# Patient Record
Sex: MALE | Race: WHITE | NOT HISPANIC OR LATINO | Employment: OTHER | URBAN - METROPOLITAN AREA
[De-identification: names, ages, dates, MRNs, and addresses within clinical notes are randomized per-mention and may not be internally consistent; named-entity substitution may affect disease eponyms.]

---

## 2017-01-03 ENCOUNTER — OFFICE VISIT (OUTPATIENT)
Dept: INTERNAL MEDICINE | Facility: CLINIC | Age: 82
End: 2017-01-03
Payer: MEDICARE

## 2017-01-03 VITALS
BODY MASS INDEX: 27.93 KG/M2 | OXYGEN SATURATION: 95 % | TEMPERATURE: 97 F | HEART RATE: 105 BPM | HEIGHT: 73 IN | WEIGHT: 210.75 LBS | DIASTOLIC BLOOD PRESSURE: 64 MMHG | SYSTOLIC BLOOD PRESSURE: 166 MMHG

## 2017-01-03 DIAGNOSIS — M51.36 DDD (DEGENERATIVE DISC DISEASE), LUMBAR: ICD-10-CM

## 2017-01-03 DIAGNOSIS — N40.0 BENIGN NON-NODULAR PROSTATIC HYPERPLASIA WITHOUT LOWER URINARY TRACT SYMPTOMS: ICD-10-CM

## 2017-01-03 DIAGNOSIS — J44.9 CHRONIC OBSTRUCTIVE PULMONARY DISEASE, UNSPECIFIED COPD TYPE: ICD-10-CM

## 2017-01-03 DIAGNOSIS — I71.40 ABDOMINAL AORTIC ANEURYSM WITHOUT RUPTURE: ICD-10-CM

## 2017-01-03 DIAGNOSIS — I10 ESSENTIAL HYPERTENSION: Chronic | ICD-10-CM

## 2017-01-03 DIAGNOSIS — E78.00 PURE HYPERCHOLESTEROLEMIA: Primary | Chronic | ICD-10-CM

## 2017-01-03 DIAGNOSIS — E29.1 HYPOGONADISM IN MALE: Primary | ICD-10-CM

## 2017-01-03 PROCEDURE — 1157F ADVNC CARE PLAN IN RCRD: CPT | Mod: S$GLB,,, | Performed by: PEDIATRICS

## 2017-01-03 PROCEDURE — 99214 OFFICE O/P EST MOD 30 MIN: CPT | Mod: 25,S$GLB,, | Performed by: PEDIATRICS

## 2017-01-03 PROCEDURE — 1125F AMNT PAIN NOTED PAIN PRSNT: CPT | Mod: S$GLB,,, | Performed by: PEDIATRICS

## 2017-01-03 PROCEDURE — 1159F MED LIST DOCD IN RCRD: CPT | Mod: S$GLB,,, | Performed by: PEDIATRICS

## 2017-01-03 PROCEDURE — 99499 UNLISTED E&M SERVICE: CPT | Mod: S$GLB,,, | Performed by: PEDIATRICS

## 2017-01-03 PROCEDURE — 99999 PR PBB SHADOW E&M-EST. PATIENT-LVL III: CPT | Mod: PBBFAC,,, | Performed by: PEDIATRICS

## 2017-01-03 PROCEDURE — 3077F SYST BP >= 140 MM HG: CPT | Mod: S$GLB,,, | Performed by: PEDIATRICS

## 2017-01-03 PROCEDURE — 3078F DIAST BP <80 MM HG: CPT | Mod: S$GLB,,, | Performed by: PEDIATRICS

## 2017-01-03 PROCEDURE — 1160F RVW MEDS BY RX/DR IN RCRD: CPT | Mod: S$GLB,,, | Performed by: PEDIATRICS

## 2017-01-03 PROCEDURE — 96372 THER/PROPH/DIAG INJ SC/IM: CPT | Mod: S$GLB,,, | Performed by: PEDIATRICS

## 2017-01-03 RX ORDER — TESTOSTERONE CYPIONATE 200 MG/ML
200 INJECTION, SOLUTION INTRAMUSCULAR
Status: DISCONTINUED | OUTPATIENT
Start: 2017-01-03 | End: 2017-03-28

## 2017-01-03 RX ADMIN — TESTOSTERONE CYPIONATE 200 MG: 200 INJECTION, SOLUTION INTRAMUSCULAR at 10:01

## 2017-01-03 NOTE — MR AVS SNAPSHOT
[facility] Internal Medicine  [address] [place] [address]  Phone: [phone]  Fax: [phone]                  Chao Hawk   1/3/2017 10:20 AM   Office Visit    Description:  Male : 1932   Provider:  MONSTER Ureña Jr., MD   Department:  [facility] Internal Medicine           Reason for Visit     Follow-up           Diagnoses this Visit        Comments    Pure hypercholesterolemia    -  Primary     Essential hypertension         Chronic obstructive pulmonary disease, unspecified COPD type         Abdominal aortic aneurysm without rupture         DDD (degenerative disc disease), lumbar         Benign non-nodular prostatic hyperplasia without lower urinary tract symptoms                To Do List           Future Appointments        Provider Department Dept Phone    2017 7:45 AM LABORATORY, [facility] [phone]    2017 8:00 AM [facility] [phone]    7/3/2017 8:00 AM MONSTER Ureña Jr., MD [facility] Internal [facility] [phone]      Goals (5 Years of Data)     None      Follow-Up and Disposition     Return in about 6 months (around 7/3/2017).    Follow-up and Disposition History      [facility]     [facility] -  Assistance  Registered nurses in the [facility] provide clinical advisement, health education, appointment booking, and other advisory services.  Call for this free service at [phone].             Medications           Message regarding Medications     Verify the changes and/or additions to your medication regime listed below are the same as discussed with your clinician today.  If any of these changes or additions are incorrect, please notify your healthcare provider.        STOP taking these medications     gabapentin (NEURONTIN) 100 MG capsule Take 1 capsule (100 mg total) by mouth every evening. Take 45 min to 1 hour before bed as may cause drowsiness    FLOVENT  "DISKUS 100 mcg/actuation inhaler INHALE 1 PUFF TWICE DAILY --RINSE MOUTH OUT WITH WATER           Verify that the below list of medications is an accurate representation of the medications you are currently taking.  If none reported, the list may be blank. If incorrect, please contact your healthcare provider. Carry this list with you in case of emergency.           Current Medications     atorvastatin (LIPITOR) 40 MG tablet Take 1 tablet (40 mg total) by mouth once daily.    epinephrine (EPIPEN) 0.3 mg/0.3 mL (1:1,000) AtIn Inject 1 each into the muscle once.    hydrochlorothiazide (HYDRODIURIL) 25 MG tablet TAKE 1 TABLET EVERY DAY    lisinopril 10 MG tablet TAKE 1 TABLET ONE TIME DAILY    meloxicam (MOBIC) 7.5 MG tablet TAKE 1 TABLET ONE TIME DAILY    tamsulosin (FLOMAX) 0.4 mg Cp24 TAKE 1 CAPSULE EVERY MORNING    tramadol-acetaminophen 37.5-325 mg (ULTRACET) 37.5-325 mg Tab Take 1 tablet by mouth 2 (two) times daily as needed for Pain.           Clinical Reference Information           Vital Signs - Last Recorded  Most recent update: 1/3/2017 10:07 AM by Kala Hoskins LPN    BP Pulse Temp Ht    (!) 166/64 (BP Location: Left arm, Patient Position: Sitting, BP Method: Manual) 105 96.5 °F (35.8 °C) (Tympanic) 6' 1" (1.854 m)    Wt SpO2 BMI    95.6 kg (210 lb 12.2 oz) 95% 27.81 kg/m2      Blood Pressure          Most Recent Value    BP  (!)  166/64      Allergies as of 1/3/2017     Bee Sting  [Honey Bee Venom Protein]    Poison Ivy Extract    Penicillins      Immunizations Administered on Date of Encounter - 1/3/2017     None      Orders Placed During Today's Visit      Normal Orders This Visit    Assign HDMP Onboarding Questionnaire Series     Hypertension Digital Medicine (HDMP)  Enrollment Order     Future Labs/Procedures Expected by Expires    ALT (SGPT)  1/3/2017 3/4/2018    Lipid panel  1/3/2017 1/3/2018    US Abdominal Aorta  1/3/2017 1/3/2018      Hypertension Digital Medicine Program Information          "     As discussed, you could benefit from enrolling in the Hypertension Digital Medicine Program. The goal of the program is to help you effectively manage your high blood pressure through an appropriate balance of medication and lifestyle changes, all from the comfort of your own home. Effectively managed blood pressure reduces your risk of having a heart attack or a stroke in the future, so you can enjoy a long and healthy life. We want to make blood pressure control your goal.        What is the Hypertension Digital Medicine Program?  Finding the right balance in managing high blood pressure is different for every person, and we will tailor our treatment approach based on your individual needs using the most current evidence-based guidelines.  As a participant, you will be able to send home blood pressure readings, on your schedule, directly into your medical record at [facility]. I, along with a team of pharmacists, will monitor this data, help you adjust your medication(s) and/or make lifestyle recommendations to better manage your hypertension.       What are the requirements of the program?   Participating in the program is as easy as 1 - 2 - 3.   1. Smartphone - You must have your own smartphone to participate (either an iPhone or an Android phone such as Access UK, LumiGrow, HTC, NaturalMotion, SpringSource, or [employer] Biosystems).    2. [facility] account - [facility] offers a great way to connect through the online patient portal, [facility], which is free and provides you access to your [facility] medical record.  3. Digital blood pressure cuff - Using this blood pressure cuff that hooks up to your smartphone, you will be able to send in your home blood pressure readings to the Hypertension Digital Medicine Team. We have made arrangements to offer blood pressure cuffs at a discount for our programs participants.           What can I do to get started?   Complete the Hypertension Digital Medicine Patient Consent questionnaire already  "available in your [facility] account. To access and complete this questionnaire, either  - Use the [facility] website on a computer and select My Medical Record, then Questionnaires.  - Use the [employer] kim on your smartphone and select "Questionnaires".    Once you have given consent, additional onboarding questionnaires will be assigned to you to complete prior to starting the program. You must return to the "Questionnaires" page of your [facility] account to start the additional questionnaires.     How do I purchase a digital blood pressure cuff and obtain a discount?  [facility] has negotiated a discounted price from industry leading healthcare technology companies. Patients using an Apple iPhone can purchase an ecoATM Ease Blood Pressure cuff for $33 (originally $39.99). While supplies last, Android users can purchase the Logic Instrument Blood Pressure Monitor for $45 (originally $129.95).  Purchase locations will be sent once all onboarding questionnaires have been completed (see above).    If you have any questions regarding this program or would like more information, please visit our Hypertension Digital Medicine website ([email or web address]) or call Digital Medicine Patient Support at [phone].          "

## 2017-01-03 NOTE — PROGRESS NOTES
Subjective:        Patient ID: Chao Hawk is a 83 y.o. male.      Chief Complaint: Follow-up      HPI Comments: HTN: B/P normal, no HTNive symptoms  LIPIDS:following D&E, tolerating and compliant with med(s).    BPH quiet  COPD:Does well with meds. Flovent causes so mucous congestion.  Low T: fell off depo testosterone.  Back much improved with daily exercise and tylenol, not limiting daily activities.  AAA: u/s stable  LABS REVIEWED AND DISCUSSED WITH PATIENT      Review of Systems   Constitutional: Negative for fever and unexpected weight change.   HENT: Negative for congestion and rhinorrhea.   Eyes: Negative for discharge and redness.   Respiratory: Negative for cough and wheezing.   Cardiovascular: Negative for chest pain, palpitations and leg swelling.   Gastrointestinal: Negative for vomiting or pain. But he gets alternating diarrhea and constipation.   Endocrine: Negative for cold intolerance, heat intolerance, polydipsia, polyphagia and polyuria.   Genitourinary: Negative for decreased urine volume and difficulty urinating.   Musculoskeletal: Positive for back pain. Negative for joint swelling and arthralgias.   Skin: Negative for rash and wound.   Neurological: Negative for syncope and headaches.   Psychiatric/Behavioral: Negative for behavioral problems and sleep disturbance.       Objective:        Physical Exam   Constitutional: He is oriented to person, place, and time. He appears well-developed and well-nourished. No distress.   Neck: Trachea normal and normal range of motion. Neck supple. No JVD present. No thyromegaly present.   Cardiovascular: Normal rate, regular rhythm, S1 normal, S2 normal, normal heart sounds and normal pulses. Exam reveals no gallop and no friction rub.    No murmur heard.  Pulmonary/Chest: Effort normal and breath sounds normal. He has no wheezes. He has no rales.   Abdominal: Soft. Normal appearance and bowel sounds are normal. He exhibits no mass. There is no  hepatosplenomegaly. There is no tenderness. There is no rebound and no guarding.   Musculoskeletal: Normal range of motion. He exhibits no edema.   ROM is mildly restricted in lumbar flexion, no SLR pain, no muscle spasm.   Lymphadenopathy:   He has no cervical adenopathy.   Neurological: He is alert and oriented to person, place, and time. He has normal strength. Coordination and gait normal.   Skin: Skin is warm and intact. No rash noted.   Psychiatric: He has a normal mood and affect. His speech is normal and behavior is normal. Judgment and thought content normal.       Assessment:        1.  Chronic obstructive pulmonary disease, unspecified COPD type     2.  Low testosterone     3.  Essential hypertension     4.  Pure hypercholesterolemia     5.  Low back pain without sciatica, unspecified back pain laterality         Plan:        He is doing well overall. Continue meds and monthly depoT. Try to rinse mouth out with water, if not improved, try spiriva. D&E, weight loss. F/U 6 months with labs(u/s AAA yearly).

## 2017-01-19 ENCOUNTER — PATIENT MESSAGE (OUTPATIENT)
Dept: INTERNAL MEDICINE | Facility: CLINIC | Age: 82
End: 2017-01-19

## 2017-01-20 ENCOUNTER — PATIENT MESSAGE (OUTPATIENT)
Dept: INTERNAL MEDICINE | Facility: CLINIC | Age: 82
End: 2017-01-20

## 2017-01-20 ENCOUNTER — TELEPHONE (OUTPATIENT)
Dept: INTERNAL MEDICINE | Facility: CLINIC | Age: 82
End: 2017-01-20

## 2017-01-22 ENCOUNTER — PATIENT MESSAGE (OUTPATIENT)
Dept: INTERNAL MEDICINE | Facility: CLINIC | Age: 82
End: 2017-01-22

## 2017-01-30 ENCOUNTER — PATIENT MESSAGE (OUTPATIENT)
Dept: INTERNAL MEDICINE | Facility: CLINIC | Age: 82
End: 2017-01-30

## 2017-02-24 ENCOUNTER — PATIENT MESSAGE (OUTPATIENT)
Dept: INTERNAL MEDICINE | Facility: CLINIC | Age: 82
End: 2017-02-24

## 2017-02-24 DIAGNOSIS — J44.9 CHRONIC OBSTRUCTIVE PULMONARY DISEASE, UNSPECIFIED COPD TYPE: Primary | ICD-10-CM

## 2017-02-28 ENCOUNTER — OFFICE VISIT (OUTPATIENT)
Dept: INTERNAL MEDICINE | Facility: CLINIC | Age: 82
End: 2017-02-28
Payer: MEDICARE

## 2017-02-28 VITALS
WEIGHT: 206.25 LBS | SYSTOLIC BLOOD PRESSURE: 122 MMHG | DIASTOLIC BLOOD PRESSURE: 74 MMHG | BODY MASS INDEX: 27.33 KG/M2 | TEMPERATURE: 98 F | HEIGHT: 73 IN

## 2017-02-28 DIAGNOSIS — E78.00 PURE HYPERCHOLESTEROLEMIA: Chronic | ICD-10-CM

## 2017-02-28 DIAGNOSIS — M51.36 DDD (DEGENERATIVE DISC DISEASE), LUMBAR: ICD-10-CM

## 2017-02-28 DIAGNOSIS — R79.89 LOW TESTOSTERONE: ICD-10-CM

## 2017-02-28 DIAGNOSIS — I70.0 ATHEROSCLEROSIS OF AORTA: ICD-10-CM

## 2017-02-28 DIAGNOSIS — J44.9 CHRONIC OBSTRUCTIVE PULMONARY DISEASE, UNSPECIFIED COPD TYPE: Primary | ICD-10-CM

## 2017-02-28 DIAGNOSIS — N40.0 BENIGN NON-NODULAR PROSTATIC HYPERPLASIA WITHOUT LOWER URINARY TRACT SYMPTOMS: ICD-10-CM

## 2017-02-28 DIAGNOSIS — I10 ESSENTIAL HYPERTENSION: Chronic | ICD-10-CM

## 2017-02-28 DIAGNOSIS — I71.40 ABDOMINAL AORTIC ANEURYSM WITHOUT RUPTURE: ICD-10-CM

## 2017-02-28 PROCEDURE — 99213 OFFICE O/P EST LOW 20 MIN: CPT | Mod: 25,S$GLB,, | Performed by: PHYSICIAN ASSISTANT

## 2017-02-28 PROCEDURE — 1157F ADVNC CARE PLAN IN RCRD: CPT | Mod: S$GLB,,, | Performed by: PHYSICIAN ASSISTANT

## 2017-02-28 PROCEDURE — 99499 UNLISTED E&M SERVICE: CPT | Mod: S$GLB,,, | Performed by: PHYSICIAN ASSISTANT

## 2017-02-28 PROCEDURE — 3074F SYST BP LT 130 MM HG: CPT | Mod: S$GLB,,, | Performed by: PHYSICIAN ASSISTANT

## 2017-02-28 PROCEDURE — 99999 PR PBB SHADOW E&M-EST. PATIENT-LVL III: CPT | Mod: PBBFAC,,, | Performed by: PHYSICIAN ASSISTANT

## 2017-02-28 PROCEDURE — 1160F RVW MEDS BY RX/DR IN RCRD: CPT | Mod: S$GLB,,, | Performed by: PHYSICIAN ASSISTANT

## 2017-02-28 PROCEDURE — 3078F DIAST BP <80 MM HG: CPT | Mod: S$GLB,,, | Performed by: PHYSICIAN ASSISTANT

## 2017-02-28 PROCEDURE — 1159F MED LIST DOCD IN RCRD: CPT | Mod: S$GLB,,, | Performed by: PHYSICIAN ASSISTANT

## 2017-02-28 PROCEDURE — 96372 THER/PROPH/DIAG INJ SC/IM: CPT | Mod: S$GLB,,, | Performed by: PHYSICIAN ASSISTANT

## 2017-02-28 RX ADMIN — TESTOSTERONE CYPIONATE 200 MG: 200 INJECTION, SOLUTION INTRAMUSCULAR at 10:02

## 2017-02-28 NOTE — PROGRESS NOTES
"Chao Hawk presented for a  Medicare AWV and comprehensive Health Risk Assessment today. The following components were reviewed and updated:    · Medical history  · Family History  · Social history  · Allergies and Current Medications  · Health Risk Assessment  · Health Maintenance  · Care Team   He was called in by Dr. collado for his annual HRA exam.  He has no problems today.  He is supposed to get his testosterone shot this morning after this exam is done.  See Completed Assessments for Annual Wellness Visit within the encounter summary.       The following assessments were completed:  · Living Situation  · CAGE  · Depression Screening  · Timed Get Up and Go  · Whisper Test  · Cognitive Function Screening  · Nutrition Screening  · ADL Screening  · PAQ Screening    Vitals:    02/28/17 1002   BP: 122/74   BP Location: Right arm   Patient Position: Sitting   BP Method: Manual   Temp: 97.5 °F (36.4 °C)   TempSrc: Tympanic   Weight: 93.6 kg (206 lb 3.9 oz)   Height: 6' 1" (1.854 m)     Body mass index is 27.21 kg/(m^2).  Physical Exam   Constitutional: He is oriented to person, place, and time. He appears well-developed and well-nourished.   HENT:   Head: Normocephalic and atraumatic.   Right Ear: External ear normal.   Left Ear: External ear normal.   Nose: Nose normal.   Mouth/Throat: Oropharynx is clear and moist. No oropharyngeal exudate.   Eyes: Conjunctivae and EOM are normal. Pupils are equal, round, and reactive to light. No scleral icterus.   Neck: Normal range of motion. Neck supple. No JVD present. No tracheal deviation present. No thyromegaly present.   Cardiovascular: Normal rate, regular rhythm, normal heart sounds and intact distal pulses.  Exam reveals no gallop and no friction rub.    No murmur heard.  Pulmonary/Chest: Effort normal and breath sounds normal. No respiratory distress. He has no wheezes. He has no rales. He exhibits no tenderness.   Abdominal: Soft. Bowel sounds are normal. He exhibits " no mass. There is no tenderness. There is no rebound and no guarding.   Genitourinary:   Genitourinary Comments: This portion of the exam was not accomplished today.   Musculoskeletal: Normal range of motion. He exhibits no edema or tenderness.   Lymphadenopathy:     He has no cervical adenopathy.   Neurological: He is alert and oriented to person, place, and time. He has normal reflexes. He displays normal reflexes. No cranial nerve deficit. Coordination normal.   Skin: Skin is warm and dry. No rash noted. He is not diaphoretic. No erythema.   Psychiatric: He has a normal mood and affect. His behavior is normal. Judgment and thought content normal.   Nursing note and vitals reviewed.        Diagnoses and health risks identified today and associated recommendations/orders:    1. Chronic obstructive pulmonary disease, unspecified COPD type  This problem is adequately taking care of by his PCP Dr. Percy Ureña M.D.    2. Essential hypertension  Check answer to #1.    3. Atherosclerosis of aorta  Check answer to #1.    4. Abdominal aortic aneurysm without rupture  Check answer to #1.    5. Benign non-nodular prostatic hyperplasia without lower urinary tract symptoms  Check answer to #1.    6. Pure hypercholesterolemia  Check answer to #1.    7. DDD (degenerative disc disease), lumbar  Check answer to #1.    8. Low testosterone  Check answer to #1.      Provided Chao with a 5-10 year written screening schedule and personal prevention plan. Recommendations were developed using the USPSTF age appropriate recommendations. Education, counseling, and referrals were provided as needed. After Visit Summary printed and given to patient which includes a list of additional screenings\tests needed.    Dr. ureña keeps him up to date on everything necessary and there is no need to do any ancillary studies today at today's visit.    Return in about 6 months (around 8/28/2017).    Fausto Darling PA-C

## 2017-02-28 NOTE — MR AVS SNAPSHOT
[facility] Internal Medicine  [address] [place] [address]  Phone: [phone]  Fax: [phone]                  Chao Hawk   2017 10:00 AM   Office Visit    Description:  Male : 1932   Provider:  Fausto Darling PA-C   Department:  [facility] Internal Medicine           Reason for Visit     HRA           Diagnoses this Visit        Comments    Chronic obstructive pulmonary disease, unspecified COPD type    -  Primary     Essential hypertension         Atherosclerosis of aorta         Abdominal aortic aneurysm without rupture         Benign non-nodular prostatic hyperplasia without lower urinary tract symptoms         Pure hypercholesterolemia         DDD (degenerative disc disease), lumbar         Low testosterone                To Do List           Future Appointments        Provider Department Dept Phone    3/28/2017 9:30 AM INTERNAL MEDICINE NURSE, [facility] [phone]    2017 7:45 AM LAB[facility]ATORY, [facility] [phone]    2017 8:00 AM [facility] [phone]    7/3/2017 8:00 AM MONSTER Ureña Jr., MD [facility] [phone]      Goals (5 Years of Data)     None      Follow-Up and Disposition     Discussed this visit Return in about 6 months (around 2017).      [facility]     [facility] Nurse [facility] Line - 24/7 Assistance  Registered nurses in the [facility] provide clinical advisement, health education, appointment booking, and other advisory services.  Call for this free service at [phone].             Medications           Message regarding Medications     Verify the changes and/or additions to your medication regime listed below are the same as discussed with your clinician today.  If any of these changes or additions are incorrect, please notify your healthcare provider.             Verify that the below list of medications is an accurate  representation of the medications you are currently taking.  If none reported, the list may be blank. If incorrect, please contact your healthcare provider. Carry this list with you in case of emergency.           Current Medications     atorvastatin (LIPITOR) 40 MG tablet Take 1 tablet (40 mg total) by mouth once daily.    beclomethasone (QVAR) 80 mcg/actuation Aero Inhale 1 puff into the lungs 2 (two) times daily. Controller    epinephrine (EPIPEN) 0.3 mg/0.3 mL (1:1,000) AtIn Inject 1 each into the muscle once.    hydrochlorothiazide (HYDRODIURIL) 25 MG tablet TAKE 1 TABLET EVERY DAY    lisinopril 10 MG tablet TAKE 1 TABLET ONE TIME DAILY    meloxicam (MOBIC) 7.5 MG tablet TAKE 1 TABLET ONE TIME DAILY    tamsulosin (FLOMAX) 0.4 mg Cp24 TAKE 1 CAPSULE EVERY MORNING           Clinical Reference Information           Your Vitals Were     BP                   122/74 (BP Location: Right arm, Patient Position: Sitting, BP Method: Manual)           Blood Pressure          Most Recent Value    BP  122/74      Allergies as of 2/28/2017     Bee Sting  [Allergen Ext-venom-honey Bee]    Poison Ivy Extract    Penicillins      Immunizations Administered on Date of Encounter - 2/28/2017     None      Administrations This Visit     testosterone cypionate injection 200 mg     Admin Date Action Dose Route Administered By             02/28/2017 Given 200 mg Intramuscular Kala Hoskins LPN                      Language Assistance Services     ATTENTION: Language assistance services are available, free of charge. Please call [phone].      ATENCIÓN: Si habla español, tiene a han disposición servicios gratuitos de asistencia lingüística. Llame al [phone].     CHÚ Ý: N?u b?n nói Ti?ng Vi?t, có các d?ch v? h? tr? ngôn ng? mi?n phí dành cho b?n. G?i s? [phone].         [facility] complies with applicable Federal civil rights laws and does not discriminate on the basis of race, color, national origin,  age, disability, or sex.

## 2017-02-28 NOTE — PROGRESS NOTES
Testosterone injection given. Patient tolerated well. Advised to wait 15min in lobby to check reaction. He verbalized understanding.

## 2017-03-02 RX ORDER — FLUTICASONE PROPIONATE 110 UG/1
1 AEROSOL, METERED RESPIRATORY (INHALATION) 2 TIMES DAILY
Qty: 36 G | Refills: 3 | Status: SHIPPED | OUTPATIENT
Start: 2017-03-02 | End: 2018-01-17

## 2017-03-10 ENCOUNTER — PATIENT MESSAGE (OUTPATIENT)
Dept: INTERNAL MEDICINE | Facility: CLINIC | Age: 82
End: 2017-03-10

## 2017-03-27 ENCOUNTER — PATIENT MESSAGE (OUTPATIENT)
Dept: INTERNAL MEDICINE | Facility: CLINIC | Age: 82
End: 2017-03-27

## 2017-03-27 DIAGNOSIS — G57.00 PYRIFORMIS SYNDROME, UNSPECIFIED LATERALITY: Primary | ICD-10-CM

## 2017-03-27 RX ORDER — CELECOXIB 100 MG/1
100 CAPSULE ORAL 2 TIMES DAILY
Qty: 60 CAPSULE | Refills: 1 | Status: SHIPPED | OUTPATIENT
Start: 2017-03-27 | End: 2017-05-16

## 2017-03-28 ENCOUNTER — CLINICAL SUPPORT (OUTPATIENT)
Dept: INTERNAL MEDICINE | Facility: CLINIC | Age: 82
End: 2017-03-28
Payer: MEDICARE

## 2017-03-28 DIAGNOSIS — E29.1 HYPOGONADISM IN MALE: ICD-10-CM

## 2017-03-28 PROCEDURE — 96372 THER/PROPH/DIAG INJ SC/IM: CPT | Mod: S$GLB,,, | Performed by: PEDIATRICS

## 2017-03-28 PROCEDURE — 99999 PR PBB SHADOW E&M-EST. PATIENT-LVL II: CPT | Mod: PBBFAC,,,

## 2017-03-28 RX ORDER — TESTOSTERONE CYPIONATE 200 MG/ML
200 INJECTION, SOLUTION INTRAMUSCULAR
Status: DISCONTINUED | OUTPATIENT
Start: 2017-03-28 | End: 2017-07-03

## 2017-03-28 RX ADMIN — TESTOSTERONE CYPIONATE 200 MG: 200 INJECTION, SOLUTION INTRAMUSCULAR at 09:03

## 2017-04-27 ENCOUNTER — TELEPHONE (OUTPATIENT)
Dept: INTERNAL MEDICINE | Facility: CLINIC | Age: 82
End: 2017-04-27

## 2017-04-27 NOTE — TELEPHONE ENCOUNTER
Returned call to pt, says he's usually in pain after his therapy session, and would like to reschedule today's nurse visit to tomorrow. I advised him that I would not be in the office tomorrow to administer his injection, and offered Tuesday. He states that he has another therapy session on Tuesday as well. Called pt to inform him that Sonja has agreed to give injection. He verbalized understanding.//rlt

## 2017-04-27 NOTE — TELEPHONE ENCOUNTER
----- Message from Benja Shah sent at 4/27/2017  8:16 AM CDT -----  Contact: pt  He's calling in regards to rescheduling his nurses visit Monday 5/1/17, please advise, [phone] (home)

## 2017-04-28 ENCOUNTER — CLINICAL SUPPORT (OUTPATIENT)
Dept: INTERNAL MEDICINE | Facility: CLINIC | Age: 82
End: 2017-04-28
Payer: MEDICARE

## 2017-04-28 PROCEDURE — 99999 PR PBB SHADOW E&M-EST. PATIENT-LVL II: CPT | Mod: PBBFAC,,,

## 2017-04-28 PROCEDURE — 96372 THER/PROPH/DIAG INJ SC/IM: CPT | Mod: S$GLB,,, | Performed by: PEDIATRICS

## 2017-04-28 RX ADMIN — TESTOSTERONE CYPIONATE 200 MG: 200 INJECTION, SOLUTION INTRAMUSCULAR at 09:04

## 2017-04-28 NOTE — PROGRESS NOTES
Patient came in for depo testosterone. Administered injection. Instructed patient to wait 15 minutes for any adverse reactions. Patient verbalized understanding.

## 2017-05-02 ENCOUNTER — PATIENT MESSAGE (OUTPATIENT)
Dept: INTERNAL MEDICINE | Facility: CLINIC | Age: 82
End: 2017-05-02

## 2017-05-04 ENCOUNTER — OFFICE VISIT (OUTPATIENT)
Dept: INTERNAL MEDICINE | Facility: CLINIC | Age: 82
End: 2017-05-04
Payer: MEDICARE

## 2017-05-04 VITALS
WEIGHT: 195.31 LBS | TEMPERATURE: 97 F | BODY MASS INDEX: 25.88 KG/M2 | SYSTOLIC BLOOD PRESSURE: 126 MMHG | OXYGEN SATURATION: 98 % | HEART RATE: 91 BPM | DIASTOLIC BLOOD PRESSURE: 80 MMHG | HEIGHT: 73 IN

## 2017-05-04 DIAGNOSIS — E78.5 HYPERLIPIDEMIA, UNSPECIFIED HYPERLIPIDEMIA TYPE: ICD-10-CM

## 2017-05-04 DIAGNOSIS — M25.551 PAIN OF RIGHT HIP JOINT: Primary | ICD-10-CM

## 2017-05-04 PROCEDURE — 99499 UNLISTED E&M SERVICE: CPT | Mod: S$GLB,,, | Performed by: PEDIATRICS

## 2017-05-04 PROCEDURE — 99999 PR PBB SHADOW E&M-EST. PATIENT-LVL III: CPT | Mod: PBBFAC,,, | Performed by: PEDIATRICS

## 2017-05-04 PROCEDURE — 1157F ADVNC CARE PLAN IN RCRD: CPT | Mod: 8P,S$GLB,, | Performed by: PEDIATRICS

## 2017-05-04 PROCEDURE — 1125F AMNT PAIN NOTED PAIN PRSNT: CPT | Mod: S$GLB,,, | Performed by: PEDIATRICS

## 2017-05-04 PROCEDURE — 1160F RVW MEDS BY RX/DR IN RCRD: CPT | Mod: S$GLB,,, | Performed by: PEDIATRICS

## 2017-05-04 PROCEDURE — 1159F MED LIST DOCD IN RCRD: CPT | Mod: S$GLB,,, | Performed by: PEDIATRICS

## 2017-05-04 PROCEDURE — 3079F DIAST BP 80-89 MM HG: CPT | Mod: S$GLB,,, | Performed by: PEDIATRICS

## 2017-05-04 PROCEDURE — 3074F SYST BP LT 130 MM HG: CPT | Mod: S$GLB,,, | Performed by: PEDIATRICS

## 2017-05-04 PROCEDURE — 99213 OFFICE O/P EST LOW 20 MIN: CPT | Mod: S$GLB,,, | Performed by: PEDIATRICS

## 2017-05-04 RX ORDER — METHOCARBAMOL 500 MG/1
500 TABLET, FILM COATED ORAL 3 TIMES DAILY PRN
Qty: 40 TABLET | Refills: 0 | Status: SHIPPED | OUTPATIENT
Start: 2017-05-04 | End: 2017-05-16

## 2017-05-04 RX ORDER — ATORVASTATIN CALCIUM 40 MG/1
40 TABLET, FILM COATED ORAL DAILY
Qty: 90 TABLET | Refills: 3 | Status: SHIPPED | OUTPATIENT
Start: 2017-05-04 | End: 2018-01-17 | Stop reason: SDUPTHER

## 2017-05-04 NOTE — PROGRESS NOTES
Subjective:       Patient ID: Chao Hawk is a 85 y.o. male.    Chief Complaint: Generalized pain    HPI Comments: He has been in PT for his back and hips. Friday, PT was aggressively working and he developed right and lesser degree left hip pain at base of leg. Back leg muscles and posterior knees hurt. Stepping, sitting worsens. Interferes with sleep.    Review of Systems   Genitourinary: Negative for dysuria, frequency and urgency.   Musculoskeletal: Positive for arthralgias, back pain and gait problem.       Objective:      Physical Exam   Constitutional: He is oriented to person, place, and time. He appears well-developed and well-nourished. No distress.   Musculoskeletal: He exhibits no edema.   With his 10 pound weight loss, he has lost fat pads of buttocks. He is tender at posterior hip at ischial tuberosity. This mimics pain. ROM good, but flexion of hip illicits pain.   Neurological: He is alert and oriented to person, place, and time.   Psychiatric: He has a normal mood and affect. His behavior is normal. Judgment and thought content normal.       Assessment:       1. Pain of right hip joint    2. Hyperlipidemia, unspecified hyperlipidemia type        Plan:       Pain of right hip joint  -     methocarbamol (ROBAXIN) 500 MG Tab; Take 1 tablet (500 mg total) by mouth 3 (three) times daily as needed.  Dispense: 40 tablet; Refill: 0    Hyperlipidemia, unspecified hyperlipidemia type  -     atorvastatin (LIPITOR) 40 MG tablet; Take 1 tablet (40 mg total) by mouth once daily.  Dispense: 90 tablet; Refill: 3    Padding when sitting. See PT for needling and add muscle relaxant. If not better then xray.

## 2017-05-04 NOTE — MR AVS SNAPSHOT
[facility]  [address] [place] [address]  Phone: [phone]  Fax: [phone]                  Chao Hawk   2017 2:40 PM   Office Visit    Description:  Male : 1932   Provider:  MONSTER Bell Jr., MD   Department:  [facility] - Internal Medicine           Reason for Visit     Generalized pain           Diagnoses this Visit        Comments    Pain of right hip joint    -  Primary     Hyperlipidemia, unspecified hyperlipidemia type                To Do List           Future Appointments        Provider Department Dept Phone    2017 9:30 AM NURSE SCHEDULE, [facility] [phone]    2017 9:30 AM NURSE SCHEDULE, [facility] [phone]    2017 7:45 AM LABORATORY, [facility] [phone]    2017 8:00 AM [facility] [phone]    7/3/2017 8:00 AM MONSTER Bell Jr., MD [facility] [phone]      Goals (5 Years of Data)     None      Follow-Up and Disposition     Follow-up and Disposition History       These Medications        Disp Refills Start End    methocarbamol (ROBAXIN) 500 MG Tab 40 tablet 0 2017    Take 1 tablet (500 mg total) by mouth 3 (three) times daily as needed. - Oral    [id number] [place] [facility] [address] Ph #: [phone]       atorvastatin (LIPITOR) 40 MG tablet 90 tablet 3 2017    Take 1 tablet (40 mg total) by mouth once daily. - Oral    Pharmacy: [facility] [address] [place] [address] Ph #: [phone]         [facility]     [facility] -  Assistance  Unless otherwise directed by your provider, please contact [facility], our nurse care line that is available for  assistance.     Registered nurses in the [facility] Center provide: appointment scheduling,  "clinical advisement, health education, and other advisory services.  Call: [phone] (toll free)               Medications           Message regarding Medications     Verify the changes and/or additions to your medication regime listed below are the same as discussed with your clinician today.  If any of these changes or additions are incorrect, please notify your healthcare provider.        START taking these NEW medications        Refills    methocarbamol (ROBAXIN) 500 MG Tab 0    Sig: Take 1 tablet (500 mg total) by mouth 3 (three) times daily as needed.    Class: Normal    Route: Oral           Verify that the below list of medications is an accurate representation of the medications you are currently taking.  If none reported, the list may be blank. If incorrect, please contact your healthcare provider. Carry this list with you in case of emergency.           Current Medications     atorvastatin (LIPITOR) 40 MG tablet Take 1 tablet (40 mg total) by mouth once daily.    celecoxib (CELEBREX) 100 MG capsule Take 1 capsule (100 mg total) by mouth 2 (two) times daily.    epinephrine (EPIPEN) 0.3 mg/0.3 mL (1:1,000) AtIn Inject 1 each into the muscle once.    fluticasone (FLOVENT HFA) 110 mcg/actuation inhaler Inhale 1 puff into the lungs 2 (two) times daily. Controller    hydrochlorothiazide (HYDRODIURIL) 25 MG tablet TAKE 1 TABLET EVERY DAY    lisinopril 10 MG tablet TAKE 1 TABLET ONE TIME DAILY    tamsulosin (FLOMAX) 0.4 mg Cp24 TAKE 1 CAPSULE EVERY MORNING    methocarbamol (ROBAXIN) 500 MG Tab Take 1 tablet (500 mg total) by mouth 3 (three) times daily as needed.           Clinical Reference Information           Your Vitals Were     BP Pulse Temp Height    126/80 (BP Location: Left arm, Patient Position: Sitting, BP Method: Manual) 91 97.4 °F (36.3 °C) (Tympanic) 6' 1" (1.854 m)    Weight SpO2 BMI    88.6 kg (195 lb 5.2 oz) 98% 25.77 kg/m2      Blood Pressure          Most Recent Value    BP  126/80    "   Allergies as of 5/4/2017     Bee Sting  [Allergen Ext-venom-honey Bee]    Poison Ivy Extract    Penicillins      Immunizations Administered on Date of Encounter - 5/4/2017     None      Language Assistance Services     ATTENTION: Language assistance services are available, free of charge. Please call [phone].      ATENCIÓN: Si estefanía west, tiene a han disposición servicios gratuitos de asistencia lingüística. Llame al [phone].     CHÚ Ý: N?u b?n nói Ti?ng Vi?t, có các d?ch v? h? tr? ngôn ng? mi?n phí dành cho b?n. G?i s? [phone].         [facility] complies with applicable Federal civil rights laws and does not discriminate on the basis of race, color, national origin, age, disability, or sex.

## 2017-05-15 ENCOUNTER — PATIENT MESSAGE (OUTPATIENT)
Dept: INTERNAL MEDICINE | Facility: CLINIC | Age: 82
End: 2017-05-15

## 2017-05-15 DIAGNOSIS — M51.36 DDD (DEGENERATIVE DISC DISEASE), LUMBAR: Primary | ICD-10-CM

## 2017-05-16 ENCOUNTER — PATIENT MESSAGE (OUTPATIENT)
Dept: INTERNAL MEDICINE | Facility: CLINIC | Age: 82
End: 2017-05-16

## 2017-05-16 ENCOUNTER — TELEPHONE (OUTPATIENT)
Dept: INTERNAL MEDICINE | Facility: CLINIC | Age: 82
End: 2017-05-16

## 2017-05-16 RX ORDER — ETODOLAC 400 MG/1
400 TABLET, FILM COATED ORAL 2 TIMES DAILY
Qty: 60 TABLET | Refills: 0 | Status: SHIPPED | OUTPATIENT
Start: 2017-05-16 | End: 2017-06-15

## 2017-05-16 RX ORDER — CYCLOBENZAPRINE HCL 10 MG
10 TABLET ORAL 3 TIMES DAILY PRN
Qty: 30 TABLET | Refills: 0 | Status: SHIPPED | OUTPATIENT
Start: 2017-05-16 | End: 2017-05-26

## 2017-05-16 NOTE — TELEPHONE ENCOUNTER
----- Message from Carmen Rangel sent at 5/16/2017  8:08 AM CDT -----  Contact: Marge, Pharmacist with [facility]   [phone] ext [phone]  Recd escripts received this morning came through as faxes instead without any information at all so she has no info at all to go by.

## 2017-05-16 NOTE — TELEPHONE ENCOUNTER
Returned call to pharmacy.  [facility] did not process correctly. Verbal order given for Flexeril and Lodine.//rlt

## 2017-05-18 ENCOUNTER — TELEPHONE (OUTPATIENT)
Dept: INTERNAL MEDICINE | Facility: CLINIC | Age: 82
End: 2017-05-18

## 2017-05-18 NOTE — TELEPHONE ENCOUNTER
Returned call to patient regarding referral to PM. Appt scheduled. He verbalized understanding of time/date.//rlt

## 2017-05-18 NOTE — TELEPHONE ENCOUNTER
----- Message from Asaf Jacobson sent at 5/18/2017 10:22 AM CDT -----  Contact: Pt   Pt is requesting to speak with the nurse in regards to getting him in with pain management./ Please advise [phone] (home)

## 2017-05-22 ENCOUNTER — OFFICE VISIT (OUTPATIENT)
Dept: PAIN MEDICINE | Facility: CLINIC | Age: 82
End: 2017-05-22
Payer: MEDICARE

## 2017-05-22 VITALS
WEIGHT: 195 LBS | DIASTOLIC BLOOD PRESSURE: 67 MMHG | BODY MASS INDEX: 25.84 KG/M2 | HEIGHT: 73 IN | SYSTOLIC BLOOD PRESSURE: 115 MMHG | HEART RATE: 108 BPM | RESPIRATION RATE: 16 BRPM

## 2017-05-22 DIAGNOSIS — M54.16 BILATERAL LUMBAR RADICULOPATHY: Primary | ICD-10-CM

## 2017-05-22 DIAGNOSIS — M51.36 DDD (DEGENERATIVE DISC DISEASE), LUMBAR: ICD-10-CM

## 2017-05-22 DIAGNOSIS — M48.061 STENOSIS, SPINAL, LUMBAR: ICD-10-CM

## 2017-05-22 DIAGNOSIS — M43.16 SPONDYLOLISTHESIS OF LUMBAR REGION: ICD-10-CM

## 2017-05-22 DIAGNOSIS — M47.817 SPONDYLOSIS OF LUMBOSACRAL REGION WITHOUT MYELOPATHY OR RADICULOPATHY: ICD-10-CM

## 2017-05-22 PROCEDURE — 1125F AMNT PAIN NOTED PAIN PRSNT: CPT | Mod: S$GLB,,, | Performed by: ANESTHESIOLOGY

## 2017-05-22 PROCEDURE — 99499 UNLISTED E&M SERVICE: CPT | Mod: S$GLB,,, | Performed by: ANESTHESIOLOGY

## 2017-05-22 PROCEDURE — 99213 OFFICE O/P EST LOW 20 MIN: CPT | Mod: S$GLB,,, | Performed by: ANESTHESIOLOGY

## 2017-05-22 PROCEDURE — 1160F RVW MEDS BY RX/DR IN RCRD: CPT | Mod: S$GLB,,, | Performed by: ANESTHESIOLOGY

## 2017-05-22 PROCEDURE — 99999 PR PBB SHADOW E&M-EST. PATIENT-LVL III: CPT | Mod: PBBFAC,,, | Performed by: ANESTHESIOLOGY

## 2017-05-22 PROCEDURE — 3074F SYST BP LT 130 MM HG: CPT | Mod: S$GLB,,, | Performed by: ANESTHESIOLOGY

## 2017-05-22 PROCEDURE — 1159F MED LIST DOCD IN RCRD: CPT | Mod: S$GLB,,, | Performed by: ANESTHESIOLOGY

## 2017-05-22 PROCEDURE — 3078F DIAST BP <80 MM HG: CPT | Mod: S$GLB,,, | Performed by: ANESTHESIOLOGY

## 2017-05-22 RX ORDER — TRAMADOL HYDROCHLORIDE AND ACETAMINOPHEN 37.5; 325 MG/1; MG/1
1 TABLET, FILM COATED ORAL 2 TIMES DAILY PRN
Qty: 60 TABLET | Refills: 0 | Status: SHIPPED | OUTPATIENT
Start: 2017-05-22 | End: 2017-06-21

## 2017-05-22 NOTE — LETTER
May 23, 2017      MONSTER Ureña Jr., MD  [address]  [place] [address]           [facility]  [address] [place] [address]  Phone: [phone]  Fax: [phone]          Patient: Chao Hawk   MR Number: [id number]   YOB: 1932   Date of Visit: 5/22/2017       Dear Dr. MONSTER Ureña Jr.:    Thank you for referring Chao Hawk to me for evaluation. Attached you will find relevant portions of my assessment and plan of care.    If you have questions, please do not hesitate to call me. I look forward to following Chao Hawk along with you.    Sincerely,    Landon Hurtado MD    Enclosure  CC:  No Recipients    If you would like to receive this communication electronically, please contact [email or web address] or [phone] to request more information on [employer] Link access.    For providers and/or their staff who would like to refer a patient to [facility], please contact us through our one-stop-shop provider referral line, [facility], at [phone].    If you feel you have received this communication in error or would no longer like to receive these types of communications, please e-mail [email or web address]

## 2017-05-22 NOTE — PROGRESS NOTES
Chief Pain Complaint:  Low Back Pain, Bilateral Leg Pain    History of Present Illness:  This patient is a 85 y.o. male who presents today complaining of the above noted pain/s. The patient describes the pain as follows.    - duration of pain: > 3 months   - timing: constant   - character: aching, sharp  - radiating, dermatomal: extends into bilateral lower extremities  - antecedent trauma, prior spinal surgery: no prior trauma, no prior spinal surgery   - pertinent negatives: No fever, No chills, No weight loss, No bladder dysfunction, No bowel dysfunction, No saddle anesthesia  - pertinent positives: generalized nonspecific Lower Extremity weakness bilaterally    - medications, other therapies tried (physical therapy, injections):     >> Tylenol, NSAIDs, ultracet, gabapentin    >> Has previously undergone Physical Therapy    >> Has previously undergone spinal injection/s, lumbar mbb's        Imaging / Labs / Studies (reviewed on 5/22/2017):      Results for orders placed during the hospital encounter of 01/08/16   MRI Lumbar Spine Without Contrast    Narrative Technique:  Multiplanar, multisequence MR images were performed of the lumbar spine obtained without contrast.   Comparison: None.   Results:  There is grade 1 spondylolisthesis of L4 on L5 along with a few millimeters of retrolisthesis of L3 on L4. The vertebral body heights are well maintained, with no fracture.  No marrow signal abnormality suspicious for an infiltrative process.    The conus medullaris terminates at approximately the mid body of L1.  There is an infrarenal abdominal aortic aneurysm that measures up to 3.5 cm in AP dimension and up to approximately 3.3 cm in length.  There is disk desiccation noted throughout the lumbar spine with severe disk space narrowing noted from L1-2 through the L5-S1 levels.  There appeared a couple renal cysts associated with either kidney.  L1-L2: Diffuse disk bulge and posterior spondylotic ridging and  bilateral facet arthropathy right greater than the left resulting in mild narrowing of the central canal.  There is also mild narrowing of the right neural foraminal canal.  The right lateral recess is also minimally narrowed.  L2-L3: Diffuse disk bulge posterior spondylotic ridging severe bilateral facet arthropathy and bilateral ligamentum flavum hypertrophy resulting in severe narrowing of the central canal and significant narrowing of either lateral recess.  The right neural foraminal canal is mildly narrowed.  L3-L4: Diffuse disk bulge severe bilateral facet arthropathy and mild bilateral ligamenta flava hypertrophy resulting in severe narrowing of the central canal.  The left neural foraminal canal is moderately to severely narrowed.  The right neural foraminal canal is mildly narrowed.  Both lateral recesses demonstrate significant narrowing as well right greater than the left.  L4-L5:  Diffuse disk bulge severe bilateral facet arthropathy and severe bilateral ligamentum flavum hypertrophy resulting in severe narrowing of the central canal.  The bilateral neural foraminal canals are moderately narrowed left greater than the right.  L5-S1:  Moderate to severe bilateral facet arthropathy along with a diffuse disk bulge resulting in no significant central canal narrowing.  The right neural foraminal canal is mildly to moderately narrowed.  The left neural foraminal canal is moderately to severely narrowed.              Results for orders placed during the hospital encounter of 08/30/13   CT Cervical Spine Without Contrast    Narrative Study:   Cervical spine CT without contrast.  History: Trauma, neck pain.  Findings:  No evidence for cervical spine fracture.  No abnormal prevertebral soft tissue swelling.  C2-3  disc: Right C2-3 facet arthropathy with mild posterior disk bulge and osteophyte formation.  Right C2-3 uncovertebral joint hypertrophy.  C3-4  disc: Bilateral C3-C4 facet arthropathy with mild  "degenerative disk space narrowing and disk bulge.  C4-5  disc: Left C4-5 facet arthropathy with mild disk bulge and degenerative spondylolisthesis of C4 on C5.  C5-6  disc: Left C5-6 facet of all but the with mild disk bulge and degenerative spondylolisthesis of C5 on C6.  C6-7  disc: Mild disk bulge and marginal osteophyte formation with bilateral facet arthropathy.  C7-T1 disc: Degenerative disk space narrowing with mild disk bulge and marginal osteophyte formation.  Left C7-T1 facet arthropathy.              Results for orders placed during the hospital encounter of 04/02/15   X-Ray Lumbar Spine AP And Lateral    Narrative Three views of the lumbar spine  Findings: The vertebral bodies demonstrate normal height.  There is mild levoscoliosis of the upper lumbar spine with apex at L1. There is a few millimeters of anterolisthesis of L4 on L5. There is moderate to severe disk space narrowing noted throughout the lumbar spine with multilevel spondylosis noted. prominent multilevel facet arthropathy is present.           Review of Systems:  CONSTITUTIONAL: patient denies any fever, chills, or weight loss  SKIN: patient denies any rash or itching  RESPIRATORY: patient reports dyspnea with exertion COPD  GASTROINTESTINAL: patient denies having any diarrhea, constipation, or bowel incontinence  GENITOURINARY: patient denies having any abnormal bladder function    MUSCULOSKELETAL:  - patient complains of the above noted pain/s (see chief pain complaint)    NEUROLOGICAL:   - pain as above  - strength in Lower extremities is decreased, BILATERALLY  - sensation in Lower extremities is abnormal, BILATERALLY  - patient denies any loss of bowel or bladder control      PSYCHIATRIC: patient denies any change in mood      Physical Exam:  /67 (BP Location: Right arm, Patient Position: Sitting, BP Method: Automatic)   Pulse 108   Resp 16   Ht 6' 1" (1.854 m)   Wt 88.5 kg (195 lb)   BMI 25.73 kg/m²  (reviewed on " 5/22/2017)  General: alert and oriented, in no apparent distress  Gait: normal gait  Skin: No rashes, No discoloration, No obvious lesions  HEENT: EOMI  Respiratory: respirations nonlabored    Musculoskeletal:  - Any pain on flexion, extension, rotation:    >> pain on extension and rotation  - Straight Leg Raise:     >> LEFT :: negative    >> RIGHT :: negative    - Any tenderness to palpation across paraspinal muscles, joints, bursae:     >> across lumbar paraspinals    Neuro:  - Extremity Strength:     >> LEFT :: 5/5    >> RIGHT :: 5/5  - Extremity Reflexes:    >> LEFT  :: 2+    >> RIGHT :: 2+    Psych:  Mood and affect is appropriate      Assessment:  Lumbar Spondylosis  Lumbar Degenerative Disc Disease  Spinal stenosis  Spondylolisthesis  Lumbar radiculopathy    Plan:  Patient presents complaining of low back and bilateral leg pain with some distal numbness.  I will update MRI, order b/l L4 tf debbie, refill ultracet which he has tolerated in the past.  Imaging / studies reviewed, detailed above.  I discussed in detail the risks, benefits, and alternatives to any and all potential treatment options.  All questions and concerns were fully addressed today in clinic.  More than 50% of the time was spent counseling / coordinating care.      >>Opioid Risk Tool:  12/5/2016 :: 0

## 2017-05-23 ENCOUNTER — HOSPITAL ENCOUNTER (OUTPATIENT)
Dept: RADIOLOGY | Facility: HOSPITAL | Age: 82
Discharge: HOME | End: 2017-05-23
Attending: ANESTHESIOLOGY
Payer: MEDICARE

## 2017-05-23 DIAGNOSIS — M54.16 LUMBAR RADICULOPATHY: Primary | ICD-10-CM

## 2017-05-23 DIAGNOSIS — M54.16 BILATERAL LUMBAR RADICULOPATHY: ICD-10-CM

## 2017-05-23 PROCEDURE — 72148 MRI LUMBAR SPINE W/O DYE: CPT | Mod: 26,,, | Performed by: RADIOLOGY

## 2017-05-23 PROCEDURE — 72148 MRI LUMBAR SPINE W/O DYE: CPT | Mod: TC,PO

## 2017-05-30 ENCOUNTER — PATIENT MESSAGE (OUTPATIENT)
Dept: INTERNAL MEDICINE | Facility: CLINIC | Age: 82
End: 2017-05-30

## 2017-05-31 ENCOUNTER — PATIENT MESSAGE (OUTPATIENT)
Dept: INTERNAL MEDICINE | Facility: CLINIC | Age: 82
End: 2017-05-31

## 2017-06-01 ENCOUNTER — HOSPITAL ENCOUNTER (OUTPATIENT)
Dept: RADIOLOGY | Facility: HOSPITAL | Age: 82
Discharge: HOME | End: 2017-06-01
Attending: PEDIATRICS
Payer: MEDICARE

## 2017-06-01 ENCOUNTER — OFFICE VISIT (OUTPATIENT)
Dept: INTERNAL MEDICINE | Facility: CLINIC | Age: 82
End: 2017-06-01
Payer: MEDICARE

## 2017-06-01 VITALS
SYSTOLIC BLOOD PRESSURE: 142 MMHG | TEMPERATURE: 97 F | HEIGHT: 73 IN | WEIGHT: 200.31 LBS | OXYGEN SATURATION: 94 % | HEART RATE: 102 BPM | BODY MASS INDEX: 26.55 KG/M2 | DIASTOLIC BLOOD PRESSURE: 80 MMHG

## 2017-06-01 DIAGNOSIS — R60.9 DEPENDENT EDEMA: Primary | ICD-10-CM

## 2017-06-01 DIAGNOSIS — R60.9 DEPENDENT EDEMA: ICD-10-CM

## 2017-06-01 PROBLEM — E66.3 OVERWEIGHT (BMI 25.0-29.9): Status: ACTIVE | Noted: 2017-06-01

## 2017-06-01 PROBLEM — E78.5 HYPERLIPIDEMIA: Chronic | Status: ACTIVE | Noted: 2017-06-01

## 2017-06-01 PROBLEM — E78.5 HYPERLIPIDEMIA: Status: ACTIVE | Noted: 2017-06-01

## 2017-06-01 PROCEDURE — 99213 OFFICE O/P EST LOW 20 MIN: CPT | Mod: S$GLB,,, | Performed by: PHYSICIAN ASSISTANT

## 2017-06-01 PROCEDURE — 71020 XR CHEST PA AND LATERAL: CPT | Mod: 26,,, | Performed by: RADIOLOGY

## 2017-06-01 PROCEDURE — 99999 PR PBB SHADOW E&M-EST. PATIENT-LVL III: CPT | Mod: PBBFAC,,, | Performed by: PHYSICIAN ASSISTANT

## 2017-06-01 PROCEDURE — 1125F AMNT PAIN NOTED PAIN PRSNT: CPT | Mod: S$GLB,,, | Performed by: PHYSICIAN ASSISTANT

## 2017-06-01 PROCEDURE — 1159F MED LIST DOCD IN RCRD: CPT | Mod: S$GLB,,, | Performed by: PHYSICIAN ASSISTANT

## 2017-06-01 PROCEDURE — 71020 XR CHEST PA AND LATERAL: CPT | Mod: TC,PO

## 2017-06-01 NOTE — PROGRESS NOTES
Subjective:       Patient ID: Chao Hawk is a 85 y.o.W/ male.    Chief Complaint: Edema (lower leg edema bilateral)    HPI         He comes in today in a wheelchair and is accompanied by his son who lives in [place] normally.  Patient has the above problem.  He first started out with a little bit of swelling in his left foot and ankle and that can have stopped and got better and now he has swelling in his right foot and his entire lower leg.  He does admit to eating some salty products.  He also admits to sitting in a chair with his feet on the floor a lot.  He has also fallen a couple of times in the past week because he just lost his balance.        He denies any problem with: Fever, chills, rigors, dyspnea of any modality, cough, asthma, CP, pleurisy, palpitations, tachyarrhythmia, retrosternal ache, sweats, diaphoresis, night sweats, and GI symptoms of nausea, vomiting, diarrhea, anorexia etc.  He has no history of ever having had a blood clot in either his lungs or his legs.  He and his family are little worried that he may have a blood clot in his right leg.    Review of Systems    Otherwise negative concerning the PULMONARY, CV, VASCULAR, GI, and  system review.    Objective:      Physical Exam    CHEST: Clear BS anterior to posterior but his breath sounds are probably only 40-50% of normal.  He is not retracting any rib muscles or using accessory muscles to breathe and he doesn't appear to be in any respiratory distress.  I don't hear any wheeze, rhonchi, or rales.  HEART: S1 is greater than S2.  RSR.  Pulse rate is 80 bpm and regular.  There were no asystoles heard.  He does have 2+ pitting edema from his knee all the way to his toes on the right side.  There is some erythema of his toes and right foot.  He does have a half plus dorsalis pedis pulse but I can't palpate his posterior tibialis.  LEFT LEG: He does have pitting edema of 1+ variety all the way up to about mid tibia.  There is no  inflammatory response on his left foot or ankle.  His ability to ambulate is almost nonexistent.  He's very weak has hard for him to stand at this point.    Assessment:       1. Dependent edema        Plan:     1.  Chastised about his salt intake and he is to stop using any salty foods or saltshaker.  He is to go to start using CoSalt or Ms Dash instead of his regular saltshaker.  2.  For the next few days, he is to increase his use of HCTZ 25 mg to be taken at 8 AM and 2 PM.  He's also to elevate his feet and ankles higher than his hip on the couch or the bed until the swelling goes down.  Should his leg become more red or hot to touch they're to contact me immediately.  3.  Get CXR PA lateral today.  4.  Follow-up Monday if no significant improvement in his swelling.

## 2017-06-07 ENCOUNTER — TELEPHONE (OUTPATIENT)
Dept: PAIN MEDICINE | Facility: CLINIC | Age: 82
End: 2017-06-07

## 2017-06-07 NOTE — TELEPHONE ENCOUNTER
Spoke with patient and reminded to stop *ASA tomorrow as discussed.  Patient acknowledged understanding.

## 2017-06-08 ENCOUNTER — OUTPATIENT CASE MANAGEMENT (OUTPATIENT)
Dept: ADMINISTRATIVE | Facility: OTHER | Age: 82
End: 2017-06-08

## 2017-06-08 NOTE — PROGRESS NOTES
Thank you for the referral.  Patient has been assigned to ELEANOR Larose for low risk screening for Outpatient Case Management.     Reason for referral: Low Risk ([employer] Patient)      Member is not eligible for the [employer] At Home program are you able to offer Regional support?    Have a great day!    Thank you.    Basilia Hobson  [facility]  Special Process Supporter      Thank you    Yamileth Mcgarry, SSC

## 2017-06-09 ENCOUNTER — OUTPATIENT CASE MANAGEMENT (OUTPATIENT)
Dept: ADMINISTRATIVE | Facility: OTHER | Age: 82
End: 2017-06-09

## 2017-06-09 NOTE — PROGRESS NOTES
This CSW received a referral on the above patient.   Reason for referral:Not eligible for [employer] at home  Name of the community resource that was provided:Emergency Alert System  Resource given to: Patient  via US Mail    CSW completed assessment with patient . CSW mailed resources to patient home . CSW also provided her contact information for any additional needs.

## 2017-06-12 ENCOUNTER — PATIENT MESSAGE (OUTPATIENT)
Dept: INTERNAL MEDICINE | Facility: CLINIC | Age: 82
End: 2017-06-12

## 2017-06-14 ENCOUNTER — HOSPITAL ENCOUNTER (OUTPATIENT)
Facility: HOSPITAL | Age: 82
Discharge: HOME | End: 2017-06-14
Attending: ANESTHESIOLOGY | Admitting: ANESTHESIOLOGY
Payer: MEDICARE

## 2017-06-14 ENCOUNTER — HOSPITAL ENCOUNTER (OUTPATIENT)
Dept: RADIOLOGY | Facility: HOSPITAL | Age: 82
Discharge: HOME | End: 2017-06-14
Attending: ANESTHESIOLOGY | Admitting: ANESTHESIOLOGY
Payer: MEDICARE

## 2017-06-14 ENCOUNTER — SURGERY (OUTPATIENT)
Age: 82
End: 2017-06-14

## 2017-06-14 VITALS
HEART RATE: 106 BPM | SYSTOLIC BLOOD PRESSURE: 151 MMHG | OXYGEN SATURATION: 97 % | HEIGHT: 72 IN | BODY MASS INDEX: 26.41 KG/M2 | DIASTOLIC BLOOD PRESSURE: 94 MMHG | RESPIRATION RATE: 14 BRPM | WEIGHT: 195 LBS

## 2017-06-14 DIAGNOSIS — M54.16 BILATERAL LUMBAR RADICULOPATHY: Primary | ICD-10-CM

## 2017-06-14 DIAGNOSIS — M54.16 BILATERAL LUMBAR RADICULOPATHY: ICD-10-CM

## 2017-06-14 DIAGNOSIS — M54.16 LUMBAR RADICULOPATHY: ICD-10-CM

## 2017-06-14 PROCEDURE — 64483 NJX AA&/STRD TFRM EPI L/S 1: CPT | Mod: 50

## 2017-06-14 PROCEDURE — 25000003 PHARM REV CODE 250

## 2017-06-14 PROCEDURE — 25500020 PHARM REV CODE 255

## 2017-06-14 PROCEDURE — 64483 NJX AA&/STRD TFRM EPI L/S 1: CPT | Mod: 50,,, | Performed by: ANESTHESIOLOGY

## 2017-06-14 PROCEDURE — 63600175 PHARM REV CODE 636 W HCPCS

## 2017-06-14 PROCEDURE — 63600175 PHARM REV CODE 636 W HCPCS: Performed by: ANESTHESIOLOGY

## 2017-06-14 PROCEDURE — 25000003 PHARM REV CODE 250: Performed by: ANESTHESIOLOGY

## 2017-06-14 RX ORDER — LIDOCAINE HYDROCHLORIDE 20 MG/ML
INJECTION, SOLUTION INFILTRATION; PERINEURAL
Status: DISCONTINUED | OUTPATIENT
Start: 2017-06-14 | End: 2017-06-14 | Stop reason: HOSPADM

## 2017-06-14 RX ORDER — DEXAMETHASONE SODIUM PHOSPHATE 4 MG/ML
INJECTION, SOLUTION INTRA-ARTICULAR; INTRALESIONAL; INTRAMUSCULAR; INTRAVENOUS; SOFT TISSUE
Status: DISCONTINUED | OUTPATIENT
Start: 2017-06-14 | End: 2017-06-14 | Stop reason: HOSPADM

## 2017-06-14 RX ADMIN — LIDOCAINE HYDROCHLORIDE 10 ML: 20 INJECTION, SOLUTION INFILTRATION; PERINEURAL at 10:06

## 2017-06-14 RX ADMIN — DEXAMETHASONE SODIUM PHOSPHATE 20 MG: 4 INJECTION, SOLUTION INTRA-ARTICULAR; INTRALESIONAL; INTRAMUSCULAR; INTRAVENOUS; SOFT TISSUE at 10:06

## 2017-06-14 NOTE — PLAN OF CARE
Problem: Patient Care Overview  Goal: Plan of Care Review  Outcome: Outcome(s) achieved Date Met: 06/14/17  Patient discharged home in stable condition via wheelchair with ride. Verbalized understanding of discharge instructions. Patient voiced no complaints at this time. Patient stood at side of bed, walked steps with no new motor or sensory deficits. Neurologically intact.

## 2017-06-14 NOTE — DISCHARGE SUMMARY
[facility]  Discharge Note       Description of Procedure: Lumbar Transforaminal Epidural Steroid Injection under Fluoroscopic Guidance    Procedure Date: 6/14/2017    Admit Date: 6/14/2017  Discharge Date: 6/14/2017     Attending Physician: Landon Hurtado   Discharge Provider: Landon Hurtado    Preoperative Diagnosis:   Active Hospital Problems    Diagnosis  POA    Lumbar radiculopathy [M54.16]  Yes     Priority: High      Resolved Hospital Problems    Diagnosis Date Resolved POA   No resolved problems to display.        Postoperative Diagnosis: as above, same as preoperative diagnosis    Discharged Condition: Stable    Hospital Course: Patient was admitted for an outpatient procedure. The procedure was tolerated well with no complications.    Final Diagnoses: Same as principal problem.    Disposition: Home, self-care.    Follow up/Patient Instructions:  Follow-up in clinic in 2-3 weeks.    Medications: No medications were prescribed today. The patient was advised to resume normal medication regimen without change.  Specific information was provided regarding restarting any anticoagulant/s.    Discharge Procedure Orders (must include Diet, Follow-up, Activity):  Light activity for the remainder of the day, resume normal activity tomorrow. Resume normal diet. Follow-up in clinic in 2-3 weeks.

## 2017-06-14 NOTE — OP NOTE
"Procedure: Lumbar Transforaminal Epidural Steroid Injection under Fluoroscopic Guidance (supraneural approach)    Level: L4/5     Side: Bilateral    PROCEDURE DATE: 6/14/2017    Pre-operative Diagnosis: Lumbar Radiculopathy  Post-operative Diagnosis: Lumbar Radiculopathy    Provider: Landon Hurtado MD  Assistant(s): None    Anesthesia: Local, No Sedation    >> 0 mg of VERSED    >> 0 mcg of FENTANYL     Indication: Low back pain with radiculopathy consistent with distribution of targeted nerve. Symptoms unresponsive to conservative treatments. Fluoroscopy was used to optimize visualization of needle placement and to maximize safety.     Procedure Description / Technique:  The patient was seen and identified in the preoperative area. Risks, benefits, complications, and alternatives were discussed with the patient. The patient agreed to proceed with the procedure and signed the consent. The site and side of the procedure was identified and marked. An IV was not placed for this procedure. The patient was taken to the procedural suite.    The patient was positioned in prone orientation on procedure table and a pillow was placed under the abdomen to reduce lumbar lordosis. A time out was performed prior to any intervention. The procedure, site, side, and allergies were stated and agreed to by all present. The lumbosacral area was widely prepped with ChloraPrep. The procedural site was draped in usual sterile fashion. Vital signs were closely monitored throughout this procedure. Conscious sedation was not used for this procedure.    The target area was visualized under fluoroscopy. The cephalocaudal angle of the fluoroscope was adjusted as to align the vertebral end plates. The fluoroscopic arm was rotated ipsilaterally to an angle of approximately 30 degrees until the "constanza dog" outline came into view and the tip of the inferior superior articular process pointed towards the midline, 6:00 position of the above pedicle. " "A 25 gauge 3.5 inch spinal needle was directed towards the "chin" of the "constanza dog" (adjacent to the pars interarticularis and inferior to the pedicle). The needle was advanced until OS was met at the inferior border of the pedicle / pars interface. The needle was adjusted so that it would pass inferior to the osseous border. The fluoroscope was then placed in the lateral position and the needle was slowly advanced until it rested in the posterior 1/3rd of the vertebral foramen. AP fluoroscopy was checked and the needle tip rested at the 6:00 position under the pedicle. No paresthesia was elicited during needle placement. With the needle tip in its final position, gentle aspiration was negative for blood and CSF. Omnipaque 240 (1 to 2 mL) was injected under live fluoroscopy. Microbore tubing was used for injection. There was no pain or paresthesia on injection. The contrast clearly delineated the targeted nerve root on AP fluoroscopy. No vascular uptake was seen. A solution containing 2 mL of 1% PF Lidocaine and 2 mL of Dexamethasone (10 mg/mL) was mixed and 2 mL was injected slowly at each level targeted. There was minimal resistance on injection. No pain or paresthesia was elicited on injection. The stylet was replaced and the needle was withdrawn intact. This procedure was performed for each of the above indicated levels.     Description of Findings: Not applicable    Prosthetic devices, grafts, tissues, or devices implanted: None    Specimen Removed: No    Estimated Blood Loss: minimal    COMPLICATIONS: None    DISPOSITION / PLANS: The patient was transferred to the recovery area in a stable condition for observation. The patient was reexamined prior to discharge. There was no evidence of acute neurologic injury following the procedure.  Patient was discharged from the recovery room after meeting discharge criteria. Home discharge instructions were given to the patient by the staff.    "

## 2017-06-23 ENCOUNTER — TELEPHONE (OUTPATIENT)
Dept: RADIOLOGY | Facility: HOSPITAL | Age: 82
End: 2017-06-23

## 2017-06-26 ENCOUNTER — HOSPITAL ENCOUNTER (OUTPATIENT)
Dept: RADIOLOGY | Facility: HOSPITAL | Age: 82
Discharge: HOME | End: 2017-06-26
Attending: PEDIATRICS
Payer: MEDICARE

## 2017-06-26 DIAGNOSIS — I71.40 ABDOMINAL AORTIC ANEURYSM WITHOUT RUPTURE: ICD-10-CM

## 2017-06-26 PROCEDURE — 76775 US EXAM ABDO BACK WALL LIM: CPT | Mod: 26,,, | Performed by: RADIOLOGY

## 2017-06-26 PROCEDURE — 76775 US EXAM ABDO BACK WALL LIM: CPT | Mod: TC,PO

## 2017-06-29 RX ORDER — TRAMADOL HYDROCHLORIDE AND ACETAMINOPHEN 37.5; 325 MG/1; MG/1
1 TABLET, FILM COATED ORAL 2 TIMES DAILY PRN
Qty: 60 TABLET | Refills: 1 | Status: SHIPPED | OUTPATIENT
Start: 2017-07-05 | End: 2017-07-03 | Stop reason: SDUPTHER

## 2017-07-03 ENCOUNTER — OFFICE VISIT (OUTPATIENT)
Dept: INTERNAL MEDICINE | Facility: CLINIC | Age: 82
End: 2017-07-03
Payer: MEDICARE

## 2017-07-03 VITALS
HEART RATE: 94 BPM | BODY MASS INDEX: 26.12 KG/M2 | DIASTOLIC BLOOD PRESSURE: 60 MMHG | WEIGHT: 192.88 LBS | HEIGHT: 72 IN | OXYGEN SATURATION: 95 % | SYSTOLIC BLOOD PRESSURE: 126 MMHG | TEMPERATURE: 97 F

## 2017-07-03 DIAGNOSIS — E29.1 HYPOGONADISM IN MALE: Primary | ICD-10-CM

## 2017-07-03 DIAGNOSIS — E29.1 HYPOGONADISM IN MALE: ICD-10-CM

## 2017-07-03 DIAGNOSIS — R60.0 LEG EDEMA, RIGHT: ICD-10-CM

## 2017-07-03 DIAGNOSIS — E78.00 PURE HYPERCHOLESTEROLEMIA: Chronic | ICD-10-CM

## 2017-07-03 DIAGNOSIS — R79.89 LOW TESTOSTERONE: ICD-10-CM

## 2017-07-03 DIAGNOSIS — I10 ESSENTIAL HYPERTENSION: Chronic | ICD-10-CM

## 2017-07-03 DIAGNOSIS — J44.9 CHRONIC OBSTRUCTIVE PULMONARY DISEASE, UNSPECIFIED COPD TYPE: Primary | Chronic | ICD-10-CM

## 2017-07-03 DIAGNOSIS — M51.36 DDD (DEGENERATIVE DISC DISEASE), LUMBAR: ICD-10-CM

## 2017-07-03 DIAGNOSIS — N40.0 BENIGN NON-NODULAR PROSTATIC HYPERPLASIA WITHOUT LOWER URINARY TRACT SYMPTOMS: Chronic | ICD-10-CM

## 2017-07-03 PROCEDURE — 99214 OFFICE O/P EST MOD 30 MIN: CPT | Mod: 25,S$GLB,, | Performed by: PEDIATRICS

## 2017-07-03 PROCEDURE — 96372 THER/PROPH/DIAG INJ SC/IM: CPT | Mod: S$GLB,,, | Performed by: PEDIATRICS

## 2017-07-03 PROCEDURE — 99999 PR PBB SHADOW E&M-EST. PATIENT-LVL III: CPT | Mod: PBBFAC,,, | Performed by: PEDIATRICS

## 2017-07-03 PROCEDURE — 1159F MED LIST DOCD IN RCRD: CPT | Mod: S$GLB,,, | Performed by: PEDIATRICS

## 2017-07-03 RX ORDER — TRAMADOL HYDROCHLORIDE AND ACETAMINOPHEN 37.5; 325 MG/1; MG/1
1 TABLET, FILM COATED ORAL 2 TIMES DAILY PRN
Qty: 60 TABLET | Refills: 1 | Status: SHIPPED | OUTPATIENT
Start: 2017-07-05 | End: 2017-08-04

## 2017-07-03 RX ORDER — FUROSEMIDE 40 MG/1
40 TABLET ORAL DAILY
Qty: 30 TABLET | Refills: 11 | Status: SHIPPED | OUTPATIENT
Start: 2017-07-03 | End: 2018-01-17

## 2017-07-03 RX ORDER — TESTOSTERONE CYPIONATE 200 MG/ML
200 INJECTION, SOLUTION INTRAMUSCULAR ONCE
Status: COMPLETED | OUTPATIENT
Start: 2017-07-03 | End: 2017-07-03

## 2017-07-03 RX ORDER — TESTOSTERONE CYPIONATE 200 MG/ML
200 INJECTION, SOLUTION INTRAMUSCULAR
Status: DISCONTINUED | OUTPATIENT
Start: 2017-07-26 | End: 2018-01-09

## 2017-07-03 RX ADMIN — TESTOSTERONE CYPIONATE 200 MG: 200 INJECTION, SOLUTION INTRAMUSCULAR at 09:07

## 2017-07-03 NOTE — PROGRESS NOTES
Subjective:       Patient ID: Chao Hawk is a 85 y.o. male.    Chief Complaint: Follow-up (6 mo w/lab)    HTN: B/P normal, no HTNive symptoms  LIPIDS:following D&E, tolerating and compliant with med(s).    BPH quiet  COPD:Does well with meds. Flovent causes so mucous congestion.  Low T: fell off depo testosterone.  Back  improved with daily exercise and ultracet and JANET by Dr Hurtado-50% improvement but still very limiting daily activities. Has to use walker, use hands to transfer. Fall risk.    Right leg still swollen, left less so.     LABS REVIEWED AND DISCUSSED WITH PATIENT  LABS REVIEWED AND DISCUSSED WITH PATIENT      Review of Systems   Constitutional: Negative for fever and unexpected weight change.   HENT: Negative for congestion and rhinorrhea.    Eyes: Negative for discharge and redness.   Respiratory: Negative for cough and wheezing.    Cardiovascular: Positive for leg swelling. Negative for chest pain and palpitations.   Gastrointestinal: Negative for constipation, diarrhea and vomiting.   Endocrine: Negative for cold intolerance, polydipsia, polyphagia and polyuria.   Genitourinary: Negative for decreased urine volume and difficulty urinating.   Musculoskeletal: Positive for arthralgias, back pain and gait problem. Negative for joint swelling.   Skin: Negative for rash and wound.   Neurological: Negative for syncope and headaches.   Psychiatric/Behavioral: Negative for behavioral problems and sleep disturbance.       Objective:      Physical Exam   Constitutional: He is oriented to person, place, and time. He appears well-developed and well-nourished.   Neck: No JVD present.   Cardiovascular: Normal rate, regular rhythm and normal heart sounds.    No murmur heard.  Pulmonary/Chest: Effort normal and breath sounds normal. No respiratory distress. He has no wheezes. He has no rales.   Abdominal: Soft. He exhibits no distension and no mass. There is no tenderness. There is no rebound and no guarding.    Musculoskeletal: He exhibits edema (trace left edema, right 1-2+ to knee).   Very limited back movement, gait unsteady, uses hands to transfer   Neurological: He is alert and oriented to person, place, and time. No cranial nerve deficit. Coordination normal.   Psychiatric: He has a normal mood and affect. His behavior is normal. Judgment and thought content normal.       Assessment:       1. Chronic obstructive pulmonary disease, unspecified COPD type    2. Pure hypercholesterolemia    3. Essential hypertension    4. Low testosterone    5. DDD (degenerative disc disease), lumbar    6. Benign non-nodular prostatic hyperplasia without lower urinary tract symptoms    7. Leg edema, right    8. Hypogonadism in male        Plan:       Chronic obstructive pulmonary disease, unspecified COPD type    Pure hypercholesterolemia  -     ALT (SGPT); Future; Expected date: 07/03/2017  -     AST (SGOT); Future; Expected date: 07/03/2017  -     Lipid panel; Future; Expected date: 07/03/2017    Essential hypertension  -     Basic metabolic panel; Future; Expected date: 07/03/2017  -     CBC auto differential; Future; Expected date: 07/03/2017    Low testosterone    DDD (degenerative disc disease), lumbar  -     tramadol-acetaminophen 37.5-325 mg (ULTRACET) 37.5-325 mg Tab; Take 1 tablet by mouth 2 (two) times daily as needed for Pain.  Dispense: 60 tablet; Refill: 1    Benign non-nodular prostatic hyperplasia without lower urinary tract symptoms  -     PROSTATE SPECIFIC ANTIGEN, DIAGNOSTIC; Future; Expected date: 07/03/2017    Leg edema, right  -     VAS Ultrasound doppler venous leg right; Future  -     furosemide (LASIX) 40 MG tablet; Take 1 tablet (40 mg total) by mouth once daily.  Dispense: 30 tablet; Refill: 11    Hypogonadism in male  -     testosterone cypionate injection 200 mg; Inject 1 mL (200 mg total) into the muscle every 30 days.    Meds reviewed. Add lasix, get venous US to check DVT. Watch salt intake. He plans to  consult [facility] for his back. F/U in 6 months with labs.

## 2017-07-05 ENCOUNTER — TELEPHONE (OUTPATIENT)
Dept: RADIOLOGY | Facility: HOSPITAL | Age: 82
End: 2017-07-05

## 2017-07-05 ENCOUNTER — OFFICE VISIT (OUTPATIENT)
Dept: PAIN MEDICINE | Facility: CLINIC | Age: 82
End: 2017-07-05
Payer: MEDICARE

## 2017-07-05 VITALS
HEART RATE: 108 BPM | RESPIRATION RATE: 16 BRPM | SYSTOLIC BLOOD PRESSURE: 125 MMHG | HEIGHT: 72 IN | BODY MASS INDEX: 25.06 KG/M2 | WEIGHT: 185 LBS | DIASTOLIC BLOOD PRESSURE: 73 MMHG

## 2017-07-05 DIAGNOSIS — M54.16 LUMBAR RADICULOPATHY: Primary | ICD-10-CM

## 2017-07-05 DIAGNOSIS — M47.817 SPONDYLOSIS OF LUMBOSACRAL REGION WITHOUT MYELOPATHY OR RADICULOPATHY: ICD-10-CM

## 2017-07-05 DIAGNOSIS — M48.061 STENOSIS, SPINAL, LUMBAR: ICD-10-CM

## 2017-07-05 DIAGNOSIS — M54.16 BILATERAL LUMBAR RADICULOPATHY: ICD-10-CM

## 2017-07-05 DIAGNOSIS — M51.36 DDD (DEGENERATIVE DISC DISEASE), LUMBAR: ICD-10-CM

## 2017-07-05 DIAGNOSIS — M43.16 SPONDYLOLISTHESIS OF LUMBAR REGION: ICD-10-CM

## 2017-07-05 PROCEDURE — 99214 OFFICE O/P EST MOD 30 MIN: CPT | Mod: S$GLB,,, | Performed by: PHYSICIAN ASSISTANT

## 2017-07-05 PROCEDURE — 99999 PR PBB SHADOW E&M-EST. PATIENT-LVL III: CPT | Mod: PBBFAC,,, | Performed by: PHYSICIAN ASSISTANT

## 2017-07-05 PROCEDURE — 1159F MED LIST DOCD IN RCRD: CPT | Mod: S$GLB,,, | Performed by: PHYSICIAN ASSISTANT

## 2017-07-05 PROCEDURE — 1125F AMNT PAIN NOTED PAIN PRSNT: CPT | Mod: S$GLB,,, | Performed by: PHYSICIAN ASSISTANT

## 2017-07-05 NOTE — TELEPHONE ENCOUNTER
Patient's prescriptions were printed and signed by Dr. Hurtado prior to the patient's clinic appointment.    He already got a Rx with refill from Dr. Ureña, so Rx printed by our office will be destroyed.

## 2017-07-05 NOTE — PROGRESS NOTES
Chief Pain Complaint:  Low Back Pain, Bilateral Leg Pain    History of Present Illness:  This patient is a 85 y.o. male who presents today complaining of the above noted pain/s. The patient describes the pain as follows.    - duration of pain: > 4 months   - timing: constant   - character: aching, sharp  - radiating, dermatomal: extends into bilateral lower extremities, worse on the right  - antecedent trauma, prior spinal surgery: no prior trauma, no prior spinal surgery   - pertinent negatives: No fever, No chills, No weight loss, No bladder dysfunction, No bowel dysfunction, No saddle anesthesia  - pertinent positives: generalized nonspecific Lower Extremity weakness bilaterally    - medications, other therapies tried (physical therapy, injections):     >> Tylenol, NSAIDs, ultracet, gabapentin    >> Has previously undergone Physical Therapy    >> Has previously undergone spinal injection/s    - Previous lumbar mbb's   - Bilateral L4/5 TF JANET on 6-14-17        Imaging / Labs / Studies (reviewed on 7/5/2017):    5/24/17 Lumbar MRI  Comparison: 01/08/2016  Usual sequences performed without contrast.  History: Radiculopathy  Findings:  Heterogeneous marrow signal noted throughout the spine.  Multiple small hemangiomas identified involving the T12 through sacral regions.  Multilevel disc desiccation with anterior and posterior marginal spurring throughout the lumbar spine.  Extensive bilateral facet arthropathy and grade 1 anterolisthesis L4 on L5 noted.  Conus terminates at the L1 level.  Incidental note again made of probable renal cortical cysts and ectasia and mild saccular aneurysmal dilatation of distal abdominal aorta.  Correlate clinically.  T12-L1: Evaluate on sagittal sequences only.  Desiccation with minimal posterior degenerative ridging.  Bilateral facet arthropathy.  No central or foraminal stenosis.  L1-2: desiccation with uniform loss of disc height.  Posterior bony ridging with smooth diffuse disc  ridging.  Foraminal and extraforaminal prominence.  Minimal inferior foraminal stenosis.  No central stenosis.  Facet arthropathy and hypertrophied posterior ligaments.  L2-3: Desiccation with uniform loss of height.  Posterior concentric disc bulge or ridging with effaced/flattening of thecal sac.  No central stenosis with fat signal identified posteriorly.  Moderate inferior foraminal stenosis bilaterally.  Extensive facet arthropathy and hypertrophied posterior ligaments.  L3-4: Facet arthropathy with uniform loss of disc height.  Posterior concentric disc bulge effaces anterior thecal sac 3 prominent inferior foraminal stenosis, slightly greater on the LEFT.  There is concomitant asymmetric prominence facet arthropathy and hypertrophied posterior ligaments on the LEFT.  No central stenosis.  L4-5: Minimal anterior subluxation L4 and L5 with concomitant marked loss of disc height and disc desiccation.  Posterior bony ridging.  Moderate to severe central stenosis secondary to prominent posterior marginal spurring, anterior subluxation and extensive facet arthropathy.  Concomitant prominent inferior foraminal stenosis noted bilaterally.  Heterogeneous signal noted  centrally in area of maximum stenosis.  Cannot completely exclude disc protrusion or small extruded fragment.  Degree of central stenosis is severe at this focal level.  L5-S1: Prominent disc desiccation and uniform loss of height.  Posterior concentric disc bulge with slight LEFT posterior lateral and bilateral foraminal and extraforaminal prominence.  Disc comes in close proximity to the S1 nerve roots within the lateral recess.  Minimal inferior foraminal stenosis, greater on the LEFT.  No central stenosis.  Prominent facet arthropathy.       01/08/16 MRI Lumbar Spine Without Contrast    Narrative Technique:  Multiplanar, multisequence MR images were performed of the lumbar spine obtained without contrast.   Comparison: None.   Results:  There is  grade 1 spondylolisthesis of L4 on L5 along with a few millimeters of retrolisthesis of L3 on L4. The vertebral body heights are well maintained, with no fracture.  No marrow signal abnormality suspicious for an infiltrative process.    The conus medullaris terminates at approximately the mid body of L1.  There is an infrarenal abdominal aortic aneurysm that measures up to 3.5 cm in AP dimension and up to approximately 3.3 cm in length.  There is disk desiccation noted throughout the lumbar spine with severe disk space narrowing noted from L1-2 through the L5-S1 levels.  There appeared a couple renal cysts associated with either kidney.  L1-L2: Diffuse disk bulge and posterior spondylotic ridging and bilateral facet arthropathy right greater than the left resulting in mild narrowing of the central canal.  There is also mild narrowing of the right neural foraminal canal.  The right lateral recess is also minimally narrowed.  L2-L3: Diffuse disk bulge posterior spondylotic ridging severe bilateral facet arthropathy and bilateral ligamentum flavum hypertrophy resulting in severe narrowing of the central canal and significant narrowing of either lateral recess.  The right neural foraminal canal is mildly narrowed.  L3-L4: Diffuse disk bulge severe bilateral facet arthropathy and mild bilateral ligamenta flava hypertrophy resulting in severe narrowing of the central canal.  The left neural foraminal canal is moderately to severely narrowed.  The right neural foraminal canal is mildly narrowed.  Both lateral recesses demonstrate significant narrowing as well right greater than the left.  L4-L5:  Diffuse disk bulge severe bilateral facet arthropathy and severe bilateral ligamentum flavum hypertrophy resulting in severe narrowing of the central canal.  The bilateral neural foraminal canals are moderately narrowed left greater than the right.  L5-S1:  Moderate to severe bilateral facet arthropathy along with a diffuse disk  bulge resulting in no significant central canal narrowing.  The right neural foraminal canal is mildly to moderately narrowed.  The left neural foraminal canal is moderately to severely narrowed.       8/30/13 CT Cervical Spine Without Contrast    Narrative Study:   Cervical spine CT without contrast.  History: Trauma, neck pain.  Findings:  No evidence for cervical spine fracture.  No abnormal prevertebral soft tissue swelling.  C2-3  disc: Right C2-3 facet arthropathy with mild posterior disk bulge and osteophyte formation.  Right C2-3 uncovertebral joint hypertrophy.  C3-4  disc: Bilateral C3-C4 facet arthropathy with mild degenerative disk space narrowing and disk bulge.  C4-5  disc: Left C4-5 facet arthropathy with mild disk bulge and degenerative spondylolisthesis of C4 on C5.  C5-6  disc: Left C5-6 facet of all but the with mild disk bulge and degenerative spondylolisthesis of C5 on C6.  C6-7  disc: Mild disk bulge and marginal osteophyte formation with bilateral facet arthropathy.  C7-T1 disc: Degenerative disk space narrowing with mild disk bulge and marginal osteophyte formation.  Left C7-T1 facet arthropathy.       4/02/15 X-Ray Lumbar Spine AP And Lateral    Narrative Three views of the lumbar spine  Findings: The vertebral bodies demonstrate normal height.  There is mild levoscoliosis of the upper lumbar spine with apex at L1. There is a few millimeters of anterolisthesis of L4 on L5. There is moderate to severe disk space narrowing noted throughout the lumbar spine with multilevel spondylosis noted. prominent multilevel facet arthropathy is present.           Review of Systems:  CONSTITUTIONAL: patient denies any fever, chills, or weight loss  SKIN: patient denies any rash or itching  RESPIRATORY: patient reports dyspnea with exertion COPD  GASTROINTESTINAL: patient denies having any diarrhea, constipation, or bowel incontinence  GENITOURINARY: patient denies having any abnormal bladder  function    MUSCULOSKELETAL:  - patient complains of the above noted pain/s (see chief pain complaint)    NEUROLOGICAL:   - pain as above  - strength in Lower extremities is decreased, BILATERALLY  - sensation in Lower extremities is abnormal, BILATERALLY  - patient denies any loss of bowel or bladder control      PSYCHIATRIC: patient denies any change in mood      Physical Exam:  Vitals:  /73 (BP Location: Right arm, Patient Position: Sitting, BP Method: Automatic)   Pulse 108   Resp 16   Ht 6' (1.829 m)   Wt 83.9 kg (185 lb)   BMI 25.09 kg/m²    (reviewed on 7/5/2017)    General: alert and oriented, in no apparent distress  Gait: antalgic gait, using walker  Skin: No rashes, No discoloration, No obvious lesions  HEENT: EOMI  Respiratory: respirations nonlabored    Musculoskeletal:  - Any pain on flexion, extension, rotation:    >> pain on extension and rotation  - Straight Leg Raise:     >> LEFT :: negative    >> RIGHT :: negative  - Any tenderness to palpation across paraspinal muscles, joints, bursae:     >> across lumbar paraspinals    Neuro:  - Extremity Strength:     >> LEFT :: 5/5    >> RIGHT :: 5/5  - Extremity Reflexes:    >> LEFT  :: 2+    >> RIGHT :: 2+    Psych:  Mood and affect is appropriate        Assessment:  Lumbar Spondylosis  Lumbar Degenerative Disc Disease  Spinal stenosis  Spondylolisthesis  Lumbar radiculopathy    Plan:  Patient presents for follow-up. He complains of low back and bilateral leg pain with some distal numbness.    - S/p bilateral L4/5 TF JANET on 6-14-17 with some relief overall, but he is not happy with degree of functioning at this time. He reports he is unable to move around without the walker.  - Lumbar MRI reviewed, detailed above.   - Will refer to [facility] (Dr. Blancas) - as this patient is ready to move forward with surgical intervention.  RTC PRN. I discussed the risks, benefits, and alternatives to potential treatment options. All questions and  concerns were fully addressed today in clinic. Dr. Hurtado was consulted regarding the patient plan and agrees.            >>Opioid Risk Tool:  12/5/2016 :: 0

## 2017-07-06 ENCOUNTER — HOSPITAL ENCOUNTER (OUTPATIENT)
Dept: RADIOLOGY | Facility: HOSPITAL | Age: 82
Discharge: HOME | End: 2017-07-06
Attending: PEDIATRICS
Payer: MEDICARE

## 2017-07-06 DIAGNOSIS — R60.0 LEG EDEMA, RIGHT: ICD-10-CM

## 2017-07-06 PROCEDURE — 93971 EXTREMITY STUDY: CPT | Mod: TC,PO

## 2017-07-06 PROCEDURE — 93971 EXTREMITY STUDY: CPT | Mod: 26,,, | Performed by: RADIOLOGY

## 2017-07-17 ENCOUNTER — PATIENT MESSAGE (OUTPATIENT)
Dept: INTERNAL MEDICINE | Facility: CLINIC | Age: 82
End: 2017-07-17

## 2017-07-27 ENCOUNTER — CLINICAL SUPPORT (OUTPATIENT)
Dept: INTERNAL MEDICINE | Facility: CLINIC | Age: 82
End: 2017-07-27
Payer: MEDICARE

## 2017-07-27 PROCEDURE — 96372 THER/PROPH/DIAG INJ SC/IM: CPT | Mod: S$GLB,,, | Performed by: PEDIATRICS

## 2017-07-27 PROCEDURE — 99999 PR PBB SHADOW E&M-EST. PATIENT-LVL II: CPT | Mod: PBBFAC,,,

## 2017-07-27 RX ADMIN — TESTOSTERONE CYPIONATE 200 MG: 200 INJECTION, SOLUTION INTRAMUSCULAR at 09:07

## 2017-08-21 ENCOUNTER — CLINICAL SUPPORT (OUTPATIENT)
Dept: CARDIOLOGY | Facility: CLINIC | Age: 82
End: 2017-08-21
Payer: MEDICARE

## 2017-08-21 ENCOUNTER — HOSPITAL ENCOUNTER (OUTPATIENT)
Dept: RADIOLOGY | Facility: HOSPITAL | Age: 82
Discharge: HOME | End: 2017-08-21
Attending: PHYSICIAN ASSISTANT
Payer: MEDICARE

## 2017-08-21 DIAGNOSIS — M51.26 DISC DISPLACEMENT, LUMBAR: Primary | ICD-10-CM

## 2017-08-21 DIAGNOSIS — M48.061 STENOSIS, SPINAL, LUMBAR: ICD-10-CM

## 2017-08-21 DIAGNOSIS — Z01.818 PREOP EXAMINATION: ICD-10-CM

## 2017-08-21 DIAGNOSIS — M51.26 DISPLACEMENT OF LUMBAR INTERVERTEBRAL DISC WITHOUT MYELOPATHY: Primary | ICD-10-CM

## 2017-08-21 DIAGNOSIS — M51.26 DISPLACEMENT OF LUMBAR INTERVERTEBRAL DISC WITHOUT MYELOPATHY: ICD-10-CM

## 2017-08-21 PROCEDURE — 71010 XR CHEST 1 VIEW: CPT | Mod: 26,,, | Performed by: RADIOLOGY

## 2017-08-21 PROCEDURE — 93000 ELECTROCARDIOGRAM COMPLETE: CPT | Mod: S$GLB,,, | Performed by: NUCLEAR MEDICINE

## 2017-08-22 DIAGNOSIS — M48.061 STENOSIS, SPINAL, LUMBAR: ICD-10-CM

## 2017-08-22 DIAGNOSIS — M51.26 DISC DISPLACEMENT, LUMBAR: ICD-10-CM

## 2017-08-22 DIAGNOSIS — M79.609 PAIN IN LIMB: Primary | ICD-10-CM

## 2017-08-24 ENCOUNTER — OFFICE VISIT (OUTPATIENT)
Dept: INTERNAL MEDICINE | Facility: CLINIC | Age: 82
End: 2017-08-24
Payer: MEDICARE

## 2017-08-24 VITALS
DIASTOLIC BLOOD PRESSURE: 64 MMHG | OXYGEN SATURATION: 95 % | BODY MASS INDEX: 25.26 KG/M2 | TEMPERATURE: 96 F | WEIGHT: 186.5 LBS | HEIGHT: 72 IN | SYSTOLIC BLOOD PRESSURE: 132 MMHG | HEART RATE: 96 BPM

## 2017-08-24 DIAGNOSIS — Q61.02 MULTIPLE RENAL CYSTS: ICD-10-CM

## 2017-08-24 DIAGNOSIS — E78.00 PURE HYPERCHOLESTEROLEMIA: Chronic | ICD-10-CM

## 2017-08-24 DIAGNOSIS — N40.0 BENIGN PROSTATIC HYPERPLASIA WITHOUT LOWER URINARY TRACT SYMPTOMS: Chronic | ICD-10-CM

## 2017-08-24 DIAGNOSIS — I10 ESSENTIAL HYPERTENSION: Chronic | ICD-10-CM

## 2017-08-24 DIAGNOSIS — R79.89 LOW TESTOSTERONE: ICD-10-CM

## 2017-08-24 DIAGNOSIS — M54.16 LUMBAR RADICULOPATHY: ICD-10-CM

## 2017-08-24 DIAGNOSIS — Z01.818 PREOPERATIVE CLEARANCE: Primary | ICD-10-CM

## 2017-08-24 DIAGNOSIS — J44.9 CHRONIC OBSTRUCTIVE PULMONARY DISEASE, UNSPECIFIED COPD TYPE: Chronic | ICD-10-CM

## 2017-08-24 DIAGNOSIS — I71.40 ABDOMINAL AORTIC ANEURYSM WITHOUT RUPTURE: ICD-10-CM

## 2017-08-24 PROCEDURE — 96372 THER/PROPH/DIAG INJ SC/IM: CPT | Mod: S$GLB,,, | Performed by: PEDIATRICS

## 2017-08-24 PROCEDURE — 3078F DIAST BP <80 MM HG: CPT | Mod: S$GLB,,, | Performed by: PEDIATRICS

## 2017-08-24 PROCEDURE — 3075F SYST BP GE 130 - 139MM HG: CPT | Mod: S$GLB,,, | Performed by: PEDIATRICS

## 2017-08-24 PROCEDURE — 1159F MED LIST DOCD IN RCRD: CPT | Mod: S$GLB,,, | Performed by: PEDIATRICS

## 2017-08-24 PROCEDURE — 99214 OFFICE O/P EST MOD 30 MIN: CPT | Mod: 25,S$GLB,, | Performed by: PEDIATRICS

## 2017-08-24 PROCEDURE — 99999 PR PBB SHADOW E&M-EST. PATIENT-LVL III: CPT | Mod: PBBFAC,,, | Performed by: PEDIATRICS

## 2017-08-24 PROCEDURE — 99499 UNLISTED E&M SERVICE: CPT | Mod: S$GLB,,, | Performed by: PEDIATRICS

## 2017-08-24 PROCEDURE — 3008F BODY MASS INDEX DOCD: CPT | Mod: S$GLB,,, | Performed by: PEDIATRICS

## 2017-08-24 RX ADMIN — TESTOSTERONE CYPIONATE 200 MG: 200 INJECTION, SOLUTION INTRAMUSCULAR at 10:08

## 2017-08-24 NOTE — PROGRESS NOTES
Subjective:       Patient ID: Chao Hawk is a 85 y.o. male.    Chief Complaint: Pre-op Exam    Dr Alvarado will be performing L4/L5 back surgery on 9/13/17    PMH/PSH/SH/FH reviewed: has never had bleeding or anesthetic difficulties.    HTN: B/P normal, no HTNive symptoms  LIPIDS:following D&E, tolerating and compliant with med(s).    BPH quiet  COPD:Does well with meds. Flovent causes so mucous congestion.  Low T: fell off depo testosterone.  DJD Back: still very limiting daily activities. Has to use walker, use hands to transfer. Fall risk.  HX leg venous insufficiency: Right leg minimally swollen and red.   AAA: u/s earlier showed fusiaform dilation 3.2 cm     LABS REVIEWED AND DISCUSSED WITH PATIENT      Review of Systems   Constitutional: Negative for fever and unexpected weight change.   HENT: Negative for congestion and rhinorrhea.    Eyes: Negative for discharge and redness.   Respiratory: Negative for cough and wheezing.    Cardiovascular: Positive for leg swelling (mild). Negative for chest pain and palpitations.   Gastrointestinal: Negative for constipation, diarrhea and vomiting.   Endocrine: Negative for cold intolerance, heat intolerance, polydipsia, polyphagia and polyuria.   Genitourinary: Negative for decreased urine volume and difficulty urinating.   Musculoskeletal: Positive for arthralgias, back pain and gait problem. Negative for joint swelling.   Skin: Negative for rash and wound.   Neurological: Negative for syncope and headaches.   Psychiatric/Behavioral: Negative for behavioral problems and sleep disturbance.       Objective:      Physical Exam   Constitutional: He is oriented to person, place, and time. He appears well-developed and well-nourished. No distress.   Neck: No JVD present.   Cardiovascular: Normal rate, regular rhythm and normal heart sounds.    No murmur heard.  Pulmonary/Chest: Effort normal and breath sounds normal. No respiratory distress. He has no wheezes. He has no  rales.   Abdominal: Soft. He exhibits no distension and no mass. There is no tenderness. There is no rebound and no guarding.   Musculoskeletal: He exhibits edema (1+ bilateral R>L with minimal redness, no tenderness).   Lymphadenopathy:     He has no cervical adenopathy.   Neurological: He is alert and oriented to person, place, and time. No cranial nerve deficit.   Psychiatric: He has a normal mood and affect. His behavior is normal. Judgment and thought content normal.       Assessment:       1. Preoperative clearance    2. Lumbar radiculopathy    3. Chronic obstructive pulmonary disease, unspecified COPD type    4. Low testosterone    5. Essential hypertension    6. Benign prostatic hyperplasia without lower urinary tract symptoms    7. Pure hypercholesterolemia    8. Multiple renal cysts    9. Abdominal aortic aneurysm without rupture        Plan:       Preoperative clearance    Lumbar radiculopathy    Chronic obstructive pulmonary disease, unspecified COPD type    Low testosterone    Essential hypertension    Benign prostatic hyperplasia without lower urinary tract symptoms    Pure hypercholesterolemia    Multiple renal cysts    Abdominal aortic aneurysm without rupture    His preop labs were reviewed. INR and PT were not covered by insurance, Dr Alvarado can obtain at time of surgery if he needs. CXR and EKG stable. He is cleared for surgery at slightly increased risk. He may need preop albuterol for COPD and post op pulmonary toilet. Hold vitamin and NSAIDs 7 days prior. Note to Dr Alvarado.

## 2017-09-28 ENCOUNTER — CLINICAL SUPPORT (OUTPATIENT)
Dept: INTERNAL MEDICINE | Facility: CLINIC | Age: 82
End: 2017-09-28
Payer: MEDICARE

## 2017-09-28 PROCEDURE — 96372 THER/PROPH/DIAG INJ SC/IM: CPT | Mod: S$GLB,,, | Performed by: PEDIATRICS

## 2017-09-28 PROCEDURE — 99999 PR PBB SHADOW E&M-EST. PATIENT-LVL II: CPT | Mod: PBBFAC,,,

## 2017-09-28 RX ADMIN — TESTOSTERONE CYPIONATE 200 MG: 200 INJECTION, SOLUTION INTRAMUSCULAR at 09:09

## 2017-09-28 NOTE — PROGRESS NOTES
Testosterone injection given. Patient tolerated well. Advised to wait 15min in lobby to check reaction. He verbalized understanding.//rlt

## 2017-09-30 ENCOUNTER — PATIENT MESSAGE (OUTPATIENT)
Dept: INTERNAL MEDICINE | Facility: CLINIC | Age: 82
End: 2017-09-30

## 2017-10-09 ENCOUNTER — PATIENT MESSAGE (OUTPATIENT)
Dept: INTERNAL MEDICINE | Facility: CLINIC | Age: 82
End: 2017-10-09

## 2017-10-11 ENCOUNTER — OFFICE VISIT (OUTPATIENT)
Dept: INTERNAL MEDICINE | Facility: CLINIC | Age: 82
End: 2017-10-11
Payer: MEDICARE

## 2017-10-11 VITALS
BODY MASS INDEX: 26.22 KG/M2 | WEIGHT: 193.56 LBS | HEIGHT: 72 IN | RESPIRATION RATE: 20 BRPM | SYSTOLIC BLOOD PRESSURE: 136 MMHG | DIASTOLIC BLOOD PRESSURE: 66 MMHG | HEART RATE: 94 BPM | OXYGEN SATURATION: 95 % | TEMPERATURE: 98 F

## 2017-10-11 DIAGNOSIS — R60.9 DEPENDENT EDEMA: Primary | ICD-10-CM

## 2017-10-11 PROCEDURE — 99999 PR PBB SHADOW E&M-EST. PATIENT-LVL IV: CPT | Mod: PBBFAC,,, | Performed by: PHYSICIAN ASSISTANT

## 2017-10-11 PROCEDURE — 99213 OFFICE O/P EST LOW 20 MIN: CPT | Mod: S$GLB,,, | Performed by: PHYSICIAN ASSISTANT

## 2017-10-11 RX ORDER — ASPIRIN 81 MG/1
81 TABLET ORAL NIGHTLY
Status: ON HOLD | COMMUNITY
End: 2020-10-06 | Stop reason: SDUPTHER

## 2017-10-11 RX ORDER — TRAMADOL HYDROCHLORIDE AND ACETAMINOPHEN 37.5; 325 MG/1; MG/1
1 TABLET, FILM COATED ORAL 2 TIMES DAILY PRN
Qty: 60 TABLET | Refills: 0 | Status: SHIPPED | OUTPATIENT
Start: 2017-10-11 | End: 2018-01-17

## 2017-10-11 RX ORDER — TRAMADOL HYDROCHLORIDE AND ACETAMINOPHEN 37.5; 325 MG/1; MG/1
1 TABLET, FILM COATED ORAL 2 TIMES DAILY PRN
COMMUNITY
End: 2017-10-11 | Stop reason: SDUPTHER

## 2017-10-11 RX ORDER — MELOXICAM 7.5 MG/1
7.5 TABLET ORAL DAILY
COMMUNITY
End: 2018-01-17

## 2017-10-11 RX ORDER — OXYCODONE AND ACETAMINOPHEN 7.5; 325 MG/1; MG/1
1 TABLET ORAL EVERY 4 HOURS PRN
COMMUNITY
Start: 2017-09-15 | End: 2018-01-17

## 2017-10-11 NOTE — PROGRESS NOTES
Subjective:       Patient ID: Chao Hawk is a 85 y.o.W/ male.    Chief Complaint: Leg Swelling and Leg Pain    HPI         He comes in today accompanied by his wife and he has the above problem.  He has been swelling since mid June in his legs and feet and had been placed on Lasix 40 mg by his PCP Dr. Ureña.  At first it was getting better and then it got back to the way it was before he started the Lasix.  He doesn't get up and move around very much during the daytime and he does sit in a chair with his feet on the floor a lot.  Occasionally he will elevate them.  He's also started having numbness in his toes and soles of his feet.  He did have lumbar spine and disc surgery just done about a month ago and all his pre-op labs were normal at that time.  He denies any problems with: Chest pain, palpitations, tachyarrhythmia, sweats, neck or shoulder pain, numbness of his upper extremities, diaphoresis, near syncope, heartburn, indigestion, nausea, anorexia, loose bowels or diarrhea.  He says he has limited his salt intake to almost nothing.  He is not eating a lot of salty snacks or salty lunchmeat.    Review of Systems    Otherwise negative concerning his PULMONARY, CV, VASCULAR, and GI system review.    Objective:      Physical Exam    CHEST: Clear BS anterior to posterior with no wheeze, rhonchi, or rales.  There is no muffling of the chest sounds and the intensity is normal.  HEART: RSR.  Pulse rate is 76 bpm and regular.  S1 is greater than S2 and there is no murmur or gallop.  ABDOMEN: Rotund but not distended with air or fluid.  Bowel sounds are normal.  No organomegaly or mass felt palpation percussion.  LOWER EXTREMITIES: He has 2+ pitting edema up to both knees.  He is wearing some compression stockings.    Assessment:       1. Dependent edema        Plan:      1.  Dr. Ureña came in and also took a look at him.  He agrees with the following.  2.  Patient needs to get up every hour and walk for 5-10  minutes.  He also needs to elevate his feet when he is sitting down either on a couch or not elevated recliner where the fever higher than the hip.  3.  Increase his Lasix temporarily to 1 in the morning at 9:00 in the morning and a second one at 3 PM.  He's only did take the evening dose if his legs are swollen.  4.  Recheck in 2 weeks if symptoms persist.

## 2017-10-26 ENCOUNTER — CLINICAL SUPPORT (OUTPATIENT)
Dept: INTERNAL MEDICINE | Facility: CLINIC | Age: 82
End: 2017-10-26
Payer: MEDICARE

## 2017-10-26 PROCEDURE — 96372 THER/PROPH/DIAG INJ SC/IM: CPT | Mod: S$GLB,,, | Performed by: PEDIATRICS

## 2017-10-26 PROCEDURE — 99999 PR PBB SHADOW E&M-EST. PATIENT-LVL II: CPT | Mod: PBBFAC,,,

## 2017-10-26 RX ADMIN — TESTOSTERONE CYPIONATE 200 MG: 200 INJECTION, SOLUTION INTRAMUSCULAR at 09:10

## 2017-11-08 RX ORDER — FLUTICASONE PROPIONATE 100 UG/1
POWDER, METERED RESPIRATORY (INHALATION)
Qty: 3 INHALER | Refills: 3 | Status: SHIPPED | OUTPATIENT
Start: 2017-11-08 | End: 2018-01-17 | Stop reason: SDUPTHER

## 2017-11-14 ENCOUNTER — PATIENT MESSAGE (OUTPATIENT)
Dept: INTERNAL MEDICINE | Facility: CLINIC | Age: 82
End: 2017-11-14

## 2017-12-06 ENCOUNTER — TELEPHONE (OUTPATIENT)
Dept: INTERNAL MEDICINE | Facility: CLINIC | Age: 82
End: 2017-12-06

## 2017-12-06 NOTE — TELEPHONE ENCOUNTER
Returned call to pt. Says he'd like to reschedule testosterone injection to 1/9 when he comes in for labs. Appt rescheduled. He verbalized understanding.   General

## 2017-12-06 NOTE — TELEPHONE ENCOUNTER
----- Message from Xander Henry sent at 12/5/2017  5:29 PM CST -----  Contact: Self  Pt called in reference to rescheduling nurse visit on 12/28/17 to 1/9/18 and would like a callback at ([phone])

## 2017-12-28 ENCOUNTER — TELEPHONE (OUTPATIENT)
Dept: INTERNAL MEDICINE | Facility: CLINIC | Age: 82
End: 2017-12-28

## 2017-12-28 NOTE — TELEPHONE ENCOUNTER
Returned call to pt's daughter regarding work release paperwork. No answer. Left message for return call.

## 2017-12-28 NOTE — TELEPHONE ENCOUNTER
Returned call to pt's daughter. Says mom passed away 11/14. Says she needs to get [facility] paperwork completed, as she was taking care of dad after vehicle accident. Wants to know if Dr. Ureña would be willing to complete paperwork. I advised that Dr. Ureña would complete the paperwork, but she must bring in from her employer. She verbalized understanding.

## 2017-12-28 NOTE — TELEPHONE ENCOUNTER
----- Message from Lauren Willis sent at 12/28/2017  2:35 PM CST -----  Contact: Zhane, pt's daughter  She's returning a missed call, please advise [phone]

## 2017-12-28 NOTE — TELEPHONE ENCOUNTER
----- Message from Jennifer Saucedo sent at 12/28/2017 10:22 AM CST -----  Contact: Pt/Daughter (Zhane)  Please give pt daughter a call at [phone] regarding a work release form for her.

## 2018-01-09 ENCOUNTER — LAB VISIT (OUTPATIENT)
Dept: LAB | Facility: HOSPITAL | Age: 83
End: 2018-01-09
Attending: PEDIATRICS
Payer: MEDICARE

## 2018-01-09 ENCOUNTER — CLINICAL SUPPORT (OUTPATIENT)
Dept: INTERNAL MEDICINE | Facility: CLINIC | Age: 83
End: 2018-01-09
Payer: MEDICARE

## 2018-01-09 DIAGNOSIS — I10 ESSENTIAL HYPERTENSION: Chronic | ICD-10-CM

## 2018-01-09 DIAGNOSIS — N40.0 BENIGN NON-NODULAR PROSTATIC HYPERPLASIA WITHOUT LOWER URINARY TRACT SYMPTOMS: Chronic | ICD-10-CM

## 2018-01-09 DIAGNOSIS — E29.1 HYPOGONADISM IN MALE: Primary | ICD-10-CM

## 2018-01-09 DIAGNOSIS — E78.00 PURE HYPERCHOLESTEROLEMIA: Chronic | ICD-10-CM

## 2018-01-09 LAB
ALT SERPL W/O P-5'-P-CCNC: 22 U/L
ANION GAP SERPL CALC-SCNC: 9 MMOL/L
AST SERPL-CCNC: 25 U/L
BASOPHILS # BLD AUTO: 0.03 K/UL
BASOPHILS NFR BLD: 0.4 %
BUN SERPL-MCNC: 18 MG/DL
CALCIUM SERPL-MCNC: 8.8 MG/DL
CHLORIDE SERPL-SCNC: 106 MMOL/L
CHOLEST SERPL-MCNC: 142 MG/DL
CHOLEST/HDLC SERPL: 2.7 {RATIO}
CO2 SERPL-SCNC: 28 MMOL/L
COMPLEXED PSA SERPL-MCNC: 2.2 NG/ML
CREAT SERPL-MCNC: 0.9 MG/DL
DIFFERENTIAL METHOD: ABNORMAL
EOSINOPHIL # BLD AUTO: 0.2 K/UL
EOSINOPHIL NFR BLD: 2.7 %
ERYTHROCYTE [DISTWIDTH] IN BLOOD BY AUTOMATED COUNT: 13.8 %
EST. GFR  (AFRICAN AMERICAN): >60 ML/MIN/1.73 M^2
EST. GFR  (NON AFRICAN AMERICAN): >60 ML/MIN/1.73 M^2
GLUCOSE SERPL-MCNC: 98 MG/DL
HCT VFR BLD AUTO: 40.4 %
HDLC SERPL-MCNC: 53 MG/DL
HDLC SERPL: 37.3 %
HGB BLD-MCNC: 13.2 G/DL
IMM GRANULOCYTES # BLD AUTO: 0.02 K/UL
IMM GRANULOCYTES NFR BLD AUTO: 0.2 %
LDLC SERPL CALC-MCNC: 72 MG/DL
LYMPHOCYTES # BLD AUTO: 1 K/UL
LYMPHOCYTES NFR BLD: 12.1 %
MCH RBC QN AUTO: 30.8 PG
MCHC RBC AUTO-ENTMCNC: 32.7 G/DL
MCV RBC AUTO: 94 FL
MONOCYTES # BLD AUTO: 0.8 K/UL
MONOCYTES NFR BLD: 9.3 %
NEUTROPHILS # BLD AUTO: 6.1 K/UL
NEUTROPHILS NFR BLD: 75.3 %
NONHDLC SERPL-MCNC: 89 MG/DL
NRBC BLD-RTO: 0 /100 WBC
PLATELET # BLD AUTO: 197 K/UL
PMV BLD AUTO: 11.3 FL
POTASSIUM SERPL-SCNC: 4.1 MMOL/L
RBC # BLD AUTO: 4.29 M/UL
SODIUM SERPL-SCNC: 143 MMOL/L
TRIGL SERPL-MCNC: 85 MG/DL
WBC # BLD AUTO: 8.04 K/UL

## 2018-01-09 PROCEDURE — 96372 THER/PROPH/DIAG INJ SC/IM: CPT | Mod: S$GLB,,, | Performed by: PEDIATRICS

## 2018-01-09 PROCEDURE — 80048 BASIC METABOLIC PNL TOTAL CA: CPT

## 2018-01-09 PROCEDURE — 36415 COLL VENOUS BLD VENIPUNCTURE: CPT | Mod: PO

## 2018-01-09 PROCEDURE — 80061 LIPID PANEL: CPT

## 2018-01-09 PROCEDURE — 85025 COMPLETE CBC W/AUTO DIFF WBC: CPT

## 2018-01-09 PROCEDURE — 84153 ASSAY OF PSA TOTAL: CPT

## 2018-01-09 PROCEDURE — 84450 TRANSFERASE (AST) (SGOT): CPT

## 2018-01-09 PROCEDURE — 84460 ALANINE AMINO (ALT) (SGPT): CPT

## 2018-01-09 RX ORDER — TESTOSTERONE CYPIONATE 200 MG/ML
200 INJECTION, SOLUTION INTRAMUSCULAR ONCE
Status: COMPLETED | OUTPATIENT
Start: 2018-01-09 | End: 2018-01-09

## 2018-01-09 RX ADMIN — TESTOSTERONE CYPIONATE 200 MG: 200 INJECTION, SOLUTION INTRAMUSCULAR at 09:01

## 2018-01-10 RX ORDER — TESTOSTERONE CYPIONATE 200 MG/ML
200 INJECTION, SOLUTION INTRAMUSCULAR
Status: COMPLETED | OUTPATIENT
Start: 2018-02-08 | End: 2018-06-28

## 2018-01-11 ENCOUNTER — TELEPHONE (OUTPATIENT)
Dept: INTERNAL MEDICINE | Facility: CLINIC | Age: 83
End: 2018-01-11

## 2018-01-11 NOTE — TELEPHONE ENCOUNTER
I returned a call to the pts daughter and she is requesting the time frame of November 22,2017 through December 15,2017 and please fax back to her at the number on the received fax . I  Informed her that this information has been sent to the provider and once completed will be faxed back. She verbalized understanding.

## 2018-01-11 NOTE — TELEPHONE ENCOUNTER
----- Message from Jennifer Saucedo sent at 1/11/2018  1:36 PM CST -----  Contact: Pt Daughter  Please call pt daughter at [phone] regarding a fax that she sent over.

## 2018-01-15 DIAGNOSIS — I10 ESSENTIAL HYPERTENSION: ICD-10-CM

## 2018-01-16 RX ORDER — HYDROCHLOROTHIAZIDE 25 MG/1
TABLET ORAL
Qty: 90 TABLET | Refills: 3 | Status: SHIPPED | OUTPATIENT
Start: 2018-01-16 | End: 2018-01-17 | Stop reason: SDUPTHER

## 2018-01-16 RX ORDER — LISINOPRIL 10 MG/1
TABLET ORAL
Qty: 90 TABLET | Refills: 3 | Status: SHIPPED | OUTPATIENT
Start: 2018-01-16 | End: 2018-01-17 | Stop reason: SDUPTHER

## 2018-01-17 ENCOUNTER — TELEPHONE (OUTPATIENT)
Dept: INTERNAL MEDICINE | Facility: CLINIC | Age: 83
End: 2018-01-17

## 2018-01-17 ENCOUNTER — OFFICE VISIT (OUTPATIENT)
Dept: INTERNAL MEDICINE | Facility: CLINIC | Age: 83
End: 2018-01-17
Payer: MEDICARE

## 2018-01-17 VITALS
DIASTOLIC BLOOD PRESSURE: 60 MMHG | TEMPERATURE: 96 F | HEART RATE: 80 BPM | WEIGHT: 184.5 LBS | HEIGHT: 72 IN | OXYGEN SATURATION: 97 % | BODY MASS INDEX: 24.99 KG/M2 | SYSTOLIC BLOOD PRESSURE: 130 MMHG

## 2018-01-17 DIAGNOSIS — I70.0 ATHEROSCLEROSIS OF AORTA: ICD-10-CM

## 2018-01-17 DIAGNOSIS — M54.16 LUMBAR RADICULOPATHY: ICD-10-CM

## 2018-01-17 DIAGNOSIS — I71.40 ABDOMINAL AORTIC ANEURYSM WITHOUT RUPTURE: ICD-10-CM

## 2018-01-17 DIAGNOSIS — E78.5 HYPERLIPIDEMIA, UNSPECIFIED HYPERLIPIDEMIA TYPE: ICD-10-CM

## 2018-01-17 DIAGNOSIS — I10 ESSENTIAL HYPERTENSION: Chronic | ICD-10-CM

## 2018-01-17 DIAGNOSIS — E78.00 PURE HYPERCHOLESTEROLEMIA: Primary | Chronic | ICD-10-CM

## 2018-01-17 DIAGNOSIS — J44.9 CHRONIC OBSTRUCTIVE PULMONARY DISEASE, UNSPECIFIED COPD TYPE: Chronic | ICD-10-CM

## 2018-01-17 DIAGNOSIS — R79.89 LOW TESTOSTERONE: ICD-10-CM

## 2018-01-17 DIAGNOSIS — N40.0 BENIGN PROSTATIC HYPERPLASIA WITHOUT LOWER URINARY TRACT SYMPTOMS: Chronic | ICD-10-CM

## 2018-01-17 DIAGNOSIS — D64.9 ANEMIA, UNSPECIFIED TYPE: ICD-10-CM

## 2018-01-17 PROCEDURE — 99499 UNLISTED E&M SERVICE: CPT | Mod: S$GLB,,, | Performed by: PEDIATRICS

## 2018-01-17 PROCEDURE — 99999 PR PBB SHADOW E&M-EST. PATIENT-LVL III: CPT | Mod: PBBFAC,,, | Performed by: PEDIATRICS

## 2018-01-17 PROCEDURE — 99214 OFFICE O/P EST MOD 30 MIN: CPT | Mod: S$GLB,,, | Performed by: PEDIATRICS

## 2018-01-17 RX ORDER — HYDROCHLOROTHIAZIDE 25 MG/1
25 TABLET ORAL DAILY
Qty: 90 TABLET | Refills: 3 | Status: SHIPPED | OUTPATIENT
Start: 2018-01-17 | End: 2019-01-17

## 2018-01-17 RX ORDER — ATORVASTATIN CALCIUM 40 MG/1
40 TABLET, FILM COATED ORAL DAILY
Qty: 90 TABLET | Refills: 3 | Status: SHIPPED | OUTPATIENT
Start: 2018-01-17 | End: 2018-07-17 | Stop reason: DRUGHIGH

## 2018-01-17 RX ORDER — FLUTICASONE PROPIONATE 100 UG/1
POWDER, METERED RESPIRATORY (INHALATION)
Qty: 3 INHALER | Refills: 3 | Status: SHIPPED | OUTPATIENT
Start: 2018-01-17 | End: 2019-01-17

## 2018-01-17 RX ORDER — TAMSULOSIN HYDROCHLORIDE 0.4 MG/1
0.8 CAPSULE ORAL EVERY MORNING
Qty: 180 CAPSULE | Refills: 3 | Status: SHIPPED | OUTPATIENT
Start: 2018-01-17 | End: 2018-11-20 | Stop reason: SDUPTHER

## 2018-01-17 RX ORDER — LISINOPRIL 10 MG/1
TABLET ORAL
Qty: 90 TABLET | Refills: 3 | Status: SHIPPED | OUTPATIENT
Start: 2018-01-17 | End: 2019-01-25 | Stop reason: SDUPTHER

## 2018-01-17 NOTE — PROGRESS NOTES
Subjective:       Patient ID: Chao Hawk is a 85 y.o. male.     Chief Complaint: Follow-up (6 mo w/lab)     HTN: B/P normal, no HTNive symptoms  LIPIDS:following D&E, tolerating and compliant with med(s).    BPH on flomax 0.4 mg nocturia x 3, wants to increase  COPD:Does well, no exercise limitations. Flovent diskus working well, no albuterol use.  Low T:On depo testosterone.  Back  improved with surgery, uses canes only for balance and supprot for uneven surface, has residual feet numbness. Uses only tylenol. Fall risk is now minimal, only fall occurred after surgery .     LABS REVIEWED AND DISCUSSED WITH PATIENT     Review of Systems   Constitutional: Negative for fever and unexpected weight change.   HENT: Negative for congestion and rhinorrhea.    Eyes: Negative for discharge and redness.   Respiratory: Negative for cough and wheezing.    Cardiovascular: Positive for leg swelling. Negative for chest pain and palpitations.   Gastrointestinal: Negative for constipation, diarrhea and vomiting.   Endocrine: Negative for cold intolerance, polydipsia, polyphagia and polyuria.   Genitourinary: Negative for decreased urine volume and difficulty urinating.   Musculoskeletal: Positive for arthralgias, back pain and gait problem. Negative for joint swelling.   Skin: Negative for rash and wound.   Neurological: Negative for syncope and headaches.   Psychiatric/Behavioral: Negative for behavioral problems and sleep disturbance. Greiving for wife after her death, not depressed, close family and friend support      Objective:   Physical Exam   Constitutional: He is oriented to person, place, and time. He appears well-developed and well-nourished.   Neck: No JVD present.   Cardiovascular: Normal rate, regular rhythm and normal heart sounds.    No murmur heard.  Pulmonary/Chest: Effort normal and breath sounds normal. No respiratory distress. He has no wheezes. He has no rales.   Abdominal: Soft. He exhibits no distension  and no mass. There is no tenderness. There is no rebound and no guarding.   Musculoskeletal: He exhibits edema (trace left edema, right 1-2+ to knee).   Much improved back movement, gait steady, does not uses hands to transfer   Neurological: He is alert and oriented to person, place, and time. No cranial nerve deficit. Coordination normal.   Psychiatric: He has a normal mood and affect. His behavior is normal. Judgment and thought content normal.       Assessment:       1. Chronic obstructive pulmonary disease, unspecified COPD type    2. Pure hypercholesterolemia    3. Essential hypertension    4. Low testosterone    5. DDD (degenerative disc disease), lumbar    6. Benign non-nodular prostatic hyperplasia without lower urinary tract symptoms    7. AAA/ aortic atherlosclerosis   8. Hypogonadism in male        Plan:   Meds reviewed. Diesase states under good control, but he would like to try increase flomax to 0.8 mg, watch dizziness. He is doing well. F/U in 6 months with labs and triple A us. .

## 2018-01-17 NOTE — TELEPHONE ENCOUNTER
----- Message from Darrion Boyd sent at 1/17/2018  3:00 PM CST -----  Contact: Pt  Pt request call from the nurse call, please contact the pt at [phone]

## 2018-01-17 NOTE — TELEPHONE ENCOUNTER
----- Message from Irene Olivarez sent at 1/17/2018  1:23 PM CST -----  Contact: self [phone]  Would like to consult with nurse regarding rescheduling appointment scheduled on 1/17 to sooner appointment.  Please call back at  [phone].   Md Estefany

## 2018-02-05 ENCOUNTER — PATIENT OUTREACH (OUTPATIENT)
Dept: ADMINISTRATIVE | Facility: HOSPITAL | Age: 83
End: 2018-02-05

## 2018-02-05 NOTE — PROGRESS NOTES
Last documented 2017 Medication reconciliation Post d/c has been sent to [facility] as attestation documentation for MEDICATION RECONCILIATION. Dated 10/11/17. Measure has been met.

## 2018-02-06 ENCOUNTER — PATIENT MESSAGE (OUTPATIENT)
Dept: INTERNAL MEDICINE | Facility: CLINIC | Age: 83
End: 2018-02-06

## 2018-02-08 ENCOUNTER — OFFICE VISIT (OUTPATIENT)
Dept: INTERNAL MEDICINE | Facility: CLINIC | Age: 83
End: 2018-02-08
Payer: MEDICARE

## 2018-02-08 ENCOUNTER — CLINICAL SUPPORT (OUTPATIENT)
Dept: INTERNAL MEDICINE | Facility: CLINIC | Age: 83
End: 2018-02-08
Payer: MEDICARE

## 2018-02-08 ENCOUNTER — HOSPITAL ENCOUNTER (EMERGENCY)
Facility: HOSPITAL | Age: 83
Discharge: HOME | End: 2018-02-08
Attending: EMERGENCY MEDICINE
Payer: MEDICARE

## 2018-02-08 VITALS
DIASTOLIC BLOOD PRESSURE: 69 MMHG | OXYGEN SATURATION: 95 % | HEART RATE: 80 BPM | HEIGHT: 72 IN | BODY MASS INDEX: 25.26 KG/M2 | SYSTOLIC BLOOD PRESSURE: 134 MMHG | WEIGHT: 186.5 LBS

## 2018-02-08 VITALS
DIASTOLIC BLOOD PRESSURE: 59 MMHG | TEMPERATURE: 98 F | WEIGHT: 186.81 LBS | OXYGEN SATURATION: 97 % | HEART RATE: 80 BPM | BODY MASS INDEX: 25.33 KG/M2 | RESPIRATION RATE: 16 BRPM | SYSTOLIC BLOOD PRESSURE: 132 MMHG

## 2018-02-08 DIAGNOSIS — I49.1 PAC (PREMATURE ATRIAL CONTRACTION): ICD-10-CM

## 2018-02-08 DIAGNOSIS — M47.816 LUMBAR ARTHROPATHY: ICD-10-CM

## 2018-02-08 DIAGNOSIS — I10 ESSENTIAL HYPERTENSION: Chronic | ICD-10-CM

## 2018-02-08 DIAGNOSIS — I71.40 ABDOMINAL AORTIC ANEURYSM WITHOUT RUPTURE: ICD-10-CM

## 2018-02-08 DIAGNOSIS — R39.12 BENIGN PROSTATIC HYPERPLASIA WITH WEAK URINARY STREAM: Chronic | ICD-10-CM

## 2018-02-08 DIAGNOSIS — N40.1 BENIGN PROSTATIC HYPERPLASIA WITH WEAK URINARY STREAM: Chronic | ICD-10-CM

## 2018-02-08 DIAGNOSIS — I70.0 ATHEROSCLEROSIS OF AORTA: ICD-10-CM

## 2018-02-08 DIAGNOSIS — I73.9 PVD (PERIPHERAL VASCULAR DISEASE): Primary | ICD-10-CM

## 2018-02-08 DIAGNOSIS — M79.89 RIGHT LEG SWELLING: ICD-10-CM

## 2018-02-08 DIAGNOSIS — I49.9 IRREGULARLY IRREGULAR CARDIAC RHYTHM: ICD-10-CM

## 2018-02-08 DIAGNOSIS — M79.2 NEUROPATHIC PAIN: ICD-10-CM

## 2018-02-08 DIAGNOSIS — M51.36 DDD (DEGENERATIVE DISC DISEASE), LUMBAR: ICD-10-CM

## 2018-02-08 DIAGNOSIS — R55 NEAR SYNCOPE: ICD-10-CM

## 2018-02-08 DIAGNOSIS — J44.89 CHRONIC BRONCHITIS WITH EMPHYSEMA: ICD-10-CM

## 2018-02-08 DIAGNOSIS — J84.10 CALCIFIED GRANULOMA OF LUNG: ICD-10-CM

## 2018-02-08 DIAGNOSIS — R79.89 LOW TESTOSTERONE: ICD-10-CM

## 2018-02-08 DIAGNOSIS — Q61.02 MULTIPLE RENAL CYSTS: ICD-10-CM

## 2018-02-08 DIAGNOSIS — R09.89 BILATERAL CAROTID BRUITS: ICD-10-CM

## 2018-02-08 DIAGNOSIS — R60.0 UNILATERAL EDEMA OF LOWER EXTREMITY: ICD-10-CM

## 2018-02-08 DIAGNOSIS — Z00.00 ENCOUNTER FOR PREVENTIVE HEALTH EXAMINATION: Primary | ICD-10-CM

## 2018-02-08 DIAGNOSIS — E78.00 PURE HYPERCHOLESTEROLEMIA: Chronic | ICD-10-CM

## 2018-02-08 PROBLEM — J98.4 CALCIFIED GRANULOMA OF LUNG: Status: ACTIVE | Noted: 2018-02-08

## 2018-02-08 LAB
ALBUMIN SERPL BCP-MCNC: 4.1 G/DL
ALP SERPL-CCNC: 70 U/L
ALT SERPL W/O P-5'-P-CCNC: 26 U/L
ANION GAP SERPL CALC-SCNC: 10 MMOL/L
APTT BLDCRRT: 24.3 SEC
AST SERPL-CCNC: 23 U/L
BASOPHILS # BLD AUTO: 0.01 K/UL
BASOPHILS NFR BLD: 0.1 %
BILIRUB SERPL-MCNC: 0.5 MG/DL
BNP SERPL-MCNC: 11 PG/ML
BUN SERPL-MCNC: 20 MG/DL
CALCIUM SERPL-MCNC: 9.6 MG/DL
CHLORIDE SERPL-SCNC: 107 MMOL/L
CO2 SERPL-SCNC: 24 MMOL/L
CREAT SERPL-MCNC: 0.9 MG/DL
DIFFERENTIAL METHOD: ABNORMAL
EOSINOPHIL # BLD AUTO: 0.1 K/UL
EOSINOPHIL NFR BLD: 1.9 %
ERYTHROCYTE [DISTWIDTH] IN BLOOD BY AUTOMATED COUNT: 14.8 %
EST. GFR  (AFRICAN AMERICAN): >60 ML/MIN/1.73 M^2
EST. GFR  (NON AFRICAN AMERICAN): >60 ML/MIN/1.73 M^2
GLUCOSE SERPL-MCNC: 100 MG/DL
HCT VFR BLD AUTO: 40.3 %
HGB BLD-MCNC: 13.1 G/DL
INR PPP: 1
LYMPHOCYTES # BLD AUTO: 0.8 K/UL
LYMPHOCYTES NFR BLD: 11.8 %
MCH RBC QN AUTO: 30.9 PG
MCHC RBC AUTO-ENTMCNC: 32.5 G/DL
MCV RBC AUTO: 95 FL
MONOCYTES # BLD AUTO: 0.5 K/UL
MONOCYTES NFR BLD: 7.4 %
NEUTROPHILS # BLD AUTO: 5.4 K/UL
NEUTROPHILS NFR BLD: 78.8 %
PLATELET # BLD AUTO: 166 K/UL
PMV BLD AUTO: 11 FL
POTASSIUM SERPL-SCNC: 4.1 MMOL/L
PROT SERPL-MCNC: 6.7 G/DL
PROTHROMBIN TIME: 10.1 SEC
RBC # BLD AUTO: 4.24 M/UL
SODIUM SERPL-SCNC: 141 MMOL/L
TROPONIN I SERPL DL<=0.01 NG/ML-MCNC: 0.03 NG/ML
WBC # BLD AUTO: 6.79 K/UL

## 2018-02-08 PROCEDURE — 85025 COMPLETE CBC W/AUTO DIFF WBC: CPT

## 2018-02-08 PROCEDURE — 99499 UNLISTED E&M SERVICE: CPT | Mod: S$GLB,,, | Performed by: NURSE PRACTITIONER

## 2018-02-08 PROCEDURE — 99214 OFFICE O/P EST MOD 30 MIN: CPT | Mod: PO,25 | Performed by: NURSE PRACTITIONER

## 2018-02-08 PROCEDURE — 85730 THROMBOPLASTIN TIME PARTIAL: CPT

## 2018-02-08 PROCEDURE — 99284 EMERGENCY DEPT VISIT MOD MDM: CPT | Mod: 25

## 2018-02-08 PROCEDURE — 99999 PR PBB SHADOW E&M-EST. PATIENT-LVL IV: CPT | Mod: PBBFAC,,, | Performed by: NURSE PRACTITIONER

## 2018-02-08 PROCEDURE — 93005 ELECTROCARDIOGRAM TRACING: CPT

## 2018-02-08 PROCEDURE — 85610 PROTHROMBIN TIME: CPT

## 2018-02-08 PROCEDURE — 80053 COMPREHEN METABOLIC PANEL: CPT

## 2018-02-08 PROCEDURE — 93010 ELECTROCARDIOGRAM REPORT: CPT | Mod: ,,, | Performed by: INTERNAL MEDICINE

## 2018-02-08 PROCEDURE — 83880 ASSAY OF NATRIURETIC PEPTIDE: CPT

## 2018-02-08 PROCEDURE — 84484 ASSAY OF TROPONIN QUANT: CPT

## 2018-02-08 PROCEDURE — 96372 THER/PROPH/DIAG INJ SC/IM: CPT | Mod: S$GLB,,, | Performed by: PEDIATRICS

## 2018-02-08 PROCEDURE — G0439 PPPS, SUBSEQ VISIT: HCPCS | Mod: S$GLB,,, | Performed by: NURSE PRACTITIONER

## 2018-02-08 RX ORDER — ASPIRIN 81 MG/1
81 TABLET ORAL
COMMUNITY
End: 2018-03-20 | Stop reason: SDUPTHER

## 2018-02-08 RX ORDER — HYDROCODONE BITARTRATE AND ACETAMINOPHEN 5; 325 MG/1; MG/1
TABLET ORAL
Refills: 0 | COMMUNITY
Start: 2017-11-15 | End: 2018-03-20 | Stop reason: ALTCHOICE

## 2018-02-08 RX ORDER — ASPIRIN 325 MG
325 TABLET ORAL
Status: DISCONTINUED | OUTPATIENT
Start: 2018-02-08 | End: 2018-02-08 | Stop reason: HOSPADM

## 2018-02-08 RX ORDER — FUROSEMIDE 40 MG/1
20 TABLET ORAL
COMMUNITY
End: 2018-07-17 | Stop reason: DRUGHIGH

## 2018-02-08 RX ORDER — ATORVASTATIN CALCIUM 10 MG/1
10 TABLET, FILM COATED ORAL
COMMUNITY
End: 2018-02-08

## 2018-02-08 RX ADMIN — TESTOSTERONE CYPIONATE 200 MG: 200 INJECTION, SOLUTION INTRAMUSCULAR at 08:02

## 2018-02-08 NOTE — PROGRESS NOTES
I offered to discuss end of life issues, including information on how to make advance directives that the patient could use to name someone who would make medical decisions on their behalf if they became too ill to make themselves. Patient states has copy of advanced directives at home and will bring at follow up visit to Dr. Ureña for his medical records.

## 2018-02-08 NOTE — PROGRESS NOTES
Chao Hawk presented for a  Medicare AWV and comprehensive Health Risk Assessment today. The following components were reviewed and updated:    · Medical history  · Family History  · Social history  · Allergies and Current Medications  · Health Risk Assessment  · Health Maintenance  · Care Team     ** See Completed Assessments for Annual Wellness Visit within the encounter summary.**       The following assessments were completed:  · Living Situation  · CAGE  · Depression Screening- PHQ9 Score: 5  · Timed Get Up and Go  · Whisper Test- left ear abnormal  · Cognitive Function Screening  · Nutrition Screening  · ADL Screening  · PAQ Screening    Vitals:    02/08/18 1010   BP: 134/69   BP Location: Left arm   Patient Position: Sitting   BP Method: Large (Manual)   Pulse: 80   SpO2: 95%   Weight: 84.6 kg (186 lb 8.2 oz)   Height: 6' (1.829 m)     Body mass index is 25.3 kg/m².  Physical Exam   Constitutional: He is oriented to person, place, and time. Vital signs are normal. He appears well-developed. He is cooperative. No distress.   HENT:   Head: Normocephalic and atraumatic.   Right Ear: No decreased hearing is noted.   Left Ear: Decreased hearing is noted.   Eyes: Pupils are equal, round, and reactive to light.   Neck: Decreased carotid pulses present. Carotid bruit is present.   Cardiovascular: Normal rate and intact distal pulses.  A regularly irregular rhythm present. Exam reveals no gallop.    No murmur heard.  Pulses:       Carotid pulses are 1+ on the right side with bruit, and 2+ on the left side with bruit.       Radial pulses are 2+ on the right side, and 2+ on the left side.        Dorsalis pedis pulses are 1+ on the right side, and 1+ on the left side.   Pulmonary/Chest: Effort normal. He has rales in the right middle field, the right lower field and the left lower field.   Abdominal: Soft. Bowel sounds are normal. He exhibits no distension. There is no tenderness.   AAA   Musculoskeletal:        Right  ankle: He exhibits decreased range of motion, swelling and abnormal pulse.        Right lower leg: He exhibits tenderness and edema.        Left lower leg: He exhibits edema.   3+ RLE edema, RLE erythematous, posterior calves TOP, Right calf warmth greater than left. Toes cyanotic and cold on right, 1+ Bilateral pedal pulses  1+ LLE edema, No sensation in 6 points with monofilament test on right. 2 places of 6 sensed with monofilament test on left.    Feet:   Right Foot:   Protective Sensation: 6 sites tested. 0 sites sensed.   Skin Integrity: Positive for erythema and warmth.   Left Foot:   Protective Sensation: 6 sites tested. 2 sites sensed.   Neurological: He is alert and oriented to person, place, and time. A sensory deficit is present. He exhibits normal muscle tone. Gait abnormal.   Skin: Skin is warm, dry and intact.   Psychiatric: He has a normal mood and affect. His speech is normal and behavior is normal. Judgment and thought content normal. Cognition and memory are normal.   Nursing note and vitals reviewed.        Diagnoses and health risks identified today and associated recommendations/orders:    1. Encounter for preventive health examination  Abnormal whisper test- Left ear  PHQ9 score: 5, grief over loss of wife in November 2017    2. Lumbar arthropathy  Stable and controlled. Patient states had lumbar surgery 9/2017. Pain improved. Neuropathy resolved.Continue current treatment plan as previously prescribed with your PCP and neurosurgeon at [facility].         3. Calcified granuloma of lung  CT abdomen 11-14-17 in Care Everywhere: Calcified granuloma of right middle lobe lung   Stable. Denies shortness of breath. States has chronic daily productive cough.  Continue current treatment plan as previously prescribed with your PCP.       4. Abdominal aortic aneurysm without rupture  6/26/17 AAA ultrasound: Impression: Stable abdominal aortic aneurysm with maximum diameter of 3.2 cm.  Stable and  controlled. Future abdominal ultrasound ordered per Dr. Ureña. Continue current treatment plan as previously prescribed with your PCP.         5. Chronic bronchitis with emphysema  Stable and controlled. Patient denies any changes Discussed follow up PFTs to evaluate COPD. Patient refused pulmonary function testing to evaluate current state of previously diagnosed (per his report) COPD. States he had PFTs many years ago. Continue current treatment plan as previously prescribed with your PCP.         6. Atherosclerosis of aorta  Stable. Patient denies chest pain. On ASA, statin, blood pressure controlled. Continue current treatment plan as previously prescribed with your PCP.         7. Essential hypertension  Stable and controlled. On ACE, Lasix, and HCTZ. Patient feels 40 mg Lasix dose it too much. States has been taking half of the dose. States no improvement in edema.  Continue current treatment plan as previously prescribed with your PCP.         8. Pure hypercholesterolemia  Lab Results   Component Value Date    CHOL 142 01/09/2018    CHOL 145 06/26/2017    CHOL 136 12/27/2016     Lab Results   Component Value Date    HDL 53 01/09/2018    HDL 47 06/26/2017    HDL 51 12/27/2016     Lab Results   Component Value Date    LDLCALC 72.0 01/09/2018    LDLCALC 81.0 06/26/2017    LDLCALC 70.8 12/27/2016     Lab Results   Component Value Date    TRIG 85 01/09/2018    TRIG 85 06/26/2017    TRIG 71 12/27/2016     Lab Results   Component Value Date    CHOLHDL 37.3 01/09/2018    CHOLHDL 32.4 06/26/2017    CHOLHDL 37.5 12/27/2016   Stable and controlled. On statin therapy. LFT WNL. Continue current treatment plan as previously prescribed with your PCP.       9. Multiple renal cysts  Lumbar MRI 5/24/17: Incidental note again made of probable renal cortical cysts and ectasia and mild saccular aneurysmal dilatation of distal abdominal aorta.  Correlate clinically.  Lab Results   Component Value Date    CREATININE 0.9 01/09/2018     BUN 18 01/09/2018     01/09/2018    K 4.1 01/09/2018     01/09/2018    CO2 28 01/09/2018     Stable. Renal function stable. Follow up with PCP  Continue current treatment plan as previously prescribed with your PCP.       10. Benign prostatic hyperplasia with weak urinary stream  This problem is currently not controlled.Patient states continues to have poor urine stream after increased medication dose.  Please follow up with your PCP as planned to discuss adjustments to your treatment plan.      11. Low testosterone  Stable. Patient testosterone levels being measured by PCP. Received testosterone injection today prior to visit.  Continue current treatment plan as previously prescribed with your PCP.       12. Irregularly irregular cardiac rhythm  This is a new problem that has been identified during today's visit. Patient has no history of atrial fibrillation, states has had several near syncopal episodes wear he was sitting on the edge of the bed and woke up right before his face could hit the floor. RLE edema 3+ warm with erythema, claudication bilaterally, 1+ bilateral pedal pulses, right toes light cyanosis and cold, loss of sensation bilaterally on monofilament test, completely on riight foot. Trace edema to LLE. Bilateral carotid bruits. Dr. Ureña evaluated patient. Instructed patient concern for DVT, pulmonary embolism, atrial fibrillation, and PAD. Dr. Ureña and educated patient on risks if conditions exist and not evaluated and treated. He stated this could not be done in the clinic and would best serve the patient's need for timely assessment and possible intervention in the hospital. Dr. Sarmiento at [facility] ED notified of patient condition and Dr. Ureña's assessment concers.  Please follow up with PCP post hospital visit.       13. Unilateral edema of lower extremity  See #12    14. Bilateral carotid bruits  See #12    15. DDD (degenerative disc disease), lumbar  Stable and controlled.  Continue current treatment plan as previously prescribed with your neurologist.       16. Near syncope  See #12     Patient refused Mental health recommended based on PHQ9 score. Denies desire for grief assistance.     Provided Chao with a 5-10 year written screening schedule and personal prevention plan. Recommendations were developed using the USPSTF age appropriate recommendations. Education, counseling, and referrals were provided as needed. After Visit Summary printed and given to patient which includes a list of additional screenings\tests needed.    Follow-up in about 1 year (around 2/8/2019) for HRA.    Chely Joseph NP

## 2018-02-08 NOTE — PROGRESS NOTES
Testosterone given. Pt tolerated well. Advised to wait 15 mins in lobby to check reaction. He verbalized understanding.

## 2018-02-08 NOTE — Clinical Note
Primary Care Providers: MONSTER Ureña Jr., MD, MD (General),  Your patient was seen today for a HRA visit. Abnormalities and gap(s) in care (HEDIS gaps) have been identified during this visit that required additional testing and possible follow up.   Patient report called to ED per your request. As always, pertinent findings in bold after each diagnosis. Patient refused Mental health recommended based on PHQ9 score. Denies desire for grief assistance.   No orders of the defined types were placed in this encounter.   These orders were placed using [facility] approved protocol and any results will be forwarded to your office for appropriate follow up. I have included a copy my visit note; please review the note and feel free to contact me with any questions.   Thank you for allowing me to participate in the care of your patients.  Chely Joseph NP

## 2018-02-08 NOTE — ED PROVIDER NOTES
"SCRIBE #1 NOTE: I, Gavin Az, am scribing for, and in the presence of, Jody Sarmiento MD. I have scribed the entire note.      History      Chief Complaint   Patient presents with    Leg Swelling     pt sent by dr delacruz for "testing" pt unsure of what test       Review of patient's allergies indicates:   Allergen Reactions    Bee sting  [allergen ext-venom-honey bee] Swelling    Poison ivy extract Hives    Penicillins Rash        HPI   HPI    2/8/2018, 1:39 PM   History obtained from the patient      History of Present Illness: Chao Hawk Jr. is a 85 y.o. male patient who presents to the Emergency Department for further evaluation of RLE swelling which onset gradually months ago. Symptoms are constant and moderate in severity. Sx are exacerbated by nothing and relieved by nothing. Pt was seen at Dr. Delacruz's office PTA and we was sent to the ED for further evaluation. Pt also states he was told he had an irregular heart beat while being seen today. Pt reports a mild numb sensation to bilateral feet which is chronic and has been ongoing for months, he denies any worsening numbness or paresthesias, denies any actual weakness and is able to ambulate without difficulty, denies any rest pain in his legs. No other sxs reported. Patient denies any chest pain or pressure, dizziness, syncope or presyncope, palpitations,  fever, N/V/D, chills, weakness, calf pain, recent injury, radiating pain, foot pain, ecchymosis, SOB and all other sxs at this time. No further complaints or concerns at this time.     Arrival mode: Personal vehicle    PCP: VY Delacruz Jr, MD       Past Medical History:  Past Medical History:   Diagnosis Date    Arthritis     back, hand    Back pain     Dr. Cristobal- [facility]    Chronic bronchitis     Diverticulosis 5/16/06    colonoscopy    Hernia     x2    Hyperlipidemia     Hypertension     Lumbar radiculopathy 6/14/2017    Multiple renal cysts 7/5/2016    Tobacco dependence  "       Past Surgical History:  Past Surgical History:   Procedure Laterality Date    EYE SURGERY Bilateral     Glaucoma- Dr. Gianni Payne    FINGER SURGERY Right 1980s    index- Dr. Encarnacion    HERNIA REPAIR  1990s    x2    LUMBAR SPINE SURGERY  09/13/2017    TONSILLECTOMY, ADENOIDECTOMY  1940         Family History:  Family History   Problem Relation Age of Onset    Emphysema Mother     Heart disease Mother     COPD Mother     Appendicitis Father     Heart disease Son     Colon cancer Neg Hx     Cancer Neg Hx     Stroke Neg Hx        Social History:  Social History     Social History Main Topics    Smoking status: Former Smoker     Packs/day: 1.00     Years: 40.00     Quit date: 11/19/1970    Smokeless tobacco: Never Used    Alcohol use 9.0 oz/week     8 Standard drinks or equivalent, 7 Shots of liquor per week    Drug use: No    Sexual activity: No       ROS   Review of Systems   Constitutional: Negative for activity change, chills, diaphoresis and fever.   HENT: Negative for congestion, drooling, ear pain, rhinorrhea, sneezing, sore throat and trouble swallowing.    Eyes: Negative for pain.   Respiratory: Negative for cough, chest tightness, shortness of breath, wheezing and stridor.    Cardiovascular: Positive for leg swelling. Negative for chest pain and palpitations.   Gastrointestinal: Negative for abdominal distention, abdominal pain, constipation, diarrhea, nausea and vomiting.   Genitourinary: Negative for difficulty urinating, dysuria, frequency and urgency.   Musculoskeletal: Negative for arthralgias, back pain, myalgias, neck pain and neck stiffness.   Skin: Negative for pallor, rash and wound.   Neurological: Positive for numbness (bilateral feet chronic). Negative for dizziness, syncope, weakness, light-headedness and headaches.   All other systems reviewed and are negative.      Physical Exam      Initial Vitals [02/08/18 1307]   BP Pulse Resp Temp SpO2   (!) 160/78 106 16 97.6 °F  (36.4 °C) 95 %      MAP       105.33          Physical Exam  Nursing Notes and Vital Signs Reviewed.  Constitutional: Patient is in no apparent distress. Well-developed and well-nourished. Elderly, non ill appearing.   Head: Atraumatic. Normocephalic.  Eyes: PERRL. EOM intact. Conjunctivae are not pale. No scleral icterus.  ENT: Mucous membranes are moist. Oropharynx is clear and symmetric.    Neck: Supple. Full ROM. No lymphadenopathy.  Cardiovascular: Regular rate. Regular rhythm. No murmurs, rubs, or gallops. Distal pulses are 2+ and symmetric.  Pulmonary/Chest: No respiratory distress. Clear to auscultation bilaterally. No wheezing or rales.  Abdominal: Soft and non-distended.  There is no tenderness.  No rebound, guarding, or rigidity. Good bowel sounds.  Musculoskeletal: Moves all extremities. No obvious deformities. No edema. No calf tenderness. BLE are warm to touch.  RLE: no evident deformity. negative for tenderness. Mild swelling noted to the R foot and distal RLE. No erythema and no increased warmth appreciated. No calf pain or tenderness appreciated with palpation. 2 doplarable pulses noted to bilateral feet. ROM is normal. Cap refill distally is <2 seconds. No motor deficit. Neurovascularly intact. Toes are not cool or mottled or ischemic, He reports decreased sensation bilaterally but is able to move feet and toes without difficulty.   Skin: Warm and dry.  No rash, decreased hair noted on right distal and left distal leg.   Neurological:  Alert, awake, and appropriate.  Normal speech.  No acute focal neurological deficits are appreciated.  Psychiatric: Normal affect. Good eye contact. Appropriate in content.    ED Course    Procedures  ED Vital Signs:  Vitals:    02/08/18 1307 02/08/18 1356 02/08/18 1435 02/08/18 1540   BP: (!) 160/78 116/68  (!) 132/59   Pulse: 106 90 77 80   Resp: 16 14 16 16   Temp: 97.6 °F (36.4 °C)      TempSrc: Oral      SpO2: 95% 97% 95% 97%   Weight: 84.7 kg (186 lb 12.8 oz)           Abnormal Lab Results:  Labs Reviewed   CBC W/ AUTO DIFFERENTIAL - Abnormal; Notable for the following:        Result Value    RBC 4.24 (*)     Hemoglobin 13.1 (*)     RDW 14.8 (*)     Lymph # 0.8 (*)     Gran% 78.8 (*)     Lymph% 11.8 (*)     All other components within normal limits   TROPONIN I - Abnormal; Notable for the following:     Troponin I 0.030 (*)     All other components within normal limits   COMPREHENSIVE METABOLIC PANEL   B-TYPE NATRIURETIC PEPTIDE   PROTIME-INR   APTT        All Lab Results:  Results for orders placed or performed during the hospital encounter of 02/08/18   CBC auto differential   Result Value Ref Range    WBC 6.79 3.90 - 12.70 K/uL    RBC 4.24 (L) 4.60 - 6.20 M/uL    Hemoglobin 13.1 (L) 14.0 - 18.0 g/dL    Hematocrit 40.3 40.0 - 54.0 %    MCV 95 82 - 98 fL    MCH 30.9 27.0 - 31.0 pg    MCHC 32.5 32.0 - 36.0 g/dL    RDW 14.8 (H) 11.5 - 14.5 %    Platelets 166 150 - 350 K/uL    MPV 11.0 9.2 - 12.9 fL    Gran # (ANC) 5.4 1.8 - 7.7 K/uL    Lymph # 0.8 (L) 1.0 - 4.8 K/uL    Mono # 0.5 0.3 - 1.0 K/uL    Eos # 0.1 0.0 - 0.5 K/uL    Baso # 0.01 0.00 - 0.20 K/uL    Gran% 78.8 (H) 38.0 - 73.0 %    Lymph% 11.8 (L) 18.0 - 48.0 %    Mono% 7.4 4.0 - 15.0 %    Eosinophil% 1.9 0.0 - 8.0 %    Basophil% 0.1 0.0 - 1.9 %    Differential Method Automated    Comprehensive metabolic panel   Result Value Ref Range    Sodium 141 136 - 145 mmol/L    Potassium 4.1 3.5 - 5.1 mmol/L    Chloride 107 95 - 110 mmol/L    CO2 24 23 - 29 mmol/L    Glucose 100 70 - 110 mg/dL    BUN, Bld 20 8 - 23 mg/dL    Creatinine 0.9 0.5 - 1.4 mg/dL    Calcium 9.6 8.7 - 10.5 mg/dL    Total Protein 6.7 6.0 - 8.4 g/dL    Albumin 4.1 3.5 - 5.2 g/dL    Total Bilirubin 0.5 0.1 - 1.0 mg/dL    Alkaline Phosphatase 70 55 - 135 U/L    AST 23 10 - 40 U/L    ALT 26 10 - 44 U/L    Anion Gap 10 8 - 16 mmol/L    eGFR if African American >60 >60 mL/min/1.73 m^2    eGFR if non African American >60 >60 mL/min/1.73 m^2   Troponin I #1    Result Value Ref Range    Troponin I 0.030 (H) 0.000 - 0.026 ng/mL   B-Type natriuretic peptide (BNP)   Result Value Ref Range    BNP 11 0 - 99 pg/mL   Protime-INR   Result Value Ref Range    Prothrombin Time 10.1 9.0 - 12.5 sec    INR 1.0 0.8 - 1.2   APTT   Result Value Ref Range    aPTT 24.3 21.0 - 32.0 sec         Imaging Results:  Imaging Results          US Lower Extremity Veins Right (Final result)  Result time 02/08/18 14:28:28    Final result by Percy Fairchild MD (02/08/18 14:28:28)                 Impression:       1.  Negative exam.      Electronically signed by: PERCY FAIRCHILD MD  Date:     02/08/18  Time:    14:28              Narrative:    Indication: Leg swelling, leg pain.    CPT code 36117.    Findings:    Grayscale, color flow, and Doppler evaluation of the right lower extremity deep venous structures was performed.    All deep venous structures display normal coaptation and response augmentation without evidence of deep venous thrombosis.                               The EKG was ordered, reviewed, and independently interpreted by the ED provider.  Interpretation time: 1354  Rate: 91 BPM  Rhythm: SR with PAC  Interpretation: Incomplete R BBB. No STEMI.         The Emergency Provider reviewed the vital signs and test results, which are outlined above.    ED Discussion     3:38 PM Reassessed the pt, no DVT, he has no evidence clinically of an acute ischemic leg, he is not in AFib, NSR on ECG with occassional PACs, he has evidence of likely PVD, this however is not an acute issue and can be addressed in an outpatient setting, he has no chest pain or pressure or any recent history to suggest of such, no shortness of breath. I discussed the need for outpatient follow up with cardiology Dr. Hoffmann is who he would like to follow up with briefly discussed case with Dr. Hoffmann who states patient can follow up within next week needs ABIs and likely holter monitor.   The pt is resting comfortably and is NAD.   Discussed test results, shared treatment plan, specific conditions for return, and the need for f/u.  Answered their questions at this time.  Pt understands and agrees to the plan.  The pt has remained hemodynamically stable through ED course and is stable for discharge.     Troponin mildly elevated but this is nonspecific and patient does not have any active chest pain, pressure, shortness of breath symptoms.     ED Medication(s):  Medications   aspirin tablet 325 mg (325 mg Oral Not Given 2/8/18 1530)       New Prescriptions    No medications on file       Follow-up Information     Natalya Hoffmann MD. Schedule an appointment as soon as possible for a visit in 1 day.    Specialty:  Cardiology  Why:  Return to the Emergency Room, If symptoms worsen  Contact information:  [address]  [place] [address] [phone]                     Medical Decision Making    Medical Decision Making:   Clinical Tests:   Lab Tests: Reviewed and Ordered  Radiological Study: Reviewed and Ordered  Medical Tests: Ordered and Reviewed           Scribe Attestation:   Scribe #1: I performed the above scribed service and the documentation accurately describes the services I performed. I attest to the accuracy of the note.    Attending:   Physician Attestation Statement for Scribe #1: I, Jody Sarmiento MD, personally performed the services described in this documentation, as scribed by Gavin Bernardo, in my presence, and it is both accurate and complete.          Clinical Impression       ICD-10-CM ICD-9-CM   1. PVD (peripheral vascular disease) I73.9 443.9   2. Right leg swelling M79.89 729.81   3. Neuropathic pain M79.2 729.2   4. PAC (premature atrial contraction) I49.1 427.61       Disposition:   Disposition: Discharged  Condition: Stable         Jody Sarmiento MD  02/08/18 4841

## 2018-02-08 NOTE — PATIENT INSTRUCTIONS
Counseling and Referral of Other Preventative  (Italic type indicates deductible and co-insurance are waived)    Patient Name: Chao Hawk  Today's Date: 2/8/2018    Health Maintenance       Date Due Completion Date    Lipid Panel 01/09/2019 1/9/2018    Colonoscopy 11/19/2020 11/19/2015 (Not Clinical)    Override on 11/19/2015: Not Clinically Appropriate    Override on 5/16/2006: Done (repeat in 5 years)    TETANUS VACCINE 08/30/2023 8/30/2013        No orders of the defined types were placed in this encounter.    The following information is provided to all patients.  This information is to help you find resources for any of the problems found today that may be affecting your health:                Living healthy guide: www.[facility].[place].gov      Understanding Diabetes: www.diabetes.org      Eating healthy: www.cdc.gov/healthyweight      CDC home safety checklist: www.cdc.gov/steadi/patient.html      Agency on Aging: www.goea.[place].[facility]      Alcoholics anonymous (AA): www.aa.org      Physical Activity: www.jojo.nih.gov/ng1tlqy      Tobacco use: www.quitwithusla.org

## 2018-02-15 DIAGNOSIS — I10 HYPERTENSION, UNSPECIFIED TYPE: Primary | ICD-10-CM

## 2018-02-16 ENCOUNTER — OFFICE VISIT (OUTPATIENT)
Dept: CARDIOLOGY | Facility: CLINIC | Age: 83
End: 2018-02-16
Payer: MEDICARE

## 2018-02-16 VITALS
WEIGHT: 189.13 LBS | HEART RATE: 80 BPM | HEIGHT: 72 IN | BODY MASS INDEX: 25.62 KG/M2 | SYSTOLIC BLOOD PRESSURE: 132 MMHG | DIASTOLIC BLOOD PRESSURE: 70 MMHG

## 2018-02-16 DIAGNOSIS — I70.0 ATHEROSCLEROSIS OF AORTA: ICD-10-CM

## 2018-02-16 DIAGNOSIS — I71.40 ABDOMINAL AORTIC ANEURYSM WITHOUT RUPTURE: ICD-10-CM

## 2018-02-16 DIAGNOSIS — J44.9 CHRONIC OBSTRUCTIVE PULMONARY DISEASE, UNSPECIFIED COPD TYPE: Chronic | ICD-10-CM

## 2018-02-16 DIAGNOSIS — E78.00 PURE HYPERCHOLESTEROLEMIA: Chronic | ICD-10-CM

## 2018-02-16 DIAGNOSIS — I10 ESSENTIAL HYPERTENSION: Chronic | ICD-10-CM

## 2018-02-16 DIAGNOSIS — R60.0 BILATERAL LEG EDEMA: Primary | ICD-10-CM

## 2018-02-16 PROCEDURE — 1159F MED LIST DOCD IN RCRD: CPT | Mod: S$GLB,,, | Performed by: INTERNAL MEDICINE

## 2018-02-16 PROCEDURE — 3008F BODY MASS INDEX DOCD: CPT | Mod: S$GLB,,, | Performed by: INTERNAL MEDICINE

## 2018-02-16 PROCEDURE — 1125F AMNT PAIN NOTED PAIN PRSNT: CPT | Mod: S$GLB,,, | Performed by: INTERNAL MEDICINE

## 2018-02-16 PROCEDURE — 99999 PR PBB SHADOW E&M-EST. PATIENT-LVL III: CPT | Mod: PBBFAC,,, | Performed by: INTERNAL MEDICINE

## 2018-02-16 PROCEDURE — 99204 OFFICE O/P NEW MOD 45 MIN: CPT | Mod: S$GLB,,, | Performed by: INTERNAL MEDICINE

## 2018-02-16 PROCEDURE — 99499 UNLISTED E&M SERVICE: CPT | Mod: S$GLB,,, | Performed by: INTERNAL MEDICINE

## 2018-02-16 NOTE — PROGRESS NOTES
Subjective:   Patient ID:  Chao Hawk Jr. is a 85 y.o. male who presents for evaluation of Hypertension and Leg Swelling      HPI  An 86 yo male with h//o aaa back pain hlp htn s/purgery had subsequent fabián that leg swelling that has been progressively worse with swelling more as the day progresses. It is betetr in the morning. It is betetr with leg elvation. His legs feels tight in the calves it is worse on the rt. He had u/s negative for dvt.has no chest pain has shortness of breath from copd.no evaluation for pulmonary htn since an echo in 2013.has no chf symptoms angina tia claudication bleeding bruisng abdominal pain.  Past Medical History:   Diagnosis Date    Arthritis     back, hand    Back pain     Dr. Cristobal- [facility]    Bilateral leg edema 2/16/2018    Chronic bronchitis     Diverticulosis 5/16/06    colonoscopy    Hernia     x2    Hyperlipidemia     Hypertension     Lumbar radiculopathy 6/14/2017    Multiple renal cysts 7/5/2016    Tobacco dependence        Past Surgical History:   Procedure Laterality Date    EYE SURGERY Bilateral     Glaucoma- Dr. Gianni Payne    FINGER SURGERY Right 1980s    index- Dr. Encarnacion    HERNIA REPAIR  1990s    x2    LUMBAR SPINE SURGERY  09/13/2017    TONSILLECTOMY, ADENOIDECTOMY  1940       Social History   Substance Use Topics    Smoking status: Former Smoker     Packs/day: 1.00     Years: 40.00     Quit date: 11/19/1970    Smokeless tobacco: Never Used    Alcohol use 9.0 oz/week     8 Standard drinks or equivalent, 7 Shots of liquor per week       Family History   Problem Relation Age of Onset    Emphysema Mother     Heart disease Mother     COPD Mother     Appendicitis Father     Heart disease Son     Colon cancer Neg Hx     Cancer Neg Hx     Stroke Neg Hx        Current Outpatient Prescriptions   Medication Sig    aspirin (ECOTRIN) 81 MG EC tablet Take 81 mg by mouth once daily.    aspirin (ECOTRIN) 81 MG EC tablet Take 81 mg by mouth.     atorvastatin (LIPITOR) 40 MG tablet Take 1 tablet (40 mg total) by mouth once daily. (Patient taking differently: Take 10 mg by mouth once daily. )    epinephrine (EPIPEN) 0.3 mg/0.3 mL (1:1,000) AtIn Inject 1 each into the muscle once.    fluticasone (FLOVENT DISKUS) 100 mcg/actuation inhaler INHALE 1 PUFF TWICE DAILY --RINSE MOUTH OUT WITH WATER    furosemide (LASIX) 40 MG tablet Take 40 mg by mouth.     hydroCHLOROthiazide (HYDRODIURIL) 25 MG tablet Take 1 tablet (25 mg total) by mouth once daily.    hydrocodone-acetaminophen 5-325mg (NORCO) 5-325 mg per tablet     lisinopril 10 MG tablet TAKE 1 TABLET ONE TIME DAILY    tamsulosin (FLOMAX) 0.4 mg Cp24 Take 2 capsules (0.8 mg total) by mouth every morning. (Patient taking differently: Take 0.8 mg by mouth every evening. )     Current Facility-Administered Medications   Medication    testosterone cypionate injection 200 mg     Current Outpatient Prescriptions on File Prior to Visit   Medication Sig    aspirin (ECOTRIN) 81 MG EC tablet Take 81 mg by mouth once daily.    aspirin (ECOTRIN) 81 MG EC tablet Take 81 mg by mouth.    atorvastatin (LIPITOR) 40 MG tablet Take 1 tablet (40 mg total) by mouth once daily. (Patient taking differently: Take 10 mg by mouth once daily. )    epinephrine (EPIPEN) 0.3 mg/0.3 mL (1:1,000) AtIn Inject 1 each into the muscle once.    fluticasone (FLOVENT DISKUS) 100 mcg/actuation inhaler INHALE 1 PUFF TWICE DAILY --RINSE MOUTH OUT WITH WATER    furosemide (LASIX) 40 MG tablet Take 40 mg by mouth.     hydroCHLOROthiazide (HYDRODIURIL) 25 MG tablet Take 1 tablet (25 mg total) by mouth once daily.    hydrocodone-acetaminophen 5-325mg (NORCO) 5-325 mg per tablet     lisinopril 10 MG tablet TAKE 1 TABLET ONE TIME DAILY    tamsulosin (FLOMAX) 0.4 mg Cp24 Take 2 capsules (0.8 mg total) by mouth every morning. (Patient taking differently: Take 0.8 mg by mouth every evening. )     Current Facility-Administered Medications on  File Prior to Visit   Medication    testosterone cypionate injection 200 mg       Review of patient's allergies indicates:   Allergen Reactions    Bee sting  [allergen ext-venom-honey bee] Swelling    Poison ivy extract Hives    Penicillins Rash       Review of Systems   Constitution: Negative for weakness and malaise/fatigue.   Eyes: Negative for blurred vision.   Cardiovascular: Positive for dyspnea on exertion and leg swelling. Negative for chest pain, claudication, cyanosis, irregular heartbeat, near-syncope, orthopnea, palpitations and paroxysmal nocturnal dyspnea.   Respiratory: Positive for shortness of breath. Negative for cough and hemoptysis.    Hematologic/Lymphatic: Negative for bleeding problem. Does not bruise/bleed easily.   Skin: Negative for dry skin and itching.   Musculoskeletal: Negative for falls, muscle weakness and myalgias.   Gastrointestinal: Negative for abdominal pain, diarrhea, heartburn, hematemesis, hematochezia and melena.   Genitourinary: Negative for flank pain and hematuria.   Neurological: Negative for dizziness, focal weakness, headaches, light-headedness, numbness, paresthesias and seizures.   Psychiatric/Behavioral: Negative for altered mental status and memory loss. The patient is not nervous/anxious.    Allergic/Immunologic: Negative for hives.       Objective:   Physical Exam   Constitutional: He is oriented to person, place, and time. He appears well-developed and well-nourished. No distress.   HENT:   Head: Normocephalic and atraumatic.   Eyes: EOM are normal. Pupils are equal, round, and reactive to light. Right eye exhibits no discharge. Left eye exhibits no discharge.   Neck: Neck supple. No JVD present. No thyromegaly present.   Cardiovascular: Normal rate, regular rhythm, normal heart sounds and intact distal pulses.  Exam reveals no gallop and no friction rub.    No murmur heard.  Pulses:       Carotid pulses are 2+ on the right side, and 2+ on the left side.        Radial pulses are 2+ on the right side, and 2+ on the left side.        Femoral pulses are 2+ on the right side, and 2+ on the left side.       Popliteal pulses are 2+ on the right side, and 2+ on the left side.        Dorsalis pedis pulses are 2+ on the right side, and 2+ on the left side.        Posterior tibial pulses are 2+ on the right side, and 2+ on the left side.   No pulsatile abdominal mass.   Pulmonary/Chest: Effort normal and breath sounds normal. No respiratory distress. He has no wheezes. He has no rales. He exhibits no tenderness.   Abdominal: Soft. Bowel sounds are normal. He exhibits no distension. There is no tenderness.   Musculoskeletal: Normal range of motion. He exhibits edema (trace bilateral).   Neurological: He is alert and oriented to person, place, and time. No cranial nerve deficit.   Skin: Skin is warm and dry. No rash noted. He is not diaphoretic. No erythema.   Decrease hair in legs with spider veins.   Psychiatric: He has a normal mood and affect. His behavior is normal.   Nursing note and vitals reviewed.    Vitals:    02/16/18 1116   BP: 132/70   Pulse: 80   Weight: 85.8 kg (189 lb 2.5 oz)   Height: 6' (1.829 m)     Lab Results   Component Value Date    CHOL 142 01/09/2018    CHOL 145 06/26/2017    CHOL 136 12/27/2016     Lab Results   Component Value Date    HDL 53 01/09/2018    HDL 47 06/26/2017    HDL 51 12/27/2016     Lab Results   Component Value Date    LDLCALC 72.0 01/09/2018    LDLCALC 81.0 06/26/2017    LDLCALC 70.8 12/27/2016     Lab Results   Component Value Date    TRIG 85 01/09/2018    TRIG 85 06/26/2017    TRIG 71 12/27/2016     Lab Results   Component Value Date    CHOLHDL 37.3 01/09/2018    CHOLHDL 32.4 06/26/2017    CHOLHDL 37.5 12/27/2016       Chemistry        Component Value Date/Time     02/08/2018 1402    K 4.1 02/08/2018 1402     02/08/2018 1402    CO2 24 02/08/2018 1402    BUN 20 02/08/2018 1402    CREATININE 0.9 02/08/2018 1402      02/08/2018 1402        Component Value Date/Time    CALCIUM 9.6 02/08/2018 1402    ALKPHOS 70 02/08/2018 1402    AST 23 02/08/2018 1402    ALT 26 02/08/2018 1402    BILITOT 0.5 02/08/2018 1402    ESTGFRAFRICA >60 02/08/2018 1402    EGFRNONAA >60 02/08/2018 1402          Lab Results   Component Value Date    TSH 2.14 05/16/2011     Lab Results   Component Value Date    INR 1.0 02/08/2018    INR 1.0 08/21/2017    INR 1.0 05/15/2011     Lab Results   Component Value Date    WBC 6.79 02/08/2018    HGB 13.1 (L) 02/08/2018    HCT 40.3 02/08/2018    MCV 95 02/08/2018     02/08/2018     BNP  @LABRCNTIP(BNP,BNPTRIAGEBLO)@  CrCl cannot be calculated (Patient's most recent lab result is older than the maximum 7 days allowed.).  Assessment:     1. Bilateral leg edema    2. Essential hypertension    3. Chronic obstructive pulmonary disease, unspecified COPD type    4. Pure hypercholesterolemia    5. Atherosclerosis of aorta    6. Abdominal aortic aneurysm without rupture      Leg swelling is mostly from venous insufficiency . However needs to make sure no element of cor pulmonale or pulmonary htn.will reepeate cho.  He will benefit from support hose 15-20 mmhg leg elevation low salt diet.  Plan:   Echo   Compression stockings. 15 -20  Phone review

## 2018-02-16 NOTE — LETTER
February 16, 2018      Iglesia Almaraz MD  [address] [facility] [place] [address]           [facility] - Cardiology  [address] [facility] [address] [place] [address]  Phone: [phone]  Fax: [phone]          Patient: Chao Hawk Jr.   MR Number: [id number]   YOB: 1932   Date of Visit: 2/16/2018       Dear Dr. Iglesia Almaraz:    Thank you for referring Chao Hawk to me for evaluation. Attached you will find relevant portions of my assessment and plan of care.    If you have questions, please do not hesitate to call me. I look forward to following Chao Hawk along with you.    Sincerely,    Gael Hoffmann MD    Enclosure  CC:  No Recipients    If you would like to receive this communication electronically, please contact [email or web address] or [phone] to request more information on [employer] access.    For providers and/or their staff who would like to refer a patient to [facility], please contact us through our one-stop-shop provider referral line, [facility], at [phone].    If you feel you have received this communication in error or would no longer like to receive these types of communications, please e-mail [email or web address]

## 2018-02-27 ENCOUNTER — CLINICAL SUPPORT (OUTPATIENT)
Dept: CARDIOLOGY | Facility: CLINIC | Age: 83
End: 2018-02-27
Attending: INTERNAL MEDICINE
Payer: MEDICARE

## 2018-02-27 DIAGNOSIS — I10 ESSENTIAL HYPERTENSION: Chronic | ICD-10-CM

## 2018-02-27 DIAGNOSIS — R60.0 BILATERAL LEG EDEMA: ICD-10-CM

## 2018-02-27 LAB
DIASTOLIC DYSFUNCTION: NO
ESTIMATED PA SYSTOLIC PRESSURE: 45.45
MITRAL VALVE MOBILITY: NORMAL
MITRAL VALVE REGURGITATION: ABNORMAL
RETIRED EF AND QEF - SEE NOTES: 55 (ref 55–65)

## 2018-02-27 PROCEDURE — 93306 TTE W/DOPPLER COMPLETE: CPT | Mod: S$GLB,,, | Performed by: INTERNAL MEDICINE

## 2018-02-28 ENCOUNTER — TELEPHONE (OUTPATIENT)
Dept: CARDIOLOGY | Facility: CLINIC | Age: 83
End: 2018-02-28

## 2018-03-08 ENCOUNTER — CLINICAL SUPPORT (OUTPATIENT)
Dept: INTERNAL MEDICINE | Facility: CLINIC | Age: 83
End: 2018-03-08
Payer: MEDICARE

## 2018-03-08 PROCEDURE — 99999 PR PBB SHADOW E&M-EST. PATIENT-LVL II: CPT | Mod: PBBFAC,,,

## 2018-03-08 PROCEDURE — 96372 THER/PROPH/DIAG INJ SC/IM: CPT | Mod: S$GLB,,, | Performed by: PEDIATRICS

## 2018-03-08 RX ADMIN — TESTOSTERONE CYPIONATE 200 MG: 200 INJECTION, SOLUTION INTRAMUSCULAR at 09:03

## 2018-03-20 ENCOUNTER — OFFICE VISIT (OUTPATIENT)
Dept: PULMONOLOGY | Facility: CLINIC | Age: 83
End: 2018-03-20
Payer: MEDICARE

## 2018-03-20 VITALS
OXYGEN SATURATION: 93 % | RESPIRATION RATE: 12 BRPM | DIASTOLIC BLOOD PRESSURE: 60 MMHG | WEIGHT: 193.56 LBS | BODY MASS INDEX: 26.22 KG/M2 | HEART RATE: 86 BPM | HEIGHT: 72 IN | SYSTOLIC BLOOD PRESSURE: 158 MMHG

## 2018-03-20 DIAGNOSIS — I27.21 PAH (PULMONARY ARTERY HYPERTENSION): Primary | ICD-10-CM

## 2018-03-20 DIAGNOSIS — J41.0 SIMPLE CHRONIC BRONCHITIS: ICD-10-CM

## 2018-03-20 DIAGNOSIS — G47.34 NOCTURNAL HYPOXEMIA: ICD-10-CM

## 2018-03-20 PROCEDURE — 99205 OFFICE O/P NEW HI 60 MIN: CPT | Mod: 25,S$GLB,, | Performed by: INTERNAL MEDICINE

## 2018-03-20 PROCEDURE — 99999 PR PBB SHADOW E&M-EST. PATIENT-LVL IV: CPT | Mod: PBBFAC,,, | Performed by: INTERNAL MEDICINE

## 2018-03-20 PROCEDURE — 99499 UNLISTED E&M SERVICE: CPT | Mod: S$GLB,,, | Performed by: INTERNAL MEDICINE

## 2018-03-20 NOTE — PROGRESS NOTES
Subjective:       Patient ID: Chao Hawk Jr. is a 86 y.o. male.    Chief Complaint: He       Pulmonary Hypertension    HPI     Dyspnea:  Dyspnea  Patient complains of shortness of breath for months. Started on Flovent with little relief. Symptoms occur walking short distances. Back surgery and pain limit activity. Symptoms began 2 years ago, gradually worsening since. Associated symptoms include  dyspnea on exertion, morning cough, productive cough, shortness of breath, sputum production and wheezing. He denies chest pain, located left chest. He does not have had recent travel. Weight has been stable. Symptoms are exacerbated by any exercise. Symptoms are alleviated by rest.     history of Chronic Obstructive Pulmonary Disease   Former smoker 40 pack years     Pulmonary Hypertension  He presents for evaluation and treatment of pulmonary hypertension. Symptoms include dyspnea on exertion, productive cough, shortness of breath and sputum production.  Symptoms began 2 years ago, unchanged since that time. He denies chest pain, located left chest. Associated symptoms include myalgias and productive cough. He does not have had recent travel. Weight has been stable. Appetite has been unaffected. Symptoms are exacerbated by any exercise. Symptoms are alleviated by rest. Work up thus far has included Echo.      Past Medical History:   Diagnosis Date    Arthritis     back, hand    Back pain     Dr. Cristobal- [facility]    Bilateral leg edema 2/16/2018    Chronic bronchitis     Diverticulosis 5/16/06    colonoscopy    Hernia     x2    Hyperlipidemia     Hypertension     Lumbar radiculopathy 6/14/2017    Multiple renal cysts 7/5/2016    Tobacco dependence      Past Surgical History:   Procedure Laterality Date    EYE SURGERY Bilateral     Glaucoma- Dr. Gianni Payne    FINGER SURGERY Right 1980s    index- Dr. Encarnacion    HERNIA REPAIR  1990s    x2    LUMBAR SPINE SURGERY  09/13/2017    TONSILLECTOMY, ADENOIDECTOMY   1940     Social History     Social History    Marital status:      Spouse name: Linsey    Number of children: 4    Years of education: 12 + 4     Occupational History     retired age 60      [employer]     Social History Main Topics    Smoking status: Former Smoker     Packs/day: 1.00     Years: 40.00     Start date: 1/1/1949     Quit date: 1/1/1991    Smokeless tobacco: Never Used    Alcohol use 9.0 oz/week     7 Shots of liquor, 8 Standard drinks or equivalent per week    Drug use: No    Sexual activity: No     Other Topics Concern    Not on file     Social History Narrative    No narrative on file     Review of Systems   Constitutional: Positive for fatigue. Negative for fever.   HENT: Positive for postnasal drip, rhinorrhea and congestion.    Eyes: Negative for redness and itching.   Respiratory: Positive for cough, sputum production, shortness of breath, dyspnea on extertion, use of rescue inhaler and Paroxysmal Nocturnal Dyspnea.    Cardiovascular: Negative for chest pain, palpitations and leg swelling.   Genitourinary: Negative for difficulty urinating and hematuria.   Endocrine: Negative for cold intolerance and heat intolerance.    Skin: Negative for rash.   Gastrointestinal: Negative for nausea and abdominal pain.   Neurological: Negative for dizziness, syncope, weakness and light-headedness.   Hematological: Negative for adenopathy. Does not bruise/bleed easily.   Psychiatric/Behavioral: Negative for sleep disturbance. The patient is not nervous/anxious.        Objective:      Physical Exam   Constitutional: He is oriented to person, place, and time. He appears well-developed and well-nourished.   HENT:   Head: Normocephalic and atraumatic.   Mouth/Throat: Oropharyngeal exudate present.   Eyes: Conjunctivae are normal. Pupils are equal, round, and reactive to light.   Neck: Neck supple. No JVD present. No tracheal deviation present. No thyromegaly present.    Cardiovascular: Normal rate, regular rhythm and normal heart sounds.    No murmur heard.  Pulmonary/Chest: Effort normal. He has decreased breath sounds. He has wheezes in the right lower field and the left lower field. He has no rhonchi. He has no rales.   Abdominal: Soft. Bowel sounds are normal.   Musculoskeletal: Normal range of motion. He exhibits no edema or tenderness.   Lymphadenopathy:     He has no cervical adenopathy.   Neurological: He is alert and oriented to person, place, and time.   Skin: Skin is warm and dry.   Nursing note and vitals reviewed.    Personal Diagnostic Review  Chest x-ray: hyperinflation   CONCLUSIONS     1 - Normal left ventricular systolic function (EF 55-60%).     2 - Normal left ventricular diastolic function.     3 - Normal right ventricular systolic function .     4 - Pulmonary hypertension. The estimated PA systolic pressure is 45 mmHg.   This document has been electronically    SIGNED BY: Claudy Hui MD On: 02/27/2018 12:27          No flowsheet data found.      Assessment:       1. PAH (pulmonary artery hypertension)    2. Simple chronic bronchitis    3. Nocturnal hypoxemia        Outpatient Encounter Prescriptions as of 3/20/2018   Medication Sig Dispense Refill    aspirin (ECOTRIN) 81 MG EC tablet Take 81 mg by mouth once daily.      atorvastatin (LIPITOR) 40 MG tablet Take 1 tablet (40 mg total) by mouth once daily. (Patient taking differently: Take 10 mg by mouth once daily. ) 90 tablet 3    epinephrine (EPIPEN) 0.3 mg/0.3 mL (1:1,000) AtIn Inject 1 each into the muscle once.      fluticasone (FLOVENT DISKUS) 100 mcg/actuation inhaler INHALE 1 PUFF TWICE DAILY --RINSE MOUTH OUT WITH WATER 3 Inhaler 3    furosemide (LASIX) 40 MG tablet Take 20 mg by mouth.       lisinopril 10 MG tablet TAKE 1 TABLET ONE TIME DAILY 90 tablet 3    tamsulosin (FLOMAX) 0.4 mg Cp24 Take 2 capsules (0.8 mg total) by mouth every morning. (Patient taking differently: Take 0.8 mg by mouth  every evening. ) 180 capsule 3    hydroCHLOROthiazide (HYDRODIURIL) 25 MG tablet Take 1 tablet (25 mg total) by mouth once daily. 90 tablet 3    [DISCONTINUED] aspirin (ECOTRIN) 81 MG EC tablet Take 81 mg by mouth.      [DISCONTINUED] hydrocodone-acetaminophen 5-325mg (NORCO) 5-325 mg per tablet   0     Facility-Administered Encounter Medications as of 3/20/2018   Medication Dose Route Frequency Provider Last Rate Last Dose    testosterone cypionate injection 200 mg  200 mg Intramuscular Q28 Days MONSTER Ureña Jr., MD   200 mg at 03/08/18 0902     Orders Placed This Encounter   Procedures    X-Ray Chest 4 Or More View     Standing Status:   Future     Standing Expiration Date:   3/21/2019     Order Specific Question:   Reason for Exam:     Answer:   asbestosis    Complete PFT with bronchodilator     Standing Status:   Future     Standing Expiration Date:   9/20/2019    PULSE OXIMETRY OVERNIGHT     Order Specific Question:   Reason for Test?     Answer:   O2 Qualification     Comments:   On room air     Order Specific Question:   Symptoms:     Answer:   Daytime Fatigue     Plan:       Requested Prescriptions      No prescriptions requested or ordered in this encounter     PAH (pulmonary artery hypertension)  -     PULSE OXIMETRY OVERNIGHT  -     Complete PFT with bronchodilator; Future; Expected date: 03/20/2018    Simple chronic bronchitis  -     PULSE OXIMETRY OVERNIGHT  -     Cancel: Spirometry with/without bronchodilator; Future; Expected date: 09/20/2018  -     X-Ray Chest 4 Or More View; Future; Expected date: 03/20/2018  -     Complete PFT with bronchodilator; Future; Expected date: 03/20/2018    Nocturnal hypoxemia  -     PULSE OXIMETRY OVERNIGHT           Follow-up in about 1 week (around 3/27/2018) for review of PFT, CXR and overnight.    MEDICAL DECISION MAKING: Moderate to high complexity.  Overall, the multiple problems listed are of moderate to high severity that may impact quality of life and  activities of daily living. Side effects of medications, treatment plan as well as options and alternatives reviewed and discussed with patient. There was counseling of patient concerning these issues.    Total time spent in face to face counseling and coordination of care - 60  minutes over 50% of time was used in discussion of prognosis, risks, benefits of treatment, instructions and compliance with regimen . Discussion with other physicians or health care providers (DME, NP, pharmacy, respiratory therapy) occurred.

## 2018-03-20 NOTE — LETTER
March 20, 2018      Gael Hoffmann MD  [address]  [place] [address]           [facility]  [address] [place] [address]  Phone: [phone]  Fax: [phone]          Patient: Chao Hawk Jr.   MR Number: [id number]   YOB: 1932   Date of Visit: 3/20/2018       Dear Dr. Gael Hoffmann:    Thank you for referring Chao Hawk to me for evaluation. Attached you will find relevant portions of my assessment and plan of care.    If you have questions, please do not hesitate to call me. I look forward to following Chao Hawk along with you.    Sincerely,    Obed Sotelo MD    Enclosure  CC:  No Recipients    If you would like to receive this communication electronically, please contact [email or web address] or [phone] to request more information on [employer] access.    For providers and/or their staff who would like to refer a patient to [facility], please contact us through our one-stop-shop provider referral line, [facility], at [phone].    If you feel you have received this communication in error or would no longer like to receive these types of communications, please e-mail [email or web address]

## 2018-04-05 ENCOUNTER — CLINICAL SUPPORT (OUTPATIENT)
Dept: INTERNAL MEDICINE | Facility: CLINIC | Age: 83
End: 2018-04-05
Payer: MEDICARE

## 2018-04-05 PROCEDURE — 96372 THER/PROPH/DIAG INJ SC/IM: CPT | Mod: S$GLB,,, | Performed by: FAMILY MEDICINE

## 2018-04-05 RX ADMIN — TESTOSTERONE CYPIONATE 200 MG: 200 INJECTION, SOLUTION INTRAMUSCULAR at 08:04

## 2018-04-12 ENCOUNTER — TELEPHONE (OUTPATIENT)
Dept: INTERNAL MEDICINE | Facility: CLINIC | Age: 83
End: 2018-04-12

## 2018-04-12 NOTE — TELEPHONE ENCOUNTER
----- Message from Jennifer Saucedo sent at 4/12/2018  3:39 PM CDT -----  Contact: Pt  Please give pt a call at ..[phone] (home) regarding his ER visit

## 2018-04-12 NOTE — TELEPHONE ENCOUNTER
JOLYNN pt regarding hospital follow and pt has an appt scheduled for 04/17/18 at 11:20a. Pt verbalized understanding.

## 2018-04-13 ENCOUNTER — TELEPHONE (OUTPATIENT)
Dept: EMERGENCY MEDICINE | Facility: HOSPITAL | Age: 83
End: 2018-04-13

## 2018-04-13 ENCOUNTER — HOSPITAL ENCOUNTER (INPATIENT)
Facility: HOSPITAL | Age: 83
LOS: 2 days | Discharge: HOME | DRG: 392 | End: 2018-04-15
Attending: EMERGENCY MEDICINE | Admitting: SURGERY
Payer: MEDICARE

## 2018-04-13 DIAGNOSIS — K57.20 DIVERTICULITIS OF LARGE INTESTINE WITH PERFORATION WITHOUT BLEEDING: ICD-10-CM

## 2018-04-13 DIAGNOSIS — K56.7 ILEUS: ICD-10-CM

## 2018-04-13 DIAGNOSIS — K57.20 DIVERTICULITIS OF LARGE INTESTINE WITH PERFORATION WITHOUT ABSCESS OR BLEEDING: ICD-10-CM

## 2018-04-13 DIAGNOSIS — K57.92 DIVERTICULITIS: Primary | ICD-10-CM

## 2018-04-13 LAB
ALBUMIN SERPL BCP-MCNC: 3.6 G/DL
ALP SERPL-CCNC: 55 U/L
ALT SERPL W/O P-5'-P-CCNC: 24 U/L
ANION GAP SERPL CALC-SCNC: 9 MMOL/L
APTT BLDCRRT: 24.4 SEC
AST SERPL-CCNC: 24 U/L
BASOPHILS # BLD AUTO: 0.02 K/UL
BASOPHILS NFR BLD: 0.2 %
BILIRUB SERPL-MCNC: 0.4 MG/DL
BILIRUB UR QL STRIP: NEGATIVE
BUN SERPL-MCNC: 19 MG/DL
CALCIUM SERPL-MCNC: 9.3 MG/DL
CHLORIDE SERPL-SCNC: 105 MMOL/L
CLARITY UR: CLEAR
CO2 SERPL-SCNC: 27 MMOL/L
COLOR UR: YELLOW
COMPLEXED PSA SERPL-MCNC: 2.1 NG/ML
CREAT SERPL-MCNC: 1 MG/DL
DIFFERENTIAL METHOD: ABNORMAL
EOSINOPHIL # BLD AUTO: 0.1 K/UL
EOSINOPHIL NFR BLD: 1.5 %
ERYTHROCYTE [DISTWIDTH] IN BLOOD BY AUTOMATED COUNT: 14.4 %
EST. GFR  (AFRICAN AMERICAN): >60 ML/MIN/1.73 M^2
EST. GFR  (NON AFRICAN AMERICAN): >60 ML/MIN/1.73 M^2
GLUCOSE SERPL-MCNC: 129 MG/DL
GLUCOSE UR QL STRIP: NEGATIVE
HCT VFR BLD AUTO: 38.7 %
HGB BLD-MCNC: 12.9 G/DL
HGB UR QL STRIP: NEGATIVE
INR PPP: 1
KETONES UR QL STRIP: NEGATIVE
LACTATE SERPL-SCNC: 1.9 MMOL/L
LEUKOCYTE ESTERASE UR QL STRIP: NEGATIVE
LYMPHOCYTES # BLD AUTO: 0.5 K/UL
LYMPHOCYTES NFR BLD: 5.4 %
MCH RBC QN AUTO: 31.9 PG
MCHC RBC AUTO-ENTMCNC: 33.3 G/DL
MCV RBC AUTO: 96 FL
MONOCYTES # BLD AUTO: 1.1 K/UL
MONOCYTES NFR BLD: 11.3 %
NEUTROPHILS # BLD AUTO: 7.8 K/UL
NEUTROPHILS NFR BLD: 81.6 %
NITRITE UR QL STRIP: NEGATIVE
PH UR STRIP: 6 [PH] (ref 5–8)
PLATELET # BLD AUTO: 157 K/UL
PMV BLD AUTO: 10.5 FL
POCT GLUCOSE: 113 MG/DL (ref 70–110)
POTASSIUM SERPL-SCNC: 3.7 MMOL/L
PROT SERPL-MCNC: 6.2 G/DL
PROT UR QL STRIP: NEGATIVE
PROTHROMBIN TIME: 10.1 SEC
RBC # BLD AUTO: 4.04 M/UL
SODIUM SERPL-SCNC: 141 MMOL/L
SP GR UR STRIP: 1.02 (ref 1–1.03)
URN SPEC COLLECT METH UR: NORMAL
UROBILINOGEN UR STRIP-ACNC: NEGATIVE EU/DL
WBC # BLD AUTO: 9.53 K/UL

## 2018-04-13 PROCEDURE — 11000001 HC ACUTE MED/SURG PRIVATE ROOM

## 2018-04-13 PROCEDURE — 25000242 PHARM REV CODE 250 ALT 637 W/ HCPCS: Performed by: EMERGENCY MEDICINE

## 2018-04-13 PROCEDURE — 85025 COMPLETE CBC W/AUTO DIFF WBC: CPT

## 2018-04-13 PROCEDURE — 25000003 PHARM REV CODE 250: Performed by: PHYSICIAN ASSISTANT

## 2018-04-13 PROCEDURE — 94640 AIRWAY INHALATION TREATMENT: CPT

## 2018-04-13 PROCEDURE — 83605 ASSAY OF LACTIC ACID: CPT

## 2018-04-13 PROCEDURE — 99222 1ST HOSP IP/OBS MODERATE 55: CPT | Mod: ,,, | Performed by: PHYSICIAN ASSISTANT

## 2018-04-13 PROCEDURE — S0030 INJECTION, METRONIDAZOLE: HCPCS | Performed by: PHYSICIAN ASSISTANT

## 2018-04-13 PROCEDURE — 81003 URINALYSIS AUTO W/O SCOPE: CPT

## 2018-04-13 PROCEDURE — 85730 THROMBOPLASTIN TIME PARTIAL: CPT

## 2018-04-13 PROCEDURE — 25500020 PHARM REV CODE 255: Performed by: EMERGENCY MEDICINE

## 2018-04-13 PROCEDURE — 84153 ASSAY OF PSA TOTAL: CPT

## 2018-04-13 PROCEDURE — 80053 COMPREHEN METABOLIC PANEL: CPT

## 2018-04-13 PROCEDURE — 85610 PROTHROMBIN TIME: CPT

## 2018-04-13 PROCEDURE — 63600175 PHARM REV CODE 636 W HCPCS: Performed by: EMERGENCY MEDICINE

## 2018-04-13 RX ORDER — ONDANSETRON 2 MG/ML
4 INJECTION INTRAMUSCULAR; INTRAVENOUS
Status: COMPLETED | OUTPATIENT
Start: 2018-04-13 | End: 2018-04-13

## 2018-04-13 RX ORDER — RAMELTEON 8 MG/1
8 TABLET ORAL NIGHTLY PRN
Status: DISCONTINUED | OUTPATIENT
Start: 2018-04-13 | End: 2018-04-15 | Stop reason: HOSPADM

## 2018-04-13 RX ORDER — BUDESONIDE 0.5 MG/2ML
0.5 INHALANT ORAL 2 TIMES DAILY
Status: DISCONTINUED | OUTPATIENT
Start: 2018-04-13 | End: 2018-04-15 | Stop reason: HOSPADM

## 2018-04-13 RX ORDER — MORPHINE SULFATE 4 MG/ML
2 INJECTION, SOLUTION INTRAMUSCULAR; INTRAVENOUS
Status: COMPLETED | OUTPATIENT
Start: 2018-04-13 | End: 2018-04-13

## 2018-04-13 RX ORDER — MORPHINE SULFATE 4 MG/ML
1 INJECTION, SOLUTION INTRAMUSCULAR; INTRAVENOUS EVERY 4 HOURS PRN
Status: DISCONTINUED | OUTPATIENT
Start: 2018-04-13 | End: 2018-04-15 | Stop reason: HOSPADM

## 2018-04-13 RX ORDER — HYDROCODONE BITARTRATE AND ACETAMINOPHEN 5; 325 MG/1; MG/1
1 TABLET ORAL EVERY 4 HOURS PRN
Status: DISCONTINUED | OUTPATIENT
Start: 2018-04-13 | End: 2018-04-15 | Stop reason: HOSPADM

## 2018-04-13 RX ORDER — DIPHENHYDRAMINE HYDROCHLORIDE 50 MG/ML
25 INJECTION INTRAMUSCULAR; INTRAVENOUS EVERY 4 HOURS PRN
Status: DISCONTINUED | OUTPATIENT
Start: 2018-04-13 | End: 2018-04-15 | Stop reason: HOSPADM

## 2018-04-13 RX ORDER — METRONIDAZOLE 500 MG/100ML
500 INJECTION, SOLUTION INTRAVENOUS
Status: DISCONTINUED | OUTPATIENT
Start: 2018-04-13 | End: 2018-04-14

## 2018-04-13 RX ORDER — TAMSULOSIN HYDROCHLORIDE 0.4 MG/1
0.8 CAPSULE ORAL NIGHTLY
Status: DISCONTINUED | OUTPATIENT
Start: 2018-04-13 | End: 2018-04-15 | Stop reason: HOSPADM

## 2018-04-13 RX ORDER — CIPROFLOXACIN 2 MG/ML
400 INJECTION, SOLUTION INTRAVENOUS
Status: DISCONTINUED | OUTPATIENT
Start: 2018-04-13 | End: 2018-04-14

## 2018-04-13 RX ORDER — METRONIDAZOLE 500 MG/100ML
500 INJECTION, SOLUTION INTRAVENOUS
Status: DISCONTINUED | OUTPATIENT
Start: 2018-04-13 | End: 2018-04-13

## 2018-04-13 RX ORDER — DEXTROSE MONOHYDRATE, SODIUM CHLORIDE, AND POTASSIUM CHLORIDE 50; 1.49; 4.5 G/1000ML; G/1000ML; G/1000ML
INJECTION, SOLUTION INTRAVENOUS CONTINUOUS
Status: DISCONTINUED | OUTPATIENT
Start: 2018-04-13 | End: 2018-04-15 | Stop reason: HOSPADM

## 2018-04-13 RX ORDER — ONDANSETRON 8 MG/1
8 TABLET, ORALLY DISINTEGRATING ORAL EVERY 8 HOURS PRN
Status: DISCONTINUED | OUTPATIENT
Start: 2018-04-13 | End: 2018-04-15 | Stop reason: HOSPADM

## 2018-04-13 RX ORDER — LISINOPRIL 10 MG/1
10 TABLET ORAL DAILY
Status: DISCONTINUED | OUTPATIENT
Start: 2018-04-13 | End: 2018-04-15 | Stop reason: HOSPADM

## 2018-04-13 RX ORDER — ACETAMINOPHEN 325 MG/1
650 TABLET ORAL EVERY 8 HOURS PRN
Status: DISCONTINUED | OUTPATIENT
Start: 2018-04-13 | End: 2018-04-15 | Stop reason: HOSPADM

## 2018-04-13 RX ORDER — SODIUM CHLORIDE 0.9 % (FLUSH) 0.9 %
3 SYRINGE (ML) INJECTION
Status: DISCONTINUED | OUTPATIENT
Start: 2018-04-13 | End: 2018-04-15 | Stop reason: HOSPADM

## 2018-04-13 RX ORDER — ACETAMINOPHEN 325 MG/1
650 TABLET ORAL EVERY 4 HOURS PRN
Status: DISCONTINUED | OUTPATIENT
Start: 2018-04-13 | End: 2018-04-15 | Stop reason: HOSPADM

## 2018-04-13 RX ORDER — ATORVASTATIN CALCIUM 40 MG/1
40 TABLET, FILM COATED ORAL DAILY
Status: DISCONTINUED | OUTPATIENT
Start: 2018-04-13 | End: 2018-04-15 | Stop reason: HOSPADM

## 2018-04-13 RX ORDER — FLUTICASONE PROPIONATE 110 UG/1
1 AEROSOL, METERED RESPIRATORY (INHALATION) EVERY 12 HOURS
Status: DISCONTINUED | OUTPATIENT
Start: 2018-04-13 | End: 2018-04-13 | Stop reason: CLARIF

## 2018-04-13 RX ADMIN — METRONIDAZOLE 500 MG: 500 INJECTION, SOLUTION INTRAVENOUS at 03:04

## 2018-04-13 RX ADMIN — ATORVASTATIN CALCIUM 40 MG: 40 TABLET, FILM COATED ORAL at 02:04

## 2018-04-13 RX ADMIN — IOHEXOL 75 ML: 350 INJECTION, SOLUTION INTRAVENOUS at 11:04

## 2018-04-13 RX ADMIN — DEXTROSE MONOHYDRATE, SODIUM CHLORIDE, AND POTASSIUM CHLORIDE: 50; 4.5; 1.49 INJECTION, SOLUTION INTRAVENOUS at 03:04

## 2018-04-13 RX ADMIN — HYDROCODONE BITARTRATE AND ACETAMINOPHEN 1 TABLET: 5; 325 TABLET ORAL at 08:04

## 2018-04-13 RX ADMIN — TAMSULOSIN HYDROCHLORIDE 0.8 MG: 0.4 CAPSULE ORAL at 08:04

## 2018-04-13 RX ADMIN — BUDESONIDE 0.5 MG: 0.5 SUSPENSION RESPIRATORY (INHALATION) at 09:04

## 2018-04-13 RX ADMIN — MORPHINE SULFATE 2 MG: 4 INJECTION INTRAVENOUS at 09:04

## 2018-04-13 RX ADMIN — HYDROCODONE BITARTRATE AND ACETAMINOPHEN 1 TABLET: 5; 325 TABLET ORAL at 03:04

## 2018-04-13 RX ADMIN — METRONIDAZOLE 500 MG: 500 INJECTION, SOLUTION INTRAVENOUS at 09:04

## 2018-04-13 RX ADMIN — LISINOPRIL 10 MG: 10 TABLET ORAL at 02:04

## 2018-04-13 RX ADMIN — CIPROFLOXACIN 400 MG: 2 INJECTION, SOLUTION INTRAVENOUS at 02:04

## 2018-04-13 RX ADMIN — ONDANSETRON 4 MG: 2 INJECTION INTRAMUSCULAR; INTRAVENOUS at 09:04

## 2018-04-13 NOTE — ED PROVIDER NOTES
SCRIBE #1 NOTE: I, Jose De Jesus Teague, am scribing for, and in the presence of, Robert Hoskins Jr., MD. I have scribed the entire note.      History      Chief Complaint   Patient presents with    Diverticulitis     called back for perf diverticulitis. States he feels better than when he came in this week.        Review of patient's allergies indicates:   Allergen Reactions    Bee sting  [allergen ext-venom-honey bee] Swelling    Poison ivy extract Hives    Penicillins Rash        HPI   HPI    4/13/2018, 8:55 AM   History obtained from the patient      History of Present Illness: Chao Hawk Jr. is a 86 y.o. male patient who presents to the Emergency Department for LLQ pain which onset gradually 3 days ago. Sxs are constant and moderate in severity. Pt reports he was seen in ED 2 days ago for same sxs. Pt was called back to ED today for perforated diverticulitis. Pt reports his sxs have improved since last being seen. There are no mitigating or exacerbating factors noted. Pt denies any fever, N/V/D, CP, SOB, HA, numbness, blood in stool, constipation, and all other sxs at this time. Prior tx includes CIPRO and FLAGYL as Rx. No further complaints or concerns at this time.     Arrival mode: Personal vehicle     PCP: VY Ureña Jr, MD       Past Medical History:  Past Medical History:   Diagnosis Date    Arthritis     back, hand    Back pain     Dr. Cristobal- [facility]    Bilateral leg edema 2/16/2018    Chronic bronchitis     Diverticulosis 5/16/06    colonoscopy    Hernia     x2    Hyperlipidemia     Hypertension     Lumbar radiculopathy 6/14/2017    Multiple renal cysts 7/5/2016    Tobacco dependence        Past Surgical History:  Past Surgical History:   Procedure Laterality Date    EYE SURGERY Bilateral     Glaucoma- Dr. Gianni Payne    FINGER SURGERY Right 1980s    index- Dr. Encarnacion    HERNIA REPAIR  1990s    x2    LUMBAR SPINE SURGERY  09/13/2017    TONSILLECTOMY, ADENOIDECTOMY  1940          Family History:  Family History   Problem Relation Age of Onset    Emphysema Mother     Heart disease Mother     COPD Mother     Appendicitis Father     Heart disease Son     Colon cancer Neg Hx     Cancer Neg Hx     Stroke Neg Hx        Social History:  Social History     Social History Main Topics    Smoking status: Former Smoker     Packs/day: 1.00     Years: 40.00     Start date: 1/1/1949     Quit date: 1/1/1991    Smokeless tobacco: Never Used    Alcohol use 9.0 oz/week     7 Shots of liquor, 8 Standard drinks or equivalent per week    Drug use: No    Sexual activity: No       ROS   Review of Systems   Constitutional: Negative for fever.   HENT: Negative for sore throat.    Respiratory: Negative for shortness of breath.    Cardiovascular: Negative for chest pain.   Gastrointestinal: Positive for abdominal pain (LLQ). Negative for nausea.   Genitourinary: Negative for dysuria.   Musculoskeletal: Negative for back pain.   Skin: Negative for rash.   Neurological: Negative for weakness.   Hematological: Does not bruise/bleed easily.     Physical Exam      Initial Vitals [04/13/18 0813]   BP Pulse Resp Temp SpO2   130/67 101 18 97.9 °F (36.6 °C) (!) 94 %      MAP       88          Physical Exam  Nursing Notes and Vital Signs Reviewed.  Constitutional: Patient is in no acute distress. Well-developed and well-nourished.  Head: Atraumatic. Normocephalic.  Eyes: PERRL. EOM intact. Conjunctivae are not pale. No scleral icterus.  ENT: Mucous membranes are moist. Oropharynx is clear and symmetric.    Neck: Supple. Full ROM. No lymphadenopathy.  Cardiovascular: Regular rate. Regular rhythm. No murmurs, rubs, or gallops. Distal pulses are 2+ and symmetric.  Pulmonary/Chest: No respiratory distress. Clear to auscultation bilaterally. No wheezing or rales.  Abdominal: Soft and non-distended.  There is LLQ tenderness.  No rebound, guarding, or rigidity. Good bowel sounds.  Genitourinary: No CVA  "tenderness  Musculoskeletal: Moves all extremities. No obvious deformities. No edema. No calf tenderness.  Skin: Warm and dry.  Neurological:  Alert, awake, and appropriate.  Normal speech.  No acute focal neurological deficits are appreciated.  Psychiatric: Normal affect. Good eye contact. Appropriate in content.    ED Course    Procedures  ED Vital Signs:  Vitals:    04/13/18 0813 04/13/18 1013 04/13/18 1101 04/13/18 1348   BP: 130/67 (!) 114/59 (!) 109/53 134/64   Pulse: 101 74 70 72   Resp: 18 16 16    Temp: 97.9 °F (36.6 °C)      TempSrc: Oral      SpO2: (!) 94% (!) 94% (!) 94% 95%   Weight: 86.1 kg (189 lb 13.1 oz)      Height: 6' 1" (1.854 m)          Abnormal Lab Results:  Labs Reviewed   CBC W/ AUTO DIFFERENTIAL - Abnormal; Notable for the following:        Result Value    RBC 4.04 (*)     Hemoglobin 12.9 (*)     Hematocrit 38.7 (*)     MCH 31.9 (*)     Gran # (ANC) 7.8 (*)     Lymph # 0.5 (*)     Mono # 1.1 (*)     Gran% 81.6 (*)     Lymph% 5.4 (*)     All other components within normal limits   COMPREHENSIVE METABOLIC PANEL - Abnormal; Notable for the following:     Glucose 129 (*)     All other components within normal limits   POCT GLUCOSE - Abnormal; Notable for the following:     POCT Glucose 113 (*)     All other components within normal limits   LACTIC ACID, PLASMA   PROTIME-INR   APTT   URINALYSIS   PSA, SCREENING        All Lab Results:  Results for orders placed or performed during the hospital encounter of 04/13/18   CBC auto differential   Result Value Ref Range    WBC 9.53 3.90 - 12.70 K/uL    RBC 4.04 (L) 4.60 - 6.20 M/uL    Hemoglobin 12.9 (L) 14.0 - 18.0 g/dL    Hematocrit 38.7 (L) 40.0 - 54.0 %    MCV 96 82 - 98 fL    MCH 31.9 (H) 27.0 - 31.0 pg    MCHC 33.3 32.0 - 36.0 g/dL    RDW 14.4 11.5 - 14.5 %    Platelets 157 150 - 350 K/uL    MPV 10.5 9.2 - 12.9 fL    Gran # (ANC) 7.8 (H) 1.8 - 7.7 K/uL    Lymph # 0.5 (L) 1.0 - 4.8 K/uL    Mono # 1.1 (H) 0.3 - 1.0 K/uL    Eos # 0.1 0.0 - 0.5 K/uL "    Baso # 0.02 0.00 - 0.20 K/uL    Gran% 81.6 (H) 38.0 - 73.0 %    Lymph% 5.4 (L) 18.0 - 48.0 %    Mono% 11.3 4.0 - 15.0 %    Eosinophil% 1.5 0.0 - 8.0 %    Basophil% 0.2 0.0 - 1.9 %    Differential Method Automated    Comprehensive metabolic panel   Result Value Ref Range    Sodium 141 136 - 145 mmol/L    Potassium 3.7 3.5 - 5.1 mmol/L    Chloride 105 95 - 110 mmol/L    CO2 27 23 - 29 mmol/L    Glucose 129 (H) 70 - 110 mg/dL    BUN, Bld 19 8 - 23 mg/dL    Creatinine 1.0 0.5 - 1.4 mg/dL    Calcium 9.3 8.7 - 10.5 mg/dL    Total Protein 6.2 6.0 - 8.4 g/dL    Albumin 3.6 3.5 - 5.2 g/dL    Total Bilirubin 0.4 0.1 - 1.0 mg/dL    Alkaline Phosphatase 55 55 - 135 U/L    AST 24 10 - 40 U/L    ALT 24 10 - 44 U/L    Anion Gap 9 8 - 16 mmol/L    eGFR if African American >60 >60 mL/min/1.73 m^2    eGFR if non African American >60 >60 mL/min/1.73 m^2   Lactic acid, plasma   Result Value Ref Range    Lactate (Lactic Acid) 1.9 0.5 - 2.2 mmol/L   Protime-INR   Result Value Ref Range    Prothrombin Time 10.1 9.0 - 12.5 sec    INR 1.0 0.8 - 1.2   APTT   Result Value Ref Range    aPTT 24.4 21.0 - 32.0 sec   POCT glucose   Result Value Ref Range    POCT Glucose 113 (H) 70 - 110 mg/dL         Imaging Results:  Imaging Results          CT Abdomen Pelvis With Contrast (Final result)  Result time 04/13/18 12:53:44    Final result by Fausto Patel MD (04/13/18 12:53:44)                 Impression:      Evidence for colonic diverticulosis.  Wall thickening with pericolonic fatty stranding involving the proximal sigmoid colon which is similar to 04/12/2018 concerning for diverticulitis.  There is evidence for a pneumoperitoneum without interval worsening compared to 04/12/2018.  Compatible with bowel perforation.  No definite sizable pericolonic abscess is identified.    Dilated small bowel within the left lower quadrant and left midabdomen.  Suspect this is due to an ileus.  Obstructed small bowel possibly yet appears less  likely.    Enlarged prostate gland with prominent seminal vesicles.    Bilateral renal cysts.    Contrast within the gallbladder which can be seen with vicarious excretion of contrast.    Infrarenal abdominal aortic aneurysm.    Several other findings as described above.    Findings discussed with Dr. Hoskins 12:50 PM 04/13/2018.    All CT scans at this facility use dose modulation, iterative reconstruction, and/or weight based dosing when appropriate to reduce radiation dose to as low as reasonably achievable.      Electronically signed by: MAGAN ENCARNACION MD  Date:     04/13/18  Time:    12:53              Narrative:    EXAM: <Abdomen and Pelvis CT> with IV contrast     CLINICAL HISTORY: Diverticulitis, perforated.  Subsequent encounter.    TECHNIQUE: Axial images. Reformatted coronal and sagittal images.    Contrast:   <Omnipaque 350> <75>ml.     COMPARISON STUDIES: CT abdomen and pelvis 04/12/2018.    FINDINGS:  Elevated left hemidiaphragm.  A 1.5 cm calcification within the right middle lobe.  Mild atelectasis right left lower lobes.    There is evidence for pneumoperitoneum.  There is gas within the left upper quadrant without interval worsening compared to 04/12/2018.    Borderline splenomegaly.  Unremarkable liver.  Contrast within the gallbladder lumen which can be seen with vicarious excretion of contrast.  The adrenal glands are unremarkable.  No definite acute pancreatic abnormality is identified.    Diffuse atherosclerosis.  There is a distal abdominal aortic aneurysm measuring 3.6 cm AP dimension 3.3 cm transverse dimension.  There is also fusiform aneurysmal dilatation of the mid infrarenal abdominal aorta measuring approximately 3.2 cm in diameter.    Right kidney: Perinephric fatty stranding similar to the prior study.  No definite hydronephrosis.  There are approximate 3 renal cysts measuring up to 4.5 x 3.4 cm in diameter.  Additional lesions too small to accurately characterize.      Left kidney:  Perinephric fatty stranding similar to the prior study.  No definite hydronephrosis.  2 renal cysts measuring up to 1.6 cm in diameter.  Other lesions too small to accurately characterize.    There is enlargement of the prostate gland as well as bilateral seminal vesicles.  The prostate indents the interest to the urinary bladder.  Urinary bladder is otherwise unremarkable.    Colonic diverticulosis.  Wall thickening involving the proximal sigmoid colon with pericolonic fatty stranding.  There is gas demonstrated within several diverticula within this region.  There may be a couple of subtle foci of contiguous free air.  There is free air contiguous with the splenic flexure.    Cecum is in the right mid abdomen.  Moderate amount of feces in the colon.  Distended small bowel within the left lower quadrant and left midabdomen.  Appendix and remainder of the bowel appear unremarkable.      No definite acute or aggressive osseous lesion is identified.  Degenerative changes in thoracic and lumbar spine.  Previous surgery involving the lumbar spine.  Right inguinal hernia.                             X-Ray Abdomen Flat And Erect (Final result)  Result time 04/13/18 09:14:59    Final result by Percy Fairchild MD (04/13/18 09:14:59)                 Impression:     Nonobstructive bowel gas pattern.  Moderate constipation.      Electronically signed by: PERCY FAIRCHILD MD  Date:     04/13/18  Time:    09:14              Narrative:    History: Abdominal pain    Comparison: None    Result: 3 view abdomen    Nonobstructive bowel gas pattern is noted. Moderate constipation. No radiopaque kidney calculus is identified.  There are multiple calcifications in the pelvis most consistent with phleboliths.  No evidence of organomegaly.  The bones demonstrate moderate degenerative changes within the lower lumbar region.  The bones are otherwise intact.  Calcification/granuloma right lower lobe.                                      The  Emergency Provider reviewed the vital signs and test results, which are outlined above.    ED Discussion     10:57 AM: Dr. Hoskins discussed the pt's case with Dr. Melgar (General Surgery). Dr. Melgar agrees with current care and management of pt and accepts admission.   Admitting Service: General Surgery   Admitting Physician: Dr. Melgar  Admit to: Med/Surg    12:00 PM: Re-evaluated pt. I have discussed test results, shared treatment plan, and the need for admission with patient and family at bedside. Pt and family express understanding at this time and agree with all information. All questions answered. Pt and family have no further questions or concerns at this time. Pt is ready for admit.    ED Medication(s):  Medications   ciprofloxacin (CIPRO)400mg/200ml D5W IVPB 400 mg (not administered)   sodium chloride 0.9% flush 3 mL (not administered)   ondansetron disintegrating tablet 8 mg (not administered)   promethazine (PHENERGAN) 6.25 mg in dextrose 5 % 50 mL IVPB (not administered)   ramelteon tablet 8 mg (not administered)   acetaminophen tablet 650 mg (not administered)   diphenhydrAMINE injection 25 mg (not administered)   dextrose 5 % and 0.45 % NaCl with KCl 20 mEq infusion (not administered)   hydrocodone-acetaminophen 5-325mg per tablet 1 tablet (not administered)   acetaminophen tablet 650 mg (not administered)   morphine injection 1 mg (not administered)   metronidazole IVPB 500 mg (not administered)   atorvastatin tablet 40 mg (not administered)   lisinopril tablet 10 mg (not administered)   tamsulosin 24 hr capsule 0.8 mg (not administered)   budesonide nebulizer solution 0.5 mg (not administered)   morphine injection 2 mg (2 mg Intravenous Given 4/13/18 2162)   ondansetron injection 4 mg (4 mg Intravenous Given 4/13/18 2824)   omnipaque 350 iohexol 75 mL (75 mLs Intravenous Given 4/13/18 8698)       New Prescriptions    No medications on file             Medical Decision Making    Medical  Decision Making:   Clinical Tests:   Lab Tests: Reviewed and Ordered  Radiological Study: Reviewed and Ordered           Scribe Attestation:   Scribe #1: I performed the above scribed service and the documentation accurately describes the services I performed. I attest to the accuracy of the note.    Attending:   Physician Attestation Statement for Scribe #1: I, Robert Hoskins Jr., MD, personally performed the services described in this documentation, as scribed by Jose De Jesus Teague, in my presence, and it is both accurate and complete.          Clinical Impression       ICD-10-CM ICD-9-CM   1. Diverticulitis K57.92 562.11   2. Diverticulitis of large intestine with perforation without bleeding K57.20 562.11     569.83   3. Ileus K56.7 560.1       Disposition:   Disposition: Admitted  Condition: Serious         Robert Hoskins Jr., MD  04/13/18 174

## 2018-04-13 NOTE — PLAN OF CARE
SW met with patient in ED. Patient lives alone.  Patient independent with ADL's at home (uses a cane support).  Patient voiced no d/c concerns.  No anticipated needs at present. Case mgt to follow up with d/c needs.  Juan Wright (son) -[phone].     04/13/18 1515   Discharge Assessment   Assessment Type Discharge Planning Assessment   Confirmed/corrected address and phone number on facesheet? Yes   Assessment information obtained from? Patient   Communicated expected length of stay with patient/caregiver yes   Current cognitive status: Alert/Oriented   Current Functional Status: Assistive Equipment;Independent   Lives With alone   Able to Return to Prior Arrangements yes   Is patient able to care for self after discharge? Yes   Who are your caregiver(s) and their phone number(s)? juan wright (son) [phone]   Patient's perception of discharge disposition home or selfcare   Readmission Within The Last 30 Days no previous admission in last 30 days   Patient currently being followed by outpatient case management? No   Patient currently receives any other outside agency services? No   Equipment Currently Used at Home cane, straight;shower chair;grab bar;walker, rolling   Do you have any problems affording any of your prescribed medications? No   Is the patient taking medications as prescribed? yes   Does the patient have transportation home? Yes   Transportation Available car;family or friend will provide   Does the patient receive services at the [facility]? No   Discharge Plan A Home

## 2018-04-13 NOTE — HPI
Chao Hawk Jr. is a 86 y.o. male presents with LLQ abdominal pain that began 3 days ago and rapdily worsened. He denies fever, chills, nausea, vomiting. He presented 2 days ago to ER with the same symptoms. He was called by ER and asked to return due to perforation. He has been on PO cipro and flagyl. Pt reports he feels about the same. He has not had a BM in 3 days. His appetite is poor. Repeat CT today shows similar microperforation of sigmoid diverticulitis, now dilated small bowel.

## 2018-04-13 NOTE — CONSULTS
[facility] -   General Surgery  Consult Note    Patient Name: Chao Hawk Jr.  MRN: [id number]  Code Status: Full Code  Admission Date: 4/13/2018  Hospital Length of Stay: 0 days  Attending Physician: Robert Hoskins Jr., MD  Primary Care Provider: VY Ureña Jr, MD    Patient information was obtained from patient, past medical records and ER records.     Inpatient consult to General surgery  Consult performed by: SHERIN ISIDRO  Consult ordered by: ROBERT HOSKINS JR        Subjective:     Principal Problem: <principal problem not specified>    History of Present Illness: Chao Hawk Jr. is a 86 y.o. male presents with LLQ abdominal pain that began 3 days ago and rapdily worsened. He denies fever, chills, nausea, vomiting. He presented 2 days ago to ER with the same symptoms. He was called by ER and asked to return due to perforation. He has been on PO cipro and flagyl. Pt reports he feels about the same. He has not had a BM in 3 days. His appetite is poor. Repeat CT today shows similar microperforation of sigmoid diverticulitis, now dilated small bowel.    Current Facility-Administered Medications on File Prior to Encounter   Medication    testosterone cypionate injection 200 mg     Current Outpatient Prescriptions on File Prior to Encounter   Medication Sig    aspirin (ECOTRIN) 81 MG EC tablet Take 81 mg by mouth once daily.    atorvastatin (LIPITOR) 40 MG tablet Take 1 tablet (40 mg total) by mouth once daily. (Patient taking differently: Take 10 mg by mouth once daily. )    ciprofloxacin HCl (CIPRO) 500 MG tablet Take 1 tablet (500 mg total) by mouth 2 (two) times daily.    fluticasone (FLOVENT DISKUS) 100 mcg/actuation inhaler INHALE 1 PUFF TWICE DAILY --RINSE MOUTH OUT WITH WATER    furosemide (LASIX) 40 MG tablet Take 20 mg by mouth.     hydroCHLOROthiazide (HYDRODIURIL) 25 MG tablet Take 1 tablet (25 mg total) by mouth once daily.    lisinopril 10 MG tablet TAKE 1 TABLET ONE TIME  DAILY    metroNIDAZOLE (FLAGYL) 500 MG tablet Take 1 tablet (500 mg total) by mouth 3 (three) times daily.    tamsulosin (FLOMAX) 0.4 mg Cp24 Take 2 capsules (0.8 mg total) by mouth every morning. (Patient taking differently: Take 0.8 mg by mouth every evening. )    traMADol (ULTRAM) 50 mg tablet Take 1 tablet (50 mg total) by mouth every 6 (six) hours as needed for Pain.    epinephrine (EPIPEN) 0.3 mg/0.3 mL (1:1,000) AtIn Inject 1 each into the muscle once.       Review of patient's allergies indicates:   Allergen Reactions    Bee sting  [allergen ext-venom-honey bee] Swelling    Poison ivy extract Hives    Penicillins Rash       Past Medical History:   Diagnosis Date    Arthritis     back, hand    Back pain     Dr. Cristobal- [facility]    Bilateral leg edema 2/16/2018    Chronic bronchitis     Diverticulosis 5/16/06    colonoscopy    Hernia     x2    Hyperlipidemia     Hypertension     Lumbar radiculopathy 6/14/2017    Multiple renal cysts 7/5/2016    Tobacco dependence      Past Surgical History:   Procedure Laterality Date    EYE SURGERY Bilateral     Glaucoma- Dr. Gianni Payne    FINGER SURGERY Right 1980s    index- Dr. Encarnacion    HERNIA REPAIR  1990s    x2    LUMBAR SPINE SURGERY  09/13/2017    TONSILLECTOMY, ADENOIDECTOMY  1940     Family History     Problem Relation (Age of Onset)    Appendicitis Father    COPD Mother    Emphysema Mother    Heart disease Mother, Son        Social History Main Topics    Smoking status: Former Smoker     Packs/day: 1.00     Years: 40.00     Start date: 1/1/1949     Quit date: 1/1/1991    Smokeless tobacco: Never Used    Alcohol use 9.0 oz/week     7 Shots of liquor, 8 Standard drinks or equivalent per week    Drug use: No    Sexual activity: No     Review of Systems   Constitutional: Positive for appetite change. Negative for activity change, chills and fever.   Respiratory: Negative for shortness of breath.    Cardiovascular: Positive for leg swelling  (chronic). Negative for chest pain.   Gastrointestinal: Positive for abdominal pain and constipation. Negative for abdominal distention, anal bleeding, blood in stool, nausea and vomiting.   Genitourinary: Positive for difficulty urinating. Negative for dysuria.   Musculoskeletal: Negative for myalgias.   Skin: Negative for wound.   Neurological: Negative for weakness.   Hematological: Does not bruise/bleed easily.   Psychiatric/Behavioral: The patient is not nervous/anxious.      Objective:     Vital Signs (Most Recent):  Temp: 97.9 °F (36.6 °C) (04/13/18 0813)  Pulse: 70 (04/13/18 1101)  Resp: 16 (04/13/18 1101)  BP: (!) 109/53 (04/13/18 1101)  SpO2: (!) 94 % (04/13/18 1101) Vital Signs (24h Range):  Temp:  [97.9 °F (36.6 °C)] 97.9 °F (36.6 °C)  Pulse:  [] 70  Resp:  [16-18] 16  SpO2:  [94 %] 94 %  BP: (109-130)/(53-67) 109/53     Weight: 86.1 kg (189 lb 13.1 oz)  Body mass index is 25.04 kg/m².    Physical Exam   Constitutional: He is oriented to person, place, and time. He appears well-developed and well-nourished.   HENT:   Head: Normocephalic and atraumatic.   Eyes: EOM are normal.   Cardiovascular: Normal rate and regular rhythm.    Pulmonary/Chest: Effort normal. No respiratory distress.   Abdominal: Soft. He exhibits distension (softly). There is tenderness (llq). There is guarding.   Musculoskeletal: Normal range of motion.   Neurological: He is alert and oriented to person, place, and time.   Skin: Skin is warm and dry.   Psychiatric: He has a normal mood and affect. Thought content normal.   Vitals reviewed.      Significant Labs:  CBC:   Recent Labs  Lab 04/13/18  0859   WBC 9.53   RBC 4.04*   HGB 12.9*   HCT 38.7*      MCV 96   MCH 31.9*   MCHC 33.3     CMP:   Recent Labs  Lab 04/13/18  0859   *   CALCIUM 9.3   ALBUMIN 3.6   PROT 6.2      K 3.7   CO2 27      BUN 19   CREATININE 1.0   ALKPHOS 55   ALT 24   AST 24   BILITOT 0.4       Significant Diagnostics:  I have  reviewed all pertinent imaging results/findings within the past 24 hours.    Assessment/Plan:     Diverticulitis of large intestine with perforation without abscess or bleeding    Admit to inpatient  IV fluids, IV cipro/flagyl, NPO  Prn analgesia  Resume home meds            VTE Risk Mitigation         Ordered     Place sequential compression device  Until discontinued      04/13/18 1320          Thank you for your consult. I will follow-up with patient. Please contact us if you have any additional questions.    Dalia Turner PA-C  General Surgery  [facility]

## 2018-04-13 NOTE — PLAN OF CARE
SW met with patient in ED. Patient lives alone.  Patient independent with ADL's at home (uses a cane support).  Patient voiced no d/c concerns.  No anticipated needs at present. Case mgt to follow up with d/c needs.  Juan Wright (son) -[phone].     04/13/18 1515   Discharge Assessment   Assessment Type Discharge Planning Assessment   Confirmed/corrected address and phone number on facesheet? Yes   Assessment information obtained from? Patient   Communicated expected length of stay with patient/caregiver yes   Current cognitive status: Alert/Oriented   Current Functional Status: Assistive Equipment;Independent   Lives With alone   Able to Return to Prior Arrangements yes   Is patient able to care for self after discharge? Yes   Who are your caregiver(s) and their phone number(s)? juan wright (son) [phone]   Patient's perception of discharge disposition home or selfcare   Readmission Within The Last 30 Days no previous admission in last 30 days   Patient currently being followed by outpatient case management? No   Patient currently receives any other outside agency services? No   Equipment Currently Used at Home cane, straight   Do you have any problems affording any of your prescribed medications? No   Is the patient taking medications as prescribed? yes   Does the patient have transportation home? Yes   Transportation Available car;family or friend will provide   Does the patient receive services at the [facility]? No   Discharge Plan A Home

## 2018-04-13 NOTE — TELEPHONE ENCOUNTER
----- Message from Kenneth Moran MD sent at 4/13/2018  5:28 AM CDT -----  Call to have pt return to ED for eval of perforated diverticulitis.

## 2018-04-13 NOTE — SUBJECTIVE & OBJECTIVE
Current Facility-Administered Medications on File Prior to Encounter   Medication    testosterone cypionate injection 200 mg     Current Outpatient Prescriptions on File Prior to Encounter   Medication Sig    aspirin (ECOTRIN) 81 MG EC tablet Take 81 mg by mouth once daily.    atorvastatin (LIPITOR) 40 MG tablet Take 1 tablet (40 mg total) by mouth once daily. (Patient taking differently: Take 10 mg by mouth once daily. )    ciprofloxacin HCl (CIPRO) 500 MG tablet Take 1 tablet (500 mg total) by mouth 2 (two) times daily.    fluticasone (FLOVENT DISKUS) 100 mcg/actuation inhaler INHALE 1 PUFF TWICE DAILY --RINSE MOUTH OUT WITH WATER    furosemide (LASIX) 40 MG tablet Take 20 mg by mouth.     hydroCHLOROthiazide (HYDRODIURIL) 25 MG tablet Take 1 tablet (25 mg total) by mouth once daily.    lisinopril 10 MG tablet TAKE 1 TABLET ONE TIME DAILY    metroNIDAZOLE (FLAGYL) 500 MG tablet Take 1 tablet (500 mg total) by mouth 3 (three) times daily.    tamsulosin (FLOMAX) 0.4 mg Cp24 Take 2 capsules (0.8 mg total) by mouth every morning. (Patient taking differently: Take 0.8 mg by mouth every evening. )    traMADol (ULTRAM) 50 mg tablet Take 1 tablet (50 mg total) by mouth every 6 (six) hours as needed for Pain.    epinephrine (EPIPEN) 0.3 mg/0.3 mL (1:1,000) AtIn Inject 1 each into the muscle once.       Review of patient's allergies indicates:   Allergen Reactions    Bee sting  [allergen ext-venom-honey bee] Swelling    Poison ivy extract Hives    Penicillins Rash       Past Medical History:   Diagnosis Date    Arthritis     back, hand    Back pain     Dr. Cristobal- [facility]    Bilateral leg edema 2/16/2018    Chronic bronchitis     Diverticulosis 5/16/06    colonoscopy    Hernia     x2    Hyperlipidemia     Hypertension     Lumbar radiculopathy 6/14/2017    Multiple renal cysts 7/5/2016    Tobacco dependence      Past Surgical History:   Procedure Laterality Date    EYE SURGERY Bilateral     Glaucoma-  Dr. Gianni Payne    FINGER SURGERY Right 1980s    index- Dr. Encarnacion    HERNIA REPAIR  1990s    x2    LUMBAR SPINE SURGERY  09/13/2017    TONSILLECTOMY, ADENOIDECTOMY  1940     Family History     Problem Relation (Age of Onset)    Appendicitis Father    COPD Mother    Emphysema Mother    Heart disease Mother, Son        Social History Main Topics    Smoking status: Former Smoker     Packs/day: 1.00     Years: 40.00     Start date: 1/1/1949     Quit date: 1/1/1991    Smokeless tobacco: Never Used    Alcohol use 9.0 oz/week     7 Shots of liquor, 8 Standard drinks or equivalent per week    Drug use: No    Sexual activity: No     Review of Systems   Constitutional: Positive for appetite change. Negative for activity change, chills and fever.   Respiratory: Negative for shortness of breath.    Cardiovascular: Positive for leg swelling (chronic). Negative for chest pain.   Gastrointestinal: Positive for abdominal pain and constipation. Negative for abdominal distention, anal bleeding, blood in stool, nausea and vomiting.   Genitourinary: Positive for difficulty urinating. Negative for dysuria.   Musculoskeletal: Negative for myalgias.   Skin: Negative for wound.   Neurological: Negative for weakness.   Hematological: Does not bruise/bleed easily.   Psychiatric/Behavioral: The patient is not nervous/anxious.      Objective:     Vital Signs (Most Recent):  Temp: 97.9 °F (36.6 °C) (04/13/18 0813)  Pulse: 70 (04/13/18 1101)  Resp: 16 (04/13/18 1101)  BP: (!) 109/53 (04/13/18 1101)  SpO2: (!) 94 % (04/13/18 1101) Vital Signs (24h Range):  Temp:  [97.9 °F (36.6 °C)] 97.9 °F (36.6 °C)  Pulse:  [] 70  Resp:  [16-18] 16  SpO2:  [94 %] 94 %  BP: (109-130)/(53-67) 109/53     Weight: 86.1 kg (189 lb 13.1 oz)  Body mass index is 25.04 kg/m².    Physical Exam   Constitutional: He is oriented to person, place, and time. He appears well-developed and well-nourished.   HENT:   Head: Normocephalic and atraumatic.   Eyes:  EOM are normal.   Cardiovascular: Normal rate and regular rhythm.    Pulmonary/Chest: Effort normal. No respiratory distress.   Abdominal: Soft. He exhibits distension (softly). There is tenderness (llq). There is guarding.   Musculoskeletal: Normal range of motion.   Neurological: He is alert and oriented to person, place, and time.   Skin: Skin is warm and dry.   Psychiatric: He has a normal mood and affect. Thought content normal.   Vitals reviewed.      Significant Labs:  CBC:   Recent Labs  Lab 04/13/18  0859   WBC 9.53   RBC 4.04*   HGB 12.9*   HCT 38.7*      MCV 96   MCH 31.9*   MCHC 33.3     CMP:   Recent Labs  Lab 04/13/18  0859   *   CALCIUM 9.3   ALBUMIN 3.6   PROT 6.2      K 3.7   CO2 27      BUN 19   CREATININE 1.0   ALKPHOS 55   ALT 24   AST 24   BILITOT 0.4       Significant Diagnostics:  I have reviewed all pertinent imaging results/findings within the past 24 hours.

## 2018-04-14 PROBLEM — K57.92 DIVERTICULITIS: Status: RESOLVED | Noted: 2018-04-13 | Resolved: 2018-04-14

## 2018-04-14 LAB
ANION GAP SERPL CALC-SCNC: 7 MMOL/L
BASOPHILS # BLD AUTO: 0.01 K/UL
BASOPHILS NFR BLD: 0.2 %
BUN SERPL-MCNC: 13 MG/DL
CALCIUM SERPL-MCNC: 8.9 MG/DL
CHLORIDE SERPL-SCNC: 102 MMOL/L
CO2 SERPL-SCNC: 30 MMOL/L
CREAT SERPL-MCNC: 0.9 MG/DL
DIFFERENTIAL METHOD: ABNORMAL
EOSINOPHIL # BLD AUTO: 0.2 K/UL
EOSINOPHIL NFR BLD: 3.1 %
ERYTHROCYTE [DISTWIDTH] IN BLOOD BY AUTOMATED COUNT: 14.4 %
EST. GFR  (AFRICAN AMERICAN): >60 ML/MIN/1.73 M^2
EST. GFR  (NON AFRICAN AMERICAN): >60 ML/MIN/1.73 M^2
GLUCOSE SERPL-MCNC: 111 MG/DL
HCT VFR BLD AUTO: 39.4 %
HGB BLD-MCNC: 12.6 G/DL
LYMPHOCYTES # BLD AUTO: 0.7 K/UL
LYMPHOCYTES NFR BLD: 11.5 %
MCH RBC QN AUTO: 31.3 PG
MCHC RBC AUTO-ENTMCNC: 32 G/DL
MCV RBC AUTO: 98 FL
MONOCYTES # BLD AUTO: 0.6 K/UL
MONOCYTES NFR BLD: 11 %
NEUTROPHILS # BLD AUTO: 4.3 K/UL
NEUTROPHILS NFR BLD: 74.2 %
PLATELET # BLD AUTO: 143 K/UL
PMV BLD AUTO: 11.3 FL
POTASSIUM SERPL-SCNC: 4.1 MMOL/L
RBC # BLD AUTO: 4.02 M/UL
SODIUM SERPL-SCNC: 139 MMOL/L
WBC # BLD AUTO: 5.75 K/UL

## 2018-04-14 PROCEDURE — 25000003 PHARM REV CODE 250: Performed by: SURGERY

## 2018-04-14 PROCEDURE — 11000001 HC ACUTE MED/SURG PRIVATE ROOM

## 2018-04-14 PROCEDURE — S0030 INJECTION, METRONIDAZOLE: HCPCS | Performed by: PHYSICIAN ASSISTANT

## 2018-04-14 PROCEDURE — 63600175 PHARM REV CODE 636 W HCPCS: Performed by: EMERGENCY MEDICINE

## 2018-04-14 PROCEDURE — 80048 BASIC METABOLIC PNL TOTAL CA: CPT

## 2018-04-14 PROCEDURE — 25000003 PHARM REV CODE 250: Performed by: PHYSICIAN ASSISTANT

## 2018-04-14 PROCEDURE — 36415 COLL VENOUS BLD VENIPUNCTURE: CPT

## 2018-04-14 PROCEDURE — 85025 COMPLETE CBC W/AUTO DIFF WBC: CPT

## 2018-04-14 PROCEDURE — 94640 AIRWAY INHALATION TREATMENT: CPT

## 2018-04-14 PROCEDURE — 63600175 PHARM REV CODE 636 W HCPCS: Performed by: SURGERY

## 2018-04-14 PROCEDURE — 99232 SBSQ HOSP IP/OBS MODERATE 35: CPT | Mod: ,,, | Performed by: SURGERY

## 2018-04-14 PROCEDURE — 63600175 PHARM REV CODE 636 W HCPCS: Performed by: PHYSICIAN ASSISTANT

## 2018-04-14 PROCEDURE — 25000242 PHARM REV CODE 250 ALT 637 W/ HCPCS: Performed by: EMERGENCY MEDICINE

## 2018-04-14 RX ORDER — ENOXAPARIN SODIUM 100 MG/ML
40 INJECTION SUBCUTANEOUS EVERY 24 HOURS
Status: DISCONTINUED | OUTPATIENT
Start: 2018-04-14 | End: 2018-04-15 | Stop reason: HOSPADM

## 2018-04-14 RX ORDER — CIPROFLOXACIN 500 MG/1
500 TABLET ORAL EVERY 12 HOURS
Status: DISCONTINUED | OUTPATIENT
Start: 2018-04-14 | End: 2018-04-15 | Stop reason: HOSPADM

## 2018-04-14 RX ORDER — METRONIDAZOLE 500 MG/1
500 TABLET ORAL EVERY 8 HOURS
Status: DISCONTINUED | OUTPATIENT
Start: 2018-04-14 | End: 2018-04-15 | Stop reason: HOSPADM

## 2018-04-14 RX ORDER — HYDROCODONE BITARTRATE AND ACETAMINOPHEN 5; 325 MG/1; MG/1
1 TABLET ORAL EVERY 6 HOURS PRN
Status: DISCONTINUED | OUTPATIENT
Start: 2018-04-14 | End: 2018-04-15 | Stop reason: HOSPADM

## 2018-04-14 RX ADMIN — DIPHENHYDRAMINE HYDROCHLORIDE 25 MG: 50 INJECTION, SOLUTION INTRAMUSCULAR; INTRAVENOUS at 05:04

## 2018-04-14 RX ADMIN — CIPROFLOXACIN 400 MG: 2 INJECTION, SOLUTION INTRAVENOUS at 01:04

## 2018-04-14 RX ADMIN — BUDESONIDE 0.5 MG: 0.5 SUSPENSION RESPIRATORY (INHALATION) at 07:04

## 2018-04-14 RX ADMIN — TAMSULOSIN HYDROCHLORIDE 0.8 MG: 0.4 CAPSULE ORAL at 09:04

## 2018-04-14 RX ADMIN — DEXTROSE MONOHYDRATE, SODIUM CHLORIDE, AND POTASSIUM CHLORIDE: 50; 4.5; 1.49 INJECTION, SOLUTION INTRAVENOUS at 07:04

## 2018-04-14 RX ADMIN — METRONIDAZOLE 500 MG: 500 INJECTION, SOLUTION INTRAVENOUS at 05:04

## 2018-04-14 RX ADMIN — DEXTROSE MONOHYDRATE, SODIUM CHLORIDE, AND POTASSIUM CHLORIDE: 50; 4.5; 1.49 INJECTION, SOLUTION INTRAVENOUS at 06:04

## 2018-04-14 RX ADMIN — HYDROCODONE BITARTRATE AND ACETAMINOPHEN 1 TABLET: 5; 325 TABLET ORAL at 12:04

## 2018-04-14 RX ADMIN — METRONIDAZOLE 500 MG: 500 TABLET ORAL at 09:04

## 2018-04-14 RX ADMIN — LISINOPRIL 10 MG: 10 TABLET ORAL at 08:04

## 2018-04-14 RX ADMIN — BUDESONIDE 0.5 MG: 0.5 SUSPENSION RESPIRATORY (INHALATION) at 08:04

## 2018-04-14 RX ADMIN — DEXTROSE MONOHYDRATE, SODIUM CHLORIDE, AND POTASSIUM CHLORIDE: 50; 4.5; 1.49 INJECTION, SOLUTION INTRAVENOUS at 12:04

## 2018-04-14 RX ADMIN — CIPROFLOXACIN HYDROCHLORIDE 500 MG: 500 TABLET, FILM COATED ORAL at 09:04

## 2018-04-14 RX ADMIN — HYDROCODONE BITARTRATE AND ACETAMINOPHEN 1 TABLET: 5; 325 TABLET ORAL at 06:04

## 2018-04-14 RX ADMIN — ATORVASTATIN CALCIUM 40 MG: 40 TABLET, FILM COATED ORAL at 08:04

## 2018-04-14 RX ADMIN — METRONIDAZOLE 500 MG: 500 TABLET ORAL at 02:04

## 2018-04-14 RX ADMIN — ENOXAPARIN SODIUM 40 MG: 100 INJECTION SUBCUTANEOUS at 04:04

## 2018-04-14 NOTE — PROGRESS NOTES
[facility] -   General Surgery  Progress Note    Subjective:     History of Present Illness:  Chao Hawk Jr. is a 86 y.o. male presents with LLQ abdominal pain that began 3 days ago and rapdily worsened. He denies fever, chills, nausea, vomiting. He presented 2 days ago to ER with the same symptoms. He was called by ER and asked to return due to perforation. He has been on PO cipro and flagyl. Pt reports he feels about the same. He has not had a BM in 3 days. His appetite is poor. Repeat CT today shows similar microperforation of sigmoid diverticulitis, now dilated small bowel.    Post-Op Info:  * No surgery found *         Interval History: No significant increase in pain, still mild to minimal left lower quadrant    Medications:  Continuous Infusions:   dextrose 5 % and 0.45 % NaCl with KCl 20 mEq 125 mL/hr at 04/14/18 0056     Scheduled Meds:   atorvastatin  40 mg Oral Daily    budesonide  0.5 mg Nebulization BID    ciprofloxacin HCl  500 mg Oral Q12H    enoxaparin  40 mg Subcutaneous Daily    lisinopril  10 mg Oral Daily    metroNIDAZOLE  500 mg Oral Q8H    tamsulosin  0.8 mg Oral QHS     PRN Meds:acetaminophen, acetaminophen, diphenhydrAMINE, hydrocodone-acetaminophen 5-325mg, hydrocodone-acetaminophen 5-325mg, morphine, ondansetron, promethazine (PHENERGAN) IVPB, ramelteon, sodium chloride 0.9%     Review of patient's allergies indicates:   Allergen Reactions    Bee sting  [allergen ext-venom-honey bee] Swelling    Poison ivy extract Hives    Penicillins Rash     Objective:     Vital Signs (Most Recent):  Temp: 98.4 °F (36.9 °C) (04/14/18 1108)  Pulse: 91 (04/14/18 1108)  Resp: 18 (04/14/18 1108)  BP: (!) 149/69 (04/14/18 1108)  SpO2: 98 % (04/14/18 1108) Vital Signs (24h Range):  Temp:  [97.5 °F (36.4 °C)-98.4 °F (36.9 °C)] 98.4 °F (36.9 °C)  Pulse:  [64-99] 91  Resp:  [16-20] 18  SpO2:  [93 %-99 %] 98 %  BP: (109-149)/(53-69) 149/69     Weight: 86.1 kg (189 lb 13.1 oz)  Body mass  index is 25.04 kg/m².    Intake/Output - Last 3 Shifts       04/12 0700 - 04/13 0659 04/13 0700 - 04/14 0659 04/14 0700 - 04/15 0659    P.O.  0     I.V. (mL/kg)  1812.5 (21.1)     IV Piggyback  700     Total Intake(mL/kg)  2512.5 (29.2)     Net   +2512.5             Urine Occurrence   1 x    Stool Occurrence   1 x          Physical Exam   Constitutional: He is oriented to person, place, and time. He appears well-developed and well-nourished. No distress.   HENT:   Head: Normocephalic and atraumatic.   Neck: Normal range of motion.   Cardiovascular: Normal rate, regular rhythm and normal heart sounds.    Pulmonary/Chest: Effort normal and breath sounds normal.   Abdominal: Soft. Bowel sounds are normal. He exhibits no distension. Tenderness: minimum left lower quadrant.   Musculoskeletal: Normal range of motion. He exhibits no edema.   Neurological: He is oriented to person, place, and time.   Vitals reviewed.      Significant Labs:  CBC:   Recent Labs  Lab 04/14/18  0522   WBC 5.75   RBC 4.02*   HGB 12.6*   HCT 39.4*   *   MCV 98   MCH 31.3*   MCHC 32.0     BMP:   Recent Labs  Lab 04/14/18 0522   *      K 4.1      CO2 30*   BUN 13   CREATININE 0.9   CALCIUM 8.9       Significant Diagnostics:  No new    Assessment/Plan:     Diverticulitis of large intestine with perforation without abscess or bleeding    Clear liquids to full liquid diet.  Convert to oral antibiotics.  Labs in the morning.  Possible discharge on low residual diet tomorrow          Pure hypercholesterolemia    Continue home medications        Essential hypertension    Home medications        COPD (chronic obstructive pulmonary disease)    Home medications            Justo Melgar MD  General Surgery  [facility] -

## 2018-04-14 NOTE — HOSPITAL COURSE
Hospital day 2.  No significant change abdominal pain.  He has remained afebrile.  Has some mild left lower quadrant discomfort    Hd3:  Less pain, no nausea, tolerated a diet

## 2018-04-14 NOTE — H&P
[facility] -   General Surgery       Patient Name: Chao Hawk Jr.  MRN: [id number]  Code Status: Full Code  Admission Date: 4/13/2018  Hospital Length of Stay: 0 days  Attending Physician: Robert Hoskins Jr., MD  Primary Care Provider: VY Ureña Jr, MD     Patient information was obtained from patient, past medical records and ER records.      Inpatient consult to General surgery  Consult performed by: SHERIN ISIDRO  Consult ordered by: ROBERT HOSKINS JR        Subjective:      Principal Problem: <principal problem not specified>     History of Present Illness: Chao Hawk Jr. is a 86 y.o. male presents with LLQ abdominal pain that began 3 days ago and rapdily worsened. He denies fever, chills, nausea, vomiting. He presented 2 days ago to ER with the same symptoms. He was called by ER and asked to return due to perforation. He has been on PO cipro and flagyl. Pt reports he feels about the same. He has not had a BM in 3 days. His appetite is poor. Repeat CT today shows similar microperforation of sigmoid diverticulitis, now dilated small bowel.     Current Facility-Administered Medications on File Prior to Encounter   Medication    testosterone cypionate injection 200 mg           Current Outpatient Prescriptions on File Prior to Encounter   Medication Sig    aspirin (ECOTRIN) 81 MG EC tablet Take 81 mg by mouth once daily.    atorvastatin (LIPITOR) 40 MG tablet Take 1 tablet (40 mg total) by mouth once daily. (Patient taking differently: Take 10 mg by mouth once daily. )    ciprofloxacin HCl (CIPRO) 500 MG tablet Take 1 tablet (500 mg total) by mouth 2 (two) times daily.    fluticasone (FLOVENT DISKUS) 100 mcg/actuation inhaler INHALE 1 PUFF TWICE DAILY --RINSE MOUTH OUT WITH WATER    furosemide (LASIX) 40 MG tablet Take 20 mg by mouth.     hydroCHLOROthiazide (HYDRODIURIL) 25 MG tablet Take 1 tablet (25 mg total) by mouth once daily.    lisinopril 10 MG tablet TAKE 1 TABLET ONE TIME  DAILY    metroNIDAZOLE (FLAGYL) 500 MG tablet Take 1 tablet (500 mg total) by mouth 3 (three) times daily.    tamsulosin (FLOMAX) 0.4 mg Cp24 Take 2 capsules (0.8 mg total) by mouth every morning. (Patient taking differently: Take 0.8 mg by mouth every evening. )    traMADol (ULTRAM) 50 mg tablet Take 1 tablet (50 mg total) by mouth every 6 (six) hours as needed for Pain.    epinephrine (EPIPEN) 0.3 mg/0.3 mL (1:1,000) AtIn Inject 1 each into the muscle once.              Review of patient's allergies indicates:   Allergen Reactions    Bee sting  [allergen ext-venom-honey bee] Swelling    Poison ivy extract Hives    Penicillins Rash              Past Medical History:   Diagnosis Date    Arthritis       back, hand    Back pain       Dr. Cristobal- [facility]    Bilateral leg edema 2/16/2018    Chronic bronchitis      Diverticulosis 5/16/06     colonoscopy    Hernia       x2    Hyperlipidemia      Hypertension      Lumbar radiculopathy 6/14/2017    Multiple renal cysts 7/5/2016    Tobacco dependence              Past Surgical History:   Procedure Laterality Date    EYE SURGERY Bilateral       Glaucoma- Dr. Gianni Payne    FINGER SURGERY Right 1980s     index- Dr. Encarnacion    HERNIA REPAIR   1990s     x2    LUMBAR SPINE SURGERY   09/13/2017    TONSILLECTOMY, ADENOIDECTOMY   1940           Family History      Problem Relation (Age of Onset)     Appendicitis Father     COPD Mother     Emphysema Mother     Heart disease Mother, Son                Social History Main Topics    Smoking status: Former Smoker       Packs/day: 1.00       Years: 40.00       Start date: 1/1/1949       Quit date: 1/1/1991    Smokeless tobacco: Never Used    Alcohol use 9.0 oz/week       7 Shots of liquor, 8 Standard drinks or equivalent per week    Drug use: No    Sexual activity: No      Review of Systems   Constitutional: Positive for appetite change. Negative for activity change, chills and fever.   Respiratory: Negative for  shortness of breath.    Cardiovascular: Positive for leg swelling (chronic). Negative for chest pain.   Gastrointestinal: Positive for abdominal pain and constipation. Negative for abdominal distention, anal bleeding, blood in stool, nausea and vomiting.   Genitourinary: Positive for difficulty urinating. Negative for dysuria.   Musculoskeletal: Negative for myalgias.   Skin: Negative for wound.   Neurological: Negative for weakness.   Hematological: Does not bruise/bleed easily.   Psychiatric/Behavioral: The patient is not nervous/anxious.       Objective:      Vital Signs (Most Recent):  Temp: 97.9 °F (36.6 °C) (04/13/18 0813)  Pulse: 70 (04/13/18 1101)  Resp: 16 (04/13/18 1101)  BP: (!) 109/53 (04/13/18 1101)  SpO2: (!) 94 % (04/13/18 1101) Vital Signs (24h Range):  Temp:  [97.9 °F (36.6 °C)] 97.9 °F (36.6 °C)  Pulse:  [] 70  Resp:  [16-18] 16  SpO2:  [94 %] 94 %  BP: (109-130)/(53-67) 109/53      Weight: 86.1 kg (189 lb 13.1 oz)  Body mass index is 25.04 kg/m².     Physical Exam   Constitutional: He is oriented to person, place, and time. He appears well-developed and well-nourished.   HENT:   Head: Normocephalic and atraumatic.   Eyes: EOM are normal.   Cardiovascular: Normal rate and regular rhythm.    Pulmonary/Chest: Effort normal. No respiratory distress.   Abdominal: Soft. He exhibits distension (softly). There is tenderness (llq). There is guarding.   Musculoskeletal: Normal range of motion.   Neurological: He is alert and oriented to person, place, and time.   Skin: Skin is warm and dry.   Psychiatric: He has a normal mood and affect. Thought content normal.   Vitals reviewed.        Significant Labs:  CBC:   Recent Labs  Lab 04/13/18  0859   WBC 9.53   RBC 4.04*   HGB 12.9*   HCT 38.7*      MCV 96   MCH 31.9*   MCHC 33.3      CMP:   Recent Labs  Lab 04/13/18  0859   *   CALCIUM 9.3   ALBUMIN 3.6   PROT 6.2      K 3.7   CO2 27      BUN 19   CREATININE 1.0   ALKPHOS 55    ALT 24   AST 24   BILITOT 0.4         Significant Diagnostics:  I have reviewed all pertinent imaging results/findings within the past 24 hours.     Assessment/Plan:          Diverticulitis of large intestine with perforation without abscess or bleeding     Admit to inpatient  IV fluids, IV cipro/flagyl, NPO  Prn analgesia  Resume home meds                      VTE Risk Mitigation          Ordered       Place sequential compression device  Until discontinued      04/13/18 8469             Thank you for your consult. I will follow-up with patient. Please contact us if you have any additional questions.

## 2018-04-14 NOTE — ASSESSMENT & PLAN NOTE
Clear liquids to full liquid diet.  Convert to oral antibiotics.  Labs in the morning.  Possible discharge on low residual diet tomorrow

## 2018-04-14 NOTE — SUBJECTIVE & OBJECTIVE
Interval History: No significant increase in pain, still mild to minimal left lower quadrant    Medications:  Continuous Infusions:   dextrose 5 % and 0.45 % NaCl with KCl 20 mEq 125 mL/hr at 04/14/18 0056     Scheduled Meds:   atorvastatin  40 mg Oral Daily    budesonide  0.5 mg Nebulization BID    ciprofloxacin HCl  500 mg Oral Q12H    enoxaparin  40 mg Subcutaneous Daily    lisinopril  10 mg Oral Daily    metroNIDAZOLE  500 mg Oral Q8H    tamsulosin  0.8 mg Oral QHS     PRN Meds:acetaminophen, acetaminophen, diphenhydrAMINE, hydrocodone-acetaminophen 5-325mg, hydrocodone-acetaminophen 5-325mg, morphine, ondansetron, promethazine (PHENERGAN) IVPB, ramelteon, sodium chloride 0.9%     Review of patient's allergies indicates:   Allergen Reactions    Bee sting  [allergen ext-venom-honey bee] Swelling    Poison ivy extract Hives    Penicillins Rash     Objective:     Vital Signs (Most Recent):  Temp: 98.4 °F (36.9 °C) (04/14/18 1108)  Pulse: 91 (04/14/18 1108)  Resp: 18 (04/14/18 1108)  BP: (!) 149/69 (04/14/18 1108)  SpO2: 98 % (04/14/18 1108) Vital Signs (24h Range):  Temp:  [97.5 °F (36.4 °C)-98.4 °F (36.9 °C)] 98.4 °F (36.9 °C)  Pulse:  [64-99] 91  Resp:  [16-20] 18  SpO2:  [93 %-99 %] 98 %  BP: (109-149)/(53-69) 149/69     Weight: 86.1 kg (189 lb 13.1 oz)  Body mass index is 25.04 kg/m².    Intake/Output - Last 3 Shifts       04/12 0700 - 04/13 0659 04/13 0700 - 04/14 0659 04/14 0700 - 04/15 0659    P.O.  0     I.V. (mL/kg)  1812.5 (21.1)     IV Piggyback  700     Total Intake(mL/kg)  2512.5 (29.2)     Net   +2512.5             Urine Occurrence   1 x    Stool Occurrence   1 x          Physical Exam   Constitutional: He is oriented to person, place, and time. He appears well-developed and well-nourished. No distress.   HENT:   Head: Normocephalic and atraumatic.   Neck: Normal range of motion.   Cardiovascular: Normal rate, regular rhythm and normal heart sounds.    Pulmonary/Chest: Effort normal and  breath sounds normal.   Abdominal: Soft. Bowel sounds are normal. He exhibits no distension. Tenderness: minimum left lower quadrant.   Musculoskeletal: Normal range of motion. He exhibits no edema.   Neurological: He is oriented to person, place, and time.   Vitals reviewed.      Significant Labs:  CBC:   Recent Labs  Lab 04/14/18  0522   WBC 5.75   RBC 4.02*   HGB 12.6*   HCT 39.4*   *   MCV 98   MCH 31.3*   MCHC 32.0     BMP:   Recent Labs  Lab 04/14/18  0522   *      K 4.1      CO2 30*   BUN 13   CREATININE 0.9   CALCIUM 8.9       Significant Diagnostics:  No new

## 2018-04-14 NOTE — PLAN OF CARE
Problem: Patient Care Overview  Goal: Plan of Care Review  Outcome: Ongoing (interventions implemented as appropriate)  Patient AAO and VSS.    IVF & antibiotics administered as ordered. Pain adequately managed with prn meds. NPO excepts sips with meds. Remains free of injury. Fall precautions maintained with bed low and wheels locked. Chart review for 24 hours completed. Will continue to monitor till discharge.

## 2018-04-14 NOTE — PLAN OF CARE
Problem: Patient Care Overview  Goal: Plan of Care Review  Outcome: Ongoing (interventions implemented as appropriate)  Patient remains free of falls and safety precautions maintained. Afebrile. Pain controlled. Patient tolerating clear liquid diet well. No complaints of N/V at this time. Will continue to monitor. 12 hour chart check.

## 2018-04-15 VITALS
WEIGHT: 189.81 LBS | SYSTOLIC BLOOD PRESSURE: 148 MMHG | RESPIRATION RATE: 20 BRPM | DIASTOLIC BLOOD PRESSURE: 65 MMHG | HEIGHT: 73 IN | HEART RATE: 77 BPM | TEMPERATURE: 98 F | OXYGEN SATURATION: 95 % | BODY MASS INDEX: 25.16 KG/M2

## 2018-04-15 PROBLEM — K57.20 DIVERTICULITIS OF LARGE INTESTINE WITH PERFORATION WITHOUT ABSCESS OR BLEEDING: Status: ACTIVE | Noted: 2018-04-15

## 2018-04-15 LAB
ANION GAP SERPL CALC-SCNC: 8 MMOL/L
BASOPHILS # BLD AUTO: 0.01 K/UL
BASOPHILS NFR BLD: 0.2 %
BUN SERPL-MCNC: 10 MG/DL
CALCIUM SERPL-MCNC: 8.8 MG/DL
CHLORIDE SERPL-SCNC: 106 MMOL/L
CO2 SERPL-SCNC: 26 MMOL/L
CREAT SERPL-MCNC: 0.9 MG/DL
DIFFERENTIAL METHOD: ABNORMAL
EOSINOPHIL # BLD AUTO: 0.2 K/UL
EOSINOPHIL NFR BLD: 3.4 %
ERYTHROCYTE [DISTWIDTH] IN BLOOD BY AUTOMATED COUNT: 14.1 %
EST. GFR  (AFRICAN AMERICAN): >60 ML/MIN/1.73 M^2
EST. GFR  (NON AFRICAN AMERICAN): >60 ML/MIN/1.73 M^2
GLUCOSE SERPL-MCNC: 111 MG/DL
HCT VFR BLD AUTO: 38.6 %
HGB BLD-MCNC: 12.6 G/DL
LYMPHOCYTES # BLD AUTO: 0.8 K/UL
LYMPHOCYTES NFR BLD: 14.4 %
MCH RBC QN AUTO: 31.6 PG
MCHC RBC AUTO-ENTMCNC: 32.6 G/DL
MCV RBC AUTO: 97 FL
MONOCYTES # BLD AUTO: 0.6 K/UL
MONOCYTES NFR BLD: 10.8 %
NEUTROPHILS # BLD AUTO: 4 K/UL
NEUTROPHILS NFR BLD: 71.2 %
PLATELET # BLD AUTO: 136 K/UL
PMV BLD AUTO: 10.2 FL
POTASSIUM SERPL-SCNC: 4.1 MMOL/L
RBC # BLD AUTO: 3.99 M/UL
SODIUM SERPL-SCNC: 140 MMOL/L
WBC # BLD AUTO: 5.64 K/UL

## 2018-04-15 PROCEDURE — 80048 BASIC METABOLIC PNL TOTAL CA: CPT

## 2018-04-15 PROCEDURE — 85025 COMPLETE CBC W/AUTO DIFF WBC: CPT

## 2018-04-15 PROCEDURE — 94640 AIRWAY INHALATION TREATMENT: CPT

## 2018-04-15 PROCEDURE — 36415 COLL VENOUS BLD VENIPUNCTURE: CPT

## 2018-04-15 PROCEDURE — 25000003 PHARM REV CODE 250: Performed by: PHYSICIAN ASSISTANT

## 2018-04-15 PROCEDURE — 25000003 PHARM REV CODE 250: Performed by: SURGERY

## 2018-04-15 PROCEDURE — 25000242 PHARM REV CODE 250 ALT 637 W/ HCPCS: Performed by: EMERGENCY MEDICINE

## 2018-04-15 RX ADMIN — LISINOPRIL 10 MG: 10 TABLET ORAL at 09:04

## 2018-04-15 RX ADMIN — ATORVASTATIN CALCIUM 40 MG: 40 TABLET, FILM COATED ORAL at 09:04

## 2018-04-15 RX ADMIN — CIPROFLOXACIN HYDROCHLORIDE 500 MG: 500 TABLET, FILM COATED ORAL at 09:04

## 2018-04-15 RX ADMIN — DEXTROSE MONOHYDRATE, SODIUM CHLORIDE, AND POTASSIUM CHLORIDE: 50; 4.5; 1.49 INJECTION, SOLUTION INTRAVENOUS at 03:04

## 2018-04-15 RX ADMIN — HYDROCODONE BITARTRATE AND ACETAMINOPHEN 1 TABLET: 5; 325 TABLET ORAL at 03:04

## 2018-04-15 RX ADMIN — METRONIDAZOLE 500 MG: 500 TABLET ORAL at 05:04

## 2018-04-15 RX ADMIN — BUDESONIDE 0.5 MG: 0.5 SUSPENSION RESPIRATORY (INHALATION) at 07:04

## 2018-04-15 NOTE — SUBJECTIVE & OBJECTIVE
Interval History: tolerated full liquids, less pain    Medications:  Continuous Infusions:   dextrose 5 % and 0.45 % NaCl with KCl 20 mEq 125 mL/hr at 04/15/18 0333     Scheduled Meds:   atorvastatin  40 mg Oral Daily    budesonide  0.5 mg Nebulization BID    ciprofloxacin HCl  500 mg Oral Q12H    enoxaparin  40 mg Subcutaneous Daily    lisinopril  10 mg Oral Daily    metroNIDAZOLE  500 mg Oral Q8H    tamsulosin  0.8 mg Oral QHS     PRN Meds:acetaminophen, acetaminophen, diphenhydrAMINE, hydrocodone-acetaminophen 5-325mg, hydrocodone-acetaminophen 5-325mg, morphine, ondansetron, promethazine (PHENERGAN) IVPB, ramelteon, sodium chloride 0.9%     Review of patient's allergies indicates:   Allergen Reactions    Bee sting  [allergen ext-venom-honey bee] Swelling    Poison ivy extract Hives    Penicillins Rash     Objective:     Vital Signs (Most Recent):  Temp: 98.4 °F (36.9 °C) (04/15/18 0817)  Pulse: 78 (04/15/18 0817)  Resp: 18 (04/15/18 0817)  BP: (!) 144/71 (04/15/18 0817)  SpO2: 96 % (04/15/18 0817) Vital Signs (24h Range):  Temp:  [97.9 °F (36.6 °C)-98.5 °F (36.9 °C)] 98.4 °F (36.9 °C)  Pulse:  [73-91] 78  Resp:  [16-18] 18  SpO2:  [93 %-98 %] 96 %  BP: (119-149)/(59-71) 144/71     Weight: 86.1 kg (189 lb 13.1 oz)  Body mass index is 25.04 kg/m².    Intake/Output - Last 3 Shifts       04/13 0700 - 04/14 0659 04/14 0700 - 04/15 0659 04/15 0700 - 04/16 0659    P.O. 0 480     I.V. (mL/kg) 1812.5 (21.1) 2968.8 (34.5)     IV Piggyback 700      Total Intake(mL/kg) 2512.5 (29.2) 3448.8 (40.1)     Net +2512.5 +3448.8             Urine Occurrence  9 x     Stool Occurrence  1 x           Physical Exam   Constitutional: He is oriented to person, place, and time. He appears well-developed and well-nourished. No distress.   HENT:   Head: Normocephalic and atraumatic.   Neck: Normal range of motion. Neck supple.   Cardiovascular: Normal rate, regular rhythm and normal heart sounds.    Pulmonary/Chest: Effort normal  and breath sounds normal.   Abdominal: Soft. Bowel sounds are normal. He exhibits no distension. Tenderness: very mild. There is no rebound and no guarding. No hernia.   Musculoskeletal: Normal range of motion.   Neurological: He is alert and oriented to person, place, and time.   Skin: Skin is warm and dry. Capillary refill takes less than 2 seconds.   Psychiatric: He has a normal mood and affect. His behavior is normal. Judgment and thought content normal.   Vitals reviewed.      Significant Labs:  CBC:   Recent Labs  Lab 04/15/18  0626   WBC 5.64   RBC 3.99*   HGB 12.6*   HCT 38.6*   *   MCV 97   MCH 31.6*   MCHC 32.6     BMP:   Recent Labs  Lab 04/15/18  0626   *      K 4.1      CO2 26   BUN 10   CREATININE 0.9   CALCIUM 8.8     CMP:   Recent Labs  Lab 04/13/18  0859  04/15/18  0626   *  < > 111*   CALCIUM 9.3  < > 8.8   ALBUMIN 3.6  --   --    PROT 6.2  --   --      < > 140   K 3.7  < > 4.1   CO2 27  < > 26     < > 106   BUN 19  < > 10   CREATININE 1.0  < > 0.9   ALKPHOS 55  --   --    ALT 24  --   --    AST 24  --   --    BILITOT 0.4  --   --    < > = values in this interval not displayed.    Significant Diagnostics:  none

## 2018-04-15 NOTE — PLAN OF CARE
Problem: Patient Care Overview  Goal: Plan of Care Review  Outcome: Outcome(s) achieved Date Met: 04/15/18  Remains injury free. Denies c/o pain. Stable condition. Discharging to home.

## 2018-04-15 NOTE — PROGRESS NOTES
[facility] -   General Surgery  Progress Note    Subjective:     History of Present Illness:  Chao Hawk Jr. is a 86 y.o. male presents with LLQ abdominal pain that began 3 days ago and rapdily worsened. He denies fever, chills, nausea, vomiting. He presented 2 days ago to ER with the same symptoms. He was called by ER and asked to return due to perforation. He has been on PO cipro and flagyl. Pt reports he feels about the same. He has not had a BM in 3 days. His appetite is poor. Repeat CT today shows similar microperforation of sigmoid diverticulitis, now dilated small bowel.    Post-Op Info:  * No surgery found *         Interval History: tolerated full liquids, less pain    Medications:  Continuous Infusions:   dextrose 5 % and 0.45 % NaCl with KCl 20 mEq 125 mL/hr at 04/15/18 0333     Scheduled Meds:   atorvastatin  40 mg Oral Daily    budesonide  0.5 mg Nebulization BID    ciprofloxacin HCl  500 mg Oral Q12H    enoxaparin  40 mg Subcutaneous Daily    lisinopril  10 mg Oral Daily    metroNIDAZOLE  500 mg Oral Q8H    tamsulosin  0.8 mg Oral QHS     PRN Meds:acetaminophen, acetaminophen, diphenhydrAMINE, hydrocodone-acetaminophen 5-325mg, hydrocodone-acetaminophen 5-325mg, morphine, ondansetron, promethazine (PHENERGAN) IVPB, ramelteon, sodium chloride 0.9%     Review of patient's allergies indicates:   Allergen Reactions    Bee sting  [allergen ext-venom-honey bee] Swelling    Poison ivy extract Hives    Penicillins Rash     Objective:     Vital Signs (Most Recent):  Temp: 98.4 °F (36.9 °C) (04/15/18 0817)  Pulse: 78 (04/15/18 0817)  Resp: 18 (04/15/18 0817)  BP: (!) 144/71 (04/15/18 0817)  SpO2: 96 % (04/15/18 0817) Vital Signs (24h Range):  Temp:  [97.9 °F (36.6 °C)-98.5 °F (36.9 °C)] 98.4 °F (36.9 °C)  Pulse:  [73-91] 78  Resp:  [16-18] 18  SpO2:  [93 %-98 %] 96 %  BP: (119-149)/(59-71) 144/71     Weight: 86.1 kg (189 lb 13.1 oz)  Body mass index is 25.04 kg/m².    Intake/Output -  Last 3 Shifts       04/13 0700 - 04/14 0659 04/14 0700 - 04/15 0659 04/15 0700 - 04/16 0659    P.O. 0 480     I.V. (mL/kg) 1812.5 (21.1) 2968.8 (34.5)     IV Piggyback 700      Total Intake(mL/kg) 2512.5 (29.2) 3448.8 (40.1)     Net +2512.5 +3448.8             Urine Occurrence  9 x     Stool Occurrence  1 x           Physical Exam   Constitutional: He is oriented to person, place, and time. He appears well-developed and well-nourished. No distress.   HENT:   Head: Normocephalic and atraumatic.   Neck: Normal range of motion. Neck supple.   Cardiovascular: Normal rate, regular rhythm and normal heart sounds.    Pulmonary/Chest: Effort normal and breath sounds normal.   Abdominal: Soft. Bowel sounds are normal. He exhibits no distension. Tenderness: very mild. There is no rebound and no guarding. No hernia.   Musculoskeletal: Normal range of motion.   Neurological: He is alert and oriented to person, place, and time.   Skin: Skin is warm and dry. Capillary refill takes less than 2 seconds.   Psychiatric: He has a normal mood and affect. His behavior is normal. Judgment and thought content normal.   Vitals reviewed.      Significant Labs:  CBC:   Recent Labs  Lab 04/15/18  0626   WBC 5.64   RBC 3.99*   HGB 12.6*   HCT 38.6*   *   MCV 97   MCH 31.6*   MCHC 32.6     BMP:   Recent Labs  Lab 04/15/18  0626   *      K 4.1      CO2 26   BUN 10   CREATININE 0.9   CALCIUM 8.8     CMP:   Recent Labs  Lab 04/13/18  0859  04/15/18  0626   *  < > 111*   CALCIUM 9.3  < > 8.8   ALBUMIN 3.6  --   --    PROT 6.2  --   --      < > 140   K 3.7  < > 4.1   CO2 27  < > 26     < > 106   BUN 19  < > 10   CREATININE 1.0  < > 0.9   ALKPHOS 55  --   --    ALT 24  --   --    AST 24  --   --    BILITOT 0.4  --   --    < > = values in this interval not displayed.    Significant Diagnostics:  none    Assessment/Plan:     * Diverticulitis of large intestine with perforation without abscess or bleeding     Low residual diet.  C oral antibiotics.    Discharge home        Pure hypercholesterolemia    Continue home medications        Essential hypertension    Home medications        COPD (chronic obstructive pulmonary disease)    Home medications            Justo Melgar MD  General Surgery  [facility] -

## 2018-04-15 NOTE — PLAN OF CARE
04/15/18 1119   Final Note   Assessment Type Final Discharge Note   Discharge Disposition Home   Right Care Referral Info   Post Acute Recommendation No Care

## 2018-04-15 NOTE — DISCHARGE SUMMARY
[facility] -   General Surgery  Discharge Summary      Patient Name: Chao Hawk Jr.  MRN: [id number]  Admission Date: 4/13/2018  Hospital Length of Stay: 2 days  Discharge Date and Time: 4/15/2018  1:43 PM  Attending Physician: No att. providers found   Discharging Provider: Justo Melgar MD  Primary Care Provider: VY Ureña Jr, MD    HPI:   Chao Hawk Jr. is a 86 y.o. male presents with LLQ abdominal pain that began 3 days ago and rapdily worsened. He denies fever, chills, nausea, vomiting. He presented 2 days ago to ER with the same symptoms. He was called by ER and asked to return due to perforation. He has been on PO cipro and flagyl. Pt reports he feels about the same. He has not had a BM in 3 days. His appetite is poor. Repeat CT today shows similar microperforation of sigmoid diverticulitis, now dilated small bowel.    * No surgery found *      Indwelling Lines/Drains at time of discharge:   Lines/Drains/Airways          No matching active lines, drains, or airways        Hospital Course: Hospital day 2.  No significant change abdominal pain.  He has remained afebrile.  Has some mild left lower quadrant discomfort    Hd3:  Less pain, no nausea, tolerated a diet    Consults:   Consults         Status Ordering Provider     Inpatient consult to General surgery  Once     Provider:  Justo Melgar MD    Completed DAYO OLIVEROS JR          Significant Diagnostic Studies: Labs:   BMP:   Recent Labs  Lab 04/14/18  0522 04/15/18  0626   * 111*    140   K 4.1 4.1    106   CO2 30* 26   BUN 13 10   CREATININE 0.9 0.9   CALCIUM 8.9 8.8   , CMP   Recent Labs  Lab 04/14/18  0522 04/15/18  0626    140   K 4.1 4.1    106   CO2 30* 26   * 111*   BUN 13 10   CREATININE 0.9 0.9   CALCIUM 8.9 8.8   ANIONGAP 7* 8   ESTGFRAFRICA >60 >60   EGFRNONAA >60 >60    and CBC   Recent Labs  Lab 04/14/18  0522 04/15/18  0626   WBC 5.75 5.64   HGB 12.6* 12.6*   HCT 39.4*  38.6*   * 136*     Radiology: CT scan: CT ABDOMEN PELVIS WITH CONTRAST:   Results for orders placed or performed during the hospital encounter of 04/13/18   CT Abdomen Pelvis With Contrast    Narrative    EXAM: <Abdomen and Pelvis CT> with IV contrast     CLINICAL HISTORY: Diverticulitis, perforated.  Subsequent encounter.    TECHNIQUE: Axial images. Reformatted coronal and sagittal images.    Contrast:   <Omnipaque 350> <75>ml.     COMPARISON STUDIES: CT abdomen and pelvis 04/12/2018.    FINDINGS:  Elevated left hemidiaphragm.  A 1.5 cm calcification within the right middle lobe.  Mild atelectasis right left lower lobes.    There is evidence for pneumoperitoneum.  There is gas within the left upper quadrant without interval worsening compared to 04/12/2018.    Borderline splenomegaly.  Unremarkable liver.  Contrast within the gallbladder lumen which can be seen with vicarious excretion of contrast.  The adrenal glands are unremarkable.  No definite acute pancreatic abnormality is identified.    Diffuse atherosclerosis.  There is a distal abdominal aortic aneurysm measuring 3.6 cm AP dimension 3.3 cm transverse dimension.  There is also fusiform aneurysmal dilatation of the mid infrarenal abdominal aorta measuring approximately 3.2 cm in diameter.    Right kidney: Perinephric fatty stranding similar to the prior study.  No definite hydronephrosis.  There are approximate 3 renal cysts measuring up to 4.5 x 3.4 cm in diameter.  Additional lesions too small to accurately characterize.      Left kidney: Perinephric fatty stranding similar to the prior study.  No definite hydronephrosis.  2 renal cysts measuring up to 1.6 cm in diameter.  Other lesions too small to accurately characterize.    There is enlargement of the prostate gland as well as bilateral seminal vesicles.  The prostate indents the interest to the urinary bladder.  Urinary bladder is otherwise unremarkable.    Colonic diverticulosis.  Wall  thickening involving the proximal sigmoid colon with pericolonic fatty stranding.  There is gas demonstrated within several diverticula within this region.  There may be a couple of subtle foci of contiguous free air.  There is free air contiguous with the splenic flexure.    Cecum is in the right mid abdomen.  Moderate amount of feces in the colon.  Distended small bowel within the left lower quadrant and left midabdomen.  Appendix and remainder of the bowel appear unremarkable.      No definite acute or aggressive osseous lesion is identified.  Degenerative changes in thoracic and lumbar spine.  Previous surgery involving the lumbar spine.  Right inguinal hernia.    Impression    Evidence for colonic diverticulosis.  Wall thickening with pericolonic fatty stranding involving the proximal sigmoid colon which is similar to 04/12/2018 concerning for diverticulitis.  There is evidence for a pneumoperitoneum without interval worsening compared to 04/12/2018.  Compatible with bowel perforation.  No definite sizable pericolonic abscess is identified.    Dilated small bowel within the left lower quadrant and left midabdomen.  Suspect this is due to an ileus.  Obstructed small bowel possibly yet appears less likely.    Enlarged prostate gland with prominent seminal vesicles.    Bilateral renal cysts.    Contrast within the gallbladder which can be seen with vicarious excretion of contrast.    Infrarenal abdominal aortic aneurysm.    Several other findings as described above.    Findings discussed with Dr. Hoskins 12:50 PM 04/13/2018.    All CT scans at this facility use dose modulation, iterative reconstruction, and/or weight based dosing when appropriate to reduce radiation dose to as low as reasonably achievable.      Electronically signed by: MAGAN ENCARNACION MD  Date:     04/13/18  Time:    12:53     and CT ABDOMEN PELVIS WITHOUT CONTRAST: No results found for this visit on 04/13/18.    Pending Diagnostic Studies:      None        Final Active Diagnoses:    Diagnosis Date Noted POA    PRINCIPAL PROBLEM:  Diverticulitis of large intestine with perforation without abscess or bleeding [K57.20] 04/15/2018 Yes    COPD (chronic obstructive pulmonary disease) [J44.9] 12/17/2013 Yes     Chronic    Essential hypertension [I10] 12/17/2013 Yes     Chronic    Pure hypercholesterolemia [E78.00] 12/17/2013 Yes     Chronic      Problems Resolved During this Admission:    Diagnosis Date Noted Date Resolved POA    Diverticulitis [K57.92] 04/13/2018 04/14/2018 Yes      Discharged Condition: stable    Disposition: Home or Self Care    Follow Up:  Follow-up Information     Justo Melgar MD In 2 weeks.    Specialty:  General Surgery  Why:  est patient, diverticulitis  Contact information:  [address]  [place] [id number] [phone]                 Patient Instructions:     Diet Adult Regular   Order Specific Question Answer Comments   Additional restrictions: Low Fiber      Activity as tolerated     Notify your health care provider if you experience any of the following:  temperature >100.4     Notify your health care provider if you experience any of the following:  persistent nausea and vomiting or diarrhea     Notify your health care provider if you experience any of the following:  severe uncontrolled pain     Notify your health care provider if you experience any of the following:  redness, tenderness, or signs of infection (pain, swelling, redness, odor or green/yellow discharge around incision site)       Medications:  Reconciled Home Medications:      Medication List      CHANGE how you take these medications    atorvastatin 40 MG tablet  Commonly known as:  LIPITOR  Take 1 tablet (40 mg total) by mouth once daily.  What changed:  how much to take     tamsulosin 0.4 mg Cp24  Commonly known as:  FLOMAX  Take 2 capsules (0.8 mg total) by mouth every morning.  What changed:  when to take this        CONTINUE taking these  medications    aspirin 81 MG EC tablet  Commonly known as:  ECOTRIN  Take 81 mg by mouth once daily.     ciprofloxacin HCl 500 MG tablet  Commonly known as:  CIPRO  Take 1 tablet (500 mg total) by mouth 2 (two) times daily.     EPINEPHrine 0.3 mg/0.3 mL Atin  Commonly known as:  EPIPEN  Inject 1 each into the muscle once.     fluticasone 100 mcg/actuation inhaler  Commonly known as:  FLOVENT DISKUS  INHALE 1 PUFF TWICE DAILY --RINSE MOUTH OUT WITH WATER     furosemide 40 MG tablet  Commonly known as:  LASIX  Take 20 mg by mouth.     hydroCHLOROthiazide 25 MG tablet  Commonly known as:  HYDRODIURIL  Take 1 tablet (25 mg total) by mouth once daily.     lisinopril 10 MG tablet  TAKE 1 TABLET ONE TIME DAILY     metroNIDAZOLE 500 MG tablet  Commonly known as:  FLAGYL  Take 1 tablet (500 mg total) by mouth 3 (three) times daily.     traMADol 50 mg tablet  Commonly known as:  ULTRAM  Take 1 tablet (50 mg total) by mouth every 6 (six) hours as needed for Pain.          Time spent on the discharge of patient: 20 minutes    Justo Melgar MD  General Surgery  [facility] -

## 2018-04-16 ENCOUNTER — PATIENT OUTREACH (OUTPATIENT)
Dept: ADMINISTRATIVE | Facility: CLINIC | Age: 83
End: 2018-04-16

## 2018-04-16 NOTE — PATIENT INSTRUCTIONS
Discharge Instructions for Diverticulitis  You have been diagnosed with diverticulitis. This is a condition in which small pouches form in your colon (large intestine) and become inflamed or infected. Follow the guidelines below for home care.  As you recover  Tips for recovery include:  · Eat a low-fiber diet. Your healthcare provider may advise a liquid diet. This gives your bowel a chance to rest so that it can recover.  · Foods to include: flake cereal, mashed potatoes, pancakes, waffles, pasta, white bread, rice, applesauce, bananas, eggs, fish, poultry, tofu, and well-cooked vegetables  · Take your medicines as directed. Do not stop taking the medicines, even if you feel better.  · Monitor your temperature and report any rising temperature to your healthcare provider.  · Take antibiotics exactly as directed. Do not miss any and keep taking them even if you feel better.   · Drink 6 to 8 glasses of water every day, unless directed otherwise.  · Use a heating pad or hot water bottle to reduce abdominal cramping or pain.  Preventing diverticulitis in the future  Tips for prevention include:  · Eat a high-fiber diet. Fiber adds bulk to the stool so that it passes through the large intestine more easily.  · Keep drinking 6 to 8 glasses of water every day, unless directed otherwise.  · Begin an exercise program. Ask your healthcare provider how to get started. You can benefit from simple activities such as walking or gardening.  · Treat diarrhea with a bland diet. Start with liquids only, then slowly add fiber over time.  · Watch for changes in your bowel movements (constipation to diarrhea).  · Avoid constipation with fiber and add a stool softener if needed.   · Get plenty of rest and sleep.  Follow-up care  Make a follow-up appointment as directed by our staff.  When to call your healthcare provider  Call your healthcare provider immediately if you have any of the following:  · Fever of 100.4°F (38.0°C) or  higher, or as directed by your healthcare provider  · Chills  · Severe cramps in the belly, most commonly the lower left side  · Tenderness in the belly, most commonly the lower left side  · Nausea and vomiting  · Bleeding from your rectum   Date Last Reviewed: 7/1/2016  © 2548-6834 [employer]. [address] [place] [address], [place] [address]. All rights reserved. This information is not intended as a substitute for professional medical care. Always follow your healthcare professional's instructions.

## 2018-04-17 ENCOUNTER — HOSPITAL ENCOUNTER (OUTPATIENT)
Dept: RADIOLOGY | Facility: HOSPITAL | Age: 83
Discharge: HOME | End: 2018-04-17
Attending: INTERNAL MEDICINE
Payer: MEDICARE

## 2018-04-17 ENCOUNTER — OFFICE VISIT (OUTPATIENT)
Dept: PULMONOLOGY | Facility: CLINIC | Age: 83
End: 2018-04-17
Payer: MEDICARE

## 2018-04-17 ENCOUNTER — OFFICE VISIT (OUTPATIENT)
Dept: INTERNAL MEDICINE | Facility: CLINIC | Age: 83
End: 2018-04-17
Payer: MEDICARE

## 2018-04-17 ENCOUNTER — PROCEDURE VISIT (OUTPATIENT)
Dept: PULMONOLOGY | Facility: CLINIC | Age: 83
End: 2018-04-17
Payer: MEDICARE

## 2018-04-17 VITALS
OXYGEN SATURATION: 95 % | DIASTOLIC BLOOD PRESSURE: 64 MMHG | HEART RATE: 81 BPM | SYSTOLIC BLOOD PRESSURE: 112 MMHG | BODY MASS INDEX: 25.58 KG/M2 | RESPIRATION RATE: 17 BRPM | WEIGHT: 193 LBS | HEIGHT: 73 IN

## 2018-04-17 VITALS
DIASTOLIC BLOOD PRESSURE: 64 MMHG | HEART RATE: 98 BPM | SYSTOLIC BLOOD PRESSURE: 112 MMHG | BODY MASS INDEX: 25.31 KG/M2 | HEIGHT: 73 IN | OXYGEN SATURATION: 95 % | TEMPERATURE: 99 F | WEIGHT: 190.94 LBS

## 2018-04-17 DIAGNOSIS — J41.0 SIMPLE CHRONIC BRONCHITIS: ICD-10-CM

## 2018-04-17 DIAGNOSIS — R39.12 BENIGN PROSTATIC HYPERPLASIA WITH WEAK URINARY STREAM: Chronic | ICD-10-CM

## 2018-04-17 DIAGNOSIS — N40.1 BENIGN PROSTATIC HYPERPLASIA WITH WEAK URINARY STREAM: Chronic | ICD-10-CM

## 2018-04-17 DIAGNOSIS — E78.00 PURE HYPERCHOLESTEROLEMIA: Chronic | ICD-10-CM

## 2018-04-17 DIAGNOSIS — K57.20 DIVERTICULITIS OF LARGE INTESTINE WITH PERFORATION WITHOUT ABSCESS OR BLEEDING: Primary | ICD-10-CM

## 2018-04-17 DIAGNOSIS — I10 ESSENTIAL HYPERTENSION: Chronic | ICD-10-CM

## 2018-04-17 DIAGNOSIS — J44.9 CHRONIC OBSTRUCTIVE PULMONARY DISEASE, UNSPECIFIED COPD TYPE: Primary | ICD-10-CM

## 2018-04-17 DIAGNOSIS — J44.9 CHRONIC OBSTRUCTIVE PULMONARY DISEASE, UNSPECIFIED COPD TYPE: Chronic | ICD-10-CM

## 2018-04-17 DIAGNOSIS — I27.21 PAH (PULMONARY ARTERY HYPERTENSION): ICD-10-CM

## 2018-04-17 PROCEDURE — 99999 PR PBB SHADOW E&M-EST. PATIENT-LVL III: CPT | Mod: PBBFAC,,, | Performed by: PEDIATRICS

## 2018-04-17 PROCEDURE — 94729 DIFFUSING CAPACITY: CPT | Mod: S$GLB,,, | Performed by: INTERNAL MEDICINE

## 2018-04-17 PROCEDURE — 99499 UNLISTED E&M SERVICE: CPT | Mod: S$PBB,,, | Performed by: PEDIATRICS

## 2018-04-17 PROCEDURE — 99214 OFFICE O/P EST MOD 30 MIN: CPT | Mod: 25,S$GLB,, | Performed by: INTERNAL MEDICINE

## 2018-04-17 PROCEDURE — 94010 BREATHING CAPACITY TEST: CPT | Mod: S$GLB,,, | Performed by: INTERNAL MEDICINE

## 2018-04-17 PROCEDURE — 71048 X-RAY EXAM CHEST 4+ VIEWS: CPT | Mod: 26,,, | Performed by: RADIOLOGY

## 2018-04-17 PROCEDURE — 94726 PLETHYSMOGRAPHY LUNG VOLUMES: CPT | Mod: S$GLB,,, | Performed by: INTERNAL MEDICINE

## 2018-04-17 PROCEDURE — 99499 UNLISTED E&M SERVICE: CPT | Mod: S$GLB,,, | Performed by: INTERNAL MEDICINE

## 2018-04-17 PROCEDURE — 99999 PR PBB SHADOW E&M-EST. PATIENT-LVL IV: CPT | Mod: PBBFAC,,, | Performed by: INTERNAL MEDICINE

## 2018-04-17 PROCEDURE — 99214 OFFICE O/P EST MOD 30 MIN: CPT | Mod: S$GLB,,, | Performed by: PEDIATRICS

## 2018-04-17 PROCEDURE — 71048 X-RAY EXAM CHEST 4+ VIEWS: CPT | Mod: TC,PO

## 2018-04-17 RX ORDER — ALBUTEROL SULFATE 0.83 MG/ML
2.5 SOLUTION RESPIRATORY (INHALATION)
Qty: 360 ML | Refills: 12 | Status: SHIPPED | OUTPATIENT
Start: 2018-04-17 | End: 2019-01-30 | Stop reason: SDUPTHER

## 2018-04-17 NOTE — PROGRESS NOTES
Subjective:       Patient ID: Chao Hawk Jr. is a 86 y.o. male.    Chief Complaint: Hospital Follow Up (after fall )    Seen in ER for abd pain, felt to be diverticulitis with microperforations. Seen by surgery and felt to be out patient candidate and placed on metronidazole and cipro combo. Pain resolved, no fever, no bloody diarrhea(in fact constipated). Has appt with surgery today. HTN, COPD all stable. Appetite good, UOP good.      Review of Systems   Constitutional: Negative for fever and unexpected weight change.   HENT: Negative for congestion and rhinorrhea.    Eyes: Negative for discharge and redness.   Respiratory: Negative for cough and wheezing.    Cardiovascular: Negative for chest pain and leg swelling.   Gastrointestinal: Positive for constipation. Negative for abdominal distention, abdominal pain, anal bleeding, blood in stool, diarrhea, nausea and vomiting.   Endocrine: Negative for cold intolerance, heat intolerance, polydipsia, polyphagia and polyuria.   Genitourinary: Negative for decreased urine volume, difficulty urinating, dysuria and frequency.   Musculoskeletal: Negative for arthralgias and joint swelling.   Skin: Negative for rash and wound.   Neurological: Negative for syncope and headaches.   Psychiatric/Behavioral: Negative for behavioral problems and sleep disturbance.       Objective:      Physical Exam   Constitutional: He is oriented to person, place, and time. He appears well-developed and well-nourished. No distress.   HENT:   Head: Normocephalic and atraumatic.   Right Ear: Tympanic membrane normal.   Left Ear: Tympanic membrane normal.   Mouth/Throat: Uvula is midline and mucous membranes are normal. Normal dentition.   Eyes: Conjunctivae, EOM and lids are normal. Pupils are equal, round, and reactive to light.   Neck: Trachea normal and normal range of motion. Neck supple. No JVD present. No thyromegaly present.   Cardiovascular: Normal rate, regular rhythm, S1 normal, S2  normal, normal heart sounds and normal pulses.  Exam reveals no gallop and no friction rub.    No murmur heard.  Pulmonary/Chest: Effort normal and breath sounds normal. He has no wheezes. He has no rales.   Abdominal: Soft. Normal appearance and bowel sounds are normal. He exhibits no distension and no mass. There is no hepatosplenomegaly. There is tenderness (mild LLQ). There is no rebound and no guarding.   Musculoskeletal: Normal range of motion. He exhibits no edema.   Lymphadenopathy:     He has no cervical adenopathy.   Neurological: He is alert and oriented to person, place, and time. He has normal strength. No cranial nerve deficit. Coordination and gait normal.   Skin: Skin is warm and intact. No rash noted.   Psychiatric: He has a normal mood and affect. His speech is normal and behavior is normal. Judgment and thought content normal.       Assessment:       1. Diverticulitis of large intestine with perforation without abscess or bleeding    2. Essential hypertension    3. Benign prostatic hyperplasia with weak urinary stream    4. Pure hypercholesterolemia    5. Chronic obstructive pulmonary disease, unspecified COPD type        Plan:       Diverticulitis of large intestine with perforation without abscess or bleeding    Essential hypertension    Benign prostatic hyperplasia with weak urinary stream    Pure hypercholesterolemia    Chronic obstructive pulmonary disease, unspecified COPD type    Overall he is stable. Finish antibiotics, see surgery. Use live cx yogurt or probiotics. OTC mirilax daily up to capful in 12 ounces of water for daily soft smooth stool. F/U as arranged.

## 2018-04-17 NOTE — PROGRESS NOTES
Subjective:       Patient ID: Chao Hawk Jr. is a 86 y.o. male.    Chief Complaint: He       COPD and pulmonary artery hypertension    HPI   COPD  He presents for evaluation and treatment of COPD. The patient is not currently have symptoms / an exacerbation. The patient has COPD for approximately 10 years. Symptoms in previous episodes have included dyspnea, cough and wheezing, and typically last 2 weeks. Previous episodes have been exacerbated by any exercise. Current treatment includes albuterol inhaler, which generally provides some relief of symptoms.   He uses 1 pillows at night. Patient currently is not on home oxygen therapy.. The patient is having no constitutional symptoms, denying fever, chills, anorexia, or weight loss. The patient has been hospitalized for this condition before. He has a history of 40 pack years. The patient is experiencing exercise intolerance (difficulty walking 1 blocks on flat ground).    Pulmonary Hypertension  He presents for evaluation and treatment of pulmonary hypertension. Symptoms include dyspnea on exertion, productive cough, shortness of breath and sputum production.  Symptoms began 2 years ago, unchanged since that time. He denies chest pain, located left chest. Associated symptoms include myalgias and productive cough. He does not have had recent travel. Weight has been stable. Appetite has been unaffected. Symptoms are exacerbated by any exercise. Symptoms are alleviated by rest. Work up thus far has included Echo.      He   presents for evaluation and treatment of Chronic Obstructive Pulmonary Disease and sigmoid diverticulitis after being discharged from the hospital  5  days ago. Since discharge symptoms have gradually improving course since that time. Patient denies chest pain. Symptoms are aggravated by activity. Symptoms improve withrest.  Respiratory: positive for cough and dyspnea on exertion; Cardiovascular: no chest pain or palpitations.  Patient currently  is not on home oxygen therapy..    MEDICAL RECORDS FROM THE HOSPITAL REVIEWED:  [facility] -   General Surgery  Discharge Summary        Patient Name: Chao Hawk Jr.  MRN: [id number]  Admission Date: 4/13/2018  Hospital Length of Stay: 2 days  Discharge Date and Time: 4/15/2018  1:43 PM  Attending Physician: No att. providers found   Discharging Provider: Justo Melgar MD  Primary Care Provider: VY Ureña Jr, MD     HPI:   Chao Hawk Jr. is a 86 y.o. male presents with LLQ abdominal pain that began 3 days ago and rapdily worsened. He denies fever, chills, nausea, vomiting. He presented 2 days ago to ER with the same symptoms. He was called by ER and asked to return due to perforation. He has been on PO cipro and flagyl. Pt reports he feels about the same. He has not had a BM in 3 days. His appetite is poor. Repeat CT today shows similar microperforation of sigmoid diverticulitis, now dilated small bowel.     * No surgery found *      Indwelling Lines/Drains at time of discharge:       Lines/Drains/Airways            No matching active lines, drains, or airways          Hospital Course: Hospital day 2.  No significant change abdominal pain.  He has remained afebrile.  Has some mild left lower quadrant discomfort     Hd3:  Less pain, no nausea, tolerated a diet     Consults:         Review of medical records from hospital. Medical records from hospital were reviewed during office visit - these included but were not limited to review of radiographic studies and /or radiologists reports, laboratory studies, discharge summaries, procedure notes, consultations and other transcribed notes.                     Past Medical History:   Diagnosis Date    Arthritis     back, hand    Back pain     Dr. Cristobal- [facility]    Bilateral leg edema 2/16/2018    Chronic bronchitis     Diverticulosis 5/16/06    colonoscopy    Hernia     x2    Hyperlipidemia     Hypertension     Lumbar radiculopathy  6/14/2017    Multiple renal cysts 7/5/2016    Tobacco dependence      Past Surgical History:   Procedure Laterality Date    EYE SURGERY Bilateral     Glaucoma- Dr. Gianni Payne    FINGER SURGERY Right 1980s    index- Dr. Encarnacion    HERNIA REPAIR  1990s    x2    LUMBAR SPINE SURGERY  09/13/2017    TONSILLECTOMY, ADENOIDECTOMY  1940     Social History     Social History    Marital status:      Spouse name: Linsey    Number of children: 4    Years of education: 12 + 4     Occupational History     retired age 60      [employer]     Social History Main Topics    Smoking status: Former Smoker     Packs/day: 1.00     Years: 40.00     Start date: 1/1/1949     Quit date: 1/1/1991    Smokeless tobacco: Never Used    Alcohol use 9.0 oz/week     7 Shots of liquor, 8 Standard drinks or equivalent per week    Drug use: No    Sexual activity: No     Other Topics Concern    Not on file     Social History Narrative    No narrative on file     Review of Systems   Constitutional: Positive for fatigue. Negative for fever.   HENT: Positive for congestion. Negative for postnasal drip and rhinorrhea.    Eyes: Negative for redness and itching.   Respiratory: Positive for shortness of breath. Negative for cough, wheezing, dyspnea on extertion and Paroxysmal Nocturnal Dyspnea.    Cardiovascular: Negative for chest pain.   Genitourinary: Negative for difficulty urinating and hematuria.   Endocrine: Negative for polyphagia, cold intolerance and heat intolerance.    Musculoskeletal: Positive for arthralgias.   Skin: Negative for rash.   Gastrointestinal: Positive for abdominal pain. Negative for nausea, vomiting and abdominal distention.   Neurological: Negative for dizziness and headaches.   Hematological: Negative for adenopathy. Does not bruise/bleed easily and no excessive bruising.   Psychiatric/Behavioral: Positive for sleep disturbance. The patient is not nervous/anxious.        Objective:       Physical Exam   Constitutional: He is oriented to person, place, and time. He appears well-developed and well-nourished.   HENT:   Head: Normocephalic and atraumatic.   Eyes: Conjunctivae are normal. Pupils are equal, round, and reactive to light.   Neck: Neck supple. No JVD present. No tracheal deviation present. No thyromegaly present.   Cardiovascular: Normal rate, regular rhythm and normal heart sounds.    Pulmonary/Chest: Effort normal. He has decreased breath sounds in the right lower field and the left lower field.   Abdominal: Soft.   Musculoskeletal:   Decreased range of motion of knees and back   Lymphadenopathy:     He has no cervical adenopathy.   Neurological: He is alert and oriented to person, place, and time.   Skin: Skin is warm and dry.   Nursing note and vitals reviewed.    Personal Diagnostic Review  none pertinent  Office Spirometry Results:     No flowsheet data found.  Pulmonary Studies Review 4/17/2018   SpO2 95   Height 73.000   Weight 3088   BMI (Calculated) 25.5   Predicted Distance 247.11   Predicted Distance Meters (Calculated) 508.68         Assessment:       1. Chronic obstructive pulmonary disease, unspecified COPD type        Outpatient Encounter Prescriptions as of 4/17/2018   Medication Sig Dispense Refill    aspirin (ECOTRIN) 81 MG EC tablet Take 81 mg by mouth once daily.      atorvastatin (LIPITOR) 40 MG tablet Take 1 tablet (40 mg total) by mouth once daily. (Patient taking differently: Take 10 mg by mouth once daily. ) 90 tablet 3    ciprofloxacin HCl (CIPRO) 500 MG tablet Take 1 tablet (500 mg total) by mouth 2 (two) times daily. 14 tablet 0    epinephrine (EPIPEN) 0.3 mg/0.3 mL (1:1,000) AtIn Inject 1 each into the muscle once.      fluticasone (FLOVENT DISKUS) 100 mcg/actuation inhaler INHALE 1 PUFF TWICE DAILY --RINSE MOUTH OUT WITH WATER 3 Inhaler 3    furosemide (LASIX) 40 MG tablet Take 20 mg by mouth.       hydroCHLOROthiazide (HYDRODIURIL) 25 MG tablet Take 1  "tablet (25 mg total) by mouth once daily. 90 tablet 3    lisinopril 10 MG tablet TAKE 1 TABLET ONE TIME DAILY 90 tablet 3    metroNIDAZOLE (FLAGYL) 500 MG tablet Take 1 tablet (500 mg total) by mouth 3 (three) times daily. 21 tablet 0    tamsulosin (FLOMAX) 0.4 mg Cp24 Take 2 capsules (0.8 mg total) by mouth every morning. (Patient taking differently: Take 0.8 mg by mouth every evening. ) 180 capsule 3    traMADol (ULTRAM) 50 mg tablet Take 1 tablet (50 mg total) by mouth every 6 (six) hours as needed for Pain. 12 tablet 0    albuterol (PROVENTIL) 2.5 mg /3 mL (0.083 %) nebulizer solution Take 3 mLs (2.5 mg total) by nebulization every 6 (six) hours while awake. 360 mL 12     Facility-Administered Encounter Medications as of 4/17/2018   Medication Dose Route Frequency Provider Last Rate Last Dose    testosterone cypionate injection 200 mg  200 mg Intramuscular Q28 Days MONSTER Ureña Jr., MD   200 mg at 04/05/18 0854     Orders Placed This Encounter   Procedures    NEBULIZER FOR HOME USE     [facility] for CPAP/Oxygen/Nebulizer supplies.  Customer Service: [phone]  Call: [phone]  Fax: [phone]  Billing Inquiries: [phone] or [phone]       Order Specific Question:   Height:     Answer:   6' 1" (1.854 m)     Order Specific Question:   Weight:     Answer:   87.5 kg (193 lb)     Order Specific Question:   Length of need (1-99 months):     Answer:   99    NEBULIZER KIT (SUPPLIES) FOR HOME USE     Order Specific Question:   Height:     Answer:   6' 1" (1.854 m)     Order Specific Question:   Weight:     Answer:   87.5 kg (193 lb)     Order Specific Question:   Length of need (1-99 months):     Answer:   99     Order Specific Question:   Mask or Mouthpiece?     Answer:   Mouthpiece    Six Minute Walk Test to qualify for Home Oxygen     Standing Status:   Future     Standing Expiration Date:   4/17/2019     Plan:       Requested Prescriptions     Signed Prescriptions Disp Refills    " albuterol (PROVENTIL) 2.5 mg /3 mL (0.083 %) nebulizer solution 360 mL 12     Sig: Take 3 mLs (2.5 mg total) by nebulization every 6 (six) hours while awake.     Chronic obstructive pulmonary disease, unspecified COPD type  -     NEBULIZER FOR HOME USE  -     NEBULIZER KIT (SUPPLIES) FOR HOME USE  -     albuterol (PROVENTIL) 2.5 mg /3 mL (0.083 %) nebulizer solution; Take 3 mLs (2.5 mg total) by nebulization every 6 (six) hours while awake.  Dispense: 360 mL; Refill: 12  -     Six Minute Walk Test to qualify for Home Oxygen; Future           Follow-up in about 7 months (around 11/17/2018) for 6 min walk.    MEDICAL DECISION MAKING: Moderate to high complexity.  Overall, the multiple problems listed are of moderate to high severity that may impact quality of life and activities of daily living. Side effects of medications, treatment plan as well as options and alternatives reviewed and discussed with patient. There was counseling of patient concerning these issues.    Total time spent in face to face counseling and coordination of care - 40  minutes over 50% of time was used in discussion of prognosis, risks, benefits of treatment, instructions and compliance with regimen . Discussion with other physicians or health care providers (DME, NP, pharmacy, respiratory therapy) occurred.

## 2018-04-17 NOTE — PATIENT INSTRUCTIONS
Step-by-Step  Using a Nebulizer with a Mouthpiece    Date Last Reviewed: 5/29/2015  © 9108-2174 [employer]. [address] [place] [address]. All rights reserved. This information is not intended as a substitute for professional medical care. Always follow your healthcare professional's instructions.        Step-by-Step  Using a Nebulizer     11 steps in using a nebulizer with a face mask     Date Last Reviewed: 3/1/2017  © 3157-7411 [employer]. [address] [place] [address]. All rights reserved. This information is not intended as a substitute for professional medical care. Always follow your healthcare professional's instructions.        Albuterol inhalation solution  What is this medicine?  ALBUTEROL (al BYOO ter ole) is a bronchodilator. It helps to open up the airways in your lungs to make it easier to breathe. This medicine is used to treat and to prevent bronchospasm.  How should I use this medicine?  This medicine is used in a nebulizer. Nebulizers make a liquid into an aerosol that you breathe in through your mouth or your mouth and nose into your lungs. You will be taught how to use your nebulizer. Follow the directions on your prescription label. Take your medicine at regular intervals. Do not use more often than directed.  Talk to your pediatrician regarding the use of this medicine in children. Special care may be needed.  What side effects may I notice from receiving this medicine?  Side effects that you should report to your doctor or health care professional as soon as possible:  · allergic reactions like skin rash, itching or hives, swelling of the face, lips, or tongue  · breathing problems  · chest pain  · feeling faint or lightheaded, falls  · high blood pressure  · irregular heartbeat  · fever  · muscle cramps or weakness  · pain, tingling, numbness in the hands or feet  · vomiting  Side effects that usually do not require medical attention  (report to your doctor or health care professional if they continue or are bothersome):  · cough  · difficulty sleeping  · headache  · nervousness, trembling  · stomach upset  · stuffy or runny nose  · throat irritation  · unusual taste  What may interact with this medicine?  · anti-infectives like chloroquine and pentamidine  · caffeine  · cisapride  · diuretics  · medicines for colds  · medicines for depression or emotional or psychotic conditions  · medicines for weight loss including some herbal products  · methadone  · some antibiotics like clarithromycin, erythromycin, levofloxacin, and linezolid  · some heart medicines  · steroid hormones like dexamethasone, cortisone, hydrocortisone  · theophylline  · thyroid hormones  What if I miss a dose?  If you miss a dose, use it as soon as you can. If it is almost time for your next dose, use only that dose. Do not use double or extra doses.  Where should I keep my medicine?  Keep out of the reach of children.  Store between 2 and 25 degrees C (36 and 77 degrees F). Do not freeze. Protect from light. Throw away any unused medicine after the expiration date. Most products are kept in the foil package until time of use. Some products can be used up to 1 week after they are removed from the foil pouch. Check the instructions that come with your medicine.  What should I tell my health care provider before I take this medicine?  They need to know if you have any of the following conditions:  · diabetes  · heart disease or irregular heartbeat  · high blood pressure  · pheochromocytoma  · seizures  · thyroid disease  · an unusual or allergic reaction to albuterol, levalbuterol, sulfites, other medicines, foods, dyes, or preservatives  · pregnant or trying to get pregnant  · breast-feeding  What should I watch for while using this medicine?  Tell your doctor or health care professional if your symptoms do not improve. Do not use extra albuterol. Call your doctor right away if  your asthma or bronchitis gets worse while you are using this medicine.  If your mouth gets dry try chewing sugarless gum or sucking hard candy. Drink water as directed.  NOTE:This sheet is a summary. It may not cover all possible information. If you have questions about this medicine, talk to your doctor, pharmacist, or health care provider. Copyright© 2017 [employer]

## 2018-04-18 LAB
PRE DLCO: 11.66 ML/MMHG/MIN (ref 14.95–23.24)
PRE ERV: 0.59 L
PRE FEF 25 75: 0.28 L/S (ref 1.08–2.81)
PRE FET 100: 16.74 S
PRE FEV1 FVC: 36 %
PRE FEV1: 1.09 L (ref 2.56–3.41)
PRE FIF 50: 3.96 L/S
PRE FRC PL: 5.92 L (ref 3.53–4.74)
PRE FVC: 3.03 L (ref 3.75–4.76)
PRE KROGHS K: 2.35 1/MIN
PRE PEF: 3.68 L/S (ref 5.83–8.34)
PRE RV: 5.21 L (ref 2.62–3.41)
PRE SVC: 3.15 L
PRE TLC: 8.36 L (ref 6.49–7.49)
PREDICTED DLCO: 19.1 ML/MMHG/MIN (ref 14.95–23.24)
PREDICTED FEV1 FVC: 70.3 % (ref 65.46–75.14)
PREDICTED FEV1: 2.99 L (ref 2.56–3.41)
PREDICTED FRC N2: 4.14 L (ref 3.53–4.74)
PREDICTED FRC PL: 4.14 L (ref 3.53–4.74)
PREDICTED FVC: 4.25 L (ref 3.75–4.76)
PREDICTED RV: 3.02 L (ref 2.62–3.41)
PREDICTED SVC: 4.27 L
PREDICTED TLC: 6.99 L (ref 6.49–7.49)
PROVOCATION PROTOCOL: ABNORMAL

## 2018-04-30 ENCOUNTER — OFFICE VISIT (OUTPATIENT)
Dept: SURGERY | Facility: CLINIC | Age: 83
End: 2018-04-30
Payer: MEDICARE

## 2018-04-30 VITALS
HEIGHT: 73 IN | TEMPERATURE: 98 F | HEART RATE: 86 BPM | SYSTOLIC BLOOD PRESSURE: 124 MMHG | RESPIRATION RATE: 18 BRPM | BODY MASS INDEX: 24.69 KG/M2 | WEIGHT: 186.31 LBS | DIASTOLIC BLOOD PRESSURE: 60 MMHG

## 2018-04-30 DIAGNOSIS — K57.20 DIVERTICULITIS OF LARGE INTESTINE WITH PERFORATION WITHOUT ABSCESS OR BLEEDING: Primary | ICD-10-CM

## 2018-04-30 DIAGNOSIS — Z87.19 HISTORY OF DIVERTICULITIS: ICD-10-CM

## 2018-04-30 PROCEDURE — 99211 OFF/OP EST MAY X REQ PHY/QHP: CPT | Mod: S$GLB,,, | Performed by: SURGERY

## 2018-04-30 PROCEDURE — 99999 PR PBB SHADOW E&M-EST. PATIENT-LVL IV: CPT | Mod: PBBFAC,,, | Performed by: SURGERY

## 2018-04-30 NOTE — PROGRESS NOTES
Subjective:       Patient ID: Chao Hawk Jr. is a 86 y.o. male.     diverticulitis with microerfoaton    Chief Complaint: Hospital Follow Up      Pain has not returned   Tolerating a diet  Completes his antibiotics tomorrow      Review of Systems   Respiratory: Negative.    Cardiovascular: Negative.    Gastrointestinal: Negative.        Objective:      Physical Exam   Constitutional:    slightly frail   Cardiovascular: Normal rate and regular rhythm.    Pulmonary/Chest: Effort normal and breath sounds normal.   Abdominal: Soft. Bowel sounds are normal. He exhibits no distension. There is no tenderness.   Vitals reviewed.      Assessment:      diverticulitis with  Microperforation, resolved     Plan:       Finish his last antibiotic pills.  He was asked to go to the emergency room or seek care if his pain returns in the future.    Follow-up with surgery as needed

## 2018-05-03 ENCOUNTER — CLINICAL SUPPORT (OUTPATIENT)
Dept: INTERNAL MEDICINE | Facility: CLINIC | Age: 83
End: 2018-05-03
Payer: MEDICARE

## 2018-05-03 PROCEDURE — 99999 PR PBB SHADOW E&M-EST. PATIENT-LVL II: CPT | Mod: PBBFAC,,,

## 2018-05-03 PROCEDURE — 96372 THER/PROPH/DIAG INJ SC/IM: CPT | Mod: S$GLB,,, | Performed by: PEDIATRICS

## 2018-05-03 RX ADMIN — TESTOSTERONE CYPIONATE 200 MG: 200 INJECTION, SOLUTION INTRAMUSCULAR at 09:05

## 2018-05-21 ENCOUNTER — TELEPHONE (OUTPATIENT)
Dept: PULMONOLOGY | Facility: CLINIC | Age: 83
End: 2018-05-21

## 2018-05-21 NOTE — TELEPHONE ENCOUNTER
----- Message from Irene Olivarez sent at 5/21/2018  2:34 PM CDT -----  Contact: [facility] [phone]  Would like to consult with nurse regarding staus of request for Albuterol solution.  Please call back at [phone].  Md Estefany

## 2018-05-31 ENCOUNTER — CLINICAL SUPPORT (OUTPATIENT)
Dept: INTERNAL MEDICINE | Facility: CLINIC | Age: 83
End: 2018-05-31
Payer: MEDICARE

## 2018-05-31 PROCEDURE — 96372 THER/PROPH/DIAG INJ SC/IM: CPT | Mod: S$GLB,,, | Performed by: PEDIATRICS

## 2018-05-31 RX ADMIN — TESTOSTERONE CYPIONATE 200 MG: 200 INJECTION, SOLUTION INTRAMUSCULAR at 09:05

## 2018-06-28 ENCOUNTER — CLINICAL SUPPORT (OUTPATIENT)
Dept: INTERNAL MEDICINE | Facility: CLINIC | Age: 83
End: 2018-06-28
Payer: MEDICARE

## 2018-06-28 PROCEDURE — 96372 THER/PROPH/DIAG INJ SC/IM: CPT | Mod: S$GLB,,, | Performed by: PEDIATRICS

## 2018-06-28 RX ADMIN — TESTOSTERONE CYPIONATE 200 MG: 200 INJECTION, SOLUTION INTRAMUSCULAR at 08:06

## 2018-07-09 ENCOUNTER — TELEPHONE (OUTPATIENT)
Dept: RADIOLOGY | Facility: HOSPITAL | Age: 83
End: 2018-07-09

## 2018-07-10 ENCOUNTER — HOSPITAL ENCOUNTER (OUTPATIENT)
Dept: RADIOLOGY | Facility: HOSPITAL | Age: 83
Discharge: HOME | End: 2018-07-10
Attending: PEDIATRICS
Payer: MEDICARE

## 2018-07-10 DIAGNOSIS — I71.40 ABDOMINAL AORTIC ANEURYSM WITHOUT RUPTURE: ICD-10-CM

## 2018-07-10 PROCEDURE — 76775 US EXAM ABDO BACK WALL LIM: CPT | Mod: 26,,, | Performed by: RADIOLOGY

## 2018-07-10 PROCEDURE — 76775 US EXAM ABDO BACK WALL LIM: CPT | Mod: TC,PO

## 2018-07-17 ENCOUNTER — TELEPHONE (OUTPATIENT)
Dept: INTERNAL MEDICINE | Facility: CLINIC | Age: 83
End: 2018-07-17

## 2018-07-17 ENCOUNTER — OFFICE VISIT (OUTPATIENT)
Dept: INTERNAL MEDICINE | Facility: CLINIC | Age: 83
End: 2018-07-17
Payer: MEDICARE

## 2018-07-17 VITALS
RESPIRATION RATE: 16 BRPM | DIASTOLIC BLOOD PRESSURE: 72 MMHG | HEIGHT: 73 IN | OXYGEN SATURATION: 98 % | SYSTOLIC BLOOD PRESSURE: 122 MMHG | TEMPERATURE: 98 F | WEIGHT: 188.25 LBS | HEART RATE: 86 BPM | BODY MASS INDEX: 24.95 KG/M2

## 2018-07-17 DIAGNOSIS — Z87.19 HISTORY OF DIVERTICULITIS: ICD-10-CM

## 2018-07-17 DIAGNOSIS — I10 ESSENTIAL HYPERTENSION: Primary | Chronic | ICD-10-CM

## 2018-07-17 DIAGNOSIS — R39.12 BENIGN PROSTATIC HYPERPLASIA WITH WEAK URINARY STREAM: Chronic | ICD-10-CM

## 2018-07-17 DIAGNOSIS — E78.00 PURE HYPERCHOLESTEROLEMIA: Chronic | ICD-10-CM

## 2018-07-17 DIAGNOSIS — N40.1 BENIGN PROSTATIC HYPERPLASIA WITH WEAK URINARY STREAM: Chronic | ICD-10-CM

## 2018-07-17 DIAGNOSIS — R79.89 LOW TESTOSTERONE: ICD-10-CM

## 2018-07-17 DIAGNOSIS — M51.36 DDD (DEGENERATIVE DISC DISEASE), LUMBAR: ICD-10-CM

## 2018-07-17 PROCEDURE — 99214 OFFICE O/P EST MOD 30 MIN: CPT | Mod: S$GLB,,, | Performed by: PEDIATRICS

## 2018-07-17 PROCEDURE — 99999 PR PBB SHADOW E&M-EST. PATIENT-LVL III: CPT | Mod: PBBFAC,,, | Performed by: PEDIATRICS

## 2018-07-17 RX ORDER — ATORVASTATIN CALCIUM 10 MG/1
10 TABLET, FILM COATED ORAL DAILY
COMMUNITY
End: 2019-01-17

## 2018-07-17 RX ORDER — ACETAMINOPHEN 500 MG
1000 TABLET ORAL 2 TIMES DAILY PRN
COMMUNITY
End: 2020-09-30

## 2018-07-17 RX ORDER — TRAMADOL HYDROCHLORIDE 50 MG/1
50 TABLET ORAL DAILY PRN
Qty: 30 TABLET | Refills: 5 | Status: SHIPPED | OUTPATIENT
Start: 2018-07-17 | End: 2020-01-17 | Stop reason: ALTCHOICE

## 2018-07-17 NOTE — PROGRESS NOTES
Subjective:       Patient ID: Chao Hawk Jr. is a 86 y.o. male.    Chief Complaint: Follow-up and Results    HTN: B/P normal, no HTNive symptoms  LIPIDS:following D&E, tolerating and compliant with med(s).    BPH on flomax 0.4 mg nocturia x 3  COPD:Does well, no exercise limitations. Saw pulmonary, compliant with meds  Low T:On depo testosterone.  Back still  improved with surgery, uses canes only for balance and support for uneven surface, has residual feet numbness. Worse in AM until he gets around. Uses only tylenol twice a day and tramadol in AM. Fall risk is now minimal, only one fall occurred after surgery and another when he fell asleep on edge of bed .  Diverticulitis hx: quiet currently.     LABS REVIEWED AND DISCUSSED WITH PATIENT      Review of Systems   Constitutional: Negative for fever and unexpected weight change.   HENT: Negative for congestion and rhinorrhea.    Eyes: Negative for discharge and redness.   Respiratory: Negative for cough and wheezing.    Cardiovascular: Negative for chest pain, palpitations and leg swelling.   Gastrointestinal: Negative for abdominal pain, anal bleeding, blood in stool, constipation, diarrhea and vomiting.   Endocrine: Negative for cold intolerance, heat intolerance, polydipsia, polyphagia and polyuria.   Genitourinary: Positive for difficulty urinating and frequency. Negative for decreased urine volume and dysuria.        Stable bph   Musculoskeletal: Positive for arthralgias and back pain. Negative for joint swelling.        Stable   Skin: Negative for rash and wound.   Neurological: Negative for syncope and headaches.   Psychiatric/Behavioral: Negative for behavioral problems, decreased concentration and sleep disturbance.       Objective:      Physical Exam   Constitutional: He is oriented to person, place, and time. He appears well-developed and well-nourished. No distress.   Neck: No JVD present. No thyromegaly present.   Cardiovascular: Normal rate,  regular rhythm and normal heart sounds.    No murmur heard.  Pulmonary/Chest: Effort normal and breath sounds normal. No respiratory distress. He has no wheezes. He has no rales.   Abdominal: Soft. He exhibits no distension and no mass. There is no tenderness. There is no guarding.   Musculoskeletal: He exhibits no edema.   Lymphadenopathy:     He has no cervical adenopathy.   Neurological: He is alert and oriented to person, place, and time. No cranial nerve deficit. Coordination normal.   Skin: Capillary refill takes less than 2 seconds. No rash noted.   Psychiatric: He has a normal mood and affect. His behavior is normal. Judgment and thought content normal.       Assessment:       1. Essential hypertension    2. Pure hypercholesterolemia    3. Benign prostatic hyperplasia with weak urinary stream    4. Low testosterone    5. History of diverticulitis    6. DDD (degenerative disc disease), lumbar        Plan:       Essential hypertension  -     Basic metabolic panel; Future; Expected date: 07/17/2018  -     CBC auto differential; Future; Expected date: 07/17/2018    Pure hypercholesterolemia  -     Lipid panel; Future; Expected date: 07/17/2018  -     AST (SGOT); Future; Expected date: 07/17/2018  -     ALT (SGPT); Future; Expected date: 07/17/2018    Benign prostatic hyperplasia with weak urinary stream    Low testosterone    History of diverticulitis    DDD (degenerative disc disease), lumbar  -     traMADol (ULTRAM) 50 mg tablet; Take 1 tablet (50 mg total) by mouth daily as needed for Pain.  Dispense: 30 tablet; Refill: 5    Overall he is doing well. Maintain meds. F/U 6 months with labs.

## 2018-07-17 NOTE — TELEPHONE ENCOUNTER
Per Dr. Ureña, advised pt 07/10/18 abd u/s unchanged from last yrs result, pt voiced understanding and to call back PRN.

## 2018-07-19 ENCOUNTER — OUTPATIENT CASE MANAGEMENT (OUTPATIENT)
Dept: ADMINISTRATIVE | Facility: OTHER | Age: 83
End: 2018-07-19

## 2018-07-19 NOTE — PROGRESS NOTES
The following patient has been assigned to Gianna Crowell RN with Outpatient Complex Care Management for high risk screening.    Reason: High Risk Recently Seen in Clinic    Please contact OPCM at [phone] with any questions.    Thank you,    Millicent Stewart    Outpatient Case Management

## 2018-07-23 ENCOUNTER — OUTPATIENT CASE MANAGEMENT (OUTPATIENT)
Dept: ADMINISTRATIVE | Facility: OTHER | Age: 83
End: 2018-07-23

## 2018-07-23 NOTE — LETTER
July 23, 2018    Chao VY Hawk Jr.  [address] [address]             [facility] [address] [place] [address] Dear Mr. Hawk,          Thank you for speaking to me and completing you Health Assessment.      Here are the educational and resource materials that I believe you may find useful. Please take your time to review them. Do not hesitate to call me for any questions or concerns.      General appointment scheduling [phone]  [facility] (nurse line) [phone]    Sincerely,     Gianna Crowell RN   Outpatient Case Management  [facility]  [phone]

## 2018-07-23 NOTE — PROGRESS NOTES
7/23  Summary:  Initial and RN assessment completed with patient on the phone today. Patient is 86 yom that lives alone in the house he owns. Patient is alert and oriented. Patient is totally independent with all of his ADLs. Patient states he walks unassisted but does have a cane to use as needed for balance in bathroom etc. Patient has arthritis and suffers with back pain. Patient states he uses Tramadol once daily and Tylenol for pain control. Encouraged patient to try heat and cold and he voiced understanding. Patient states he takes all of his medications as directed. Patient reports he drives himself to his MD appointments and picks up his medications at pharmacy. Patient has 4 children 3 living in other states and 1 here in [place]. Patient states he stays busy doing things around his house and goes to Religious and out to eat with a friend. Patient has COPD and uses his nebulizer twice daily. Patient does report that he watches his sodium intake and does have some LE swelling for which he wears JOSÉ LUIS hose. Encouraged patient to call this RN if having any questions/concerns. This RN will continue to follow. MEGAN Bustillo OPCM         Interventions:  Mailed educational materials on COPD, Fall prevention, Low sodium diet        Plan:  F/u on receipt of educational materials. Continue education on COPD exacerbation, Fall prevention, Low sodium diet

## 2018-07-23 NOTE — PATIENT INSTRUCTIONS
Chronic Lung Disease: Preventing Lung Infections  Chronic lung diseases include chronic obstructive pulmonary disease (COPD), which includes chronic bronchitis and emphysema. Other chronic lung diseases include pulmonary fibrosis, sarcoidosis, and other conditions. When you have chronic lung diseases, it's very important to protect yourself from respiratory infections, like colds, the flu, and lung infections. Infections may cause your lung condition to worsen. Although you can't completely avoid them, there are things you can do to lessen the chance of infections.    Take precautions  Taking the following precautions can help you avoid illness:  · Remember to keep your hands away from your nose and mouth. Germs on your hands get into your respiratory system this way.  · Wash your hands often. When you wash them:  ¨ Use soap and warm water.  ¨ Rub your hands together well for at least 20 seconds.  ¨ Make sure to rinse them well.  ¨ Dry your hands on clean towels or air-dry them.  · Use hand  containing alcohol, if you are unable to wash your hands. Use the  after touching doorknobs, handles, and supermarket carts, for example, since lots of people touch them. Then wash your hands as soon as you can.  · To help prevent the flu, get a flu vaccination every year. This may be given at your healthcare provider's office, a drugstore, or pharmacy, or at work. Get your flu shot as soon as the vaccines are available in your area. This is usually around September each year.  · To help prevent pneumococcal pneumonia, get pneumonia vaccinations. Talk with your healthcare provider about which pneumococcal vaccinations you need.  · Try to stay away from people with respiratory infections, such as colds or the flu. Stay away from crowded places, like shopping centers or movie theatres during cold and flu season.  · If you smoke, think about quitting. In addition to causing or worsening many lung conditions, the  lung damage from smoking increases your risk of infections. Stay away from others who smoke, too. This is also harmful and increases your chance of infections.  Date Last Reviewed: 4/14/2016 © 2000-2017 [employer]. [address] [place] [address], [place] [address]. All rights reserved. This information is not intended as a substitute for professional medical care. Always follow your healthcare professional's instructions.        Discharge Instructions: Diaphragmatic (Controlled) Breathing  When you have lung problems, you may find it harder to take deep breaths. Learning to use controlled breathing can help you get more air into and out of your lungs, which will help you with shortness of breath. Diaphragmatic breathing or belly breathing helps you breathe with your diaphragm. The diaphragm is a large muscle that plays an important part in breathing. It is located below your lungs. It separates your chest from your abdomen (belly).  Home care  Follow these steps to use controlled breathing:  · Sit in a comfortable chair or lie on your back with a pillow under your head with your knees bent.  · Relax the muscles in your neck and shoulders.  · Place one hand on your stomach and place the other hand on your upper chest.  · Breathe in slowly through your nose as deeply as you can. Count to 2. As you inhale, your stomach should move out against your hand. Your chest should stay still.  · Breathe out slowly with your lips together (called pursed lips) to the count of 4. You should feel your stomach muscles move in.  · Repeat the above steps until you feel relaxed or are no longer feeling short of breath.    Follow-up care  Make a follow-up appointment as directed by our staff.     When to call the healthcare provider  Call your healthcare provider right away if you have any of the following:  · Shortness of breath that is not relieved by controlled breathing exercises or by your medicine  · Wheezing or  coughing  · Increased mucus  · Yellow, green, bloody, or smelly mucus  · Fever or chills  · Tightness in your chest that does not go away with your normal medicines  · Irregular heartbeat  · Swollen ankles  · Trouble doing your usual activities   Date Last Reviewed: 5/1/2016  © 6694-1369 [employer]. [address] [place] [address]. All rights reserved. This information is not intended as a substitute for professional medical care. Always follow your healthcare professional's instructions.        COPD Flare    You have had a flare-up of your COPD.  COPD, or chronic obstructive pulmonary disease, is a common lung disease. It causes your airways to become irritated and narrower. This makes it harder for you to breathe. Emphysema and chronic bronchitis are both types of COPD. This is a chronic condition, which means you always have it. Sometimes it gets worse. When this happens, it is called a flare-up.  Symptoms of COPD  People with COPD may have symptoms most of the time. In a flare-up, your symptoms get worse. These symptoms may mean you are having a flare-up:  · Shortness of breath, shallow or rapid breathing, or wheezing that gets worse  · Lung infection  · Cough that gets worse  · More mucus, thicker mucus or mucus of a different color  · Tiredness, decreased energy, or trouble doing your usual activities  · Fever  · Chest tightness  · Your symptoms dont get better even when you use your usual medicines, inhalers, and nebulizer  · Trouble talking  · You feel confused  Causes of flare-ups  Unfortunately, a flare-up can happen even though you did everything right, and you followed your doctors instructions. Some causes of flare-ups are:  · Smoking or secondhand smoke  · Colds, the flu, or respiratory infections  · Air pollution  · Sudden change in the weather  · Dust, irritating chemicals, or strong fumes  · Not taking your medicines as prescribed  Home care  Here are some things you can  do at home to treat a flare-up:  · Try not to panic. This makes it harder to breathe, and keeps you from doing the right things.  · Dont smoke or be around others who are smoking.  · Try to drink more fluids than usual during a flare-up, unless your doctor has told you not to because of heart and kidney problems. More fluids can help loosen the mucus.  · Use your inhalers and nebulizer, if you have one, as you have been told to.  · If you were given antibiotics, take them until they are used up or your doctor tells you to stop. Its important to finish the antibiotics, even though you feel better. This will make sure the infection has cleared.  · If you were given prednisone or another steroid, finish it even if you feel better.  Preventing a flare-up  Even though flare-ups happen, the best way to treat one is to prevent it before it starts. Here are some pointers:  · Dont smoke or be around others who are smoking.  · Take your medicines as you have been told.  · Talk with your doctor about getting a flu shot every year. Also find out if you need a pneumonia shot.  · If there is a weather advisory warning to stay indoors, try to stay inside when possible.  · Try to eat healthy and get plenty of sleep.  · Try to avoid things that usually set you off, like dust, chemical fumes, hairsprays, or strong perfumes.  Follow-up care  Follow up with your healthcare provider, or as advised.  If a culture was done, you will be told if your treatment needs to be changed. You can call as directed for the results.  If X-rays were done, you will be notified of any new findings that may affect your care.  Call 911  Call 911 if any of these occur:  · You have trouble breathing  · You feel confused or its difficult to wake you up  · You faint or lose consciousness  · You have a rapid heart rate  · You have new pain in your chest, arm, shoulder, neck or upper back  When to seek medical advice  Call your healthcare provider right  away if any of these occur:  · Wheezing or shortness of breath gets worse  · You need to use your inhalers more often than usual without relief  · Fever of 100.4°F (38ºC) or higher, or as directed by your healthcare provider  · Coughing up lots of dark-colored or bloody mucus (sputum)  · Chest pain with each breath  · You do not start to get better within 24 hours  · Swelling of your ankles gets worse  · Dizziness or weakness  Date Last Reviewed: 9/1/2016  © 6685-7449 [employer]. [address] [place] [address], [place] [address]. All rights reserved. This information is not intended as a substitute for professional medical care. Always follow your healthcare professional's instructions.        Coughing Techniques    Airway clearance techniques help to remove mucus from your airways. Clearing the airways helps you breathe better and lowers the chance for infection. One method is controlled coughing. Be sure to also use any medicines before or after clearing your airways, as instructed by your healthcare provider. For example, some people use inhaled bronchodilators before clearing their airways.      Note: Be sure to keep a box of tissues beside you. Wash your hands when you are done.   Controlled coughing  Here is how to do it:   · Sit on a chair with both feet on the floor.  · Take a slow, deep breath through your nose. Hold for 2 counts.  · Lean forward slightly.  · Cough twice--2 short coughs.  · Relax for a few seconds.  Repeat the steps as needed.   The hernandez technique  Here is how to do it:   · Sit on a chair with both feet on the floor.  · Take a slow, deep breath through your nose. Hold for 2 counts.  · To breathe out, open your mouth and make a hernandez sound in your throat. (This is the same way you might breathe to clean a pair of eyeglasses.)  · Hernandez 2 to 3 times as you breathe out.  · Relax for a few seconds.  Repeat the steps as needed.  Follow-up care  Make a follow-up appointment as directed by  our staff.     When to call the healthcare provider  Call your healthcare provider right away if you have any of the following:  · Shortness of breath, wheezing, or coughing  · Increased mucus  · Yellow, green, bloody, or smelly mucus  · Fever or chills  · Tightness in your chest that does not go away with rest or medicine  · An irregular heartbeat         Date Last Reviewed: 5/1/2016  © 0794-8037 [employer]. [address] [place] [address], [place] [address]. All rights reserved. This information is not intended as a substitute for professional medical care. Always follow your healthcare professional's instructions.        Chronic Lung Disease: Avoiding Irritants and Allergens    Many people with chronic lung disease, such as asthma or COPD, need to avoid irritants that can trigger symptoms making it more difficult to breathe. Irritants are certain substances in the air that irritate the airways. Some people are also sensitive to certain allergens. These are substances that cause inflammation in the lungs. Allergens can also cause runny nose, or itchy, watery eyes. You probably cant avoid all these things, all the time. But youll most likely breathe better if you stay away from the substances that bother you.   Try to avoid  Smoke. This includes cigarettes, cigars, pipes, and fireplaces.  · Dont smoke. And dont allow anyone else to smoke near you or in your home.  · Ask for smoke-free hotel rooms and rental cars.  · Make sure fireplaces and wood stoves are well ventilated, and sit well away from them.  Smog. This is made up of car exhaust and other air pollutants.  · Read or listen to local air quality reports. These let you know when air quality is poor.  · Stay indoors as much as you can on smoggy days.  Strong odors. These include scented room fresheners, mothballs, and insect sprays. Perfume and cooking can be other causes of strong odors.  · Avoid using bleach and ammonia for cleaning.  · Use  scent-free deodorant, lotion, and other products.  Other irritants. These include dust, aerosol sprays, and fine powders.  · Wear a mask while doing tasks like dusting, sweeping, and yard work.  Cold weather. This can make breathing more difficult.  · Protect your lungs by wearing a scarf over your nose and mouth.   You may also need to avoid  If you have allergies, you should try to avoid the allergens that cause them. Ask your healthcare provider if you need to avoid any of these:  Pollen. This is a fine powder made by trees, grasses, and weeds.  · Try to learn what types of pollen affect you the most. Pollen levels vary during the year.  · Avoid outdoor activities when pollen levels are high. Use air conditioning instead of opening the windows in your home and car.  Animal dander. This is shed by animals with fur or feathers. The particles can float through the air and stick to carpet, clothing, and furniture.  · Wash your hands and clothes after handling pets.  Dust mites. These are tiny bugs too small to see. They live in mattresses, bedding, carpets, curtains, and indoor dust.  · Wash bedding in hot water (130°F/54.4°C) each week.  · Cover mattresses and pillows with special mite-proof cases.  Mold. This grows in damp places, such as bathrooms, basements, and closets.  · Run an exhaust fan while bathing. Or, leave a window open in the bathroom.  · Use a dehumidifier in damp areas.  Date Last Reviewed: 5/1/2016 © 2000-2017 [email or web address]. [address] [place] [address], [place] [address]. All rights reserved. This information is not intended as a substitute for professional medical care. Always follow your healthcare professional's instructions.        Exercises to Prevent Falls  Certain types of exercises may help make you less likely to fall. Try the ones below. Or do other exercises that your health care provider suggests. Depending on your health, you may need to start slowly. Don't let that stop you.  "Even small amounts of exercise can help you. Be sure to talk to your health care provider before starting any exercise program.       Improve balance  Many types of exercise can help improve balance. Sanjeev chi and yoga are good examples. Here's another one to try. You can do it anytime and almost anywhere.  · Stand next to a counter or solid support.  · Push yourself up onto your tiptoes.  · Hold for 5 seconds. If you start to lose your balance, hold on to the counter.  · Rest and repeat 5 times. Work up to holding for 20 to 30 seconds, if you can. Increase flexibility  Being more flexible makes it easier for you to move around safely. Try exercises like the seated hamstring stretch.  · Sit in a chair and put one foot on a stool.  · Straighten your leg and reach with both hands down either side of your leg. Reach as far down your leg as you can.  · Hold for about 20 seconds.  · Go back to the starting position. Then repeat 5 times. Switch legs. Build strength  "Resistance" exercises help build strength. You can do them without equipment. Or you can use weights, elastic bands, or special machines. One such exercise is called the biceps curl. You can hold a 1-pound weight or even a can of soup. Do this exercise at least 3 times a week. Strive for every day.  · Sit up straight in a chair.  · Keep your elbow close to your body and your wrist straight.  · Bend your arm, moving your hand up to your shoulder. Then slowly lower your arm.  · Repeat 5 times. Switch to the other arm.   Build your staying power  Aerobic exercises make your heart and lungs stronger so you can keep moving longer. Walking and swimming are two of the best types of exercises you can do. Using a stationary bike is great, too. Find an aerobic exercise that you enjoy. Start slowly and build up. Even 5 minutes is helpful. Aim for a goal of 30 minutes, at least 3 times a week. You don't have to do 30 minutes in 1 session. Break it up and walk a little " throughout the day.  More helpful tips  · Start easy. Slowly work up to doing more.  · Talk with your health care provider about the best exercises for you.  · Call senior centers or health clubs about exercise programs.  · If needed, have a family member watch you walk every so often to check your stability.  · Exercise with a friend. Choose an activity you both enjoy.  · Consider ruben chi or yoga to strengthen your balance.  · Try exercises that you can do anytime, anywhere. Here are 2 examples. Have someone with you when you first try these:  ¨ Practice walking by placing 1 foot right in front of the other.  ¨ Stand up and sit down 10 times. Repeat this throughout the day.   Date Last Reviewed: 6/13/2015 © 2000-2017 [employer]. [address] [place] [address], [place] [address]. All rights reserved. This information is not intended as a substitute for professional medical care. Always follow your healthcare professional's instructions.        Preventing Falls: Making Changes in Your Living Space  Is your living space filled with hazards that could cause you to fall? Changes can make you safer. They could even save your life. Take a careful look around your home. Change what you can on your own. Hire someone or ask friends or family to help with harder tasks.      Be sure to add a nonslip mat to the inside of your shower or bathtub. Always keep a nightlight on. Keep a clear path from your bed to the bathroom. Move items from higher shelves to lower ones.   Remove hazards  · Remove things that can trip you, like throw rugs, boxes, piles of paper, or cords.  · Nail down rugs or carpeting if you don't want to remove them.  · Don't store items on stairs.  · Keep walkways clear.  · Clean up spills right away.  · Replace glass tables with wooden ones. They're safer if you fall.  Add safety devices  · Add handrails to both sides of stairs.  · Buy a raised toilet seat.  · Add grab bars near the toilet and in the  shower.  · Get grabbers to help you reach things and avoid climbing.  Improve lighting  · Add nightlights to halls, bedrooms, and bathrooms.   · Put light switches at the top and bottom of stairs.  · Be sure each room and flight of stairs has proper lighting.  · Use shades or curtains to cut glare from windows.  · Put flashlights in each room. Replace burned-out bulbs.  · Get glowing light switches for room entrances.  Take other precautions  · Use nonskid floor wax.  · Buy a nonslip mat and a liquid soap dispenser for the shower.  · Tack down carpets or use slip-resistant backing.  · Put most-used items within easy reach.  · Add bright paint or tape on the top front edge of steps.  · Save big jobs, such as moving furniture or other heavy objects, for family or friends.  · Get professional help installing grab bars. They can be unsafe if not installed the right way.  Fix riskier rooms first  Don't tackle everything at once. Focus on one room at a time. The bathroom is a common spot for falls, so you may start there. Or start with a room you spend lots of time in, such as your bedroom. Make only a few changes at once. This will give you time to adjust to them.     Outside your home  You might arrange for these changes yourself, or you might need to talk to your  or homeowners' association about them.  · Have loose boards on porches or damaged stairs repaired.  · Have rough edges, holes, or large cracks in sidewalks or driveways repaired.  · Have hazards that could trip you, such as hoses or daniel, removed.  · Use high-wattage light bulbs (100 or greater) near outside doors and stairs.  · Get handrails added to outside stairs. Have them extend beyond the bottom step.  · Get help in winter weather with ice or snow removal.   Date Last Reviewed: 6/12/2015  © 2231-3909 [employer]. [address] [place] [address], [place] [address]. All rights reserved. This information is not intended as a  substitute for professional medical care. Always follow your healthcare professional's instructions.        Preventing Falls: Are You At Risk of Falling?     Ask for help to reduce risk of falling in your home.     As you get older, you're not as steady on your feet as you once were. And you may have health problems you didn't have when you were younger. So, it's not surprising that older people are more likely to trip and fall. Falling can be very serious. It can change your overall health and quality of life. That's why it's important to be aware of your own risk of falling.  The dangers of falling  Falls are one of the main causes of injury in people over age 65. An older person who falls may take longer to get better than a younger person. And, after a fall, an older person is more likely to have problems that don't go away. So, preventing falls can help you avoid serious health problems.  Are you at risk of falling?  Answer these questions to rate your level of risk.  · Are you a woman?  · Have you fallen or stumbled in the last year?  · Are you over age 65?  · Are you ever dizzy or lightheaded with standing?  · Do you have a hard time getting in and out of the bathtub or on and off the toilet?  · Do you lean on objects to help you get around? Or do you use a cane or walker?  · Do you have vision or hearing problems? For example, do you need new glasses or hearing aids?  · Do you have 2 or more long-lasting (chronic) medical conditions?  · Do you take 3 or more medicines?  · Have you felt depressed recently?  · Have you had more trouble with your memory in recent months?  · Are there hazards in your home that might cause you to fall, such as loose rugs or poor lighting?  · Do you have a pet that jumps on you or might trip you?  · Have you stopped getting regular exercise?  · Do you have diabetes?   · Do you have a neurologic disease, such as Parkinson or Alzheimer disease?   · Do you drink alcohol?  · Do you  "wear athletic shoes or slippers, or go barefoot at home?  You can help prevent falls  If you answered "yes" to any of the above questions, you should take steps to reduce your risk of a fall. Monitoring health conditions and keeping walkways in your home free of clutter are just 2 ways. Changing is sometimes easier said than done. But keep in mind that even small changes can make you less likely to fall.  The fear of falling  It's normal to be scared of falling, especially if you've fallen before. But being afraid can actually make you more likely to fall. This is because:  · Fear might cause you to become less active. Being less active can lead to a loss of strength and balance.  · Fear can lead to isolation from others, depression, or the use of more medicines or alcohol. And all these things make falling even more likely.  To break the cycle, learn more about ways to avoid falling. As you take control, you may find yourself feeling less afraid.   Date Last Reviewed: 6/12/2015  © 2017-2133 [employer]. [address] [place] [address]. All rights reserved. This information is not intended as a substitute for professional medical care. Always follow your healthcare professional's instructions.        Low-Salt Choices  Eating salt (sodium) can make your body retain too much water. Excess water makes your heart work harder. Canned, packaged, and frozen foods are easy to prepare, but they are often high in sodium. Here are some ideas for low-salt foods you can easily prepare yourself.    For breakfast  · Fruit or 100% fruit juice  · Whole-wheat bread or an English muffin. Compare sodium content on labels.  · Low-fat milk or yogurt  · Unsalted eggs  · Shredded wheat  · Corn tortillas  · Unsalted steamed rice  · Regular (not instant) hot cereal, made without salt  Stay away from:  · Sausage, macdonald, and ham  · Flour tortillas  · Packaged muffins, pancakes, and biscuits  · Instant hot " cereals  · Cottage cheese  For lunch and dinner  · Fresh fish, chicken, turkey, or meat--baked, broiled, or roasted without salt  · Dry beans, cooked without salt  · Tofu, stir-fried without salt  · Unsalted fresh fruit and vegetables, or frozen or canned fruit and vegetables with no added salt  Stay away from:  · Lunch or deli meat that is cured or smoked  · Cheese  · Tomato juice and catsup  · Canned vegetables, soups, and fish not labeled as no-salt-added or reduced sodium  · Packaged gravies and sauces  · Olives, pickles, and relish  · Bottled salad dressings  For snacks and desserts  · Yogurt  · Unsalted, air popped popcorn  · Unsalted nuts or seeds  Stay away from:  · Pies and cakes  · Packaged dessert mixes  · Pizza  · Canned and packaged puddings  · Pretzels, chips, crackers, and nuts--unless the label says unsalted  Date Last Reviewed: 6/17/2015  © 8416-8218 [employer]. [address] [place] [address]. All rights reserved. This information is not intended as a substitute for professional medical care. Always follow your healthcare professional's instructions.        Tips for Using Less Salt    Most people with heart problems need to eat less salt (sodium). Reducing the amount of salt you eat may help control your blood pressure. The higher your blood pressure, the greater your risk for heart disease, stroke, blindness, and kidney problems.  At the store  · Make low-salt choices by reading labels carefully. Look for the total amount of sodium per serving.  · Use more fresh food. Buy more fruits and vegetables. Select lean meats, fish, and poultry.  · Use fewer frozen, canned, and packaged foods which often contain a lot of sodium.  · Use plain frozen vegetables without sauces or toppings. These products are often low- or no-sodium.  · Opt for reduced-sodium or no-salt-added versions of canned vegetables and soups.  In the kitchen  · Don't add salt to food when you're cooking. Season  with flavorings such as onion, garlic, pepper, salt-free herbal blends, and lemon or lime juice.  · Use a cookbook containing low-salt recipes. It can give you ideas for tasty meals that are healthy for your heart.  · Sprinkle salt-free herbal blends on vegetables and meat.  · Drain and rinse canned foods, such as canned beans and vegetables, before cooking or eating.  Eating out  · Tell the  you're on a low-salt diet. Ask questions about the menu.  · Order fish, chicken, and meat broiled, baked, poached, or grilled without salt, butter, or breading.  · Use lemon, pepper, and salt-free herb mixes to add flavor.  · Choose plain steamed rice, boiled noodles, and baked or boiled potatoes. Top potatoes with chives and a little sour cream.     Beware! Salt goes by many other names. Limit foods with these words listed as ingredients: salt, sodium, soy sauce, baking soda, baking powder, MSG, monosodium, Na (the chemical symbol for sodium). Some antacids are also high in salt.   Date Last Reviewed: 6/19/2015  © 9440-7495 [employer]. [address] [place] [address], [place] [address]. All rights reserved. This information is not intended as a substitute for professional medical care. Always follow your healthcare professional's instructions.        Low-Salt Diet  This diet removes foods that are high in salt. It also limits the amount of salt you use when cooking. It is most often used for people with high blood pressure, edema (fluid retention), and kidney, liver, or heart disease.  Table salt contains the mineral sodium. Your body needs sodium to work normally. But too much sodium can make your health problems worse. Your healthcare provider is recommending a low-salt (also called low-sodium) diet for you. Your total daily allowance of salt is 1,500 to 2,300 milligrams (mg). It is less than 1 teaspoon of table salt. This means you can have only about 500 to 700 mg of sodium at each meal. People with certain  health problems should limit salt intake to the lower end of the recommended range.    When you cook, dont add much salt. If you can cook without using salt, even better. Dont add salt to your food at the table.  When shopping, read food labels. Salt is often called sodium on the label. Choose foods that are salt-free, low salt, or very low salt. Note that foods with reduced salt may not lower your salt intake enough.    Beans, potatoes, and pasta  Ok: Dry beans, split peas, lentils, potatoes, rice, macaroni, pasta, spaghetti without added salt  Avoid: Potato chips, tortilla chips, and similar products  Breads and cereals  Ok: Low-sodium breads, rolls, cereals, and cakes; low-salt crackers, matzo crackers  Avoid: Salted crackers, pretzels, popcorn, Croatian toast, pancakes, muffins  Dairy  Ok: Milk, chocolate milk, hot chocolate mix, low-salt cheeses, and yogurt  Avoid: Processed cheese and cheese spreads; Roquefort, Camembert, and cottage cheese; buttermilk, instant breakfast drink  Desserts  Ok: Ice cream, frozen yogurt, juice bars, gelatin, cookies and pies, sugar, honey, jelly, hard candy  Avoid: Most pies, cakes and cookies prepared or processed with salt; instant pudding  Drinks  Ok: Tea, coffee, fizzy (carbonated) drinks, juices  Avoid: Flavored coffees, electrolyte replacement drinks, sports drinks  Meats  Ok: All fresh meat, fish, poultry, low-salt tuna, eggs, egg substitute  Avoid: Smoked, pickled, brine-cured, or salted meats and fish. This includes macdonald, chipped beef, corned beef, hot dogs, deli meats, ham, kosher meats, salt pork, sausage, canned tuna, salted codfish, smoked salmon, herring, sardines, or anchovies.  Seasonings and spices  Ok: Most seasonings are okay. Good substitutes for salt include: fresh herb blends, hot sauce, lemon, garlic, lawrence, vinegar, dry mustard, parsley, cilantro, horseradish, tomato paste, regular margarine, mayonnaise, unsalted butter, cream cheese, vegetable oil, cream,  low-salt salad dressing and gravy.  Avoid: Regular ketchup, relishes, pickles, soy sauce, teriyaki sauce, Worcestershire sauce, BBQ sauce, tartar sauce, meat tenderizer, chili sauce, regular gravy, regular salad dressing, salted butter  Soups  Ok: Low-salt soups and broths made with allowed foods  Avoid: Bouillon cubes, soups with smoked or salted meats, regular soup and broth  Vegetables  Ok: Most vegetables are okay; also low-salt tomato and vegetable juices  Avoid: Sauerkraut and other brine-soaked vegetables; pickles and other pickled vegetables; tomato juice, olives  Date Last Reviewed: 8/1/2016 © 2000-2017 [employer]. [address] [place] [address], [place] [address]. All rights reserved. This information is not intended as a substitute for professional medical care. Always follow your healthcare professional's instructions.

## 2018-07-26 ENCOUNTER — CLINICAL SUPPORT (OUTPATIENT)
Dept: INTERNAL MEDICINE | Facility: CLINIC | Age: 83
End: 2018-07-26
Payer: MEDICARE

## 2018-07-26 DIAGNOSIS — R79.89 LOW TESTOSTERONE IN MALE: Primary | ICD-10-CM

## 2018-07-26 PROCEDURE — 96372 THER/PROPH/DIAG INJ SC/IM: CPT | Mod: S$GLB,,, | Performed by: PEDIATRICS

## 2018-07-26 PROCEDURE — 99999 PR PBB SHADOW E&M-EST. PATIENT-LVL II: CPT | Mod: PBBFAC,,,

## 2018-07-26 RX ORDER — TESTOSTERONE CYPIONATE 200 MG/ML
200 INJECTION, SOLUTION INTRAMUSCULAR
Status: DISCONTINUED | OUTPATIENT
Start: 2018-07-26 | End: 2019-01-08

## 2018-07-26 RX ADMIN — TESTOSTERONE CYPIONATE 200 MG: 200 INJECTION, SOLUTION INTRAMUSCULAR at 08:07

## 2018-07-30 ENCOUNTER — OUTPATIENT CASE MANAGEMENT (OUTPATIENT)
Dept: ADMINISTRATIVE | Facility: OTHER | Age: 83
End: 2018-07-30

## 2018-07-30 NOTE — PROGRESS NOTES
"7/30  Summary:  Contacted patient by phone today for follow up visit. Patient states he is taking all of his medications as directed.  Patient states he received educational materials mailed per [facility] and has reviewed.  Patient states his doctor "has not told him he needs to limit his sodium that his LE swelling is from poor circulation". OPCM educated that high sodium may exacerbated problem of edema in lower extremities as well. Patient states he is not ready to give up salt and he will discuss with his MD. Patient denies falls since our last visit. Patient states he is active around his house and tries to walk in his house regularly which helps with his back discomfort and continues to take his Tramadol once daily and Tylenol for pain control. Encouraged patient to try heat and cold and he voiced understanding. Patient states his breathing status is good and he continues to use his nebulizer twice daily. Educated on if sputum is greenish/yellowish he should notify MD and increase water intake if secretions are thick and patient voiced understanding/agreement. Encouraged patient to call this RN if having any questions/concerns. This RN will continue to follow. MEGAN Bustillo [facility]            Interventions:  Mailed educational materials on COPD, Fall prevention, Pain management           Plan:  F/u on receipt of educational materials. Continue education on COPD exacerbation, Fall prevention, Pain management  "

## 2018-08-13 ENCOUNTER — OUTPATIENT CASE MANAGEMENT (OUTPATIENT)
Dept: ADMINISTRATIVE | Facility: OTHER | Age: 83
End: 2018-08-13

## 2018-08-13 NOTE — PROGRESS NOTES
8/13  Contacted patient by phone today for follow up visit. Patient states he has been relatively steady on his feet and denies falls since our last visit. Educated on fall prevention/home safety, ie. good lighting, clear pathways, no throw rugs and no electrical cords and patient voiced understanding/agreeemnt. Patient states he continues to walk in his home and stays very busy all day. Patient states he will try using his heating pad for back and lower leg pain.   Patient states he continues to take all of his medications as directed. Patient states he continues to use his nebulizer twice daily which controls his COPD well. Patient denies sputum production. Patient states he wears his JOSÉ LUIS hose for his swelling in his lower extremities. Encouraged patient to call this RN if having any questions/concerns. This RN will continue to follow. MEGAN Bustillo [facility]          Interventions:  Continue education on COPD, Fall prevention, Low sodium diet         Plan:  F/u on receipt of educational materials. Continue education on COPD exacerbation, Fall prevention, Low sodium diet

## 2018-08-23 ENCOUNTER — CLINICAL SUPPORT (OUTPATIENT)
Dept: INTERNAL MEDICINE | Facility: CLINIC | Age: 83
End: 2018-08-23
Payer: MEDICARE

## 2018-08-23 PROCEDURE — 96372 THER/PROPH/DIAG INJ SC/IM: CPT | Mod: S$GLB,,, | Performed by: PEDIATRICS

## 2018-08-23 RX ADMIN — TESTOSTERONE CYPIONATE 200 MG: 200 INJECTION, SOLUTION INTRAMUSCULAR at 08:08

## 2018-08-29 ENCOUNTER — OUTPATIENT CASE MANAGEMENT (OUTPATIENT)
Dept: ADMINISTRATIVE | Facility: OTHER | Age: 83
End: 2018-08-29

## 2018-08-29 NOTE — PROGRESS NOTES
1st Attempt to complete follow-up for Outpatient Care Management; left message requesting return call.

## 2018-08-30 ENCOUNTER — OUTPATIENT CASE MANAGEMENT (OUTPATIENT)
Dept: ADMINISTRATIVE | Facility: OTHER | Age: 83
End: 2018-08-30

## 2018-08-30 NOTE — PROGRESS NOTES
8/30  Contacted patient by phone today for follow up visit. Patient states he has been doing well. Patient states his breathing is good, no productive cough or SOB.  We went over signs and symptoms of COPD Exacerbation. Patient reports he continues to use his nebulizer twice daily. Patient denies sputum production. We went over importance of adherence to medication regimen. We went over importance of maintained follow-up with doctors. Patient denies falls since our last visit. Educated on fall prevention/home safety, ie. good lighting, clear pathways, no throw rugs and no electrical cords and patient voiced understanding/agreeemnt. Patient states his back pain has been controlled with 1 Tramadol in am with a hot shower and he did not try heating pad. Encouraged patient to call this RN if having any questions/concerns. This RN will continue to follow. MEGAN Bustillo [facility]          Interventions:  Continue education on COPD, Fall prevention         Plan:  Continue education on COPD exacerbation, Fall prevention

## 2018-09-04 ENCOUNTER — OUTPATIENT CASE MANAGEMENT (OUTPATIENT)
Dept: ADMINISTRATIVE | Facility: OTHER | Age: 83
End: 2018-09-04

## 2018-09-04 NOTE — PROGRESS NOTES
[x] Called and reviewed disaster plan with patient/caregiver.  Provided emergency phone number for patient's [place].   [] Attempted to reach patient/caregiver to review disaster plan.  Left a voice message with the phone number for patient's [place] Office of Emergency Preparedness.     Reviewed with Patient/Caregiver:  [x] Patient to have a supply of water, non-perishable food items, flashlights and batteries  [x] Patient to bring all medications if evacuates:  at least a five day supply preferably in the medication bottles, in case displaced for longer than 5 days  [x] Patient to bring any DME needed (walkers, wheelchairs, shower chairs, nebulizer machine, etc.)   [] If Diabetic, patient to bring glucometer and monitoring supplies.   [] If Hypertension, patient to bring blood pressure cuff  [x] If CHF, patient to bring scale  [] If tube feeds, patient to bring tube feedings and feeding supplies.  [] If on oxygen,  patient to bring all oxygen supplies (Cannula, portable tanks, concentrator).  Patient will contact oxygen supply company to find out the nearest location of a supply company near evacuated location. ([employer]: [phone], [facility]: [phone], Counts include 234 beds at the [facility]:[phone], [employer]: [phone]), [facility] -[phone].  [] If on hemodialysis, patient should already have a plan in place with dialysis center. If patient does not know where to evacuate to in order to be close to a dialysis center, patient/caregiver to reach out to regular dialysis center for further direction. ([facility]  service line: [phone], [facility]  service line [phone])  [] If receiving treatment at an infusion center, patient/caregiver to contact the infusion center to find out which infusion center they have a contract with where patient plans to evacuate.      Office of Emergency Preparedness Phone number:  [] [place]: [phone]  [] [place]: [phone]  [] [facility]  [facility]: [phone]  [] [place]: [phone]  [] [place]: [phone]  [] [place]: [phone]  [x] [facility]: [phone]  [] [facility]: [phone]  [] [facility]: [phone]  [] [place]: [phone]  [] [facility]: [phone]  [] [place]: [phone]  [] [facility]: [phone]    [] [facility]:  [phone]   [] [facility]:  [phone]   [] [facility]:  [phone]   [] [facility]:  [phone]   [] [facility]:  [phone]   [] [facility]: [phone]   [] [facility]:  [phone]   [] [facility]:  [phone]   [] [facility]:  [phone]   [] [facility]:  [phone]   [] [facility]:  [phone]   [] [facility]:  [phone]   [] [facility]:  [phone]   [] [facility]:  [phone]   [] [facility]:  [phone]   [] [facility]: [phone]   [] [facility]:  [phone]   [] [facility]: [phone]   [] [facility]: [phone]

## 2018-09-18 ENCOUNTER — OUTPATIENT CASE MANAGEMENT (OUTPATIENT)
Dept: ADMINISTRATIVE | Facility: OTHER | Age: 83
End: 2018-09-18

## 2018-09-18 NOTE — PROGRESS NOTES
9/18  Contacted patient by phone today for follow up visit. Patient states he has been doing well. Patient states his breathing is good, no productive cough or SOB.  Educated on s/s of COPD Exacerbation, ie. wheezing, SOB, severe or productive cough with yellow or greenish colored sputum or fever. Triggers to avoid include Air pollution, smoke, car exhaust and seasonal allergens. Patient denies falls since our last visit. Educated on fall prevention/home safety, ie. good lighting, clear pathways, no throw rugs and no electrical cords and patient voiced understanding/agreeemnt. Patient continues to report his back pain is controlled with his Tramadol in am and a hot shower. Patient states he has been using his heating pad on his shoulder. Encouraged patient/caregiver to communicate with physician and call this RN if having any questions/concerns. This patient will be closed by the OPCM RN, as the nursing centered patient goals have been met .          Interventions:  Continued education on COPD, Fall prevention         Plan:  Case closed

## 2018-09-20 ENCOUNTER — CLINICAL SUPPORT (OUTPATIENT)
Dept: INTERNAL MEDICINE | Facility: CLINIC | Age: 83
End: 2018-09-20
Payer: MEDICARE

## 2018-09-20 PROCEDURE — 96372 THER/PROPH/DIAG INJ SC/IM: CPT | Mod: PBBFAC,PO

## 2018-09-20 RX ADMIN — TESTOSTERONE CYPIONATE 200 MG: 200 INJECTION, SOLUTION INTRAMUSCULAR at 08:09

## 2018-10-13 ENCOUNTER — PATIENT MESSAGE (OUTPATIENT)
Dept: INTERNAL MEDICINE | Facility: CLINIC | Age: 83
End: 2018-10-13

## 2018-10-18 ENCOUNTER — CLINICAL SUPPORT (OUTPATIENT)
Dept: INTERNAL MEDICINE | Facility: CLINIC | Age: 83
End: 2018-10-18
Payer: MEDICARE

## 2018-10-18 PROCEDURE — 96372 THER/PROPH/DIAG INJ SC/IM: CPT | Mod: PBBFAC,PO

## 2018-10-18 PROCEDURE — 90662 IIV NO PRSV INCREASED AG IM: CPT | Mod: PBBFAC,PO

## 2018-10-18 RX ADMIN — TESTOSTERONE CYPIONATE 200 MG: 200 INJECTION, SOLUTION INTRAMUSCULAR at 08:10

## 2018-10-23 ENCOUNTER — PATIENT MESSAGE (OUTPATIENT)
Dept: INTERNAL MEDICINE | Facility: CLINIC | Age: 83
End: 2018-10-23

## 2018-10-24 RX ORDER — GABAPENTIN 100 MG/1
100 CAPSULE ORAL 2 TIMES DAILY
Qty: 60 CAPSULE | Refills: 11 | Status: SHIPPED | OUTPATIENT
Start: 2018-10-24 | End: 2019-01-17 | Stop reason: SDUPTHER

## 2018-11-15 ENCOUNTER — CLINICAL SUPPORT (OUTPATIENT)
Dept: INTERNAL MEDICINE | Facility: CLINIC | Age: 83
End: 2018-11-15
Payer: MEDICARE

## 2018-11-15 PROCEDURE — 96372 THER/PROPH/DIAG INJ SC/IM: CPT | Mod: HCNC,S$GLB,, | Performed by: PEDIATRICS

## 2018-11-15 PROCEDURE — 99999 PR PBB SHADOW E&M-EST. PATIENT-LVL III: CPT | Mod: PBBFAC,HCNC,,

## 2018-11-15 RX ADMIN — TESTOSTERONE CYPIONATE 200 MG: 200 INJECTION, SOLUTION INTRAMUSCULAR at 08:11

## 2018-11-19 ENCOUNTER — OFFICE VISIT (OUTPATIENT)
Dept: PULMONOLOGY | Facility: CLINIC | Age: 83
End: 2018-11-19
Payer: MEDICARE

## 2018-11-19 ENCOUNTER — CLINICAL SUPPORT (OUTPATIENT)
Dept: PULMONOLOGY | Facility: CLINIC | Age: 83
End: 2018-11-19
Payer: MEDICARE

## 2018-11-19 VITALS
BODY MASS INDEX: 26.41 KG/M2 | BODY MASS INDEX: 26.41 KG/M2 | SYSTOLIC BLOOD PRESSURE: 131 MMHG | RESPIRATION RATE: 16 BRPM | HEART RATE: 82 BPM | HEIGHT: 72 IN | WEIGHT: 195 LBS | WEIGHT: 195 LBS | DIASTOLIC BLOOD PRESSURE: 60 MMHG | HEIGHT: 72 IN | OXYGEN SATURATION: 94 %

## 2018-11-19 DIAGNOSIS — I27.21 PAH (PULMONARY ARTERY HYPERTENSION): ICD-10-CM

## 2018-11-19 DIAGNOSIS — J44.9 CHRONIC OBSTRUCTIVE PULMONARY DISEASE, UNSPECIFIED COPD TYPE: Primary | ICD-10-CM

## 2018-11-19 DIAGNOSIS — J44.9 CHRONIC OBSTRUCTIVE PULMONARY DISEASE, UNSPECIFIED COPD TYPE: ICD-10-CM

## 2018-11-19 PROCEDURE — 99214 OFFICE O/P EST MOD 30 MIN: CPT | Mod: 25,HCNC,S$GLB, | Performed by: NURSE PRACTITIONER

## 2018-11-19 PROCEDURE — 99999 PR PBB SHADOW E&M-EST. PATIENT-LVL IV: CPT | Mod: PBBFAC,HCNC,, | Performed by: NURSE PRACTITIONER

## 2018-11-19 PROCEDURE — 94618 PULMONARY STRESS TESTING: CPT | Mod: HCNC,S$GLB,, | Performed by: INTERNAL MEDICINE

## 2018-11-19 PROCEDURE — 1101F PT FALLS ASSESS-DOCD LE1/YR: CPT | Mod: CPTII,HCNC,S$GLB, | Performed by: NURSE PRACTITIONER

## 2018-11-19 NOTE — PROGRESS NOTES
Subjective:      Patient ID: Chao Hawk Jr. is a 86 y.o. male.    Chief Complaint: COPD    HPI  Patient of Dr. Sotelo with COPD and pulmonary hypertension presents to the office today for oxygen evaluation with a walk.  He ambulates with a cane.  He does not have oxygen at home.  His pulmonary inhalers include Flovent, albuterol   No fever, chills, or hemoptysis. No pleuritic type chest pain. Breathing is stable as compared to 6 months ago.      /60   Pulse 82   Resp 16   Ht 6' (1.829 m)   Wt 88.5 kg (195 lb)   SpO2 (!) 94%   BMI 26.45 kg/m²   Body mass index is 26.45 kg/m².    Review of Systems   Musculoskeletal: Positive for arthralgias and gait problem.   All other systems reviewed and are negative.    Objective:      Physical Exam   Constitutional: He is oriented to person, place, and time. He appears well-developed and well-nourished.   HENT:   Head: Normocephalic and atraumatic.   Neck: Normal range of motion. Neck supple.   Cardiovascular: Normal rate and regular rhythm.   Pulmonary/Chest: Effort normal and breath sounds normal.   Abdominal: Soft.   Musculoskeletal: He exhibits no edema.   Neurological: He is alert and oriented to person, place, and time.   Skin: Skin is warm and dry.   Psychiatric: He has a normal mood and affect.     Personal Diagnostic Review  6 min walk is below predicted distance.  Lowest oxygen level 92% on room air.      Assessment:       1. Chronic obstructive pulmonary disease, unspecified COPD type    2. PAH (pulmonary artery hypertension)        Outpatient Encounter Medications as of 11/19/2018   Medication Sig Dispense Refill    acetaminophen (TYLENOL) 500 MG tablet Take 500 mg by mouth every 6 (six) hours as needed for Pain.      albuterol (PROVENTIL) 2.5 mg /3 mL (0.083 %) nebulizer solution Take 3 mLs (2.5 mg total) by nebulization every 6 (six) hours while awake. 360 mL 12    aspirin (ECOTRIN) 81 MG EC tablet Take 81 mg by mouth once daily.      atorvastatin  (LIPITOR) 10 MG tablet Take 10 mg by mouth once daily.      epinephrine (EPIPEN) 0.3 mg/0.3 mL (1:1,000) AtIn Inject 1 each into the muscle once.      fluticasone (FLOVENT DISKUS) 100 mcg/actuation inhaler INHALE 1 PUFF TWICE DAILY --RINSE MOUTH OUT WITH WATER 3 Inhaler 3    furosemide (LASIX ORAL) Take 10 mg by mouth once daily.      gabapentin (NEURONTIN) 100 MG capsule Take 1 capsule (100 mg total) by mouth 2 (two) times daily. 60 capsule 11    hydroCHLOROthiazide (HYDRODIURIL) 25 MG tablet Take 1 tablet (25 mg total) by mouth once daily. (Patient taking differently: Take 25 mg by mouth as needed. ) 90 tablet 3    lisinopril 10 MG tablet TAKE 1 TABLET ONE TIME DAILY 90 tablet 3    tamsulosin (FLOMAX) 0.4 mg Cp24 Take 2 capsules (0.8 mg total) by mouth every morning. (Patient taking differently: Take 0.8 mg by mouth every evening. ) 180 capsule 3    traMADol (ULTRAM) 50 mg tablet Take 1 tablet (50 mg total) by mouth daily as needed for Pain. 30 tablet 5     Facility-Administered Encounter Medications as of 11/19/2018   Medication Dose Route Frequency Provider Last Rate Last Dose    testosterone cypionate injection 200 mg  200 mg Intramuscular Q28 Days MONSTER Ureña Jr., MD   200 mg at 11/15/18 0838     Orders Placed This Encounter   Procedures    X-Ray Chest PA And Lateral     Standing Status:   Future     Standing Expiration Date:   11/19/2019     Order Specific Question:   May the Radiologist modify the order per protocol to meet the clinical needs of the patient?     Answer:   Yes     Plan:      No need for oxygen therapy.  Continue current pulmonary regimen.  Follow-up in 6 months with chest x-ray or call earlier if any problems

## 2018-11-19 NOTE — PROCEDURES
[facility]  Six Minute Walk     SUMMARY     Ordering Provider: Deepak   Interpreting Provider: Deepak  Performing nurse/tech/RT: Kris Salcedo  Diagnosis: COPD  Height: 6' (182.9 cm)  Weight: 88.5 kg (195 lb)  BMI (Calculated): 26.5   Patient Race:             Phase Oxygen Assessment Supplemental O2 Heart   Rate Blood Pressure Katie Dyspnea Scale Rating   Resting 94 % Room Air 76 bpm 131/60 4   Exercise        Minute        1 92 % Room Air 102 bpm     2 92 % Room Air 112 bpm     3 92 % Room Air 113 bpm     4 94 % Room Air 109 bpm     5 95 % Room Air 102 bpm     6  94 % Room Air 97 bpm 146/67 5-6   Recovery        Minute        1 95 % Room Air 88 bpm     2 95 % Room Air 87 bpm     3 94 % Room Air 80 bpm     4 94 % Room Air 86 bpm 132/62 2     Six Minute Walk Summary  6MWT Status: completed without stopping  Patient Reported: (Shoulder Pain)     Interpretation:  Did the patient stop or pause?: No                                         Total Time Walked (Calculated): 360 seconds  Final Partial Lap Distance (feet): 0 feet  Total Distance Meters (Calculated): 182.88 meters  Predicted Distance Meters (Calculated): 488.16 meters  Percentage of Predicted (Calculated): 37.46  Peak VO2 (Calculated): 9.47  Mets: 2.71  Has The Patient Had a Previous Six Minute Walk Test?: No       Previous 6MWT Results  Has The Patient Had a Previous Six Minute Walk Test?: No      Interpretation:  Distance walked during 6 minutes was severly reduced when compared to predicted values. The 6 minute walk demonstrated severe functional impairment. There was no significant oxygen desaturation noted . Clinical correlation suggested.    Obed Sotelo MD

## 2018-11-19 NOTE — LETTER
November 19, 2018      Obed Sotelo MD  [address] [facility] [place] [address]           [facility] Pulmonary Services  [address] [facility] [address] [place] [address]  Phone: [phone]  Fax: [phone]          Patient: Chao Hawk Jr.   MR Number: [id number]   YOB: 1932   Date of Visit: 11/19/2018       Dear Dr. Obed Sotelo:    Thank you for referring Chao Hawk to me for evaluation. Attached you will find relevant portions of my assessment and plan of care.    If you have questions, please do not hesitate to call me. I look forward to following Chao Hawk along with you.    Sincerely,    Elizabeth Lejeune, NP    Enclosure  CC:  No Recipients    If you would like to receive this communication electronically, please contact [email or web address] or [phone] to request more information on [employer] access.    For providers and/or their staff who would like to refer a patient to [facility], please contact us through our one-stop-shop provider referral line, [facility], at [phone].    If you feel you have received this communication in error or would no longer like to receive these types of communications, please e-mail [email or web address]

## 2018-11-20 RX ORDER — ATORVASTATIN CALCIUM 40 MG/1
TABLET, FILM COATED ORAL
Qty: 90 TABLET | Refills: 3 | Status: SHIPPED | OUTPATIENT
Start: 2018-11-20 | End: 2019-09-10 | Stop reason: SDUPTHER

## 2018-11-20 RX ORDER — TAMSULOSIN HYDROCHLORIDE 0.4 MG/1
CAPSULE ORAL
Qty: 180 CAPSULE | Refills: 3 | Status: SHIPPED | OUTPATIENT
Start: 2018-11-20 | End: 2019-11-20

## 2019-01-08 ENCOUNTER — CLINICAL SUPPORT (OUTPATIENT)
Dept: INTERNAL MEDICINE | Facility: CLINIC | Age: 84
End: 2019-01-08
Payer: MEDICARE

## 2019-01-08 ENCOUNTER — LAB VISIT (OUTPATIENT)
Dept: LAB | Facility: HOSPITAL | Age: 84
End: 2019-01-08
Attending: PEDIATRICS
Payer: MEDICARE

## 2019-01-08 DIAGNOSIS — R79.89 LOW TESTOSTERONE IN MALE: ICD-10-CM

## 2019-01-08 DIAGNOSIS — I10 ESSENTIAL HYPERTENSION: Chronic | ICD-10-CM

## 2019-01-08 DIAGNOSIS — E78.00 PURE HYPERCHOLESTEROLEMIA: Chronic | ICD-10-CM

## 2019-01-08 LAB
ALT SERPL W/O P-5'-P-CCNC: 22 U/L
ANION GAP SERPL CALC-SCNC: 8 MMOL/L
AST SERPL-CCNC: 23 U/L
BASOPHILS # BLD AUTO: 0.02 K/UL
BASOPHILS NFR BLD: 0.2 %
BUN SERPL-MCNC: 20 MG/DL
CALCIUM SERPL-MCNC: 9.6 MG/DL
CHLORIDE SERPL-SCNC: 105 MMOL/L
CHOLEST SERPL-MCNC: 122 MG/DL
CHOLEST/HDLC SERPL: 2.6 {RATIO}
CO2 SERPL-SCNC: 29 MMOL/L
CREAT SERPL-MCNC: 0.9 MG/DL
DIFFERENTIAL METHOD: ABNORMAL
EOSINOPHIL # BLD AUTO: 0.1 K/UL
EOSINOPHIL NFR BLD: 0.7 %
ERYTHROCYTE [DISTWIDTH] IN BLOOD BY AUTOMATED COUNT: 12.7 %
EST. GFR  (AFRICAN AMERICAN): >60 ML/MIN/1.73 M^2
EST. GFR  (NON AFRICAN AMERICAN): >60 ML/MIN/1.73 M^2
GLUCOSE SERPL-MCNC: 106 MG/DL
HCT VFR BLD AUTO: 39.1 %
HDLC SERPL-MCNC: 47 MG/DL
HDLC SERPL: 38.5 %
HGB BLD-MCNC: 12.7 G/DL
IMM GRANULOCYTES # BLD AUTO: 0.06 K/UL
IMM GRANULOCYTES NFR BLD AUTO: 0.7 %
LDLC SERPL CALC-MCNC: 64.8 MG/DL
LYMPHOCYTES # BLD AUTO: 0.5 K/UL
LYMPHOCYTES NFR BLD: 5.8 %
MCH RBC QN AUTO: 30.8 PG
MCHC RBC AUTO-ENTMCNC: 32.5 G/DL
MCV RBC AUTO: 95 FL
MONOCYTES # BLD AUTO: 0.8 K/UL
MONOCYTES NFR BLD: 8.7 %
NEUTROPHILS # BLD AUTO: 7.6 K/UL
NEUTROPHILS NFR BLD: 83.9 %
NONHDLC SERPL-MCNC: 75 MG/DL
NRBC BLD-RTO: 0 /100 WBC
PLATELET # BLD AUTO: 218 K/UL
PMV BLD AUTO: 10.9 FL
POTASSIUM SERPL-SCNC: 4.6 MMOL/L
RBC # BLD AUTO: 4.12 M/UL
SODIUM SERPL-SCNC: 142 MMOL/L
TRIGL SERPL-MCNC: 51 MG/DL
WBC # BLD AUTO: 9.08 K/UL

## 2019-01-08 PROCEDURE — 80048 BASIC METABOLIC PNL TOTAL CA: CPT | Mod: HCNC

## 2019-01-08 PROCEDURE — 96372 THER/PROPH/DIAG INJ SC/IM: CPT | Mod: HCNC,S$GLB,, | Performed by: PEDIATRICS

## 2019-01-08 PROCEDURE — 80061 LIPID PANEL: CPT | Mod: HCNC

## 2019-01-08 PROCEDURE — 99999 PR PBB SHADOW E&M-EST. PATIENT-LVL II: CPT | Mod: PBBFAC,HCNC,,

## 2019-01-08 PROCEDURE — 84460 ALANINE AMINO (ALT) (SGPT): CPT | Mod: HCNC

## 2019-01-08 PROCEDURE — 84450 TRANSFERASE (AST) (SGOT): CPT | Mod: HCNC

## 2019-01-08 PROCEDURE — 36415 COLL VENOUS BLD VENIPUNCTURE: CPT | Mod: HCNC,PO

## 2019-01-08 PROCEDURE — 99999 PR PBB SHADOW E&M-EST. PATIENT-LVL II: ICD-10-PCS | Mod: PBBFAC,HCNC,,

## 2019-01-08 PROCEDURE — 85025 COMPLETE CBC W/AUTO DIFF WBC: CPT | Mod: HCNC

## 2019-01-08 PROCEDURE — 96372 PR INJECTION,THERAP/PROPH/DIAG2ST, IM OR SUBCUT: ICD-10-PCS | Mod: HCNC,S$GLB,, | Performed by: PEDIATRICS

## 2019-01-08 RX ORDER — TESTOSTERONE CYPIONATE 200 MG/ML
200 INJECTION, SOLUTION INTRAMUSCULAR
Status: ACTIVE | OUTPATIENT
Start: 2019-01-10 | End: 2019-07-25

## 2019-01-08 RX ADMIN — TESTOSTERONE CYPIONATE 200 MG: 200 INJECTION, SOLUTION INTRAMUSCULAR at 11:01

## 2019-01-17 ENCOUNTER — OFFICE VISIT (OUTPATIENT)
Dept: INTERNAL MEDICINE | Facility: CLINIC | Age: 84
End: 2019-01-17
Payer: MEDICARE

## 2019-01-17 VITALS
TEMPERATURE: 98 F | OXYGEN SATURATION: 96 % | HEART RATE: 108 BPM | SYSTOLIC BLOOD PRESSURE: 122 MMHG | RESPIRATION RATE: 18 BRPM | BODY MASS INDEX: 26.37 KG/M2 | HEIGHT: 72 IN | DIASTOLIC BLOOD PRESSURE: 60 MMHG | WEIGHT: 194.69 LBS

## 2019-01-17 DIAGNOSIS — R39.12 BENIGN PROSTATIC HYPERPLASIA WITH WEAK URINARY STREAM: Chronic | ICD-10-CM

## 2019-01-17 DIAGNOSIS — E78.00 PURE HYPERCHOLESTEROLEMIA: Chronic | ICD-10-CM

## 2019-01-17 DIAGNOSIS — M47.816 LUMBAR ARTHROPATHY: ICD-10-CM

## 2019-01-17 DIAGNOSIS — I71.40 ABDOMINAL AORTIC ANEURYSM WITHOUT RUPTURE: ICD-10-CM

## 2019-01-17 DIAGNOSIS — I10 ESSENTIAL HYPERTENSION: Chronic | ICD-10-CM

## 2019-01-17 DIAGNOSIS — J44.9 CHRONIC OBSTRUCTIVE PULMONARY DISEASE, UNSPECIFIED COPD TYPE: Primary | Chronic | ICD-10-CM

## 2019-01-17 DIAGNOSIS — R60.0 BILATERAL LEG EDEMA: ICD-10-CM

## 2019-01-17 DIAGNOSIS — I27.21 PAH (PULMONARY ARTERY HYPERTENSION): ICD-10-CM

## 2019-01-17 DIAGNOSIS — Q61.02 MULTIPLE RENAL CYSTS: ICD-10-CM

## 2019-01-17 DIAGNOSIS — Z87.19 HISTORY OF DIVERTICULITIS: ICD-10-CM

## 2019-01-17 DIAGNOSIS — J84.10 CALCIFIED GRANULOMA OF LUNG: ICD-10-CM

## 2019-01-17 DIAGNOSIS — R79.89 LOW TESTOSTERONE: ICD-10-CM

## 2019-01-17 DIAGNOSIS — N40.1 BENIGN PROSTATIC HYPERPLASIA WITH WEAK URINARY STREAM: Chronic | ICD-10-CM

## 2019-01-17 DIAGNOSIS — I70.0 ATHEROSCLEROSIS OF AORTA: ICD-10-CM

## 2019-01-17 DIAGNOSIS — M51.36 DDD (DEGENERATIVE DISC DISEASE), LUMBAR: ICD-10-CM

## 2019-01-17 PROCEDURE — 3288F FALL RISK ASSESSMENT DOCD: CPT | Mod: CPTII,HCNC,S$GLB, | Performed by: PEDIATRICS

## 2019-01-17 PROCEDURE — 99215 PR OFFICE/OUTPT VISIT, EST, LEVL V, 40-54 MIN: ICD-10-PCS | Mod: HCNC,S$GLB,, | Performed by: PEDIATRICS

## 2019-01-17 PROCEDURE — 99999 PR PBB SHADOW E&M-EST. PATIENT-LVL IV: CPT | Mod: PBBFAC,HCNC,, | Performed by: PEDIATRICS

## 2019-01-17 PROCEDURE — 1100F PTFALLS ASSESS-DOCD GE2>/YR: CPT | Mod: CPTII,HCNC,S$GLB, | Performed by: PEDIATRICS

## 2019-01-17 PROCEDURE — 99499 UNLISTED E&M SERVICE: CPT | Mod: HCNC,S$GLB,, | Performed by: PEDIATRICS

## 2019-01-17 PROCEDURE — 1100F PR PT FALLS ASSESS DOC 2+ FALLS/FALL W/INJURY/YR: ICD-10-PCS | Mod: CPTII,HCNC,S$GLB, | Performed by: PEDIATRICS

## 2019-01-17 PROCEDURE — 99215 OFFICE O/P EST HI 40 MIN: CPT | Mod: HCNC,S$GLB,, | Performed by: PEDIATRICS

## 2019-01-17 PROCEDURE — 99499 RISK ADDL DX/OHS AUDIT: ICD-10-PCS | Mod: HCNC,S$GLB,, | Performed by: PEDIATRICS

## 2019-01-17 PROCEDURE — 99999 PR PBB SHADOW E&M-EST. PATIENT-LVL IV: ICD-10-PCS | Mod: PBBFAC,HCNC,, | Performed by: PEDIATRICS

## 2019-01-17 PROCEDURE — 3288F PR FALLS RISK ASSESSMENT DOCUMENTED: ICD-10-PCS | Mod: CPTII,HCNC,S$GLB, | Performed by: PEDIATRICS

## 2019-01-17 RX ORDER — FLUTICASONE PROPIONATE 250 UG/1
1 POWDER, METERED RESPIRATORY (INHALATION) 2 TIMES DAILY
Qty: 3 EACH | Refills: 3 | Status: SHIPPED | OUTPATIENT
Start: 2019-01-17 | End: 2019-01-30 | Stop reason: SDUPTHER

## 2019-01-17 RX ORDER — GABAPENTIN 100 MG/1
100 CAPSULE ORAL 2 TIMES DAILY
Qty: 180 CAPSULE | Refills: 3 | Status: SHIPPED | OUTPATIENT
Start: 2019-01-17 | End: 2020-05-18

## 2019-01-17 NOTE — PROGRESS NOTES
Subjective:       Patient ID: Chao Hawk Jr. is a 86 y.o. male.    Chief Complaint: Follow-up; Results; and Shortness of Breath    Chief Complaint: Follow-up and Results     HTN: B/P normal, no HTNive symptoms  LIPIDS:following D&E, tolerating and compliant with med(s).    BPH on flomax 0.4 mg nocturia x 3  COPD:Does well, now with mild exercise limitations. Saw pulmonary, compliant with meds  Low T:On depo testosterone.  Back:improved with surgery, uses canes only for balance and support for uneven surface, has residual feet numbness. Worse in AM until he gets around. Uses only tylenol twice a day and tramadol in AM. Fall risk is now minimal, No further falls- has handrails in place at home.  Diverticulitis hx: quiet currently.  Pulmonary artery HTN: no unusual CP/Orthopnea/hemoptysis. Has had some progressive SOB  AAA: medical management. CT 2018: distal abdominal aortic aneurysm measuring 3.6 cm AP dimension 3.3 cm transverse dimension.  There is also fusiform aneurysmal dilatation of the mid infrarenal abdominal aorta measuring approximately 3.2 cm in diameter.  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      LABS REVIEWED AND DISCUSSED WITH PATIENT      Doing ok with holidays with wife's death.      Review of Systems   Constitutional: Negative for fever and unexpected weight change.   HENT: Negative for congestion and rhinorrhea.    Eyes: Negative for discharge and redness.   Respiratory: Positive for shortness of breath. Negative for cough and wheezing.    Cardiovascular: Positive for chest pain (minimal). Negative for palpitations and leg swelling.   Gastrointestinal: Negative for abdominal pain, constipation, diarrhea and vomiting.   Endocrine: Negative for cold intolerance, heat intolerance, polydipsia, polyphagia and polyuria.   Genitourinary: Negative for decreased urine volume and difficulty urinating.   Musculoskeletal: Positive for arthralgias, back pain and gait problem. Negative for joint  swelling.   Skin: Negative for rash and wound.   Neurological: Negative for syncope and headaches.   Psychiatric/Behavioral: Negative for behavioral problems and sleep disturbance.       Objective:      Physical Exam   Constitutional: He is oriented to person, place, and time. He appears well-developed and well-nourished. No distress.   Neck: No JVD present. No thyromegaly present.   Cardiovascular: Normal rate, regular rhythm and normal heart sounds.   No murmur heard.  Pulmonary/Chest: Effort normal and breath sounds normal. No respiratory distress. He has no wheezes. He has no rales.   Abdominal: Soft. He exhibits no distension and no mass. There is no tenderness. There is no guarding.   Musculoskeletal: He exhibits no edema.   Hunched, no active swelling. Uses hands to assist climbing/ambulation.   Lymphadenopathy:     He has no cervical adenopathy.   Neurological: He is alert and oriented to person, place, and time. No cranial nerve deficit. Coordination normal.   Skin: Capillary refill takes less than 2 seconds. No rash noted.   Psychiatric: He has a normal mood and affect. His behavior is normal. Judgment and thought content normal.       Assessment:       1. Chronic obstructive pulmonary disease, unspecified COPD type    2. Essential hypertension    3. Pure hypercholesterolemia    4. Benign prostatic hyperplasia with weak urinary stream    5. Low testosterone    6. Atherosclerosis of aorta    7. Abdominal aortic aneurysm without rupture    8. DDD (degenerative disc disease), lumbar    9. Multiple renal cysts    10. Lumbar arthropathy    11. Calcified granuloma of lung    12. Bilateral leg edema    13. PAH (pulmonary artery hypertension)    14. History of diverticulitis        Plan:       Chronic obstructive pulmonary disease, unspecified COPD type  -     fluticasone (FLOVENT DISKUS) 250 mcg/actuation DsDv; Inhale 1 puff into the lungs 2 (two) times daily. Controller  Dispense: 3 each; Refill: 3  -      Ambulatory consult to Pulmonology  -     Complete PFT with bronchodilator; Future; Expected date: 01/17/2019    Essential hypertension  -     Basic metabolic panel; Future; Expected date: 01/17/2019  -     CBC auto differential; Future; Expected date: 01/17/2019    Pure hypercholesterolemia  -     Lipid panel; Future; Expected date: 01/17/2019  -     ALT (SGPT); Future; Expected date: 01/17/2019  -     AST (SGOT); Future; Expected date: 01/17/2019    Benign prostatic hyperplasia with weak urinary stream    Low testosterone    Atherosclerosis of aorta    Abdominal aortic aneurysm without rupture    DDD (degenerative disc disease), lumbar    Multiple renal cysts    Lumbar arthropathy  -     gabapentin (NEURONTIN) 100 MG capsule; Take 1 capsule (100 mg total) by mouth 2 (two) times daily.  Dispense: 180 capsule; Refill: 3    Calcified granuloma of lung    Bilateral leg edema    PAH (pulmonary artery hypertension)  -     Ambulatory consult to Cardiology  -     2D echo with color flow doppler; Future    History of diverticulitis       Increase flovent to 250 BID and get echo and pfts and see pulmonary and card to see if pulmonary or cardiac concerns for SOB. Will monitor CBC(has diverticulosis). Maintain other meds, D&E as tolerated. F/U 6 months with labs. Tramadol use reviewed on [place] [facility] website.

## 2019-01-24 ENCOUNTER — CLINICAL SUPPORT (OUTPATIENT)
Dept: PULMONOLOGY | Facility: CLINIC | Age: 84
End: 2019-01-24
Attending: PEDIATRICS
Payer: MEDICARE

## 2019-01-24 ENCOUNTER — CLINICAL SUPPORT (OUTPATIENT)
Dept: CARDIOLOGY | Facility: CLINIC | Age: 84
End: 2019-01-24
Attending: PEDIATRICS
Payer: MEDICARE

## 2019-01-24 DIAGNOSIS — J44.9 CHRONIC OBSTRUCTIVE PULMONARY DISEASE, UNSPECIFIED COPD TYPE: Chronic | ICD-10-CM

## 2019-01-24 DIAGNOSIS — I27.21 PAH (PULMONARY ARTERY HYPERTENSION): ICD-10-CM

## 2019-01-24 LAB
BRPFT: ABNORMAL
DLCO ADJ PRE: 11.27 ML/(MIN*MMHG) (ref 16.17–30.03)
DLCO SINGLE BREATH LLN: 16.17
DLCO SINGLE BREATH PRE REF: 46 %
DLCO SINGLE BREATH REF: 23.1
DLCOC SBVA LLN: 2.2
DLCOC SBVA PRE REF: 80.2 %
DLCOC SBVA REF: 3.35
DLCOC SINGLE BREATH LLN: 16.17
DLCOC SINGLE BREATH PRE REF: 48.8 %
DLCOC SINGLE BREATH REF: 23.1
DLCOVA LLN: 2.2
DLCOVA PRE REF: 75.5 %
DLCOVA PRE: 2.53 ML/(MIN*MMHG*L) (ref 2.2–4.5)
DLCOVA REF: 3.35
DLVAADJ PRE: 2.68 ML/(MIN*MMHG*L) (ref 2.2–4.5)
ERV LLN: 0.82
ERV PRE REF: 68.3 %
ERV REF: 0.82
ERVN2 LLN: 0.82
ERVN2 REF: 0.82
FEF 25 75 LLN: 0.6
FEF 25 75 PRE REF: 12.5 %
FEF 25 75 REF: 1.76
FEV1 FVC LLN: 58
FEV1 FVC PRE REF: 45.8 %
FEV1 FVC REF: 74
FEV1 LLN: 1.74
FEV1 PRE REF: 37.9 %
FEV1 REF: 2.59
FEV6 LLN: 2.43
FEV6 PRE REF: 62 %
FEV6 PRE: 2.05 L (ref 2.43–4.18)
FEV6 REF: 3.31
FRCN2 LLN: 2.79
FRCN2 REF: 3.78
FRCPLETH LLN: 2.79
FRCPLETH PREREF: 183.2 %
FRCPLETH REF: 3.78
FVC LLN: 2.5
FVC PRE REF: 81.7 %
FVC REF: 3.54
IVC PRE: 2.42 L (ref 2.5–4.57)
IVC SINGLE BREATH LLN: 2.5
IVC SINGLE BREATH PRE REF: 68.3 %
IVC SINGLE BREATH REF: 3.54
MVV LLN: 86
MVV PRE REF: 41.3 %
MVV REF: 102
PEF LLN: 3.94
PEF PRE REF: 58.9 %
PEF REF: 6.19
PRE DLCO: 10.62 ML/(MIN*MMHG) (ref 16.17–30.03)
PRE ERV: 0.56 L (ref 0.82–0.82)
PRE FEF 25 75: 0.22 L/S (ref 0.6–2.91)
PRE FET 100: 19.52 SEC
PRE FEV1 FVC: 33.94 % (ref 58.34–89.76)
PRE FEV1: 0.98 L (ref 1.74–3.43)
PRE FRC PL: 6.92 L
PRE FVC: 2.89 L (ref 2.5–4.57)
PRE MVV: 42 L/MIN (ref 86.39–116.89)
PRE PEF: 3.65 L/S (ref 3.94–8.43)
PRE RV: 6.02 L (ref 2.28–3.63)
PRE TLC: 8.91 L (ref 5.75–8.05)
RAW LLN: 3.06
RAW PRE REF: 274.4 %
RAW PRE: 8.39 CMH2O*S/L (ref 3.06–3.06)
RAW REF: 3.06
RV LLN: 2.28
RV PRE REF: 203.8 %
RV REF: 2.95
RVN2 LLN: 2.28
RVN2 REF: 2.95
RVN2TLCN2 LLN: 39
RVN2TLCN2 REF: 48
RVTLC LLN: 39
RVTLC PRE REF: 142.3 %
RVTLC PRE: 67.57 % (ref 38.52–56.48)
RVTLC REF: 48
TLC LLN: 5.75
TLC PRE REF: 129.1 %
TLC REF: 6.9
TLCN2 LLN: 5.75
TLCN2 REF: 6.9
VA PRE: 4.21 L (ref 6.75–6.75)
VA SINGLE BREATH LLN: 6.75
VA SINGLE BREATH PRE REF: 62.3 %
VA SINGLE BREATH REF: 6.75
VC LLN: 2.5
VC PRE REF: 81.7 %
VC PRE: 2.89 L (ref 2.5–4.57)
VC REF: 3.54
VCMAXN2 LLN: 2.5
VCMAXN2 REF: 3.54
VTGRAWPRE: 6.67 L

## 2019-01-24 PROCEDURE — 93306 2D ECHO WITH COLOR FLOW DOPPLER: ICD-10-PCS | Mod: HCNC,S$GLB,, | Performed by: INTERNAL MEDICINE

## 2019-01-24 PROCEDURE — 94010 BREATHING CAPACITY TEST: CPT | Mod: HCNC,S$GLB,, | Performed by: INTERNAL MEDICINE

## 2019-01-24 PROCEDURE — 94729 PR C02/MEMBANE DIFFUSE CAPACITY: ICD-10-PCS | Mod: HCNC,S$GLB,, | Performed by: INTERNAL MEDICINE

## 2019-01-24 PROCEDURE — 94726 PLETHYSMOGRAPHY LUNG VOLUMES: CPT | Mod: HCNC,S$GLB,, | Performed by: INTERNAL MEDICINE

## 2019-01-24 PROCEDURE — 94726 PULM FUNCT TST PLETHYSMOGRAP: ICD-10-PCS | Mod: HCNC,S$GLB,, | Performed by: INTERNAL MEDICINE

## 2019-01-24 PROCEDURE — 94729 DIFFUSING CAPACITY: CPT | Mod: HCNC,S$GLB,, | Performed by: INTERNAL MEDICINE

## 2019-01-24 PROCEDURE — 94010 BREATHING CAPACITY TEST: ICD-10-PCS | Mod: HCNC,S$GLB,, | Performed by: INTERNAL MEDICINE

## 2019-01-24 PROCEDURE — 93306 TTE W/DOPPLER COMPLETE: CPT | Mod: HCNC,S$GLB,, | Performed by: INTERNAL MEDICINE

## 2019-01-25 DIAGNOSIS — I10 ESSENTIAL HYPERTENSION: Chronic | ICD-10-CM

## 2019-01-25 LAB
DIASTOLIC DYSFUNCTION: NO
ESTIMATED PA SYSTOLIC PRESSURE: 37.34
RETIRED EF AND QEF - SEE NOTES: 50 (ref 55–65)

## 2019-01-26 RX ORDER — LISINOPRIL 10 MG/1
TABLET ORAL
Qty: 90 TABLET | Refills: 3 | Status: SHIPPED | OUTPATIENT
Start: 2019-01-26 | End: 2019-11-13 | Stop reason: SDUPTHER

## 2019-01-28 DIAGNOSIS — I10 ESSENTIAL HYPERTENSION: Primary | ICD-10-CM

## 2019-01-30 ENCOUNTER — CLINICAL SUPPORT (OUTPATIENT)
Dept: CARDIOLOGY | Facility: CLINIC | Age: 84
End: 2019-01-30
Payer: MEDICARE

## 2019-01-30 ENCOUNTER — CLINICAL SUPPORT (OUTPATIENT)
Dept: PULMONOLOGY | Facility: CLINIC | Age: 84
End: 2019-01-30
Payer: MEDICARE

## 2019-01-30 ENCOUNTER — OFFICE VISIT (OUTPATIENT)
Dept: PULMONOLOGY | Facility: CLINIC | Age: 84
End: 2019-01-30
Payer: MEDICARE

## 2019-01-30 ENCOUNTER — OFFICE VISIT (OUTPATIENT)
Dept: CARDIOLOGY | Facility: CLINIC | Age: 84
End: 2019-01-30
Payer: MEDICARE

## 2019-01-30 VITALS
BODY MASS INDEX: 25.33 KG/M2 | SYSTOLIC BLOOD PRESSURE: 138 MMHG | DIASTOLIC BLOOD PRESSURE: 70 MMHG | HEART RATE: 104 BPM | HEIGHT: 72 IN | WEIGHT: 187 LBS

## 2019-01-30 VITALS
RESPIRATION RATE: 18 BRPM | HEIGHT: 72 IN | BODY MASS INDEX: 26.4 KG/M2 | OXYGEN SATURATION: 94 % | DIASTOLIC BLOOD PRESSURE: 70 MMHG | HEART RATE: 90 BPM | SYSTOLIC BLOOD PRESSURE: 136 MMHG | WEIGHT: 194.88 LBS

## 2019-01-30 DIAGNOSIS — E78.00 PURE HYPERCHOLESTEROLEMIA: Chronic | ICD-10-CM

## 2019-01-30 DIAGNOSIS — G47.34 NOCTURNAL HYPOXEMIA: Primary | ICD-10-CM

## 2019-01-30 DIAGNOSIS — E66.3 OVERWEIGHT (BMI 25.0-29.9): ICD-10-CM

## 2019-01-30 DIAGNOSIS — I10 ESSENTIAL HYPERTENSION: Chronic | ICD-10-CM

## 2019-01-30 DIAGNOSIS — J44.9 CHRONIC OBSTRUCTIVE PULMONARY DISEASE, UNSPECIFIED COPD TYPE: Primary | Chronic | ICD-10-CM

## 2019-01-30 DIAGNOSIS — J84.10 CALCIFIED GRANULOMA OF LUNG: ICD-10-CM

## 2019-01-30 DIAGNOSIS — G47.34 NOCTURNAL HYPOXEMIA: ICD-10-CM

## 2019-01-30 DIAGNOSIS — J41.0 SIMPLE CHRONIC BRONCHITIS: Chronic | ICD-10-CM

## 2019-01-30 DIAGNOSIS — I70.0 ATHEROSCLEROSIS OF AORTA: ICD-10-CM

## 2019-01-30 DIAGNOSIS — I10 ESSENTIAL HYPERTENSION: ICD-10-CM

## 2019-01-30 DIAGNOSIS — I27.21 PAH (PULMONARY ARTERY HYPERTENSION): ICD-10-CM

## 2019-01-30 DIAGNOSIS — J44.9 CHRONIC OBSTRUCTIVE PULMONARY DISEASE, UNSPECIFIED COPD TYPE: ICD-10-CM

## 2019-01-30 DIAGNOSIS — R94.31 ABNORMAL EKG: ICD-10-CM

## 2019-01-30 DIAGNOSIS — I71.40 ABDOMINAL AORTIC ANEURYSM WITHOUT RUPTURE: ICD-10-CM

## 2019-01-30 PROCEDURE — 99215 OFFICE O/P EST HI 40 MIN: CPT | Mod: 25,HCNC,S$GLB, | Performed by: INTERNAL MEDICINE

## 2019-01-30 PROCEDURE — 3288F PR FALLS RISK ASSESSMENT DOCUMENTED: ICD-10-PCS | Mod: HCNC,CPTII,S$GLB, | Performed by: INTERNAL MEDICINE

## 2019-01-30 PROCEDURE — 93000 EKG 12-LEAD: ICD-10-PCS | Mod: HCNC,S$GLB,, | Performed by: INTERNAL MEDICINE

## 2019-01-30 PROCEDURE — 99999 PR PBB SHADOW E&M-EST. PATIENT-LVL III: ICD-10-PCS | Mod: PBBFAC,HCNC,, | Performed by: INTERNAL MEDICINE

## 2019-01-30 PROCEDURE — 1101F PR PT FALLS ASSESS DOC 0-1 FALLS W/OUT INJ PAST YR: ICD-10-PCS | Mod: HCNC,CPTII,S$GLB, | Performed by: INTERNAL MEDICINE

## 2019-01-30 PROCEDURE — 99999 PR PBB SHADOW E&M-EST. PATIENT-LVL III: CPT | Mod: PBBFAC,HCNC,, | Performed by: INTERNAL MEDICINE

## 2019-01-30 PROCEDURE — 99999 PR PBB SHADOW E&M-EST. PATIENT-LVL IV: CPT | Mod: PBBFAC,HCNC,, | Performed by: INTERNAL MEDICINE

## 2019-01-30 PROCEDURE — 99214 OFFICE O/P EST MOD 30 MIN: CPT | Mod: HCNC,S$GLB,, | Performed by: INTERNAL MEDICINE

## 2019-01-30 PROCEDURE — 99499 UNLISTED E&M SERVICE: CPT | Mod: HCNC,S$GLB,, | Performed by: INTERNAL MEDICINE

## 2019-01-30 PROCEDURE — 99214 PR OFFICE/OUTPT VISIT, EST, LEVL IV, 30-39 MIN: ICD-10-PCS | Mod: HCNC,S$GLB,, | Performed by: INTERNAL MEDICINE

## 2019-01-30 PROCEDURE — 94762 PR NONINVASV OXYGEN SATUR, CONT: ICD-10-PCS | Mod: HCNC,S$GLB,, | Performed by: INTERNAL MEDICINE

## 2019-01-30 PROCEDURE — 1101F PT FALLS ASSESS-DOCD LE1/YR: CPT | Mod: HCNC,CPTII,S$GLB, | Performed by: INTERNAL MEDICINE

## 2019-01-30 PROCEDURE — 99499 RISK ADDL DX/OHS AUDIT: ICD-10-PCS | Mod: HCNC,S$GLB,, | Performed by: INTERNAL MEDICINE

## 2019-01-30 PROCEDURE — 93000 ELECTROCARDIOGRAM COMPLETE: CPT | Mod: HCNC,S$GLB,, | Performed by: INTERNAL MEDICINE

## 2019-01-30 PROCEDURE — 1100F PR PT FALLS ASSESS DOC 2+ FALLS/FALL W/INJURY/YR: ICD-10-PCS | Mod: HCNC,CPTII,S$GLB, | Performed by: INTERNAL MEDICINE

## 2019-01-30 PROCEDURE — 99215 PR OFFICE/OUTPT VISIT, EST, LEVL V, 40-54 MIN: ICD-10-PCS | Mod: 25,HCNC,S$GLB, | Performed by: INTERNAL MEDICINE

## 2019-01-30 PROCEDURE — 94762 N-INVAS EAR/PLS OXIMTRY CONT: CPT | Mod: HCNC,S$GLB,, | Performed by: INTERNAL MEDICINE

## 2019-01-30 PROCEDURE — 1100F PTFALLS ASSESS-DOCD GE2>/YR: CPT | Mod: HCNC,CPTII,S$GLB, | Performed by: INTERNAL MEDICINE

## 2019-01-30 PROCEDURE — 99999 PR PBB SHADOW E&M-EST. PATIENT-LVL IV: ICD-10-PCS | Mod: PBBFAC,HCNC,, | Performed by: INTERNAL MEDICINE

## 2019-01-30 PROCEDURE — 3288F FALL RISK ASSESSMENT DOCD: CPT | Mod: HCNC,CPTII,S$GLB, | Performed by: INTERNAL MEDICINE

## 2019-01-30 RX ORDER — FLUTICASONE PROPIONATE 250 UG/1
1 POWDER, METERED RESPIRATORY (INHALATION) 2 TIMES DAILY
Qty: 3 EACH | Refills: 3 | Status: SHIPPED | OUTPATIENT
Start: 2019-01-30 | End: 2019-05-16 | Stop reason: SDUPTHER

## 2019-01-30 RX ORDER — IBUPROFEN 200 MG
200 TABLET ORAL EVERY 6 HOURS PRN
COMMUNITY
End: 2020-01-17 | Stop reason: ALTCHOICE

## 2019-01-30 RX ORDER — ALBUTEROL SULFATE 0.83 MG/ML
2.5 SOLUTION RESPIRATORY (INHALATION)
Qty: 360 ML | Refills: 12 | Status: SHIPPED | OUTPATIENT
Start: 2019-01-30 | End: 2019-08-05 | Stop reason: SDUPTHER

## 2019-01-30 NOTE — PROGRESS NOTES
Subjective:   Patient ID:  Chao Hawk Jr. is a 86 y.o. male who presents for follow up of Hypertension (intermittent chest pain)      HPI  An 87 yo male with pah copd  htn hlp is here for f /u. He has not been in clinic for 1 year. Has no new issues except that eh noticed around 2 months ago recurrent intermittent left sided chest pain with exertion associated with shortness of breath it is very short lived resolved with rest.  He is doing his daily house work .has no syncope  chf palpitation tia or claudication  He wears compression socks. He is compliant with salt intake. His heart function is normal by echo.  Past Medical History:   Diagnosis Date    Arthritis     back, hand    Back pain     Dr. Cristobal- [facility]    Bilateral leg edema 2018    Chronic bronchitis     Diverticulosis 06    colonoscopy    Hernia     x2    Hyperlipidemia     Hypertension     Lumbar radiculopathy 2017    Multiple renal cysts 2016    Tobacco dependence        Past Surgical History:   Procedure Laterality Date    EYE SURGERY Bilateral     Glaucoma- Dr. Gianni Payne    FINGER SURGERY Right 1980s    index- Dr. Encarnacion    HERNIA REPAIR      x2    LUMBAR SPINE SURGERY  2017    TONSILLECTOMY, ADENOIDECTOMY         Social History     Tobacco Use    Smoking status: Former Smoker     Packs/day: 1.00     Years: 40.00     Pack years: 40.00     Start date: 1949     Last attempt to quit: 1991     Years since quittin.0    Smokeless tobacco: Never Used   Substance Use Topics    Alcohol use: Yes     Alcohol/week: 9.0 oz     Types: 7 Shots of liquor, 8 Standard drinks or equivalent per week     Frequency: 4 or more times a week     Drinks per session: 1 or 2     Binge frequency: Never    Drug use: No       Family History   Problem Relation Age of Onset    Emphysema Mother     Heart disease Mother     COPD Mother     Appendicitis Father     Heart disease Son     Colon cancer Neg Hx      Cancer Neg Hx     Stroke Neg Hx        Current Outpatient Medications   Medication Sig    acetaminophen (TYLENOL) 500 MG tablet Take 1,000 mg by mouth 2 (two) times daily as needed for Pain.     albuterol (PROVENTIL) 2.5 mg /3 mL (0.083 %) nebulizer solution Take 3 mLs (2.5 mg total) by nebulization every 6 (six) hours while awake.    aspirin (ECOTRIN) 81 MG EC tablet Take 81 mg by mouth every evening.     atorvastatin (LIPITOR) 40 MG tablet TAKE 1 TABLET EVERY DAY    furosemide (LASIX ORAL) Take 10 mg by mouth once daily.    gabapentin (NEURONTIN) 100 MG capsule Take 1 capsule (100 mg total) by mouth 2 (two) times daily.    ibuprofen (ADVIL,MOTRIN) 200 MG tablet Take 200 mg by mouth every 6 (six) hours as needed for Pain.    lisinopril 10 MG tablet TAKE 1 TABLET EVERY DAY    tamsulosin (FLOMAX) 0.4 mg Cap TAKE 2 CAPSULES EVERY MORNING (Patient taking differently: TAKE 2 CAPSULES EVERY MORNING and 1 AT BEDTIME.)    traMADol (ULTRAM) 50 mg tablet Take 1 tablet (50 mg total) by mouth daily as needed for Pain.    epinephrine (EPIPEN) 0.3 mg/0.3 mL (1:1,000) AtIn Inject 1 each into the muscle once.    fluticasone (FLOVENT DISKUS) 250 mcg/actuation DsDv Inhale 1 puff into the lungs 2 (two) times daily. Controller     Current Facility-Administered Medications   Medication    testosterone cypionate injection 200 mg     Current Outpatient Medications on File Prior to Visit   Medication Sig    acetaminophen (TYLENOL) 500 MG tablet Take 1,000 mg by mouth 2 (two) times daily as needed for Pain.     albuterol (PROVENTIL) 2.5 mg /3 mL (0.083 %) nebulizer solution Take 3 mLs (2.5 mg total) by nebulization every 6 (six) hours while awake.    aspirin (ECOTRIN) 81 MG EC tablet Take 81 mg by mouth every evening.     atorvastatin (LIPITOR) 40 MG tablet TAKE 1 TABLET EVERY DAY    furosemide (LASIX ORAL) Take 10 mg by mouth once daily.    gabapentin (NEURONTIN) 100 MG capsule Take 1 capsule (100 mg total) by  mouth 2 (two) times daily.    ibuprofen (ADVIL,MOTRIN) 200 MG tablet Take 200 mg by mouth every 6 (six) hours as needed for Pain.    lisinopril 10 MG tablet TAKE 1 TABLET EVERY DAY    tamsulosin (FLOMAX) 0.4 mg Cap TAKE 2 CAPSULES EVERY MORNING (Patient taking differently: TAKE 2 CAPSULES EVERY MORNING and 1 AT BEDTIME.)    traMADol (ULTRAM) 50 mg tablet Take 1 tablet (50 mg total) by mouth daily as needed for Pain.    epinephrine (EPIPEN) 0.3 mg/0.3 mL (1:1,000) AtIn Inject 1 each into the muscle once.    fluticasone (FLOVENT DISKUS) 250 mcg/actuation DsDv Inhale 1 puff into the lungs 2 (two) times daily. Controller     Current Facility-Administered Medications on File Prior to Visit   Medication    testosterone cypionate injection 200 mg     Review of patient's allergies indicates:   Allergen Reactions    Bee sting  [allergen ext-venom-honey bee] Swelling    Poison ivy extract Hives    Penicillins Rash       Review of Systems   Constitution: Negative for weakness and malaise/fatigue.   Eyes: Negative for blurred vision.   Cardiovascular: Positive for chest pain, dyspnea on exertion and leg swelling. Negative for claudication, cyanosis, irregular heartbeat, near-syncope, orthopnea, palpitations and paroxysmal nocturnal dyspnea.   Respiratory: Positive for shortness of breath. Negative for cough and hemoptysis.    Hematologic/Lymphatic: Negative for bleeding problem. Does not bruise/bleed easily.   Skin: Negative for dry skin and itching.   Musculoskeletal: Negative for falls, muscle weakness and myalgias.   Gastrointestinal: Negative for abdominal pain, diarrhea, heartburn, hematemesis, hematochezia and melena.   Genitourinary: Negative for flank pain and hematuria.   Neurological: Negative for dizziness, focal weakness, headaches, light-headedness, numbness, paresthesias and seizures.   Psychiatric/Behavioral: Negative for altered mental status and memory loss. The patient is not nervous/anxious.     Allergic/Immunologic: Negative for hives.       Objective:   Physical Exam   Constitutional: He is oriented to person, place, and time. He appears well-developed and well-nourished. No distress.   HENT:   Head: Normocephalic and atraumatic.   Eyes: EOM are normal. Pupils are equal, round, and reactive to light. Right eye exhibits no discharge. Left eye exhibits no discharge.   Neck: Neck supple. No JVD present. No thyromegaly present.   Cardiovascular: Normal rate, regular rhythm, normal heart sounds and intact distal pulses. Exam reveals no gallop and no friction rub.   No murmur heard.  Pulses:       Carotid pulses are 2+ on the right side, and 2+ on the left side.       Radial pulses are 2+ on the right side, and 2+ on the left side.        Femoral pulses are 2+ on the right side, and 2+ on the left side.       Popliteal pulses are 2+ on the right side, and 2+ on the left side.        Dorsalis pedis pulses are 2+ on the right side, and 2+ on the left side.        Posterior tibial pulses are 2+ on the right side, and 2+ on the left side.   Pulmonary/Chest: Effort normal and breath sounds normal. No respiratory distress. He has no wheezes. He has no rales. He exhibits no tenderness.   Abdominal: Soft. Bowel sounds are normal. He exhibits no distension. There is no tenderness.   Musculoskeletal: Normal range of motion. He exhibits edema (trace bilateral ).   Loss of hair in legs with spider veins noted.   Neurological: He is alert and oriented to person, place, and time. No cranial nerve deficit.   Skin: Skin is warm and dry. No rash noted. He is not diaphoretic. No erythema.   Psychiatric: He has a normal mood and affect. His behavior is normal.   Nursing note and vitals reviewed.    Vitals:    01/30/19 1346   BP: 138/70   Patient Position: Sitting   BP Method: Large (Manual)   Pulse: 104   Weight: 84.8 kg (187 lb)   Height: 6' (1.829 m)     Lab Results   Component Value Date    CHOL 122 01/08/2019    CHOL 134  07/10/2018    CHOL 142 01/09/2018     Lab Results   Component Value Date    HDL 47 01/08/2019    HDL 52 07/10/2018    HDL 53 01/09/2018     Lab Results   Component Value Date    LDLCALC 64.8 01/08/2019    LDLCALC 64.8 07/10/2018    LDLCALC 72.0 01/09/2018     Lab Results   Component Value Date    TRIG 51 01/08/2019    TRIG 86 07/10/2018    TRIG 85 01/09/2018     Lab Results   Component Value Date    CHOLHDL 38.5 01/08/2019    CHOLHDL 38.8 07/10/2018    CHOLHDL 37.3 01/09/2018       Chemistry        Component Value Date/Time     01/08/2019 0922    K 4.6 01/08/2019 0922     01/08/2019 0922    CO2 29 01/08/2019 0922    BUN 20 01/08/2019 0922    CREATININE 0.9 01/08/2019 0922     01/08/2019 0922        Component Value Date/Time    CALCIUM 9.6 01/08/2019 0922    ALKPHOS 55 04/13/2018 0859    AST 23 01/08/2019 0922    ALT 22 01/08/2019 0922    BILITOT 0.4 04/13/2018 0859    ESTGFRAFRICA >60.0 01/08/2019 0922    EGFRNONAA >60.0 01/08/2019 0922          Lab Results   Component Value Date    TSH 2.14 05/16/2011     Lab Results   Component Value Date    INR 1.0 04/13/2018    INR 1.0 02/08/2018    INR 1.0 08/21/2017     Lab Results   Component Value Date    WBC 9.08 01/08/2019    HGB 12.7 (L) 01/08/2019    HCT 39.1 (L) 01/08/2019    MCV 95 01/08/2019     01/08/2019     BMP  Sodium   Date Value Ref Range Status   01/08/2019 142 136 - 145 mmol/L Final     Potassium   Date Value Ref Range Status   01/08/2019 4.6 3.5 - 5.1 mmol/L Final     Chloride   Date Value Ref Range Status   01/08/2019 105 95 - 110 mmol/L Final     CO2   Date Value Ref Range Status   01/08/2019 29 23 - 29 mmol/L Final     BUN, Bld   Date Value Ref Range Status   01/08/2019 20 8 - 23 mg/dL Final     Creatinine   Date Value Ref Range Status   01/08/2019 0.9 0.5 - 1.4 mg/dL Final     Calcium   Date Value Ref Range Status   01/08/2019 9.6 8.7 - 10.5 mg/dL Final     Anion Gap   Date Value Ref Range Status   01/08/2019 8 8 - 16 mmol/L  Final     eGFR if    Date Value Ref Range Status   01/08/2019 >60.0 >60 mL/min/1.73 m^2 Final     eGFR if non    Date Value Ref Range Status   01/08/2019 >60.0 >60 mL/min/1.73 m^2 Final     Comment:     Calculation used to obtain the estimated glomerular filtration  rate (eGFR) is the CKD-EPI equation.        CrCl cannot be calculated (Patient's most recent lab result is older than the maximum 7 days allowed.).    Assessment:     1. Chronic obstructive pulmonary disease, unspecified COPD type    2. Essential hypertension    3. Pure hypercholesterolemia    4. Atherosclerosis of aorta    5. Abdominal aortic aneurysm without rupture    6. Overweight (BMI 25.0-29.9)    7. PAH (pulmonary artery hypertension)    has atypical chest pain some symptoms of exertional angina however . Will evaluate with cardiolite.  Lipids on target  Plan:   lexiscan  Continue current therapy  Cardiac low salt diet.  Risk factor modification and excercise program.  Phone review

## 2019-01-30 NOTE — LETTER
January 30, 2019      [place]  [address]  [place] [address]           [facility]  [address] [facility] [address]  Phone: [phone]  Fax: [phone]          Patient: Chao Hawk Jr.   MR Number: [id number]   YOB: 1932   Date of Visit: 1/30/2019       Dear Dr. MONSTER Ureña Jr.:    Thank you for referring Chao Hawk to me for evaluation. Attached you will find relevant portions of my assessment and plan of care.    If you have questions, please do not hesitate to call me. I look forward to following Chao Hawk along with you.    Sincerely,    Gael Hoffmann MD    Enclosure  CC:  No Recipients    If you would like to receive this communication electronically, please contact [email or web address] or [phone] to request more information on [employer] access.    For providers and/or their staff who would like to refer a patient to [facility], please contact us through our one-stop-shop provider referral line, [facility], at [phone].    If you feel you have received this communication in error or would no longer like to receive these types of communications, please e-mail [email or web address]

## 2019-01-30 NOTE — PROGRESS NOTES
Subjective:       Patient ID: Chao Hawk Jr. is a 86 y.o. male.    Chief Complaint: He       COPD    HPI   COPD  He presents for evaluation and treatment of COPD. The patient is not currently have symptoms / an exacerbation. The patient has COPD for approximately 10 years. Symptoms in previous episodes have included dyspnea, cough and wheezing, and typically last 2 weeks. Previous episodes have been exacerbated by any exercise. Current treatment includes albuterol inhaler, which generally provides some relief of symptoms.   He uses 1 pillows at night. Patient currently is not on home oxygen therapy.. The patient is having no constitutional symptoms, denying fever, chills, anorexia, or weight loss. The patient has been hospitalized for this condition before. He has a history of 40 pack years. The patient is experiencing exercise intolerance (difficulty walking 1 blocks on flat ground).     Pulmonary Hypertension  He presents for evaluation and treatment of pulmonary hypertension. Symptoms include dyspnea on exertion, productive cough, shortness of breath and sputum production.  Symptoms began 2 years ago, unchanged since that time. He denies chest pain, located left chest. Associated symptoms include myalgias and productive cough. He does not have had recent travel. Weight has been stable. Appetite has been unaffected. Symptoms are exacerbated by any exercise. Symptoms are alleviated by rest. Work up thus far has included Echo.        Past Medical History:   Diagnosis Date    Arthritis     back, hand    Back pain     Dr. Cristobal- [facility]    Bilateral leg edema 2/16/2018    Chronic bronchitis     Diverticulosis 5/16/06    colonoscopy    Hernia     x2    Hyperlipidemia     Hypertension     Lumbar radiculopathy 6/14/2017    Multiple renal cysts 7/5/2016    Tobacco dependence      Past Surgical History:   Procedure Laterality Date    EYE SURGERY Bilateral     Glaucoma- Dr. Gianni Payne    FINGER SURGERY  Right     index- Dr. Encarnacion    HERNIA REPAIR      x2    LUMBAR SPINE SURGERY  2017    TONSILLECTOMY, ADENOIDECTOMY  1940     Social History     Socioeconomic History    Marital status:      Spouse name: Linsey    Number of children: 4    Years of education: 12 + 4    Highest education level: Not on file   Social Needs    Financial resource strain: Not on file    Food insecurity - worry: Not on file    Food insecurity - inability: Not on file    Transportation needs - medical: Not on file    Transportation needs - non-medical: Not on file   Occupational History    Occupation:  retired age 60     Comment: [employer]   Tobacco Use    Smoking status: Former Smoker     Packs/day: 1.00     Years: 40.00     Pack years: 40.00     Start date: 1949     Last attempt to quit: 1991     Years since quittin.0    Smokeless tobacco: Never Used   Substance and Sexual Activity    Alcohol use: Yes     Alcohol/week: 9.0 oz     Types: 7 Shots of liquor, 8 Standard drinks or equivalent per week     Frequency: 4 or more times a week     Drinks per session: 1 or 2     Binge frequency: Never    Drug use: No    Sexual activity: No   Other Topics Concern    Not on file   Social History Narrative    No smokers or pets in household.     Review of Systems   Constitutional: Negative for fever and fatigue.   HENT: Negative for postnasal drip and rhinorrhea.    Eyes: Negative for redness and itching.   Respiratory: Positive for apnea, shortness of breath, dyspnea on extertion and Paroxysmal Nocturnal Dyspnea. Negative for cough and wheezing.    Cardiovascular: Negative for chest pain.   Genitourinary: Negative for difficulty urinating and hematuria.   Endocrine: Negative for polyphagia, cold intolerance and heat intolerance.    Musculoskeletal: Positive for arthralgias.   Skin: Negative for rash.   Gastrointestinal: Negative for nausea, vomiting, abdominal pain and abdominal  distention.   Neurological: Negative for dizziness and headaches.   Hematological: Negative for adenopathy. Does not bruise/bleed easily and no excessive bruising.   Psychiatric/Behavioral: Positive for sleep disturbance. The patient is not nervous/anxious.        Objective:      Physical Exam   Constitutional: He is oriented to person, place, and time. He appears well-developed and well-nourished.   HENT:   Head: Normocephalic and atraumatic.   Eyes: Conjunctivae are normal. Pupils are equal, round, and reactive to light.   Neck: Neck supple. No JVD present. No tracheal deviation present. No thyromegaly present.   Cardiovascular: Normal rate, regular rhythm and normal heart sounds.   Pulmonary/Chest: Effort normal. He has decreased breath sounds in the right lower field and the left lower field.   Abdominal: Soft.   Musculoskeletal: Normal range of motion.   Lymphadenopathy:     He has no cervical adenopathy.   Neurological: He is alert and oriented to person, place, and time.   Skin: Skin is warm and dry.   Nursing note and vitals reviewed.    Personal Diagnostic Review  Chest x-ray: hyperinflation\    Office Spirometry Results:     No flowsheet data found.  Pulmonary Studies Review 1/30/2019   SpO2 94   Ordering Provider -   Interpreting Provider -   Performing nurse/tech/RT -   Diagnosis -   Height 72.000   Weight 3118.19   BMI (Calculated) 26.5   Predicted Distance 241.5   Patient Race -   6MWT Status -   Patient Reported -   Was O2 used? -   6MW Distance walked (feet) -   Distance walked (meters) -   Did patient stop? -   Type of assistive device(s) used? -   Is extra documentation required for this patient? -   Oxygen Saturation -   Supplemental Oxygen -   Heart Rate -   Blood Pressure -   Katie Dyspnea Rating  -   Oxygen Saturation -   Supplemental Oxygen -   Heart Rate -   Blood Pressure -   Katie Dyspnea Rating  -   Recovery Time (seconds) -   Oxygen Saturation -   Supplemental Oxygen -   Heart Rate -    Blood Pressure -   Katie Dyspnea Rating  -   Is procedure ready for interpretation? -   Did the patient stop or pause? -   Total Time Walked (Calculated) -   Total Laps Walked -   Final Partial Lap Distance (feet) -   Total Distance Feet (Calculated) -   Total Distance Meters (Calculated) -   Predicted Distance Meters (Calculated) 488.25   Percentage of Predicted (Calculated) -   Peak VO2 (Calculated) -   Mets -   Has The Patient Had a Previous Six Minute Walk Test? -   Oxygen Qualification? -         Assessment:       1. Nocturnal hypoxemia    2. Chronic obstructive pulmonary disease, unspecified COPD type    3. PAH (pulmonary artery hypertension)        Outpatient Encounter Medications as of 1/30/2019   Medication Sig Dispense Refill    acetaminophen (TYLENOL) 500 MG tablet Take 1,000 mg by mouth 2 (two) times daily as needed for Pain.       albuterol (PROVENTIL) 2.5 mg /3 mL (0.083 %) nebulizer solution Take 3 mLs (2.5 mg total) by nebulization every 6 (six) hours while awake. 360 mL 12    aspirin (ECOTRIN) 81 MG EC tablet Take 81 mg by mouth every evening.       atorvastatin (LIPITOR) 40 MG tablet TAKE 1 TABLET EVERY DAY 90 tablet 3    epinephrine (EPIPEN) 0.3 mg/0.3 mL (1:1,000) AtIn Inject 1 each into the muscle once.      fluticasone (FLOVENT DISKUS) 250 mcg/actuation DsDv Inhale 1 puff into the lungs 2 (two) times daily. Controller 3 each 3    furosemide (LASIX ORAL) Take 10 mg by mouth once daily.      gabapentin (NEURONTIN) 100 MG capsule Take 1 capsule (100 mg total) by mouth 2 (two) times daily. 180 capsule 3    ibuprofen (ADVIL,MOTRIN) 200 MG tablet Take 200 mg by mouth every 6 (six) hours as needed for Pain.      lisinopril 10 MG tablet TAKE 1 TABLET EVERY DAY 90 tablet 3    tamsulosin (FLOMAX) 0.4 mg Cap TAKE 2 CAPSULES EVERY MORNING (Patient taking differently: TAKE 2 CAPSULES EVERY MORNING and 1 AT BEDTIME.) 180 capsule 3    traMADol (ULTRAM) 50 mg tablet Take 1 tablet (50 mg total) by  mouth daily as needed for Pain. 30 tablet 5    [DISCONTINUED] albuterol (PROVENTIL) 2.5 mg /3 mL (0.083 %) nebulizer solution Take 3 mLs (2.5 mg total) by nebulization every 6 (six) hours while awake. 360 mL 12    [DISCONTINUED] fluticasone (FLOVENT DISKUS) 250 mcg/actuation DsDv Inhale 1 puff into the lungs 2 (two) times daily. Controller 3 each 3    [DISCONTINUED] lisinopril 10 MG tablet TAKE 1 TABLET ONE TIME DAILY 90 tablet 3     Facility-Administered Encounter Medications as of 1/30/2019   Medication Dose Route Frequency Provider Last Rate Last Dose    testosterone cypionate injection 200 mg  200 mg Intramuscular Q28 Days MONSTER Ureña Jr., MD   200 mg at 01/08/19 1108     Orders Placed This Encounter   Procedures    Complete PFT with bronchodilator     Standing Status:   Future     Standing Expiration Date:   7/30/2020    PULSE OXIMETRY OVERNIGHT     Standing Status:   Future     Standing Expiration Date:   1/30/2020     Order Specific Question:   Reason for Test?     Answer:   O2 Qualification     Comments:   On room air     Order Specific Question:   Symptoms:     Answer:   Daytime Fatigue     Plan:       Requested Prescriptions     Signed Prescriptions Disp Refills    albuterol (PROVENTIL) 2.5 mg /3 mL (0.083 %) nebulizer solution 360 mL 12     Sig: Take 3 mLs (2.5 mg total) by nebulization every 6 (six) hours while awake.    fluticasone (FLOVENT DISKUS) 250 mcg/actuation DsDv 3 each 3     Sig: Inhale 1 puff into the lungs 2 (two) times daily. Controller     Nocturnal hypoxemia  -     PULSE OXIMETRY OVERNIGHT; Future    Chronic obstructive pulmonary disease, unspecified COPD type  -     albuterol (PROVENTIL) 2.5 mg /3 mL (0.083 %) nebulizer solution; Take 3 mLs (2.5 mg total) by nebulization every 6 (six) hours while awake.  Dispense: 360 mL; Refill: 12  -     fluticasone (FLOVENT DISKUS) 250 mcg/actuation DsDv; Inhale 1 puff into the lungs 2 (two) times daily. Controller  Dispense: 3 each;  Refill: 3  -     Complete PFT with bronchodilator; Future; Expected date: 01/30/2019    PAH (pulmonary artery hypertension)  -     PULSE OXIMETRY OVERNIGHT; Future  -     Complete PFT with bronchodilator; Future; Expected date: 01/30/2019           Follow-up in about 6 months (around 7/30/2019) for Overnight O2 Sat ASAP.    MEDICAL DECISION MAKING: Moderate to high complexity.  Overall, the multiple problems listed are of moderate to high severity that may impact quality of life and activities of daily living. Side effects of medications, treatment plan as well as options and alternatives reviewed and discussed with patient. There was counseling of patient concerning these issues.    Total time spent in face to face counseling and coordination of care - 40  minutes over 50% of time was used in discussion of prognosis, risks, benefits of treatment, instructions and compliance with regimen . Discussion with other physicians or health care providers (DME, NP, pharmacy, respiratory therapy) occurred.

## 2019-01-31 ENCOUNTER — PATIENT MESSAGE (OUTPATIENT)
Dept: INTERNAL MEDICINE | Facility: CLINIC | Age: 84
End: 2019-01-31

## 2019-01-31 DIAGNOSIS — G47.34 NOCTURNAL HYPOXEMIA: Primary | ICD-10-CM

## 2019-02-05 NOTE — PROCEDURES
[facility]  Kelli Laughlin la  Test date: 19 Start: 19 23:37:53 Chao Hawk Jr  Doctor: Dr Sotelo End: 19 06:51:49 [id number]  Oximetry: Summary Report  Comments: ROOM AIR  Recording time: 07:13:56 Highest pulse: 112 Highest SpO2: 98%  Excluded samplin:50:28 Lowest pulse: 60 Lowest SpO2: 77%  Total valid samplin:23:28 Mean pulse: 79 Mean SpO2: 92.6%  1 S.D.: 7.4 1 S.D.: 2.7  Time with SpO2<90: 0:40:00, 10.4%  Time with SpO2<80: 0:00:24, 0.1%  Time with SpO2<70: 0:00:00, 0.0%  Time with SpO2<60: 0:00:00, 0.0%  Time with SpO2<88: 0:21:48, 5.7%  Time with SpO2 =>90: 5:43:28, 89.6%  Time with SpO2=>80 & <90: 0:39:36, 10.3%  Time with SpO2=>70 & <80: 0:00:24, 0.1%  Time with SpO2=>60 & <70: 0:00:00, 0.0%  The longest continuous time with saturation <=89 was 00:01:24, which started at  19 03:26:05.  A desaturation event was defined as a decrease of saturation by 4 or more.  No events were excluded due to artifact.  There were 5 desaturation events over 3 minutes duration.  There were 199 desaturation events of less than 3 minutes duration during which:  The mean high was 95.8%. The mean low was 87.8%.  The number of these events that were:  > 0 & <10 seconds: 2 > 0 seconds: 199  =>10 & <20 seconds: 34 =>10 seconds: 197  =>20 & <30 seconds: 81 =>20 seconds: 163  =>30 & <40 seconds: 32 =>30 seconds: 82  =>40 & <50 seconds: 17 =>40 seconds: 50  =>50 & <60 seconds: 10 =>50 seconds: 33  =>60 seconds: 23 =>60 seconds: 23  The mean length of desaturation events that were >=10 sec & <=3 mins was: 33.8 sec.  Desaturation event index (events >=10 sec per sampled hour): 30.8  Desaturation event index (events >= 0 sec per sampled hour): 31.1    Overnight Oxygen Saturation Study:    INTERPRETATION:  Overnight O2 saturation study reviewed.  Conditions of testing: Stable Outpatient on Room air.   Abnormal overnight oxygenation Time with SpO2<88: 0:21:48, 5.7%  of the study period. Lowest oxygen  satruation recorded was 77% . Clinical correlation suggested.    Obed Sotelo MD  Pulmonary / Critical Care Medicine    Medicare Criteria Comments:   Oximetry test results suggest the patient does fall under Medicare Group 1 Criteria.   (Arterial oxygen saturation at or below 88% for at least 5 minutes taken during sleep)    Details about Medicare Group Criteria coverage can be found at http://www.cms.hhs.gov/manuals/downloads/

## 2019-02-06 ENCOUNTER — HOSPITAL ENCOUNTER (OUTPATIENT)
Dept: RADIOLOGY | Facility: HOSPITAL | Age: 84
Discharge: HOME | End: 2019-02-06
Attending: INTERNAL MEDICINE
Payer: MEDICARE

## 2019-02-06 ENCOUNTER — CLINICAL SUPPORT (OUTPATIENT)
Dept: INTERNAL MEDICINE | Facility: CLINIC | Age: 84
End: 2019-02-06
Payer: MEDICARE

## 2019-02-06 ENCOUNTER — CLINICAL SUPPORT (OUTPATIENT)
Dept: CARDIOLOGY | Facility: CLINIC | Age: 84
End: 2019-02-06
Attending: INTERNAL MEDICINE
Payer: MEDICARE

## 2019-02-06 DIAGNOSIS — R94.31 ABNORMAL EKG: ICD-10-CM

## 2019-02-06 PROBLEM — G47.34 NOCTURNAL HYPOXEMIA: Status: ACTIVE | Noted: 2019-02-06

## 2019-02-06 LAB — DIASTOLIC DYSFUNCTION: NO

## 2019-02-06 PROCEDURE — 99999 PR PBB SHADOW E&M-EST. PATIENT-LVL II: CPT | Mod: PBBFAC,HCNC,,

## 2019-02-06 PROCEDURE — 99999 PR PBB SHADOW E&M-EST. PATIENT-LVL II: ICD-10-PCS | Mod: PBBFAC,HCNC,,

## 2019-02-06 PROCEDURE — 93015 NM MULTI PHARM STRESS CARDIAC COMPONENT: ICD-10-PCS | Mod: HCNC,S$GLB,, | Performed by: INTERNAL MEDICINE

## 2019-02-06 PROCEDURE — 93015 CV STRESS TEST SUPVJ I&R: CPT | Mod: HCNC,S$GLB,, | Performed by: INTERNAL MEDICINE

## 2019-02-06 PROCEDURE — 78452 NM MULTI PHARM STRESS CARDIAC COMPONENT: ICD-10-PCS | Mod: 26,HCNC,, | Performed by: INTERNAL MEDICINE

## 2019-02-06 PROCEDURE — 96372 PR INJECTION,THERAP/PROPH/DIAG2ST, IM OR SUBCUT: ICD-10-PCS | Mod: HCNC,S$GLB,, | Performed by: PEDIATRICS

## 2019-02-06 PROCEDURE — A9502 TC99M TETROFOSMIN: HCPCS | Mod: HCNC

## 2019-02-06 PROCEDURE — 78452 HT MUSCLE IMAGE SPECT MULT: CPT | Mod: 26,HCNC,, | Performed by: INTERNAL MEDICINE

## 2019-02-06 PROCEDURE — 96372 THER/PROPH/DIAG INJ SC/IM: CPT | Mod: HCNC,S$GLB,, | Performed by: PEDIATRICS

## 2019-02-06 RX ADMIN — TESTOSTERONE CYPIONATE 200 MG: 200 INJECTION, SOLUTION INTRAMUSCULAR at 11:02

## 2019-02-06 NOTE — PROGRESS NOTES
Requested pt to remain in room 15 minutes to monitor for s/s adverse reactions.  Pt tolerated injection well.

## 2019-02-07 ENCOUNTER — TELEPHONE (OUTPATIENT)
Dept: CARDIOLOGY | Facility: CLINIC | Age: 84
End: 2019-02-07

## 2019-02-07 DIAGNOSIS — J43.1 PANLOBULAR EMPHYSEMA: ICD-10-CM

## 2019-02-07 DIAGNOSIS — G47.34 NOCTURNAL HYPOXEMIA: Primary | ICD-10-CM

## 2019-02-07 NOTE — PROGRESS NOTES
Orders for oxygen at night submitted  Subjective:       Patient ID: Chao Hawk Jr. is a 86 y.o. male.    Chief Complaint: He       No chief complaint on file.    HPI   COPD  He presents for evaluation and treatment of COPD. The patient is not currently have symptoms / an exacerbation. The patient has COPD for approximately 10 years. Symptoms in previous episodes have included dyspnea, cough and wheezing, and typically last 2 weeks. Previous episodes have been exacerbated by any exercise. Current treatment includes albuterol inhaler, which generally provides some relief of symptoms.   He uses 1 pillows at night. Patient currently is not on home oxygen therapy.. The patient is having no constitutional symptoms, denying fever, chills, anorexia, or weight loss. The patient has been hospitalized for this condition before. He has a history of 40 pack years. The patient is experiencing exercise intolerance (difficulty walking 1 blocks on flat ground).     Pulmonary Hypertension  He presents for evaluation and treatment of pulmonary hypertension. Symptoms include dyspnea on exertion, productive cough, shortness of breath and sputum production.  Symptoms began 2 years ago, unchanged since that time. He denies chest pain, located left chest. Associated symptoms include myalgias and productive cough. He does not have had recent travel. Weight has been stable. Appetite has been unaffected. Symptoms are exacerbated by any exercise. Symptoms are alleviated by rest. Work up thus far has included Echo.        Past Medical History:   Diagnosis Date    Arthritis     back, hand    Back pain     Dr. Cristobal- [facility]    Bilateral leg edema 2/16/2018    Chronic bronchitis     Diverticulosis 5/16/06    colonoscopy    Hernia     x2    Hyperlipidemia     Hypertension     Lumbar radiculopathy 6/14/2017    Multiple renal cysts 7/5/2016    Tobacco dependence      Past Surgical History:   Procedure Laterality Date    EYE SURGERY  Bilateral     Glaucoma- Dr. Gianni Payne    FINGER SURGERY Right     index- Dr. Encarnacion    HERNIA REPAIR  1990s    x2    LUMBAR SPINE SURGERY  2017    TONSILLECTOMY, ADENOIDECTOMY  1940     Social History     Socioeconomic History    Marital status:      Spouse name: Linsey    Number of children: 4    Years of education: 12 + 4    Highest education level: Not on file   Social Needs    Financial resource strain: Not on file    Food insecurity - worry: Not on file    Food insecurity - inability: Not on file    Transportation needs - medical: Not on file    Transportation needs - non-medical: Not on file   Occupational History    Occupation:  retired age 60     Comment: [employer]   Tobacco Use    Smoking status: Former Smoker     Packs/day: 1.00     Years: 40.00     Pack years: 40.00     Start date: 1949     Last attempt to quit: 1991     Years since quittin.1    Smokeless tobacco: Never Used   Substance and Sexual Activity    Alcohol use: Yes     Alcohol/week: 9.0 oz     Types: 7 Shots of liquor, 8 Standard drinks or equivalent per week     Frequency: 4 or more times a week     Drinks per session: 1 or 2     Binge frequency: Never    Drug use: No    Sexual activity: No   Other Topics Concern    Not on file   Social History Narrative    No smokers or pets in household.     Review of Systems   Constitutional: Negative for fever and fatigue.   HENT: Negative for postnasal drip and rhinorrhea.    Eyes: Negative for redness and itching.   Respiratory: Positive for apnea, shortness of breath, dyspnea on extertion and Paroxysmal Nocturnal Dyspnea. Negative for cough and wheezing.    Cardiovascular: Negative for chest pain.   Genitourinary: Negative for difficulty urinating and hematuria.   Endocrine: Negative for polyphagia, cold intolerance and heat intolerance.    Musculoskeletal: Positive for arthralgias.   Skin: Negative for rash.   Gastrointestinal:  Negative for nausea, vomiting, abdominal pain and abdominal distention.   Neurological: Negative for dizziness and headaches.   Hematological: Negative for adenopathy. Does not bruise/bleed easily and no excessive bruising.   Psychiatric/Behavioral: Positive for sleep disturbance. The patient is not nervous/anxious.        Objective:      Physical Exam   Constitutional: He is oriented to person, place, and time. He appears well-developed and well-nourished.   HENT:   Head: Normocephalic and atraumatic.   Eyes: Conjunctivae are normal. Pupils are equal, round, and reactive to light.   Neck: Neck supple. No JVD present. No tracheal deviation present. No thyromegaly present.   Cardiovascular: Normal rate, regular rhythm and normal heart sounds.   Pulmonary/Chest: Effort normal. He has decreased breath sounds in the right lower field and the left lower field.   Abdominal: Soft.   Musculoskeletal: Normal range of motion.   Lymphadenopathy:     He has no cervical adenopathy.   Neurological: He is alert and oriented to person, place, and time.   Skin: Skin is warm and dry.   Nursing note and vitals reviewed.    Personal Diagnostic Review  Chest x-ray: hyperinflation\    Office Spirometry Results:     No flowsheet data found.  Pulmonary Studies Review 1/30/2019   SpO2 94   Ordering Provider -   Interpreting Provider -   Performing nurse/tech/RT -   Diagnosis -   Height 72.000   Weight 3118.19   BMI (Calculated) 26.5   Predicted Distance 241.5   Patient Race -   6MWT Status -   Patient Reported -   Was O2 used? -   6MW Distance walked (feet) -   Distance walked (meters) -   Did patient stop? -   Type of assistive device(s) used? -   Is extra documentation required for this patient? -   Oxygen Saturation -   Supplemental Oxygen -   Heart Rate -   Blood Pressure -   Katie Dyspnea Rating  -   Oxygen Saturation -   Supplemental Oxygen -   Heart Rate -   Blood Pressure -   Katie Dyspnea Rating  -   (RETIRED) Recovery Time (seconds)  -   Oxygen Saturation -   Supplemental Oxygen -   Heart Rate -   Blood Pressure -   Katie Dyspnea Rating  -   Is procedure ready for interpretation? -   Did the patient stop or pause? -   Total Time Walked (Calculated) -   Total Laps Walked -   Final Partial Lap Distance (feet) -   Total Distance Feet (Calculated) -   Total Distance Meters (Calculated) -   Predicted Distance Meters (Calculated) 488.25   Percentage of Predicted (Calculated) -   Peak VO2 (Calculated) -   Mets -   Has The Patient Had a Previous Six Minute Walk Test? -   Oxygen Qualification? -         Assessment:       1. Nocturnal hypoxemia    2. Panlobular emphysema        Outpatient Encounter Medications as of 2/7/2019   Medication Sig Dispense Refill    acetaminophen (TYLENOL) 500 MG tablet Take 1,000 mg by mouth 2 (two) times daily as needed for Pain.       albuterol (PROVENTIL) 2.5 mg /3 mL (0.083 %) nebulizer solution Take 3 mLs (2.5 mg total) by nebulization every 6 (six) hours while awake. 360 mL 12    aspirin (ECOTRIN) 81 MG EC tablet Take 81 mg by mouth every evening.       atorvastatin (LIPITOR) 40 MG tablet TAKE 1 TABLET EVERY DAY 90 tablet 3    epinephrine (EPIPEN) 0.3 mg/0.3 mL (1:1,000) AtIn Inject 1 each into the muscle once.      fluticasone (FLOVENT DISKUS) 250 mcg/actuation DsDv Inhale 1 puff into the lungs 2 (two) times daily. Controller 3 each 3    furosemide (LASIX ORAL) Take 10 mg by mouth once daily.      gabapentin (NEURONTIN) 100 MG capsule Take 1 capsule (100 mg total) by mouth 2 (two) times daily. 180 capsule 3    ibuprofen (ADVIL,MOTRIN) 200 MG tablet Take 200 mg by mouth every 6 (six) hours as needed for Pain.      lisinopril 10 MG tablet TAKE 1 TABLET EVERY DAY 90 tablet 3    tamsulosin (FLOMAX) 0.4 mg Cap TAKE 2 CAPSULES EVERY MORNING (Patient taking differently: TAKE 2 CAPSULES EVERY MORNING and 1 AT BEDTIME.) 180 capsule 3    traMADol (ULTRAM) 50 mg tablet Take 1 tablet (50 mg total) by mouth daily as  needed for Pain. 30 tablet 5     Facility-Administered Encounter Medications as of 2/7/2019   Medication Dose Route Frequency Provider Last Rate Last Dose    testosterone cypionate injection 200 mg  200 mg Intramuscular Q28 Days ENael Ureña Jr., MD   200 mg at 02/06/19 1118     Orders Placed This Encounter   Procedures    OXYGEN FOR HOME USE     [facility] for CPAP/Oxygen/Nebulizer supplies.  Customer Service: [phone]  Call: [phone]  Fax: [phone]  Billing Inquiries: [phone] or [phone]     Order Specific Question:   Liter Flow     Answer:   2     Order Specific Question:   Duration     Answer:   With sleep     Order Specific Question:   Qualifying SpO2:     Answer:   77%     Comments:   Time with SpO2<88: 0:21:48, 5.7%     Order Specific Question:   Testing done at:     Answer:   Sleep     Order Specific Question:   Route     Answer:   nasal cannula     Order Specific Question:   Portable mode:     Answer:   continuous     Order Specific Question:   Device     Answer:   home concentrator     Order Specific Question:   Length of need (in months):     Answer:   99 mos     Order Specific Question:   Patient condition with qualifying saturation     Answer:   COPD     Order Specific Question:   Height:     Answer:   6'     Order Specific Question:   Weight:     Answer:   194     Order Specific Question:   Alternative treatment measures have been tried or considered and deemed clinically ineffective.     Answer:   Yes     Plan:       Requested Prescriptions      No prescriptions requested or ordered in this encounter     Nocturnal hypoxemia  -     OXYGEN FOR HOME USE    Panlobular emphysema  -     OXYGEN FOR HOME USE           No Follow-up on file.    MEDICAL DECISION MAKING: Moderate to high complexity.  Overall, the multiple problems listed are of moderate to high severity that may impact quality of life and activities of daily living. Side effects of medications, treatment plan as  well as options and alternatives reviewed and discussed with patient. There was counseling of patient concerning these issues.    Total time spent in face to face counseling and coordination of care - 40  minutes over 50% of time was used in discussion of prognosis, risks, benefits of treatment, instructions and compliance with regimen . Discussion with other physicians or health care providers (DME, NP, pharmacy, respiratory therapy) occurred.

## 2019-02-07 NOTE — LETTER
February 7, 2019    Chao Hawk   [address] [facility] [address]             [facility]  [address] [facility] [address]  Phone: [phone]  Fax: [phone] Dear Mr. Hawk:    Rx for DURABLE MEDICAL EQUIPMENT (oxygen and supplies) printed , signed , faxed to [facility] and mailed to patient. For information call   [facility] for CPAP/Oxygen/Nebulizer supplies.  Customer Service: [phone]  Call: [phone]  Fax: [phone]  Billing Inquiries: [phone] or [phone]        If you have any questions or concerns, please don't hesitate to call.    Sincerely,        Obed Sotelo MD    \

## 2019-02-08 ENCOUNTER — PATIENT MESSAGE (OUTPATIENT)
Dept: PULMONOLOGY | Facility: CLINIC | Age: 84
End: 2019-02-08

## 2019-03-08 ENCOUNTER — CLINICAL SUPPORT (OUTPATIENT)
Dept: INTERNAL MEDICINE | Facility: CLINIC | Age: 84
End: 2019-03-08
Payer: MEDICARE

## 2019-03-08 PROCEDURE — 96372 PR INJECTION,THERAP/PROPH/DIAG2ST, IM OR SUBCUT: ICD-10-PCS | Mod: HCNC,S$GLB,, | Performed by: PEDIATRICS

## 2019-03-08 PROCEDURE — 99999 PR PBB SHADOW E&M-EST. PATIENT-LVL II: ICD-10-PCS | Mod: PBBFAC,HCNC,,

## 2019-03-08 PROCEDURE — 96372 THER/PROPH/DIAG INJ SC/IM: CPT | Mod: HCNC,S$GLB,, | Performed by: PEDIATRICS

## 2019-03-08 PROCEDURE — 99999 PR PBB SHADOW E&M-EST. PATIENT-LVL II: CPT | Mod: PBBFAC,HCNC,,

## 2019-03-08 RX ADMIN — TESTOSTERONE CYPIONATE 200 MG: 200 INJECTION, SOLUTION INTRAMUSCULAR at 08:03

## 2019-04-04 ENCOUNTER — CLINICAL SUPPORT (OUTPATIENT)
Dept: INTERNAL MEDICINE | Facility: CLINIC | Age: 84
End: 2019-04-04
Payer: MEDICARE

## 2019-04-04 PROCEDURE — 96372 PR INJECTION,THERAP/PROPH/DIAG2ST, IM OR SUBCUT: ICD-10-PCS | Mod: HCNC,S$GLB,, | Performed by: PEDIATRICS

## 2019-04-04 PROCEDURE — 99999 PR PBB SHADOW E&M-EST. PATIENT-LVL II: CPT | Mod: PBBFAC,HCNC,,

## 2019-04-04 PROCEDURE — 96372 THER/PROPH/DIAG INJ SC/IM: CPT | Mod: HCNC,S$GLB,, | Performed by: PEDIATRICS

## 2019-04-04 PROCEDURE — 99999 PR PBB SHADOW E&M-EST. PATIENT-LVL II: ICD-10-PCS | Mod: PBBFAC,HCNC,,

## 2019-04-04 RX ADMIN — TESTOSTERONE CYPIONATE 200 MG: 200 INJECTION, SOLUTION INTRAMUSCULAR at 08:04

## 2019-04-15 RX ORDER — FLUTICASONE PROPIONATE 100 UG/1
POWDER, METERED RESPIRATORY (INHALATION)
Qty: 180 EACH | Refills: 3 | Status: SHIPPED | OUTPATIENT
Start: 2019-04-15 | End: 2019-05-16 | Stop reason: SDUPTHER

## 2019-05-01 ENCOUNTER — PES CALL (OUTPATIENT)
Dept: ADMINISTRATIVE | Facility: CLINIC | Age: 84
End: 2019-05-01

## 2019-05-02 ENCOUNTER — HOSPITAL ENCOUNTER (OUTPATIENT)
Dept: RADIOLOGY | Facility: HOSPITAL | Age: 84
Discharge: HOME | End: 2019-05-02
Attending: PEDIATRICS
Payer: MEDICARE

## 2019-05-02 ENCOUNTER — OFFICE VISIT (OUTPATIENT)
Dept: INTERNAL MEDICINE | Facility: CLINIC | Age: 84
End: 2019-05-02
Payer: MEDICARE

## 2019-05-02 ENCOUNTER — CLINICAL SUPPORT (OUTPATIENT)
Dept: INTERNAL MEDICINE | Facility: CLINIC | Age: 84
End: 2019-05-02
Payer: MEDICARE

## 2019-05-02 VITALS
WEIGHT: 178.81 LBS | SYSTOLIC BLOOD PRESSURE: 110 MMHG | DIASTOLIC BLOOD PRESSURE: 76 MMHG | HEART RATE: 83 BPM | BODY MASS INDEX: 24.22 KG/M2 | HEIGHT: 72 IN | TEMPERATURE: 98 F | OXYGEN SATURATION: 96 %

## 2019-05-02 DIAGNOSIS — M25.552 LEFT HIP PAIN: ICD-10-CM

## 2019-05-02 DIAGNOSIS — M19.111 OSTEOARTHRITIS OF RIGHT SHOULDER DUE TO ROTATOR CUFF INJURY: ICD-10-CM

## 2019-05-02 DIAGNOSIS — S46.001S OSTEOARTHRITIS OF RIGHT SHOULDER DUE TO ROTATOR CUFF INJURY: ICD-10-CM

## 2019-05-02 DIAGNOSIS — M25.552 LEFT HIP PAIN: Primary | ICD-10-CM

## 2019-05-02 PROCEDURE — 73030 X-RAY EXAM OF SHOULDER: CPT | Mod: 26,HCNC,RT, | Performed by: RADIOLOGY

## 2019-05-02 PROCEDURE — 73502 X-RAY EXAM HIP UNI 2-3 VIEWS: CPT | Mod: 26,HCNC,LT, | Performed by: RADIOLOGY

## 2019-05-02 PROCEDURE — 96372 PR INJECTION,THERAP/PROPH/DIAG2ST, IM OR SUBCUT: ICD-10-PCS | Mod: HCNC,S$GLB,, | Performed by: PEDIATRICS

## 2019-05-02 PROCEDURE — 99213 OFFICE O/P EST LOW 20 MIN: CPT | Mod: 25,HCNC,S$GLB, | Performed by: PEDIATRICS

## 2019-05-02 PROCEDURE — 73502 XR HIP 2 VIEW LEFT: ICD-10-PCS | Mod: 26,HCNC,LT, | Performed by: RADIOLOGY

## 2019-05-02 PROCEDURE — 99999 PR PBB SHADOW E&M-EST. PATIENT-LVL II: ICD-10-PCS | Mod: PBBFAC,HCNC,,

## 2019-05-02 PROCEDURE — 73502 X-RAY EXAM HIP UNI 2-3 VIEWS: CPT | Mod: TC,HCNC,LT

## 2019-05-02 PROCEDURE — 99213 PR OFFICE/OUTPT VISIT, EST, LEVL III, 20-29 MIN: ICD-10-PCS | Mod: 25,HCNC,S$GLB, | Performed by: PEDIATRICS

## 2019-05-02 PROCEDURE — 1101F PT FALLS ASSESS-DOCD LE1/YR: CPT | Mod: HCNC,CPTII,S$GLB, | Performed by: PEDIATRICS

## 2019-05-02 PROCEDURE — 99999 PR PBB SHADOW E&M-EST. PATIENT-LVL II: CPT | Mod: PBBFAC,HCNC,,

## 2019-05-02 PROCEDURE — 99999 PR PBB SHADOW E&M-EST. PATIENT-LVL IV: CPT | Mod: PBBFAC,HCNC,, | Performed by: PEDIATRICS

## 2019-05-02 PROCEDURE — 99999 PR PBB SHADOW E&M-EST. PATIENT-LVL IV: ICD-10-PCS | Mod: PBBFAC,HCNC,, | Performed by: PEDIATRICS

## 2019-05-02 PROCEDURE — 73030 X-RAY EXAM OF SHOULDER: CPT | Mod: TC,HCNC,RT

## 2019-05-02 PROCEDURE — 1101F PR PT FALLS ASSESS DOC 0-1 FALLS W/OUT INJ PAST YR: ICD-10-PCS | Mod: HCNC,CPTII,S$GLB, | Performed by: PEDIATRICS

## 2019-05-02 PROCEDURE — 73030 XR SHOULDER COMPLETE 2 OR MORE VIEWS RIGHT: ICD-10-PCS | Mod: 26,HCNC,RT, | Performed by: RADIOLOGY

## 2019-05-02 PROCEDURE — 96372 THER/PROPH/DIAG INJ SC/IM: CPT | Mod: HCNC,S$GLB,, | Performed by: PEDIATRICS

## 2019-05-02 RX ORDER — HYDROCHLOROTHIAZIDE 12.5 MG/1
12.5 CAPSULE ORAL DAILY
COMMUNITY
End: 2020-04-27

## 2019-05-02 RX ADMIN — TESTOSTERONE CYPIONATE 200 MG: 200 INJECTION, SOLUTION INTRAMUSCULAR at 08:05

## 2019-05-02 NOTE — PROGRESS NOTES
Subjective:       Patient ID: Chao Hawk Jr. is a 87 y.o. male.    Chief Complaint: Hip Pain    With daughter. At 4 am went to get out of bed to go to restroom, he bumped nightstand and fell striking left hip. Immediate pain just below hip. Can stand but can't bear weight, Previously had catching sensation in that hip. No LOC or faint. Also unrelated long term right shoulder pain with abduction, had been using 10 pound weight for PT, now reduced rom, clunk snesation and can only do 1 pound weights.    Review of Systems   Constitutional: Negative for fever and unexpected weight change.   HENT: Negative for congestion and rhinorrhea.    Eyes: Negative for discharge and redness.   Respiratory: Negative for cough and wheezing.    Cardiovascular: Negative for chest pain, palpitations and leg swelling.   Gastrointestinal: Negative for constipation, diarrhea and vomiting.   Genitourinary: Negative for decreased urine volume and difficulty urinating.   Musculoskeletal: Positive for arthralgias (left hip and right shoulder) and gait problem. Negative for joint swelling.   Skin: Negative for rash and wound.   Neurological: Negative for syncope and headaches.   Psychiatric/Behavioral: Negative for behavioral problems and sleep disturbance.       Objective:      Physical Exam   Constitutional: He is oriented to person, place, and time. He appears well-developed and well-nourished. No distress (except with hip. in wheel chair).   Musculoskeletal:   He can stand, cannot bear weight enough to walk, tender at upper shaft of femur just below greater trochanter. Cannot fully assess hip rom due to inability to get onto exam table and exam in wheelchair. Right shoulder ROM limited to 110 abduction, there is crepitance in joint.   Neurological: He is alert and oriented to person, place, and time. No cranial nerve deficit. Coordination normal.   Skin: Capillary refill takes less than 2 seconds. No rash noted.   No bruising at hip    Psychiatric: He has a normal mood and affect. His behavior is normal. Judgment and thought content normal.       Assessment:       1. Left hip pain    2. 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 Use ice pack or heating pad. If hip not better, f/u as needed.

## 2019-05-07 ENCOUNTER — TELEPHONE (OUTPATIENT)
Dept: PHYSICAL MEDICINE AND REHAB | Facility: CLINIC | Age: 84
End: 2019-05-07

## 2019-05-08 ENCOUNTER — OFFICE VISIT (OUTPATIENT)
Dept: ORTHOPEDICS | Facility: CLINIC | Age: 84
End: 2019-05-08
Payer: MEDICARE

## 2019-05-08 VITALS
HEIGHT: 72 IN | BODY MASS INDEX: 24.22 KG/M2 | WEIGHT: 178.81 LBS | SYSTOLIC BLOOD PRESSURE: 152 MMHG | HEART RATE: 98 BPM | DIASTOLIC BLOOD PRESSURE: 94 MMHG

## 2019-05-08 DIAGNOSIS — M70.62 TROCHANTERIC BURSITIS OF LEFT HIP: Primary | ICD-10-CM

## 2019-05-08 DIAGNOSIS — M19.019 SHOULDER ARTHRITIS: ICD-10-CM

## 2019-05-08 DIAGNOSIS — M75.41 IMPINGEMENT SYNDROME OF RIGHT SHOULDER: ICD-10-CM

## 2019-05-08 PROCEDURE — 99214 PR OFFICE/OUTPT VISIT, EST, LEVL IV, 30-39 MIN: ICD-10-PCS | Mod: HCNC,25,S$GLB, | Performed by: PHYSICAL MEDICINE & REHABILITATION

## 2019-05-08 PROCEDURE — 1100F PR PT FALLS ASSESS DOC 2+ FALLS/FALL W/INJURY/YR: ICD-10-PCS | Mod: HCNC,CPTII,S$GLB, | Performed by: PHYSICAL MEDICINE & REHABILITATION

## 2019-05-08 PROCEDURE — 20610 PR DRAIN/INJECT LARGE JOINT/BURSA: ICD-10-PCS | Mod: HCNC,LT,S$GLB, | Performed by: PHYSICAL MEDICINE & REHABILITATION

## 2019-05-08 PROCEDURE — 99999 PR PBB SHADOW E&M-EST. PATIENT-LVL III: ICD-10-PCS | Mod: PBBFAC,HCNC,, | Performed by: PHYSICAL MEDICINE & REHABILITATION

## 2019-05-08 PROCEDURE — 1100F PTFALLS ASSESS-DOCD GE2>/YR: CPT | Mod: HCNC,CPTII,S$GLB, | Performed by: PHYSICAL MEDICINE & REHABILITATION

## 2019-05-08 PROCEDURE — 99214 OFFICE O/P EST MOD 30 MIN: CPT | Mod: HCNC,25,S$GLB, | Performed by: PHYSICAL MEDICINE & REHABILITATION

## 2019-05-08 PROCEDURE — 20610 DRAIN/INJ JOINT/BURSA W/O US: CPT | Mod: HCNC,LT,S$GLB, | Performed by: PHYSICAL MEDICINE & REHABILITATION

## 2019-05-08 PROCEDURE — 99999 PR PBB SHADOW E&M-EST. PATIENT-LVL III: CPT | Mod: PBBFAC,HCNC,, | Performed by: PHYSICAL MEDICINE & REHABILITATION

## 2019-05-08 PROCEDURE — 3288F PR FALLS RISK ASSESSMENT DOCUMENTED: ICD-10-PCS | Mod: HCNC,CPTII,S$GLB, | Performed by: PHYSICAL MEDICINE & REHABILITATION

## 2019-05-08 PROCEDURE — 3288F FALL RISK ASSESSMENT DOCD: CPT | Mod: HCNC,CPTII,S$GLB, | Performed by: PHYSICAL MEDICINE & REHABILITATION

## 2019-05-08 NOTE — PATIENT INSTRUCTIONS
Pendulum (Flexibility)    1. Lean over next to a table, with your left arm supporting your weight on the table.  2. Relax your right arm and let it hang straight down.  3. Slowly begin to swing your right arm in a small [place]. Gradually make the [place] bigger if you can. Change direction after 1 minute of motion.  4. Next, swing your right arm backward and forward. Then move it side to side. Change direction after 1 minute of motion.  5. Repeat these movements for about 5 minutes.  6. Do this exercise 3 times a day, or as often as instructed.  Date Last Reviewed: 3/10/2016  © 9986-3427 [employer]. [address] [place] [address], [place] [address]. All rights reserved. This information is not intended as a substitute for professional medical care. Always follow your healthcare professional's instructions.        Understanding Shoulder Impingement Syndrome    Shoulder impingement syndrome is a problem with the shoulder joint. It occurs when certain parts within the joint swell and are pinched. This can cause nagging pain and problems with moving the arm.  What causes shoulder impingement syndrome?  It is possible to develop impingement after years of normal shoulder use. But in most cases the condition occurs because of repeated overhead movements. These include such things as stocking shelves, painting, swimming, and throwing. These movements can irritate parts of the shoulder, leading to swelling. Swollen parts of the shoulder take up more room, making the joint space smaller. Some parts that may be involved include:  · A sac of fluid (bursa) that cushions the shoulder joint.  When the bursa is irritated, it may lead to a condition called bursitis. This is when the bursa swells with fluid, filling and squeezing the joint space.  · Fibrous tissues (tendons) that connect muscle to bone. When tendons are irritated, they may become swollen. This is called tendonitis.  · The end (acromion) of the shoulder  blade. This bone may be flat or hooked. If the acromion is hooked, the joint space may be smaller than normal. Growths (bone spurs) on the acromion can also narrow the joint space. Acromion problems dont often cause impingement. But they can make it worse.  Symptoms of shoulder impingement syndrome  Symptoms include pain, pinching, or stiffness in your shoulder. Pain often comes with movement, particularly reaching overhead or backward. It may also be felt when the shoulder is at rest. Pain at night during sleep is common.  Treatment for shoulder impingement syndrome  Treatment will depend on the cause of the problem, how bad the problem is, and if other parts of the shoulder are damaged. Treatment may include the following:  · Active rest. This allows the shoulder to heal. It means using the arm and shoulder, but avoiding activities that cause pain. These likely include reaching overhead or sleeping on your shoulder.  · Cold packs. These help reduce swelling and relieve pain.  · Prescription or over-the-counter pain medicines. These help relieve pain and swelling.  · Arm and shoulder exercises. These help keep your shoulder joint mobile as it heals. They also help improve muscle strength around the joint.  · Shots of medicine into the joint. This can help reduce swelling and pain for a short time.  If other measures dont work to relieve symptoms, you may need surgery. This opens up space in the joint to allow pain-free motion.  Possible complications of shoulder impingement syndrome  It might be tempting to stop using your shoulder completely to avoid pain. But doing so may lead to a condition called frozen shoulder. To help prevent this, follow instructions you are given for active rest and for doing exercises to help your shoulder heal.  When to call your healthcare provider  Call your healthcare provider right away if you have any of these:  · Fever of 100.4°F (38°C) or higher, or as directed  · Symptoms that  dont get better, or get worse  · New symptoms   Date Last Reviewed: 3/10/2016  © 5936-0356 [employer]. [address] [place] [address], [place] [address]. All rights reserved. This information is not intended as a substitute for professional medical care. Always follow your healthcare professional's instructions.        Understanding Trochanteric Bursitis    A bursa is a thin, slippery, sac-like film. It contains a small amount of fluid. This structure is found between bones and soft tissues in and around joints. A bursa cushions and protects a joint. It keeps parts of a joint from rubbing against each other. If a bursa becomes inflamed and irritated, it is known as bursitis.  The trochanteric bursa is found on the hip joint. It lies on top of the bump at the top of the thighbone called the greater trochanter. Inflammation of this bursa is called trochanteric bursitis.     How to say it  [id number]   Causes of trochanteric bursitis  Causes may include:  · Overuse of the hip during running or other sports, dance, or work  · Falling on or irritation to the side of the hip  This condition may occur along with other problems, such as osteoarthritis of the hip or knee, or low back problems. In rare cases, it may occur after hip surgery.  Symptoms of trochanteric bursitis  · Pain or aching on the side of the hip. The pain may travel down the leg.  · Swelling, tenderness, or warmth on the side of the hip at the bony bump at the top of the thigh  Treatment for trochanteric bursitis  These may include:  · Resting the hip. This allows the bursa to heal.  · Prescription or over-the-counter pain medicines. These help reduce inflammation, swelling, and pain.  · Cold packs and heat packs. These help reduce pain and swelling.  · Stretching and strengthening exercises. These improve flexibility and strength around the hip.  · Physical therapy. This includes exercises or other treatments.  · Injections of medicine into  the bursa. This may help reduce inflammation and relieve symptoms.  Possible complications  If you dont give your hip time to heal, the problem may not go away, may return, or may get worse. Rest and treat your hip as directed.     When to call your healthcare provider  Call your healthcare provider right away if you have any of these:  · Fever of 100.4°F (38°C) or higher, or as directed  · Redness, swelling, or warmth that gets worse  · Symptoms that dont get better with prescribed medicines, or get worse  · New symptoms   Date Last Reviewed: 3/29/2016  © 7213-7937 [employer]. [address] [place] [address], [place] [address]. All rights reserved. This information is not intended as a substitute for professional medical care. Always follow your healthcare professional's instructions.

## 2019-05-08 NOTE — PROGRESS NOTES
PM&R NEW PATIENT HISTORY & PHYSICAL:    Referring Physician: MONSTER Ureña Jr., MD    Chief Complaint   Patient presents with    Shoulder Pain     Right shoulder pain       HPI: This is a 87 y.o.  male being seen in clinic today for evaluation of Shoulder Pain (Right shoulder pain)   The problem first began about 10 years ago. Pain has been intermittent and had to reduce his exercise. He feels dull pain in his right shoulder. He notes reduced ROM and can't reach the top shelf. The symptoms are worsening. He has tried OTC meds with improvement of pain control. He has not tried physical therapy, stating he's been too busy with other medical issues. He also had a fall last week and hit his lateral left hip on side of bed. He's had x-rays showing no fracture, but pain has continued to worsen.     History obtained from patient.    Past family, medical, social, surgical history, and vital signs reviewed in chart.    Review of Systems   Constitutional: Negative for chills, fever and weight loss.   HENT: Negative for hearing loss and sore throat.    Eyes: Negative for blurred vision, photophobia and pain.   Respiratory: Negative for shortness of breath.    Cardiovascular: Negative for chest pain.   Gastrointestinal: Negative for abdominal pain.   Genitourinary: Negative for dysuria.   Skin: Negative for rash.   Neurological: Negative for tingling and headaches.   Endo/Heme/Allergies: Does not bruise/bleed easily.   Psychiatric/Behavioral: Negative for depression.       Physical Exam   Constitutional: He is oriented to person, place, and time. He appears well-developed and well-nourished.   HENT:   Head: Normocephalic and atraumatic.   Eyes: Pupils are equal, round, and reactive to light. EOM are normal.   Neck: Normal range of motion. Neck supple.   Cardiovascular: Intact distal pulses.   Pulmonary/Chest: Effort normal.   Abdominal: He exhibits no distension.   Neurological: He is alert and oriented to person, place,  and time. He has normal strength and normal reflexes. No sensory deficit.   Skin: Skin is warm and dry.   Psychiatric: He has a normal mood and affect.   Vitals reviewed.      IMPRESSION/PLAN: Chao is a 87 y.o.  male with:    1. Trochanteric bursitis of left hip  - Ambulatory referral to Physical Therapy    2. Impingement syndrome of right shoulder  - Ambulatory referral to Physical Therapy    3. Shoulder arthritis  - Ambulatory referral to Physical Therapy     The findings were discussed with Chao in detail. He has significant shoulder OA and some signs of impingement syndrome as well. However, since his hip pain is his bigger problem, we elected to address that first. His xray is negative and physical exam was most notable for trochanteric bursitis, so I offered injection as noted below and changed his PT script to include the hip girdle. All of his questions were answered. He was provided this plan in writing. He will follow-up with me in 2 weeks for possible shoulder injection.     PROCEDURE NOTE    Diagnosis: left Greater Trochanter bursitis  Procedure: left Greater Trochanter bursa injection    A time out was performed and risks and benefits of procedure explained to patient including risks of infection, bleeding, pain, or damage to surrounding tissues. All questions answered. Informed consent obtained prior to proceeding. The correct areas marked and prepped in sterile fashion. Using a 27 G 1.5  inch needle, a 2 cc mixture of depomedrol 1cc (40mg)and 1% lidocaine was injected into the greater trochanter bursa. Minimal to no bleeding noted. ER and post injection instructions given.      Radha Valladares M.D.  Physical Medicine and Rehab

## 2019-05-08 NOTE — LETTER
May 8, 2019      MONSTER Ureña Jr., MD  [address]  [place] [address]           [facility]  [address]  [place] [address]  Phone: [phone]  Fax: [phone]          Patient: Chao Hawk Jr.   MR Number: [id number]   YOB: 1932   Date of Visit: 5/8/2019       Dear Dr. MONSTER Ureña Jr.:    Thank you for referring Chao Hawk to me for evaluation. Attached you will find relevant portions of my assessment and plan of care.    If you have questions, please do not hesitate to call me. I look forward to following Chao Hawk along with you.    Sincerely,    Radha Valladares MD    Enclosure  CC:  No Recipients    If you would like to receive this communication electronically, please contact [email or web address] or [phone] to request more information on [employer] access.    For providers and/or their staff who would like to refer a patient to [facility], please contact us through our one-stop-shop provider referral line, [facility], at [phone].    If you feel you have received this communication in error or would no longer like to receive these types of communications, please e-mail [email or web address]

## 2019-05-09 RX ORDER — METHYLPREDNISOLONE ACETATE 40 MG/ML
40 INJECTION, SUSPENSION INTRA-ARTICULAR; INTRALESIONAL; INTRAMUSCULAR; SOFT TISSUE
Status: COMPLETED | OUTPATIENT
Start: 2019-05-09 | End: 2019-05-09

## 2019-05-09 RX ADMIN — METHYLPREDNISOLONE ACETATE 40 MG: 40 INJECTION, SUSPENSION INTRA-ARTICULAR; INTRALESIONAL; INTRAMUSCULAR; SOFT TISSUE at 12:05

## 2019-05-10 ENCOUNTER — CLINICAL SUPPORT (OUTPATIENT)
Dept: REHABILITATION | Facility: HOSPITAL | Age: 84
End: 2019-05-10
Payer: MEDICARE

## 2019-05-10 DIAGNOSIS — R29.3 POOR POSTURE: ICD-10-CM

## 2019-05-10 DIAGNOSIS — R53.1 DECREASED STRENGTH: ICD-10-CM

## 2019-05-10 DIAGNOSIS — M25.60 DECREASED RANGE OF MOTION: ICD-10-CM

## 2019-05-10 DIAGNOSIS — Z74.09 IMPAIRED FUNCTIONAL MOBILITY, BALANCE, GAIT, AND ENDURANCE: ICD-10-CM

## 2019-05-10 PROCEDURE — 97535 SELF CARE MNGMENT TRAINING: CPT | Mod: HCNC

## 2019-05-10 PROCEDURE — 97162 PT EVAL MOD COMPLEX 30 MIN: CPT | Mod: HCNC

## 2019-05-10 NOTE — PLAN OF CARE
"[facility]  Physical Therapy Initial Evaluation    Name: Chao Hawk Jr.  Clinic Number: [id number]    Therapy Diagnosis:   Encounter Diagnoses   Name Primary?   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 Pt states that he hit the edge of the side table with his left hip before he even started walking. Pt states that he fell down and landed on his left hip. Pt states that he was able to get up off the floor by himself. Pt states that his daughter is in town visiting so when she heard him fall, "she came running." Pt states that initially he had pain in his hip but the muscle aching did not start until the next day. Pt states that he had bruising on the lateral portion of his left hip the first day it happened but it is no longer there. Pt got a "shot" (left greater trochanter bursa injection) in his hip which has helped his symptoms a little bit. Although pt states that when walking he cannot keep "that leg straight" because he feels like it is going to collapse. Pt has been having pain on the side of his hip for "a while" indicating about 4-5 months. Pt states that he kept thinking it would go away but it did not. Sometimes he states " "that he will be sitting on the edge of the bed and he will slide off the bed to where his face is a few inches from the carpet. Pt states that he "guesses" that he falls asleep while sitting up because he does not sleep very well. Pt states that when standing, he can stand still for about 5 minutes before he starts getting tired without pain. Pt states that if he moves to the right or left then that is when he feels like his leg is going to "collapse on him."   In regards to his shoulder pain which is not as severe at this point, he states that his right shoulder has been bothering him for about 10 years. Pt states that he used to be able to lift 10 lb weights but his shoulder started "chattering" so a year ago he started using a 1 lb weight when lifting and again it started "chattering." Pt states that it feels like it is "catching" when saying "chattering" on the way up.     Additionally, pt states that he had back surgery a few years ago so pt has back pain in the morning when he wakes up. Pt states that he walks straight to the bathroom and takes a hot shower to "loosen things up."   Pt ambulated into the clinic with a rollator which the pt states that he just started using today. Pt states that it was his wife's before she . When assessing the rollator the left handrest was positioned significantly lower than the right handrest.     Medical History:   Past Medical History:   Diagnosis Date    Arthritis     back, hand    Back pain     Dr. Cristobal- [facility]    Bilateral leg edema 2018    Chronic bronchitis     Diverticulosis 06    colonoscopy    Hernia     x2    Hyperlipidemia     Hypertension     Lumbar radiculopathy 2017    Multiple renal cysts 2016    Tobacco dependence        Surgical History:   Chao VY Hawk Jr.  has a past surgical history that includes Finger surgery (Right, ); Eye surgery (Bilateral); Hernia repair (); TONSILLECTOMY, ADENOIDECTOMY (); and " Lumbar spine surgery (09/13/2017).    Medications:   Chao has a current medication list which includes the following prescription(s): acetaminophen, albuterol, aspirin, atorvastatin, epinephrine, flovent diskus, fluticasone propionate, furosemide, gabapentin, hydrochlorothiazide, ibuprofen, lisinopril, tamsulosin, and tramadol, and the following Facility-Administered Medications: testosterone cypionate.    Allergies:   Review of patient's allergies indicates:   Allergen Reactions    Bee sting  [allergen ext-venom-honey bee] Swelling    Poison ivy extract Hives    Penicillins Rash        Imaging, Xray (shoulder): There are advanced degenerative changes at the glenohumeral joint with marked joint space narrowing particularly along the inferior aspect of the joint space with marginal spurring.  There are also degenerative changes at the acromioclavicular joint which are at least moderate.  No fracture or dislocation is seen.  Humeral head is slightly high riding there are cystic changes about the lateral aspect of the humeral head.  There is a 1.1 cm centrally lucent area with peripheral hyperdensity seen projecting over the medial proximal aspect of the right humeral shaft of uncertain etiology    Xray (hip): Degenerative changes of both hips are again seen. No fracture or dislocation is identified. No radiographic evidence to suggest avascular necrosis. There also degenerative changes of the lumbosacral spine and sacroiliac joints. Soft tissues are unchanged.    Prior Therapy: Pt has had previous therapy that he said did not really help him.   Social History: Pt lives alone in a two story house. Pt has a stair lift. Pt has safety bars in the shower. Pt has a ramp to enter into home.   Occupation: Pt is a retired .   Prior Level of Function: Pt was still able to lift things and walk with a single point cane.   Current Level of Function: Pt ambulates with rolling walker at home and rollator for  longer distances. Pt has to carry groceries and such with a rolling walker or rollator.     Pain:  Current 7/10, worst 8/10, best 0/10   Location: right shoulder, left hip, low back  Description: Dull  Aggravating Factors: Standing, Walking and Night Time  Easing Factors: hot bath and rest (for short periods)    Pts goals: are to be able to count on his left leg.     Objective     Sensation:  impaired to light touch to distal lower legs  Posture:  slouched posture, forward head, rounded shoulder, increased thoracic kyphosis  Palpation: Pt has minimal edema on R lower leg with no pitting noted.    Movement Analysis: Pt has difficulty when transitioning from sitting to supine. Pt uses the method of lifting both extended LEs onto the mat and then uses his UEs to hold onto the mat while he lowers his trunk to the mat. Pt noted with discomfort per grimaces shown on face. Pt had quite a bit of difficulty when moving from supine to prone with poor movement strategies chosen and discomfort in right shoulder. Pt was unable to tolerate prone for more than a couple minutes.   Gait Analysis: Pt noted with unsteady gait requiring a rollator for increased stability. Pt noted with short step length and wide DENNY for increased stability. Pt ambulates that a sort of shuffling type of gait pattern.     Range of Motion/Strength:   Cervical AROM Pain/Dysfunction with Movement Goal   Flexion (60) WFL No pain    Extension (90) 40 No pain 60   Right side bending (45) 15 Pain on L side of neck 30 no pain   Left side bending (45) 20 No pain 30    Right rotation (75) 15 Pain on L side of neck 60 no pain   Left rotation (75) 25 No pain 60 no pain     Thoracolumbar AROM Pain/Dysfunction with Movement Goal   Flexion (60) Not test in standing due to fall risk     Extension (30) 10 Pain in L hip 20 no pain   Right side bending (25) 15 No pain 20   Left side bending (25) 5 Pain in L hip 20 no pain   Right rotation 10 Stiffness noted in lumbar  "spine    Left rotation 10 Stiffness noted in lumbar spine      Shoulder Right Left Pain/Dysfunction with Movement Goal   AROM       Flexion (180) 90 180 Pain in lateral R shoulder, audible "pop" 180 no pain   Abduction (180) 90 180 Pain in lateral R shoulder, audible "pop" 180 no pain   IR (70) WFL WFL     ER at 0° abd (90) 25 25 Pain in lateral R shoulder, tightness in L shoulder but no pain 60 bilateral     Elbow Right Left Pain/Dysfunction with Movement Goal   AROM       Flexion (150) WFL WFL Discomfort in R shoulder No discomfort   Extension (0) WFL WFL Discomfort in R shoulder No discomfort     U/E MMT Right Left Pain/Dysfunction with Movement Goal   Shoulder Flexion 3+/5 3+/5 Moderate pain in R shoulder 4+/5 no pain   Shoulder Abduction 3+/5 4/5 Moderate pain in  R shoulder 4+/5 no pain   Shoulder IR 4/5 4+/5 Pain in R shoulder 4+/5 no pain   Shoulder ER  @ 0* Abduction 4-/5 4/5 Pain and aubible "pop" in R shoulder 4+/5 no pain   Elbow Flexion  4/5 4/5  4+/5   Elbow Extension 4+/5 4+/5       Hip Right Left Pain/Dysfunction with Movement Goal   AROM       Flexion (120) WFL WFL No pain    Abduction (45) End range tightness at medial hip End range pain at medial knee Pain at adductor musculature WFL no pain or tightness   Adduction (30) WFL WFL No pain    IR (70) 20 20 Pain in groin area on L  30 No pain   ER (90) 10 10 Pain in groin area on L  45 No pain     Knee Right Left Pain/Dysfunction with Movement Goal   AROM       Flexion (135) WFL WFL No pain    Extension (0) 5 flex 5 flex  5 ext      Ankle Right Left Pain/Dysfunction with Movement Goal   AROM       Dorsiflexion (20) 0 4 Edema in R ankle 10 bilateral no edema   Plantarflexion (50) 38 45 Edema in R ankle 45 no edema   Inversion (35) WFL WFL     Eversion (15) WFL WFL       L/E MMT Right Left Pain/Dysfunction with Movement Goal   Hip Flexion 4-/5 4-/5 Pain in R hip (opposite of L hip injury) 4+/5 no pain   Hip Extension 4/5 4/5 Pain 4+/5 no pain   Hip " Abduction 4/5 4/5  4+/5   Hip Adduction 4/5 4/5  4+/5   Knee Flexion 4/5 4-/5  4+/5   Knee Extension 4/5 4-/5  4+/5   Ankle DF 4-/5 4-/5  4+/5   Ankle PF 4/5 4/5  4+/5     Function:   LOWER EXTREMITY FUNCTIONAL SCALE    Patient-reported functional assessment scores are rated as follows:  A score of 0/4 represents extreme difficulty or unable to perform the activity. A score of 1/4 represents quite a bit of difficulty. A score of 2/4 represents moderate difficulty. A score of 3/4 represents a little bit of difficulty. A score of 4/4 represents no difficulty.                EVAL   1. Any of your usual work, housework or school activities   1/4  2. Your usual hobbies, sporting     3/4  3. Getting in and out of tub      4/4  4. Walking between rooms      3/4  5. Putting on shoes or socks      4/4  6. Squatting        4/4  7. Lifting an object from the ground      3/4  8. Performing light activities around the home   2/4  9. Performing heavy activities around the home   1/4  10. Getting in and out of car      3/4  11. Walking 2 blocks       1/4  12.Walking a mile       0/4  13. Getting up and down 1 flight of stairs    3/4  14. Standing for 1 hour      1/4  15. Sitting for an hour       3/4  16. Running on even ground      1/4  17. Running on uneven ground     0/4  18. Making sharp turns when running fast    0/4  19. Hopping        0/4  20. Rolling over in bed       2/4    Patient reports 48.75% ability based on score of the Lower Extremity Functional Scale.     THE UPPER EXTREMITY FUNCTIONAL SCALE (UEFS) - The following scores are based on patient reported assessment.      0 = extreme difficulty or unable to perform activity; 1 = quite a bit of difficulty; 2 = moderate difficulty; 3 = a little bit of difficulty; 4 = no difficulty    1 Any of your usual work, housework, or school activities   3/4  2 Your usual hobbies, re creational or sporting activities    3/4  3 Lifting a bag of groceries to waist level      3/4  4  Lifting a bag of groceries above your head      1/4  5 Grooming your hair         4/4  6 Pushing up on your hands (eg from bathtub or chair)    3/4  7 Preparing food (eg peeling, cutting)      2/4  8 Driving          3/4  9 Vacuuming, sweeping or raking       4/4  10 Dressing          3/4  11 Doing up buttons         4/4  12 Using tools or appliances        4/4  13 Opening doors         3/4  14 Cleaning          3/4  15 Tying or lacing shoes        4/4  16 Sleeping          3/4  17 Laundering clothes (eg washing, ironing, folding)    3/4  18 Opening a jar         3/4  19 Throwing a ball         3/4  20 Carrying a small suitcase with your affected limb    3/4    Minimum Level of Detectable Change (90% Confidence): 9 points SCORE: 59/80    Patient reports 73.75% ability on the Upper Extremity Functional Scale.      Limitation Score: 51.25%  Category: Mobility    Current : CK = at least 40% but < 60% impaired, limited or restricted  Goal: CJ = at least 20% but < 40% impaired, limited or restricted       TREATMENT   Treatment Time In: 11:33  Treatment Time Out: 11:48  Total Treatment time separate from Evaluation: 15 minutes    Home Exercises and Patient Education Provided    Education provided: (Self-Care 15 minutes)  -Pt educated on proper use of rollator when ambulating and performing transfers. Pt educated on maintaining within rollator and putting to breaks on before sitting or before standing up. Pt educated on the safety of using a rolling walker as compared to a rollator due to increased stability as the left break on his rollator does not work. Pt educated on having the rollator at the proper height in order to not lean so far anteriorly when ambulating. Pt educated on altering the height of the L handrest of the rollator due to it being so much lower than the R handrest. Pt educated on hand placement when performing transfers while using the rollator or rolling walker.      Chao demonstrated fair   understanding of the education provided.     Assessment   Chao is a 87 y.o. 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 Pt presents with poor posture, impaired balance, endurance and functional mobility, gait abnormalities, decreased strength and ROM affecting pt's ability to ambulate about to community without falling.     Pt prognosis is Fair.   Pt will benefit from skilled outpatient Physical Therapy to address the deficits stated above and in the chart below, provide pt/family education, and to maximize pt's level of independence.     Plan of care discussed with patient: Yes  Pt's spiritual, cultural and educational needs considered and patient is agreeable to the plan of care and goals as stated below:     Anticipated Barriers for therapy: transportation and living alone and driving himself to therapy    Medical Necessity is demonstrated by the following  History  Co-morbidities and personal factors that may impact the plan of care Co-morbidities:   advanced age, COPD/asthma, HTN, prior lumbar surgery and atherosclerosis of arota, abdominal aortic aneurysm, pulmonary artery hypertension, calcified granuloma of lung    Personal Factors:   age     high   Examination  Body Structures and Functions, activity limitations and participation restrictions that may impact the plan of care Body Regions:   neck  back  lower extremities  upper extremities    Body Systems:    gross symmetry  ROM  strength  gross coordinated movement  balance  gait  transfers  transitions  edema    Participation Restrictions:   Impaired balance and stability when ambulating, decreased functional mobility when performing all reaching or lifting tasks    Activity limitations:   Learning and applying knowledge  no deficits    General Tasks and Commands  no  deficits    Communication  no deficits    Mobility  lifting and carrying objects  walking  driving (bike, car, motorcycle)    Self care  washing oneself (bathing, drying, washing hands)  caring for body parts (brushing teeth, shaving, grooming)  dressing  looking after one's health    Domestic Life  shopping  cooking  doing house work (cleaning house, washing dishes, laundry)  assisting others    Interactions/Relationships  no deficits    Life Areas  no deficits    Community and Social Life  community life  recreation and leisure         high   Clinical Presentation evolving clinical presentation with changing clinical characteristics moderate   Decision Making/ Complexity Score: moderate     Goals:  Short Term Goals:  6 weeks  1.  Patient will improve AROM to 1/2 of LTG in order to progress towards functional activities.   2.  Patient will patient will be independent with HEP in order to further progress and return to maximal function.   3.  Pain rating at worst 4/10 in order to progress towards increased independence with activity.   4.  Patient will have improved postural awareness to decrease strain on neck and low back from slouched posturing with forward head.  5.  Patient will report improved function indicated by score of 62% ability on the  Lower Extremity Functional Scale.  6. 7. Patient will report improved function indicated by score of 81% ability on the Upper Extremity Functional Index for improved ability to perform all functional tasks with greater independence.     Long Term Goals:  12 weeks  1.  Patient will return to normal ADL, recreational, and work related activities with less pain and limitation.   2.  Patient will improve AROM to stated goals in order to return to maximal functional potential.  3.  Patient will improve strength to stated goals of appropriate musculature in order to improve functional independence  4.  Patient will demonstrate normal gait mechanics in order to minimize any  compensation and return to PLOF.  5: Patient will report no instances of buckling of the L knee for 2 consecutive weeks for improved stability and to decrease risk of falls to improve QoL as pt lives alone.   6. Patient will report improved function indicated by score of 80% ability on the Lower Extremity Functional Scale.  7. Patient will report improved function indicated by score of 90% ability on the Upper Extremity Functional Index for improved ability to perform all functional tasks with greater independence.       Plan   Plan of care Certification: 5/10/2019 to 8/2/2019.    Outpatient Physical Therapy 2 times weekly for 12 weeks to include the following interventions: Cervical/Lumbar Traction, Electrical Stimulation unattended, Gait Training, Manual Therapy, Moist Heat/ Ice, Neuromuscular Re-ed, Patient Education, Self Care, Therapeutic Activites and Therapeutic Exercise. manual therapy, therapeutic exercise, functional activities, modalities, and patient education.    Thank you for this referral.    These services are reasonable and necessary for the conditions set forth above while under my care.    Dalia Carter, PT, DPT

## 2019-05-15 ENCOUNTER — CLINICAL SUPPORT (OUTPATIENT)
Dept: REHABILITATION | Facility: HOSPITAL | Age: 84
End: 2019-05-15
Payer: MEDICARE

## 2019-05-15 DIAGNOSIS — M25.60 DECREASED RANGE OF MOTION: ICD-10-CM

## 2019-05-15 DIAGNOSIS — Z74.09 IMPAIRED FUNCTIONAL MOBILITY, BALANCE, GAIT, AND ENDURANCE: ICD-10-CM

## 2019-05-15 DIAGNOSIS — R53.1 DECREASED STRENGTH: ICD-10-CM

## 2019-05-15 DIAGNOSIS — R29.3 POOR POSTURE: ICD-10-CM

## 2019-05-15 PROCEDURE — 97110 THERAPEUTIC EXERCISES: CPT | Mod: HCNC

## 2019-05-15 NOTE — PROGRESS NOTES
Physical Therapy Daily Treatment Note     Name: Chao Hawk Jr.  Clinic Number: [id number]    Therapy Diagnosis:   Encounter Diagnoses   Name Primary?    Poor posture     Impaired functional mobility, balance, gait, and endurance     Decreased range of motion     Decreased strength      Physician: Radha Valladares MD    Visit Date: 5/15/2019    Physician Orders: PT Eval and Treat  Medical Diagnosis: Trochanteric bursitis of left hip, impingement syndrome of right shoulder, shoulder arthritis  Evaluation Date: 5/10/2019  Authorization Period Expiration: 5/7/2020  Plan of Care Certification Period: 8/2/2019  Visit #/Visits authorized: 2/19     Time In: 8:58  Time Out: 9:52  Total Billable Time: 56 minutes    Precautions: Standard and Fall    Subjective     Pt reports: that he is feeling more stable and does not feel like his L leg is going to give out on him anymore which is why he switched to using his single point cane again. Pt states that he is not feeling too bad today but his pain typically gets worse by the end of the day.     He was compliant with home exercise program.  Response to previous treatment: Decreased pain and improved strength  Functional change: Pt able to ambulate with single point cane as opposed to rollator    Pain: 2/10 (hip), 1/10 (shoulder)  Location: right shoulder, left hip    Objective     Chao received therapeutic exercises to develop strength, endurance, ROM, flexibility, posture and core stabilization for 56 minutes including:  Ankle 4 ways with YTB 2x10 reps  Seated LE marches 3x10 reps  Supine hip adduction ball squeezes 3x10 reps  Hamstring stretch 4x30 sec  Hip abduction stretch 3x1 minute  PROM shoulder flexion and abduction 2x10 each  Scapular retraction 3x10 reps     Home Exercises Provided and Patient Education Provided     Education provided:   -Pt educated on proper technique when ambulating with single point cane as pt was using cane incorrectly, in order to improve  stability and decrease risk of falling. Pt educated of importance of performing HEP in order to have maximal functional improvements.     Written Home Exercises Provided: Patient instructed to cont prior HEP.  Exercises were reviewed and Chao was able to demonstrate them prior to the end of the session.  Chao demonstrated good  understanding of the education provided.     See EMR under Patient Instructions for exercises provided prior visit.    Assessment     Pt tolerated all exercises well. Pt noted with extreme tightness with hip abduction stretch and hamstring stretch. Pt noted with muscular weakness with all strengthening exercises. Pt noted with difficulty sequencing task when performing ankle strengthening exercises. Pt had difficulty relaxing when performing PROM to shoulder with pain increasing in deltoid region if the pt did not relax and no pain when the pt was able to relax.     Chao is progressing well towards his goals.   Pt prognosis is Fair.     Pt will continue to benefit from skilled outpatient physical therapy to address the deficits listed in the problem list box on initial evaluation, provide pt/family education and to maximize pt's level of independence in the home and community environment.     Pt's spiritual, cultural and educational needs considered and pt agreeable to plan of care and goals.     Anticipated barriers to physical therapy: transportation and living alone and driving himself to therapy    Goals:  Short Term Goals:  6 weeks  1.  Patient will improve AROM to 1/2 of LTG in order to progress towards functional activities.   2.  Patient will patient will be independent with HEP in order to further progress and return to maximal function.   3.  Pain rating at worst 4/10 in order to progress towards increased independence with activity.   4.  Patient will have improved postural awareness to decrease strain on neck and low back from slouched posturing with forward head.  5.   Patient will report improved function indicated by score of 62% ability on the  Lower Extremity Functional Scale.  6. 7. Patient will report improved function indicated by score of 81% ability on the Upper Extremity Functional Index for improved ability to perform all functional tasks with greater independence.      Long Term Goals:  12 weeks  1.  Patient will return to normal ADL, recreational, and work related activities with less pain and limitation.   2.  Patient will improve AROM to stated goals in order to return to maximal functional potential.  3.  Patient will improve strength to stated goals of appropriate musculature in order to improve functional independence  4.  Patient will demonstrate normal gait mechanics in order to minimize any compensation and return to PLOF.  5: Patient will report no instances of buckling of the L knee for 2 consecutive weeks for improved stability and to decrease risk of falls to improve QoL as pt lives alone.   6. Patient will report improved function indicated by score of 80% ability on the Lower Extremity Functional Scale.  7. Patient will report improved function indicated by score of 90% ability on the Upper Extremity Functional Index for improved ability to perform all functional tasks with greater independence.       Plan     Continue POC and frequency as planned. Progress strengthening and ROM exercises as tolerated.     These services are reasonable and necessary for the conditions set forth above while under my care.      Dalia Carter, PT, DPT

## 2019-05-16 ENCOUNTER — PATIENT MESSAGE (OUTPATIENT)
Dept: INTERNAL MEDICINE | Facility: CLINIC | Age: 84
End: 2019-05-16

## 2019-05-16 DIAGNOSIS — J44.9 CHRONIC OBSTRUCTIVE PULMONARY DISEASE, UNSPECIFIED COPD TYPE: ICD-10-CM

## 2019-05-16 NOTE — TELEPHONE ENCOUNTER
See mychart request, pt needs short term refill sent to location pharmacy, sent to Dr. Ureña for authorization [Flovent Diskus].    LV 05/02/19  [place] 07/17/19

## 2019-05-20 RX ORDER — FLUTICASONE PROPIONATE 250 UG/1
1 POWDER, METERED RESPIRATORY (INHALATION) 2 TIMES DAILY
Qty: 1 EACH | Refills: 0 | Status: SHIPPED | OUTPATIENT
Start: 2019-05-20 | End: 2019-06-04 | Stop reason: SDUPTHER

## 2019-05-21 ENCOUNTER — CLINICAL SUPPORT (OUTPATIENT)
Dept: REHABILITATION | Facility: HOSPITAL | Age: 84
End: 2019-05-21
Payer: MEDICARE

## 2019-05-21 ENCOUNTER — TELEPHONE (OUTPATIENT)
Dept: PHYSICAL MEDICINE AND REHAB | Facility: CLINIC | Age: 84
End: 2019-05-21

## 2019-05-21 DIAGNOSIS — M25.60 DECREASED RANGE OF MOTION: ICD-10-CM

## 2019-05-21 DIAGNOSIS — R29.3 POOR POSTURE: ICD-10-CM

## 2019-05-21 DIAGNOSIS — R53.1 DECREASED STRENGTH: ICD-10-CM

## 2019-05-21 DIAGNOSIS — Z74.09 IMPAIRED FUNCTIONAL MOBILITY, BALANCE, GAIT, AND ENDURANCE: ICD-10-CM

## 2019-05-21 PROCEDURE — 97110 THERAPEUTIC EXERCISES: CPT | Mod: HCNC

## 2019-05-21 PROCEDURE — 97140 MANUAL THERAPY 1/> REGIONS: CPT | Mod: HCNC

## 2019-05-21 NOTE — PROGRESS NOTES
"  Physical Therapy Daily Treatment Note     Name: Chao Hawk Jr.  Clinic Number: [id number]    Therapy Diagnosis:   Encounter Diagnoses   Name Primary?    Poor posture     Impaired functional mobility, balance, gait, and endurance     Decreased range of motion     Decreased strength      Physician: Radha Valladares MD    Visit Date: 5/21/2019    Physician Orders: PT Eval and Treat  Medical Diagnosis: Trochanteric bursitis of left hip, impingement syndrome of right shoulder, shoulder arthritis  Evaluation Date: 5/10/2019  Authorization Period Expiration: 5/7/2020  Plan of Care Certification Period: 8/2/2019  Visit #/Visits authorized: 3/19     Time In: 10:01  Time Out: 10:55  Total Billable Time: 54 minutes    Precautions: Standard and Fall    Subjective     Pt reports: that is feeling "okay" today. Pt states that he just caught a "twinge" in his hip that just will not go away. Pt states that his shoulder pain is there like it always is. Pt states that the "other day" he caught a leg cramp and tried to find his "electrolyte drink" that his granddaughter got him but he could not find it. Pt states that he just had to "walk around and work it out" but will go to the store and try to find the "electrolyte drink."    He was compliant with home exercise program.  Response to previous treatment: No pain or soreness following session  Functional change: Pt able to ambulate with single point cane as opposed to rollator    Pain: 3/10 (hip), 1/10 (shoulder)  Location: right shoulder, left hip    Objective     Chao received therapeutic exercises to develop strength, endurance, ROM, flexibility, posture and core stabilization for 39 minutes including:  Ankle 4 ways with YTB 2x10 reps bilaterally  Seated LE marches 3x15 reps  Supine hip adduction ball squeezes 3x10 reps  Hamstring stretch 5x30 sec on L, 2x30 sec on R  Gastroc stretch 3x30 sec on L, 2x30 sec on R  Hip abduction stretch 2x1 minute  PROM shoulder flexion and " "abduction 2x10 each  Scapular retraction x8 reps     Chao received the following manual therapy techniques: Joint mobilizations, Manual traction, Myofacial release and Soft tissue Mobilization were applied to the: R shoulder and L knee for 15 minutes, including:  STM to R posterior shoulder  Glenohumeral joint distraction  PROM shoulder flexion and abduction  Anterior and posterior glides of humerus    Home Exercises Provided and Patient Education Provided     Education provided:   -Pt educated on proper technique when ambulating with single point cane as pt was using cane incorrectly, in order to improve stability and decrease risk of falling. Pt educated of importance of performing HEP in order to have maximal functional improvements.     Written Home Exercises Provided: Patient instructed to cont prior HEP.  Exercises were reviewed and Chao was able to demonstrate them prior to the end of the session.  Chao demonstrated good  understanding of the education provided.     See EMR under Patient Instructions for exercises provided prior visit.    Assessment     Pt tolerated all exercises well. Pt tolerated all stretching well with more tightness noted in L LE than in R LE. Began performing ankle strengthening bilaterally as R lower leg is atrophied as compared to L as the pt pointed out to the therapist. Pt has difficulty performing ankle eversion on R as compared to L. Pt noted with "clicking and clunking" in R UE when performing flexion and abduction passively with moderate to severe discomfort with abduction>flexion. Pt able to obtain about  degrees of flexion consistently before "clunking" would occur.      Chao is progressing well towards his goals.   Pt prognosis is Fair.     Pt will continue to benefit from skilled outpatient physical therapy to address the deficits listed in the problem list box on initial evaluation, provide pt/family education and to maximize pt's level of independence in the " home and community environment.     Pt's spiritual, cultural and educational needs considered and pt agreeable to plan of care and goals.     Anticipated barriers to physical therapy: transportation and living alone and driving himself to therapy    Goals:  Short Term Goals:  6 weeks  1.  Patient will improve AROM to 1/2 of LTG in order to progress towards functional activities.   2.  Patient will patient will be independent with HEP in order to further progress and return to maximal function.   3.  Pain rating at worst 4/10 in order to progress towards increased independence with activity.   4.  Patient will have improved postural awareness to decrease strain on neck and low back from slouched posturing with forward head.  5.  Patient will report improved function indicated by score of 62% ability on the  Lower Extremity Functional Scale.  6. 7. Patient will report improved function indicated by score of 81% ability on the Upper Extremity Functional Index for improved ability to perform all functional tasks with greater independence.      Long Term Goals:  12 weeks  1.  Patient will return to normal ADL, recreational, and work related activities with less pain and limitation.   2.  Patient will improve AROM to stated goals in order to return to maximal functional potential.  3.  Patient will improve strength to stated goals of appropriate musculature in order to improve functional independence  4.  Patient will demonstrate normal gait mechanics in order to minimize any compensation and return to PLOF.  5: Patient will report no instances of buckling of the L knee for 2 consecutive weeks for improved stability and to decrease risk of falls to improve QoL as pt lives alone.   6. Patient will report improved function indicated by score of 80% ability on the Lower Extremity Functional Scale.  7. Patient will report improved function indicated by score of 90% ability on the Upper Extremity Functional Index for improved  ability to perform all functional tasks with greater independence.       Plan     Continue POC and frequency as planned. Progress strengthening and ROM exercises as tolerated.     These services are reasonable and necessary for the conditions set forth above while under my care.    Dalia Carter, PT, DPT

## 2019-05-22 ENCOUNTER — OFFICE VISIT (OUTPATIENT)
Dept: ORTHOPEDICS | Facility: CLINIC | Age: 84
End: 2019-05-22
Payer: MEDICARE

## 2019-05-22 VITALS
DIASTOLIC BLOOD PRESSURE: 74 MMHG | BODY MASS INDEX: 24.22 KG/M2 | HEART RATE: 80 BPM | SYSTOLIC BLOOD PRESSURE: 156 MMHG | WEIGHT: 178.81 LBS | HEIGHT: 72 IN

## 2019-05-22 DIAGNOSIS — M19.019 SHOULDER ARTHRITIS: ICD-10-CM

## 2019-05-22 DIAGNOSIS — M75.41 IMPINGEMENT SYNDROME OF RIGHT SHOULDER: Primary | ICD-10-CM

## 2019-05-22 DIAGNOSIS — M70.62 TROCHANTERIC BURSITIS OF LEFT HIP: ICD-10-CM

## 2019-05-22 PROCEDURE — 99214 PR OFFICE/OUTPT VISIT, EST, LEVL IV, 30-39 MIN: ICD-10-PCS | Mod: HCNC,25,S$GLB, | Performed by: PHYSICAL MEDICINE & REHABILITATION

## 2019-05-22 PROCEDURE — 1100F PTFALLS ASSESS-DOCD GE2>/YR: CPT | Mod: HCNC,CPTII,S$GLB, | Performed by: PHYSICAL MEDICINE & REHABILITATION

## 2019-05-22 PROCEDURE — 99999 PR PBB SHADOW E&M-EST. PATIENT-LVL III: CPT | Mod: PBBFAC,HCNC,, | Performed by: PHYSICAL MEDICINE & REHABILITATION

## 2019-05-22 PROCEDURE — 3288F FALL RISK ASSESSMENT DOCD: CPT | Mod: HCNC,CPTII,S$GLB, | Performed by: PHYSICAL MEDICINE & REHABILITATION

## 2019-05-22 PROCEDURE — 3288F PR FALLS RISK ASSESSMENT DOCUMENTED: ICD-10-PCS | Mod: HCNC,CPTII,S$GLB, | Performed by: PHYSICAL MEDICINE & REHABILITATION

## 2019-05-22 PROCEDURE — 20610 PR DRAIN/INJECT LARGE JOINT/BURSA: ICD-10-PCS | Mod: HCNC,RT,S$GLB, | Performed by: PHYSICAL MEDICINE & REHABILITATION

## 2019-05-22 PROCEDURE — 1100F PR PT FALLS ASSESS DOC 2+ FALLS/FALL W/INJURY/YR: ICD-10-PCS | Mod: HCNC,CPTII,S$GLB, | Performed by: PHYSICAL MEDICINE & REHABILITATION

## 2019-05-22 PROCEDURE — 99999 PR PBB SHADOW E&M-EST. PATIENT-LVL III: ICD-10-PCS | Mod: PBBFAC,HCNC,, | Performed by: PHYSICAL MEDICINE & REHABILITATION

## 2019-05-22 PROCEDURE — 99214 OFFICE O/P EST MOD 30 MIN: CPT | Mod: HCNC,25,S$GLB, | Performed by: PHYSICAL MEDICINE & REHABILITATION

## 2019-05-22 PROCEDURE — 20610 DRAIN/INJ JOINT/BURSA W/O US: CPT | Mod: HCNC,RT,S$GLB, | Performed by: PHYSICAL MEDICINE & REHABILITATION

## 2019-05-22 RX ORDER — METHYLPREDNISOLONE ACETATE 40 MG/ML
40 INJECTION, SUSPENSION INTRA-ARTICULAR; INTRALESIONAL; INTRAMUSCULAR; SOFT TISSUE
Status: COMPLETED | OUTPATIENT
Start: 2019-05-22 | End: 2019-05-22

## 2019-05-22 RX ADMIN — METHYLPREDNISOLONE ACETATE 40 MG: 40 INJECTION, SUSPENSION INTRA-ARTICULAR; INTRALESIONAL; INTRAMUSCULAR; SOFT TISSUE at 08:05

## 2019-05-22 NOTE — PROGRESS NOTES
PM&R Follow Up Visit :    Referring Physician: No ref. provider found    Chief Complaint   Patient presents with    Shoulder Pain     Right shoulder pain       HPI: This is a 87 y.o.  male being seen in clinic today for evaluation of Shoulder Pain (Right shoulder pain)   Since last visit, the symptoms are improving, especially in the hip that I did a bursa injection to last visit. He has tried stretching in PT with slight improvement, and is just starting to do exercises. He has been to therapy and they only started working on the shoulder yesterday. He returns today hoping to try a shoulder injection.    History obtained from patient.    Past family, medical, social, surgical history, and vital signs reviewed in chart.    Review of Systems   Constitutional: Negative for chills, fever and weight loss.   HENT: Negative for hearing loss and sore throat.    Eyes: Negative for blurred vision, photophobia and pain.   Respiratory: Negative for shortness of breath.    Cardiovascular: Negative for chest pain.   Gastrointestinal: Negative for abdominal pain.   Genitourinary: Negative for dysuria.   Skin: Negative for rash.   Neurological: Negative for tingling and headaches.   Endo/Heme/Allergies: Does not bruise/bleed easily.   Psychiatric/Behavioral: Negative for depression.       Physical Exam   Constitutional: He is oriented to person, place, and time. He appears well-developed and well-nourished.   HENT:   Head: Normocephalic and atraumatic.   Eyes: Pupils are equal, round, and reactive to light. EOM are normal.   Neck: Normal range of motion. Neck supple.   Cardiovascular: Intact distal pulses.   Pulmonary/Chest: Effort normal.   Abdominal: He exhibits no distension.   Musculoskeletal:        Right shoulder: He exhibits decreased range of motion (limited flexion and abduction to 120, decreased internal rotation. ), tenderness (about AC joint and subacromial bursa), crepitus (has audible clunking as he goes through  ROM) and decreased strength (weakness with flexion and external rotation).   Neurological: He is alert and oriented to person, place, and time. He has normal strength and normal reflexes. No sensory deficit.   Skin: Skin is warm and dry.   Psychiatric: He has a normal mood and affect.   Vitals reviewed.    IMPRESSION/PLAN: This is a 87 y.o.  male with:    Impingement syndrome of right shoulder    Shoulder arthritis    Trochanteric bursitis of left hip      The findings were discussed with Chao in detail. I'm glad the hip injection worked and that he is finding benefit from PT. I discussed that the shoulder injection likely wouldn't stop the clunking or remove all pain, but the hope is to reduce the pain. He voiced understanding and we proceeded as noted below. He was provided with this plan in writing. All of his questions were answered. He will follow up with me in 6 weeks.     PROCEDURE NOTE    Diagnosis: right rotator cuff tendinosis  Procedure: right subacromial bursa injection    A time out was performed and risks and benefits of procedure explained to patient including risks of infection, bleeding, pain, or damage to surrounding tissues. All questions answered. Informed consent obtained prior to proceeding. The proper area was marked and prepped in sterile fashion. Using a 25 G 1.5  inch needle, a 5 cc mixture of depomedrol 1cc (40mg) and 1% lidocaine was injected into the shoulder-posterior approach. Minimal to no bleeding noted. ER and post injection instructions given.        Radha Valladares M.D.  Physical Medicine and Rehab

## 2019-05-24 ENCOUNTER — CLINICAL SUPPORT (OUTPATIENT)
Dept: REHABILITATION | Facility: HOSPITAL | Age: 84
End: 2019-05-24
Payer: MEDICARE

## 2019-05-24 DIAGNOSIS — R53.1 DECREASED STRENGTH: ICD-10-CM

## 2019-05-24 DIAGNOSIS — R29.3 POOR POSTURE: ICD-10-CM

## 2019-05-24 DIAGNOSIS — Z74.09 IMPAIRED FUNCTIONAL MOBILITY, BALANCE, GAIT, AND ENDURANCE: ICD-10-CM

## 2019-05-24 DIAGNOSIS — M25.60 DECREASED RANGE OF MOTION: ICD-10-CM

## 2019-05-24 PROCEDURE — 97140 MANUAL THERAPY 1/> REGIONS: CPT | Mod: HCNC

## 2019-05-24 PROCEDURE — 97110 THERAPEUTIC EXERCISES: CPT | Mod: HCNC

## 2019-05-24 NOTE — PROGRESS NOTES
"  Physical Therapy Daily Treatment Note     Name: Chao Hawk Jr.  Clinic Number: [id number]    Therapy Diagnosis:   Encounter Diagnoses   Name Primary?    Poor posture     Impaired functional mobility, balance, gait, and endurance     Decreased range of motion     Decreased strength      Physician: Radha Valladares MD    Visit Date: 5/24/2019    Physician Orders: PT Eval and Treat  Medical Diagnosis: Trochanteric bursitis of left hip, impingement syndrome of right shoulder, shoulder arthritis  Evaluation Date: 5/10/2019  Authorization Period Expiration: 5/7/2020  Plan of Care Certification Period: 8/2/2019  Visit #/Visits authorized: 4/19     Time In: 10:28  Time Out: 11:22  Total Billable Time: 54 minutes    Precautions: Standard and Fall    Subjective     Pt reports: that is feeling "pretty good" today. Pt arrived to therapy yesterday as he thought it was Friday yesterday. Pt reports that today he can hardly feel his shoulder pain today. Pt states that his hip pain is "not too bad" today either. Pt ambulates into the gym with his single point cane.     He was compliant with home exercise program.  Response to previous treatment: No pain or soreness following session  Functional change: Pt able to ambulate with single point cane as opposed to rollator    Pain: 2/10  Location: right shoulder, left hip    Objective     Chao received therapeutic exercises to develop strength, endurance, ROM, flexibility, posture and core stabilization for 42 minutes including:  Ankle 4 ways with YTB 2x10 reps bilaterally  Seated LE marches 3x15 reps  Supine hip adduction ball squeezes 3x10 reps  Supine hip abduction with YTB 3x10 reps   Hamstring stretch 5x30 sec on L, 2x30 sec on R  Gastroc stretch on wedge 3x30 each while standing   Hip abduction stretch 2x1 minute deferred  PROM shoulder flexion and abduction 2x10 each  Scapular retraction x8 reps  Static IR and ER resisted by therapist 2x10 reps each    Chao received the " "following manual therapy techniques: Joint mobilizations, Manual traction, Myofacial release and Soft tissue Mobilization were applied to the: R shoulder and L knee for 12 minutes, including:  STM to R posterior shoulder  Glenohumeral joint distraction  Strumming of hamstrings while performing hamstring stretch    Home Exercises Provided and Patient Education Provided     Education provided:   -Pt educated on proper technique when ambulating with single point cane as pt was using cane incorrectly, in order to improve stability and decrease risk of falling. Pt educated of importance of performing HEP in order to have maximal functional improvements.     Written Home Exercises Provided: Patient instructed to cont prior HEP.  Exercises were reviewed and Chao was able to demonstrate them prior to the end of the session.  Chao demonstrated good  understanding of the education provided.     See EMR under Patient Instructions for exercises provided prior visit.    Assessment     Pt tolerated all stretching well with slight increase in pain noted with performing hamstring stretch on the R LE which relieved with rest. Pt tolerated ankle strengthening well with less cueing required for proper sequencing of task. Pt noted with less severe "clicking and clunking" in R UE when performing flexion and abduction passively while still only able to obtain about  degrees of flexion consistently. "Clicking/clunking" is more apparent in abduction as compared to flexion. Pt tolerated resisted IR and ER by therapist well with only slight increase in discomfort noted. Pt noted with increase in pain when performing shoulder/scapular strengthening which relieved with manual glenohumeral joint distraction.     Chao is progressing well towards his goals.   Pt prognosis is Fair.     Pt will continue to benefit from skilled outpatient physical therapy to address the deficits listed in the problem list box on initial evaluation, " provide pt/family education and to maximize pt's level of independence in the home and community environment.     Pt's spiritual, cultural and educational needs considered and pt agreeable to plan of care and goals.     Anticipated barriers to physical therapy: transportation and living alone and driving himself to therapy    Goals:  Short Term Goals:  6 weeks  1.  Patient will improve AROM to 1/2 of LTG in order to progress towards functional activities.   2.  Patient will patient will be independent with HEP in order to further progress and return to maximal function.   3.  Pain rating at worst 4/10 in order to progress towards increased independence with activity.   4.  Patient will have improved postural awareness to decrease strain on neck and low back from slouched posturing with forward head.  5.  Patient will report improved function indicated by score of 62% ability on the  Lower Extremity Functional Scale.  6. 7. Patient will report improved function indicated by score of 81% ability on the Upper Extremity Functional Index for improved ability to perform all functional tasks with greater independence.      Long Term Goals:  12 weeks  1.  Patient will return to normal ADL, recreational, and work related activities with less pain and limitation.   2.  Patient will improve AROM to stated goals in order to return to maximal functional potential.  3.  Patient will improve strength to stated goals of appropriate musculature in order to improve functional independence  4.  Patient will demonstrate normal gait mechanics in order to minimize any compensation and return to PLOF.  5: Patient will report no instances of buckling of the L knee for 2 consecutive weeks for improved stability and to decrease risk of falls to improve QoL as pt lives alone.   6. Patient will report improved function indicated by score of 80% ability on the Lower Extremity Functional Scale.  7. Patient will report improved function indicated  by score of 90% ability on the Upper Extremity Functional Index for improved ability to perform all functional tasks with greater independence.       Plan     Continue POC and frequency as planned. Progress strengthening and ROM exercises as tolerated.     These services are reasonable and necessary for the conditions set forth above while under my care.    Dalia Carter, PT, DPT

## 2019-05-28 ENCOUNTER — CLINICAL SUPPORT (OUTPATIENT)
Dept: REHABILITATION | Facility: HOSPITAL | Age: 84
End: 2019-05-28
Payer: MEDICARE

## 2019-05-28 DIAGNOSIS — Z74.09 IMPAIRED FUNCTIONAL MOBILITY, BALANCE, GAIT, AND ENDURANCE: ICD-10-CM

## 2019-05-28 DIAGNOSIS — R29.3 POOR POSTURE: ICD-10-CM

## 2019-05-28 DIAGNOSIS — M25.60 DECREASED RANGE OF MOTION: ICD-10-CM

## 2019-05-28 DIAGNOSIS — R53.1 DECREASED STRENGTH: ICD-10-CM

## 2019-05-28 PROCEDURE — 97110 THERAPEUTIC EXERCISES: CPT | Mod: HCNC

## 2019-05-28 PROCEDURE — 97140 MANUAL THERAPY 1/> REGIONS: CPT | Mod: HCNC

## 2019-05-28 NOTE — PROGRESS NOTES
"  Physical Therapy Daily Treatment Note     Name: Chao Hawk Jr.  Clinic Number: [id number]    Therapy Diagnosis:   Encounter Diagnoses   Name Primary?    Poor posture     Impaired functional mobility, balance, gait, and endurance     Decreased range of motion     Decreased strength      Physician: Radha Valladares MD    Visit Date: 5/28/2019    Physician Orders: PT Eval and Treat  Medical Diagnosis: Trochanteric bursitis of left hip, impingement syndrome of right shoulder, shoulder arthritis  Evaluation Date: 5/10/2019  Authorization Period Expiration: 5/7/2020  Plan of Care Certification Period: 8/2/2019  Visit #/Visits authorized: 5/19     Time In: 10:31  Time Out: 11:35  Total Billable Time: 55 minutes    Precautions: Standard and Fall    Subjective     Pt reports: that is feeling "pretty good" today. Pt reports that his shoulder and hip felt fine after last session but he felt tightness and a little discomfort in his low back and "calves" after the last exercise he did (referring to gastroc stretches on wedge).     He was compliant with home exercise program.  Response to previous treatment: Tightness and discomfort in low back and "calves", no soreness or pain in hip or shoulder.   Functional change: Pt able to ambulate with single point cane as opposed to rollator    Pain: 2/10  Location: right shoulder, left hip    Objective     Chao received therapeutic exercises to develop strength, endurance, ROM, flexibility, posture and core stabilization for 41 minutes including:  Ankle 4 ways with YTB 2x10 reps bilaterally  Seated LE marches 3x15 reps  Supine hip adduction ball squeezes 3x10 reps  Supine hip abduction with YTB 3x10 reps   Hamstring stretch 2x1 minutes each LE  Gastroc stretch on wedge 3x30 each while standing deferred today  Hip abduction stretch 2x1 minute  PROM shoulder flexion and abduction 2x10 each  Scapular retraction x8 reps deferred today  Static IR and ER resisted by therapist 2x10 reps " each  Ball circles on wall for 2x10 in each direction    Chao received the following manual therapy techniques: Joint mobilizations, Manual traction, Myofacial release and Soft tissue Mobilization were applied to the: R shoulder and L knee for 14 minutes, including:  STM to R posterior shoulder while performing PROM stretches  Glenohumeral joint distraction with oscillations while performing PROM stretch for improved mobility  Hip joint inferior glides for improved hip mobility    Home Exercises Provided and Patient Education Provided     Education provided:   -Pt educated on proper technique when ambulating with single point cane as pt was using cane incorrectly, in order to improve stability and decrease risk of falling. Pt educated of importance of performing HEP in order to have maximal functional improvements.     Written Home Exercises Provided: Patient instructed to cont prior HEP.  Exercises were reviewed and Chao was able to demonstrate them prior to the end of the session.  Chao demonstrated good  understanding of the education provided.     See EMR under Patient Instructions for exercises provided prior visit.    Assessment     Pt tolerated all exercises well with continual cueing required for sequencing of all tasks. Pt noted with tightness at posterior knee when performing hamstring stretch with education provided to decrease stretch for improved tolerance with good follow through. Pt noted with fatigue when performing therapist resisted IR and ER. Pt noted with good ability to perform ball on wall circles although fatigue was noted. Pt noted with clicking in shoulder when performing both shoulder strengthening exercises although no pain with present with the clicking. When performing manual therapy on shoulder, pt noted with decreased occurrences of clicking which could likely be due to applying distraction when PROM.     Chao is progressing well towards his goals.   Pt prognosis is Fair.      Pt will continue to benefit from skilled outpatient physical therapy to address the deficits listed in the problem list box on initial evaluation, provide pt/family education and to maximize pt's level of independence in the home and community environment.     Pt's spiritual, cultural and educational needs considered and pt agreeable to plan of care and goals.     Anticipated barriers to physical therapy: transportation and living alone and driving himself to therapy    Goals:  Short Term Goals:  6 weeks  1.  Patient will improve AROM to 1/2 of LTG in order to progress towards functional activities.   2.  Patient will patient will be independent with HEP in order to further progress and return to maximal function.   3.  Pain rating at worst 4/10 in order to progress towards increased independence with activity.   4.  Patient will have improved postural awareness to decrease strain on neck and low back from slouched posturing with forward head.  5.  Patient will report improved function indicated by score of 62% ability on the  Lower Extremity Functional Scale.  6. 7. Patient will report improved function indicated by score of 81% ability on the Upper Extremity Functional Index for improved ability to perform all functional tasks with greater independence.      Long Term Goals:  12 weeks  1.  Patient will return to normal ADL, recreational, and work related activities with less pain and limitation.   2.  Patient will improve AROM to stated goals in order to return to maximal functional potential.  3.  Patient will improve strength to stated goals of appropriate musculature in order to improve functional independence  4.  Patient will demonstrate normal gait mechanics in order to minimize any compensation and return to PLOF.  5: Patient will report no instances of buckling of the L knee for 2 consecutive weeks for improved stability and to decrease risk of falls to improve QoL as pt lives alone.   6. Patient will  report improved function indicated by score of 80% ability on the Lower Extremity Functional Scale.  7. Patient will report improved function indicated by score of 90% ability on the Upper Extremity Functional Index for improved ability to perform all functional tasks with greater independence.       Plan     Continue POC and frequency as planned. Progress strengthening and ROM exercises as tolerated.     These services are reasonable and necessary for the conditions set forth above while under my care.    Dalia Carter, PT, DPT

## 2019-05-29 ENCOUNTER — CLINICAL SUPPORT (OUTPATIENT)
Dept: REHABILITATION | Facility: HOSPITAL | Age: 84
End: 2019-05-29
Payer: MEDICARE

## 2019-05-29 DIAGNOSIS — R29.3 POOR POSTURE: ICD-10-CM

## 2019-05-29 DIAGNOSIS — R53.1 DECREASED STRENGTH: ICD-10-CM

## 2019-05-29 DIAGNOSIS — M25.60 DECREASED RANGE OF MOTION: ICD-10-CM

## 2019-05-29 DIAGNOSIS — Z74.09 IMPAIRED FUNCTIONAL MOBILITY, BALANCE, GAIT, AND ENDURANCE: ICD-10-CM

## 2019-05-29 PROCEDURE — 97140 MANUAL THERAPY 1/> REGIONS: CPT | Mod: HCNC

## 2019-05-29 PROCEDURE — 97110 THERAPEUTIC EXERCISES: CPT | Mod: HCNC

## 2019-05-29 NOTE — PROGRESS NOTES
"  Physical Therapy Daily Treatment Note     Name: Chao Hawk Jr.  Clinic Number: [id number]    Therapy Diagnosis:   Encounter Diagnoses   Name Primary?    Poor posture     Impaired functional mobility, balance, gait, and endurance     Decreased range of motion     Decreased strength      Physician: Radha Valladares MD    Visit Date: 5/29/2019    Physician Orders: PT Eval and Treat  Medical Diagnosis: Trochanteric bursitis of left hip, impingement syndrome of right shoulder, shoulder arthritis  Evaluation Date: 5/10/2019  Authorization Period Expiration: 5/7/2020  Plan of Care Certification Period: 8/2/2019  Visit #/Visits authorized: 6/19     Time In: 10:33  Time Out: 11:30  Total Billable Time: 57 minutes    Precautions: Standard and Fall    Subjective     Pt reports: that is feeling "a little stiff" today referring to his LEs. Pt states that his shoulder also feels a little tender but it is "not too bad." Pt states that he does not have the soreness/tightness in his "calves" like when arriving to last session. Pt states that his hip is bothering him today which could be "due to the weather."    He was compliant with home exercise program.  Response to previous treatment: No increase in soreness or stiffness.   Functional change: Pt able to ambulate with single point cane as opposed to rollator    Pain: 2/10  Location: right shoulder, left hip    Objective     Chao received therapeutic exercises to develop strength, endurance, ROM, flexibility, posture and core stabilization for 49 minutes including:  Ankle 4 ways with YTB 2x10 reps bilaterally  Seated LE marches 3x15 reps  Supine hip adduction ball squeezes 3x10 reps  Supine hip abduction with YTB 3x10 reps   Hamstring stretch 2x1 minutes each LE  Gastroc stretch on wedge 3x30 each while standing deferred today  Hip abduction stretch 2x1 minute  PROM shoulder flexion and abduction 2x10 each  Scapular retraction 3x10 reps   Static IR and ER resisted by " "therapist 2x10 reps each  Ball circles on wall for 2x10 in each direction    Chao received the following manual therapy techniques: Joint mobilizations, Manual traction, Myofacial release and Soft tissue Mobilization were applied to the: R shoulder and L knee for 8 minutes, including:  STM to R posterior shoulder while performing PROM stretches deferred  Glenohumeral joint distraction with oscillations while performing PROM stretch for improved mobility   Hip joint inferior glides for improved hip mobility deferred    Home Exercises Provided and Patient Education Provided     Education provided:   -Pt educated on proper technique when ambulating with single point cane as pt was using cane incorrectly, in order to improve stability and decrease risk of falling. Pt educated of importance of performing HEP in order to have maximal functional improvements.     Written Home Exercises Provided: Patient instructed to cont prior HEP.  Exercises were reviewed and Chao was able to demonstrate them prior to the end of the session.  Chao demonstrated good  understanding of the education provided.     See EMR under Patient Instructions for exercises provided prior visit.    Assessment     Pt tolerated all exercises well with moderate verbal and tactile cueing required for sequencing of all tasks. Pt noted with "burning" reported in ankle after performing with inversion and eversion likely due to muscular weakness and not compensating by using his LE like seen in previous sessions. Pt tolerated shoulder and scapular strengthening exercises well without pain reported.    Chao is progressing well towards his goals.   Pt prognosis is Fair.     Pt will continue to benefit from skilled outpatient physical therapy to address the deficits listed in the problem list box on initial evaluation, provide pt/family education and to maximize pt's level of independence in the home and community environment.     Pt's spiritual, cultural " and educational needs considered and pt agreeable to plan of care and goals.     Anticipated barriers to physical therapy: transportation and living alone and driving himself to therapy    Goals:  Short Term Goals:  6 weeks  1.  Patient will improve AROM to 1/2 of LTG in order to progress towards functional activities.   2.  Patient will patient will be independent with HEP in order to further progress and return to maximal function.   3.  Pain rating at worst 4/10 in order to progress towards increased independence with activity.   4.  Patient will have improved postural awareness to decrease strain on neck and low back from slouched posturing with forward head.  5.  Patient will report improved function indicated by score of 62% ability on the  Lower Extremity Functional Scale.  6. 7. Patient will report improved function indicated by score of 81% ability on the Upper Extremity Functional Index for improved ability to perform all functional tasks with greater independence.      Long Term Goals:  12 weeks  1.  Patient will return to normal ADL, recreational, and work related activities with less pain and limitation.   2.  Patient will improve AROM to stated goals in order to return to maximal functional potential.  3.  Patient will improve strength to stated goals of appropriate musculature in order to improve functional independence  4.  Patient will demonstrate normal gait mechanics in order to minimize any compensation and return to PLOF.  5: Patient will report no instances of buckling of the L knee for 2 consecutive weeks for improved stability and to decrease risk of falls to improve QoL as pt lives alone.   6. Patient will report improved function indicated by score of 80% ability on the Lower Extremity Functional Scale.  7. Patient will report improved function indicated by score of 90% ability on the Upper Extremity Functional Index for improved ability to perform all functional tasks with greater  independence.       Plan     Continue POC and frequency as planned. Progress strengthening and ROM exercises as tolerated.     These services are reasonable and necessary for the conditions set forth above while under my care.    Dalia Carter, PT, DPT

## 2019-05-30 ENCOUNTER — TELEPHONE (OUTPATIENT)
Dept: INTERNAL MEDICINE | Facility: CLINIC | Age: 84
End: 2019-05-30

## 2019-05-30 NOTE — TELEPHONE ENCOUNTER
Called pt, no answer, left voicemail requesting pt return call to clinic. Received written letter from pt stating he was unaware of why his Advair rx had changed from 100mcg to the 250mcg, a/t chart rx was changed after he saw Dr. Sotelo in [facility] at 01/30/19, so when pt requested refill of rx it was filled in 250mcg current rx.

## 2019-05-30 NOTE — TELEPHONE ENCOUNTER
See gokitt message. Called pt, no answer, left voicemail requesting pt return call to clinic. Received written letter from pt stating he was unaware of why his Advair rx had changed from 100mcg to the 250mcg, a/t chart rx was changed after he saw Dr. Sotelo in [facility] at 01/30/19, so when pt requested refill of rx it was filled in 250mcg current rx.

## 2019-06-04 DIAGNOSIS — J44.9 CHRONIC OBSTRUCTIVE PULMONARY DISEASE, UNSPECIFIED COPD TYPE: ICD-10-CM

## 2019-06-04 RX ORDER — FLUTICASONE PROPIONATE 250 UG/1
1 POWDER, METERED RESPIRATORY (INHALATION) 2 TIMES DAILY
Qty: 1 EACH | Refills: 11 | Status: SHIPPED | OUTPATIENT
Start: 2019-06-04 | End: 2019-06-06 | Stop reason: SDUPTHER

## 2019-06-04 NOTE — TELEPHONE ENCOUNTER
S/w pt requesting refill of Flovent Diskus 250/50mcg to local pharmacy confirmed in chart. Advised pt would send request to Dr. Ureña, and let pt know when we receive Dr's response. Pt voiced understanding.    LV 05/02/19  [place] 07/17/19

## 2019-06-05 ENCOUNTER — CLINICAL SUPPORT (OUTPATIENT)
Dept: REHABILITATION | Facility: HOSPITAL | Age: 84
End: 2019-06-05
Payer: MEDICARE

## 2019-06-05 DIAGNOSIS — R53.1 DECREASED STRENGTH: ICD-10-CM

## 2019-06-05 DIAGNOSIS — Z74.09 IMPAIRED FUNCTIONAL MOBILITY, BALANCE, GAIT, AND ENDURANCE: ICD-10-CM

## 2019-06-05 DIAGNOSIS — R29.3 POOR POSTURE: ICD-10-CM

## 2019-06-05 DIAGNOSIS — M25.60 DECREASED RANGE OF MOTION: ICD-10-CM

## 2019-06-05 PROCEDURE — 97110 THERAPEUTIC EXERCISES: CPT | Mod: HCNC

## 2019-06-06 RX ORDER — FLUTICASONE PROPIONATE 250 UG/1
1 POWDER, METERED RESPIRATORY (INHALATION) 2 TIMES DAILY
Qty: 1 EACH | Refills: 11 | Status: SHIPPED | OUTPATIENT
Start: 2019-06-06 | End: 2019-08-05 | Stop reason: SDUPTHER

## 2019-06-06 NOTE — PROGRESS NOTES
"  Physical Therapy Daily Treatment Note     Name: Chao Hawk Jr.  Clinic Number: [id number]    Therapy Diagnosis:   Encounter Diagnoses   Name Primary?    Poor posture     Impaired functional mobility, balance, gait, and endurance     Decreased range of motion     Decreased strength      Physician: Radha Valladares MD    Visit Date: 6/5/2019    Physician Orders: PT Eval and Treat  Medical Diagnosis: Trochanteric bursitis of left hip, impingement syndrome of right shoulder, shoulder arthritis  Evaluation Date: 5/10/2019  Authorization Period Expiration: 5/7/2020  Plan of Care Certification Period: 8/2/2019  Visit #/Visits authorized: 7/19     Time In: 10:03  Time Out: 10:58  Total Billable Time: 55 minutes    Precautions: Standard and Fall    Subjective     Pt reports: that his back is really bothering him today but that is "just his arthritis" he states. Pt states that his shoulder is doing okay but his hip is sort of irritated.     He was compliant with home exercise program.  Response to previous treatment: No increase in soreness or stiffness.   Functional change: Pt able to ambulate with single point cane as opposed to rollator    Pain: 7/10 (low back), 2/10 (shoulder), 4/10 (hip)  Location: right shoulder, left hip    Objective     Chao received therapeutic exercises to develop strength, endurance, ROM, flexibility, posture and core stabilization for 55 minutes including:  Ankle 4 ways with YTB 2x10 reps bilaterally  Seated LE marches 3x15 reps  Supine hip adduction ball squeezes 3x10 reps  Supine hip abduction with YTB 4x10 reps   Hamstring stretch 2x1 minutes each LE  Gastroc stretch on wedge 3x30 each while standing deferred today  Hip abduction stretch 2x1 minute deferred  PROM shoulder flexion and abduction 2x10 each deferred  Scapular retraction 3x10 reps deferred  Static IR and ER resisted by therapist 2x10 reps each  Ball circles on wall for 2x10 in each direction deferred  Ab bracing 3x10 " repetitions with cueing required for proper sequencing of task and for proper breathing throughout  Ab bracing with LE marches 3x10 reps     Home Exercises Provided and Patient Education Provided     Education provided:   -Pt educated on proper technique when ambulating with single point cane as pt was using cane incorrectly, in order to improve stability and decrease risk of falling. Pt educated of importance of performing HEP in order to have maximal functional improvements.     Written Home Exercises Provided: Patient instructed to cont prior HEP.  Exercises were reviewed and Chao was able to demonstrate them prior to the end of the session.  Chao demonstrated good  understanding of the education provided.     See EMR under Patient Instructions for exercises provided prior visit.    Assessment     Incorporated abdominal bracing into LE activities today to decrease low back pain with relief noted from pt. Pt tolerated abdominal bracing well although cueing was required for proper sequencing and for coordination of breathing throughout with good follow through evident. Held off on UE exercises today due to increase irritation in shoulder during every session when performing, will begin again next visit.    Chao is progressing well towards his goals.   Pt prognosis is Fair.     Pt will continue to benefit from skilled outpatient physical therapy to address the deficits listed in the problem list box on initial evaluation, provide pt/family education and to maximize pt's level of independence in the home and community environment.     Pt's spiritual, cultural and educational needs considered and pt agreeable to plan of care and goals.     Anticipated barriers to physical therapy: transportation and living alone and driving himself to therapy    Goals:  Short Term Goals:  6 weeks  1.  Patient will improve AROM to 1/2 of LTG in order to progress towards functional activities.   2.  Patient will patient will be  independent with HEP in order to further progress and return to maximal function.   3.  Pain rating at worst 4/10 in order to progress towards increased independence with activity.   4.  Patient will have improved postural awareness to decrease strain on neck and low back from slouched posturing with forward head.  5.  Patient will report improved function indicated by score of 62% ability on the  Lower Extremity Functional Scale.  6. 7. Patient will report improved function indicated by score of 81% ability on the Upper Extremity Functional Index for improved ability to perform all functional tasks with greater independence.      Long Term Goals:  12 weeks  1.  Patient will return to normal ADL, recreational, and work related activities with less pain and limitation.   2.  Patient will improve AROM to stated goals in order to return to maximal functional potential.  3.  Patient will improve strength to stated goals of appropriate musculature in order to improve functional independence  4.  Patient will demonstrate normal gait mechanics in order to minimize any compensation and return to PLOF.  5: Patient will report no instances of buckling of the L knee for 2 consecutive weeks for improved stability and to decrease risk of falls to improve QoL as pt lives alone.   6. Patient will report improved function indicated by score of 80% ability on the Lower Extremity Functional Scale.  7. Patient will report improved function indicated by score of 90% ability on the Upper Extremity Functional Index for improved ability to perform all functional tasks with greater independence.       Plan     Continue POC and frequency as planned. Progress strengthening and ROM exercises as tolerated.     These services are reasonable and necessary for the conditions set forth above while under my care.    Dalia Carter, PT, DPT

## 2019-06-07 ENCOUNTER — CLINICAL SUPPORT (OUTPATIENT)
Dept: REHABILITATION | Facility: HOSPITAL | Age: 84
End: 2019-06-07
Payer: MEDICARE

## 2019-06-07 DIAGNOSIS — R53.1 DECREASED STRENGTH: ICD-10-CM

## 2019-06-07 DIAGNOSIS — M25.60 DECREASED RANGE OF MOTION: ICD-10-CM

## 2019-06-07 DIAGNOSIS — R29.3 POOR POSTURE: ICD-10-CM

## 2019-06-07 DIAGNOSIS — Z74.09 IMPAIRED FUNCTIONAL MOBILITY, BALANCE, GAIT, AND ENDURANCE: ICD-10-CM

## 2019-06-07 PROCEDURE — 97110 THERAPEUTIC EXERCISES: CPT | Mod: HCNC

## 2019-06-07 PROCEDURE — 97140 MANUAL THERAPY 1/> REGIONS: CPT | Mod: HCNC

## 2019-06-07 NOTE — PROGRESS NOTES
"  Physical Therapy Daily Treatment Note     Name: Chao Hawk Jr.  Clinic Number: [id number]    Therapy Diagnosis:   Encounter Diagnoses   Name Primary?    Poor posture     Impaired functional mobility, balance, gait, and endurance     Decreased range of motion     Decreased strength      Physician: Radha Valladares MD    Visit Date: 6/7/2019    Physician Orders: PT Eval and Treat  Medical Diagnosis: Trochanteric bursitis of left hip, impingement syndrome of right shoulder, shoulder arthritis  Evaluation Date: 5/10/2019  Authorization Period Expiration: 5/7/2020  Plan of Care Certification Period: 8/2/2019  Visit #/Visits authorized: 8/19     Time In: 12:58  Time Out: 2:00  Total Billable Time: 62 minutes    Precautions: Standard and Fall    Subjective     Pt reports: that his low back is feeling better today. Pt states that his hip was really bothering him earlier today but has "calmed down a bit" after moving around. Pt states that he has an inversion table that he uses sometimes when his back pain is bothering him. Pt states that his MD knows that he uses it and has been cleared to do so. Pt states that he only inverts to 15 degrees with occasionally going to 30 degrees. Pt states that he never spends longer than 15 minutes on it at a time. Pt inquired about how much longer he would need to attend therapy and requested to he bump his visits down to 1x a week. Pt states that he can do his exercises at home in meantime.     He was not compliant with home exercise program. Pt states that he does his HEP infrequently.   Response to previous treatment: No increase in soreness or stiffness.   Functional change: Pt able to ambulate with single point cane as opposed to rollator    Pain: 4/10 (low back), 2/10 (shoulder), 5/10 (hip)  Location: right shoulder, left hip    Objective     Chao received therapeutic exercises to develop strength, endurance, ROM, flexibility, posture and core stabilization for 52 minutes " including:  Ankle 4 ways with YTB 2x10 reps bilaterally  Seated LE marches 3x15 reps  Supine hip adduction ball squeezes 3x10 reps  Supine hip abduction with YTB 3x10 reps   Hamstring stretch 2x1 minutes each LE  Gastroc stretch on wedge 3x30 each while standing deferred today  Hip abduction stretch 2x1 minute deferred  PROM shoulder flexion and abduction 2x10 each deferred  Scapular retraction 3x10 reps deferred  Serratus punches 2x10 reps   Static IR and ER resisted by therapist 2x10 reps each deferred  Ball circles on wall for 2x10 in each direction deferred  Ab bracing 3x10 repetitions with cueing required for proper sequencing of task and for proper breathing throughout  Ab bracing with LE marches 3x10 reps   Standing marches on foam mat 2x15 reps for improved balance on unlevel surfaces    Chao received the following manual therapy techniques: Joint mobilizations, Manual traction, Myofacial release and Soft tissue Mobilization were applied to the: R shoulder and L knee for 10 minutes, including:  Manual distraction to R posterior shoulder   Manual hip distraction for improved hip mobility and pain relief      Home Exercises Provided and Patient Education Provided     Education provided:   -Pt educated on proper technique when ambulating with single point cane as pt was using cane incorrectly, in order to improve stability and decrease risk of falling. Pt educated of importance of performing HEP in order to have maximal functional improvements.     Written Home Exercises Provided: Patient instructed to cont prior HEP.  Exercises were reviewed and Chao was able to demonstrate them prior to the end of the session.  Chao demonstrated good  understanding of the education provided.     See EMR under Patient Instructions for exercises provided prior visit.    Assessment     Pt tolerated manual distraction of hip and glenohumeral joint well with relief of symptoms in hip following. Pt tolerated all exercises  well with serratus punches incorporated with no pain but fatigue present. Incorporated standing marches on foam mat to challenge balance and SLS stability on uneven surfaces to decrease risk of falling. Pt provided with HEP of exercises per request to pt so that he can decrease his frequency of sessions.     Chao is progressing well towards his goals.   Pt prognosis is Fair.     Pt will continue to benefit from skilled outpatient physical therapy to address the deficits listed in the problem list box on initial evaluation, provide pt/family education and to maximize pt's level of independence in the home and community environment.     Pt's spiritual, cultural and educational needs considered and pt agreeable to plan of care and goals.     Anticipated barriers to physical therapy: transportation and living alone and driving himself to therapy    Goals:  Short Term Goals:  6 weeks  1.  Patient will improve AROM to 1/2 of LTG in order to progress towards functional activities.   2.  Patient will patient will be independent with HEP in order to further progress and return to maximal function.   3.  Pain rating at worst 4/10 in order to progress towards increased independence with activity.   4.  Patient will have improved postural awareness to decrease strain on neck and low back from slouched posturing with forward head.  5.  Patient will report improved function indicated by score of 62% ability on the  Lower Extremity Functional Scale.  6. 7. Patient will report improved function indicated by score of 81% ability on the Upper Extremity Functional Index for improved ability to perform all functional tasks with greater independence.      Long Term Goals:  12 weeks  1.  Patient will return to normal ADL, recreational, and work related activities with less pain and limitation.   2.  Patient will improve AROM to stated goals in order to return to maximal functional potential.  3.  Patient will improve strength to stated  goals of appropriate musculature in order to improve functional independence  4.  Patient will demonstrate normal gait mechanics in order to minimize any compensation and return to PLOF.  5: Patient will report no instances of buckling of the L knee for 2 consecutive weeks for improved stability and to decrease risk of falls to improve QoL as pt lives alone.   6. Patient will report improved function indicated by score of 80% ability on the Lower Extremity Functional Scale.  7. Patient will report improved function indicated by score of 90% ability on the Upper Extremity Functional Index for improved ability to perform all functional tasks with greater independence.       Plan     Continue POC as planned. Progress strengthening and ROM exercises as tolerated.     These services are reasonable and necessary for the conditions set forth above while under my care.    Dalia Carter, PT, DPT

## 2019-06-11 ENCOUNTER — CLINICAL SUPPORT (OUTPATIENT)
Dept: REHABILITATION | Facility: HOSPITAL | Age: 84
End: 2019-06-11
Payer: MEDICARE

## 2019-06-11 DIAGNOSIS — R29.3 POOR POSTURE: ICD-10-CM

## 2019-06-11 DIAGNOSIS — M25.60 DECREASED RANGE OF MOTION: ICD-10-CM

## 2019-06-11 DIAGNOSIS — Z74.09 IMPAIRED FUNCTIONAL MOBILITY, BALANCE, GAIT, AND ENDURANCE: ICD-10-CM

## 2019-06-11 DIAGNOSIS — R53.1 DECREASED STRENGTH: ICD-10-CM

## 2019-06-11 PROCEDURE — 97110 THERAPEUTIC EXERCISES: CPT | Mod: HCNC

## 2019-06-11 PROCEDURE — 97140 MANUAL THERAPY 1/> REGIONS: CPT | Mod: HCNC

## 2019-06-11 NOTE — PROGRESS NOTES
Physical Therapy Daily Treatment Note     Name: Chao Hawk Jr.  Clinic Number: [id number]    Therapy Diagnosis:   Encounter Diagnoses   Name Primary?    Poor posture     Impaired functional mobility, balance, gait, and endurance     Decreased range of motion     Decreased strength      Physician: Radha Valladares MD    Visit Date: 6/11/2019    Physician Orders: PT Eval and Treat  Medical Diagnosis: Trochanteric bursitis of left hip, impingement syndrome of right shoulder, shoulder arthritis  Evaluation Date: 5/10/2019  Authorization Period Expiration: 5/7/2020  Plan of Care Certification Period: 8/2/2019  Visit #/Visits authorized: 9/19     Time In: 10:31  Time Out: 11:30  Total Billable Time: 59 minutes    Precautions: Standard and Fall    Subjective     Pt reports: that he is feeling a little bit stiff today. Pt states that he attempted some of the exercises on his HEP yesterday and now thinks that he should not have done that as he is stiff today.     He was compliant with home exercise program.  Response to previous treatment: No increase in soreness or stiffness following session.  Functional change: Pt able to ambulate with single point cane as opposed to rollator    Pain: 7/10 (low back), 2/10 (shoulder), 5/10 (hip)  Location: right shoulder, left hip    Objective     Chao received therapeutic exercises to develop strength, endurance, ROM, flexibility, posture and core stabilization for 49 minutes including:  Ankle 4 ways with YTB 2x10 reps bilaterally  Seated LE marches 3x15 reps  Supine hip adduction ball squeezes 3x15 reps with incorporation of ab bracing  Supine hip abduction with RTB 3x15 reps with incorporation of ab bracing  Hamstring stretch 2x1 minutes each LE  Gastroc stretch on wedge 3x30 each while standing deferred today  Hip abduction stretch 2x1 minute deferred  PROM shoulder flexion and abduction 2x10 each deferred  Scapular retraction 3x10 reps deferred  Serratus punches 3x12 reps    Static IR and ER resisted by therapist 2x10 reps each deferred  Ball circles on wall for 2x10 in each direction deferred  Ab bracing 3x10 repetitions with cueing required for proper sequencing of task and for proper breathing throughout  Ab bracing with LE marches 3x10 reps deferred  Standing marches on foam mat 2x25 reps for improved balance on unlevel surfaces    Chao received the following manual therapy techniques: Joint mobilizations, Manual traction, Myofacial release and Soft tissue Mobilization were applied to the: R shoulder and L knee for 10 minutes, including:  Manual distraction to R posterior shoulder deferred  Manual hip distraction with intermittent oscillations for improved hip mobility and pain relief      Home Exercises Provided and Patient Education Provided     Education provided:   -Pt educated on proper technique when ambulating with single point cane as pt was using cane incorrectly, in order to improve stability and decrease risk of falling. Pt educated of importance of performing HEP in order to have maximal functional improvements.     Written Home Exercises Provided: Patient instructed to cont prior HEP.  Exercises were reviewed and Chao was able to demonstrate them prior to the end of the session.  Chao demonstrated good  understanding of the education provided.     See EMR under Patient Instructions for exercises provided prior visit.    Assessment     Pt tolerated hip distraction well with relief of hip pain. Pt instructed pt on how to perform ankle 4 ways by himself with supervision and cueing given throughout for proper sequencing of task as pt is wanting to decrease frequency of visits. Pt noted with fair carry over with cueing. Pt tolerated all activities well today with incorporation of ab bracing with ball squeezes, hip abduction and marches for improved pain in low back. Pt noted with good endurance and balance when performing standing marches on foam mat today with  increased repetitions performed. Pt noted with improved hip and low back pain to 0/10 by end of session.     Chao is progressing well towards his goals.   Pt prognosis is Fair.     Pt will continue to benefit from skilled outpatient physical therapy to address the deficits listed in the problem list box on initial evaluation, provide pt/family education and to maximize pt's level of independence in the home and community environment.     Pt's spiritual, cultural and educational needs considered and pt agreeable to plan of care and goals.     Anticipated barriers to physical therapy: transportation and living alone and driving himself to therapy    Goals:  Short Term Goals:  6 weeks  1.  Patient will improve AROM to 1/2 of LTG in order to progress towards functional activities.   2.  Patient will patient will be independent with HEP in order to further progress and return to maximal function.   3.  Pain rating at worst 4/10 in order to progress towards increased independence with activity.   4.  Patient will have improved postural awareness to decrease strain on neck and low back from slouched posturing with forward head.  5.  Patient will report improved function indicated by score of 62% ability on the  Lower Extremity Functional Scale.  6. 7. Patient will report improved function indicated by score of 81% ability on the Upper Extremity Functional Index for improved ability to perform all functional tasks with greater independence.      Long Term Goals:  12 weeks  1.  Patient will return to normal ADL, recreational, and work related activities with less pain and limitation.   2.  Patient will improve AROM to stated goals in order to return to maximal functional potential.  3.  Patient will improve strength to stated goals of appropriate musculature in order to improve functional independence  4.  Patient will demonstrate normal gait mechanics in order to minimize any compensation and return to PLOF.  5: Patient  will report no instances of buckling of the L knee for 2 consecutive weeks for improved stability and to decrease risk of falls to improve QoL as pt lives alone.   6. Patient will report improved function indicated by score of 80% ability on the Lower Extremity Functional Scale.  7. Patient will report improved function indicated by score of 90% ability on the Upper Extremity Functional Index for improved ability to perform all functional tasks with greater independence.       Plan     Continue POC as planned. Progress strengthening and ROM exercises as tolerated.     These services are reasonable and necessary for the conditions set forth above while under my care.    Dalia Carter, PT, DPT

## 2019-06-18 ENCOUNTER — CLINICAL SUPPORT (OUTPATIENT)
Dept: REHABILITATION | Facility: HOSPITAL | Age: 84
End: 2019-06-18
Payer: MEDICARE

## 2019-06-18 DIAGNOSIS — R53.1 DECREASED STRENGTH: ICD-10-CM

## 2019-06-18 DIAGNOSIS — M25.60 DECREASED RANGE OF MOTION: ICD-10-CM

## 2019-06-18 DIAGNOSIS — R29.3 POOR POSTURE: ICD-10-CM

## 2019-06-18 DIAGNOSIS — Z74.09 IMPAIRED FUNCTIONAL MOBILITY, BALANCE, GAIT, AND ENDURANCE: ICD-10-CM

## 2019-06-18 PROCEDURE — 97110 THERAPEUTIC EXERCISES: CPT | Mod: HCNC

## 2019-06-18 NOTE — PROGRESS NOTES
"  Physical Therapy Daily Treatment Note     Name: Chao Hawk Jr.  Clinic Number: [id number]    Therapy Diagnosis:   Encounter Diagnoses   Name Primary?    Poor posture     Impaired functional mobility, balance, gait, and endurance     Decreased range of motion     Decreased strength      Physician: Radha Valladares MD    Visit Date: 6/18/2019    Physician Orders: PT Eval and Treat  Medical Diagnosis: Trochanteric bursitis of left hip, impingement syndrome of right shoulder, shoulder arthritis  Evaluation Date: 5/10/2019  Authorization Period Expiration: 5/7/2020  Plan of Care Certification Period: 8/2/2019  Visit #/Visits authorized: 10/19     Time In: 10:32  Time Out: 11:00  Total Billable Time: 28 minutes    Precautions: Standard and Fall    Subjective     Pt reports: to therapy 30 minutes late, agreeable to be seen by physical therapist for 30 minute session. Pt states that he is feeling pretty good today. Pt states that his low back was bothering him this morning but is experiencing no pain in this area now. Pt states that his shoulder is not bothering him at all today. Pt states that he had family over from around the country for Father's Day this weekend and was cleaning up after they left yesterday without any issues with his left hip. Pt states that he has "not really" been performing his HEP but did perform some exercises in his HEP last night. Pt states that he bought a foam mat to work on stability at home.     He was compliant with home exercise program.  Response to previous treatment: No increase in soreness or stiffness following session.  Functional change: Pt able to ambulate with single point cane as opposed to rollator    Pain: 0/10 (low back), 0/10 (shoulder), 2/10 (hip)  Location: right shoulder, left hip    Objective     Objective information below is from initial evaluation unless bolded today.    Range of Motion/Strength:    Hip Right Left Pain/Dysfunction with Movement Goal   AROM       "     Flexion (120) WFL WFL No pain     Abduction (45) End range tightness at medial hip End range tightness at medial hip Tightness at adductor musculature WFL no pain or tightness   Adduction (30) WFL WFL No pain     IR (70) 20 20 Tightness in groin area on L  30 No pain   ER (90) 10 10 Tightness in groin area on L  45 No pain      Knee Right Left Pain/Dysfunction with Movement Goal   AROM           Flexion (135) WFL WFL No pain     Extension (0) 5 flex 5 flex Prior to stretching  5 ext       Ankle Right Left Pain/Dysfunction with Movement Goal   AROM           Dorsiflexion (20) 4 6 No edema noted in R ankle 10 bilateral no edema   Plantarflexion (50) 40 45 No edema noted in R ankle 45 no edema   Inversion (35) WFL WFL       Eversion (15) WFL WFL          L/E MMT Right Left Pain/Dysfunction with Movement Goal   Hip Flexion 4/5 4-/5 Minimal discomfort 4+/5 no pain   Hip Extension 4/5 4/5 Minimal discomfort 4+/5 no pain   Hip Abduction 4/5 4/5   4+/5   Hip Adduction 4/5 4/5   4+/5   Knee Flexion 4/5 4/5   4+/5   Knee Extension 4/5 4/5   4+/5   Ankle DF 4/5 4/5   4+/5   Ankle PF 4/5 4/5   4+/5        Treatment:  Chao received therapeutic exercises to develop strength, endurance, ROM, flexibility, posture and core stabilization for 28 minutes including:  Ankle 4 ways with YTB 2x10 reps bilaterally deferred  Seated LE marches with ab bracing using 2# for 3x15 reps  Supine hip adduction ball squeezes 3x10 reps with incorporation of ab bracing  Supine hip abduction with RTB 2x10 reps with incorporation of ab bracing  Hamstring stretch 2x1 minutes each LE  Gastroc stretch on wedge 3x30 each while standing deferred today  Hip abduction stretch 2x1 minute deferred  PROM shoulder flexion and abduction 2x10 each deferred  Scapular retraction 3x10 reps deferred  Serratus punches 3x12 reps deferred  Static IR and ER resisted by therapist 2x10 reps each deferred  Ball circles on wall for 2x10 in each direction deferred  Ab  bracing 3x10 repetitions with cueing required for proper sequencing of task and for proper breathing throughout deferred  Standing marches on foam mat 2x25 reps for improved balance on unlevel surfaces deferred    Home Exercises Provided and Patient Education Provided     Education provided:   -Pt educated on proper technique when ambulating with single point cane as pt was using cane incorrectly, in order to improve stability and decrease risk of falling. Pt educated of importance of performing HEP in order to have maximal functional improvements.     Written Home Exercises Provided: Patient instructed to cont prior HEP.  Exercises were reviewed and Chao was able to demonstrate them prior to the end of the session.  Chao demonstrated good  understanding of the education provided.     See EMR under Patient Instructions for exercises provided prior visit.    Assessment     Pt requested that manual therapy be deferred today due to shortened treatment session. As pt was not experiencing any pain in right shoulder, attention was focused on left lower extremity. Pt tolerated all exercises well including progressions. Pt noted with slight improvement in range of motion and strength in bilateral lower extremities as both have been focused on. Pt noted with full knee extension following hamstring stretch to neutral knee extension as compared to at the start of the session.    Chao is progressing well towards his goals.   Pt prognosis is Fair.     Pt will continue to benefit from skilled outpatient physical therapy to address the deficits listed in the problem list box on initial evaluation, provide pt/family education and to maximize pt's level of independence in the home and community environment.     Pt's spiritual, cultural and educational needs considered and pt agreeable to plan of care and goals.     Anticipated barriers to physical therapy: transportation and living alone and driving himself to  therapy    Goals:  Short Term Goals:  6 weeks  1.  Patient will improve AROM to 1/2 of LTG in order to progress towards functional activities. PROGRESSING NOT MET 6/18/2019  2.  Patient will patient will be independent with HEP in order to further progress and return to maximal function. PROGRESSING NOT MET 6/18/2019  3.  Pain rating at worst 4/10 in order to progress towards increased independence with activity. PROGRESSING NOT MET 6/18/2019  4.  Patient will have improved postural awareness to decrease strain on neck and low back from slouched posturing with forward head. PROGRESSING NOT MET 6/18/2019  5.  Patient will report improved function indicated by score of 62% ability on the  Lower Extremity Functional Scale. PROGRESSING NOT MET 6/18/2019  6. 7. Patient will report improved function indicated by score of 81% ability on the Upper Extremity Functional Index for improved ability to perform all functional tasks with greater independence. PROGRESSING NOT MET 6/18/2019     Long Term Goals:  12 weeks  1.  Patient will return to normal ADL, recreational, and work related activities with less pain and limitation. PROGRESSING NOT MET 6/18/2019  2.  Patient will improve AROM to stated goals in order to return to maximal functional potential. PROGRESSING NOT MET 6/18/2019  3.  Patient will improve strength to stated goals of appropriate musculature in order to improve functional independence PROGRESSING NOT MET 6/18/2019  4.  Patient will demonstrate normal gait mechanics in order to minimize any compensation and return to PLOF. PROGRESSING NOT MET 6/18/2019  5: Patient will report no instances of buckling of the L knee for 2 consecutive weeks for improved stability and to decrease risk of falls to improve QoL as pt lives alone. MET 6/18/2019  6. Patient will report improved function indicated by score of 80% ability on the Lower Extremity Functional Scale. PROGRESSING NOT MET 6/18/2019  7. Patient will report  improved function indicated by score of 90% ability on the Upper Extremity Functional Index for improved ability to perform all functional tasks with greater independence. PROGRESSING NOT MET 6/18/2019      Plan     Continue POC as planned. Progress strengthening and ROM exercises as tolerated.     These services are reasonable and necessary for the conditions set forth above while under my care.    Dalia Carter, PT, DPT

## 2019-06-25 ENCOUNTER — CLINICAL SUPPORT (OUTPATIENT)
Dept: REHABILITATION | Facility: HOSPITAL | Age: 84
End: 2019-06-25
Payer: MEDICARE

## 2019-06-25 DIAGNOSIS — R29.3 POOR POSTURE: ICD-10-CM

## 2019-06-25 DIAGNOSIS — Z74.09 IMPAIRED FUNCTIONAL MOBILITY, BALANCE, GAIT, AND ENDURANCE: ICD-10-CM

## 2019-06-25 DIAGNOSIS — M25.60 DECREASED RANGE OF MOTION: ICD-10-CM

## 2019-06-25 DIAGNOSIS — R53.1 DECREASED STRENGTH: ICD-10-CM

## 2019-06-25 PROCEDURE — 97110 THERAPEUTIC EXERCISES: CPT | Mod: HCNC

## 2019-06-25 NOTE — PLAN OF CARE
"  Physical Therapy Discharge Note     Name: Chao Hawk Jr.  Clinic Number: [id number]    Therapy Diagnosis:   Encounter Diagnoses   Name Primary?    Poor posture     Impaired functional mobility, balance, gait, and endurance     Decreased range of motion     Decreased strength      Physician: Radha Valladares MD    Visit Date: 6/25/2019    Physician Orders: PT Eval and Treat  Medical Diagnosis: Trochanteric bursitis of left hip, impingement syndrome of right shoulder, shoulder arthritis  Evaluation Date: 5/10/2019  Authorization Period Expiration: 5/7/2020  Plan of Care Certification Period: 8/2/2019  Visit #/Visits authorized: 11/19     Time In: 10:05  Time Out: 11:04  Total Billable Time: 59 minutes    Precautions: Standard and Fall    Subjective     Pt reports: to therapy today stating that he feels pretty good. Pt reports that his left hip is experiencing no pain at the moment although he still has an awareness of it. Pt reports that he performed his HEP the last few days and has had no issues with performing his exercises. Pt states that he think he can perform his HEP at least 3 times per week at home and reports that this is going to be his last visit for therapy. Pt reports in regards to his right shoulder that he attempted to "lift a 1 lb weight" in "different directions" and kind of messed up his shoulder a little bit but it feels alright today.     He was compliant with home exercise program.  Response to previous treatment: No increase in soreness or stiffness following session. Improved symptoms.  Functional change: Pt able to ambulate with single point cane as opposed to rollator    Pain: 0/10 (low back), 2/10 (shoulder), 2/10 (hip)  Location: right shoulder, left hip    Objective     Treatment:  Chao received therapeutic exercises to develop strength, endurance, ROM, flexibility, posture and core stabilization for 59 minutes including:  Ankle 4 ways with YTB 3x10 reps each bilaterally  Seated LE " marches with ab bracing using 2# for 3x15 reps  Supine hip adduction ball squeezes 3x15 reps with incorporation of ab bracing  Supine hip abduction with RTB 2x10 reps with incorporation of ab bracing deferred  Hamstring stretch 2x1 minutes each LE  Gastroc stretch on wedge 3x30 each while standing deferred today  Hip abduction stretch 2x1 minute deferred  PROM shoulder flexion and abduction 2x10 each deferred  Scapular retraction 3x10 reps deferred  Serratus punches 3x12 reps deferred  Static IR and ER resisted by therapist 2x10 reps each deferred  Ball circles on wall for 2x10 in each direction deferred  Ab bracing 3x10 repetitions with cueing required for proper sequencing of task and for proper breathing throughout deferred  Standing marches on foam mat 2x25 reps for improved balance on unlevel surfaces deferred  Ab bracing with LE marches 3x15 reps  Distraction 5 minutes    Home Exercises Provided and Patient Education Provided     Education provided:   -Pt educated on proper technique when ambulating with single point cane as pt was using cane incorrectly, in order to improve stability and decrease risk of falling. Pt educated of importance of performing HEP in order to have maximal functional improvements.     Written Home Exercises Provided: Patient instructed to cont prior HEP.  Exercises were reviewed and Chao was able to demonstrate them prior to the end of the session.  Chao demonstrated good  understanding of the education provided.     See EMR under Patient Instructions for exercises provided prior visit.    Assessment     Pt tolerated all activities well with good ability to demonstrate good form when performing exercises in HEP. Pt noted with slight improvement of hip discomfort when performing distraction of hip joint. Pt educated on continuing to perform exercises regularly to maintain progress reached in therapy. Pt educated on modifying UE activities as home without use of weight until pt  becomes stronger with pt noting good understanding.     Chao is progressing well towards his goals.   Pt prognosis is Fair.     Pt will continue to benefit from skilled outpatient physical therapy to address the deficits listed in the problem list box on initial evaluation, provide pt/family education and to maximize pt's level of independence in the home and community environment.     Pt's spiritual, cultural and educational needs considered and pt agreeable to plan of care and goals.     Anticipated barriers to physical therapy: transportation and living alone and driving himself to therapy    Goals:  Short Term Goals:  6 weeks  1.  Patient will improve AROM to 1/2 of LTG in order to progress towards functional activities. MET 6/25/2019  2.  Patient will patient will be independent with HEP in order to further progress and return to maximal function. MET 6/25/2019  3.  Pain rating at worst 4/10 in order to progress towards increased independence with activity. MET 6/25/2019  4.  Patient will have improved postural awareness to decrease strain on neck and low back from slouched posturing with forward head. PROGRESSING NOT MET 6/25/2019  5.  Patient will report improved function indicated by score of 62% ability on the  Lower Extremity Functional Scale. PROGRESSING NOT MET 6/18/2019  6. 7. Patient will report improved function indicated by score of 81% ability on the Upper Extremity Functional Index for improved ability to perform all functional tasks with greater independence. PROGRESSING NOT MET 6/18/2019     Long Term Goals:  12 weeks  1.  Patient will return to normal ADL, recreational, and work related activities with less pain and limitation. MET 6/25/2019  2.  Patient will improve AROM to stated goals in order to return to maximal functional potential. PROGRESSING 6/25/2019  3.  Patient will improve strength to stated goals of appropriate musculature in order to improve functional independence PROGRESSING  6/25/2019  4.  Patient will demonstrate normal gait mechanics in order to minimize any compensation and return to PLOF. PROGRESSING 6/25/2019  5: Patient will report no instances of buckling of the L knee for 2 consecutive weeks for improved stability and to decrease risk of falls to improve QoL as pt lives alone. MET 6/18/2019  6. Patient will report improved function indicated by score of 80% ability on the Lower Extremity Functional Scale. PROGRESSING NOT MET 6/18/2019  7. Patient will report improved function indicated by score of 90% ability on the Upper Extremity Functional Index for improved ability to perform all functional tasks with greater independence. PROGRESSING NOT MET 6/18/2019      Plan     DC today with HEP to continue strengthening program at home per request of pt as he states that he is much improved from initial visit.     These services are reasonable and necessary for the conditions set forth above while under my care.    Dalia Carter, PT, DPT

## 2019-06-25 NOTE — PROGRESS NOTES
"  Physical Therapy Daily Treatment Note     Name: Chao Hawk Jr.  Clinic Number: [id number]    Therapy Diagnosis:   Encounter Diagnoses   Name Primary?    Poor posture     Impaired functional mobility, balance, gait, and endurance     Decreased range of motion     Decreased strength      Physician: Radha Valladares MD    Visit Date: 6/25/2019    Physician Orders: PT Eval and Treat  Medical Diagnosis: Trochanteric bursitis of left hip, impingement syndrome of right shoulder, shoulder arthritis  Evaluation Date: 5/10/2019  Authorization Period Expiration: 5/7/2020  Plan of Care Certification Period: 8/2/2019  Visit #/Visits authorized: 11/19     Time In: 10:05  Time Out: 11:04  Total Billable Time: 59 minutes    Precautions: Standard and Fall    Subjective     Pt reports: to therapy today stating that he feels pretty good. Pt reports that his left hip is experiencing no pain at the moment although he still has an awareness of it. Pt reports that he performed his HEP the last few days and has had no issues with performing his exercises. Pt states that he think he can perform his HEP at least 3 times per week at home and reports that this is going to be his last visit for therapy. Pt reports in regards to his right shoulder that he attempted to "lift a 1 lb weight" in "different directions" and kind of messed up his shoulder a little bit but it feels alright today.     He was compliant with home exercise program.  Response to previous treatment: No increase in soreness or stiffness following session. Improved symptoms.  Functional change: Pt able to ambulate with single point cane as opposed to rollator    Pain: 0/10 (low back), 2/10 (shoulder), 2/10 (hip)  Location: right shoulder, left hip    Objective     Treatment:  Chao received therapeutic exercises to develop strength, endurance, ROM, flexibility, posture and core stabilization for 59 minutes including:  Ankle 4 ways with YTB 3x10 reps each " bilaterally  Seated LE marches with ab bracing using 2# for 3x15 reps  Supine hip adduction ball squeezes 3x15 reps with incorporation of ab bracing  Supine hip abduction with RTB 2x10 reps with incorporation of ab bracing deferred  Hamstring stretch 2x1 minutes each LE  Gastroc stretch on wedge 3x30 each while standing deferred today  Hip abduction stretch 2x1 minute deferred  PROM shoulder flexion and abduction 2x10 each deferred  Scapular retraction 3x10 reps deferred  Serratus punches 3x12 reps deferred  Static IR and ER resisted by therapist 2x10 reps each deferred  Ball circles on wall for 2x10 in each direction deferred  Ab bracing 3x10 repetitions with cueing required for proper sequencing of task and for proper breathing throughout deferred  Standing marches on foam mat 2x25 reps for improved balance on unlevel surfaces deferred  Ab bracing with LE marches 3x15 reps  Distraction 5 minutes    Home Exercises Provided and Patient Education Provided     Education provided:   -Pt educated on proper technique when ambulating with single point cane as pt was using cane incorrectly, in order to improve stability and decrease risk of falling. Pt educated of importance of performing HEP in order to have maximal functional improvements.     Written Home Exercises Provided: Patient instructed to cont prior HEP.  Exercises were reviewed and Chao was able to demonstrate them prior to the end of the session.  Chao demonstrated good  understanding of the education provided.     See EMR under Patient Instructions for exercises provided prior visit.    Assessment     Pt tolerated all activities well with good ability to demonstrate good form when performing exercises in HEP. Pt noted with slight improvement of hip discomfort when performing distraction of hip joint. Pt educated on continuing to perform exercises regularly to maintain progress reached in therapy. Pt educated on modifying UE activities as home without use  of weight until pt becomes stronger with pt noting good understanding.     Chao is progressing well towards his goals.   Pt prognosis is Fair.     Pt will continue to benefit from skilled outpatient physical therapy to address the deficits listed in the problem list box on initial evaluation, provide pt/family education and to maximize pt's level of independence in the home and community environment.     Pt's spiritual, cultural and educational needs considered and pt agreeable to plan of care and goals.     Anticipated barriers to physical therapy: transportation and living alone and driving himself to therapy    Goals:  Short Term Goals:  6 weeks  1.  Patient will improve AROM to 1/2 of LTG in order to progress towards functional activities. MET 6/25/2019  2.  Patient will patient will be independent with HEP in order to further progress and return to maximal function. MET 6/25/2019  3.  Pain rating at worst 4/10 in order to progress towards increased independence with activity. MET 6/25/2019  4.  Patient will have improved postural awareness to decrease strain on neck and low back from slouched posturing with forward head. PROGRESSING NOT MET 6/25/2019  5.  Patient will report improved function indicated by score of 62% ability on the  Lower Extremity Functional Scale. PROGRESSING NOT MET 6/18/2019  6. 7. Patient will report improved function indicated by score of 81% ability on the Upper Extremity Functional Index for improved ability to perform all functional tasks with greater independence. PROGRESSING NOT MET 6/18/2019     Long Term Goals:  12 weeks  1.  Patient will return to normal ADL, recreational, and work related activities with less pain and limitation. MET 6/25/2019  2.  Patient will improve AROM to stated goals in order to return to maximal functional potential. PROGRESSING 6/25/2019  3.  Patient will improve strength to stated goals of appropriate musculature in order to improve functional  independence PROGRESSING 6/25/2019  4.  Patient will demonstrate normal gait mechanics in order to minimize any compensation and return to PLOF. PROGRESSING 6/25/2019  5: Patient will report no instances of buckling of the L knee for 2 consecutive weeks for improved stability and to decrease risk of falls to improve QoL as pt lives alone. MET 6/18/2019  6. Patient will report improved function indicated by score of 80% ability on the Lower Extremity Functional Scale. PROGRESSING NOT MET 6/18/2019  7. Patient will report improved function indicated by score of 90% ability on the Upper Extremity Functional Index for improved ability to perform all functional tasks with greater independence. PROGRESSING NOT MET 6/18/2019      Plan     DC today with HEP to continue strengthening program at home per request of pt as he states that he is much improved from initial visit.     These services are reasonable and necessary for the conditions set forth above while under my care.    Dalia Carter, PT, DPT

## 2019-07-03 ENCOUNTER — HOSPITAL ENCOUNTER (OUTPATIENT)
Dept: RADIOLOGY | Facility: HOSPITAL | Age: 84
Discharge: HOME | End: 2019-07-03
Attending: PHYSICAL MEDICINE & REHABILITATION
Payer: MEDICARE

## 2019-07-03 ENCOUNTER — OFFICE VISIT (OUTPATIENT)
Dept: ORTHOPEDICS | Facility: CLINIC | Age: 84
End: 2019-07-03
Payer: MEDICARE

## 2019-07-03 VITALS
DIASTOLIC BLOOD PRESSURE: 79 MMHG | HEIGHT: 72 IN | BODY MASS INDEX: 24.22 KG/M2 | WEIGHT: 178.81 LBS | HEART RATE: 94 BPM | SYSTOLIC BLOOD PRESSURE: 135 MMHG

## 2019-07-03 DIAGNOSIS — M19.019 SHOULDER ARTHRITIS: ICD-10-CM

## 2019-07-03 DIAGNOSIS — M75.41 IMPINGEMENT SYNDROME OF RIGHT SHOULDER: ICD-10-CM

## 2019-07-03 DIAGNOSIS — M79.89 PAIN AND SWELLING OF LOWER LEG, RIGHT: Primary | ICD-10-CM

## 2019-07-03 DIAGNOSIS — M79.661 PAIN AND SWELLING OF LOWER LEG, RIGHT: Primary | ICD-10-CM

## 2019-07-03 DIAGNOSIS — M79.89 PAIN AND SWELLING OF LOWER LEG, RIGHT: ICD-10-CM

## 2019-07-03 DIAGNOSIS — M70.62 TROCHANTERIC BURSITIS OF LEFT HIP: ICD-10-CM

## 2019-07-03 DIAGNOSIS — M79.661 PAIN AND SWELLING OF LOWER LEG, RIGHT: ICD-10-CM

## 2019-07-03 PROCEDURE — 93971 EXTREMITY STUDY: CPT | Mod: 26,HCNC,RT, | Performed by: RADIOLOGY

## 2019-07-03 PROCEDURE — 99999 PR PBB SHADOW E&M-EST. PATIENT-LVL III: CPT | Mod: PBBFAC,HCNC,, | Performed by: PHYSICAL MEDICINE & REHABILITATION

## 2019-07-03 PROCEDURE — 1101F PR PT FALLS ASSESS DOC 0-1 FALLS W/OUT INJ PAST YR: ICD-10-PCS | Mod: HCNC,CPTII,S$GLB, | Performed by: PHYSICAL MEDICINE & REHABILITATION

## 2019-07-03 PROCEDURE — 1101F PT FALLS ASSESS-DOCD LE1/YR: CPT | Mod: HCNC,CPTII,S$GLB, | Performed by: PHYSICAL MEDICINE & REHABILITATION

## 2019-07-03 PROCEDURE — 93971 US LOWER EXTREMITY VEINS RIGHT: ICD-10-PCS | Mod: 26,HCNC,RT, | Performed by: RADIOLOGY

## 2019-07-03 PROCEDURE — 99214 PR OFFICE/OUTPT VISIT, EST, LEVL IV, 30-39 MIN: ICD-10-PCS | Mod: HCNC,S$GLB,, | Performed by: PHYSICAL MEDICINE & REHABILITATION

## 2019-07-03 PROCEDURE — 99214 OFFICE O/P EST MOD 30 MIN: CPT | Mod: HCNC,S$GLB,, | Performed by: PHYSICAL MEDICINE & REHABILITATION

## 2019-07-03 PROCEDURE — 93971 EXTREMITY STUDY: CPT | Mod: TC,HCNC,RT

## 2019-07-03 PROCEDURE — 99999 PR PBB SHADOW E&M-EST. PATIENT-LVL III: ICD-10-PCS | Mod: PBBFAC,HCNC,, | Performed by: PHYSICAL MEDICINE & REHABILITATION

## 2019-07-03 NOTE — PROGRESS NOTES
PM&R Follow Up Visit :    Referring Physician: No ref. provider found    Chief Complaint   Patient presents with    Shoulder Pain     Right shoulder pain    Leg Pain     Left leg pain       HPI: This is a 87 y.o.  male being seen in clinic today for evaluation of Shoulder Pain (Right shoulder pain) and Leg Pain (Left leg pain)   Since last visit, the symptoms are improving.  He has been to therapy and was discharged to a [facility] last week. He continues to do the plan 3 times weekly. He's felt the hip and shoulder at times, but it has overall been much better since injections and PT.  He has a new complaint of right lower leg pain today, stating he woke up with it. He has a history of right venous or arterial problem near right ankle and he thinks it is worsening. He denies previous history of DVT. Looking at his records, he underwent right LE US a little over a year ago for similar symptoms.     History obtained from patient.    Past family, medical, social, surgical history, and vital signs reviewed in chart.    Review of Systems   Constitutional: Negative for chills, fever and weight loss.   HENT: Negative for hearing loss and sore throat.    Eyes: Negative for blurred vision, photophobia and pain.   Respiratory: Negative for shortness of breath.    Cardiovascular: Negative for chest pain.   Gastrointestinal: Negative for abdominal pain.   Genitourinary: Negative for dysuria.   Skin: Negative for rash.   Neurological: Negative for tingling and headaches.   Endo/Heme/Allergies: Does not bruise/bleed easily.   Psychiatric/Behavioral: Negative for depression.       Physical Exam   Constitutional: He is oriented to person, place, and time. He appears well-developed and well-nourished.   HENT:   Head: Normocephalic and atraumatic.   Eyes: Pupils are equal, round, and reactive to light. EOM are normal.   Neck: Normal range of motion. Neck supple.   Cardiovascular: Intact distal pulses.   Pulmonary/Chest: Effort normal.    Abdominal: He exhibits no distension.   Musculoskeletal:        Right shoulder: He exhibits decreased range of motion (Improved flexion and abd to about 140 and better internal rotation.  ), tenderness (about AC joint and subacromial bursa), crepitus (has audible clunking as he goes through ROM) and decreased strength (weakness with flexion and external rotation improved since prior visit).        Right lower leg: He exhibits tenderness (through calf) and swelling (and redness compared to left leg).   Neurological: He is alert and oriented to person, place, and time. He has normal strength and normal reflexes. No sensory deficit.   Skin: Skin is warm and dry.   Psychiatric: He has a normal mood and affect.   Vitals reviewed.    IMPRESSION/PLAN: This is a 87 y.o.  male with:    1. Pain and swelling of lower leg, right  - US Lower Extremity Veins Right; Future    2. Shoulder arthritis  - X-ray Shoulder 2 or More Views Right; Future    3. Trochanteric bursitis of left hip    4. Impingement syndrome of right shoulder    The findings were discussed with Chao in detail. I'm concerned about this swelling and will get US today to r/o DVT. He will otherwise need to f/u with his PCP or vascular doctor for further evaluation and treatment of that. I'm glad the shoulder and hip are doing so well. I encouraged him to continue the HEP. I'll see him back in 3 months and get repeat shoulder x-rays to monitor the lucent appearing area in the right humerus. He'll call me in the meantime if things worse. He was provided with this plan in writing. All of his questions were answered. He will follow up with me in 3 months.     Radha Valladares M.D.  Physical Medicine and Rehab

## 2019-07-10 ENCOUNTER — LAB VISIT (OUTPATIENT)
Dept: LAB | Facility: HOSPITAL | Age: 84
End: 2019-07-10
Attending: PEDIATRICS
Payer: MEDICARE

## 2019-07-10 DIAGNOSIS — E78.00 PURE HYPERCHOLESTEROLEMIA: Chronic | ICD-10-CM

## 2019-07-10 DIAGNOSIS — I10 ESSENTIAL HYPERTENSION: Chronic | ICD-10-CM

## 2019-07-10 LAB
ALT SERPL W/O P-5'-P-CCNC: 32 U/L (ref 10–44)
ANION GAP SERPL CALC-SCNC: 9 MMOL/L (ref 8–16)
AST SERPL-CCNC: 29 U/L (ref 10–40)
BASOPHILS # BLD AUTO: 0.03 K/UL (ref 0–0.2)
BASOPHILS NFR BLD: 0.4 % (ref 0–1.9)
BUN SERPL-MCNC: 26 MG/DL (ref 8–23)
CALCIUM SERPL-MCNC: 10 MG/DL (ref 8.7–10.5)
CHLORIDE SERPL-SCNC: 105 MMOL/L (ref 95–110)
CHOLEST SERPL-MCNC: 144 MG/DL (ref 120–199)
CHOLEST/HDLC SERPL: 2.2 {RATIO} (ref 2–5)
CO2 SERPL-SCNC: 27 MMOL/L (ref 23–29)
CREAT SERPL-MCNC: 0.9 MG/DL (ref 0.5–1.4)
DIFFERENTIAL METHOD: ABNORMAL
EOSINOPHIL # BLD AUTO: 0.2 K/UL (ref 0–0.5)
EOSINOPHIL NFR BLD: 3 % (ref 0–8)
ERYTHROCYTE [DISTWIDTH] IN BLOOD BY AUTOMATED COUNT: 14.1 % (ref 11.5–14.5)
EST. GFR  (AFRICAN AMERICAN): >60 ML/MIN/1.73 M^2
EST. GFR  (NON AFRICAN AMERICAN): >60 ML/MIN/1.73 M^2
GLUCOSE SERPL-MCNC: 91 MG/DL (ref 70–110)
HCT VFR BLD AUTO: 42.8 % (ref 40–54)
HDLC SERPL-MCNC: 65 MG/DL (ref 40–75)
HDLC SERPL: 45.1 % (ref 20–50)
HGB BLD-MCNC: 13.8 G/DL (ref 14–18)
IMM GRANULOCYTES # BLD AUTO: 0.02 K/UL (ref 0–0.04)
IMM GRANULOCYTES NFR BLD AUTO: 0.3 % (ref 0–0.5)
LDLC SERPL CALC-MCNC: 65 MG/DL (ref 63–159)
LYMPHOCYTES # BLD AUTO: 1 K/UL (ref 1–4.8)
LYMPHOCYTES NFR BLD: 14.8 % (ref 18–48)
MCH RBC QN AUTO: 30.9 PG (ref 27–31)
MCHC RBC AUTO-ENTMCNC: 32.2 G/DL (ref 32–36)
MCV RBC AUTO: 96 FL (ref 82–98)
MONOCYTES # BLD AUTO: 0.6 K/UL (ref 0.3–1)
MONOCYTES NFR BLD: 8.9 % (ref 4–15)
NEUTROPHILS # BLD AUTO: 5.1 K/UL (ref 1.8–7.7)
NEUTROPHILS NFR BLD: 72.6 % (ref 38–73)
NONHDLC SERPL-MCNC: 79 MG/DL
NRBC BLD-RTO: 0 /100 WBC
PLATELET # BLD AUTO: 157 K/UL (ref 150–350)
PMV BLD AUTO: 11.8 FL (ref 9.2–12.9)
POTASSIUM SERPL-SCNC: 4.3 MMOL/L (ref 3.5–5.1)
RBC # BLD AUTO: 4.46 M/UL (ref 4.6–6.2)
SODIUM SERPL-SCNC: 141 MMOL/L (ref 136–145)
TRIGL SERPL-MCNC: 70 MG/DL (ref 30–150)
WBC # BLD AUTO: 7.05 K/UL (ref 3.9–12.7)

## 2019-07-10 PROCEDURE — 80048 BASIC METABOLIC PNL TOTAL CA: CPT | Mod: HCNC

## 2019-07-10 PROCEDURE — 85025 COMPLETE CBC W/AUTO DIFF WBC: CPT | Mod: HCNC

## 2019-07-10 PROCEDURE — 80061 LIPID PANEL: CPT | Mod: HCNC

## 2019-07-10 PROCEDURE — 84450 TRANSFERASE (AST) (SGOT): CPT | Mod: HCNC

## 2019-07-10 PROCEDURE — 84460 ALANINE AMINO (ALT) (SGPT): CPT | Mod: HCNC

## 2019-07-10 PROCEDURE — 36415 COLL VENOUS BLD VENIPUNCTURE: CPT | Mod: HCNC

## 2019-07-17 ENCOUNTER — OFFICE VISIT (OUTPATIENT)
Dept: INTERNAL MEDICINE | Facility: CLINIC | Age: 84
End: 2019-07-17
Payer: MEDICARE

## 2019-07-17 VITALS
HEIGHT: 72 IN | DIASTOLIC BLOOD PRESSURE: 74 MMHG | SYSTOLIC BLOOD PRESSURE: 122 MMHG | WEIGHT: 197.56 LBS | BODY MASS INDEX: 26.76 KG/M2 | TEMPERATURE: 98 F | OXYGEN SATURATION: 95 % | HEART RATE: 90 BPM

## 2019-07-17 DIAGNOSIS — R39.12 BENIGN PROSTATIC HYPERPLASIA WITH WEAK URINARY STREAM: Chronic | ICD-10-CM

## 2019-07-17 DIAGNOSIS — E78.00 PURE HYPERCHOLESTEROLEMIA: Chronic | ICD-10-CM

## 2019-07-17 DIAGNOSIS — N40.1 BENIGN PROSTATIC HYPERPLASIA WITH WEAK URINARY STREAM: Chronic | ICD-10-CM

## 2019-07-17 DIAGNOSIS — I10 ESSENTIAL HYPERTENSION: Primary | Chronic | ICD-10-CM

## 2019-07-17 DIAGNOSIS — J44.9 CHRONIC OBSTRUCTIVE PULMONARY DISEASE, UNSPECIFIED COPD TYPE: Chronic | ICD-10-CM

## 2019-07-17 DIAGNOSIS — M51.36 DDD (DEGENERATIVE DISC DISEASE), LUMBAR: ICD-10-CM

## 2019-07-17 DIAGNOSIS — I27.21 PAH (PULMONARY ARTERY HYPERTENSION): ICD-10-CM

## 2019-07-17 DIAGNOSIS — R79.89 LOW TESTOSTERONE: ICD-10-CM

## 2019-07-17 DIAGNOSIS — Z74.09 IMPAIRED FUNCTIONAL MOBILITY, BALANCE, GAIT, AND ENDURANCE: ICD-10-CM

## 2019-07-17 DIAGNOSIS — Z12.5 PROSTATE CANCER SCREENING: ICD-10-CM

## 2019-07-17 DIAGNOSIS — I71.40 ABDOMINAL AORTIC ANEURYSM WITHOUT RUPTURE: ICD-10-CM

## 2019-07-17 PROCEDURE — 99999 PR PBB SHADOW E&M-EST. PATIENT-LVL III: ICD-10-PCS | Mod: PBBFAC,HCNC,, | Performed by: PEDIATRICS

## 2019-07-17 PROCEDURE — 99214 OFFICE O/P EST MOD 30 MIN: CPT | Mod: HCNC,S$GLB,, | Performed by: PEDIATRICS

## 2019-07-17 PROCEDURE — 1101F PT FALLS ASSESS-DOCD LE1/YR: CPT | Mod: HCNC,CPTII,S$GLB, | Performed by: PEDIATRICS

## 2019-07-17 PROCEDURE — 99214 PR OFFICE/OUTPT VISIT, EST, LEVL IV, 30-39 MIN: ICD-10-PCS | Mod: HCNC,S$GLB,, | Performed by: PEDIATRICS

## 2019-07-17 PROCEDURE — 99999 PR PBB SHADOW E&M-EST. PATIENT-LVL III: CPT | Mod: PBBFAC,HCNC,, | Performed by: PEDIATRICS

## 2019-07-17 PROCEDURE — 1101F PR PT FALLS ASSESS DOC 0-1 FALLS W/OUT INJ PAST YR: ICD-10-PCS | Mod: HCNC,CPTII,S$GLB, | Performed by: PEDIATRICS

## 2019-07-17 RX ORDER — FUROSEMIDE 20 MG/1
10 TABLET ORAL DAILY
Qty: 30 TABLET | Refills: 11 | Status: SHIPPED | OUTPATIENT
Start: 2019-07-17 | End: 2020-01-17 | Stop reason: ALTCHOICE

## 2019-07-17 NOTE — PROGRESS NOTES
Subjective:       Patient ID: Chao Hawk Jr. is a 87 y.o. male.     Chief Complaint: Follow-up     HTN: B/P normal, no HTNive symptoms  LIPIDS:following D&E, tolerating and compliant with med(s).    BPH on flomax 0.4 mg nocturia x 3  COPD:Sees pulmonary, compliant with meds  Low T:On depo testosterone.  Back:s/p surgery, uses canes only for balance and support for uneven surface, has residual feet numbness. Worse in AM until he gets around. Uses only tylenol twice a day and rare tramadol in AM. Fall risk is now minimal, 1 fall in rolling off bed.  Pulmonary artery HTN: no unusual CP/Orthopnea/hemoptysis. Has progressive SOB, using nocturnal O2  AAA: medical management. CT 2018: distal abdominal aortic aneurysm measuring 3.6 cm AP dimension 3.3 cm transverse dimension.  There is also fusiform aneurysmal dilatation of the mid infrarenal abdominal aorta measuring approximately 3.2 cm in diameter.  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      LABS REVIEWED AND DISCUSSED WITH PATIENT     Review of Systems   Constitutional: Negative for fever and unexpected weight change.   HENT: Negative for congestion and rhinorrhea.    Eyes: Negative for discharge and redness.   Respiratory: Positive for shortness of breath. Negative for cough and wheezing.    Cardiovascular: Positive for chest pain (minimal). Negative for palpitations. Does have leg swelling, thinks lasix works better than HCTZ.   Gastrointestinal: Negative for abdominal pain, constipation, diarrhea and vomiting.   Endocrine: Negative for cold intolerance, heat intolerance, polydipsia, polyphagia and polyuria.   Genitourinary: Negative for decreased urine volume and difficulty urinating.   Musculoskeletal: Positive for arthralgias, back pain and gait problem. Negative for joint swelling.   Skin: Negative for rash and wound.   Neurological: Negative for syncope and headaches.   Psychiatric/Behavioral: Negative for behavioral problems and sleep  disturbance.       Objective:   Physical Exam   Constitutional: He is oriented to person, place, and time. He appears well-developed and well-nourished. No distress.   Neck: No JVD present. No thyromegaly present.   Cardiovascular: Normal rate, regular rhythm and normal heart sounds.   No murmur heard.  Pulmonary/Chest: Effort normal and breath sounds normal. No respiratory distress. He has no wheezes. He has no rales.   Abdominal: Soft. He exhibits no distension and no mass. There is no tenderness. There is no guarding.   Musculoskeletal: He exhibits trace edema.   Hunched, no active swelling. Much less use of hands to assist climbing/ambulation.   Lymphadenopathy:     He has no cervical adenopathy.   Neurological: He is alert and oriented to person, place, and time. No cranial nerve deficit. Coordination normal.   Skin: Capillary refill takes less than 2 seconds. No rash noted.   Psychiatric: He has a normal mood and affect. His behavior is normal. Judgment and thought content normal.       Assessment:       1. Chronic obstructive pulmonary disease, unspecified COPD type    2. Essential hypertension    3. Pure hypercholesterolemia    4. Benign prostatic hyperplasia with weak urinary stream    5. Low testosterone    6. PAH (pulmonary artery hypertension)    7. Abdominal aortic aneurysm without rupture    8. DDD (degenerative disc disease), lumbar    9. Lumbar arthropathy                            Plan:   Overall he is relatively. Refill lasix. Maintain other meds and subspecialty care, D&E as tolerated. F/U 6 months with labs. Tramadol use reviewed on [facility] website.

## 2019-08-05 ENCOUNTER — OFFICE VISIT (OUTPATIENT)
Dept: PULMONOLOGY | Facility: CLINIC | Age: 84
End: 2019-08-05
Payer: MEDICARE

## 2019-08-05 ENCOUNTER — CLINICAL SUPPORT (OUTPATIENT)
Dept: PULMONOLOGY | Facility: CLINIC | Age: 84
End: 2019-08-05
Payer: MEDICARE

## 2019-08-05 VITALS
WEIGHT: 193.56 LBS | BODY MASS INDEX: 26.22 KG/M2 | RESPIRATION RATE: 18 BRPM | DIASTOLIC BLOOD PRESSURE: 66 MMHG | HEIGHT: 72 IN | SYSTOLIC BLOOD PRESSURE: 120 MMHG | HEART RATE: 101 BPM | OXYGEN SATURATION: 94 %

## 2019-08-05 DIAGNOSIS — R09.02 EXERCISE HYPOXEMIA: ICD-10-CM

## 2019-08-05 DIAGNOSIS — G47.34 NOCTURNAL HYPOXEMIA: ICD-10-CM

## 2019-08-05 DIAGNOSIS — J44.9 CHRONIC OBSTRUCTIVE PULMONARY DISEASE, UNSPECIFIED COPD TYPE: ICD-10-CM

## 2019-08-05 DIAGNOSIS — J44.9 CHRONIC OBSTRUCTIVE PULMONARY DISEASE, UNSPECIFIED COPD TYPE: Primary | ICD-10-CM

## 2019-08-05 DIAGNOSIS — I27.21 PAH (PULMONARY ARTERY HYPERTENSION): ICD-10-CM

## 2019-08-05 LAB
BRPFT: ABNORMAL
DLCO ADJ PRE: 10.5 ML/(MIN*MMHG) (ref 18.63–32.49)
DLCO SINGLE BREATH LLN: 18.63
DLCO SINGLE BREATH PRE REF: 40.1 %
DLCO SINGLE BREATH REF: 25.56
DLCOC SBVA LLN: 2.33
DLCOC SBVA PRE REF: 65.6 %
DLCOC SBVA REF: 3.39
DLCOC SINGLE BREATH LLN: 18.63
DLCOC SINGLE BREATH PRE REF: 41.1 %
DLCOC SINGLE BREATH REF: 25.56
DLCOVA LLN: 2.33
DLCOVA PRE REF: 64 %
DLCOVA PRE: 2.17 ML/(MIN*MMHG*L) (ref 2.33–4.45)
DLCOVA REF: 3.39
DLVAADJ PRE: 2.22 ML/(MIN*MMHG*L) (ref 2.33–4.45)
ERV LLN: 0.89
ERV PRE REF: 95.9 %
ERV REF: 0.89
FEF 25 75 CHG: -21.1 %
FEF 25 75 LLN: 0.64
FEF 25 75 POST REF: 14.3 %
FEF 25 75 PRE REF: 18.2 %
FEF 25 75 REF: 1.88
FET100 CHG: 41.5 %
FEV1 CHG: 3.9 %
FEV1 FVC CHG: -9.4 %
FEV1 FVC LLN: 58
FEV1 FVC POST REF: 49.1 %
FEV1 FVC PRE REF: 54.2 %
FEV1 FVC REF: 73
FEV1 LLN: 1.91
FEV1 POST REF: 48.2 %
FEV1 PRE REF: 46.4 %
FEV1 REF: 2.84
FRCPLETH LLN: 2.99
FRCPLETH PREREF: 100.3 %
FRCPLETH REF: 3.98
FVC CHG: 14.8 %
FVC LLN: 2.77
FVC POST REF: 96.9 %
FVC PRE REF: 84.4 %
FVC REF: 3.93
IVC PRE: 3.05 L (ref 2.77–5.08)
IVC SINGLE BREATH LLN: 2.77
IVC SINGLE BREATH PRE REF: 77.8 %
IVC SINGLE BREATH REF: 3.93
MVV LLN: 97
MVV PRE REF: 28.9 %
MVV REF: 114
PEF CHG: -0 %
PEF LLN: 4.31
PEF POST REF: 53.6 %
PEF PRE REF: 53.6 %
PEF REF: 6.76
POST FEF 25 75: 0.27 L/S (ref 0.64–3.12)
POST FET 100: 19.4 SEC
POST FEV1 FVC: 36.06 % (ref 57.8–89.13)
POST FEV1: 1.37 L (ref 1.91–3.78)
POST FVC: 3.81 L (ref 2.77–5.08)
POST PEF: 3.62 L/S (ref 4.31–9.21)
PRE DLCO: 10.26 ML/(MIN*MMHG) (ref 18.63–32.49)
PRE ERV: 0.86 L (ref 0.89–0.89)
PRE FEF 25 75: 0.34 L/S (ref 0.64–3.12)
PRE FET 100: 13.71 SEC
PRE FEV1 FVC: 39.82 % (ref 57.8–89.13)
PRE FEV1: 1.32 L (ref 1.91–3.78)
PRE FRC PL: 3.99 L
PRE FVC: 3.31 L (ref 2.77–5.08)
PRE MVV: 33 L/MIN (ref 97.1–131.38)
PRE PEF: 3.63 L/S (ref 4.31–9.21)
PRE RV: 2.84 L (ref 2.41–3.76)
PRE TLC: 6.16 L (ref 6.39–8.69)
RAW LLN: 3.06
RAW PRE REF: 469 %
RAW PRE: 14.35 CMH2O*S/L (ref 3.06–3.06)
RAW REF: 3.06
RV LLN: 2.41
RV PRE REF: 92.2 %
RV REF: 3.08
RVTLC LLN: 39
RVTLC PRE REF: 96.4 %
RVTLC PRE: 46.16 % (ref 38.91–56.87)
RVTLC REF: 48
TLC LLN: 6.39
TLC PRE REF: 81.6 %
TLC REF: 7.54
VA PRE: 4.73 L (ref 7.39–7.39)
VA SINGLE BREATH LLN: 7.39
VA SINGLE BREATH PRE REF: 64 %
VA SINGLE BREATH REF: 7.39
VC LLN: 2.77
VC PRE REF: 84.4 %
VC PRE: 3.31 L (ref 2.77–5.08)
VC REF: 3.93
VTGRAWPRE: 3.93 L

## 2019-08-05 PROCEDURE — 94060 EVALUATION OF WHEEZING: CPT | Mod: HCNC,S$GLB,, | Performed by: INTERNAL MEDICINE

## 2019-08-05 PROCEDURE — 1100F PR PT FALLS ASSESS DOC 2+ FALLS/FALL W/INJURY/YR: ICD-10-PCS | Mod: HCNC,CPTII,S$GLB, | Performed by: INTERNAL MEDICINE

## 2019-08-05 PROCEDURE — 94060 PR EVAL OF BRONCHOSPASM: ICD-10-PCS | Mod: HCNC,S$GLB,, | Performed by: INTERNAL MEDICINE

## 2019-08-05 PROCEDURE — 94726 PLETHYSMOGRAPHY LUNG VOLUMES: CPT | Mod: HCNC,S$GLB,, | Performed by: INTERNAL MEDICINE

## 2019-08-05 PROCEDURE — 99214 OFFICE O/P EST MOD 30 MIN: CPT | Mod: 25,HCNC,S$GLB, | Performed by: INTERNAL MEDICINE

## 2019-08-05 PROCEDURE — 99999 PR PBB SHADOW E&M-EST. PATIENT-LVL IV: ICD-10-PCS | Mod: PBBFAC,HCNC,, | Performed by: INTERNAL MEDICINE

## 2019-08-05 PROCEDURE — 1100F PTFALLS ASSESS-DOCD GE2>/YR: CPT | Mod: HCNC,CPTII,S$GLB, | Performed by: INTERNAL MEDICINE

## 2019-08-05 PROCEDURE — 99214 PR OFFICE/OUTPT VISIT, EST, LEVL IV, 30-39 MIN: ICD-10-PCS | Mod: 25,HCNC,S$GLB, | Performed by: INTERNAL MEDICINE

## 2019-08-05 PROCEDURE — 94729 PR C02/MEMBANE DIFFUSE CAPACITY: ICD-10-PCS | Mod: HCNC,S$GLB,, | Performed by: INTERNAL MEDICINE

## 2019-08-05 PROCEDURE — 94726 PULM FUNCT TST PLETHYSMOGRAP: ICD-10-PCS | Mod: HCNC,S$GLB,, | Performed by: INTERNAL MEDICINE

## 2019-08-05 PROCEDURE — 94729 DIFFUSING CAPACITY: CPT | Mod: HCNC,S$GLB,, | Performed by: INTERNAL MEDICINE

## 2019-08-05 PROCEDURE — 3288F PR FALLS RISK ASSESSMENT DOCUMENTED: ICD-10-PCS | Mod: HCNC,CPTII,S$GLB, | Performed by: INTERNAL MEDICINE

## 2019-08-05 PROCEDURE — 3288F FALL RISK ASSESSMENT DOCD: CPT | Mod: HCNC,CPTII,S$GLB, | Performed by: INTERNAL MEDICINE

## 2019-08-05 PROCEDURE — 99999 PR PBB SHADOW E&M-EST. PATIENT-LVL IV: CPT | Mod: PBBFAC,HCNC,, | Performed by: INTERNAL MEDICINE

## 2019-08-05 RX ORDER — FLUTICASONE PROPIONATE 250 UG/1
1 POWDER, METERED RESPIRATORY (INHALATION) 2 TIMES DAILY
Qty: 1 EACH | Refills: 11 | Status: SHIPPED | OUTPATIENT
Start: 2019-08-05 | End: 2020-02-05 | Stop reason: ALTCHOICE

## 2019-08-05 RX ORDER — ALBUTEROL SULFATE 90 UG/1
2 AEROSOL, METERED RESPIRATORY (INHALATION) EVERY 4 HOURS PRN
Qty: 1 INHALER | Refills: 11 | Status: SHIPPED | OUTPATIENT
Start: 2019-08-05 | End: 2020-02-05 | Stop reason: SDUPTHER

## 2019-08-05 RX ORDER — ALBUTEROL SULFATE 0.83 MG/ML
2.5 SOLUTION RESPIRATORY (INHALATION)
Qty: 360 ML | Refills: 12 | Status: SHIPPED | OUTPATIENT
Start: 2019-08-05 | End: 2020-08-04

## 2019-08-05 NOTE — PROGRESS NOTES
Subjective:       Patient ID: Chao Hawk Jr. is a 87 y.o. male.    Chief Complaint: He       COPD and PAH    HPI       COPD  He presents for evaluation and treatment of COPD. The patient is not currently have symptoms / an exacerbation. The patient has COPD for approximately 10 years. Symptoms in previous episodes have included dyspnea, cough and wheezing, and typically last 2 weeks. Previous episodes have been exacerbated by any exercise. Current treatment includes albuterol inhaler, which generally provides some relief of symptoms. Using oxygen at night   He uses 1 pillows at night. Patient currently is not on home oxygen therapy.. The patient is having no constitutional symptoms, denying fever, chills, anorexia, or weight loss. The patient has been hospitalized for this condition before. He has a history of 40 pack years. The patient is experiencing exercise intolerance (difficulty walking 1 blocks on flat ground).     Pulmonary Hypertension  He presents for evaluation and treatment of pulmonary hypertension. Symptoms include dyspnea on exertion, productive cough, shortness of breath and sputum production.  Symptoms began 2 years ago, unchanged since that time. He denies chest pain, located left chest. Associated symptoms include myalgias and productive cough. He does not have had recent travel. Weight has been stable. Appetite has been unaffected. Symptoms are exacerbated by any exercise. Symptoms are alleviated by rest. Work up thus far has included Echo.    Past Medical History:   Diagnosis Date    Arthritis     back, hand    Back pain     Dr. Cristobal- [facility]    Bilateral leg edema 2/16/2018    Chronic bronchitis     Diverticulosis 5/16/06    colonoscopy    Hernia     x2    Hyperlipidemia     Hypertension     Lumbar radiculopathy 6/14/2017    Multiple renal cysts 7/5/2016    Tobacco dependence      Past Surgical History:   Procedure Laterality Date    EYE SURGERY Bilateral     Glaucoma- Dr. Archer  [place]    FINGER SURGERY Right     index- Dr. Encarnacion    HERNIA REPAIR      x2    LUMBAR SPINE SURGERY  2017    TONSILLECTOMY, ADENOIDECTOMY  1940     Social History     Socioeconomic History    Marital status:      Spouse name: Linsey    Number of children: 4    Years of education: 12 + 4    Highest education level: Not on file   Occupational History    Occupation:  retired age 60     Comment: [employer]   Social Needs    Financial resource strain: Not on file    Food insecurity:     Worry: Not on file     Inability: Not on file    Transportation needs:     Medical: Not on file     Non-medical: Not on file   Tobacco Use    Smoking status: Former Smoker     Packs/day: 1.00     Years: 40.00     Pack years: 40.00     Start date: 1949     Last attempt to quit: 1991     Years since quittin.6    Smokeless tobacco: Never Used   Substance and Sexual Activity    Alcohol use: Yes     Alcohol/week: 9.0 oz     Types: 7 Shots of liquor, 8 Standard drinks or equivalent per week     Frequency: 4 or more times a week     Drinks per session: 1 or 2     Binge frequency: Never    Drug use: No    Sexual activity: Never   Lifestyle    Physical activity:     Days per week: Not on file     Minutes per session: Not on file    Stress: Not on file   Relationships    Social connections:     Talks on phone: Not on file     Gets together: Not on file     Attends Synagogue service: Not on file     Active member of club or organization: Not on file     Attends meetings of clubs or organizations: Not on file     Relationship status: Not on file   Other Topics Concern    Not on file   Social History Narrative    No smokers or pets in household.     Review of Systems   Constitutional: Positive for fatigue. Negative for fever.   HENT: Positive for postnasal drip, rhinorrhea and congestion.    Eyes: Negative for redness and itching.   Respiratory: Positive for cough, sputum  "production, shortness of breath, dyspnea on extertion, use of rescue inhaler and Paroxysmal Nocturnal Dyspnea.    Cardiovascular: Negative for chest pain, palpitations and leg swelling.   Genitourinary: Negative for difficulty urinating and hematuria.   Endocrine: Negative for cold intolerance and heat intolerance.    Skin: Negative for rash.   Gastrointestinal: Negative for nausea and abdominal pain.   Neurological: Negative for dizziness, syncope, weakness and light-headedness.   Hematological: Negative for adenopathy. Does not bruise/bleed easily.   Psychiatric/Behavioral: Negative for sleep disturbance. The patient is not nervous/anxious.        Objective:      /66   Pulse 101   Resp 18   Ht 6' 0.05" (1.83 m)   Wt 87.8 kg (193 lb 9 oz)   SpO2 (!) 94%   BMI 26.22 kg/m²   Physical Exam   Constitutional: He is oriented to person, place, and time. He appears well-developed and well-nourished.   HENT:   Head: Normocephalic and atraumatic.   Mouth/Throat: Oropharyngeal exudate present.   Eyes: Pupils are equal, round, and reactive to light. Conjunctivae are normal.   Neck: Neck supple. No JVD present. No tracheal deviation present. No thyromegaly present.   Cardiovascular: Normal rate, regular rhythm and normal heart sounds.   No murmur heard.  Pulmonary/Chest: Effort normal. He has decreased breath sounds. He has wheezes in the right lower field and the left lower field. He has no rhonchi. He has no rales.   Abdominal: Soft. Bowel sounds are normal.   Musculoskeletal: Normal range of motion. He exhibits no edema or tenderness.   Lymphadenopathy:     He has no cervical adenopathy.   Neurological: He is alert and oriented to person, place, and time.   Skin: Skin is warm and dry.   Nursing note and vitals reviewed.    Personal Diagnostic Review  Chest x-ray: hyperinflation , stable    US Lower Extremity Veins Right  Narrative: EXAMINATION:  US LOWER EXTREMITY VEINS RIGHT    CLINICAL HISTORY:  eval for RLE " DVT; Pain in right lower leg    TECHNIQUE:  Duplex and color flow Doppler evaluation and graded compression of the right lower extremity veins was performed.    COMPARISON:  02/08/2018    FINDINGS:  Right thigh veins: The common femoral, femoral, popliteal, upper greater saphenous, and deep femoral veins are patent and free of thrombus. The veins are normally compressible and have normal phasic flow and augmentation response.    Right calf veins: The visualized calf veins are patent.    Contralateral CFV: The contralateral (left) common femoral vein is patent and free of thrombus.    Miscellaneous: None  Impression: No evidence of deep venous thrombosis in the right lower extremity.    Electronically signed by: Yefri Ho MD  Date:    07/03/2019  Time:    15:46      Office Spirometry Results:  Impression: NORMAL MYOCARDIAL PERFUSION  1. The perfusion scan is free of evidence for myocardial ischemia or injury. The inferior wall is obscured by bowel.  2. There is a moderate intensity fixed defect in the inferior wall of the left ventricle, secondary to diaphragm attenuation.   3. Resting wall motion is physiologic.   4. Resting LV function is normal.   5. The ventricular volumes are normal at rest and stress.   6. The extracardiac distribution of radioactivity is normal.   7. When compared to the previous study from 04/16/2013, fixed defects as prior study.           This document has been electronically    SIGNED BY: Shar Pereyra MD On: 02/06/2019 17:26      Component 6mo ago              No flowsheet data found.  Pulmonary Studies Review 8/5/2019   SpO2 94   Ordering Provider -   Interpreting Provider -   Performing nurse/tech/RT -   Diagnosis -   Height 72.047   Weight 3097.02   BMI (Calculated) 26.3   Predicted Distance 235.68   Patient Race -   6MWT Status -   Patient Reported -   Was O2 used? -   6MW Distance walked (feet) -   Distance walked (meters) -   Did patient stop? -   Type of assistive device(s)  used? -   Is extra documentation required for this patient? -   Oxygen Saturation -   Supplemental Oxygen -   Heart Rate -   Blood Pressure -   Katie Dyspnea Rating  -   Oxygen Saturation -   Supplemental Oxygen -   Heart Rate -   Blood Pressure -   Katie Dyspnea Rating  -   Recovery Time (seconds) -   Oxygen Saturation -   Supplemental Oxygen -   Heart Rate -   Blood Pressure -   Katie Dyspnea Rating  -   Is procedure ready for interpretation? -   Did the patient stop or pause? -   Total Time Walked (Calculated) -   Total Laps Walked -   Final Partial Lap Distance (feet) -   Total Distance Feet (Calculated) -   Total Distance Meters (Calculated) -   Predicted Distance Meters (Calculated) 485.04   Percentage of Predicted (Calculated) -   Peak VO2 (Calculated) -   Mets -   Has The Patient Had a Previous Six Minute Walk Test? -   Oxygen Qualification? -         Assessment:       Chronic obstructive pulmonary disease, unspecified COPD type  -     albuterol (PROVENTIL/VENTOLIN HFA) 90 mcg/actuation inhaler; Inhale 2 puffs into the lungs every 4 (four) hours as needed for Wheezing or Shortness of Breath.  Dispense: 1 Inhaler; Refill: 11  -     albuterol (PROVENTIL) 2.5 mg /3 mL (0.083 %) nebulizer solution; Take 3 mLs (2.5 mg total) by nebulization every 6 (six) hours while awake.  Dispense: 360 mL; Refill: 12  -     fluticasone propionate (FLOVENT DISKUS) 250 mcg/actuation DsDv; Inhale 1 puff into the lungs 2 (two) times daily. Controller  Dispense: 1 each; Refill: 11  -     X-Ray Chest 4 Or More View; Future; Expected date: 08/05/2019  -     Stress test, pulmonary; Future; Expected date: 08/05/2019    Nocturnal hypoxemia    Exercise hypoxemia  -     Stress test, pulmonary; Future; Expected date: 08/05/2019          Outpatient Encounter Medications as of 8/5/2019   Medication Sig Dispense Refill    acetaminophen (TYLENOL) 500 MG tablet Take 1,000 mg by mouth 2 (two) times daily as needed for Pain.       albuterol  (PROVENTIL) 2.5 mg /3 mL (0.083 %) nebulizer solution Take 3 mLs (2.5 mg total) by nebulization every 6 (six) hours while awake. 360 mL 12    aspirin (ECOTRIN) 81 MG EC tablet Take 81 mg by mouth every evening.       atorvastatin (LIPITOR) 40 MG tablet TAKE 1 TABLET EVERY DAY 90 tablet 3    epinephrine (EPIPEN) 0.3 mg/0.3 mL (1:1,000) AtIn Inject 1 each into the muscle once.      fluticasone propionate (FLOVENT DISKUS) 250 mcg/actuation DsDv Inhale 1 puff into the lungs 2 (two) times daily. Controller 1 each 11    furosemide (LASIX) 20 MG tablet Take 0.5 tablets (10 mg total) by mouth once daily. 30 tablet 11    gabapentin (NEURONTIN) 100 MG capsule Take 1 capsule (100 mg total) by mouth 2 (two) times daily. 180 capsule 3    hydroCHLOROthiazide (MICROZIDE) 12.5 mg capsule Take 12.5 mg by mouth once daily.      ibuprofen (ADVIL,MOTRIN) 200 MG tablet Take 200 mg by mouth every 6 (six) hours as needed for Pain.      lisinopril 10 MG tablet TAKE 1 TABLET EVERY DAY 90 tablet 3    tamsulosin (FLOMAX) 0.4 mg Cap TAKE 2 CAPSULES EVERY MORNING (Patient taking differently: TAKE 2 CAPSULES EVERY MORNING and 1 AT BEDTIME.) 180 capsule 3    traMADol (ULTRAM) 50 mg tablet Take 1 tablet (50 mg total) by mouth daily as needed for Pain. 30 tablet 5    [DISCONTINUED] albuterol (PROVENTIL) 2.5 mg /3 mL (0.083 %) nebulizer solution Take 3 mLs (2.5 mg total) by nebulization every 6 (six) hours while awake. 360 mL 12    [DISCONTINUED] fluticasone propionate (FLOVENT DISKUS) 250 mcg/actuation DsDv Inhale 1 puff into the lungs 2 (two) times daily. Controller 1 each 11    albuterol (PROVENTIL/VENTOLIN HFA) 90 mcg/actuation inhaler Inhale 2 puffs into the lungs every 4 (four) hours as needed for Wheezing or Shortness of Breath. 1 Inhaler 11    [DISCONTINUED] fluticasone (FLOVENT DISKUS) 250 mcg/actuation DsDv Inhale 1 puff into the lungs 2 (two) times daily. Controller 3 each 3    [DISCONTINUED] furosemide (LASIX ORAL) Take  10 mg by mouth once daily.      [] testosterone cypionate injection 200 mg        No facility-administered encounter medications on file as of 2019.      Plan:       Requested Prescriptions     Signed Prescriptions Disp Refills    albuterol (PROVENTIL/VENTOLIN HFA) 90 mcg/actuation inhaler 1 Inhaler 11     Sig: Inhale 2 puffs into the lungs every 4 (four) hours as needed for Wheezing or Shortness of Breath.    albuterol (PROVENTIL) 2.5 mg /3 mL (0.083 %) nebulizer solution 360 mL 12     Sig: Take 3 mLs (2.5 mg total) by nebulization every 6 (six) hours while awake.    fluticasone propionate (FLOVENT DISKUS) 250 mcg/actuation DsDv 1 each 11     Sig: Inhale 1 puff into the lungs 2 (two) times daily. Controller     Problem List Items Addressed This Visit     COPD (chronic obstructive pulmonary disease) - Primary (Chronic)    Relevant Medications    albuterol (PROVENTIL/VENTOLIN HFA) 90 mcg/actuation inhaler    albuterol (PROVENTIL) 2.5 mg /3 mL (0.083 %) nebulizer solution    fluticasone propionate (FLOVENT DISKUS) 250 mcg/actuation DsDv    Other Relevant Orders    X-Ray Chest 4 Or More View    Stress test, pulmonary    Nocturnal hypoxemia      Other Visit Diagnoses     Exercise hypoxemia        Relevant Orders    Stress test, pulmonary             Follow up in about 6 months (around 2020) for Review CXR, 6 min walk.    MEDICAL DECISION MAKING: Moderate to high complexity.  Overall, the multiple problems listed are of moderate to high severity that may impact quality of life and activities of daily living. Side effects of medications, treatment plan as well as options and alternatives reviewed and discussed with patient. There was counseling of patient concerning these issues.    Total time spent in face to face counseling and coordination of care - 45  minutes over 50% of time was used in discussion of prognosis, risks, benefits of treatment, instructions and compliance with regimen . Discussion  with other physicians or health care providers (DME, NP, pharmacy, respiratory therapy) occurred.

## 2019-08-15 ENCOUNTER — PATIENT MESSAGE (OUTPATIENT)
Dept: INTERNAL MEDICINE | Facility: CLINIC | Age: 84
End: 2019-08-15

## 2019-08-20 ENCOUNTER — OFFICE VISIT (OUTPATIENT)
Dept: INTERNAL MEDICINE | Facility: CLINIC | Age: 84
End: 2019-08-20
Payer: MEDICARE

## 2019-08-20 ENCOUNTER — LAB VISIT (OUTPATIENT)
Dept: LAB | Facility: HOSPITAL | Age: 84
End: 2019-08-20
Attending: FAMILY MEDICINE
Payer: MEDICARE

## 2019-08-20 VITALS
TEMPERATURE: 97 F | HEART RATE: 79 BPM | HEIGHT: 72 IN | RESPIRATION RATE: 18 BRPM | WEIGHT: 193.13 LBS | DIASTOLIC BLOOD PRESSURE: 60 MMHG | OXYGEN SATURATION: 93 % | SYSTOLIC BLOOD PRESSURE: 122 MMHG | BODY MASS INDEX: 26.16 KG/M2

## 2019-08-20 DIAGNOSIS — R59.0 ENLARGED LYMPH NODE IN NECK: Primary | ICD-10-CM

## 2019-08-20 DIAGNOSIS — R59.0 ENLARGED LYMPH NODE IN NECK: ICD-10-CM

## 2019-08-20 DIAGNOSIS — I10 ESSENTIAL HYPERTENSION: Chronic | ICD-10-CM

## 2019-08-20 DIAGNOSIS — J44.9 CHRONIC OBSTRUCTIVE PULMONARY DISEASE, UNSPECIFIED COPD TYPE: Chronic | ICD-10-CM

## 2019-08-20 LAB
BASOPHILS # BLD AUTO: 0.04 K/UL (ref 0–0.2)
BASOPHILS NFR BLD: 0.5 % (ref 0–1.9)
DIFFERENTIAL METHOD: ABNORMAL
EOSINOPHIL # BLD AUTO: 0.2 K/UL (ref 0–0.5)
EOSINOPHIL NFR BLD: 2.1 % (ref 0–8)
ERYTHROCYTE [DISTWIDTH] IN BLOOD BY AUTOMATED COUNT: 14 % (ref 11.5–14.5)
HCT VFR BLD AUTO: 43.3 % (ref 40–54)
HGB BLD-MCNC: 13.5 G/DL (ref 14–18)
IMM GRANULOCYTES # BLD AUTO: 0.03 K/UL (ref 0–0.04)
IMM GRANULOCYTES NFR BLD AUTO: 0.4 % (ref 0–0.5)
LYMPHOCYTES # BLD AUTO: 0.9 K/UL (ref 1–4.8)
LYMPHOCYTES NFR BLD: 12.1 % (ref 18–48)
MCH RBC QN AUTO: 31.1 PG (ref 27–31)
MCHC RBC AUTO-ENTMCNC: 31.2 G/DL (ref 32–36)
MCV RBC AUTO: 100 FL (ref 82–98)
MONOCYTES # BLD AUTO: 0.7 K/UL (ref 0.3–1)
MONOCYTES NFR BLD: 8.6 % (ref 4–15)
NEUTROPHILS # BLD AUTO: 5.9 K/UL (ref 1.8–7.7)
NEUTROPHILS NFR BLD: 76.3 % (ref 38–73)
NRBC BLD-RTO: 0 /100 WBC
PLATELET # BLD AUTO: 173 K/UL (ref 150–350)
PMV BLD AUTO: 11.2 FL (ref 9.2–12.9)
RBC # BLD AUTO: 4.34 M/UL (ref 4.6–6.2)
WBC # BLD AUTO: 7.68 K/UL (ref 3.9–12.7)

## 2019-08-20 PROCEDURE — 36415 COLL VENOUS BLD VENIPUNCTURE: CPT | Mod: HCNC

## 2019-08-20 PROCEDURE — 99999 PR PBB SHADOW E&M-EST. PATIENT-LVL III: ICD-10-PCS | Mod: PBBFAC,HCNC,, | Performed by: FAMILY MEDICINE

## 2019-08-20 PROCEDURE — 1101F PR PT FALLS ASSESS DOC 0-1 FALLS W/OUT INJ PAST YR: ICD-10-PCS | Mod: HCNC,CPTII,S$GLB, | Performed by: FAMILY MEDICINE

## 2019-08-20 PROCEDURE — 85025 COMPLETE CBC W/AUTO DIFF WBC: CPT | Mod: HCNC

## 2019-08-20 PROCEDURE — 99999 PR PBB SHADOW E&M-EST. PATIENT-LVL III: CPT | Mod: PBBFAC,HCNC,, | Performed by: FAMILY MEDICINE

## 2019-08-20 PROCEDURE — 1101F PT FALLS ASSESS-DOCD LE1/YR: CPT | Mod: HCNC,CPTII,S$GLB, | Performed by: FAMILY MEDICINE

## 2019-08-20 PROCEDURE — 99214 OFFICE O/P EST MOD 30 MIN: CPT | Mod: HCNC,S$GLB,, | Performed by: FAMILY MEDICINE

## 2019-08-20 PROCEDURE — 99499 RISK ADDL DX/OHS AUDIT: ICD-10-PCS | Mod: HCNC,S$GLB,, | Performed by: FAMILY MEDICINE

## 2019-08-20 PROCEDURE — 99214 PR OFFICE/OUTPT VISIT, EST, LEVL IV, 30-39 MIN: ICD-10-PCS | Mod: HCNC,S$GLB,, | Performed by: FAMILY MEDICINE

## 2019-08-20 PROCEDURE — 99499 UNLISTED E&M SERVICE: CPT | Mod: HCNC,S$GLB,, | Performed by: FAMILY MEDICINE

## 2019-08-20 NOTE — PROGRESS NOTES
Subjective:       Patient ID: Chao Hawk Jr. is a 87 y.o. male.    Chief Complaint: Adenopathy (Soreness)    87-year-old male patient of Dr. delacruz with Patient Active Problem List:     COPD (chronic obstructive pulmonary disease)     Low testosterone     Essential hypertension     Pure hypercholesterolemia     BPH (benign prostatic hyperplasia)     Atherosclerosis of aorta     Abdominal aortic aneurysm without rupture     DDD (degenerative disc disease), lumbar     Multiple renal cysts     Overweight (BMI 25.0-29.9)     Lumbar arthropathy     Calcified granuloma of lung     Bilateral leg edema     PAH (pulmonary artery hypertension)     History of diverticulitis     Nocturnal hypoxemia     Trochanteric bursitis of left hip     Impingement syndrome of right shoulder     Shoulder arthritis     Poor posture     Impaired functional mobility, balance, gait, and endurance     Decreased range of motion     Decreased strength  Reports that he has noticed enlarged lymph node on the left side 10 days ago which was initially sore to begin with but has been  gradually getting better.  Patient denies any fever with chills sore throat nausea vomiting or cough, denies any earache  Has been having allergies with postnasal drip  Denies any fever      Review of Systems   Constitutional: Negative for appetite change, fatigue and fever.   HENT: Positive for postnasal drip. Negative for congestion, ear pain and trouble swallowing.    Eyes: Negative for visual disturbance.   Respiratory: Negative for cough, shortness of breath and wheezing.    Cardiovascular: Negative for chest pain, palpitations and leg swelling.   Gastrointestinal: Negative for abdominal pain, nausea and vomiting.   Musculoskeletal: Negative for myalgias.   Skin: Negative for rash.   Neurological: Negative for headaches.   Psychiatric/Behavioral: Negative for sleep disturbance.         /60 (BP Location: Left arm, Patient Position: Sitting, BP Method: Large  (Manual))   Pulse 79   Temp 97.4 °F (36.3 °C) (Tympanic)   Resp 18   Ht 6' (1.829 m)   Wt 87.6 kg (193 lb 2 oz)   SpO2 (!) 93%   BMI 26.19 kg/m²   Objective:      Physical Exam   Constitutional: He is oriented to person, place, and time. He appears well-developed and well-nourished.   HENT:   Head: Normocephalic and atraumatic.   Right Ear: External ear normal.   Left Ear: External ear normal.   Mouth/Throat: Oropharynx is clear and moist.   Postnasal drip noted on exam today   Neck: Neck supple.   Positive for left-sided cervical lymphadenopathy, nontender   Cardiovascular: Normal rate, regular rhythm and normal heart sounds.   No murmur heard.  Pulmonary/Chest: Effort normal and breath sounds normal. He has no wheezes.   Abdominal: Soft. Bowel sounds are normal. There is no tenderness.   Musculoskeletal: He exhibits no edema.   Lymphadenopathy:     He has cervical adenopathy.   Neurological: He is alert and oriented to person, place, and time.   Skin: Skin is warm and dry. No rash noted.   Psychiatric: He has a normal mood and affect.         Assessment/Plan:   1. Enlarged lymph node in neck  - CBC auto differential; Future  Likely secondary to allergies with postnasal drip  Will check CBC to rule out acute bacterial infection  Patient was advised to do salt water gargles and take over-the-counter Zyrtec as needed for allergies  If lymph node enlargement continues to persist for next 10 days to 2 weeks return to the clinic for further imaging  Patient verbalized understanding  Vital signs stable today    2. Essential hypertension  Blood pressure is stable today currently taking hydrochlorothiazide 12.5 mg and lisinopril 10 mg daily    3. Chronic obstructive pulmonary disease, unspecified COPD type  Stable on Flovent and albuterol nebulizer

## 2019-09-06 ENCOUNTER — TELEPHONE (OUTPATIENT)
Dept: INTERNAL MEDICINE | Facility: CLINIC | Age: 84
End: 2019-09-06

## 2019-09-06 DIAGNOSIS — Z74.1 REQUIRES DAILY ASSISTANCE FOR ACTIVITIES OF DAILY LIVING (ADL) AND COMFORT NEEDS: ICD-10-CM

## 2019-09-06 DIAGNOSIS — Z74.2 NEED FOR HOME HEALTH CARE: ICD-10-CM

## 2019-09-06 DIAGNOSIS — I10 ESSENTIAL HYPERTENSION: Primary | ICD-10-CM

## 2019-09-06 DIAGNOSIS — Z74.09 IMPAIRED FUNCTIONAL MOBILITY, BALANCE, GAIT, AND ENDURANCE: ICD-10-CM

## 2019-09-06 NOTE — TELEPHONE ENCOUNTER
Mailed letter received from pt requesting Dr. Ureña order HH covered under pt's ins, pt stating requesting daily help, stating he has stairlift and safety bars in bathrooms so pt prefers HH alternative rather than assisted living. Request sent to Dr. Ureña for review.    PORFIRIO 08/20/19  [place] 01/17/20

## 2019-09-10 PROCEDURE — G0180 MD CERTIFICATION HHA PATIENT: HCPCS | Mod: ,,, | Performed by: PEDIATRICS

## 2019-09-10 PROCEDURE — G0180 PR HOME HEALTH MD CERTIFICATION: ICD-10-PCS | Mod: ,,, | Performed by: PEDIATRICS

## 2019-09-10 RX ORDER — ATORVASTATIN CALCIUM 40 MG/1
TABLET, FILM COATED ORAL
Qty: 90 TABLET | Refills: 3 | Status: SHIPPED | OUTPATIENT
Start: 2019-09-10 | End: 2020-07-02

## 2019-09-10 NOTE — TELEPHONE ENCOUNTER
S/w pt and he confirmed [facility] already made home visit today and enrolled pt in meals-on-wheels program. Pt to CB PRN.

## 2019-09-10 NOTE — TELEPHONE ENCOUNTER
MIHAI order printed and faxed w/ pt's demographics and snapshot to [facility], fax transmission confirmed F#[phone].

## 2019-09-11 ENCOUNTER — TELEPHONE (OUTPATIENT)
Dept: INTERNAL MEDICINE | Facility: CLINIC | Age: 84
End: 2019-09-11

## 2019-09-11 NOTE — TELEPHONE ENCOUNTER
----- Message from Betsy Pinto sent at 9/11/2019 11:56 AM CDT -----  [facility]   Billie would like a call back at [phone], Regards to getting orders for a .    Thanks  Td

## 2019-09-11 NOTE — TELEPHONE ENCOUNTER
S/w Billie w/ [facility] requesting SW order for pt, I confirmed had s/w pt (see documentation 09/06/19) and he confirmed had eval w/ HH nurse and she had set up meals on wheels program w/ pt, so if HH was wanting SW order for them to send to home for eval and further care/access gave VO per Dr. Ureña. She confirmed SW VORB and to CB PRN.

## 2019-09-17 ENCOUNTER — TELEPHONE (OUTPATIENT)
Dept: INTERNAL MEDICINE | Facility: CLINIC | Age: 84
End: 2019-09-17

## 2019-09-17 RX ORDER — ACETAMINOPHEN 500 MG
1 TABLET ORAL NIGHTLY
COMMUNITY

## 2019-09-17 RX ORDER — LOPERAMIDE HCL 2 MG
2 TABLET ORAL
COMMUNITY
End: 2020-09-23

## 2019-09-17 RX ORDER — DIPHENHYDRAMINE HCL 25 MG
25 TABLET ORAL
COMMUNITY
End: 2020-01-17 | Stop reason: ALTCHOICE

## 2019-09-17 RX ORDER — LANOLIN ALCOHOL/MO/W.PET/CERES
100 CREAM (GRAM) TOPICAL DAILY
COMMUNITY

## 2019-09-17 NOTE — TELEPHONE ENCOUNTER
Brian brito/ [facility]  sent Epic Message: Notification from  of OTC meds found in the pt's home. Please advise if Dr. Ureña wants pt to continue taking the following:    Diphenhydramine 25mg PRN Allergies  Loperamide 2mg PRN Diarrhea  One a Day Mens MVI  Megared Omega 3/Krill Oil 1 capsule daily  Qunol CoQ10 1 tablet daily  Glucosamine Chondroitin w/ Vit D2 2 tabs daily  Testosterone Support 1 capsule Daily  L-arginine 500mg 1 tablet daily  Vit D3 2,000units 1 capsule daily  Vit B12 1,000mg 1 tablet daily  Potassium 99mg 1 tablet daily    **Med list updated per this list from .

## 2019-09-18 ENCOUNTER — EXTERNAL HOME HEALTH (OUTPATIENT)
Dept: HOME HEALTH SERVICES | Facility: HOSPITAL | Age: 84
End: 2019-09-18
Payer: MEDICARE

## 2019-09-24 NOTE — PLAN OF CARE
Problem: Patient Care Overview  Goal: Plan of Care Review  Outcome: Ongoing (interventions implemented as appropriate)  Remains free from injury. Denies pain. Fluids running as ordered. Ambulating independently with cane. Vital signs stable. Chart reviewed. Will continue to monitor.        68

## 2019-10-03 ENCOUNTER — OFFICE VISIT (OUTPATIENT)
Dept: ORTHOPEDICS | Facility: CLINIC | Age: 84
End: 2019-10-03
Payer: MEDICARE

## 2019-10-03 ENCOUNTER — HOSPITAL ENCOUNTER (OUTPATIENT)
Dept: RADIOLOGY | Facility: HOSPITAL | Age: 84
Discharge: HOME | End: 2019-10-03
Attending: PHYSICAL MEDICINE & REHABILITATION
Payer: MEDICARE

## 2019-10-03 VITALS — HEIGHT: 72 IN | WEIGHT: 193.13 LBS | BODY MASS INDEX: 26.16 KG/M2

## 2019-10-03 DIAGNOSIS — M19.019 SHOULDER ARTHRITIS: Primary | ICD-10-CM

## 2019-10-03 DIAGNOSIS — M79.89 PAIN AND SWELLING OF LOWER LEG, RIGHT: ICD-10-CM

## 2019-10-03 DIAGNOSIS — M79.661 PAIN AND SWELLING OF LOWER LEG, RIGHT: ICD-10-CM

## 2019-10-03 DIAGNOSIS — M75.41 IMPINGEMENT SYNDROME OF RIGHT SHOULDER: ICD-10-CM

## 2019-10-03 DIAGNOSIS — M19.019 SHOULDER ARTHRITIS: ICD-10-CM

## 2019-10-03 DIAGNOSIS — M70.62 TROCHANTERIC BURSITIS OF LEFT HIP: ICD-10-CM

## 2019-10-03 PROCEDURE — 73030 XR SHOULDER COMPLETE 2 OR MORE VIEWS RIGHT: ICD-10-PCS | Mod: 26,HCNC,RT, | Performed by: RADIOLOGY

## 2019-10-03 PROCEDURE — 99214 OFFICE O/P EST MOD 30 MIN: CPT | Mod: HCNC,S$GLB,, | Performed by: PHYSICAL MEDICINE & REHABILITATION

## 2019-10-03 PROCEDURE — 99999 PR PBB SHADOW E&M-EST. PATIENT-LVL II: CPT | Mod: PBBFAC,HCNC,, | Performed by: PHYSICAL MEDICINE & REHABILITATION

## 2019-10-03 PROCEDURE — 73030 X-RAY EXAM OF SHOULDER: CPT | Mod: 26,HCNC,RT, | Performed by: RADIOLOGY

## 2019-10-03 PROCEDURE — 1101F PT FALLS ASSESS-DOCD LE1/YR: CPT | Mod: HCNC,CPTII,S$GLB, | Performed by: PHYSICAL MEDICINE & REHABILITATION

## 2019-10-03 PROCEDURE — 99999 PR PBB SHADOW E&M-EST. PATIENT-LVL II: ICD-10-PCS | Mod: PBBFAC,HCNC,, | Performed by: PHYSICAL MEDICINE & REHABILITATION

## 2019-10-03 PROCEDURE — 1101F PR PT FALLS ASSESS DOC 0-1 FALLS W/OUT INJ PAST YR: ICD-10-PCS | Mod: HCNC,CPTII,S$GLB, | Performed by: PHYSICAL MEDICINE & REHABILITATION

## 2019-10-03 PROCEDURE — 73030 X-RAY EXAM OF SHOULDER: CPT | Mod: TC,HCNC,RT

## 2019-10-03 PROCEDURE — 99214 PR OFFICE/OUTPT VISIT, EST, LEVL IV, 30-39 MIN: ICD-10-PCS | Mod: HCNC,S$GLB,, | Performed by: PHYSICAL MEDICINE & REHABILITATION

## 2019-10-03 NOTE — PROGRESS NOTES
PM&R Follow Up Visit :    Referring Physician: No ref. provider found    Chief Complaint   Patient presents with    Shoulder Pain     Right shoulder pain    Leg Pain     Left leg pain       HPI: This is a 87 y.o.  male being seen in clinic today for evaluation of Shoulder Pain (Right shoulder pain) and Leg Pain (Left leg pain)   Since last visit, the symptoms are improving.  He has been to therapy previously and continues to occasionally do some of his HEP. The swelling in his leg from last visit has gone down. Occasionally he'll feel the hip and shoulder, but notes neither is bothering him like before. He obtained new x-rays today as there was a possible lytic bone lesion partially visualized on previous x-rays.       History obtained from patient.    Past family, medical, social, surgical history, and vital signs reviewed in chart.    Review of Systems   Constitutional: Negative for chills, fever and weight loss.   HENT: Negative for hearing loss and sore throat.    Eyes: Negative for blurred vision, photophobia and pain.   Respiratory: Negative for shortness of breath.    Cardiovascular: Negative for chest pain.   Gastrointestinal: Negative for abdominal pain.   Genitourinary: Negative for dysuria.   Skin: Negative for rash.   Neurological: Negative for tingling and headaches.   Endo/Heme/Allergies: Does not bruise/bleed easily.   Psychiatric/Behavioral: Negative for depression.       General    Nursing note and vitals reviewed.  Constitutional: He is oriented to person, place, and time. He appears well-developed and well-nourished.   HENT:   Head: Normocephalic and atraumatic.   Eyes: Conjunctivae and EOM are normal. Pupils are equal, round, and reactive to light.   Neck: Neck supple.   Cardiovascular: Intact distal pulses.    Pulmonary/Chest: Effort normal. No respiratory distress.   Abdominal: He exhibits no distension.   Neurological: He is alert and oriented to person, place, and time.   Psychiatric: He  has a normal mood and affect.         Right Shoulder Exam   Right shoulder exam is normal.    Tenderness   The patient is tender to palpation of the acromioclavicular joint.    Tests & Signs   Chase test: negative  Speed's Test: negative    Other   Sensation: normal    Left Shoulder Exam   Left shoulder exam is normal.    Tests & Signs   Chase test: negative  Speed's Test: negative    Other   Sensation: normal       Muscle Strength   Right Upper Extremity   Shoulder Abduction: 5/5   Shoulder Internal Rotation: 5/5   Shoulder External Rotation: 5/5   Biceps: 5/5/5   Left Upper Extremity  Shoulder Abduction: 5/5   Shoulder Internal Rotation: 5/5   Shoulder External Rotation: 5/5   Biceps: 5/5/5     Reflexes     Left Side  Biceps:  0    Right Side   Biceps:  0    Vascular Exam     Right Pulses      Radial:                    2+      Left Pulses      Radial:                    2+        Reading Physician Reading Date Result Priority   Eddie Baires DO 10/3/2019 Routine      Narrative     EXAMINATION:  XR SHOULDER COMPLETE 2 OR MORE VIEWS RIGHT    CLINICAL HISTORY:  Primary osteoarthritis, unspecified shoulder    TECHNIQUE:  Two or three views of the right shoulder were preformed.    COMPARISON:  05/02/2019    FINDINGS:  No acute fracture or dislocation.  Mild AC joint arthropathy is noted.  There is severe joint space narrowing and osteophyte formation seen at the glenohumeral joint.  Prominent subchondral cystic changes noted in the region of the greater tuberosity.  There are some presumed loose bodies seen adjacent to the proximal shaft of the humerus the larger of the 2 measuring approximately 15 mm.      Impression       1.  As above      Electronically signed by: Eddie Baires DO  Date: 10/03/2019  Time: 11:11         IMPRESSION/PLAN: This is a 87 y.o.  male with:    Shoulder arthritis    Trochanteric bursitis of left hip    Impingement syndrome of right shoulder        The findings were discussed with  Chao in detail. He is doing quite well and happy with current pain control and functioning. Axillary x-ray shows that calcific area is actually outside of and separate from the proximal humerus, most likely a loose body from the arthritic shoulder. We had a discussion about whether or not to obtain MRI to help determine if this calcific area could be cancerous. He elected to hold off and said he'd discuss it with Dr. Ureña at his next appointment in January.  He was provided with this plan in writing. All of his questions were answered. He will follow up with me VENANCIO Valladares M.D.  Physical Medicine and Rehab

## 2019-11-09 PROCEDURE — G0179 PR HOME HEALTH MD RECERTIFICATION: ICD-10-PCS | Mod: ,,, | Performed by: PEDIATRICS

## 2019-11-09 PROCEDURE — G0179 MD RECERTIFICATION HHA PT: HCPCS | Mod: ,,, | Performed by: PEDIATRICS

## 2019-11-11 ENCOUNTER — EXTERNAL HOME HEALTH (OUTPATIENT)
Dept: HOME HEALTH SERVICES | Facility: HOSPITAL | Age: 84
End: 2019-11-11
Payer: MEDICARE

## 2019-11-13 DIAGNOSIS — I10 ESSENTIAL HYPERTENSION: Chronic | ICD-10-CM

## 2019-11-13 RX ORDER — LISINOPRIL 10 MG/1
TABLET ORAL
Qty: 90 TABLET | Refills: 4 | Status: SHIPPED | OUTPATIENT
Start: 2019-11-13 | End: 2020-12-17

## 2019-11-19 NOTE — PROGRESS NOTES
Chao Hawk presented for a  Medicare AWV and comprehensive Health Risk Assessment today. The following components were reviewed and updated:    · Medical history  · Family History  · Social history  · Allergies and Current Medications  · Health Risk Assessment  · Health Maintenance  · Care Team     ** See Completed Assessments for Annual Wellness Visit within the encounter summary.**       The following assessments were completed:  · Living Situation  · CAGE  · Depression Screening  · Timed Get Up and Go  · Whisper Test  · Cognitive Function Screening  · Nutrition Screening  · ADL Screening  · PAQ Screening    Patient Care Team:  MONSTER Ureña Jr., MD as PCP - General (Pediatrics)  [facility]  Gianni Payne MD (Inactive) (Ophthalmology)  Giovanni Payne LPN as Care Coordinator (Internal Medicine)  Gael Hoffmann MD as Consulting Physician (Cardiology)  Radha Valladares MD as Consulting Physician (Physical Medicine and Rehabilitation)  Obed Sotelo MD as Consulting Physician (Pulmonary Disease)    Vitals:    11/20/19 1008   BP: 136/60   BP Location: Right arm   Temp: 97.8 °F (36.6 °C)   TempSrc: Tympanic   Weight: 88.2 kg (194 lb 7.1 oz)   Height: 6' (1.829 m)     Body mass index is 26.37 kg/m².     Physical Exam   Constitutional: He is oriented to person, place, and time. He appears well-developed and well-nourished. No distress.   Pt ambulating with cane   HENT:   Head: Normocephalic and atraumatic.   Eyes: EOM are normal. No scleral icterus.   Neck: Neck supple.   Cardiovascular: Normal rate and regular rhythm.   Pulmonary/Chest: Effort normal and breath sounds normal. No respiratory distress. He has no decreased breath sounds. He has no wheezes. He has no rhonchi. He has no rales.   Musculoskeletal: Normal range of motion.   Neurological: He is alert and oriented to person, place, and time.   Skin: Skin is dry. He is not diaphoretic.   Psychiatric: He has a normal mood and  affect. His speech is normal and behavior is normal. Thought content normal.         Diagnoses and health risks identified today and associated recommendations/orders:    1. Encounter for preventive health examination  Pt to consider Shingrix vaccine at pharmacy as discussed.    2. Atherosclerosis of aorta  Stable. Abd aorta U/S 7/10/18. Pt taking Lipitor and aspirin. Continue current treatment plan as prescribed by your PCP and cardiologist and f/u with them for further management.    3. Chronic obstructive pulmonary disease, unspecified COPD type  Stable. PFT 8/5/19. Pt using Flovent and albuterol. Continue current treatment plan as prescribed by your PCP and pulmonologist and f/u with them for further management.    4. Calcified granuloma of lung  Stable. CXR 4/17/18. Continue current treatment plan as prescribed by your PCP and pulmonologist and f/u with them for further management.    5. PAH (pulmonary artery hypertension)  Stable. 2D Echo 1/24/19. Continue current treatment plan as prescribed by your PCP and pulmonologist and f/u with them for further management.    6. Abdominal aortic aneurysm without rupture  Stable. CT abd/pelv 4/13/18, abd aorta U/S 7/10/18. Continue current treatment plan as prescribed by your PCP and cardiologist and f/u with them for further management.    7. Essential hypertension  Stable. Pt taking lisinopril, HCTZ, and Lasix. Continue current treatment plan as prescribed by your PCP and cardiologist and f/u with them for further management.    8. Pure hypercholesterolemia  Controlled. Pt taking Lipitor as well as aspirin. Continue current treatment plan as prescribed by your PCP and cardiologist and f/u with them for further management.  Component      Latest Ref Rng & Units 7/10/2019 1/8/2019   Cholesterol      120 - 199 mg/dL 144 122   Triglycerides      30 - 150 mg/dL 70 51   HDL      40 - 75 mg/dL 65 47   LDL Cholesterol External      63.0 - 159.0 mg/dL 65.0 64.8    Hdl/Cholesterol Ratio      20.0 - 50.0 % 45.1 38.5   Total Cholesterol/HDL Ratio      2.0 - 5.0 2.2 2.6   Non-HDL Cholesterol      mg/dL 79 75     9. DDD (degenerative disc disease), lumbar; 10. Lumbar arthropathy  Stable. L-spine MRI 5/23/17. Pt s/p lumbar surgery. Pt s/p lumbar injections. Pt taking Tylenol, gabapentin, and (rare use of) tramadol. Continue current treatment plan as prescribed by your PCP and f/u with your PCP for further management.    11. Shoulder arthritis, 12. Impingement syndrome of right shoulder, 13. Trochanteric bursitis of left hip  Stable. Pt s/p L greater trochanter bursa injection and R subacromial bursa injection. Pt s/p PT. Continue current treatment plan as prescribed by your PCP and ortho and f/u with them for further management.    14. Bilateral leg edema  Stable. Pt reports no longer taking Lasix. Continue current treatment plan as prescribed by your PCP and f/u with your PCP for further management.    15. Benign prostatic hyperplasia with weak urinary stream  Stable. CT abd/pelv 4/13/18. Pt taking Flomax. Continue current treatment plan as prescribed by your PCP and f/u with your PCP for further management.    16. Multiple renal cysts  Stable. CT abd/pelv 4/13/18. Continue current treatment plan as prescribed by your PCP and f/u with your PCP for further management.    17. Low testosterone  Stable. Pt receiving depo testosterone (PCP). Continue current treatment plan as prescribed by your PCP and f/u with your PCP for further management.    18. Nocturnal hypoxemia  Stable. Overnight pulse oximetry 1/30/19. Pt using nocturnal O2. Continue current treatment plan as prescribed by your PCP and pulmonologist and f/u with them for further management.    19. Diverticulosis  Stable. CT abd/pelv 4/13/18. Continue current treatment plan as prescribed by your PCP and f/u with your PCP for further management.    Please obtain any medical records from past / present outside medical  providers and give to PCP for review and further medical recommendations.    Provided Chao with a 5-10 year written screening schedule and personal prevention plan. Recommendations were developed using the USPSTF age appropriate recommendations. Education, counseling, and referrals were provided as needed. After Visit Summary printed and given to patient which includes a list of additional screenings\tests needed.    Follow up in about 1 year (around 11/20/2020) for HRA. F/u with PCP Dr. Ureña as scheduled 1/17/20 and specialists as recommended for health management.    DUANE Lock     I offered to discuss end of life issues, including information on how to make advance directives that the patient could use to name someone who would make medical decisions on their behalf if they became too ill to make themselves.  _X_Patient declined - pt reports advance directives are already in place.  ___Patient is interested, I provided paper work and offered to discuss.

## 2019-11-20 ENCOUNTER — OFFICE VISIT (OUTPATIENT)
Dept: INTERNAL MEDICINE | Facility: CLINIC | Age: 84
End: 2019-11-20
Payer: MEDICARE

## 2019-11-20 VITALS
BODY MASS INDEX: 26.34 KG/M2 | DIASTOLIC BLOOD PRESSURE: 60 MMHG | TEMPERATURE: 98 F | SYSTOLIC BLOOD PRESSURE: 136 MMHG | HEIGHT: 72 IN | WEIGHT: 194.44 LBS

## 2019-11-20 DIAGNOSIS — J44.9 CHRONIC OBSTRUCTIVE PULMONARY DISEASE, UNSPECIFIED COPD TYPE: Chronic | ICD-10-CM

## 2019-11-20 DIAGNOSIS — G47.34 NOCTURNAL HYPOXEMIA: ICD-10-CM

## 2019-11-20 DIAGNOSIS — I71.40 ABDOMINAL AORTIC ANEURYSM WITHOUT RUPTURE: ICD-10-CM

## 2019-11-20 DIAGNOSIS — I27.21 PAH (PULMONARY ARTERY HYPERTENSION): ICD-10-CM

## 2019-11-20 DIAGNOSIS — R39.12 BENIGN PROSTATIC HYPERPLASIA WITH WEAK URINARY STREAM: Chronic | ICD-10-CM

## 2019-11-20 DIAGNOSIS — M47.816 LUMBAR ARTHROPATHY: ICD-10-CM

## 2019-11-20 DIAGNOSIS — M19.019 SHOULDER ARTHRITIS: ICD-10-CM

## 2019-11-20 DIAGNOSIS — M70.62 TROCHANTERIC BURSITIS OF LEFT HIP: ICD-10-CM

## 2019-11-20 DIAGNOSIS — E78.00 PURE HYPERCHOLESTEROLEMIA: Chronic | ICD-10-CM

## 2019-11-20 DIAGNOSIS — R60.0 BILATERAL LEG EDEMA: ICD-10-CM

## 2019-11-20 DIAGNOSIS — R79.89 LOW TESTOSTERONE: ICD-10-CM

## 2019-11-20 DIAGNOSIS — M75.41 IMPINGEMENT SYNDROME OF RIGHT SHOULDER: ICD-10-CM

## 2019-11-20 DIAGNOSIS — J84.10 CALCIFIED GRANULOMA OF LUNG: ICD-10-CM

## 2019-11-20 DIAGNOSIS — I70.0 ATHEROSCLEROSIS OF AORTA: ICD-10-CM

## 2019-11-20 DIAGNOSIS — K57.90 DIVERTICULOSIS: ICD-10-CM

## 2019-11-20 DIAGNOSIS — I10 ESSENTIAL HYPERTENSION: Chronic | ICD-10-CM

## 2019-11-20 DIAGNOSIS — Z00.00 ENCOUNTER FOR PREVENTIVE HEALTH EXAMINATION: Primary | ICD-10-CM

## 2019-11-20 DIAGNOSIS — M51.36 DDD (DEGENERATIVE DISC DISEASE), LUMBAR: ICD-10-CM

## 2019-11-20 DIAGNOSIS — N40.1 BENIGN PROSTATIC HYPERPLASIA WITH WEAK URINARY STREAM: Chronic | ICD-10-CM

## 2019-11-20 DIAGNOSIS — Q61.02 MULTIPLE RENAL CYSTS: ICD-10-CM

## 2019-11-20 PROCEDURE — G0439 PR MEDICARE ANNUAL WELLNESS SUBSEQUENT VISIT: ICD-10-PCS | Mod: HCNC,S$GLB,, | Performed by: PHYSICIAN ASSISTANT

## 2019-11-20 PROCEDURE — 99999 PR PBB SHADOW E&M-EST. PATIENT-LVL III: CPT | Mod: PBBFAC,HCNC,, | Performed by: PHYSICIAN ASSISTANT

## 2019-11-20 PROCEDURE — G0439 PPPS, SUBSEQ VISIT: HCPCS | Mod: HCNC,S$GLB,, | Performed by: PHYSICIAN ASSISTANT

## 2019-11-20 PROCEDURE — 99999 PR PBB SHADOW E&M-EST. PATIENT-LVL III: ICD-10-PCS | Mod: PBBFAC,HCNC,, | Performed by: PHYSICIAN ASSISTANT

## 2019-11-20 RX ORDER — TAMSULOSIN HYDROCHLORIDE 0.4 MG/1
CAPSULE ORAL
Qty: 180 CAPSULE | Refills: 0 | Status: SHIPPED | OUTPATIENT
Start: 2019-11-20 | End: 2020-01-22

## 2019-11-20 NOTE — PATIENT INSTRUCTIONS
Counseling and Referral of Other Preventative  (Italic type indicates deductible and co-insurance are waived)    Patient Name: Chao Hawk  Today's Date: 11/20/2019    Health Maintenance       Date Due Completion Date    Shingles Vaccine (2 of 3) 10/27/2009 9/1/2009    Influenza Vaccine (1) 09/01/2019 10/18/2018    Lipid Panel 07/10/2020 7/10/2019    Colonoscopy 11/19/2020 11/19/2015 (ClinicallyNA)    Override on 11/19/2015: Not Clinically Appropriate    Override on 5/16/2006: Done (repeat in 5 years)    TETANUS VACCINE 08/30/2023 8/30/2013        No orders of the defined types were placed in this encounter.    The following information is provided to all patients.  This information is to help you find resources for any of the problems found today that may be affecting your health:                Living healthy guide: www.[facility]      Understanding Diabetes: www.diabetes.org      Eating healthy: www.cdc.gov/healthyweight      [place] home safety checklist: www.cdc.gov/steadi/patient.html      Agency on Aging: www.goea.[place].[facility]      Alcoholics anonymous (AA): www.aa.org      Physical Activity: www.jojo.nih.gov/pz9tpbv      Tobacco use: www.quitwithusla.org     Counseling and Referral of Other Preventative  (Italic type indicates deductible and co-insurance are waived)    Patient Name: Chao Hawk  Today's Date: 11/20/2019    Health Maintenance       Date Due Completion Date    Shingles Vaccine (2 of 3) 10/27/2009 9/1/2009    Lipid Panel 07/10/2020 7/10/2019    Colonoscopy 11/19/2020 11/19/2015 (ClinicallyNA)    Override on 11/19/2015: Not Clinically Appropriate    Override on 5/16/2006: Done (repeat in 5 years)    TETANUS VACCINE 08/30/2023 8/30/2013        No orders of the defined types were placed in this encounter.    The following information is provided to all patients.  This information is to help you find resources for any of the problems found today that may be affecting your health:                 Living healthy guide: www.[facility].[place].[facility]      Understanding Diabetes: www.diabetes.org      Eating healthy: www.cdc.gov/healthyweight      CDC home safety checklist: www.cdc.gov/steadi/patient.html      Agency on Aging: www.goea.[place].[facility]      Alcoholics anonymous (AA): www.aa.org      Physical Activity: www.jojo.nih.gov/kh4yhgw      Tobacco use: www.quitwithusla.org

## 2020-01-10 ENCOUNTER — LAB VISIT (OUTPATIENT)
Dept: LAB | Facility: HOSPITAL | Age: 85
End: 2020-01-10
Attending: PEDIATRICS
Payer: MEDICARE

## 2020-01-10 DIAGNOSIS — E78.00 PURE HYPERCHOLESTEROLEMIA: Chronic | ICD-10-CM

## 2020-01-10 DIAGNOSIS — Z12.5 PROSTATE CANCER SCREENING: ICD-10-CM

## 2020-01-10 DIAGNOSIS — I10 ESSENTIAL HYPERTENSION: Chronic | ICD-10-CM

## 2020-01-10 LAB
ALT SERPL W/O P-5'-P-CCNC: 25 U/L (ref 10–44)
ANION GAP SERPL CALC-SCNC: 9 MMOL/L (ref 8–16)
AST SERPL-CCNC: 25 U/L (ref 10–40)
BASOPHILS # BLD AUTO: 0.03 K/UL (ref 0–0.2)
BASOPHILS NFR BLD: 0.5 % (ref 0–1.9)
BUN SERPL-MCNC: 22 MG/DL (ref 8–23)
CALCIUM SERPL-MCNC: 9.8 MG/DL (ref 8.7–10.5)
CHLORIDE SERPL-SCNC: 106 MMOL/L (ref 95–110)
CHOLEST SERPL-MCNC: 139 MG/DL (ref 120–199)
CHOLEST/HDLC SERPL: 2.8 {RATIO} (ref 2–5)
CO2 SERPL-SCNC: 28 MMOL/L (ref 23–29)
CREAT SERPL-MCNC: 1.1 MG/DL (ref 0.5–1.4)
DIFFERENTIAL METHOD: ABNORMAL
EOSINOPHIL # BLD AUTO: 0.1 K/UL (ref 0–0.5)
EOSINOPHIL NFR BLD: 1.7 % (ref 0–8)
ERYTHROCYTE [DISTWIDTH] IN BLOOD BY AUTOMATED COUNT: 13.4 % (ref 11.5–14.5)
EST. GFR  (AFRICAN AMERICAN): >60 ML/MIN/1.73 M^2
EST. GFR  (NON AFRICAN AMERICAN): >60 ML/MIN/1.73 M^2
GLUCOSE SERPL-MCNC: 98 MG/DL (ref 70–110)
HCT VFR BLD AUTO: 43 % (ref 40–54)
HDLC SERPL-MCNC: 50 MG/DL (ref 40–75)
HDLC SERPL: 36 % (ref 20–50)
HGB BLD-MCNC: 13.3 G/DL (ref 14–18)
IMM GRANULOCYTES # BLD AUTO: 0.03 K/UL (ref 0–0.04)
IMM GRANULOCYTES NFR BLD AUTO: 0.5 % (ref 0–0.5)
LDLC SERPL CALC-MCNC: 70 MG/DL (ref 63–159)
LYMPHOCYTES # BLD AUTO: 0.8 K/UL (ref 1–4.8)
LYMPHOCYTES NFR BLD: 12.6 % (ref 18–48)
MCH RBC QN AUTO: 31.1 PG (ref 27–31)
MCHC RBC AUTO-ENTMCNC: 30.9 G/DL (ref 32–36)
MCV RBC AUTO: 101 FL (ref 82–98)
MONOCYTES # BLD AUTO: 1.1 K/UL (ref 0.3–1)
MONOCYTES NFR BLD: 16.6 % (ref 4–15)
NEUTROPHILS # BLD AUTO: 4.5 K/UL (ref 1.8–7.7)
NEUTROPHILS NFR BLD: 68.1 % (ref 38–73)
NONHDLC SERPL-MCNC: 89 MG/DL
NRBC BLD-RTO: 0 /100 WBC
PLATELET # BLD AUTO: 170 K/UL (ref 150–350)
PMV BLD AUTO: 11.4 FL (ref 9.2–12.9)
POTASSIUM SERPL-SCNC: 4.2 MMOL/L (ref 3.5–5.1)
RBC # BLD AUTO: 4.27 M/UL (ref 4.6–6.2)
SODIUM SERPL-SCNC: 143 MMOL/L (ref 136–145)
TRIGL SERPL-MCNC: 95 MG/DL (ref 30–150)
WBC # BLD AUTO: 6.58 K/UL (ref 3.9–12.7)

## 2020-01-10 PROCEDURE — 80061 LIPID PANEL: CPT | Mod: HCNC

## 2020-01-10 PROCEDURE — 84460 ALANINE AMINO (ALT) (SGPT): CPT | Mod: HCNC

## 2020-01-10 PROCEDURE — 85025 COMPLETE CBC W/AUTO DIFF WBC: CPT | Mod: HCNC

## 2020-01-10 PROCEDURE — 36415 COLL VENOUS BLD VENIPUNCTURE: CPT | Mod: HCNC

## 2020-01-10 PROCEDURE — 84153 ASSAY OF PSA TOTAL: CPT | Mod: HCNC

## 2020-01-10 PROCEDURE — 80048 BASIC METABOLIC PNL TOTAL CA: CPT | Mod: HCNC

## 2020-01-10 PROCEDURE — 84450 TRANSFERASE (AST) (SGOT): CPT | Mod: HCNC

## 2020-01-11 LAB — COMPLEXED PSA SERPL-MCNC: 3.2 NG/ML (ref 0–4)

## 2020-01-17 ENCOUNTER — OFFICE VISIT (OUTPATIENT)
Dept: INTERNAL MEDICINE | Facility: CLINIC | Age: 85
End: 2020-01-17
Payer: MEDICARE

## 2020-01-17 VITALS
SYSTOLIC BLOOD PRESSURE: 130 MMHG | OXYGEN SATURATION: 95 % | BODY MASS INDEX: 25.98 KG/M2 | DIASTOLIC BLOOD PRESSURE: 60 MMHG | WEIGHT: 191.81 LBS | RESPIRATION RATE: 16 BRPM | HEART RATE: 93 BPM | HEIGHT: 72 IN | TEMPERATURE: 98 F

## 2020-01-17 DIAGNOSIS — G47.34 NOCTURNAL HYPOXEMIA: ICD-10-CM

## 2020-01-17 DIAGNOSIS — N40.1 BENIGN PROSTATIC HYPERPLASIA WITH WEAK URINARY STREAM: Chronic | ICD-10-CM

## 2020-01-17 DIAGNOSIS — I70.0 ATHEROSCLEROSIS OF AORTA: ICD-10-CM

## 2020-01-17 DIAGNOSIS — J84.10 CALCIFIED GRANULOMA OF LUNG: ICD-10-CM

## 2020-01-17 DIAGNOSIS — I71.40 ABDOMINAL AORTIC ANEURYSM WITHOUT RUPTURE: ICD-10-CM

## 2020-01-17 DIAGNOSIS — Z74.09 IMPAIRED FUNCTIONAL MOBILITY, BALANCE, GAIT, AND ENDURANCE: ICD-10-CM

## 2020-01-17 DIAGNOSIS — R39.12 BENIGN PROSTATIC HYPERPLASIA WITH WEAK URINARY STREAM: Chronic | ICD-10-CM

## 2020-01-17 DIAGNOSIS — M51.36 DDD (DEGENERATIVE DISC DISEASE), LUMBAR: ICD-10-CM

## 2020-01-17 DIAGNOSIS — E78.00 PURE HYPERCHOLESTEROLEMIA: Chronic | ICD-10-CM

## 2020-01-17 DIAGNOSIS — I27.21 PAH (PULMONARY ARTERY HYPERTENSION): ICD-10-CM

## 2020-01-17 DIAGNOSIS — R79.89 LOW TESTOSTERONE: ICD-10-CM

## 2020-01-17 DIAGNOSIS — I10 ESSENTIAL HYPERTENSION: Primary | Chronic | ICD-10-CM

## 2020-01-17 DIAGNOSIS — J44.9 CHRONIC OBSTRUCTIVE PULMONARY DISEASE, UNSPECIFIED COPD TYPE: Chronic | ICD-10-CM

## 2020-01-17 DIAGNOSIS — R60.0 BILATERAL LEG EDEMA: ICD-10-CM

## 2020-01-17 PROCEDURE — 1159F PR MEDICATION LIST DOCUMENTED IN MEDICAL RECORD: ICD-10-PCS | Mod: HCNC,S$GLB,, | Performed by: PEDIATRICS

## 2020-01-17 PROCEDURE — 99214 PR OFFICE/OUTPT VISIT, EST, LEVL IV, 30-39 MIN: ICD-10-PCS | Mod: HCNC,S$GLB,, | Performed by: PEDIATRICS

## 2020-01-17 PROCEDURE — 99214 OFFICE O/P EST MOD 30 MIN: CPT | Mod: HCNC,S$GLB,, | Performed by: PEDIATRICS

## 2020-01-17 PROCEDURE — 3288F PR FALLS RISK ASSESSMENT DOCUMENTED: ICD-10-PCS | Mod: HCNC,CPTII,S$GLB, | Performed by: PEDIATRICS

## 2020-01-17 PROCEDURE — 1100F PTFALLS ASSESS-DOCD GE2>/YR: CPT | Mod: HCNC,CPTII,S$GLB, | Performed by: PEDIATRICS

## 2020-01-17 PROCEDURE — 3288F FALL RISK ASSESSMENT DOCD: CPT | Mod: HCNC,CPTII,S$GLB, | Performed by: PEDIATRICS

## 2020-01-17 PROCEDURE — 1159F MED LIST DOCD IN RCRD: CPT | Mod: HCNC,S$GLB,, | Performed by: PEDIATRICS

## 2020-01-17 PROCEDURE — 1125F PR PAIN SEVERITY QUANTIFIED, PAIN PRESENT: ICD-10-PCS | Mod: HCNC,S$GLB,, | Performed by: PEDIATRICS

## 2020-01-17 PROCEDURE — 1125F AMNT PAIN NOTED PAIN PRSNT: CPT | Mod: HCNC,S$GLB,, | Performed by: PEDIATRICS

## 2020-01-17 PROCEDURE — 1100F PR PT FALLS ASSESS DOC 2+ FALLS/FALL W/INJURY/YR: ICD-10-PCS | Mod: HCNC,CPTII,S$GLB, | Performed by: PEDIATRICS

## 2020-01-17 PROCEDURE — 99499 UNLISTED E&M SERVICE: CPT | Mod: HCNC,S$GLB,, | Performed by: PEDIATRICS

## 2020-01-17 PROCEDURE — 99499 RISK ADDL DX/OHS AUDIT: ICD-10-PCS | Mod: HCNC,S$GLB,, | Performed by: PEDIATRICS

## 2020-01-17 PROCEDURE — 99999 PR PBB SHADOW E&M-EST. PATIENT-LVL IV: ICD-10-PCS | Mod: PBBFAC,HCNC,, | Performed by: PEDIATRICS

## 2020-01-17 PROCEDURE — 99999 PR PBB SHADOW E&M-EST. PATIENT-LVL IV: CPT | Mod: PBBFAC,HCNC,, | Performed by: PEDIATRICS

## 2020-01-17 NOTE — PROGRESS NOTES
Subjective:       Patient ID: Chao Hawk Jr. is a 87 y.o. male.     Chief Complaint: Follow-up     HTN: B/P normal, no HTNive symptoms  LIPIDS:following D&E, tolerating and compliant with med(s).    BPH on flomax 0.4 mg nocturia x 3  COPD:Sees pulmonary, compliant with meds  Low T:On depo testosterone.  Back:s/p surgery, uses canes only for balance and support for uneven surface, has residual feet numbness. Worse in AM until he gets around. Uses only CBD oil. Not any other OTC or tramado. Fall risk is now minimal, uses cane, no falls  Pulmonary artery HTN: no unusual CP/Orthopnea/hemoptysis. Has progressive SOB, using nocturnal O2, feels more short winded in day  AAA: medical management. CT 2018: distal abdominal aortic aneurysm measuring 3.6 cm AP dimension 3.3 cm transverse dimension.  There is also fusiform aneurysmal dilatation of the mid infrarenal abdominal aorta measuring approximately 3.2 cm in diameter.  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      LABS REVIEWED AND DISCUSSED WITH PATIENT     Review of Systems   Constitutional: Negative for fever and unexpected weight change.   HENT: Negative for congestion and rhinorrhea.    Eyes: Negative for discharge and redness.   Respiratory: Positive for shortness of breath. Negative for cough and wheezing.    Cardiovascular: Positive for chest pain (minimal). Negative for palpitations. Does have leg swelling, thinks lasix works better than HCTZ.   Gastrointestinal: Negative for abdominal pain, constipation, diarrhea and vomiting.   Endocrine: Negative for cold intolerance, heat intolerance, polydipsia, polyphagia and polyuria.   Genitourinary: Negative for decreased urine volume and difficulty urinating.   Musculoskeletal: Positive for arthralgias, back pain and gait problem. Negative for joint swelling.   Skin: Negative for rash and wound.   Neurological: Negative for syncope and headaches.   Psychiatric/Behavioral: Negative for behavioral  problems and sleep disturbance.      Objective:   Physical Exam   Constitutional: He is oriented to person, place, and time. He appears well-developed and well-nourished. No distress.   Neck: No JVD present. No thyromegaly present.   Cardiovascular: Normal rate, regular rhythm and normal heart sounds.   No murmur heard.  Pulmonary/Chest: Effort normal and breath sounds normal. No respiratory distress. He has no wheezes. He has no rales.   Abdominal: Soft. He exhibits no distension and no mass. There is no tenderness. There is no guarding.   Musculoskeletal: He exhibits trace edema.   Hunched, no active swelling. Much less use of hands to assist climbing/ambulation.   Lymphadenopathy:     He has no cervical adenopathy.   Neurological: He is alert and oriented to person, place, and time. No cranial nerve deficit. Coordination normal.   Skin: Capillary refill takes less than 2 seconds. No rash noted.   Psychiatric: He has a normal mood and affect. His behavior is normal. Judgment and thought content normal.      Assessment:      1. Chronic obstructive pulmonary disease, unspecified COPD type    2. Essential hypertension    3. Pure hypercholesterolemia    4. Benign prostatic hyperplasia with weak urinary stream    5. Low testosterone    6. PAH (pulmonary artery hypertension)    7. Abdominal aortic aneurysm without rupture    8. DDD (degenerative disc disease), lumbar    9. Lumbar arthropathy                                     Plan:   Overall he is relatively. He has not needed lasix. See pulmonary due to his dyspnea(is copd, hypoxemia or pulmonary or other cause?). Maintain other meds and subspecialty care, D&E as tolerated. F/U 6 months with labs.

## 2020-01-22 RX ORDER — TAMSULOSIN HYDROCHLORIDE 0.4 MG/1
CAPSULE ORAL
Qty: 180 CAPSULE | Refills: 0 | Status: SHIPPED | OUTPATIENT
Start: 2020-01-22 | End: 2020-03-30

## 2020-02-04 ENCOUNTER — TELEPHONE (OUTPATIENT)
Dept: RADIOLOGY | Facility: HOSPITAL | Age: 85
End: 2020-02-04

## 2020-02-05 ENCOUNTER — CLINICAL SUPPORT (OUTPATIENT)
Dept: PULMONOLOGY | Facility: CLINIC | Age: 85
End: 2020-02-05
Payer: MEDICARE

## 2020-02-05 ENCOUNTER — HOSPITAL ENCOUNTER (OUTPATIENT)
Dept: RADIOLOGY | Facility: HOSPITAL | Age: 85
Discharge: HOME | End: 2020-02-05
Attending: INTERNAL MEDICINE
Payer: MEDICARE

## 2020-02-05 ENCOUNTER — HOSPITAL ENCOUNTER (OUTPATIENT)
Dept: RADIOLOGY | Facility: HOSPITAL | Age: 85
Discharge: HOME | End: 2020-02-05
Attending: PEDIATRICS
Payer: MEDICARE

## 2020-02-05 ENCOUNTER — OFFICE VISIT (OUTPATIENT)
Dept: PULMONOLOGY | Facility: CLINIC | Age: 85
End: 2020-02-05
Payer: MEDICARE

## 2020-02-05 VITALS
WEIGHT: 192.44 LBS | HEIGHT: 72 IN | RESPIRATION RATE: 17 BRPM | BODY MASS INDEX: 26.07 KG/M2 | DIASTOLIC BLOOD PRESSURE: 61 MMHG | HEIGHT: 72 IN | WEIGHT: 192.44 LBS | OXYGEN SATURATION: 93 % | SYSTOLIC BLOOD PRESSURE: 116 MMHG | BODY MASS INDEX: 26.07 KG/M2 | HEART RATE: 90 BPM

## 2020-02-05 DIAGNOSIS — I71.40 ABDOMINAL AORTIC ANEURYSM WITHOUT RUPTURE: ICD-10-CM

## 2020-02-05 DIAGNOSIS — I27.21 PAH (PULMONARY ARTERY HYPERTENSION): ICD-10-CM

## 2020-02-05 DIAGNOSIS — J44.9 CHRONIC OBSTRUCTIVE PULMONARY DISEASE, UNSPECIFIED COPD TYPE: ICD-10-CM

## 2020-02-05 DIAGNOSIS — J43.1 PANLOBULAR EMPHYSEMA: ICD-10-CM

## 2020-02-05 DIAGNOSIS — R09.02 EXERCISE HYPOXEMIA: ICD-10-CM

## 2020-02-05 DIAGNOSIS — R09.02 EXERCISE HYPOXEMIA: Primary | ICD-10-CM

## 2020-02-05 DIAGNOSIS — J84.10 CALCIFIED GRANULOMA OF LUNG: ICD-10-CM

## 2020-02-05 PROCEDURE — 71048 X-RAY EXAM CHEST 4+ VIEWS: CPT | Mod: 26,HCNC,, | Performed by: RADIOLOGY

## 2020-02-05 PROCEDURE — 99999 PR PBB SHADOW E&M-EST. PATIENT-LVL III: ICD-10-PCS | Mod: PBBFAC,HCNC,, | Performed by: INTERNAL MEDICINE

## 2020-02-05 PROCEDURE — 99214 PR OFFICE/OUTPT VISIT, EST, LEVL IV, 30-39 MIN: ICD-10-PCS | Mod: 25,HCNC,S$GLB, | Performed by: INTERNAL MEDICINE

## 2020-02-05 PROCEDURE — 76775 US ABDOMINAL AORTA: ICD-10-PCS | Mod: 26,HCNC,, | Performed by: RADIOLOGY

## 2020-02-05 PROCEDURE — 1159F PR MEDICATION LIST DOCUMENTED IN MEDICAL RECORD: ICD-10-PCS | Mod: HCNC,S$GLB,, | Performed by: INTERNAL MEDICINE

## 2020-02-05 PROCEDURE — 1101F PR PT FALLS ASSESS DOC 0-1 FALLS W/OUT INJ PAST YR: ICD-10-PCS | Mod: HCNC,CPTII,S$GLB, | Performed by: INTERNAL MEDICINE

## 2020-02-05 PROCEDURE — 1101F PT FALLS ASSESS-DOCD LE1/YR: CPT | Mod: HCNC,CPTII,S$GLB, | Performed by: INTERNAL MEDICINE

## 2020-02-05 PROCEDURE — 99999 PR PBB SHADOW E&M-EST. PATIENT-LVL I: CPT | Mod: PBBFAC,HCNC,,

## 2020-02-05 PROCEDURE — 94618 PULMONARY STRESS TESTING: CPT | Mod: HCNC,S$GLB,, | Performed by: INTERNAL MEDICINE

## 2020-02-05 PROCEDURE — 76775 US EXAM ABDO BACK WALL LIM: CPT | Mod: TC,HCNC

## 2020-02-05 PROCEDURE — 71048 XR CHEST 4 OR MORE VIEW: ICD-10-PCS | Mod: 26,HCNC,, | Performed by: RADIOLOGY

## 2020-02-05 PROCEDURE — 71048 X-RAY EXAM CHEST 4+ VIEWS: CPT | Mod: TC,HCNC

## 2020-02-05 PROCEDURE — 99214 OFFICE O/P EST MOD 30 MIN: CPT | Mod: 25,HCNC,S$GLB, | Performed by: INTERNAL MEDICINE

## 2020-02-05 PROCEDURE — 76775 US EXAM ABDO BACK WALL LIM: CPT | Mod: 26,HCNC,, | Performed by: RADIOLOGY

## 2020-02-05 PROCEDURE — 1159F MED LIST DOCD IN RCRD: CPT | Mod: HCNC,S$GLB,, | Performed by: INTERNAL MEDICINE

## 2020-02-05 PROCEDURE — 99999 PR PBB SHADOW E&M-EST. PATIENT-LVL III: CPT | Mod: PBBFAC,HCNC,, | Performed by: INTERNAL MEDICINE

## 2020-02-05 PROCEDURE — 99999 PR PBB SHADOW E&M-EST. PATIENT-LVL I: ICD-10-PCS | Mod: PBBFAC,HCNC,,

## 2020-02-05 PROCEDURE — 94618 PULMONARY STRESS TESTING: ICD-10-PCS | Mod: HCNC,S$GLB,, | Performed by: INTERNAL MEDICINE

## 2020-02-05 RX ORDER — ALBUTEROL SULFATE 90 UG/1
2 AEROSOL, METERED RESPIRATORY (INHALATION) EVERY 4 HOURS PRN
Qty: 18 G | Refills: 11 | Status: SHIPPED | OUTPATIENT
Start: 2020-02-05 | End: 2020-09-23

## 2020-02-05 NOTE — LETTER
February 5, 2020      MONSTER Ureña Jr., MD  [address]  [place] [address]           [facility]  [address]  [place] [address]  Phone: [phone]  Fax: [phone]          Patient: Chao Hawk Jr.   MR Number: [id number]   YOB: 1932   Date of Visit: 2/5/2020       Dear Dr. MONSTER Ureña Jr.:    Thank you for referring Chao Hawk to me for evaluation. Attached you will find relevant portions of my assessment and plan of care.    If you have questions, please do not hesitate to call me. I look forward to following Chao Hawk along with you.    Sincerely,    Obed Sotelo MD    Enclosure  CC:  No Recipients    If you would like to receive this communication electronically, please contact [email or web address] or [phone] to request more information on Team My Mobile Link access.    For providers and/or their staff who would like to refer a patient to [facility], please contact us through our one-stop-shop provider referral line, [facility], at [phone].    If you feel you have received this communication in error or would no longer like to receive these types of communications, please e-mail [email or web address]

## 2020-02-05 NOTE — PROCEDURES
[facility] - Pulmonary Function [facility]  Six Minute Walk     SUMMARY     Ordering Provider: Dr. Sotelo   Interpreting Provider: Dr. Sotelo  Performing nurse/tech/RT: AMBER Puga  Diagnosis: COPD(Exercise hypoxia)  Height: 6' (182.9 cm)  Weight: 87.3 kg (192 lb 7.4 oz)  BMI (Calculated): 26.1   Patient Race:             Phase Oxygen Assessment Supplemental O2 Heart   Rate Blood Pressure Katie Dyspnea Scale Rating   Resting 93 % Room Air 90 bpm 116/61 4   Exercise        Minute        1 92 % Room Air 117 bpm     2 91 % Room Air 118 bpm     3 92 % Room Air 122 bpm     4 92 % Room Air 126 bpm     5 92 % Room Air 128 bpm     6  92 % Room Air 129 bpm 112/87 5-6   Recovery        Minute        1 93 % Room Air 122 bpm     2 95 % Room Air 107 bpm     3 95 % Room Air 110 bpm     4 94 % Room Air 94 bpm 120/64 4     Six Minute Walk Summary  6MWT Status: completed without stopping  Patient Reported: Dyspnea, Leg pain     Interpretation:  Did the patient stop or pause?: No                                         Total Time Walked (Calculated): 360 seconds  Final Partial Lap Distance (feet): 150 feet  Total Distance Meters (Calculated): 167.64 meters  Predicted Distance Meters (Calculated): 485.16 meters  Percentage of Predicted (Calculated): 34.55  Peak VO2 (Calculated): 9.01  Mets: 2.57  Has The Patient Had a Previous Six Minute Walk Test?: Yes       Previous 6MWT Results  Has The Patient Had a Previous Six Minute Walk Test?: Yes  Date of Previous Test: 11/19/18  Total Time Walked: 360 seconds  Total Distance (meters): 182.88  Predicted Distance (meters): 488.16 meters  Percentage of Predicted: 37.46  Percent Change (Calculated): 0.08    Interpretation:  Distance walked during 6 minutes was severly reduced when compared to predicted values. The 6 minute walk demonstrated severe functional impairment. There was mild to moderate  oxygen desaturation to 91 % on room air noted . Clinical correlation suggested.    Obed Sotelo  MD    Did not meet criteria for oxygen prescription

## 2020-02-05 NOTE — PROGRESS NOTES
Subjective:       Patient ID: Chao Hawk Jr. is a 87 y.o. male.    Chief Complaint: He       COPD    COPD   Associated symptoms include congestion, coughing and fatigue. Pertinent negatives include no abdominal pain, chest pain, fever, nausea, rash or weakness.          COPD  He presents for evaluation and treatment of COPD. The patient is not currently have symptoms / an exacerbation. The patient has COPD for approximately 10 years. Symptoms in previous episodes have included dyspnea, cough and wheezing, and typically last 2 weeks. Previous episodes have been exacerbated by any exercise. Current treatment includes albuterol inhaler, which generally provides some relief of symptoms. Using oxygen at night   He uses 1 pillows at night. Patient currently is not on home oxygen therapy.. The patient is having no constitutional symptoms, denying fever, chills, anorexia, or weight loss. The patient has been hospitalized for this condition before. He has a history of 40 pack years. The patient is experiencing exercise intolerance (difficulty walking 1 blocks on flat ground).     Pulmonary Hypertension  He presents for evaluation and treatment of pulmonary hypertension. Symptoms include dyspnea on exertion, productive cough, shortness of breath and sputum production.  Symptoms began 2 years ago, unchanged since that time. He denies chest pain, located left chest. Associated symptoms include myalgias and productive cough. He does not have had recent travel. Weight has been stable. Appetite has been unaffected. Symptoms are exacerbated by any exercise. Symptoms are alleviated by rest. Work up thus far has included Echo.    Past Medical History:   Diagnosis Date    Arthritis     back, hand    Back pain     Dr. Cristobal- [facility]    Bilateral leg edema 2/16/2018    Chronic bronchitis     Diverticulitis of large intestine with perforation without abscess or bleeding     Diverticulosis 5/16/06    colonoscopy    Hernia     x2     Hyperlipidemia     Hypertension     Lumbar radiculopathy 2017    Multiple renal cysts 2016    Tobacco dependence     Trouble in sleeping      Past Surgical History:   Procedure Laterality Date    CATARACT EXTRACTION, BILATERAL Bilateral     EYE SURGERY Bilateral     Glaucoma- Dr. Gianni Payne    FINGER SURGERY Right 1980s    index- Dr. Encarnacion; to remove nodule    HERNIA REPAIR      x2    LUMBAR SPINE SURGERY  2017    TONSILLECTOMY, ADENOIDECTOMY  1940     Social History     Socioeconomic History    Marital status:      Spouse name: Linsey    Number of children: 4    Years of education: 12 + 4    Highest education level: Not on file   Occupational History    Occupation:  retired age 60     Comment: [employer]   Social Needs    Financial resource strain: Not on file    Food insecurity:     Worry: Not on file     Inability: Not on file    Transportation needs:     Medical: Not on file     Non-medical: Not on file   Tobacco Use    Smoking status: Former Smoker     Packs/day: 1.00     Years: 40.00     Pack years: 40.00     Start date: 1949     Last attempt to quit: 1991     Years since quittin.1    Smokeless tobacco: Never Used   Substance and Sexual Activity    Alcohol use: Yes     Alcohol/week: 15.0 standard drinks     Types: 7 Shots of liquor, 8 Standard drinks or equivalent per week     Frequency: 4 or more times a week     Drinks per session: 1 or 2     Binge frequency: Never    Drug use: No    Sexual activity: Never   Lifestyle    Physical activity:     Days per week: Not on file     Minutes per session: Not on file    Stress: Not on file   Relationships    Social connections:     Talks on phone: Not on file     Gets together: Not on file     Attends [employer] service: Not on file     Active member of club or organization: Not on file     Attends meetings of clubs or organizations: Not on file     Relationship status: Not on  file   Other Topics Concern    Not on file   Social History Narrative    No smokers or pets in household.     Review of Systems   Constitutional: Positive for fatigue. Negative for fever.   HENT: Positive for postnasal drip, rhinorrhea and congestion.    Eyes: Negative for redness and itching.   Respiratory: Positive for cough, sputum production, shortness of breath, dyspnea on extertion, use of rescue inhaler and Paroxysmal Nocturnal Dyspnea.    Cardiovascular: Negative for chest pain, palpitations and leg swelling.   Genitourinary: Negative for difficulty urinating and hematuria.   Endocrine: Negative for cold intolerance and heat intolerance.    Skin: Negative for rash.   Gastrointestinal: Negative for nausea and abdominal pain.   Neurological: Negative for dizziness, syncope, weakness and light-headedness.   Hematological: Negative for adenopathy. Does not bruise/bleed easily.   Psychiatric/Behavioral: Negative for sleep disturbance. The patient is not nervous/anxious.        Objective:      /61   Pulse 90   Resp 17   Ht 6' (1.829 m)   Wt 87.3 kg (192 lb 7.4 oz)   SpO2 (!) 93%   BMI 26.10 kg/m²   Physical Exam   Constitutional: He is oriented to person, place, and time. He appears well-developed and well-nourished.   HENT:   Head: Normocephalic and atraumatic.   Mouth/Throat: Oropharyngeal exudate present.   Eyes: Pupils are equal, round, and reactive to light. Conjunctivae are normal.   Neck: Neck supple. No JVD present. No tracheal deviation present. No thyromegaly present.   Cardiovascular: Normal rate, regular rhythm and normal heart sounds.   No murmur heard.  Pulmonary/Chest: Effort normal. He has decreased breath sounds. He has wheezes in the right lower field and the left lower field. He has no rhonchi. He has no rales.   Abdominal: Soft. Bowel sounds are normal.   Musculoskeletal: He exhibits no edema or tenderness.   Walks with cane due to neuropathy   Lymphadenopathy:     He has no  cervical adenopathy.   Neurological: He is alert and oriented to person, place, and time. He displays abnormal reflex. A sensory deficit is present.   Skin: Skin is warm and dry.   Nursing note and vitals reviewed.    Personal Diagnostic Review  Chest x-ray: hyperinflation , stable    X-Ray Chest 4 Or More View  Narrative: EXAMINATION:  XR CHEST 4 OR MORE VIEW    CLINICAL HISTORY:  asbestosis;  Chronic obstructive pulmonary disease, unspecified    TECHNIQUE:  AP, lateral and bilateral oblique views of the chest    COMPARISON:  04/17/2018    FINDINGS:  The lungs are clear and free of infiltrate.  No pleural effusion or pneumothorax.  No obvious pleural plaques are noted.  There is a stable calcified granuloma noted within the right lung base and possible additional calcified granuloma within the left lung base.  The heart is not enlarged.  There is calcification of the aortic knob.  Impression: 1.  As above    Electronically signed by: Eddie Baires DO  Date:    02/05/2020  Time:    08:53      Office Spirometry Results:  Impression: NORMAL MYOCARDIAL PERFUSION  1. The perfusion scan is free of evidence for myocardial ischemia or injury. The inferior wall is obscured by bowel.  2. There is a moderate intensity fixed defect in the inferior wall of the left ventricle, secondary to diaphragm attenuation.   3. Resting wall motion is physiologic.   4. Resting LV function is normal.   5. The ventricular volumes are normal at rest and stress.   6. The extracardiac distribution of radioactivity is normal.   7. When compared to the previous study from 04/16/2013, fixed defects as prior study.     This document has been electronically    SIGNED BY: Shar Pereyra MD On: 02/06/2019 17:26      Component 6mo ago        No flowsheet data found.  Pulmonary Studies Review 2/5/2020   SpO2 93   Ordering Provider -   Interpreting Provider -   Performing nurse/tech/RT -   Diagnosis -   Height 72.000   Weight 3079.39   BMI (Calculated)  26.1   Predicted Distance 236.8   Patient Race -   6MWT Status -   Patient Reported -   Was O2 used? -   6MW Distance walked (feet) -   Distance walked (meters) -   Did patient stop? -   Type of assistive device(s) used? -   Is extra documentation required for this patient? -   Oxygen Saturation -   Supplemental Oxygen -   Heart Rate -   Blood Pressure -   Katie Dyspnea Rating  -   Oxygen Saturation -   Supplemental Oxygen -   Heart Rate -   Blood Pressure -   Katie Dyspnea Rating  -   Recovery Time (seconds) -   Oxygen Saturation -   Supplemental Oxygen -   Heart Rate -   Blood Pressure -   Katie Dyspnea Rating  -   Is procedure ready for interpretation? -   Did the patient stop or pause? -   Total Time Walked (Calculated) -   Total Laps Walked -   Final Partial Lap Distance (feet) -   Total Distance Feet (Calculated) -   Total Distance Meters (Calculated) -   Predicted Distance Meters (Calculated) 485.17   Percentage of Predicted (Calculated) -   Peak VO2 (Calculated) -   Mets -   Has The Patient Had a Previous Six Minute Walk Test? -   Oxygen Qualification? -         Assessment:       Exercise hypoxemia  -     Stress test, pulmonary; Future; Expected date: 02/05/2020  -     E - OTHER    Panlobular emphysema  -     Stress test, pulmonary; Future; Expected date: 02/05/2020  -     E - OTHER    Chronic obstructive pulmonary disease, unspecified COPD type  -     fluticasone-umeclidin-vilanter (TRELEGY ELLIPTA) 100-62.5-25 mcg DsDv; Inhale 1 puff into the lungs once daily.  Dispense: 60 each; Refill: 11  -     albuterol (PROVENTIL/VENTOLIN HFA) 90 mcg/actuation inhaler; Inhale 2 puffs into the lungs every 4 (four) hours as needed for Wheezing or Shortness of Breath.  Dispense: 18 g; Refill: 11    PAH (pulmonary artery hypertension)    Calcified granuloma of lung          Outpatient Encounter Medications as of 2/5/2020   Medication Sig Dispense Refill    acetaminophen (TYLENOL) 500 MG tablet Take 1,000 mg by mouth 2  (two) times daily as needed for Pain.       albuterol (PROVENTIL) 2.5 mg /3 mL (0.083 %) nebulizer solution Take 3 mLs (2.5 mg total) by nebulization every 6 (six) hours while awake. 360 mL 12    albuterol (PROVENTIL/VENTOLIN HFA) 90 mcg/actuation inhaler Inhale 2 puffs into the lungs every 4 (four) hours as needed for Wheezing or Shortness of Breath. 18 g 11    ARGININE, L-ARGININE, ORAL Take 500 mg by mouth once daily.      aspirin (ECOTRIN) 81 MG EC tablet Take 81 mg by mouth every evening.       atorvastatin (LIPITOR) 40 MG tablet TAKE 1 TABLET EVERY DAY 90 tablet 3    CANNABIDIOL, CBD, EXTRACT ORAL Take 3 drops by mouth every morning.      cholecalciferol, vitamin D3, (VITAMIN D3) 2,000 unit Cap Take 1 capsule by mouth once daily.      COQ10, UBIQUINOL, ORAL Take 1 tablet by mouth once daily.      cyanocobalamin (VITAMIN B-12) 1000 MCG tablet Take 100 mcg by mouth once daily.      epinephrine (EPIPEN) 0.3 mg/0.3 mL (1:1,000) AtIn Inject 1 each into the muscle once.      GLUCOSAM-MSM-CHONDROIT-VIT D3 ORAL Take 2 tablets by mouth once daily.      hydroCHLOROthiazide (MICROZIDE) 12.5 mg capsule Take 12.5 mg by mouth once daily.      krill/om-3/dha/epa/phospho/ast (MEGARED OMEGA-3 KRILL OIL ORAL) Take 1 capsule by mouth once daily.      lisinopril 10 MG tablet TAKE 1 TABLET EVERY DAY 90 tablet 4    loperamide (IMODIUM A-D) 2 mg Tab Take 2 mg by mouth as needed.      multivit-min/folic/vit K/lycop (ONE-A-DAY MEN'S MULTIVITAMIN ORAL) Take 1 tablet by mouth once daily.      OXYGEN-AIR DELIVERY SYSTEMS MISC Inhale 2 L into the lungs as needed (Oxygen 2L via NC concentrator).      potassium 99 mg Tab Take by mouth once.      tamsulosin (FLOMAX) 0.4 mg Cap TAKE 2 CAPSULES EVERY MORNING 180 capsule 0    TESTOSTERONE TD Take 1 capsule by mouth once daily. OTC Testosterone Support Supplement      [DISCONTINUED] albuterol (PROVENTIL/VENTOLIN HFA) 90 mcg/actuation inhaler Inhale 2 puffs into the lungs  every 4 (four) hours as needed for Wheezing or Shortness of Breath. 1 Inhaler 11    [DISCONTINUED] fluticasone propionate (FLOVENT DISKUS) 250 mcg/actuation DsDv Inhale 1 puff into the lungs 2 (two) times daily. Controller 1 each 11    fluticasone-umeclidin-vilanter (TRELEGY ELLIPTA) 100-62.5-25 mcg DsDv Inhale 1 puff into the lungs once daily. 60 each 11    gabapentin (NEURONTIN) 100 MG capsule Take 1 capsule (100 mg total) by mouth 2 (two) times daily. 180 capsule 3     No facility-administered encounter medications on file as of 2/5/2020.      Plan:       Requested Prescriptions     Signed Prescriptions Disp Refills    fluticasone-umeclidin-vilanter (TRELEGY ELLIPTA) 100-62.5-25 mcg DsDv 60 each 11     Sig: Inhale 1 puff into the lungs once daily.    albuterol (PROVENTIL/VENTOLIN HFA) 90 mcg/actuation inhaler 18 g 11     Sig: Inhale 2 puffs into the lungs every 4 (four) hours as needed for Wheezing or Shortness of Breath.     Problem List Items Addressed This Visit     Calcified granuloma of lung    Overview     CT abdomen 11-14-17 in Care Everywhere: Calcified granuloma of right middle lobe lung         Current Assessment & Plan     NO further evaluation needed         COPD (chronic obstructive pulmonary disease) (Chronic)    Relevant Medications    fluticasone-umeclidin-vilanter (TRELEGY ELLIPTA) 100-62.5-25 mcg DsDv    albuterol (PROVENTIL/VENTOLIN HFA) 90 mcg/actuation inhaler    Other Relevant Orders    Stress test, pulmonary    HME - OTHER    PAH (pulmonary artery hypertension)      Other Visit Diagnoses     Exercise hypoxemia    -  Primary    Relevant Orders    Stress test, pulmonary    HME - OTHER             Follow up in about 6 months (around 8/5/2020) for 6 min walk.    MEDICAL DECISION MAKING: Moderate to high complexity.  Overall, the multiple problems listed are of moderate to high severity that may impact quality of life and activities of daily living. Side effects of medications, treatment  plan as well as options and alternatives reviewed and discussed with patient. There was counseling of patient concerning these issues.    Total time spent in face to face counseling and coordination of care - 45  minutes over 50% of time was used in discussion of prognosis, risks, benefits of treatment, instructions and compliance with regimen . Discussion with other physicians or health care providers (DME, NP, pharmacy, respiratory therapy) occurred.

## 2020-03-30 RX ORDER — TAMSULOSIN HYDROCHLORIDE 0.4 MG/1
CAPSULE ORAL
Qty: 180 CAPSULE | Refills: 0 | Status: SHIPPED | OUTPATIENT
Start: 2020-03-30 | End: 2020-05-25

## 2020-04-27 ENCOUNTER — PATIENT MESSAGE (OUTPATIENT)
Dept: INTERNAL MEDICINE | Facility: CLINIC | Age: 85
End: 2020-04-27

## 2020-04-27 ENCOUNTER — OFFICE VISIT (OUTPATIENT)
Dept: INTERNAL MEDICINE | Facility: CLINIC | Age: 85
End: 2020-04-27
Payer: MEDICARE

## 2020-04-27 DIAGNOSIS — I10 ESSENTIAL HYPERTENSION: Chronic | ICD-10-CM

## 2020-04-27 DIAGNOSIS — M25.473 ANKLE SWELLING, UNSPECIFIED LATERALITY: Primary | ICD-10-CM

## 2020-04-27 PROCEDURE — 99442 PR PHYSICIAN TELEPHONE EVALUATION 11-20 MIN: ICD-10-PCS | Mod: HCNC,95,, | Performed by: NURSE PRACTITIONER

## 2020-04-27 PROCEDURE — 99442 PR PHYSICIAN TELEPHONE EVALUATION 11-20 MIN: CPT | Mod: HCNC,95,, | Performed by: NURSE PRACTITIONER

## 2020-04-27 RX ORDER — FUROSEMIDE 20 MG/1
20 TABLET ORAL 2 TIMES DAILY
Qty: 60 TABLET | Refills: 1 | Status: SHIPPED | OUTPATIENT
Start: 2020-04-27 | End: 2020-05-01

## 2020-04-27 NOTE — PROGRESS NOTES
Established Patient - Audio Only Telehealth Visit     The patient location is: at home  The chief complaint leading to consultation is: ankle swelling  Visit type: Virtual visit with audio only (telephone)     The reason for the audio only service rather than synchronous audio and video virtual visit was related to technical difficulties or patient preference/necessity.     Each patient to whom I provide medical services by telemedicine is:  (1) informed of the relationship between the physician and patient and the respective role of any other health care provider with respect to management of the patient; and (2) notified that they may decline to receive medical services by telemedicine and may withdraw from such care at any time. Patient verbally consented to receive this service via voice-only telephone call.       HPI: Pt reports noticeable swelling in both ankles every evening. He admits to eating more salt recently and using compression stockings intermittently. He is requesting to stop hctz and restart lasix. He denies cp, sob, cough, leg pain, feet pain. He describes the swelling and mild to moderate. No trouble ambulating. He reports that his BP is 154/81 at this time.      Assessment and plan: reduce salt intake, elevate legs as much as possible when sitting idle, increase ambulation as tolerated, support stockings. Will stop hctz and start lasix at a low dose. Monitor and record BP daily. Report dizziness, worsening swelling, blurred vision, headache, leg pain, cp, sob or any other acute, new symptom onset. Follow up audio visit this Friday.                         This service was not originating from a related E/M service provided within the previous 7 days nor will  to an E/M service or procedure within the next 24 hours or my soonest available appointment.  Prevailing standard of care was able to be met in this audio-only visit.

## 2020-05-01 ENCOUNTER — TELEPHONE (OUTPATIENT)
Dept: INTERNAL MEDICINE | Facility: CLINIC | Age: 85
End: 2020-05-01

## 2020-05-01 ENCOUNTER — OFFICE VISIT (OUTPATIENT)
Dept: INTERNAL MEDICINE | Facility: CLINIC | Age: 85
End: 2020-05-01
Payer: MEDICARE

## 2020-05-01 DIAGNOSIS — M25.473 ANKLE SWELLING, UNSPECIFIED LATERALITY: ICD-10-CM

## 2020-05-01 PROCEDURE — 99441 PR PHYSICIAN TELEPHONE EVALUATION 5-10 MIN: ICD-10-PCS | Mod: HCNC,95,, | Performed by: NURSE PRACTITIONER

## 2020-05-01 PROCEDURE — 99441 PR PHYSICIAN TELEPHONE EVALUATION 5-10 MIN: CPT | Mod: HCNC,95,, | Performed by: NURSE PRACTITIONER

## 2020-05-01 RX ORDER — FUROSEMIDE 20 MG/1
20 TABLET ORAL DAILY
Qty: 60 TABLET | Refills: 0
Start: 2020-05-01 | End: 2020-07-25 | Stop reason: SDUPTHER

## 2020-05-01 NOTE — PROGRESS NOTES
Established Patient - Audio Only Telehealth Visit     The patient location is: home  The chief complaint leading to consultation is: ankle swelling follow up  Visit type: Virtual visit with audio only (telephone)     The reason for the audio only service rather than synchronous audio and video virtual visit was related to technical difficulties or patient preference/necessity.     Each patient to whom I provide medical services by telemedicine is:  (1) informed of the relationship between the physician and patient and the respective role of any other health care provider with respect to management of the patient; and (2) notified that they may decline to receive medical services by telemedicine and may withdraw from such care at any time. Patient verbally consented to receive this service via voice-only telephone call.       HPI: spoke with patient earlier this week regarding ankle swelling. He was given lasix and asked to d/c hctz. He was also advised to decrease NA intake, elevate legs, use support stockings. He reports swelling has improved. BP is stable at 110s-130s/70s. No acute symptoms.     Assessment and plan:  Continue lasix at 20 mg daily. Notify office of new issues.                        This service was not originating from a related E/M service provided within the previous 7 days nor will  to an E/M service or procedure within the next 24 hours or my soonest available appointment.  Prevailing standard of care was able to be met in this audio-only visit.

## 2020-05-18 DIAGNOSIS — M47.816 LUMBAR ARTHROPATHY: ICD-10-CM

## 2020-05-18 RX ORDER — GABAPENTIN 100 MG/1
100 CAPSULE ORAL 2 TIMES DAILY
Qty: 180 CAPSULE | Refills: 3 | Status: SHIPPED | OUTPATIENT
Start: 2020-05-18 | End: 2021-07-22 | Stop reason: SDUPTHER

## 2020-05-25 RX ORDER — TAMSULOSIN HYDROCHLORIDE 0.4 MG/1
CAPSULE ORAL
Qty: 180 CAPSULE | Refills: 0 | Status: SHIPPED | OUTPATIENT
Start: 2020-05-25 | End: 2020-08-03

## 2020-07-15 ENCOUNTER — LAB VISIT (OUTPATIENT)
Dept: LAB | Facility: HOSPITAL | Age: 85
End: 2020-07-15
Attending: PEDIATRICS
Payer: MEDICARE

## 2020-07-15 DIAGNOSIS — E78.00 PURE HYPERCHOLESTEROLEMIA: Chronic | ICD-10-CM

## 2020-07-15 DIAGNOSIS — I10 ESSENTIAL HYPERTENSION: Chronic | ICD-10-CM

## 2020-07-15 LAB
ALT SERPL W/O P-5'-P-CCNC: 30 U/L (ref 10–44)
ANION GAP SERPL CALC-SCNC: 8 MMOL/L (ref 8–16)
AST SERPL-CCNC: 27 U/L (ref 10–40)
BASOPHILS # BLD AUTO: 0.04 K/UL (ref 0–0.2)
BASOPHILS NFR BLD: 0.5 % (ref 0–1.9)
BUN SERPL-MCNC: 36 MG/DL (ref 8–23)
CALCIUM SERPL-MCNC: 9.6 MG/DL (ref 8.7–10.5)
CHLORIDE SERPL-SCNC: 107 MMOL/L (ref 95–110)
CHOLEST SERPL-MCNC: 134 MG/DL (ref 120–199)
CHOLEST/HDLC SERPL: 2.7 {RATIO} (ref 2–5)
CO2 SERPL-SCNC: 27 MMOL/L (ref 23–29)
CREAT SERPL-MCNC: 1.2 MG/DL (ref 0.5–1.4)
DIFFERENTIAL METHOD: ABNORMAL
EOSINOPHIL # BLD AUTO: 0.1 K/UL (ref 0–0.5)
EOSINOPHIL NFR BLD: 1.5 % (ref 0–8)
ERYTHROCYTE [DISTWIDTH] IN BLOOD BY AUTOMATED COUNT: 13.6 % (ref 11.5–14.5)
EST. GFR  (AFRICAN AMERICAN): >60 ML/MIN/1.73 M^2
EST. GFR  (NON AFRICAN AMERICAN): 53.7 ML/MIN/1.73 M^2
GLUCOSE SERPL-MCNC: 102 MG/DL (ref 70–110)
HCT VFR BLD AUTO: 43.5 % (ref 40–54)
HDLC SERPL-MCNC: 50 MG/DL (ref 40–75)
HDLC SERPL: 37.3 % (ref 20–50)
HGB BLD-MCNC: 13.7 G/DL (ref 14–18)
IMM GRANULOCYTES # BLD AUTO: 0.04 K/UL (ref 0–0.04)
IMM GRANULOCYTES NFR BLD AUTO: 0.5 % (ref 0–0.5)
LDLC SERPL CALC-MCNC: 68.2 MG/DL (ref 63–159)
LYMPHOCYTES # BLD AUTO: 0.9 K/UL (ref 1–4.8)
LYMPHOCYTES NFR BLD: 10.9 % (ref 18–48)
MCH RBC QN AUTO: 30.2 PG (ref 27–31)
MCHC RBC AUTO-ENTMCNC: 31.5 G/DL (ref 32–36)
MCV RBC AUTO: 96 FL (ref 82–98)
MONOCYTES # BLD AUTO: 0.8 K/UL (ref 0.3–1)
MONOCYTES NFR BLD: 9.2 % (ref 4–15)
NEUTROPHILS # BLD AUTO: 6.7 K/UL (ref 1.8–7.7)
NEUTROPHILS NFR BLD: 77.4 % (ref 38–73)
NONHDLC SERPL-MCNC: 84 MG/DL
NRBC BLD-RTO: 0 /100 WBC
PLATELET # BLD AUTO: 188 K/UL (ref 150–350)
PMV BLD AUTO: 11.6 FL (ref 9.2–12.9)
POTASSIUM SERPL-SCNC: 4.3 MMOL/L (ref 3.5–5.1)
RBC # BLD AUTO: 4.53 M/UL (ref 4.6–6.2)
SODIUM SERPL-SCNC: 142 MMOL/L (ref 136–145)
TRIGL SERPL-MCNC: 79 MG/DL (ref 30–150)
WBC # BLD AUTO: 8.59 K/UL (ref 3.9–12.7)

## 2020-07-15 PROCEDURE — 85025 COMPLETE CBC W/AUTO DIFF WBC: CPT | Mod: HCNC

## 2020-07-15 PROCEDURE — 84450 TRANSFERASE (AST) (SGOT): CPT | Mod: HCNC

## 2020-07-15 PROCEDURE — 84460 ALANINE AMINO (ALT) (SGPT): CPT | Mod: HCNC

## 2020-07-15 PROCEDURE — 36415 COLL VENOUS BLD VENIPUNCTURE: CPT | Mod: HCNC

## 2020-07-15 PROCEDURE — 80061 LIPID PANEL: CPT | Mod: HCNC

## 2020-07-15 PROCEDURE — 80048 BASIC METABOLIC PNL TOTAL CA: CPT | Mod: HCNC

## 2020-07-21 ENCOUNTER — OFFICE VISIT (OUTPATIENT)
Dept: INTERNAL MEDICINE | Facility: CLINIC | Age: 85
End: 2020-07-21
Payer: MEDICARE

## 2020-07-21 ENCOUNTER — TELEPHONE (OUTPATIENT)
Dept: ORTHOPEDICS | Facility: CLINIC | Age: 85
End: 2020-07-21

## 2020-07-21 VITALS
OXYGEN SATURATION: 95 % | HEIGHT: 72 IN | BODY MASS INDEX: 26.1 KG/M2 | RESPIRATION RATE: 18 BRPM | TEMPERATURE: 98 F | DIASTOLIC BLOOD PRESSURE: 68 MMHG | WEIGHT: 192.69 LBS | HEART RATE: 84 BPM | SYSTOLIC BLOOD PRESSURE: 110 MMHG

## 2020-07-21 DIAGNOSIS — R60.0 BILATERAL LEG EDEMA: ICD-10-CM

## 2020-07-21 DIAGNOSIS — J84.10 CALCIFIED GRANULOMA OF LUNG: ICD-10-CM

## 2020-07-21 DIAGNOSIS — I27.21 PAH (PULMONARY ARTERY HYPERTENSION): ICD-10-CM

## 2020-07-21 DIAGNOSIS — I71.40 ABDOMINAL AORTIC ANEURYSM WITHOUT RUPTURE: ICD-10-CM

## 2020-07-21 DIAGNOSIS — Z96.611 PRESENCE OF RIGHT ARTIFICIAL SHOULDER JOINT: ICD-10-CM

## 2020-07-21 DIAGNOSIS — I70.0 ATHEROSCLEROSIS OF AORTA: ICD-10-CM

## 2020-07-21 DIAGNOSIS — N40.1 BENIGN PROSTATIC HYPERPLASIA WITH WEAK URINARY STREAM: Chronic | ICD-10-CM

## 2020-07-21 DIAGNOSIS — J44.9 CHRONIC OBSTRUCTIVE PULMONARY DISEASE, UNSPECIFIED COPD TYPE: Chronic | ICD-10-CM

## 2020-07-21 DIAGNOSIS — R79.89 LOW TESTOSTERONE: ICD-10-CM

## 2020-07-21 DIAGNOSIS — E78.00 PURE HYPERCHOLESTEROLEMIA: Chronic | ICD-10-CM

## 2020-07-21 DIAGNOSIS — M51.36 DDD (DEGENERATIVE DISC DISEASE), LUMBAR: ICD-10-CM

## 2020-07-21 DIAGNOSIS — M75.41 CORACOID IMPINGEMENT OF RIGHT SHOULDER: ICD-10-CM

## 2020-07-21 DIAGNOSIS — R39.12 BENIGN PROSTATIC HYPERPLASIA WITH WEAK URINARY STREAM: Chronic | ICD-10-CM

## 2020-07-21 DIAGNOSIS — I10 ESSENTIAL HYPERTENSION: Primary | Chronic | ICD-10-CM

## 2020-07-21 PROCEDURE — 99214 OFFICE O/P EST MOD 30 MIN: CPT | Mod: HCNC,S$GLB,, | Performed by: PEDIATRICS

## 2020-07-21 PROCEDURE — 99999 PR PBB SHADOW E&M-EST. PATIENT-LVL V: ICD-10-PCS | Mod: PBBFAC,HCNC,, | Performed by: PEDIATRICS

## 2020-07-21 PROCEDURE — 1125F PR PAIN SEVERITY QUANTIFIED, PAIN PRESENT: ICD-10-PCS | Mod: HCNC,S$GLB,, | Performed by: PEDIATRICS

## 2020-07-21 PROCEDURE — 1159F MED LIST DOCD IN RCRD: CPT | Mod: HCNC,S$GLB,, | Performed by: PEDIATRICS

## 2020-07-21 PROCEDURE — 99999 PR PBB SHADOW E&M-EST. PATIENT-LVL V: CPT | Mod: PBBFAC,HCNC,, | Performed by: PEDIATRICS

## 2020-07-21 PROCEDURE — 1159F PR MEDICATION LIST DOCUMENTED IN MEDICAL RECORD: ICD-10-PCS | Mod: HCNC,S$GLB,, | Performed by: PEDIATRICS

## 2020-07-21 PROCEDURE — 1101F PR PT FALLS ASSESS DOC 0-1 FALLS W/OUT INJ PAST YR: ICD-10-PCS | Mod: HCNC,CPTII,S$GLB, | Performed by: PEDIATRICS

## 2020-07-21 PROCEDURE — 1101F PT FALLS ASSESS-DOCD LE1/YR: CPT | Mod: HCNC,CPTII,S$GLB, | Performed by: PEDIATRICS

## 2020-07-21 PROCEDURE — 99214 PR OFFICE/OUTPT VISIT, EST, LEVL IV, 30-39 MIN: ICD-10-PCS | Mod: HCNC,S$GLB,, | Performed by: PEDIATRICS

## 2020-07-21 PROCEDURE — 1125F AMNT PAIN NOTED PAIN PRSNT: CPT | Mod: HCNC,S$GLB,, | Performed by: PEDIATRICS

## 2020-07-21 NOTE — PROGRESS NOTES
Subjective:       Patient ID: Chao Hawk Jr. is a 88 y.o. male.    Chief Complaint: Follow-up, Results, and Shoulder Pain (Rt)    Subjective:      Patient ID: Chao Hawk Jr. is a 87 y.o. male.     Chief Complaint: Follow-up     HTN: B/P normal, no HTNive symptoms  LIPIDS:following D&E, tolerating and compliant with med(s).    BPH on flomax 0.4 mg nocturia x 3  COPD:Sees pulmonary, compliant with meds  Low T:On depo testosterone.  Back:s/p surgery, uses canes only for balance and support for uneven surface, has residual feet numbness. Worse in AM until he gets around. Uses only CBD oil. Not any other OTC or tramadol. Fall risk is now minimal, uses cane, no falls  Pulmonary artery HTN/COPD: Saw pulmonary. no unusual CP/Orthopnea/hemoptysis. Has progressive SOB, using nocturnal O2, feels more short winded in day  AAA: medical management. CT 2018: distal abdominal aortic aneurysm measuring 3.6 cm AP dimension 3.3 cm transverse dimension.  There is also fusiform aneurysmal dilatation of the mid infrarenal abdominal aorta measuring approximately 3.2 cm in diameter.  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     Diuretics helping but not resolving pedal edema, no new CP, SOB  Right shoulder still problematic, Dr Valladares's note reviewed with patient.     LABS REVIEWED AND DISCUSSED WITH PATIENT           Review of Systems   Constitutional: Negative for fever and unexpected weight change.   HENT: Negative for congestion and rhinorrhea.    Eyes: Negative for discharge and redness.   Respiratory: Negative for cough, shortness of breath and wheezing. Stridor: stable.    Cardiovascular: Positive for leg swelling. Negative for chest pain and palpitations.   Gastrointestinal: Negative for abdominal pain, constipation, diarrhea and vomiting.   Endocrine: Negative for polydipsia, polyphagia and polyuria.   Genitourinary: Negative for decreased urine volume and difficulty urinating.   Musculoskeletal: Positive for  arthralgias, back pain and gait problem. Negative for joint swelling.        Stable except right shoulder   Skin: Negative for rash and wound.   Neurological: Negative for syncope and headaches.   Psychiatric/Behavioral: Negative for behavioral problems and sleep disturbance.       Objective:      Physical Exam  Constitutional:       General: He is not in acute distress.     Appearance: He is well-developed.   Neck:      Thyroid: No thyromegaly.      Vascular: No JVD.   Cardiovascular:      Rate and Rhythm: Normal rate and regular rhythm.      Heart sounds: Normal heart sounds. No murmur.   Pulmonary:      Effort: Pulmonary effort is normal. No respiratory distress.      Breath sounds: Normal breath sounds. No wheezing or rales.   Abdominal:      General: There is no distension.      Palpations: Abdomen is soft. There is no mass.      Tenderness: There is no abdominal tenderness. There is no guarding.   Musculoskeletal:      Right lower leg: Edema (1+ to mid calf with varicosities) present.      Left lower leg: Edema (trace with varicosities) present.      Comments: Right shoulder impinged   Lymphadenopathy:      Cervical: No cervical adenopathy.   Skin:     Capillary Refill: Capillary refill takes less than 2 seconds.      Findings: No rash.   Neurological:      Mental Status: He is alert and oriented to person, place, and time.      Cranial Nerves: No cranial nerve deficit.      Coordination: Coordination normal.   Psychiatric:         Behavior: Behavior normal.         Thought Content: Thought content normal.         Judgment: Judgment normal.         Assessment:       1. Essential hypertension    2. Pure hypercholesterolemia    3. Benign prostatic hyperplasia with weak urinary stream    4. Low testosterone    5. Atherosclerosis of aorta    6. Abdominal aortic aneurysm without rupture    7. DDD (degenerative disc disease), lumbar    8. Chronic obstructive pulmonary disease, unspecified COPD type    9. PAH  (pulmonary artery hypertension)    10. Calcified granuloma of lung    11. Bilateral leg edema    12. Presence of right artificial shoulder joint    13. Coracoid impingement of right shoulder        Plan:       Essential hypertension  -     Basic metabolic panel; Future; Expected date: 07/21/2020  -     CBC auto differential; Future; Expected date: 07/21/2020    Pure hypercholesterolemia  -     Lipid Panel; Future; Expected date: 07/21/2020  -     ALT (SGPT); Future; Expected date: 07/21/2020  -     AST (SGOT); Future; Expected date: 07/21/2020    Benign prostatic hyperplasia with weak urinary stream    Low testosterone    Atherosclerosis of aorta    Abdominal aortic aneurysm without rupture    DDD (degenerative disc disease), lumbar    Chronic obstructive pulmonary disease, unspecified COPD type    PAH (pulmonary artery hypertension)    Calcified granuloma of lung    Bilateral leg edema  -     Echo Color Flow Doppler? Yes; Future  -     US Lower Extremity Veins Left; Future; Expected date: 07/21/2020  -     US Lower Extremity Veins Right; Future; Expected date: 07/21/2020    Presence of right artificial shoulder joint  -     MRI Shoulder Without Contrast Right; Future; Expected date: 07/21/2020    Coracoid impingement of right shoulder  -     Ambulatory referral/consult to Orthopedics; Future; Expected date: 07/28/2020    From his chronic medical problems he is stable, no change in baseline meds. Echocardiogram to look for CHF as cause for edema and us to look for DVT, increase lasix to 40 mg if needed. Await MRI and ortho input on shoulder. F/U 6 months with labs.

## 2020-07-22 ENCOUNTER — TELEPHONE (OUTPATIENT)
Dept: ORTHOPEDICS | Facility: CLINIC | Age: 85
End: 2020-07-22

## 2020-07-23 ENCOUNTER — TELEPHONE (OUTPATIENT)
Dept: ORTHOPEDICS | Facility: CLINIC | Age: 85
End: 2020-07-23

## 2020-07-23 ENCOUNTER — TELEPHONE (OUTPATIENT)
Dept: RADIOLOGY | Facility: HOSPITAL | Age: 85
End: 2020-07-23

## 2020-07-24 ENCOUNTER — HOSPITAL ENCOUNTER (OUTPATIENT)
Dept: RADIOLOGY | Facility: HOSPITAL | Age: 85
Discharge: HOME | End: 2020-07-24
Attending: PEDIATRICS
Payer: MEDICARE

## 2020-07-24 DIAGNOSIS — Z96.611 PRESENCE OF RIGHT ARTIFICIAL SHOULDER JOINT: ICD-10-CM

## 2020-07-24 PROCEDURE — 73221 MRI JOINT UPR EXTREM W/O DYE: CPT | Mod: TC,RT

## 2020-07-24 PROCEDURE — 73221 MRI JOINT UPR EXTREM W/O DYE: CPT | Mod: 26,RT,, | Performed by: RADIOLOGY

## 2020-07-24 PROCEDURE — 73221 MRI SHOULDER WITHOUT CONTRAST RIGHT: ICD-10-PCS | Mod: 26,RT,, | Performed by: RADIOLOGY

## 2020-07-25 DIAGNOSIS — M25.473 ANKLE SWELLING, UNSPECIFIED LATERALITY: ICD-10-CM

## 2020-07-28 ENCOUNTER — TELEPHONE (OUTPATIENT)
Dept: RADIOLOGY | Facility: HOSPITAL | Age: 85
End: 2020-07-28

## 2020-07-28 RX ORDER — FUROSEMIDE 20 MG/1
20 TABLET ORAL DAILY
Qty: 60 TABLET | Refills: 2
Start: 2020-07-28 | End: 2020-08-13 | Stop reason: SDUPTHER

## 2020-07-29 ENCOUNTER — HOSPITAL ENCOUNTER (OUTPATIENT)
Dept: RADIOLOGY | Facility: HOSPITAL | Age: 85
Discharge: HOME | End: 2020-07-29
Attending: PEDIATRICS
Payer: MEDICARE

## 2020-07-29 DIAGNOSIS — R60.0 BILATERAL LEG EDEMA: ICD-10-CM

## 2020-07-29 PROCEDURE — 93970 US LOWER EXTREMITY VEINS BILATERAL: ICD-10-PCS | Mod: 26,HCNC,, | Performed by: RADIOLOGY

## 2020-07-29 PROCEDURE — 93970 EXTREMITY STUDY: CPT | Mod: TC,HCNC

## 2020-07-29 PROCEDURE — 93970 EXTREMITY STUDY: CPT | Mod: 26,HCNC,, | Performed by: RADIOLOGY

## 2020-08-12 ENCOUNTER — HOSPITAL ENCOUNTER (OUTPATIENT)
Dept: CARDIOLOGY | Facility: HOSPITAL | Age: 85
Discharge: HOME | End: 2020-08-12
Attending: PEDIATRICS
Payer: MEDICARE

## 2020-08-12 VITALS — BODY MASS INDEX: 26.01 KG/M2 | HEIGHT: 72 IN | WEIGHT: 192 LBS

## 2020-08-12 DIAGNOSIS — R60.0 BILATERAL LEG EDEMA: ICD-10-CM

## 2020-08-12 LAB
AORTIC ROOT ANNULUS: 3.53 CM
AV INDEX (PROSTH): 0.64
AV MEAN GRADIENT: 10 MMHG
AV PEAK GRADIENT: 16 MMHG
AV VALVE AREA: 2 CM2
AV VELOCITY RATIO: 0.66
BSA FOR ECHO PROCEDURE: 2.1 M2
CV ECHO LV RWT: 0.62 CM
DOP CALC AO PEAK VEL: 2.01 M/S
DOP CALC AO VTI: 44.33 CM
DOP CALC LVOT AREA: 3.1 CM2
DOP CALC LVOT DIAMETER: 1.99 CM
DOP CALC LVOT PEAK VEL: 1.32 M/S
DOP CALC LVOT STROKE VOLUME: 88.66 CM3
DOP CALC RVOT PEAK VEL: 0.66 M/S
DOP CALC RVOT VTI: 15.23 CM
DOP CALCLVOT PEAK VEL VTI: 28.52 CM
E WAVE DECELERATION TIME: 320.87 MSEC
E/A RATIO: 0.7
E/E' RATIO: 7.05 M/S
ECHO LV POSTERIOR WALL: 1.4 CM (ref 0.6–1.1)
FRACTIONAL SHORTENING: 32 % (ref 28–44)
INTERVENTRICULAR SEPTUM: 1.25 CM (ref 0.6–1.1)
IVRT: 94.2 MSEC
LA MAJOR: 4.19 CM
LA WIDTH: 3.02 CM
LEFT ATRIUM SIZE: 3.45 CM
LEFT INTERNAL DIMENSION IN SYSTOLE: 3.07 CM (ref 2.1–4)
LEFT VENTRICLE DIASTOLIC VOLUME INDEX: 44.03 ML/M2
LEFT VENTRICLE DIASTOLIC VOLUME: 92.18 ML
LEFT VENTRICLE MASS INDEX: 109 G/M2
LEFT VENTRICLE SYSTOLIC VOLUME INDEX: 17.7 ML/M2
LEFT VENTRICLE SYSTOLIC VOLUME: 37.08 ML
LEFT VENTRICULAR INTERNAL DIMENSION IN DIASTOLE: 4.49 CM (ref 3.5–6)
LEFT VENTRICULAR MASS: 228.13 G
LV LATERAL E/E' RATIO: 6.09 M/S
LV SEPTAL E/E' RATIO: 8.38 M/S
MV PEAK A VEL: 0.96 M/S
MV PEAK E VEL: 0.67 M/S
PISA TR MAX VEL: 2.85 M/S
PULM VEIN S/D RATIO: 2.03
PV MEAN GRADIENT: 0.99 MMHG
PV PEAK D VEL: 0.35 M/S
PV PEAK S VEL: 0.71 M/S
PV PEAK VELOCITY: 1.03 CM/S
RA MAJOR: 4.2 CM
RA PRESSURE: 3 MMHG
RA WIDTH: 3.18 CM
RIGHT VENTRICULAR END-DIASTOLIC DIMENSION: 2.67 CM
SINUS: 2.96 CM
STJ: 2.58 CM
TDI LATERAL: 0.11 M/S
TDI SEPTAL: 0.08 M/S
TDI: 0.1 M/S
TR MAX PG: 32 MMHG
TV REST PULMONARY ARTERY PRESSURE: 35 MMHG

## 2020-08-12 PROCEDURE — 93306 TTE W/DOPPLER COMPLETE: CPT | Mod: 26,HCNC,, | Performed by: INTERNAL MEDICINE

## 2020-08-12 PROCEDURE — 93306 ECHO (CUPID ONLY): ICD-10-PCS | Mod: 26,HCNC,, | Performed by: INTERNAL MEDICINE

## 2020-08-12 PROCEDURE — 93306 TTE W/DOPPLER COMPLETE: CPT | Mod: HCNC

## 2020-08-13 DIAGNOSIS — M25.473 ANKLE SWELLING, UNSPECIFIED LATERALITY: ICD-10-CM

## 2020-08-13 RX ORDER — FUROSEMIDE 20 MG/1
20 TABLET ORAL DAILY
Qty: 90 TABLET | Refills: 3 | Status: SHIPPED | OUTPATIENT
Start: 2020-08-13 | End: 2021-09-27

## 2020-08-13 NOTE — TELEPHONE ENCOUNTER
----- Message from Montse Isidrojazmine sent at 8/13/2020  9:54 AM CDT -----  Regarding: refill  Contact: margo with [facility]  Type:  RX Refill Request    Who Called: margo  Refill or New Rx:refill  RX Name and Strength:furosemide (LASIX) 20 MG tablet  How is the patient currently taking it? (ex. 1XDay):??  Is this a 30 day or 90 day RX:90  Preferred Pharmacy with phone number:  [facility] [address]  [address] [address]  Phone: [phone] Fax: [phone]  Local or Mail Order:local  Ordering Provider:nubia  Would the patient rather a call back or a response via [facility]? Call back  Best Call Back Number:[phone] or fax [phone]  Additional Information: none

## 2020-08-13 NOTE — TELEPHONE ENCOUNTER
Printed rx from 07/28/20 not rec'vd by [employer], refill request sent to Dr. Ureña for auth [Furosemide].    LV 07/21/20  NV 01/21/21

## 2020-08-19 ENCOUNTER — OFFICE VISIT (OUTPATIENT)
Dept: ORTHOPEDICS | Facility: CLINIC | Age: 85
End: 2020-08-19
Payer: MEDICARE

## 2020-08-19 VITALS
HEART RATE: 80 BPM | WEIGHT: 192 LBS | BODY MASS INDEX: 26.01 KG/M2 | DIASTOLIC BLOOD PRESSURE: 63 MMHG | SYSTOLIC BLOOD PRESSURE: 122 MMHG | HEIGHT: 72 IN

## 2020-08-19 DIAGNOSIS — M19.011 PRIMARY OSTEOARTHRITIS OF RIGHT SHOULDER: Primary | ICD-10-CM

## 2020-08-19 DIAGNOSIS — M75.121 NONTRAUMATIC COMPLETE TEAR OF RIGHT ROTATOR CUFF: ICD-10-CM

## 2020-08-19 PROCEDURE — 1125F PR PAIN SEVERITY QUANTIFIED, PAIN PRESENT: ICD-10-PCS | Mod: HCNC,S$GLB,, | Performed by: PHYSICAL MEDICINE & REHABILITATION

## 2020-08-19 PROCEDURE — 99214 OFFICE O/P EST MOD 30 MIN: CPT | Mod: HCNC,S$GLB,, | Performed by: PHYSICAL MEDICINE & REHABILITATION

## 2020-08-19 PROCEDURE — 99214 PR OFFICE/OUTPT VISIT, EST, LEVL IV, 30-39 MIN: ICD-10-PCS | Mod: HCNC,S$GLB,, | Performed by: PHYSICAL MEDICINE & REHABILITATION

## 2020-08-19 PROCEDURE — 1101F PT FALLS ASSESS-DOCD LE1/YR: CPT | Mod: HCNC,CPTII,S$GLB, | Performed by: PHYSICAL MEDICINE & REHABILITATION

## 2020-08-19 PROCEDURE — 1159F MED LIST DOCD IN RCRD: CPT | Mod: HCNC,S$GLB,, | Performed by: PHYSICAL MEDICINE & REHABILITATION

## 2020-08-19 PROCEDURE — 99999 PR PBB SHADOW E&M-EST. PATIENT-LVL IV: CPT | Mod: PBBFAC,HCNC,, | Performed by: PHYSICAL MEDICINE & REHABILITATION

## 2020-08-19 PROCEDURE — 1101F PR PT FALLS ASSESS DOC 0-1 FALLS W/OUT INJ PAST YR: ICD-10-PCS | Mod: HCNC,CPTII,S$GLB, | Performed by: PHYSICAL MEDICINE & REHABILITATION

## 2020-08-19 PROCEDURE — 1159F PR MEDICATION LIST DOCUMENTED IN MEDICAL RECORD: ICD-10-PCS | Mod: HCNC,S$GLB,, | Performed by: PHYSICAL MEDICINE & REHABILITATION

## 2020-08-19 PROCEDURE — 1125F AMNT PAIN NOTED PAIN PRSNT: CPT | Mod: HCNC,S$GLB,, | Performed by: PHYSICAL MEDICINE & REHABILITATION

## 2020-08-19 PROCEDURE — 99999 PR PBB SHADOW E&M-EST. PATIENT-LVL IV: ICD-10-PCS | Mod: PBBFAC,HCNC,, | Performed by: PHYSICAL MEDICINE & REHABILITATION

## 2020-08-19 NOTE — LETTER
August 19, 2020      MONSTER Ureña Jr., MD  [address]  [place] [address]           [facility]  [address]  [place] [address]  Phone: [phone]  Fax: [phone]          Patient: Chao Hawk Jr.   MR Number: [id number]   YOB: 1932   Date of Visit: 8/19/2020       Dear Dr. MONSTER Ureña Jr.:    Thank you for referring Chao Hawk to me for evaluation. Attached you will find relevant portions of my assessment and plan of care.    If you have questions, please do not hesitate to call me. I look forward to following Chao Hawk along with you.    Sincerely,    Radha Valladares MD    Enclosure  CC:  No Recipients    If you would like to receive this communication electronically, please contact [email or web address] or [phone] to request more information on [employer] access.    For providers and/or their staff who would like to refer a patient to [facility], please contact us through our one-stop-shop provider referral line, [facility], at [phone].    If you feel you have received this communication in error or would no longer like to receive these types of communications, please e-mail [email or web address]

## 2020-08-19 NOTE — PROGRESS NOTES
SPORTS MEDICINE / PM&R Follow Up Visit :    Referring Physician: MONSTER Ureña Jr., MD    Chief Complaint   Patient presents with    Right Shoulder - Pain       HPI: This is a 88 y.o.  male being seen in clinic today for evaluation of Pain of the Right Shoulder   Since last visit, the symptoms are worsening. He has not been back to therapy and has no desire to try. Previous subacromial injection provided no relief. Apparently he recently had MRI with multiple abnormal findings and now thinks that surgery would be a good option to help his pain. Notes ongoing limited ROM as well. Says he recently had cardiac eval and believes he'd be a safe candidate for surgery.       History obtained from patient.    Past family, medical, social, surgical history, and vital signs reviewed in chart.    Review of Systems   Constitutional: Negative for chills, fever and weight loss.   HENT: Negative for hearing loss and sore throat.    Eyes: Negative for blurred vision, photophobia and pain.   Respiratory: Negative for shortness of breath.    Cardiovascular: Negative for chest pain.   Gastrointestinal: Negative for abdominal pain.   Genitourinary: Negative for dysuria.   Skin: Negative for rash.   Neurological: Negative for tingling and headaches.   Endo/Heme/Allergies: Does not bruise/bleed easily.   Psychiatric/Behavioral: Negative for depression.       General    Nursing note and vitals reviewed.  Constitutional: He is oriented to person, place, and time. He appears well-developed and well-nourished.   HENT:   Head: Normocephalic and atraumatic.   Eyes: Conjunctivae and EOM are normal. Pupils are equal, round, and reactive to light.   Neck: Neck supple.   Cardiovascular: Intact distal pulses.    Pulmonary/Chest: Effort normal. No respiratory distress.   Abdominal: He exhibits no distension.   Neurological: He is alert and oriented to person, place, and time.   Psychiatric: He has a normal mood and affect.         Right Shoulder  Exam     Inspection/Observation   Swelling: absent  Bruising: absent  Deformity: absent  Scapular Dyskinesia: positive  Atrophy: absent    Tenderness   The patient is tender to palpation of the acromioclavicular joint, greater tuberosity, supraspinatus and biceps tendon.    Range of Motion   Active abduction:  80 abnormal   Passive abduction:  120 abnormal   Extension: abnormal   Forward Flexion:  80 abnormal   Adduction: normal  External Rotation 0 degrees: abnormal   Internal rotation 0 degrees: abnormal     Tests & Signs   Cross arm: positive  Chase test: positive  Impingement: positive  Rotator Cuff Painful Arc/Range: mild  Lift Off Sign: positive  Speed's Test: negative    Other   Sensation: normal    Left Shoulder Exam     Tests & Signs   Chase test: negative  Speed's Test: negative    Other   Sensation: normal       Muscle Strength   Right Upper Extremity   Shoulder Abduction: 4/5   Shoulder Internal Rotation: 5/5   Shoulder External Rotation: 4/5   Biceps: 5/5/5   Left Upper Extremity  Shoulder Abduction: 5/5   Shoulder Internal Rotation: 5/5   Shoulder External Rotation: 5/5   Biceps: 5/5/5     Reflexes     Left Side  Biceps:  0    Right Side   Biceps:  0    Vascular Exam     Right Pulses      Radial:                    2+      Left Pulses      Radial:                    2+        Narrative & Impression     EXAMINATION:  MRI SHOULDER WITHOUT CONTRAST RIGHT     CLINICAL HISTORY:  Shoulder replaced, rotator cuff tear suspected;  Presence of right artificial shoulder joint     TECHNIQUE:  Multisequence, multiplanar MR imaging of the shoulder performed without contrast.     COMPARISON:  Right shoulder radiographs from 10/03/2019     FINDINGS:  Rotator cuff:     Supraspinatus: There is chronic complete tear involving the supraspinatus tendon with retraction of the tendon to medial aspect of the humeral head.  There is associated granulation tissue present.     Infraspinatus: There is a chronic appearing  full-thickness to complete tear involving the infraspinatus tendon.     Subscapularis: Chronic appearing full-thickness insertional tear involving the subscapularis tendon.     Teres minor: Intact     There is loss of muscle bulk involving the supraspinatus and infraspinatus muscles and to a lesser extent the subscapularis muscle as well.     Labrum: Chronic tears involving the anterior and posterior labrums with macerated appearance.     Biceps: There is some thickening of the long head of the biceps tendon which is otherwise intact.  There is diffuse distension of the synovial sheath of the long head of the biceps tendon with multiple loose body seen.  For example, on image 11 of series 7 there is a 14 mm loose body.  Additional loose body is seen more cranially measuring 10 mm.     Bone: No acute fracture.  There is obliteration of the glenohumeral joint space with high riding humeral head.  Cystic changes along the lateral humeral head noted.     Acromioclavicular joint:Degenerative changes noted.     Cartilage: Obliteration of the glenohumeral joint space cartilage.     Miscellaneous: Joint effusion is present.  Trace sub acromial fluid noted.  Note is made of synovitis.     There is fluid noted in the subscapularis recess.  This appears at least partially loculated.     Impression:     Obliteration of the glenohumeral joint space with rotator cuff tear with multiple loose bodies noted within the long head of the biceps tendon sheath.  Labral tear, synovitis, degenerative changes, and multiple additional findings as outlined above.        Electronically signed by: Bertin Mancia MD  Date:                                            07/24/2020  Time:                                           13:43         IMPRESSION/PLAN: This is a 88 y.o.  male with:    Primary osteoarthritis of right shoulder    Nontraumatic complete tear of right rotator cuff  -     Ambulatory referral/consult to Orthopedics        The findings  were discussed with Chao in detail. I explained to him that I'm not sure any surgery would help or would be advised. However, he was pretty insistent on wanting improved ROM and less pain. We discussed possibility of intra-articular steroid injection, but he was more interested in surgical options. I will set him up to see our new surgeon, Dr. Martin, who will be starting soon to discuss options.  He was provided with this plan in writing. All of his questions were answered. He will follow up with me MAL.     Radha Valladares M.D.  Sports Medicine

## 2020-08-25 ENCOUNTER — TELEPHONE (OUTPATIENT)
Dept: ORTHOPEDICS | Facility: CLINIC | Age: 85
End: 2020-08-25

## 2020-08-25 NOTE — TELEPHONE ENCOUNTER
Called patient to get him scheduled with Dr. Martin. Patient is now scheduled 9/2 at 10 am. Patient stated understanding.

## 2020-09-02 ENCOUNTER — OFFICE VISIT (OUTPATIENT)
Dept: ORTHOPEDICS | Facility: CLINIC | Age: 85
End: 2020-09-02
Payer: MEDICARE

## 2020-09-02 VITALS
SYSTOLIC BLOOD PRESSURE: 146 MMHG | HEIGHT: 72 IN | BODY MASS INDEX: 26.01 KG/M2 | WEIGHT: 192 LBS | DIASTOLIC BLOOD PRESSURE: 67 MMHG | HEART RATE: 101 BPM

## 2020-09-02 DIAGNOSIS — M75.101 ROTATOR CUFF TEAR ARTHROPATHY OF RIGHT SHOULDER: Primary | ICD-10-CM

## 2020-09-02 DIAGNOSIS — M12.811 ROTATOR CUFF TEAR ARTHROPATHY OF RIGHT SHOULDER: Primary | ICD-10-CM

## 2020-09-02 PROCEDURE — 99213 OFFICE O/P EST LOW 20 MIN: CPT | Mod: HCNC,S$GLB,, | Performed by: STUDENT IN AN ORGANIZED HEALTH CARE EDUCATION/TRAINING PROGRAM

## 2020-09-02 PROCEDURE — 99999 PR PBB SHADOW E&M-EST. PATIENT-LVL IV: ICD-10-PCS | Mod: PBBFAC,HCNC,, | Performed by: STUDENT IN AN ORGANIZED HEALTH CARE EDUCATION/TRAINING PROGRAM

## 2020-09-02 PROCEDURE — 99999 PR PBB SHADOW E&M-EST. PATIENT-LVL IV: CPT | Mod: PBBFAC,HCNC,, | Performed by: STUDENT IN AN ORGANIZED HEALTH CARE EDUCATION/TRAINING PROGRAM

## 2020-09-02 PROCEDURE — 1159F MED LIST DOCD IN RCRD: CPT | Mod: HCNC,S$GLB,, | Performed by: STUDENT IN AN ORGANIZED HEALTH CARE EDUCATION/TRAINING PROGRAM

## 2020-09-02 PROCEDURE — 1101F PR PT FALLS ASSESS DOC 0-1 FALLS W/OUT INJ PAST YR: ICD-10-PCS | Mod: HCNC,CPTII,S$GLB, | Performed by: STUDENT IN AN ORGANIZED HEALTH CARE EDUCATION/TRAINING PROGRAM

## 2020-09-02 PROCEDURE — 1125F AMNT PAIN NOTED PAIN PRSNT: CPT | Mod: HCNC,S$GLB,, | Performed by: STUDENT IN AN ORGANIZED HEALTH CARE EDUCATION/TRAINING PROGRAM

## 2020-09-02 PROCEDURE — 1159F PR MEDICATION LIST DOCUMENTED IN MEDICAL RECORD: ICD-10-PCS | Mod: HCNC,S$GLB,, | Performed by: STUDENT IN AN ORGANIZED HEALTH CARE EDUCATION/TRAINING PROGRAM

## 2020-09-02 PROCEDURE — 1101F PT FALLS ASSESS-DOCD LE1/YR: CPT | Mod: HCNC,CPTII,S$GLB, | Performed by: STUDENT IN AN ORGANIZED HEALTH CARE EDUCATION/TRAINING PROGRAM

## 2020-09-02 PROCEDURE — 99213 PR OFFICE/OUTPT VISIT, EST, LEVL III, 20-29 MIN: ICD-10-PCS | Mod: HCNC,S$GLB,, | Performed by: STUDENT IN AN ORGANIZED HEALTH CARE EDUCATION/TRAINING PROGRAM

## 2020-09-02 PROCEDURE — 1125F PR PAIN SEVERITY QUANTIFIED, PAIN PRESENT: ICD-10-PCS | Mod: HCNC,S$GLB,, | Performed by: STUDENT IN AN ORGANIZED HEALTH CARE EDUCATION/TRAINING PROGRAM

## 2020-09-02 NOTE — PROGRESS NOTES
Orthopaedics Sports Medicine     Shoulder Initial Visit         9/2/2020  10:45 AM    Referring MD: No ref. provider found    Chief Complaint   Patient presents with    Right Shoulder - Pain         History of Present Illness:   Chao Hawk Jr. is a 88 y.o. left-hand dominant male who presents with right shoulder pain and dysfunction that developed several years ago after insidious onset.  He reports that he has had right shoulder pain which has worsened over the last several years.  He has pain with activities such as reaching overhead or positioning his hand away from his body.  He endorses limited range of motion and pain with activities of daily living.  He does live alone and does everything around the house but needs some assistance with certain chores.  She has tried rest, activity modification, NSAIDs, physical therapy, and corticosteroid injection.  Despite these interventions he continues to have symptoms which interfere with his activities of daily living.    Treatments to date: rest, NSAIDs, activity modification, physical therapy, corticosteroid injection     Past Medical History:   Past Medical History:   Diagnosis Date    Arthritis     back, hand    Back pain     Dr. Cristobal- [facility]    Bilateral leg edema 2/16/2018    Chronic bronchitis     Diverticulitis of large intestine with perforation without abscess or bleeding     Diverticulosis 5/16/06    colonoscopy    Hernia     x2    Hyperlipidemia     Hypertension     Lumbar radiculopathy 6/14/2017    Multiple renal cysts 7/5/2016    Tobacco dependence     Trouble in sleeping        Past Surgical History:   Past Surgical History:   Procedure Laterality Date    CATARACT EXTRACTION, BILATERAL Bilateral     EYE SURGERY Bilateral     Glaucoma- Dr. Gianni Payne    FINGER SURGERY Right 1980s    index- Dr. Encarnacion; to remove nodule    HERNIA REPAIR  1990s    x2    LUMBAR SPINE SURGERY  09/13/2017    TONSILLECTOMY, ADENOIDECTOMY  1940        Medications:  Patient's Medications   New Prescriptions    No medications on file   Previous Medications    ACETAMINOPHEN (TYLENOL) 500 MG TABLET    Take 1,000 mg by mouth 2 (two) times daily as needed for Pain.     ALBUTEROL (PROVENTIL/VENTOLIN HFA) 90 MCG/ACTUATION INHALER    Inhale 2 puffs into the lungs every 4 (four) hours as needed for Wheezing or Shortness of Breath.    ARGININE, L-ARGININE, ORAL    Take 500 mg by mouth once daily.    ASPIRIN (ECOTRIN) 81 MG EC TABLET    Take 81 mg by mouth every evening.     ATORVASTATIN (LIPITOR) 40 MG TABLET    TAKE 1 TABLET EVERY DAY    CANNABIDIOL, CBD, EXTRACT ORAL    Take 3 drops by mouth every morning.    CHOLECALCIFEROL, VITAMIN D3, (VITAMIN D3) 2,000 UNIT CAP    Take 1 capsule by mouth once daily.    COQ10, UBIQUINOL, ORAL    Take 1 tablet by mouth once daily.    CYANOCOBALAMIN (VITAMIN B-12) 1000 MCG TABLET    Take 100 mcg by mouth once daily.    EPINEPHRINE (EPIPEN) 0.3 MG/0.3 ML (1:1,000) ATIN    Inject 1 each into the muscle once.    FLUTICASONE-UMECLIDIN-VILANTER (TRELEGY ELLIPTA) 100-62.5-25 MCG DSDV    Inhale 1 puff into the lungs once daily.    FUROSEMIDE (LASIX) 20 MG TABLET    Take 1 tablet (20 mg total) by mouth once daily.    GABAPENTIN (NEURONTIN) 100 MG CAPSULE    TAKE 1 CAPSULE (100 MG TOTAL) BY MOUTH 2 (TWO) TIMES DAILY.    GLUCOSAM-MSM-CHONDROIT-VIT D3 ORAL    Take 2 tablets by mouth once daily.    KRILL/OM-3/DHA/EPA/PHOSPHO/AST (MEGARED OMEGA-3 KRILL OIL ORAL)    Take 1 capsule by mouth once daily.    LISINOPRIL 10 MG TABLET    TAKE 1 TABLET EVERY DAY    LOPERAMIDE (IMODIUM A-D) 2 MG TAB    Take 2 mg by mouth as needed.    MULTIVIT-MIN/FOLIC/VIT K/LYCOP (ONE-A-DAY MEN'S MULTIVITAMIN ORAL)    Take 1 tablet by mouth once daily.    OXYGEN-AIR DELIVERY SYSTEMS MISC    Inhale 2 L into the lungs as needed (Oxygen 2L via NC concentrator).    POTASSIUM 99 MG TAB    Take by mouth once.    TAMSULOSIN (FLOMAX) 0.4 MG CAP    TAKE 2 CAPSULES EVERY MORNING    Modified Medications    No medications on file   Discontinued Medications    No medications on file       Allergies:   Review of patient's allergies indicates:   Allergen Reactions    Bee sting  [allergen ext-venom-honey bee] Swelling    Poison ivy extract Hives    Penicillins Rash       Social History:   Social History     Socioeconomic History    Marital status:      Spouse name: Linsey    Number of children: 4    Years of education: 12 + 4    Highest education level: Not on file   Occupational History    Occupation:  retired age 60     Comment: [employer]   Social Needs    Financial resource strain: Not on file    Food insecurity     Worry: Not on file     Inability: Not on file    Transportation needs     Medical: Not on file     Non-medical: Not on file   Tobacco Use    Smoking status: Former Smoker     Packs/day: 1.00     Years: 40.00     Pack years: 40.00     Start date: 1949     Quit date: 1991     Years since quittin.6    Smokeless tobacco: Never Used   Substance and Sexual Activity    Alcohol use: Yes     Frequency: Monthly or less     Drinks per session: 1 or 2     Binge frequency: Never     Comment: rare    Drug use: No    Sexual activity: Never   Lifestyle    Physical activity     Days per week: Not on file     Minutes per session: Not on file    Stress: Not on file   Relationships    Social connections     Talks on phone: Not on file     Gets together: Not on file     Attends Orthodoxy service: Not on file     Active member of club or organization: Not on file     Attends meetings of clubs or organizations: Not on file     Relationship status: Not on file   Other Topics Concern    Not on file   Social History Narrative    No smokers or pets in household.      Home town:  [place]  Occupation:  Retired  Alcohol use: He reports current alcohol use.  Tobacco use: He reports that he quit smoking about 29 years ago. He started smoking about 71  years ago. He has a 40.00 pack-year smoking history. He has never used smokeless tobacco.    Review of systems:  History of recent illness, fevers, shakes, or chills: no  History of cardiac problems or chest pain: no  History of pulmonary problems or asthma: YES, COPD  History of diabetes: no  History of prior dvt or clotting problems: no  History of sleep apnea: no      Physical Examination:  Estimated body mass index is 26.04 kg/m² as calculated from the following:    Height as of this encounter: 6' (1.829 m).    Weight as of this encounter: 87.1 kg (192 lb).    General  Healthy appearing male in no acute distress  Alert and oriented, normal mood, appropriate affect    Shoulder Examination:  Patient is alert and oriented, no distress. Skin is intact. Neuro is normal with no focal motor or sensory findings.    Cervical exam is unremarkable. Intact cervical ROM. Negative Spurling's test    Physical Exam:  RIGHT    LEFT    Atrophy:   (-)    (-)   Deformity   (-)    (-)  Scap Dyskinesis/Winging (-)    (-)    Tenderness:          Greater Tuberosity             (-)    (-)  Bicipital Groove  (-)    (-)  AC joint   (-)    (-)  Other:     ROM:  Forward Elevation 100    180  Abduction  90    120  ER (at side)  30    80  IR   L5    T12    Strength:   Supraspinatus  4/5    5/5  Infraspinatus  4/5    5/5  Subscap / IR  4/5    5/5     Special Tests:   Chase:   +    (-)   SS Stress:   +    (-)   Belly Press:   +    (-)   Lift Off:    +    (-)   [place]'s:   +    (-)   Speeds:   (-)    (-)   Resisted Thrower's:   +    (-)   Cross Arm Abduction:  (-)    (-)    Neurovascular examination  - Motor grossly intact bilaterally to shoulder abduction, elbow flexion and extension, wrist flexion and extension, and intrinsic hand musculature  - Sensation intact to light touch bilaterally in axillary, median, radial, and ulnar distributions  - Symmetrical radial pulses      Imaging:  XR Right shoulder (10/3/19):  No acute fracture or  dislocation.  Mild AC joint arthropathy is noted.  There is severe joint space narrowing and osteophyte formation seen at the glenohumeral joint.  Prominent subchondral cystic changes noted in the region of the greater tuberosity.  There are some presumed loose bodies seen adjacent to the proximal shaft of the humerus the larger of the 2 measuring approximately 15 mm.    Physician Read: I agree with the above impression.    MRI Right shoulder (7/24/20)  Obliteration of the glenohumeral joint space with rotator cuff tear with multiple loose bodies noted within the long head of the biceps tendon sheath.  Labral tear, synovitis, degenerative changes, and multiple additional findings as outlined above (in report)    Physician Read:  I agree with the above impression.      Impression:  88 y.o. male with right shoulder rotator cuff tear arthropathy      Plan:  1. Discussed operative and non operative treatment options  2. He is interested in pursuing surgical treatment.  3. Overall he is in good health especially considering his age and so I do not think it is unreasonable to proceed with surgery.  However, I would like to discuss it with his primary care physician and have him obtain a preoperative clearance.  4. He will also require some assistance after surgery but I anticipate he will be discharged home on postop day 1.  He has a son who lives nearby who he says could help him.  I would like to see him back in clinic in 2 weeks to discuss the surgery in further detail and have his son present at that visit.        Reji Martin MD

## 2020-09-10 ENCOUNTER — TELEPHONE (OUTPATIENT)
Dept: INTERNAL MEDICINE | Facility: CLINIC | Age: 85
End: 2020-09-10

## 2020-09-10 NOTE — TELEPHONE ENCOUNTER
Per epic message, to call pt to sched for pre-op clearance appt w/ JOHAN Ramesh/Dr. Ureña for Rt Shoulder Arthroplasty w/ Dr. Martin.

## 2020-09-11 ENCOUNTER — CLINICAL SUPPORT (OUTPATIENT)
Dept: PULMONOLOGY | Facility: CLINIC | Age: 85
End: 2020-09-11
Payer: MEDICARE

## 2020-09-11 ENCOUNTER — HOSPITAL ENCOUNTER (OUTPATIENT)
Dept: RADIOLOGY | Facility: HOSPITAL | Age: 85
Discharge: HOME | End: 2020-09-11
Attending: NURSE PRACTITIONER
Payer: MEDICARE

## 2020-09-11 ENCOUNTER — OFFICE VISIT (OUTPATIENT)
Dept: PULMONOLOGY | Facility: CLINIC | Age: 85
End: 2020-09-11
Payer: MEDICARE

## 2020-09-11 VITALS
HEIGHT: 72 IN | HEIGHT: 72 IN | SYSTOLIC BLOOD PRESSURE: 133 MMHG | WEIGHT: 190.69 LBS | DIASTOLIC BLOOD PRESSURE: 64 MMHG | RESPIRATION RATE: 16 BRPM | BODY MASS INDEX: 25.83 KG/M2 | WEIGHT: 190.69 LBS | HEART RATE: 88 BPM | OXYGEN SATURATION: 94 % | BODY MASS INDEX: 25.83 KG/M2

## 2020-09-11 DIAGNOSIS — J44.9 CHRONIC OBSTRUCTIVE PULMONARY DISEASE, UNSPECIFIED COPD TYPE: Primary | Chronic | ICD-10-CM

## 2020-09-11 DIAGNOSIS — J43.1 PANLOBULAR EMPHYSEMA: ICD-10-CM

## 2020-09-11 DIAGNOSIS — I10 ESSENTIAL HYPERTENSION: Chronic | ICD-10-CM

## 2020-09-11 DIAGNOSIS — R09.02 EXERCISE HYPOXEMIA: ICD-10-CM

## 2020-09-11 DIAGNOSIS — I27.21 PAH (PULMONARY ARTERY HYPERTENSION): ICD-10-CM

## 2020-09-11 DIAGNOSIS — J84.10 CALCIFIED GRANULOMA OF LUNG: ICD-10-CM

## 2020-09-11 DIAGNOSIS — J44.9 CHRONIC OBSTRUCTIVE PULMONARY DISEASE, UNSPECIFIED COPD TYPE: ICD-10-CM

## 2020-09-11 DIAGNOSIS — G47.34 NOCTURNAL HYPOXEMIA: ICD-10-CM

## 2020-09-11 PROCEDURE — 71046 X-RAY EXAM CHEST 2 VIEWS: CPT | Mod: 26,HCNC,, | Performed by: RADIOLOGY

## 2020-09-11 PROCEDURE — 1100F PTFALLS ASSESS-DOCD GE2>/YR: CPT | Mod: HCNC,CPTII,S$GLB, | Performed by: NURSE PRACTITIONER

## 2020-09-11 PROCEDURE — 99499 RISK ADDL DX/OHS AUDIT: ICD-10-PCS | Mod: HCNC,S$GLB,, | Performed by: NURSE PRACTITIONER

## 2020-09-11 PROCEDURE — 99499 UNLISTED E&M SERVICE: CPT | Mod: HCNC,S$GLB,, | Performed by: NURSE PRACTITIONER

## 2020-09-11 PROCEDURE — 71046 X-RAY EXAM CHEST 2 VIEWS: CPT | Mod: TC,HCNC

## 2020-09-11 PROCEDURE — 1100F PR PT FALLS ASSESS DOC 2+ FALLS/FALL W/INJURY/YR: ICD-10-PCS | Mod: HCNC,CPTII,S$GLB, | Performed by: NURSE PRACTITIONER

## 2020-09-11 PROCEDURE — 1159F PR MEDICATION LIST DOCUMENTED IN MEDICAL RECORD: ICD-10-PCS | Mod: HCNC,S$GLB,, | Performed by: NURSE PRACTITIONER

## 2020-09-11 PROCEDURE — 94618 PULMONARY STRESS TESTING: ICD-10-PCS | Mod: HCNC,S$GLB,, | Performed by: INTERNAL MEDICINE

## 2020-09-11 PROCEDURE — 99214 PR OFFICE/OUTPT VISIT, EST, LEVL IV, 30-39 MIN: ICD-10-PCS | Mod: 25,HCNC,S$GLB, | Performed by: NURSE PRACTITIONER

## 2020-09-11 PROCEDURE — 99999 PR PBB SHADOW E&M-EST. PATIENT-LVL V: CPT | Mod: PBBFAC,HCNC,, | Performed by: NURSE PRACTITIONER

## 2020-09-11 PROCEDURE — 99214 OFFICE O/P EST MOD 30 MIN: CPT | Mod: 25,HCNC,S$GLB, | Performed by: NURSE PRACTITIONER

## 2020-09-11 PROCEDURE — 94618 PULMONARY STRESS TESTING: CPT | Mod: HCNC,S$GLB,, | Performed by: INTERNAL MEDICINE

## 2020-09-11 PROCEDURE — 99999 PR PBB SHADOW E&M-EST. PATIENT-LVL V: ICD-10-PCS | Mod: PBBFAC,HCNC,, | Performed by: NURSE PRACTITIONER

## 2020-09-11 PROCEDURE — 71046 XR CHEST PA AND LATERAL: ICD-10-PCS | Mod: 26,HCNC,, | Performed by: RADIOLOGY

## 2020-09-11 PROCEDURE — 3288F FALL RISK ASSESSMENT DOCD: CPT | Mod: HCNC,CPTII,S$GLB, | Performed by: NURSE PRACTITIONER

## 2020-09-11 PROCEDURE — 1159F MED LIST DOCD IN RCRD: CPT | Mod: HCNC,S$GLB,, | Performed by: NURSE PRACTITIONER

## 2020-09-11 PROCEDURE — 3288F PR FALLS RISK ASSESSMENT DOCUMENTED: ICD-10-PCS | Mod: HCNC,CPTII,S$GLB, | Performed by: NURSE PRACTITIONER

## 2020-09-11 RX ORDER — ALBUTEROL SULFATE 0.83 MG/ML
2.5 SOLUTION RESPIRATORY (INHALATION) NIGHTLY
COMMUNITY
End: 2020-11-05 | Stop reason: SDUPTHER

## 2020-09-11 NOTE — ASSESSMENT & PLAN NOTE
On NC 2lm for sleep, not adherent to use.  1/2019 abnormal overnight and diagnosis of Pul HTN.   He is not agreeable to repeat overnight at this time to determine continued need for oxygen with sleep.   Adherence encouraged.

## 2020-09-11 NOTE — PROCEDURES
[facility] - Pulmonary Function [facility]  Six Minute Walk     SUMMARY     Ordering Provider: Dr. Sotelo   Interpreting Provider: Dr. Sotelo  Performing nurse/tech/RT: SARAVANAN Holland CRT  Diagnosis: (Exercise Hypoxemia)  Height: 6' (182.9 cm)  Weight: 86.5 kg (190 lb 11.2 oz)  BMI (Calculated): 25.9   Patient Race:             Phase Oxygen Assessment Supplemental O2 Heart   Rate Blood Pressure Katie Dyspnea Scale Rating   Resting 94 % Room Air 88 bpm 133/64 3   Exercise        Minute        1 92 % Room Air 92 bpm     2 91 % Room Air 128 bpm     3 91 % Room Air 129 bpm     4 91 % Room Air 133 bpm     5 91 % Room Air 135 bpm     6  91 % Room Air 139 bpm 141/73 5-6   Recovery        Minute        1 91 % Room Air 132 bpm     2 91 % Room Air 123 bpm     3 94 % Room Air 105 bpm     4 96 % Room Air 94 bpm 118/60 2     Six Minute Walk Summary  6MWT Status: completed without stopping  Patient Reported: Dyspnea     Interpretation:  Did the patient stop or pause?: No                                         Total Time Walked (Calculated): 360 seconds  Final Partial Lap Distance (feet): 125 feet  Total Distance Meters (Calculated): 160.02 meters  Predicted Distance Meters (Calculated): 481.55 meters  Percentage of Predicted (Calculated): 33.23  Peak VO2 (Calculated): 8.78  Mets: 2.51  Has The Patient Had a Previous Six Minute Walk Test?: Yes       Previous 6MWT Results  Has The Patient Had a Previous Six Minute Walk Test?: Yes  Date of Previous Test: 02/12/20  Total Time Walked: 360 seconds  Total Distance (meters): 167.64  Predicted Distance (meters): 485.16 meters  Percentage of Predicted: 34.55  Percent Change (Calculated): 0.05    Interpretation:  Distance walked during 6 minutes was severly reduced when compared to predicted values. The 6 minute walk demonstrated severe functional impairment. There was  significant oxygen desaturation to 91% noted . Clinical correlation suggested.    Did not meet criteria for supplemental  oxygen.    Obed Sotelo MD

## 2020-09-11 NOTE — PROGRESS NOTES
Subjective:      Patient ID: Chao Hawk Jr. is a 88 y.o. male.    Chief Complaint: COPD and Pre-op Exam (rt reverse shoulder replacement)      HPI presents to pulmonary clinic for follow up on COPD with review 6mwd.   Patient also reports he is considering proceeding with right reverse shoulder replacement, has a 9/16/2020 follow up to discuss with Dr. VERNA Martin.     Last seen in pulmonary clinic by Dr. Sotelo.  The patient does not have symptoms or an exacerbation.   He states that he  is at his respiratory baseline and denies new onset of pulmonary complaints.   He denies  cough,  sputum, hemoptysis, pain with breathing and wheezing.  His current respiratory regimen: Albuterol MDI(or generic equivalent), Trelegy Ellipta and Albuterol  Nebulizer. Albuterol nebs once daily. He does use medications compliantly. CAT score today is 17.    On NC 2lm ordered for sleep, not adherent to use. Does not like wearing.   Willing to work on adherence pending his up coming Rt shoulder replacement surgery. If not able to adapt after recovers from rt shoulder surgery plans to have home oxygen picked up. He will sign AMA form. Advised no d/c order provided since medically recommended. 1/2019 abnormal overnight and diagnosis of Pul HTN.   He is not agreeable to repeat overnight at this time to determine continued need for oxygen with sleep.   Adherence encouraged.     The following portions of the patient's history were reviewed and updated as appropriate: allergies, current medications, past family history, past medical history, past social history, past surgical history and problem list.    Previous Report Reviewed: lab reports, office notes and radiology reports     Past Medical History: The following portions of the patient's history were reviewed and updated as appropriate:   He  has a past surgical history that includes Finger surgery (Right, 1980s); Eye surgery (Bilateral); Hernia repair (1990s); TONSILLECTOMY,  ADENOIDECTOMY (1940); Lumbar spine surgery (09/13/2017); and Cataract extraction, bilateral (Bilateral).  His family history includes Appendicitis in his father; COPD in his mother; Emphysema in his mother; Heart disease in his mother and son.  He  reports that he quit smoking about 29 years ago. He started smoking about 71 years ago. He has a 40.00 pack-year smoking history. He has never used smokeless tobacco. He reports current alcohol use. He reports that he does not use drugs.  He has a current medication list which includes the following prescription(s): acetaminophen, albuterol, albuterol, arginine, aspirin, atorvastatin, cannabidiol (cbd), cholecalciferol (vitamin d3), ubiquinol, cyanocobalamin, epinephrine, fluticasone-umeclidin-vilanter, furosemide, gabapentin, glucosam/msm/chondroit/vit d3, krill/om-3/dha/epa/phospho/ast, lisinopril, multivit-min/folic/vit k/lycop, oxygen-air delivery systems, potassium, tamsulosin, and loperamide.  He is allergic to bee sting  [allergen ext-venom-honey bee]; poison ivy extract; and penicillins..    Review of Systems   Constitutional: Negative for fever, chills, weight loss, weight gain, activity change, appetite change, fatigue and night sweats.   HENT: Negative for postnasal drip, rhinorrhea, sinus pressure, voice change and congestion.    Eyes: Negative for redness and itching.   Respiratory: Negative for snoring, cough, sputum production, chest tightness, shortness of breath, wheezing, orthopnea, asthma nighttime symptoms, dyspnea on extertion, use of rescue inhaler and somnolence.    Cardiovascular: Negative.  Negative for chest pain, palpitations and leg swelling.   Genitourinary: Negative for difficulty urinating and hematuria.   Endocrine: Negative for cold intolerance and heat intolerance.    Musculoskeletal: Negative for arthralgias, gait problem, joint swelling and myalgias.   Skin: Negative.    Gastrointestinal: Negative for nausea, vomiting, abdominal pain  and acid reflux.   Neurological: Negative for dizziness, weakness, light-headedness and headaches.   Hematological: Negative for adenopathy. No excessive bruising.   All other systems reviewed and are negative.     Objective:   /64   Pulse 88   Resp 16   Ht 6' (1.829 m)   Wt 86.5 kg (190 lb 11.2 oz)   SpO2 (!) 94%   BMI 25.86 kg/m²   Physical Exam  Vitals signs and nursing note reviewed.   Constitutional:       Appearance: He is well-developed.   HENT:      Head: Normocephalic and atraumatic.   Eyes:      Conjunctiva/sclera: Conjunctivae normal.   Neck:      Musculoskeletal: Normal range of motion and neck supple.   Cardiovascular:      Rate and Rhythm: Normal rate and regular rhythm.      Heart sounds: Normal heart sounds.   Pulmonary:      Effort: Pulmonary effort is normal.      Breath sounds: Normal breath sounds.   Abdominal:      General: Bowel sounds are normal.      Palpations: Abdomen is soft.   Musculoskeletal: Normal range of motion.   Skin:     General: Skin is warm and dry.   Neurological:      Mental Status: He is alert and oriented to person, place, and time.      Deep Tendon Reflexes: Reflexes are normal and symmetric.   Psychiatric:         Behavior: Behavior normal.         Thought Content: Thought content normal.         Judgment: Judgment normal.       Personal Diagnostic Review  X-Ray Chest PA And Lateral  Narrative: EXAMINATION:  XR CHEST PA AND LATERAL    CLINICAL HISTORY:  Chronic obstructive pulmonary disease, unspecified    TECHNIQUE:  PA and lateral views of the chest were performed.    COMPARISON:  02/05/2020    FINDINGS:  Calcified granuloma at the right lower lung .  No focal consolidation.  Cardiomediastinal silhouette is not enlarged.  Aortic atherosclerosis.  Thoracic spondylosis.  Impression: Stable chest.  No acute findings    Electronically signed by: Bertin Mancia MD  Date:    09/11/2020  Time:    12:36      9/11/2020 6mwd No desaturations requiring supplemental  oxygen at rest or exertion.  Phase Oxygen Assessment Supplemental O2 Heart   Rate Blood Pressure Katie Dyspnea Scale Rating   Resting 94 % Room Air 88 bpm 133/64 3   Exercise         Minute         1 92 % Room Air 92 bpm     2 91 % Room Air 128 bpm     3 91 % Room Air 129 bpm     4 91 % Room Air 133 bpm     5 91 % Room Air 135 bpm     6  91 % Room Air 139 bpm 141/73 5-6   Recovery         Minute         1 91 % Room Air 132 bpm     2 91 % Room Air 123 bpm     3 94 % Room Air 105 bpm     4 96 % Room Air 94 bpm 118/60 2     Six Minute Walk Summary   6MWT Status: completed without stopping   Patient Reported: Dyspnea     Interpretation:   Did the patient stop or pause?: No      Total Time Walked (Calculated): 360 seconds   Final Partial Lap Distance (feet): 125 feet   Total Distance Meters (Calculated): 160.02 meters   Predicted Distance Meters (Calculated): 481.55 meters   Percentage of Predicted (Calculated): 33.23   Peak VO2 (Calculated): 8.78   Mets: 2.51   Has The Patient Had a Previous Six Minute Walk Test?: Yes         Previous 6MWT Results   Has The Patient Had a Previous Six Minute Walk Test?: Yes   Date of Previous Test: 02/12/20   Total Time Walked: 360 seconds   Total Distance (meters): 167.64   Predicted Distance (meters): 485.16 meters   Percentage of Predicted: 34.55   Percent Change (Calculated): 0.05     8/2020 Echo  · Concentric left ventricular remodeling.  · Normal central venous pressure (3 mmHg).  · The estimated PA systolic pressure is 35 mmHg.  · Normal left ventricular systolic function. The estimated ejection fraction is 55%.  · Normal LV diastolic function.  · No wall motion abnormalities.     1/30/2019 overnight oximetry   Overnight Oxygen Saturation Study:     INTERPRETATION:   Overnight O2 saturation study reviewed.   Conditions of testing: Stable Outpatient on Room air.    Abnormal overnight oxygenation Time with SpO2<88: 0:21:48, 5.7%     of the study period. Lowest oxygen satruation  recorded was 77% .   Clinical correlation suggested.     Obed Sotelo MD   Pulmonary / Critical Care Medicine     Assessment:     1. Chronic obstructive pulmonary disease, unspecified COPD type    2. Calcified granuloma of lung    3. Essential hypertension    4. PAH (pulmonary artery hypertension)    5. Nocturnal hypoxemia      Orders Placed This Encounter   Procedures    X-Ray Chest PA And Lateral     Standing Status:   Future     Number of Occurrences:   1     Standing Expiration Date:   9/11/2021     Order Specific Question:   May the Radiologist modify the order per protocol to meet the clinical needs of the patient?     Answer:   Yes    Spirometry without Bronchodilator     Standing Status:   Future     Standing Expiration Date:   9/11/2021     Plan:     Problem List Items Addressed This Visit     PAH (pulmonary artery hypertension)     On NC 2lm for sleep, not adherent to use.  1/2019 abnormal overnight and diagnosis of Pul HTN.   He is not agreeable to repeat overnight at this time to determine continued need for oxygen with sleep.   Adherence encouraged.            Nocturnal hypoxemia     NC 2lm for sleep. No using.  1/2019 abnormal overnight   He is not agreeable to repeat overnight at this time to determine continued need for oxygen with sleep.   Risk discussed, agreeable to wear home oxygen post operative if has reverse shoulder surgery  Adherence encouraged nightly.             Essential hypertension (Chronic)     Controlled managed by primary care provider          COPD (chronic obstructive pulmonary disease) - Primary (Chronic)     Controlled on Trelegy Ellipta, Albuterol inhaler, Albuterol nebs   NC 2lm for sleep, improve adherence to home oxygen  Continue current regimen  9/11/2020 6mwd No desaturations requiring supplemental oxygen at rest or exertion.  Chest xray 9/11/2020 stable, no acute findings  Stable from pulmonary stand point to proceed with rt reverse shoulder replacement.             Relevant Orders    Spirometry without Bronchodilator    X-Ray Chest PA And Lateral (Completed)    Calcified granuloma of lung     Seen on imaging, stable.                 ARISCAT (Canet) preoperative pulmonary risk index: 24.  Placing this patient at a low 1.6% pulmonary complication rate.  ARISCAT risk index interpretation:   0 to 25 points Low risk: 1.6% pulmonary complication rate  26 to 44 points: Intermediate risk: 13.3% pulmonary complication rate  45 to123 points: High risk: 42.1% pulmonary complication rate    Surgical pulmonary risk have been discussed with patient who expressed and verbalized understanding. Based on my assessment, it is okay to proceed with surgery provided the risk are acceptable to both the patient and the surgeon.     RECOMMENDATIONS:                                                             Perioperative pulmonary risk reduction strategies.  Begin patient  education regarding lung-expansion maneuvers like deep breathing and incentive spirometry.  Prophylaxis for cardiac events with perioperative beta-blockers: should be considered, specific regimen per anesthesia.  Invasive hemodynamic monitoring perioperatively should be considered.   Limit duration of surgery to less than 3 hr if possible.   Use spinal or epidural anesthesia if possible.   Avoid use of pancuronium or long acting neuromuscular blockers.   Use laparoscopic procedures when possible.  Use deep-breathing exercises or incentive spirometry:   Assure perioperative analgesia and thromboprophylaxis.        Follow up in about 6 months (around 3/11/2021) for COPD, w/review akash.

## 2020-09-11 NOTE — LETTER
September 11, 2020      Obed Sotelo MD  [address]  [place] [address]           [facility]  [address]  [place] [address]  Phone: [phone]  Fax: [phone]          Patient: Chao Hawk Jr.   MR Number: [id number]   YOB: 1932   Date of Visit: 9/11/2020       Dear Dr. Obed Sotelo:    Thank you for referring Chao Hawk to me for evaluation. Attached you will find relevant portions of my assessment and plan of care.    If you have questions, please do not hesitate to call me. I look forward to following Chao Hawk along with you.    Sincerely,    Kylee Watt, NP    Enclosure  CC:  No Recipients    If you would like to receive this communication electronically, please contact [email or web address] or [phone] to request more information on [facility] access.    For providers and/or their staff who would like to refer a patient to [facility], please contact us through our one-stop-shop provider referral line, [facility], at [phone].    If you feel you have received this communication in error or would no longer like to receive these types of communications, please e-mail [email or web address]

## 2020-09-11 NOTE — ASSESSMENT & PLAN NOTE
Controlled on Trelegy Ellipta, Albuterol inhaler, Albuterol nebs   NC 2lm for sleep, improve adherence to home oxygen  Continue current regimen  9/11/2020 6mwd No desaturations requiring supplemental oxygen at rest or exertion.  Chest xray 9/11/2020 stable, no acute findings  Stable from pulmonary stand point to proceed with rt reverse shoulder replacement.

## 2020-09-11 NOTE — ASSESSMENT & PLAN NOTE
NC 2lm for sleep. No using.  1/2019 abnormal overnight   He is not agreeable to repeat overnight at this time to determine continued need for oxygen with sleep.   Risk discussed, agreeable to wear home oxygen post operative if has reverse shoulder surgery  Adherence encouraged nightly.

## 2020-09-14 ENCOUNTER — HOSPITAL ENCOUNTER (OUTPATIENT)
Dept: CARDIOLOGY | Facility: HOSPITAL | Age: 85
Discharge: HOME | End: 2020-09-14
Payer: MEDICARE

## 2020-09-14 ENCOUNTER — OFFICE VISIT (OUTPATIENT)
Dept: INTERNAL MEDICINE | Facility: CLINIC | Age: 85
End: 2020-09-14
Payer: MEDICARE

## 2020-09-14 VITALS
TEMPERATURE: 97 F | HEIGHT: 72 IN | BODY MASS INDEX: 25.95 KG/M2 | SYSTOLIC BLOOD PRESSURE: 122 MMHG | WEIGHT: 191.56 LBS | HEART RATE: 86 BPM | DIASTOLIC BLOOD PRESSURE: 64 MMHG | OXYGEN SATURATION: 97 %

## 2020-09-14 DIAGNOSIS — E78.00 PURE HYPERCHOLESTEROLEMIA: Chronic | ICD-10-CM

## 2020-09-14 DIAGNOSIS — N40.1 BENIGN PROSTATIC HYPERPLASIA WITH WEAK URINARY STREAM: Chronic | ICD-10-CM

## 2020-09-14 DIAGNOSIS — J44.9 CHRONIC OBSTRUCTIVE PULMONARY DISEASE, UNSPECIFIED COPD TYPE: Chronic | ICD-10-CM

## 2020-09-14 DIAGNOSIS — G47.34 NOCTURNAL HYPOXEMIA: ICD-10-CM

## 2020-09-14 DIAGNOSIS — I70.0 ATHEROSCLEROSIS OF AORTA: ICD-10-CM

## 2020-09-14 DIAGNOSIS — I10 ESSENTIAL HYPERTENSION: Chronic | ICD-10-CM

## 2020-09-14 DIAGNOSIS — Z01.818 PRE-OP EXAMINATION: ICD-10-CM

## 2020-09-14 DIAGNOSIS — R39.12 BENIGN PROSTATIC HYPERPLASIA WITH WEAK URINARY STREAM: Chronic | ICD-10-CM

## 2020-09-14 DIAGNOSIS — M19.019 SHOULDER ARTHRITIS: ICD-10-CM

## 2020-09-14 DIAGNOSIS — I71.40 ABDOMINAL AORTIC ANEURYSM WITHOUT RUPTURE: ICD-10-CM

## 2020-09-14 DIAGNOSIS — J84.10 CALCIFIED GRANULOMA OF LUNG: ICD-10-CM

## 2020-09-14 DIAGNOSIS — E66.3 OVERWEIGHT (BMI 25.0-29.9): ICD-10-CM

## 2020-09-14 DIAGNOSIS — Z01.818 PRE-OP EXAMINATION: Primary | ICD-10-CM

## 2020-09-14 PROCEDURE — 1159F MED LIST DOCD IN RCRD: CPT | Mod: HCNC,S$GLB,, | Performed by: NURSE PRACTITIONER

## 2020-09-14 PROCEDURE — 99999 PR PBB SHADOW E&M-EST. PATIENT-LVL V: CPT | Mod: PBBFAC,HCNC,, | Performed by: NURSE PRACTITIONER

## 2020-09-14 PROCEDURE — 3288F PR FALLS RISK ASSESSMENT DOCUMENTED: ICD-10-PCS | Mod: HCNC,CPTII,S$GLB, | Performed by: NURSE PRACTITIONER

## 2020-09-14 PROCEDURE — 93010 EKG 12-LEAD: ICD-10-PCS | Mod: HCNC,,, | Performed by: INTERNAL MEDICINE

## 2020-09-14 PROCEDURE — 1100F PTFALLS ASSESS-DOCD GE2>/YR: CPT | Mod: HCNC,CPTII,S$GLB, | Performed by: NURSE PRACTITIONER

## 2020-09-14 PROCEDURE — 99214 OFFICE O/P EST MOD 30 MIN: CPT | Mod: HCNC,S$GLB,, | Performed by: NURSE PRACTITIONER

## 2020-09-14 PROCEDURE — 93005 ELECTROCARDIOGRAM TRACING: CPT | Mod: HCNC

## 2020-09-14 PROCEDURE — 1159F PR MEDICATION LIST DOCUMENTED IN MEDICAL RECORD: ICD-10-PCS | Mod: HCNC,S$GLB,, | Performed by: NURSE PRACTITIONER

## 2020-09-14 PROCEDURE — 1126F PR PAIN SEVERITY QUANTIFIED, NO PAIN PRESENT: ICD-10-PCS | Mod: HCNC,S$GLB,, | Performed by: NURSE PRACTITIONER

## 2020-09-14 PROCEDURE — 1100F PR PT FALLS ASSESS DOC 2+ FALLS/FALL W/INJURY/YR: ICD-10-PCS | Mod: HCNC,CPTII,S$GLB, | Performed by: NURSE PRACTITIONER

## 2020-09-14 PROCEDURE — 93010 ELECTROCARDIOGRAM REPORT: CPT | Mod: HCNC,,, | Performed by: INTERNAL MEDICINE

## 2020-09-14 PROCEDURE — 1126F AMNT PAIN NOTED NONE PRSNT: CPT | Mod: HCNC,S$GLB,, | Performed by: NURSE PRACTITIONER

## 2020-09-14 PROCEDURE — 99214 PR OFFICE/OUTPT VISIT, EST, LEVL IV, 30-39 MIN: ICD-10-PCS | Mod: HCNC,S$GLB,, | Performed by: NURSE PRACTITIONER

## 2020-09-14 PROCEDURE — 3288F FALL RISK ASSESSMENT DOCD: CPT | Mod: HCNC,CPTII,S$GLB, | Performed by: NURSE PRACTITIONER

## 2020-09-14 PROCEDURE — 99999 PR PBB SHADOW E&M-EST. PATIENT-LVL V: ICD-10-PCS | Mod: PBBFAC,HCNC,, | Performed by: NURSE PRACTITIONER

## 2020-09-14 NOTE — PROGRESS NOTES
Subjective:       Patient ID: Chao Hawk Jr. is a 88 y.o. male.    Chief Complaint: Pre-op Exam    HPI      Pt here for pre-op for right shoulder arthroplasty per Dr. Martin. Date not set yet. Has appt this week      No anesthesia issues with past surgeries  Had pulmonary clearance completed       Recently saw PCP in July- all chronic issues stable.  HTN: B/P normal, no HTNive symptoms  LIPIDS:following D&E, tolerating and compliant with med(s).    BPH on flomax 0.4 mg nocturia x 3  COPD:Sees pulmonary, compliant with meds  Low T:On depo testosterone.  Back:s/p surgery, uses canes only for balance and support for uneven surface, has residual feet numbness. Worse in AM until he gets around. Uses only CBD oil. Not any other OTC or tramadol. Fall risk is now minimal, uses cane, no falls  Pulmonary artery HTN/COPD: Saw pulmonary. no unusual CP/Orthopnea/hemoptysis. Has progressive SOB, using nocturnal O2, feels more short winded in day  AAA: medical management. CT 2018: distal abdominal aortic aneurysm measuring 3.6 cm AP dimension 3.3 cm transverse dimension.  There is also fusiform aneurysmal dilatation of the mid infrarenal abdominal aorta measuring approximately 3.2 cm in diameter.  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       Past Medical History:   Diagnosis Date    Arthritis     back, hand    Back pain     Dr. Cristobal- [facility]    Bilateral leg edema 2/16/2018    Chronic bronchitis     Diverticulitis of large intestine with perforation without abscess or bleeding     Diverticulosis 5/16/06    colonoscopy    Hernia     x2    Hyperlipidemia     Hypertension     Lumbar radiculopathy 6/14/2017    Multiple renal cysts 7/5/2016    Tobacco dependence     Trouble in sleeping        Past Surgical History:   Procedure Laterality Date    CATARACT EXTRACTION, BILATERAL Bilateral     EYE SURGERY Bilateral     Glaucoma- Dr. Gianni Payne    FINGER SURGERY Right 1980s    index- Dr. Encarnacion; to  remove nodule    HERNIA REPAIR      x2    LUMBAR SPINE SURGERY  2017    TONSILLECTOMY, ADENOIDECTOMY       Social History     Socioeconomic History    Marital status:      Spouse name: Linsey    Number of children: 4    Years of education: 12 + 4    Highest education level: Not on file   Occupational History    Occupation:  retired age 60     Comment: [employer]   Social Needs    Financial resource strain: Not on file    Food insecurity     Worry: Not on file     Inability: Not on file    Transportation needs     Medical: Not on file     Non-medical: Not on file   Tobacco Use    Smoking status: Former Smoker     Packs/day: 1.00     Years: 40.00     Pack years: 40.00     Start date: 1949     Quit date: 1991     Years since quittin.7    Smokeless tobacco: Never Used   Substance and Sexual Activity    Alcohol use: Yes     Frequency: Monthly or less     Drinks per session: 1 or 2     Binge frequency: Never     Comment: rare    Drug use: No    Sexual activity: Never   Lifestyle    Physical activity     Days per week: Not on file     Minutes per session: Not on file    Stress: Not on file   Relationships    Social connections     Talks on phone: Not on file     Gets together: Not on file     Attends [employer] service: Not on file     Active member of club or organization: Not on file     Attends meetings of clubs or organizations: Not on file     Relationship status: Not on file   Other Topics Concern    Not on file   Social History Narrative    No smokers or pets in household.     Review of patient's allergies indicates:   Allergen Reactions    Bee sting  [allergen ext-venom-honey bee] Swelling    Poison ivy extract Hives    Penicillins Rash     Current Outpatient Medications   Medication Sig    acetaminophen (TYLENOL) 500 MG tablet Take 1,000 mg by mouth 2 (two) times daily as needed for Pain.     albuterol (PROVENTIL) 2.5 mg /3 mL (0.083 %)  nebulizer solution Take 2.5 mg by nebulization every 6 (six) hours as needed for Wheezing. Rescue    ARGININE, L-ARGININE, ORAL Take 500 mg by mouth once daily.    aspirin (ECOTRIN) 81 MG EC tablet Take 81 mg by mouth every evening.     atorvastatin (LIPITOR) 40 MG tablet TAKE 1 TABLET EVERY DAY    CANNABIDIOL, CBD, EXTRACT ORAL Take 3 drops by mouth every morning.    cholecalciferol, vitamin D3, (VITAMIN D3) 2,000 unit Cap Take 1 capsule by mouth once daily.    COQ10, UBIQUINOL, ORAL Take 1 tablet by mouth once daily.    cyanocobalamin (VITAMIN B-12) 1000 MCG tablet Take 100 mcg by mouth once daily.    epinephrine (EPIPEN) 0.3 mg/0.3 mL (1:1,000) AtIn Inject 1 each into the muscle once.    fluticasone-umeclidin-vilanter (TRELEGY ELLIPTA) 100-62.5-25 mcg DsDv Inhale 1 puff into the lungs once daily.    furosemide (LASIX) 20 MG tablet Take 1 tablet (20 mg total) by mouth once daily.    gabapentin (NEURONTIN) 100 MG capsule TAKE 1 CAPSULE (100 MG TOTAL) BY MOUTH 2 (TWO) TIMES DAILY.    GLUCOSAM-MSM-CHONDROIT-VIT D3 ORAL Take 2 tablets by mouth once daily.    krill/om-3/dha/epa/phospho/ast (MEGARED OMEGA-3 KRILL OIL ORAL) Take 1 capsule by mouth once daily.    lisinopril 10 MG tablet TAKE 1 TABLET EVERY DAY    multivit-min/folic/vit K/lycop (ONE-A-DAY MEN'S MULTIVITAMIN ORAL) Take 1 tablet by mouth once daily.    OXYGEN-AIR DELIVERY SYSTEMS MISC Inhale 2 L into the lungs as needed (Oxygen 2L via NC concentrator).    potassium 99 mg Tab Take by mouth once.    tamsulosin (FLOMAX) 0.4 mg Cap TAKE 2 CAPSULES EVERY MORNING    albuterol (PROVENTIL/VENTOLIN HFA) 90 mcg/actuation inhaler Inhale 2 puffs into the lungs every 4 (four) hours as needed for Wheezing or Shortness of Breath.    loperamide (IMODIUM A-D) 2 mg Tab Take 2 mg by mouth as needed.     No current facility-administered medications for this visit.            Review of Systems   Constitutional: Negative for activity change, appetite change,  chills, diaphoresis, fatigue, fever and unexpected weight change.   HENT: Negative for congestion, ear pain, postnasal drip, rhinorrhea, sinus pressure, sinus pain, sneezing, sore throat, tinnitus, trouble swallowing and voice change.    Eyes: Negative for photophobia, pain and visual disturbance.   Respiratory: Negative for cough, chest tightness, shortness of breath and wheezing.    Cardiovascular: Negative for chest pain, palpitations and leg swelling.   Gastrointestinal: Negative for abdominal distention, abdominal pain, constipation, diarrhea, nausea and vomiting.   Genitourinary: Negative for decreased urine volume, difficulty urinating, dysuria, flank pain, frequency, hematuria and urgency.   Musculoskeletal: Positive for arthralgias and myalgias. Negative for back pain, joint swelling, neck pain and neck stiffness.   Allergic/Immunologic: Negative for immunocompromised state.   Neurological: Negative for dizziness, tremors, seizures, syncope, facial asymmetry, speech difficulty, weakness, light-headedness, numbness and headaches.   Hematological: Negative for adenopathy. Does not bruise/bleed easily.   Psychiatric/Behavioral: Negative for confusion and sleep disturbance.       Objective:      Physical Exam  Cardiovascular:      Rate and Rhythm: Normal rate and regular rhythm.      Pulses: Normal pulses.   Pulmonary:      Effort: Pulmonary effort is normal.      Breath sounds: Normal breath sounds.   Abdominal:      General: Bowel sounds are normal.      Palpations: Abdomen is soft.   Musculoskeletal:      Comments: Ambulates with cane    Skin:     General: Skin is warm and dry.   Neurological:      General: No focal deficit present.      Mental Status: He is alert and oriented to person, place, and time.   Psychiatric:         Mood and Affect: Mood normal.         Assessment:     Vitals:    09/14/20 0958   BP: 122/64   Pulse: 86   Temp: 97.3 °F (36.3 °C)         1. Pre-op examination    2. Shoulder arthritis     3. Essential hypertension    4. Pure hypercholesterolemia    5. Atherosclerosis of aorta    6. Abdominal aortic aneurysm without rupture    7. Calcified granuloma of lung    8. Chronic obstructive pulmonary disease, unspecified COPD type    9. Nocturnal hypoxemia    10. Benign prostatic hyperplasia with weak urinary stream    11. Overweight (BMI 25.0-29.9)        Plan:   Pre-op examination  -     CBC auto differential; Future; Expected date: 09/14/2020  -     Comprehensive metabolic panel; Future; Expected date: 09/14/2020  -     Urinalysis; Future; Expected date: 09/14/2020  -     EKG 12-lead; Future; Expected date: 09/14/2020    Shoulder arthritis    Essential hypertension    Pure hypercholesterolemia    Atherosclerosis of aorta    Abdominal aortic aneurysm without rupture    Calcified granuloma of lung    Chronic obstructive pulmonary disease, unspecified COPD type    Nocturnal hypoxemia    Benign prostatic hyperplasia with weak urinary stream    Overweight (BMI 25.0-29.9)      Pre op tests pending clearance

## 2020-09-16 ENCOUNTER — TELEPHONE (OUTPATIENT)
Dept: INTERNAL MEDICINE | Facility: CLINIC | Age: 85
End: 2020-09-16

## 2020-09-16 ENCOUNTER — OFFICE VISIT (OUTPATIENT)
Dept: ORTHOPEDICS | Facility: CLINIC | Age: 85
End: 2020-09-16
Payer: MEDICARE

## 2020-09-16 VITALS
SYSTOLIC BLOOD PRESSURE: 142 MMHG | DIASTOLIC BLOOD PRESSURE: 72 MMHG | BODY MASS INDEX: 25.87 KG/M2 | WEIGHT: 191 LBS | HEIGHT: 72 IN | HEART RATE: 82 BPM

## 2020-09-16 DIAGNOSIS — M19.011 ARTHRITIS OF RIGHT GLENOHUMERAL JOINT: ICD-10-CM

## 2020-09-16 DIAGNOSIS — M75.101 ROTATOR CUFF TEAR ARTHROPATHY OF RIGHT SHOULDER: Primary | ICD-10-CM

## 2020-09-16 DIAGNOSIS — M12.811 ROTATOR CUFF TEAR ARTHROPATHY OF RIGHT SHOULDER: Primary | ICD-10-CM

## 2020-09-16 PROCEDURE — 99214 PR OFFICE/OUTPT VISIT, EST, LEVL IV, 30-39 MIN: ICD-10-PCS | Mod: HCNC,S$GLB,, | Performed by: STUDENT IN AN ORGANIZED HEALTH CARE EDUCATION/TRAINING PROGRAM

## 2020-09-16 PROCEDURE — 1101F PT FALLS ASSESS-DOCD LE1/YR: CPT | Mod: HCNC,CPTII,S$GLB, | Performed by: STUDENT IN AN ORGANIZED HEALTH CARE EDUCATION/TRAINING PROGRAM

## 2020-09-16 PROCEDURE — 1159F MED LIST DOCD IN RCRD: CPT | Mod: HCNC,S$GLB,, | Performed by: STUDENT IN AN ORGANIZED HEALTH CARE EDUCATION/TRAINING PROGRAM

## 2020-09-16 PROCEDURE — 1159F PR MEDICATION LIST DOCUMENTED IN MEDICAL RECORD: ICD-10-PCS | Mod: HCNC,S$GLB,, | Performed by: STUDENT IN AN ORGANIZED HEALTH CARE EDUCATION/TRAINING PROGRAM

## 2020-09-16 PROCEDURE — 1101F PR PT FALLS ASSESS DOC 0-1 FALLS W/OUT INJ PAST YR: ICD-10-PCS | Mod: HCNC,CPTII,S$GLB, | Performed by: STUDENT IN AN ORGANIZED HEALTH CARE EDUCATION/TRAINING PROGRAM

## 2020-09-16 PROCEDURE — 1125F PR PAIN SEVERITY QUANTIFIED, PAIN PRESENT: ICD-10-PCS | Mod: HCNC,S$GLB,, | Performed by: STUDENT IN AN ORGANIZED HEALTH CARE EDUCATION/TRAINING PROGRAM

## 2020-09-16 PROCEDURE — 99214 OFFICE O/P EST MOD 30 MIN: CPT | Mod: HCNC,S$GLB,, | Performed by: STUDENT IN AN ORGANIZED HEALTH CARE EDUCATION/TRAINING PROGRAM

## 2020-09-16 PROCEDURE — 99999 PR PBB SHADOW E&M-EST. PATIENT-LVL V: CPT | Mod: PBBFAC,HCNC,, | Performed by: STUDENT IN AN ORGANIZED HEALTH CARE EDUCATION/TRAINING PROGRAM

## 2020-09-16 PROCEDURE — 1125F AMNT PAIN NOTED PAIN PRSNT: CPT | Mod: HCNC,S$GLB,, | Performed by: STUDENT IN AN ORGANIZED HEALTH CARE EDUCATION/TRAINING PROGRAM

## 2020-09-16 PROCEDURE — 99999 PR PBB SHADOW E&M-EST. PATIENT-LVL V: ICD-10-PCS | Mod: PBBFAC,HCNC,, | Performed by: STUDENT IN AN ORGANIZED HEALTH CARE EDUCATION/TRAINING PROGRAM

## 2020-09-16 NOTE — PROGRESS NOTES
Orthopaedic Follow-Up Visit    Last Appointment: 9/2/2020  Diagnosis: Right shoulder rotator cuff tear arthropathy  Prior Procedure: none    Chao Hawk Jr. is a 88 y.o. male who is here for f/u evaluation of Right shoulder rotator cuff arthropathy. The patient was last seen here by me on 9/2/2020 at which point we decided to send him for preoperative evaluation and clearance by his pulmonologist and primary care physician.  He has seen both of these providers and both have cleared him for surgery, commenting that there is low risk for any adverse event.  He has had recent transthoracic echo, EKG, blood work, and clinical evaluation.  After all these evaluations, clearance was given.    To review his history, Chao Hawk Jr. is a 88 y.o. left-hand dominant male who presents with right shoulder pain and dysfunction that developed several years ago after insidious onset.  He reports that he has had right shoulder pain which has worsened over the last several years.  He has pain with activities such as reaching overhead or positioning his hand away from his body.  He endorses limited range of motion and pain with activities of daily living.  He does live alone and does everything around the house but needs some assistance with certain chores.  She has tried rest, activity modification, NSAIDs, physical therapy, and corticosteroid injection.  Despite these interventions he continues to have symptoms which interfere with his activities of daily living.  He is interested in proceeding with right reverse total shoulder arthroplasty.    Patient's medications, allergies, past medical, surgical, social and family histories were reviewed and updated as appropriate.    Review of Systems   All systems reviewed were negative.  Specifically, the patient denies fever, chills, weight loss, chest pain, shortness of breath, or dyspnea on exertion.      Past Medical History:   Diagnosis Date    Arthritis     back, hand    Back  pain     Dr. Cristobal- [facility]    Bilateral leg edema 2/16/2018    Chronic bronchitis     Diverticulitis of large intestine with perforation without abscess or bleeding     Diverticulosis 5/16/06    colonoscopy    Hernia     x2    Hyperlipidemia     Hypertension     Lumbar radiculopathy 6/14/2017    Multiple renal cysts 7/5/2016    Tobacco dependence     Trouble in sleeping        Past Surgical History:   Procedure Laterality Date    CATARACT EXTRACTION, BILATERAL Bilateral     EYE SURGERY Bilateral     Glaucoma- Dr. Gianni Payne    FINGER SURGERY Right 1980s    index- Dr. Encarnacion; to remove nodule    HERNIA REPAIR  1990s    x2    LUMBAR SPINE SURGERY  09/13/2017    TONSILLECTOMY, ADENOIDECTOMY  1940       Patient's Medications   New Prescriptions    No medications on file   Previous Medications    ACETAMINOPHEN (TYLENOL) 500 MG TABLET    Take 1,000 mg by mouth 2 (two) times daily as needed for Pain.     ALBUTEROL (PROVENTIL) 2.5 MG /3 ML (0.083 %) NEBULIZER SOLUTION    Take 2.5 mg by nebulization every 6 (six) hours as needed for Wheezing. Rescue    ALBUTEROL (PROVENTIL/VENTOLIN HFA) 90 MCG/ACTUATION INHALER    Inhale 2 puffs into the lungs every 4 (four) hours as needed for Wheezing or Shortness of Breath.    ARGININE, L-ARGININE, ORAL    Take 500 mg by mouth once daily.    ASPIRIN (ECOTRIN) 81 MG EC TABLET    Take 81 mg by mouth every evening.     ATORVASTATIN (LIPITOR) 40 MG TABLET    TAKE 1 TABLET EVERY DAY    CANNABIDIOL, CBD, EXTRACT ORAL    Take 3 drops by mouth every morning.    CHOLECALCIFEROL, VITAMIN D3, (VITAMIN D3) 2,000 UNIT CAP    Take 1 capsule by mouth once daily.    COQ10, UBIQUINOL, ORAL    Take 1 tablet by mouth once daily.    CYANOCOBALAMIN (VITAMIN B-12) 1000 MCG TABLET    Take 100 mcg by mouth once daily.    EPINEPHRINE (EPIPEN) 0.3 MG/0.3 ML (1:1,000) ATIN    Inject 1 each into the muscle once.    FLUTICASONE-UMECLIDIN-VILANTER (TRELEGY ELLIPTA) 100-62.5-25 MCG DSDV    Inhale 1  puff into the lungs once daily.    FUROSEMIDE (LASIX) 20 MG TABLET    Take 1 tablet (20 mg total) by mouth once daily.    GABAPENTIN (NEURONTIN) 100 MG CAPSULE    TAKE 1 CAPSULE (100 MG TOTAL) BY MOUTH 2 (TWO) TIMES DAILY.    GLUCOSAM-MSM-CHONDROIT-VIT D3 ORAL    Take 2 tablets by mouth once daily.    KRILL/OM-3/DHA/EPA/PHOSPHO/AST (MEGARED OMEGA-3 KRILL OIL ORAL)    Take 1 capsule by mouth once daily.    LISINOPRIL 10 MG TABLET    TAKE 1 TABLET EVERY DAY    LOPERAMIDE (IMODIUM A-D) 2 MG TAB    Take 2 mg by mouth as needed.    MULTIVIT-MIN/FOLIC/VIT K/LYCOP (ONE-A-DAY MEN'S MULTIVITAMIN ORAL)    Take 1 tablet by mouth once daily.    OXYGEN-AIR DELIVERY SYSTEMS MISC    Inhale 2 L into the lungs as needed (Oxygen 2L via NC concentrator).    POTASSIUM 99 MG TAB    Take by mouth once.    TAMSULOSIN (FLOMAX) 0.4 MG CAP    TAKE 2 CAPSULES EVERY MORNING   Modified Medications    No medications on file   Discontinued Medications    No medications on file         Family History   Problem Relation Age of Onset    Emphysema Mother     Heart disease Mother     COPD Mother     Appendicitis Father     Heart disease Son     Colon cancer Neg Hx     Cancer Neg Hx     Stroke Neg Hx        Social History     Socioeconomic History    Marital status:      Spouse name: Linsey    Number of children: 4    Years of education: 12 + 4    Highest education level: Not on file   Occupational History    Occupation:  retired age 60     Comment: [employer]   Social Needs    Financial resource strain: Not on file    Food insecurity     Worry: Not on file     Inability: Not on file    Transportation needs     Medical: Not on file     Non-medical: Not on file   Tobacco Use    Smoking status: Former Smoker     Packs/day: 1.00     Years: 40.00     Pack years: 40.00     Start date: 1949     Quit date: 1991     Years since quittin.7    Smokeless tobacco: Never Used   Substance and Sexual Activity     Alcohol use: Yes     Frequency: Monthly or less     Drinks per session: 1 or 2     Binge frequency: Never     Comment: rare    Drug use: No    Sexual activity: Never   Lifestyle    Physical activity     Days per week: Not on file     Minutes per session: Not on file    Stress: Not on file   Relationships    Social connections     Talks on phone: Not on file     Gets together: Not on file     Attends Yazdanism service: Not on file     Active member of club or organization: Not on file     Attends meetings of clubs or organizations: Not on file     Relationship status: Not on file   Other Topics Concern    Not on file   Social History Narrative    No smokers or pets in household.       Review of patient's allergies indicates:   Allergen Reactions    Bee sting  [allergen ext-venom-honey bee] Swelling    Poison ivy extract Hives    Penicillins Rash         Objective:      Physical Exam  Patient is alert and oriented, no distress. Skin is intact. Neuro is normal with no focal motor or sensory findings.    Cervical exam is unremarkable. Intact cervical ROM. Negative Spurling's test    Physical Exam:  RIGHT    LEFT    Atrophy:   (-)    (-)   Deformity   (-)    (-)  Scap Dyskinesis/Winging (-)    (-)    Tenderness:          Greater Tuberosity             +    (-)  Bicipital Groove  +    (-)  AC joint   (-)    (-)  Other:     ROM:  Forward Elevation 100    180  Abduction  90    120  ER (at side)  30    80  IR   L5    T12    Strength:   Supraspinatus  4/5    5/5  Infraspinatus  4/5    5/5  Subscap / IR  4/5    5/5     Special Tests:   Chase:   +    (-)   SS Stress:   +    (-)   Belly Press:   +    (-)   Lift Off:    +    (-)   [place]'s:   +    (-)   Speeds:   (-)    (-)   Resisted Thrower's:   +    (-)   Cross Arm Abduction:  (-)    (-)    Neurovascular examination  - Motor grossly intact bilaterally to shoulder abduction, elbow flexion and extension, wrist flexion and extension, and intrinsic hand musculature  -  Sensation intact to light touch bilaterally in axillary, median, radial, and ulnar distributions  - Symmetrical radial pulses      Lab Results   Component Value Date/Time    HGB 12.7 (L) 09/14/2020 11:11 AM    CREATININE 1.0 09/14/2020 11:11 AM       Imaging:    XR Right shoulder (10/3/19):  No acute fracture or dislocation.  Mild AC joint arthropathy is noted.  There is severe joint space narrowing and osteophyte formation seen at the glenohumeral joint.  Prominent subchondral cystic changes noted in the region of the greater tuberosity.  There are some presumed loose bodies seen adjacent to the proximal shaft of the humerus the larger of the 2 measuring approximately 15 mm.    Physician Read: I agree with the above impression.    MRI Right shoulder (7/24/20)  Obliteration of the glenohumeral joint space with rotator cuff tear with multiple loose bodies noted within the long head of the biceps tendon sheath.  Labral tear, synovitis, degenerative changes, and multiple additional findings as outlined above (in report)    Physician Read:  I agree with the above impression.    Assessment/Plan:   Chao Hawk  is a 88 y.o. male with right shoulder rotator cuff tear arthropathy with end-stage glenohumeral arthritis    Plan:    1. Right reverse total shoulder arthroplasty  2. Discussed at length all the risks, benefits, limitations, and alternatives of surgery.  He has discussed preoperative clearance with his primary care physician as well as pulmonologist.  Everyone is in agreement that he is low risk for any adverse event and he has been cleared for surgery.  After reviewing all the risks the consent was freely signed.  3. We will plan for surgery October 5, 2020 at the main hospital occasion on [facility] and he will be admitted overnight following surgery with a plan for discharge home the next day  4. Plan is for aspirin 81 mg b.i.d. as DVT prophylaxis starting postop day 1  5. Follow up on date of  surgery      Reji Martin MD

## 2020-09-16 NOTE — TELEPHONE ENCOUNTER
----- Message from Yocasta Ramesh NP sent at 9/15/2020  1:45 PM CDT -----  Inform pt that he is cleared to proceed with surgery and I will addend my note to reflect this.

## 2020-09-16 NOTE — H&P (VIEW-ONLY)
Orthopaedic Follow-Up Visit    Last Appointment: 9/2/2020  Diagnosis: Right shoulder rotator cuff tear arthropathy  Prior Procedure: none    Chao Hawk Jr. is a 88 y.o. male who is here for f/u evaluation of Right shoulder rotator cuff arthropathy. The patient was last seen here by me on 9/2/2020 at which point we decided to send him for preoperative evaluation and clearance by his pulmonologist and primary care physician.  He has seen both of these providers and both have cleared him for surgery, commenting that there is low risk for any adverse event.  He has had recent transthoracic echo, EKG, blood work, and clinical evaluation.  After all these evaluations, clearance was given.    To review his history, Chao Hawk Jr. is a 88 y.o. left-hand dominant male who presents with right shoulder pain and dysfunction that developed several years ago after insidious onset.  He reports that he has had right shoulder pain which has worsened over the last several years.  He has pain with activities such as reaching overhead or positioning his hand away from his body.  He endorses limited range of motion and pain with activities of daily living.  He does live alone and does everything around the house but needs some assistance with certain chores.  She has tried rest, activity modification, NSAIDs, physical therapy, and corticosteroid injection.  Despite these interventions he continues to have symptoms which interfere with his activities of daily living.  He is interested in proceeding with right reverse total shoulder arthroplasty.    Patient's medications, allergies, past medical, surgical, social and family histories were reviewed and updated as appropriate.    Review of Systems   All systems reviewed were negative.  Specifically, the patient denies fever, chills, weight loss, chest pain, shortness of breath, or dyspnea on exertion.      Past Medical History:   Diagnosis Date    Arthritis     back, hand    Back  pain     Dr. Cristobal- [facility]    Bilateral leg edema 2/16/2018    Chronic bronchitis     Diverticulitis of large intestine with perforation without abscess or bleeding     Diverticulosis 5/16/06    colonoscopy    Hernia     x2    Hyperlipidemia     Hypertension     Lumbar radiculopathy 6/14/2017    Multiple renal cysts 7/5/2016    Tobacco dependence     Trouble in sleeping        Past Surgical History:   Procedure Laterality Date    CATARACT EXTRACTION, BILATERAL Bilateral     EYE SURGERY Bilateral     Glaucoma- Dr. Gianni Payne    FINGER SURGERY Right 1980s    index- Dr. Encarnacion; to remove nodule    HERNIA REPAIR  1990s    x2    LUMBAR SPINE SURGERY  09/13/2017    TONSILLECTOMY, ADENOIDECTOMY  1940       Patient's Medications   New Prescriptions    No medications on file   Previous Medications    ACETAMINOPHEN (TYLENOL) 500 MG TABLET    Take 1,000 mg by mouth 2 (two) times daily as needed for Pain.     ALBUTEROL (PROVENTIL) 2.5 MG /3 ML (0.083 %) NEBULIZER SOLUTION    Take 2.5 mg by nebulization every 6 (six) hours as needed for Wheezing. Rescue    ALBUTEROL (PROVENTIL/VENTOLIN HFA) 90 MCG/ACTUATION INHALER    Inhale 2 puffs into the lungs every 4 (four) hours as needed for Wheezing or Shortness of Breath.    ARGININE, L-ARGININE, ORAL    Take 500 mg by mouth once daily.    ASPIRIN (ECOTRIN) 81 MG EC TABLET    Take 81 mg by mouth every evening.     ATORVASTATIN (LIPITOR) 40 MG TABLET    TAKE 1 TABLET EVERY DAY    CANNABIDIOL, CBD, EXTRACT ORAL    Take 3 drops by mouth every morning.    CHOLECALCIFEROL, VITAMIN D3, (VITAMIN D3) 2,000 UNIT CAP    Take 1 capsule by mouth once daily.    COQ10, UBIQUINOL, ORAL    Take 1 tablet by mouth once daily.    CYANOCOBALAMIN (VITAMIN B-12) 1000 MCG TABLET    Take 100 mcg by mouth once daily.    EPINEPHRINE (EPIPEN) 0.3 MG/0.3 ML (1:1,000) ATIN    Inject 1 each into the muscle once.    FLUTICASONE-UMECLIDIN-VILANTER (TRELEGY ELLIPTA) 100-62.5-25 MCG DSDV    Inhale 1  puff into the lungs once daily.    FUROSEMIDE (LASIX) 20 MG TABLET    Take 1 tablet (20 mg total) by mouth once daily.    GABAPENTIN (NEURONTIN) 100 MG CAPSULE    TAKE 1 CAPSULE (100 MG TOTAL) BY MOUTH 2 (TWO) TIMES DAILY.    GLUCOSAM-MSM-CHONDROIT-VIT D3 ORAL    Take 2 tablets by mouth once daily.    KRILL/OM-3/DHA/EPA/PHOSPHO/AST (MEGARED OMEGA-3 KRILL OIL ORAL)    Take 1 capsule by mouth once daily.    LISINOPRIL 10 MG TABLET    TAKE 1 TABLET EVERY DAY    LOPERAMIDE (IMODIUM A-D) 2 MG TAB    Take 2 mg by mouth as needed.    MULTIVIT-MIN/FOLIC/VIT K/LYCOP (ONE-A-DAY MEN'S MULTIVITAMIN ORAL)    Take 1 tablet by mouth once daily.    OXYGEN-AIR DELIVERY SYSTEMS MISC    Inhale 2 L into the lungs as needed (Oxygen 2L via NC concentrator).    POTASSIUM 99 MG TAB    Take by mouth once.    TAMSULOSIN (FLOMAX) 0.4 MG CAP    TAKE 2 CAPSULES EVERY MORNING   Modified Medications    No medications on file   Discontinued Medications    No medications on file         Family History   Problem Relation Age of Onset    Emphysema Mother     Heart disease Mother     COPD Mother     Appendicitis Father     Heart disease Son     Colon cancer Neg Hx     Cancer Neg Hx     Stroke Neg Hx        Social History     Socioeconomic History    Marital status:      Spouse name: Linsey    Number of children: 4    Years of education: 12 + 4    Highest education level: Not on file   Occupational History    Occupation:  retired age 60     Comment: [employer]   Social Needs    Financial resource strain: Not on file    Food insecurity     Worry: Not on file     Inability: Not on file    Transportation needs     Medical: Not on file     Non-medical: Not on file   Tobacco Use    Smoking status: Former Smoker     Packs/day: 1.00     Years: 40.00     Pack years: 40.00     Start date: 1949     Quit date: 1991     Years since quittin.7    Smokeless tobacco: Never Used   Substance and Sexual Activity     Alcohol use: Yes     Frequency: Monthly or less     Drinks per session: 1 or 2     Binge frequency: Never     Comment: rare    Drug use: No    Sexual activity: Never   Lifestyle    Physical activity     Days per week: Not on file     Minutes per session: Not on file    Stress: Not on file   Relationships    Social connections     Talks on phone: Not on file     Gets together: Not on file     Attends Roman Catholic service: Not on file     Active member of club or organization: Not on file     Attends meetings of clubs or organizations: Not on file     Relationship status: Not on file   Other Topics Concern    Not on file   Social History Narrative    No smokers or pets in household.       Review of patient's allergies indicates:   Allergen Reactions    Bee sting  [allergen ext-venom-honey bee] Swelling    Poison ivy extract Hives    Penicillins Rash         Objective:      Physical Exam  Patient is alert and oriented, no distress. Skin is intact. Neuro is normal with no focal motor or sensory findings.    Cervical exam is unremarkable. Intact cervical ROM. Negative Spurling's test    Physical Exam:  RIGHT    LEFT    Atrophy:   (-)    (-)   Deformity   (-)    (-)  Scap Dyskinesis/Winging (-)    (-)    Tenderness:          Greater Tuberosity             +    (-)  Bicipital Groove  +    (-)  AC joint   (-)    (-)  Other:     ROM:  Forward Elevation 100    180  Abduction  90    120  ER (at side)  30    80  IR   L5    T12    Strength:   Supraspinatus  4/5    5/5  Infraspinatus  4/5    5/5  Subscap / IR  4/5    5/5     Special Tests:   Chase:   +    (-)   SS Stress:   +    (-)   Belly Press:   +    (-)   Lift Off:    +    (-)   [place]'s:   +    (-)   Speeds:   (-)    (-)   Resisted Thrower's:   +    (-)   Cross Arm Abduction:  (-)    (-)    Neurovascular examination  - Motor grossly intact bilaterally to shoulder abduction, elbow flexion and extension, wrist flexion and extension, and intrinsic hand musculature  -  Sensation intact to light touch bilaterally in axillary, median, radial, and ulnar distributions  - Symmetrical radial pulses      Lab Results   Component Value Date/Time    HGB 12.7 (L) 09/14/2020 11:11 AM    CREATININE 1.0 09/14/2020 11:11 AM       Imaging:    XR Right shoulder (10/3/19):  No acute fracture or dislocation.  Mild AC joint arthropathy is noted.  There is severe joint space narrowing and osteophyte formation seen at the glenohumeral joint.  Prominent subchondral cystic changes noted in the region of the greater tuberosity.  There are some presumed loose bodies seen adjacent to the proximal shaft of the humerus the larger of the 2 measuring approximately 15 mm.    Physician Read: I agree with the above impression.    MRI Right shoulder (7/24/20)  Obliteration of the glenohumeral joint space with rotator cuff tear with multiple loose bodies noted within the long head of the biceps tendon sheath.  Labral tear, synovitis, degenerative changes, and multiple additional findings as outlined above (in report)    Physician Read:  I agree with the above impression.    Assessment/Plan:   Chao Hawk  is a 88 y.o. male with right shoulder rotator cuff tear arthropathy with end-stage glenohumeral arthritis    Plan:    1. Right reverse total shoulder arthroplasty  2. Discussed at length all the risks, benefits, limitations, and alternatives of surgery.  He has discussed preoperative clearance with his primary care physician as well as pulmonologist.  Everyone is in agreement that he is low risk for any adverse event and he has been cleared for surgery.  After reviewing all the risks the consent was freely signed.  3. We will plan for surgery October 5, 2020 at the main hospital occasion on [facility] and he will be admitted overnight following surgery with a plan for discharge home the next day  4. Plan is for aspirin 81 mg b.i.d. as DVT prophylaxis starting postop day 1  5. Follow up on date of  surgery      Reji Martin MD

## 2020-09-23 ENCOUNTER — HOSPITAL ENCOUNTER (OUTPATIENT)
Dept: PREADMISSION TESTING | Facility: HOSPITAL | Age: 85
Discharge: HOME | End: 2020-09-23
Attending: STUDENT IN AN ORGANIZED HEALTH CARE EDUCATION/TRAINING PROGRAM
Payer: MEDICARE

## 2020-09-23 VITALS
DIASTOLIC BLOOD PRESSURE: 55 MMHG | HEART RATE: 80 BPM | RESPIRATION RATE: 16 BRPM | SYSTOLIC BLOOD PRESSURE: 115 MMHG | TEMPERATURE: 98 F | OXYGEN SATURATION: 96 %

## 2020-09-23 DIAGNOSIS — J44.9 CHRONIC OBSTRUCTIVE PULMONARY DISEASE, UNSPECIFIED COPD TYPE: Chronic | ICD-10-CM

## 2020-09-23 DIAGNOSIS — R39.12 BENIGN PROSTATIC HYPERPLASIA WITH WEAK URINARY STREAM: Chronic | ICD-10-CM

## 2020-09-23 DIAGNOSIS — I71.40 ABDOMINAL AORTIC ANEURYSM WITHOUT RUPTURE: ICD-10-CM

## 2020-09-23 DIAGNOSIS — M12.811 ROTATOR CUFF ARTHROPATHY OF RIGHT SHOULDER: ICD-10-CM

## 2020-09-23 DIAGNOSIS — E78.00 PURE HYPERCHOLESTEROLEMIA: Chronic | ICD-10-CM

## 2020-09-23 DIAGNOSIS — Z74.09 IMPAIRED FUNCTIONAL MOBILITY, BALANCE, GAIT, AND ENDURANCE: ICD-10-CM

## 2020-09-23 DIAGNOSIS — I27.21 PAH (PULMONARY ARTERY HYPERTENSION): ICD-10-CM

## 2020-09-23 DIAGNOSIS — I10 ESSENTIAL HYPERTENSION: Chronic | ICD-10-CM

## 2020-09-23 DIAGNOSIS — M19.011 GLENOHUMERAL ARTHRITIS, RIGHT: ICD-10-CM

## 2020-09-23 DIAGNOSIS — N40.1 BENIGN PROSTATIC HYPERPLASIA WITH WEAK URINARY STREAM: Chronic | ICD-10-CM

## 2020-09-23 DIAGNOSIS — G47.34 NOCTURNAL HYPOXEMIA: ICD-10-CM

## 2020-09-23 PROBLEM — G62.9 PERIPHERAL NEUROPATHY: Status: ACTIVE | Noted: 2020-09-23

## 2020-09-23 NOTE — ASSESSMENT & PLAN NOTE
Pt uses cane for ambulation   Pt lives alone   Pt counseled to have 24 hour assistance available after shoulder surgery

## 2020-09-23 NOTE — H&P
Preoperative History and Physical                                                             Hospital Medicine      Chief Complaint: Preoperative evaluation     History of Present Illness:      Chao Hawk Jr. is a 88 y.o. male who presents to the office today for a preoperative consultation at the request of Dr. Martin who plans on performing Right reverse total shoulder arthroplasty on 10/5/20    Functional Status:      The patient is able to climb a flight of stairs but with difficulty. The patient is able to ambulate 100-200 feet without difficulty with cane. The patient's functional status is affected by the surgical problem. The patient's functional status is affected by chronic STEVENS. Pt denies shortness of breath at rest, chest pain, dyspnea on exertion above baseline and fatigue.    MET score less than 4    Past Medical History:      Past Medical History:   Diagnosis Date    Arthritis     back, hand    Back pain     Dr. Cristobal- [facility]    Bilateral leg edema 2/16/2018    COPD (chronic obstructive pulmonary disease)     Diverticulitis of large intestine with perforation without abscess or bleeding     Diverticulosis 5/16/06    colonoscopy    Hernia     x2    Hyperlipidemia     Hypertension     Lumbar radiculopathy 6/14/2017    Multiple renal cysts 7/5/2016    Nocturnal hypoxemia     Tobacco dependence     Trouble in sleeping         Past Surgical History:      Past Surgical History:   Procedure Laterality Date    FINGER SURGERY Left 1980s    index- Dr. Encarnacion; to remove nodule    HERNIA REPAIR  1990s    x2    INTRAOCULAR LENS INSERTION Bilateral 2016    LUMBAR SPINE SURGERY  09/13/2017    TONSILLECTOMY, ADENOIDECTOMY  1940        Social History:      Social History     Socioeconomic History    Marital status:      Spouse name: Linsey    Number of children: 4    Years of education: 12 + 4    Highest education level: Not on file    Occupational History    Occupation:  retired age 60     Comment: [employer]   Social Needs    Financial resource strain: Not on file    Food insecurity     Worry: Not on file     Inability: Not on file    Transportation needs     Medical: Not on file     Non-medical: Not on file   Tobacco Use    Smoking status: Former Smoker     Packs/day: 1.00     Years: 40.00     Pack years: 40.00     Start date: 1949     Quit date: 1991     Years since quittin.7    Smokeless tobacco: Never Used   Substance and Sexual Activity    Alcohol use: Not Currently     Frequency: Monthly or less     Drinks per session: 1 or 2     Binge frequency: Never     Comment: rare    Drug use: No    Sexual activity: Never   Lifestyle    Physical activity     Days per week: Not on file     Minutes per session: Not on file    Stress: Not on file   Relationships    Social connections     Talks on phone: Not on file     Gets together: Not on file     Attends Spiritism service: Not on file     Active member of club or organization: Not on file     Attends meetings of clubs or organizations: Not on file     Relationship status: Not on file   Other Topics Concern    Not on file   Social History Narrative    No smokers or pets in household.        Family History:      Family History   Problem Relation Age of Onset    Emphysema Mother     Heart disease Mother     COPD Mother     Appendicitis Father     Heart disease Son     Atrial fibrillation Sister     Lung disease Sister 91    Colon cancer Neg Hx     Cancer Neg Hx     Stroke Neg Hx        Allergies:      Review of patient's allergies indicates:   Allergen Reactions    Bee sting  [allergen ext-venom-honey bee] Swelling    Poison ivy extract Hives    Penicillins Rash       Medications:      Current Outpatient Medications   Medication Sig    acetaminophen (TYLENOL) 500 MG tablet Take 1,000 mg by mouth 2 (two) times daily as needed for Pain.      albuterol (PROVENTIL) 2.5 mg /3 mL (0.083 %) nebulizer solution Take 2.5 mg by nebulization every evening. Rescue     ARGININE, L-ARGININE, ORAL Take 500 mg by mouth once daily.    aspirin (ECOTRIN) 81 MG EC tablet Take 81 mg by mouth every evening.     atorvastatin (LIPITOR) 40 MG tablet TAKE 1 TABLET EVERY DAY (Patient taking differently: Take 40 mg by mouth once daily. )    CANNABIDIOL, CBD, EXTRACT ORAL Take 3 drops by mouth every morning.    cholecalciferol, vitamin D3, (VITAMIN D3) 2,000 unit Cap Take 1 capsule by mouth once daily.    COQ10, UBIQUINOL, ORAL Take 1 tablet by mouth once daily.    cyanocobalamin (VITAMIN B-12) 1000 MCG tablet Take 100 mcg by mouth once daily.    fluticasone-umeclidin-vilanter (TRELEGY ELLIPTA) 100-62.5-25 mcg DsDv Inhale 1 puff into the lungs once daily.    furosemide (LASIX) 20 MG tablet Take 1 tablet (20 mg total) by mouth once daily.    gabapentin (NEURONTIN) 100 MG capsule TAKE 1 CAPSULE (100 MG TOTAL) BY MOUTH 2 (TWO) TIMES DAILY. (Patient taking differently: Take 100 mg by mouth once daily. )    GLUCOSAM-MSM-CHONDROIT-VIT D3 ORAL Take 2 tablets by mouth once daily.    krill/om-3/dha/epa/phospho/ast (MEGARED OMEGA-3 KRILL OIL ORAL) Take 1 capsule by mouth once daily.    lisinopril 10 MG tablet TAKE 1 TABLET EVERY DAY    multivit-min/folic/vit K/lycop (ONE-A-DAY MEN'S MULTIVITAMIN ORAL) Take 1 tablet by mouth once daily.    potassium 99 mg Tab Take by mouth once daily.     tamsulosin (FLOMAX) 0.4 mg Cap TAKE 2 CAPSULES EVERY MORNING    epinephrine (EPIPEN) 0.3 mg/0.3 mL (1:1,000) AtIn Inject 1 each into the muscle once.    OXYGEN-AIR DELIVERY SYSTEMS [facility] 2 L into the lungs as needed (Oxygen 2L via NC concentrator).     No current facility-administered medications for this encounter.        Vitals:      Vitals:    09/23/20 0918   BP: (!) 115/55   Pulse: 80   Resp: 16   Temp: 97.9 °F (36.6 °C)       Review of Systems:        Constitutional: Negative  for fever, chills, weight loss, malaise/fatigue and diaphoresis.   HENT: Negative for hearing loss, ear pain, nosebleeds, congestion, sore throat, neck pain, tinnitus and ear discharge.    Eyes: Negative for blurred vision, double vision, photophobia, pain, discharge and redness.   Respiratory: +chronic STEVENS- not above baseline, occasional wheezing Negative for cough, hemoptysis, sputum production, shortness of breath, and stridor.    Cardiovascular: +occasional BLE swelling Negative for chest pain, palpitations, orthopnea, claudication,and PND.   Gastrointestinal: Negative for heartburn, nausea, vomiting, abdominal pain, diarrhea, constipation, blood in stool and melena.   Genitourinary: Negative for dysuria, urgency, frequency, hematuria and flank pain.   Musculoskeletal: +right shoulder pain and limited ROM, Some back pain Negative for myalgias  and falls.   Skin: Negative for itching and rash.   Neurological: +BLE numbness since back surgery Negative for dizziness, tingling, tremors, sensory change, speech change, focal weakness, seizures, loss of consciousness, weakness and headaches.   Endo/Heme/Allergies:+environmental allergies Negative for  and polydipsia. Does not bruise/bleed easily.   Psychiatric/Behavioral: Negative for depression, suicidal ideas, hallucinations, memory loss and substance abuse. The patient is not nervous/anxious and does not have insomnia.    All 14 systems reviewed and negative except as noted above.    Physical Exam:      Constitutional: Appears elderly and frail  and in no acute distress.  Patient is oriented to person, place, and time. +Ambulates with cane   Head: Normocephalic and atraumatic. Mucous membranes moist.  Neck: Neck supple no mass.   Cardiovascular: Normal rate and regular rhythm.  S1 S2 appreciated by ascultation.  Pulmonary/Chest: Effort normal and diminished to auscultation bilaterally. No respiratory distress.   Abdomen: Soft. Non-tender and non-distended. Bowel  sounds are normal.   Neurological: Patient is alert and oriented to person, place and time. Moves all extremities.  Skin: Warm and dry. No lesions.  Extremities: No clubbing, cyanosis. +trace BLE edema     Laboratory data:      Reviewed and noted in plan where applicable. Please see chart for full laboratory data.    No results for input(s): CPK, CPKMB, TROPONINI, MB in the last 24 hours. No results for input(s): POCTGLUCOSE in the last 24 hours.     Lab Results   Component Value Date    INR 1.0 2018    INR 1.0 2018    INR 1.0 2017       Lab Results   Component Value Date    WBC 7.80 2020    HGB 12.7 (L) 2020    HCT 39.8 (L) 2020    MCV 98 2020     2020       No results for input(s): GLU, NA, K, CL, CO2, BUN, CREATININE, CALCIUM, MG in the last 24 hours.    Predictors of intubation difficulty:       Morbid obesity? no   Anatomically abnormal facies? no   Prominent incisors? no   Receding mandible? no   Short, thick neck? no   Neck range of motion: normal   Dentition: Edentulous    Cardiographics:      EC20  Sinus rhythm with Premature atrial complexes   Left axis deviation   Incomplete right bundle branch block   Anteroseptal infarct (cited on or before 14-SEP-2020)   Abnormal ECG   When compared with ECG of 2019 09:32,   Previous ECG has undetermined rhythm, needs review   Questionable change in initial forces of Anterior leads   QT has shortened   Confirmed by IFEANYI SANDOVAL, RUBEN ([id number]) on 2020 3:42:37 PM   Echocardiogram: 20  · Concentric left ventricular remodeling.  · Normal central venous pressure (3 mmHg).  · The estimated PA systolic pressure is 35 mmHg.  · Normal left ventricular systolic function. The estimated ejection fraction is 55%.  · Normal LV diastolic function.  · No wall motion abnormalities.     Stress test 19  free of evidence for myocardial ischemia or injury  LVEF: >= 70 %  Imaging:      Chest x-ray: 20    Calcified granuloma at the right lower lung .  No focal consolidation.  Cardiomediastinal silhouette is not enlarged.  Aortic atherosclerosis.  Thoracic spondylosis.  Assessment and Plan:      Rotator cuff arthropathy of right shoulder  The patient is scheduled for a Right reverse total shoulder arthroplasty on 10/5/20 by Dr. Martin     Known risk factors for perioperative complications:      Difficulty with intubation is not anticipated.    Cardiac Risk Estimation: low to moderate intraoperative cardiac risk for moderate risk surgery    1.) Preoperative workup as follows: none.  2.) Change in medication regimen before surgery:discontinue antihypertensives (Lisinopril and Lasix am of surgery) to avoid hypotension from anesthesia, Hold CBD oil/fish oil, CoQ10, and MVI 5 days prior to surgery  3.) Prophylaxis for cardiac events with perioperative beta-blockers: not indicated.  4.) Invasive hemodynamic monitoring perioperatively: not indicated.  5.) Deep vein thrombosis prophylaxis postoperatively: regimen to be chosen by surgical team.  6.) Surveillance for postoperative MI with ECG immediately postoperatively and on postoperati ve days 1 and 2 AND troponin levels 24 hours postoperatively and on day 4 or hospital discharge (whichever comes first): not indicated.  7.) Current medications which may produce withdrawal symptoms if withheld perioperatively: none   8.) Other measures: None     Right glenohumeral arthritis -end stage  See above     COPD (chronic obstructive pulmonary disease)  Followed by Pulmonology-see last visit note 9/11/20  Last PFTs 8/5/19 showed Severe obstruction. FEV1  46.41 %  predicted.Improvement in airflow following bronchodilator therapy suggests an asthmatic component.Mild restriction. Moderate reduction in diffusion capacity - adjusted for hemoglobin. (DLCO > or equal to 40% and < 60% predicted). Pattern of mixed obstruction and restriction. Clinical correlation suggested. Overall there  is severe ventilatory impairment. (FEV1> or equal to 35%   Last Six minute walk/pulmonary stress test 9/11/20 was normal. He completed without stopping. Lowest O2 sat was 91%  Pt denies any recent COPD exacerbations   Pt is noncompliant with Trelegy inhaler.  Instructed pt on use of Trelegy inhaler and to use daily as instructed in preparation for surgery  Cont Albuterol Neb tx nightly     Nocturnal hypoxemia  Overnight pulse oximetry 1/2019 was abnormal and pt qualified for nightly oxygen -he has a home oxygen concentrator. No portable oxygen   Pt reports he does not wear his oxygen at night - counseled on compliance     PAH (pulmonary artery hypertension)  Followed by Pulmonology and Cardiology   Last Echo 8/12/2020  · Concentric left ventricular remodeling.  · Normal central venous pressure (3 mmHg).  · The estimated PA systolic pressure is 35 mmHg.  · Normal left ventricular systolic function. The estimated ejection fraction is 55%.  · Normal LV diastolic function.  · No wall motion abnormalities.    Essential hypertension  /55 in office today   Cont Lisinopril and Lasix daily -hold am of surgery    BPH (benign prostatic hyperplasia)  Pt reports symptoms are stable -no outflow/urniary retnetion problems  Cont Flomax     Abdominal aortic aneurysm without rupture  Abd U/S 2/5/20 showed  The infrarenal abdominal aortic aneurysm measures up to 3.4 cm in length  Followed by PCP and Cardiology     Pure hypercholesterolemia  Cont Lipitor     Impaired functional mobility, balance, gait, and endurance  Pt uses cane for ambulation   Pt lives alone   Pt counseled to have 24 hour assistance available after shoulder surgery     Peripheral neuropathy  Pt reports leg numbness after back surgery  cont Gabapentin

## 2020-09-23 NOTE — DISCHARGE INSTRUCTIONS
To confirm, Your doctor has instructed you that surgery is scheduled for 10/5/20 *.       Please report to [facility] .  Pre admit office will call afternoon prior to surgery with final arrival time    INSTRUCTIONS IMPORTANT!!!   Do not eat, drink, or smoke after 12 midnight-including water. OK to brush teeth, no gum, candy or mints!    ¨ Take only these medicines with a small swallow of water-morning of surgery.  Lipitor, Gabapentin, Flomax    Pre operative instructions:  Please review the Pre-Operative Instruction booklet that you were given.        Bathing Instructions--See page 6 in the Pre-operative booklet.      Prevention of surgical site infections:     -Keep incisions clean and dry.   -Do not soak/submerge incisions in water until completely healed.   -Do not apply lotions, powders, creams, or deodorants to site.   -Always make sure hands are cleaned with antibacterial soap/ alcohol-based  prior to touching the surgical site.  (This includes doctors, nurses, staff, and yourself.)    Signs and symptoms:   -Redness and pain around the area where you had surgery   -Drainage of cloudy fluid from your surgical wound   -Fever over 100.4       I have read or had read and explained to me, and understand the above information.  Additional comments or instructions:  Received a copy of Pre-operative instructions booklet, FAQ surgical site infection sheet, and packets of hibiclens (if indicated).

## 2020-09-23 NOTE — ASSESSMENT & PLAN NOTE
The patient is scheduled for a Right reverse total shoulder arthroplasty on 10/5/20 by Dr. Martin     Known risk factors for perioperative complications:      Difficulty with intubation is not anticipated.    Cardiac Risk Estimation: low to moderate intraoperative cardiac risk for moderate risk surgery    1.) Preoperative workup as follows: none.  2.) Change in medication regimen before surgery:discontinue antihypertensives (Lisinopril and Lasix am of surgery) to avoid hypotension from anesthesia, Hold CBD oil/fish oil, CoQ10, and MVI 5 days prior to surgery  3.) Prophylaxis for cardiac events with perioperative beta-blockers: not indicated.  4.) Invasive hemodynamic monitoring perioperatively: not indicated.  5.) Deep vein thrombosis prophylaxis postoperatively: regimen to be chosen by surgical team.  6.) Surveillance for postoperative MI with ECG immediately postoperatively and on postoperati ve days 1 and 2 AND troponin levels 24 hours postoperatively and on day 4 or hospital discharge (whichever comes first): not indicated.  7.) Current medications which may produce withdrawal symptoms if withheld perioperatively: none   8.) Other measures: None

## 2020-09-23 NOTE — ASSESSMENT & PLAN NOTE
Followed by Pulmonology and Cardiology   Last Echo 8/12/2020  · Concentric left ventricular remodeling.  · Normal central venous pressure (3 mmHg).  · The estimated PA systolic pressure is 35 mmHg.  · Normal left ventricular systolic function. The estimated ejection fraction is 55%.  · Normal LV diastolic function.  · No wall motion abnormalities.

## 2020-09-23 NOTE — ASSESSMENT & PLAN NOTE
Followed by Pulmonology-see last visit note 9/11/20  Last PFTs 8/5/19 showed Severe obstruction. FEV1  46.41 %  predicted.Improvement in airflow following bronchodilator therapy suggests an asthmatic component.Mild restriction. Moderate reduction in diffusion capacity - adjusted for hemoglobin. (DLCO > or equal to 40% and < 60% predicted). Pattern of mixed obstruction and restriction. Clinical correlation suggested. Overall there is severe ventilatory impairment. (FEV1> or equal to 35%   Pt denies any recent COPD exacerbations    Pt is noncompliant with Trelegy inhaler.  Instructed pt on use of Trelegy inhaler and to use daily as instructed in preparation for surgery  Cont Albuterol Neb tx nightly

## 2020-09-23 NOTE — ASSESSMENT & PLAN NOTE
Abd U/S 2/5/20 showed  The infrarenal abdominal aortic aneurysm measures up to 3.4 cm in length  Followed by PCP and Cardiology

## 2020-09-23 NOTE — ASSESSMENT & PLAN NOTE
Last Six minute walk pulmonary stress test 9/11/20 was normal. He completed without stopping. Lowest O2 sat was 91%  Overnight pulse oximetry was abnormal 1/2019 and pt qualified for oxygen -he has a concentrator. No portable oxygen   Pt reports he does not wear his oxygen at night - counseled on compliance

## 2020-09-29 ENCOUNTER — PATIENT MESSAGE (OUTPATIENT)
Dept: OTHER | Facility: OTHER | Age: 85
End: 2020-09-29

## 2020-09-30 DIAGNOSIS — Z01.818 PREOP TESTING: Primary | ICD-10-CM

## 2020-09-30 NOTE — PRE ADMISSION SCREENING
Pre op instructions reviewed with patient per phone:    To confirm, Your surgeon has instructed you:  Surgery is scheduled 10/05/20at 0700.      [facility] Department [facility] by 0530.  Pre admit office to call afternoon prior to surgery with final arrival time.  If surgery is on Monday, Pre admit office to call Friday afternoon with with final arrival time.    Covid 19 testing is scheduled for 10/02/20 at 1010 @ [facility]  Please self quarantine after Covid testing, prior to surgery    INSTRUCTIONS IMPORTANT!!!  ¨ No smoking after 12 midnight, the night before surgery.  ¨ No solid food after 12 midnight, but you may have clear liquids up until 3 hours prior to surgery.  This includes: grape, cranberry, and apple juice (not orange, and no coffee.)   ¨ OK to brush teeth, but no gum, candy or mints!    ¨ Take only these medicines with a small swallow of water-morning of surgery.  Lipitor, Gabapentin, use Trelegy inhaler             ____   Due to COVID 19 concerns, 1 visitor will be allowed in the pre operative area, and must adhere to social distancing guidelines.  One visitor/family member is currently allowed to visit in-patient rooms from 08:00 a.m to 6:00 p.m  ____   Family/caregivers will be updated re pt status via text/cell phone      ____  Do not wear makeup, including mascara.  ____  No powder, lotions or creams to surgical area.  ____  Please remove all jewelry, including piercings and leave at home.  ____  No money or valuables needed. Please leave at home.  ____  Please bring identification and insurance information to hospital.  ____  If going home the same day, arrange for a ride home. You will not be able to   drive if Anesthesia was used.  ____  Children, under 12 years old, must remain in the waiting room with an adult.  They are not allowed in patient areas.  ____  Wear loose fitting clothing. Allow for dressings, bandages.  ____  Stop Aspirin,  Ibuprofen, Motrin and Aleve at least 5-7 days before surgery, unless otherwise instructed by your doctor, or the nurse.   You MAY use Tylenol/acetaminophen until day of surgery.  ____  If you take diabetic medication, do not take am of surgery unless instructed by   Doctor.  ____ Stop taking any Fish Oil supplement or any Vitamins that contain Vitamin E at least 5 days prior to surgery.          Bathing Instructions-- The night before surgery and the morning prior to coming to the hospital:   -Do not shave the surgical area.   -Shower and wash your hair and body as usual with your regular soap and shampoo.   -Rinse your hair and body completely.   -Use one packet of hibiclens to wash the surgical site (using your hand) gently for 5 minutes.  Do not scrub you skin too hard.   -Do not use hibiclens on your head, face, or genitals.   -Do not wash with regular soap after you use the hibiclens.   -Rinse your body thoroughly.   -Dry with clean, soft towel.  Do not use lotion, cream, deodorant, or powders on   the surgical site.    Use antibacterial soap in place of hibiclens if your surgery is on the head, face or genitals.         Surgical Site Infection    Prevention of surgical site infections:     -Keep incisions clean and dry.   -Do not soak/submerge incisions in water until completely healed.   -Do not apply lotions, powders, creams, or deodorants to site.   -Always make sure hands are cleaned with antibacterial soap/ alcohol-based   prior to touching the surgical site.  (This includes doctors, nurses, staff, and yourself.)    Signs and symptoms:   -Redness and pain around the area where you had surgery   -Drainage of cloudy fluid from your surgical wound   -Fever over 100.4  I have read or had read and explained to me, and understand the above information.

## 2020-10-02 ENCOUNTER — TELEPHONE (OUTPATIENT)
Dept: ORTHOPEDICS | Facility: CLINIC | Age: 85
End: 2020-10-02

## 2020-10-02 NOTE — TELEPHONE ENCOUNTER
Spoke w/ patient in regards to his lab appt he has scheduled and to also inform him that they will give him a red bracelet and that he has to keep it on in order to have surgery. Patient verbalized understnading and was grateful for the call. -DD    ----- Message from Zayda Villarreal sent at 10/2/2020  1:41 PM CDT -----  Regarding: Returning a missed call  Type:  Patient Returning Call    Who Called: Pt   Who Left Message for Patient:nurse  Does the patient know what this is regarding?:no   Would the patient rather a call back or a response via [facility]? Call back   Best Call Back Number: [phone]  Additional Information: n/a

## 2020-10-05 ENCOUNTER — ANESTHESIA (OUTPATIENT)
Dept: SURGERY | Facility: HOSPITAL | Age: 85
DRG: 483 | End: 2020-10-05
Payer: MEDICARE

## 2020-10-05 ENCOUNTER — ANESTHESIA EVENT (OUTPATIENT)
Dept: SURGERY | Facility: HOSPITAL | Age: 85
DRG: 483 | End: 2020-10-05
Payer: MEDICARE

## 2020-10-05 ENCOUNTER — HOSPITAL ENCOUNTER (INPATIENT)
Facility: HOSPITAL | Age: 85
LOS: 1 days | Discharge: HOME HEALTH | DRG: 483 | End: 2020-10-06
Attending: STUDENT IN AN ORGANIZED HEALTH CARE EDUCATION/TRAINING PROGRAM | Admitting: STUDENT IN AN ORGANIZED HEALTH CARE EDUCATION/TRAINING PROGRAM
Payer: MEDICARE

## 2020-10-05 DIAGNOSIS — Z96.611 S/P REVERSE TOTAL SHOULDER ARTHROPLASTY, RIGHT: Primary | ICD-10-CM

## 2020-10-05 DIAGNOSIS — M12.811 ROTATOR CUFF ARTHROPATHY OF RIGHT SHOULDER: ICD-10-CM

## 2020-10-05 PROCEDURE — 64415 NJX AA&/STRD BRCH PLXS IMG: CPT | Mod: HCNC | Performed by: NURSE ANESTHETIST, CERTIFIED REGISTERED

## 2020-10-05 PROCEDURE — 63600175 PHARM REV CODE 636 W HCPCS: Mod: HCNC | Performed by: NURSE ANESTHETIST, CERTIFIED REGISTERED

## 2020-10-05 PROCEDURE — 36000711: Mod: HCNC | Performed by: STUDENT IN AN ORGANIZED HEALTH CARE EDUCATION/TRAINING PROGRAM

## 2020-10-05 PROCEDURE — 71000039 HC RECOVERY, EACH ADD'L HOUR: Mod: HCNC | Performed by: STUDENT IN AN ORGANIZED HEALTH CARE EDUCATION/TRAINING PROGRAM

## 2020-10-05 PROCEDURE — 71000033 HC RECOVERY, INTIAL HOUR: Mod: HCNC | Performed by: STUDENT IN AN ORGANIZED HEALTH CARE EDUCATION/TRAINING PROGRAM

## 2020-10-05 PROCEDURE — 37000009 HC ANESTHESIA EA ADD 15 MINS: Mod: HCNC | Performed by: STUDENT IN AN ORGANIZED HEALTH CARE EDUCATION/TRAINING PROGRAM

## 2020-10-05 PROCEDURE — C1776 JOINT DEVICE (IMPLANTABLE): HCPCS | Mod: HCNC | Performed by: STUDENT IN AN ORGANIZED HEALTH CARE EDUCATION/TRAINING PROGRAM

## 2020-10-05 PROCEDURE — 36000710: Mod: HCNC | Performed by: STUDENT IN AN ORGANIZED HEALTH CARE EDUCATION/TRAINING PROGRAM

## 2020-10-05 PROCEDURE — 25000003 PHARM REV CODE 250: Mod: HCNC | Performed by: STUDENT IN AN ORGANIZED HEALTH CARE EDUCATION/TRAINING PROGRAM

## 2020-10-05 PROCEDURE — 94761 N-INVAS EAR/PLS OXIMETRY MLT: CPT | Mod: HCNC

## 2020-10-05 PROCEDURE — 23472 PR RECONSTR TOTAL SHOULDER IMPLANT: ICD-10-PCS | Mod: RT,,, | Performed by: STUDENT IN AN ORGANIZED HEALTH CARE EDUCATION/TRAINING PROGRAM

## 2020-10-05 PROCEDURE — 63600175 PHARM REV CODE 636 W HCPCS: Mod: HCNC | Performed by: STUDENT IN AN ORGANIZED HEALTH CARE EDUCATION/TRAINING PROGRAM

## 2020-10-05 PROCEDURE — 25000242 PHARM REV CODE 250 ALT 637 W/ HCPCS: Mod: HCNC | Performed by: NURSE PRACTITIONER

## 2020-10-05 PROCEDURE — 94640 AIRWAY INHALATION TREATMENT: CPT | Mod: HCNC

## 2020-10-05 PROCEDURE — 11000001 HC ACUTE MED/SURG PRIVATE ROOM: Mod: HCNC

## 2020-10-05 PROCEDURE — 37000008 HC ANESTHESIA 1ST 15 MINUTES: Mod: HCNC | Performed by: STUDENT IN AN ORGANIZED HEALTH CARE EDUCATION/TRAINING PROGRAM

## 2020-10-05 PROCEDURE — C1713 ANCHOR/SCREW BN/BN,TIS/BN: HCPCS | Mod: HCNC | Performed by: STUDENT IN AN ORGANIZED HEALTH CARE EDUCATION/TRAINING PROGRAM

## 2020-10-05 PROCEDURE — 23472 RECONSTRUCT SHOULDER JOINT: CPT | Mod: RT,,, | Performed by: STUDENT IN AN ORGANIZED HEALTH CARE EDUCATION/TRAINING PROGRAM

## 2020-10-05 PROCEDURE — 76942 ECHO GUIDE FOR BIOPSY: CPT | Mod: HCNC | Performed by: NURSE ANESTHETIST, CERTIFIED REGISTERED

## 2020-10-05 PROCEDURE — 27000221 HC OXYGEN, UP TO 24 HOURS: Mod: HCNC

## 2020-10-05 PROCEDURE — 25000003 PHARM REV CODE 250: Mod: HCNC | Performed by: NURSE ANESTHETIST, CERTIFIED REGISTERED

## 2020-10-05 PROCEDURE — 27201423 OPTIME MED/SURG SUP & DEVICES STERILE SUPPLY: Mod: HCNC | Performed by: STUDENT IN AN ORGANIZED HEALTH CARE EDUCATION/TRAINING PROGRAM

## 2020-10-05 PROCEDURE — 99900035 HC TECH TIME PER 15 MIN (STAT): Mod: HCNC

## 2020-10-05 PROCEDURE — 25000242 PHARM REV CODE 250 ALT 637 W/ HCPCS: Mod: HCNC | Performed by: STUDENT IN AN ORGANIZED HEALTH CARE EDUCATION/TRAINING PROGRAM

## 2020-10-05 DEVICE — IMPLANTABLE DEVICE: Type: IMPLANTABLE DEVICE | Site: SHOULDER | Status: FUNCTIONAL

## 2020-10-05 RX ORDER — KETAMINE HYDROCHLORIDE 50 MG/ML
INJECTION, SOLUTION INTRAMUSCULAR; INTRAVENOUS
Status: DISCONTINUED | OUTPATIENT
Start: 2020-10-05 | End: 2020-10-05

## 2020-10-05 RX ORDER — ALBUTEROL SULFATE 0.83 MG/ML
2.5 SOLUTION RESPIRATORY (INHALATION)
Status: DISCONTINUED | OUTPATIENT
Start: 2020-10-05 | End: 2020-10-06 | Stop reason: HOSPADM

## 2020-10-05 RX ORDER — ONDANSETRON 2 MG/ML
4 INJECTION INTRAMUSCULAR; INTRAVENOUS DAILY PRN
Status: DISCONTINUED | OUTPATIENT
Start: 2020-10-05 | End: 2020-10-05 | Stop reason: HOSPADM

## 2020-10-05 RX ORDER — ALBUTEROL SULFATE 0.83 MG/ML
2.5 SOLUTION RESPIRATORY (INHALATION) EVERY 4 HOURS
Status: DISCONTINUED | OUTPATIENT
Start: 2020-10-05 | End: 2020-10-05

## 2020-10-05 RX ORDER — ATORVASTATIN CALCIUM 40 MG/1
40 TABLET, FILM COATED ORAL DAILY
Status: DISCONTINUED | OUTPATIENT
Start: 2020-10-05 | End: 2020-10-06 | Stop reason: HOSPADM

## 2020-10-05 RX ORDER — SODIUM CHLORIDE, SODIUM LACTATE, POTASSIUM CHLORIDE, CALCIUM CHLORIDE 600; 310; 30; 20 MG/100ML; MG/100ML; MG/100ML; MG/100ML
INJECTION, SOLUTION INTRAVENOUS CONTINUOUS PRN
Status: DISCONTINUED | OUTPATIENT
Start: 2020-10-05 | End: 2020-10-05

## 2020-10-05 RX ORDER — FUROSEMIDE 20 MG/1
20 TABLET ORAL DAILY
Status: DISCONTINUED | OUTPATIENT
Start: 2020-10-05 | End: 2020-10-06 | Stop reason: HOSPADM

## 2020-10-05 RX ORDER — ASPIRIN 81 MG/1
81 TABLET ORAL 2 TIMES DAILY
Status: DISCONTINUED | OUTPATIENT
Start: 2020-10-05 | End: 2020-10-06 | Stop reason: HOSPADM

## 2020-10-05 RX ORDER — ONDANSETRON 2 MG/ML
INJECTION INTRAMUSCULAR; INTRAVENOUS
Status: DISCONTINUED | OUTPATIENT
Start: 2020-10-05 | End: 2020-10-05

## 2020-10-05 RX ORDER — ACETAMINOPHEN 500 MG
1000 TABLET ORAL EVERY 8 HOURS PRN
Status: DISCONTINUED | OUTPATIENT
Start: 2020-10-05 | End: 2020-10-06 | Stop reason: HOSPADM

## 2020-10-05 RX ORDER — TRANEXAMIC ACID 100 MG/ML
1000 INJECTION, SOLUTION INTRAVENOUS ONCE
Status: DISCONTINUED | OUTPATIENT
Start: 2020-10-05 | End: 2020-10-05 | Stop reason: HOSPADM

## 2020-10-05 RX ORDER — TRANEXAMIC ACID 100 MG/ML
1000 INJECTION, SOLUTION INTRAVENOUS
Status: COMPLETED | OUTPATIENT
Start: 2020-10-05 | End: 2020-10-05

## 2020-10-05 RX ORDER — SODIUM CHLORIDE 0.9 % (FLUSH) 0.9 %
10 SYRINGE (ML) INJECTION
Status: DISCONTINUED | OUTPATIENT
Start: 2020-10-05 | End: 2020-10-06 | Stop reason: HOSPADM

## 2020-10-05 RX ORDER — OXYCODONE AND ACETAMINOPHEN 5; 325 MG/1; MG/1
1 TABLET ORAL
Status: DISCONTINUED | OUTPATIENT
Start: 2020-10-05 | End: 2020-10-05 | Stop reason: HOSPADM

## 2020-10-05 RX ORDER — DOCUSATE SODIUM 100 MG/1
100 CAPSULE, LIQUID FILLED ORAL 2 TIMES DAILY
Status: DISCONTINUED | OUTPATIENT
Start: 2020-10-05 | End: 2020-10-06 | Stop reason: HOSPADM

## 2020-10-05 RX ORDER — NEOSTIGMINE METHYLSULFATE 1 MG/ML
INJECTION, SOLUTION INTRAVENOUS
Status: DISCONTINUED | OUTPATIENT
Start: 2020-10-05 | End: 2020-10-05

## 2020-10-05 RX ORDER — OXYCODONE HYDROCHLORIDE 5 MG/1
10 TABLET ORAL EVERY 4 HOURS PRN
Status: DISCONTINUED | OUTPATIENT
Start: 2020-10-05 | End: 2020-10-06 | Stop reason: HOSPADM

## 2020-10-05 RX ORDER — DEXAMETHASONE SODIUM PHOSPHATE 4 MG/ML
INJECTION, SOLUTION INTRA-ARTICULAR; INTRALESIONAL; INTRAMUSCULAR; INTRAVENOUS; SOFT TISSUE
Status: DISCONTINUED | OUTPATIENT
Start: 2020-10-05 | End: 2020-10-05

## 2020-10-05 RX ORDER — PROPOFOL 10 MG/ML
VIAL (ML) INTRAVENOUS
Status: DISCONTINUED | OUTPATIENT
Start: 2020-10-05 | End: 2020-10-05

## 2020-10-05 RX ORDER — HYDROMORPHONE HYDROCHLORIDE 2 MG/ML
0.2 INJECTION, SOLUTION INTRAMUSCULAR; INTRAVENOUS; SUBCUTANEOUS EVERY 5 MIN PRN
Status: DISCONTINUED | OUTPATIENT
Start: 2020-10-05 | End: 2020-10-05 | Stop reason: HOSPADM

## 2020-10-05 RX ORDER — TAMSULOSIN HYDROCHLORIDE 0.4 MG/1
2 CAPSULE ORAL EVERY MORNING
Status: DISCONTINUED | OUTPATIENT
Start: 2020-10-05 | End: 2020-10-06 | Stop reason: HOSPADM

## 2020-10-05 RX ORDER — OXYCODONE HYDROCHLORIDE 5 MG/1
5 TABLET ORAL EVERY 4 HOURS PRN
Status: DISCONTINUED | OUTPATIENT
Start: 2020-10-05 | End: 2020-10-06 | Stop reason: HOSPADM

## 2020-10-05 RX ORDER — LISINOPRIL 10 MG/1
10 TABLET ORAL DAILY
Status: DISCONTINUED | OUTPATIENT
Start: 2020-10-05 | End: 2020-10-06 | Stop reason: HOSPADM

## 2020-10-05 RX ORDER — LIDOCAINE HYDROCHLORIDE 10 MG/ML
INJECTION, SOLUTION EPIDURAL; INFILTRATION; INTRACAUDAL; PERINEURAL
Status: DISCONTINUED | OUTPATIENT
Start: 2020-10-05 | End: 2020-10-05

## 2020-10-05 RX ORDER — CEFAZOLIN SODIUM 2 G/50ML
2 SOLUTION INTRAVENOUS
Status: COMPLETED | OUTPATIENT
Start: 2020-10-05 | End: 2020-10-05

## 2020-10-05 RX ORDER — ONDANSETRON 2 MG/ML
4 INJECTION INTRAMUSCULAR; INTRAVENOUS EVERY 12 HOURS PRN
Status: DISCONTINUED | OUTPATIENT
Start: 2020-10-05 | End: 2020-10-06 | Stop reason: HOSPADM

## 2020-10-05 RX ORDER — GABAPENTIN 100 MG/1
100 CAPSULE ORAL DAILY
Status: DISCONTINUED | OUTPATIENT
Start: 2020-10-05 | End: 2020-10-06 | Stop reason: HOSPADM

## 2020-10-05 RX ORDER — EPHEDRINE SULFATE 50 MG/ML
INJECTION, SOLUTION INTRAVENOUS
Status: DISCONTINUED | OUTPATIENT
Start: 2020-10-05 | End: 2020-10-05

## 2020-10-05 RX ORDER — ALBUTEROL SULFATE 0.83 MG/ML
2.5 SOLUTION RESPIRATORY (INHALATION) EVERY 4 HOURS PRN
Status: DISCONTINUED | OUTPATIENT
Start: 2020-10-05 | End: 2020-10-06 | Stop reason: HOSPADM

## 2020-10-05 RX ORDER — SUCCINYLCHOLINE CHLORIDE 20 MG/ML
INJECTION INTRAMUSCULAR; INTRAVENOUS
Status: DISCONTINUED | OUTPATIENT
Start: 2020-10-05 | End: 2020-10-05

## 2020-10-05 RX ORDER — SODIUM CHLORIDE 0.9 % (FLUSH) 0.9 %
10 SYRINGE (ML) INJECTION
Status: DISCONTINUED | OUTPATIENT
Start: 2020-10-05 | End: 2020-10-05 | Stop reason: HOSPADM

## 2020-10-05 RX ORDER — ACETAMINOPHEN 10 MG/ML
INJECTION, SOLUTION INTRAVENOUS
Status: DISCONTINUED | OUTPATIENT
Start: 2020-10-05 | End: 2020-10-05

## 2020-10-05 RX ORDER — ROCURONIUM BROMIDE 10 MG/ML
INJECTION, SOLUTION INTRAVENOUS
Status: DISCONTINUED | OUTPATIENT
Start: 2020-10-05 | End: 2020-10-05

## 2020-10-05 RX ORDER — CHLORHEXIDINE GLUCONATE ORAL RINSE 1.2 MG/ML
10 SOLUTION DENTAL 2 TIMES DAILY
Status: DISCONTINUED | OUTPATIENT
Start: 2020-10-05 | End: 2020-10-06 | Stop reason: HOSPADM

## 2020-10-05 RX ORDER — ROPIVACAINE HYDROCHLORIDE 5 MG/ML
INJECTION, SOLUTION EPIDURAL; INFILTRATION; PERINEURAL
Status: DISCONTINUED | OUTPATIENT
Start: 2020-10-05 | End: 2020-10-05

## 2020-10-05 RX ORDER — FENTANYL CITRATE 50 UG/ML
INJECTION, SOLUTION INTRAMUSCULAR; INTRAVENOUS
Status: DISCONTINUED | OUTPATIENT
Start: 2020-10-05 | End: 2020-10-05

## 2020-10-05 RX ORDER — CHLORHEXIDINE GLUCONATE ORAL RINSE 1.2 MG/ML
10 SOLUTION DENTAL
Status: DISCONTINUED | OUTPATIENT
Start: 2020-10-05 | End: 2020-10-05 | Stop reason: HOSPADM

## 2020-10-05 RX ADMIN — PROPOFOL 90 MG: 10 INJECTION, EMULSION INTRAVENOUS at 07:10

## 2020-10-05 RX ADMIN — ALBUTEROL SULFATE 2.5 MG: 2.5 SOLUTION RESPIRATORY (INHALATION) at 07:10

## 2020-10-05 RX ADMIN — ROCURONIUM BROMIDE 5 MG: 10 INJECTION, SOLUTION INTRAVENOUS at 07:10

## 2020-10-05 RX ADMIN — TRANEXAMIC ACID 1000 MG: 100 INJECTION, SOLUTION INTRAVENOUS at 07:10

## 2020-10-05 RX ADMIN — LISINOPRIL 10 MG: 10 TABLET ORAL at 12:10

## 2020-10-05 RX ADMIN — ALBUTEROL SULFATE 2.5 MG: 2.5 SOLUTION RESPIRATORY (INHALATION) at 12:10

## 2020-10-05 RX ADMIN — SUCCINYLCHOLINE CHLORIDE 160 MG: 20 INJECTION, SOLUTION INTRAMUSCULAR; INTRAVENOUS at 07:10

## 2020-10-05 RX ADMIN — FUROSEMIDE 20 MG: 20 TABLET ORAL at 12:10

## 2020-10-05 RX ADMIN — FENTANYL CITRATE 50 MCG: 50 INJECTION, SOLUTION INTRAMUSCULAR; INTRAVENOUS at 07:10

## 2020-10-05 RX ADMIN — NEOSTIGMINE METHYLSULFATE 5 MG: 1 INJECTION INTRAVENOUS at 10:10

## 2020-10-05 RX ADMIN — KETAMINE HYDROCHLORIDE 10 MG: 50 INJECTION INTRAMUSCULAR; INTRAVENOUS at 09:10

## 2020-10-05 RX ADMIN — ROCURONIUM BROMIDE 20 MG: 10 INJECTION, SOLUTION INTRAVENOUS at 09:10

## 2020-10-05 RX ADMIN — KETAMINE HYDROCHLORIDE 30 MG: 50 INJECTION INTRAMUSCULAR; INTRAVENOUS at 07:10

## 2020-10-05 RX ADMIN — FENTANYL CITRATE 25 MCG: 50 INJECTION, SOLUTION INTRAMUSCULAR; INTRAVENOUS at 10:10

## 2020-10-05 RX ADMIN — OXYCODONE HYDROCHLORIDE 5 MG: 5 TABLET ORAL at 03:10

## 2020-10-05 RX ADMIN — EPHEDRINE SULFATE 10 MG: 50 INJECTION INTRAVENOUS at 08:10

## 2020-10-05 RX ADMIN — TAMSULOSIN HYDROCHLORIDE 0.8 MG: 0.4 CAPSULE ORAL at 12:10

## 2020-10-05 RX ADMIN — GABAPENTIN 100 MG: 100 CAPSULE ORAL at 12:10

## 2020-10-05 RX ADMIN — EPHEDRINE SULFATE 20 MG: 50 INJECTION INTRAVENOUS at 07:10

## 2020-10-05 RX ADMIN — ROCURONIUM BROMIDE 10 MG: 10 INJECTION, SOLUTION INTRAVENOUS at 07:10

## 2020-10-05 RX ADMIN — FENTANYL CITRATE 25 MCG: 50 INJECTION, SOLUTION INTRAMUSCULAR; INTRAVENOUS at 08:10

## 2020-10-05 RX ADMIN — CHLORHEXIDINE GLUCONATE 0.12% ORAL RINSE 10 ML: 1.2 LIQUID ORAL at 09:10

## 2020-10-05 RX ADMIN — OXYCODONE HYDROCHLORIDE 5 MG: 5 TABLET ORAL at 11:10

## 2020-10-05 RX ADMIN — CEFAZOLIN SODIUM 2 G: 2 SOLUTION INTRAVENOUS at 07:10

## 2020-10-05 RX ADMIN — ROCURONIUM BROMIDE 10 MG: 10 INJECTION, SOLUTION INTRAVENOUS at 08:10

## 2020-10-05 RX ADMIN — ROCURONIUM BROMIDE 25 MG: 10 INJECTION, SOLUTION INTRAVENOUS at 07:10

## 2020-10-05 RX ADMIN — DEXAMETHASONE SODIUM PHOSPHATE 8 MG: 4 INJECTION, SOLUTION INTRA-ARTICULAR; INTRALESIONAL; INTRAMUSCULAR; INTRAVENOUS; SOFT TISSUE at 07:10

## 2020-10-05 RX ADMIN — DOCUSATE SODIUM 100 MG: 100 CAPSULE, LIQUID FILLED ORAL at 12:10

## 2020-10-05 RX ADMIN — GLYCOPYRROLATE 0.8 MG: 0.2 INJECTION, SOLUTION INTRAMUSCULAR; INTRAVENOUS at 10:10

## 2020-10-05 RX ADMIN — ACETAMINOPHEN 1000 MG: 10 INJECTION, SOLUTION INTRAVENOUS at 09:10

## 2020-10-05 RX ADMIN — SODIUM CHLORIDE, SODIUM LACTATE, POTASSIUM CHLORIDE, AND CALCIUM CHLORIDE: 600; 310; 30; 20 INJECTION, SOLUTION INTRAVENOUS at 07:10

## 2020-10-05 RX ADMIN — ASPIRIN 81 MG: 81 TABLET, COATED ORAL at 09:10

## 2020-10-05 RX ADMIN — ATORVASTATIN CALCIUM 40 MG: 40 TABLET, FILM COATED ORAL at 12:10

## 2020-10-05 RX ADMIN — LIDOCAINE HYDROCHLORIDE 50 MG: 10 INJECTION, SOLUTION EPIDURAL; INFILTRATION; INTRACAUDAL; PERINEURAL at 07:10

## 2020-10-05 RX ADMIN — SODIUM CHLORIDE, SODIUM LACTATE, POTASSIUM CHLORIDE, AND CALCIUM CHLORIDE: 600; 310; 30; 20 INJECTION, SOLUTION INTRAVENOUS at 08:10

## 2020-10-05 RX ADMIN — DOCUSATE SODIUM 100 MG: 100 CAPSULE, LIQUID FILLED ORAL at 09:10

## 2020-10-05 RX ADMIN — CHLORHEXIDINE GLUCONATE 0.12% ORAL RINSE 10 ML: 1.2 LIQUID ORAL at 12:10

## 2020-10-05 RX ADMIN — EPHEDRINE SULFATE 20 MG: 50 INJECTION INTRAVENOUS at 08:10

## 2020-10-05 RX ADMIN — FENTANYL CITRATE 50 MCG: 50 INJECTION, SOLUTION INTRAMUSCULAR; INTRAVENOUS at 10:10

## 2020-10-05 RX ADMIN — TRANEXAMIC ACID 1000 MG: 100 INJECTION, SOLUTION INTRAVENOUS at 09:10

## 2020-10-05 RX ADMIN — ROPIVACAINE HYDROCHLORIDE 30 ML: 5 INJECTION, SOLUTION EPIDURAL; INFILTRATION; PERINEURAL at 06:10

## 2020-10-05 RX ADMIN — ONDANSETRON 4 MG: 2 INJECTION, SOLUTION INTRAMUSCULAR; INTRAVENOUS at 09:10

## 2020-10-05 NOTE — OP NOTE
Patient Name: Chao Hawk    Date of Surgery: 10/5/2020    Medical Record #: [id number]     Pre-operative Diagnosis:  Right Rotator Cuff Arthropathy    Post-operative Diagnosis:  Right Rotator Cuff Arthropathy    Procedure:  Right Reverse Total Shoulder Arthroplasty     Primary Surgeon: Reji Martin MD    Implants Utilized:    Arthrex Univers Revers with Modular Glenoid System  24 mm + 4mm baseplate  7 mm Univers Humeral Stem  +6mm Humeral Insert  36 right Humeral Tray  36 mm + 0 glenosphere    Anesthesia:  General endotracheal anesthetic with accompanying regional block.    Complications:  None    Estimated Blood Loss: 350 mL    Indications for Procedure:   Chao Hawk is a 88 y.o. year old male who presents at this time with recalcitrant pain and dysfunction secondary to rotator cuff tear arthropathy.  he has persistent pain and activity derived symptoms with deterioration in function and limitations in quality of life.  he has failed conservative options at this time and presents today for elective reverse total shoulder replacement.    Description of Procedure:  This patient was taken to the operating room and placed in a supine position on the operating table.  The patient was correctly identified upon entry and the operative site had been marked by the operating surgeon, and this was verified.  Intravenous antibiotics were administered prior to skin incision.  At this time general endotracheal anesthesia was initiated by the attending anesthesiologist and after successful induction of anesthetic, the patient was then positioned in the modified upright beach chair position.  The head was placed at neutral, all bony prominences were well padded, and the arm was supported in an articulated arm positioner.  After timeout was called the operative extremity was correctly identified by the operating surgeon and was subsequently sterilely prepped and draped in the usual surgical fashion and the procedure  was then commenced.    A delto-pectoral approach was carried out in the standard fashion.  After an oblique skin incision was made, dissection was carried down to the deep tissues.  Hemostasis was achieved.  The deltopectoral interval was identified and the cephalic vein was mobilized laterally and protected.  The deltoid and pectoralis major muscles were mobilized and a deep self-retaining retractor was placed.  Adhesions were released up into the subacromial space and the rotator cuff tissue was visualized and found to be deficient.  The conjoint tendon was mobilized medially and protected throughout the case.  The biceps tendon was identified and tenodesis performed by suturing it to the upper border of the pectoralis major. The biceps was then cut just superior to the tenodesis site. The axillary nerve was identified and also protected throughout the procedure. A subscapularis peel was performed to release the residual anterior subscap and anterior capsule with release around the inferior humeral neck with care being taken to avoid damage to surrounding structures.  Osteophytes were then removed circumferentially from the humeral head.  The humerus was then positioned for preparation and retractors were placed circumferentially.  A provisional humeral cut was made using an extramedullary guide and a protective plate put onto the humeral surface.     The glenoid was circumferentially exposed in the usual fashion.  The subscapularis remnant was mobilized with release of the rotator interval.  Any anterior adhesions as well as deep adhesions to the anterior capsule were incised inferiorly to approximately the apex of the glenoid.  Retractors were placed and the humerus was carefully retracted posteroinferiorly. Direct en face exposure of the glenoid was achieved and glenoid preparation was carried out. A guidepin was placed into the apex of the glenoid vault with a startpoint to allow inferior positioning of the  baseplate and approximately 10 degrees of inferior tilt. The glenoid face was reamed to a flat surface with minimal medialization. We measured the screw depth.  We then tapped for the central screw.  The baseplate/screw construct was then assembled on the back table and screwed into the glenoid with excellent purchase.  Four peripheral locking screws were drilled and placed.  A glenosphere was then impacted over the baseplate and a securing screw placed.    We then returned to the humerus.  The humeral canal was entered through the superior head and medullary reaming was carried out up to 7 mm at which point appropriate chatter was noted. Next broaching was carried out with the final broach left in place. We then reamed for our inlay humeral tray.  The humerus was visualized and a standard tray was trialed. Subsequent increased offset trays were trialed until appropriate soft tissue tensioning was noted, with care being taken to achieve optimal deltoid tension, stability, and range of motion. The humeral trials and broach were then removed and the appropriate sized humeral stem was impacted into place. The final implantable tray was then positioned and impacted into place. The joint was reduced and a final check was carried out to confirm satisfactory deltoid tension, stability, and ROM. The axillary nerve was palpated and intact. Satisfied with our arthroplasty, closure was then carried out.    The residual subscapularis tissue was repaired using #2 fiberwire passed through the suture cup. After copious irrigation we proceeded with superficial closure.  A layered closure was carried out utilizing 0 Ethibond sutures to re-approximate the delto-pectoral interval followed by 0 and 2-0 Vicryl sutures in a layered fashion for the skin and a sub-cuticular 3-0 prolene suture for final closure.  Steri-strips were placed and a sterile dressing was placed subsequently.  The patient was then placed in an ultra-sling device  and brought into the supine position where he was successfully awakened from anesthetic, transferred to a gurney, and taken in stable condition to the recovery room.      There were no intraoperative complications. Needles and sponge counts were correct. IV antibiotics were started and completed within 30 min of skin incision.    POSTOPERATIVE PLAN:    Reverse Total Shoulder Arthroplasty protocol  NWB RUE in sling  Ok to be out of sling for pendulums, elbow, wrist ROM  ASA 81mg BID x 2wks for DVT ppx  F/u 10-14 days for wound check/suture removal    Reji Martin

## 2020-10-05 NOTE — ANESTHESIA PROCEDURE NOTES
Peripheral Block    Patient location during procedure: pre-op   Block not for primary anesthetic.  Reason for block: at surgeon's request and post-op pain management   Post-op Pain Location: right shoulder  Start time: 10/5/2020 6:51 AM  Timeout: 10/5/2020 6:50 AM   End time: 10/5/2020 6:54 AM    Staffing  Authorizing Provider: Francis Fontaine CRNA  Performing Provider: Francis Fontaine CRNA    Preanesthetic Checklist  Completed: patient identified, site marked, surgical consent, pre-op evaluation, timeout performed, IV checked, risks and benefits discussed and monitors and equipment checked  Peripheral Block  Patient position: supine  Prep: ChloraPrep  Patient monitoring: heart rate, continuous pulse ox and frequent blood pressure checks  Block type: supraclavicular  Laterality: right  Injection technique: single shot  Needle  Needle type: Echogenic   Needle gauge: 22 G  Needle length: 4 in  Needle localization: ultrasound guidance, nerve stimulator and anatomical landmarks  Needle insertion depth: 2 cm   -ultrasound image captured on disc.  Assessment  Injection assessment: negative aspiration, negative parasthesia and local visualized surrounding nerve  Paresthesia pain: none  Heart rate change: no  Slow fractionated injection: yes

## 2020-10-05 NOTE — SUBJECTIVE & OBJECTIVE
"Past Medical History:   Diagnosis Date    Arthritis     back, hand    Back pain     Dr. Cristobal- [facility]    Bilateral leg edema 2/16/2018    COPD (chronic obstructive pulmonary disease)     Diverticulitis of large intestine with perforation without abscess or bleeding     Diverticulosis 5/16/06    colonoscopy    Hernia     x2    Hyperlipidemia     Hypertension     Lumbar radiculopathy 6/14/2017    Multiple renal cysts 7/5/2016    Nocturnal hypoxemia     Tobacco dependence     Trouble in sleeping        Past Surgical History:   Procedure Laterality Date    FINGER SURGERY Left 1980s    index- Dr. Encarnacion; to remove nodule    HERNIA REPAIR  1990s    x2    INTRAOCULAR LENS INSERTION Bilateral 2016    LUMBAR SPINE SURGERY  09/13/2017    TONSILLECTOMY, ADENOIDECTOMY  1940       Review of patient's allergies indicates:   Allergen Reactions    Penicillins Rash     "Felt like I had pins and needles in my feet" , hospitalized overnight    Bee sting  [allergen ext-venom-honey bee] Swelling    Poison ivy extract Hives       No current facility-administered medications on file prior to encounter.      Current Outpatient Medications on File Prior to Encounter   Medication Sig    albuterol (PROVENTIL) 2.5 mg /3 mL (0.083 %) nebulizer solution Take 2.5 mg by nebulization every evening. Rescue     ARGININE, L-ARGININE, ORAL Take 500 mg by mouth once daily.    aspirin (ECOTRIN) 81 MG EC tablet Take 81 mg by mouth every evening.     atorvastatin (LIPITOR) 40 MG tablet TAKE 1 TABLET EVERY DAY (Patient taking differently: Take 40 mg by mouth once daily. )    CANNABIDIOL, CBD, EXTRACT ORAL Take 3 drops by mouth every morning.    cholecalciferol, vitamin D3, (VITAMIN D3) 2,000 unit Cap Take 1 capsule by mouth every evening.     COQ10, UBIQUINOL, ORAL Take 1 tablet by mouth nightly.     cyanocobalamin (VITAMIN B-12) 1000 MCG tablet Take 100 mcg by mouth once daily.    fluticasone-umeclidin-vilanter (TRELEGY " ELLIPTA) 100-62.5-25 mcg DsDv Inhale 1 puff into the lungs once daily.    furosemide (LASIX) 20 MG tablet Take 1 tablet (20 mg total) by mouth once daily.    gabapentin (NEURONTIN) 100 MG capsule TAKE 1 CAPSULE (100 MG TOTAL) BY MOUTH 2 (TWO) TIMES DAILY. (Patient taking differently: Take 100 mg by mouth once daily. )    GLUCOSAM-MSM-CHONDROIT-VIT D3 ORAL Take 2 tablets by mouth once daily.    krill/om-3/dha/epa/phospho/ast (MEGARED OMEGA-3 KRILL OIL ORAL) Take 1 capsule by mouth nightly.     lisinopril 10 MG tablet TAKE 1 TABLET EVERY DAY    multivit-min/folic/vit K/lycop (ONE-A-DAY MEN'S MULTIVITAMIN ORAL) Take 1 tablet by mouth every evening.     potassium 99 mg Tab Take by mouth once daily.     tamsulosin (FLOMAX) 0.4 mg Cap TAKE 2 CAPSULES EVERY MORNING    epinephrine (EPIPEN) 0.3 mg/0.3 mL (1:1,000) AtIn Inject 1 each into the muscle once.    OXYGEN-AIR DELIVERY SYSTEMS MISC Inhale 2 L into the lungs as needed (Oxygen 2L via NC concentrator).     Family History     Problem Relation (Age of Onset)    Appendicitis Father    Atrial fibrillation Sister    COPD Mother    Emphysema Mother    Heart disease Mother, Son    Lung disease Sister (91)        Tobacco Use    Smoking status: Former Smoker     Packs/day: 1.00     Years: 40.00     Pack years: 40.00     Start date: 1949     Quit date: 1991     Years since quittin.7    Smokeless tobacco: Never Used   Substance and Sexual Activity    Alcohol use: Yes     Frequency: Monthly or less     Drinks per session: 1 or 2     Binge frequency: Never     Comment: rarely   No alcohol 72h  prior to sx    Drug use: No    Sexual activity: Never     Review of Systems   Constitutional: Negative for activity change, appetite change, chills, fatigue and fever.   HENT: Negative for dental problem, ear pain, hearing loss, mouth sores, nosebleeds, sinus pressure, sore throat, tinnitus and trouble swallowing.    Eyes: Negative for pain, discharge and visual  disturbance.   Respiratory: Negative for cough, choking, chest tightness, shortness of breath and wheezing.    Cardiovascular: Negative for chest pain, palpitations and leg swelling.   Gastrointestinal: Negative for abdominal distention, abdominal pain, anal bleeding, blood in stool, constipation, diarrhea, nausea and vomiting.   Endocrine: Negative for cold intolerance, heat intolerance, polydipsia, polyphagia and polyuria.   Genitourinary: Negative for decreased urine volume, difficulty urinating, flank pain, frequency, hematuria and urgency.   Musculoskeletal: Negative for arthralgias, back pain, gait problem, myalgias, neck pain and neck stiffness.   Skin: Negative for color change, rash and wound (surgical to R shoulder).   Allergic/Immunologic: Negative for food allergies and immunocompromised state.   Neurological: Negative for dizziness, tremors, seizures, syncope, speech difficulty, weakness, light-headedness and headaches.   Hematological: Negative for adenopathy. Does not bruise/bleed easily.   Psychiatric/Behavioral: Negative for confusion and sleep disturbance. The patient is not nervous/anxious.    All other systems reviewed and are negative.    Objective:     Vital Signs (Most Recent):  Temp: 97.8 °F (36.6 °C) (10/05/20 1535)  Pulse: 81 (10/05/20 1535)  Resp: 18 (10/05/20 1535)  BP: (!) 123/58 (10/05/20 1535)  SpO2: 97 % (10/05/20 1535) Vital Signs (24h Range):  Temp:  [97.4 °F (36.3 °C)-98.4 °F (36.9 °C)] 97.8 °F (36.6 °C)  Pulse:  [75-98] 81  Resp:  [10-18] 18  SpO2:  [93 %-100 %] 97 %  BP: (116-140)/(56-81) 123/58     Weight: 84.6 kg (186 lb 8.2 oz)  Body mass index is 25.3 kg/m².    Physical Exam  Vitals signs and nursing note reviewed.   Constitutional:       General: He is not in acute distress.     Appearance: He is well-developed. He is not diaphoretic.   HENT:      Head: Normocephalic and atraumatic.      Nose: Nose normal.   Eyes:      General:         Right eye: No discharge.         Left  eye: No discharge.      Conjunctiva/sclera: Conjunctivae normal.      Pupils: Pupils are equal, round, and reactive to light.   Neck:      Musculoskeletal: Normal range of motion and neck supple.      Thyroid: No thyromegaly.      Vascular: No JVD.      Trachea: No tracheal deviation.   Cardiovascular:      Rate and Rhythm: Normal rate and regular rhythm.      Heart sounds: Normal heart sounds. No murmur. No friction rub. No gallop.    Pulmonary:      Effort: Pulmonary effort is normal. No respiratory distress.      Breath sounds: Normal breath sounds. No wheezing or rales.   Chest:      Chest wall: No tenderness.   Abdominal:      General: Bowel sounds are normal. There is no distension.      Palpations: Abdomen is soft. There is no mass.      Tenderness: There is no abdominal tenderness.   Musculoskeletal: Normal range of motion.         General: No tenderness or deformity.   Skin:     General: Skin is warm and dry.      Capillary Refill: Capillary refill takes less than 2 seconds.      Findings: No erythema or rash.      Comments: Dressing to R shoulder c/d/i with brace in place   Neurological:      Mental Status: He is alert and oriented to person, place, and time.      Motor: No abnormal muscle tone.      Coordination: Coordination normal.   Psychiatric:         Behavior: Behavior normal.           CRANIAL NERVES     CN III, IV, VI   Pupils are equal, round, and reactive to light.       Significant Labs:   Recent Lab Results     None        All pertinent labs within the past 24 hours have been reviewed.    Significant Imaging: I have reviewed all pertinent imaging results/findings within the past 24 hours.

## 2020-10-05 NOTE — ANESTHESIA PREPROCEDURE EVALUATION
10/05/2020  Chao Hawk is a 88 y.o., male.      Patient Active Problem List   Diagnosis    COPD (chronic obstructive pulmonary disease)    Low testosterone    Essential hypertension    Pure hypercholesterolemia    BPH (benign prostatic hyperplasia)    Atherosclerosis of aorta    Abdominal aortic aneurysm without rupture    DDD (degenerative disc disease), lumbar    Multiple renal cysts    Overweight (BMI 25.0-29.9)    Lumbar arthropathy    Calcified granuloma of lung    Bilateral leg edema    PAH (pulmonary artery hypertension)    History of diverticulitis    Nocturnal hypoxemia    Trochanteric bursitis of left hip    Impingement syndrome of right shoulder    Shoulder arthritis    Poor posture    Impaired functional mobility, balance, gait, and endurance    Decreased range of motion    Decreased strength    Rotator cuff arthropathy of right shoulder    Right glenohumeral arthritis -end stage    Peripheral neuropathy       No current facility-administered medications on file prior to encounter.      Current Outpatient Medications on File Prior to Encounter   Medication Sig Dispense Refill    albuterol (PROVENTIL) 2.5 mg /3 mL (0.083 %) nebulizer solution Take 2.5 mg by nebulization every evening. Rescue       ARGININE, L-ARGININE, ORAL Take 500 mg by mouth once daily.      aspirin (ECOTRIN) 81 MG EC tablet Take 81 mg by mouth every evening.       atorvastatin (LIPITOR) 40 MG tablet TAKE 1 TABLET EVERY DAY (Patient taking differently: Take 40 mg by mouth once daily. ) 90 tablet 3    CANNABIDIOL, CBD, EXTRACT ORAL Take 3 drops by mouth every morning.      cholecalciferol, vitamin D3, (VITAMIN D3) 2,000 unit Cap Take 1 capsule by mouth every evening.       COQ10, UBIQUINOL, ORAL Take 1 tablet by mouth nightly.       cyanocobalamin (VITAMIN B-12) 1000 MCG tablet Take 100 mcg  by mouth once daily.      fluticasone-umeclidin-vilanter (TRELEGY ELLIPTA) 100-62.5-25 mcg DsDv Inhale 1 puff into the lungs once daily. 60 each 11    furosemide (LASIX) 20 MG tablet Take 1 tablet (20 mg total) by mouth once daily. 90 tablet 3    gabapentin (NEURONTIN) 100 MG capsule TAKE 1 CAPSULE (100 MG TOTAL) BY MOUTH 2 (TWO) TIMES DAILY. (Patient taking differently: Take 100 mg by mouth once daily. ) 180 capsule 3    GLUCOSAM-MSM-CHONDROIT-VIT D3 ORAL Take 2 tablets by mouth once daily.      krill/om-3/dha/epa/phospho/ast (MEGARED OMEGA-3 KRILL OIL ORAL) Take 1 capsule by mouth nightly.       lisinopril 10 MG tablet TAKE 1 TABLET EVERY DAY 90 tablet 4    multivit-min/folic/vit K/lycop (ONE-A-DAY MEN'S MULTIVITAMIN ORAL) Take 1 tablet by mouth every evening.       potassium 99 mg Tab Take by mouth once daily.       tamsulosin (FLOMAX) 0.4 mg Cap TAKE 2 CAPSULES EVERY MORNING 180 capsule 3    epinephrine (EPIPEN) 0.3 mg/0.3 mL (1:1,000) AtIn Inject 1 each into the muscle once.      OXYGEN-AIR DELIVERY SYSTEMS MISC Inhale 2 L into the lungs as needed (Oxygen 2L via NC concentrator).         Past Surgical History:   Procedure Laterality Date    FINGER SURGERY Left 1980s    index- Dr. Encarnacion; to remove nodule    HERNIA REPAIR  1990s    x2    INTRAOCULAR LENS INSERTION Bilateral 2016    LUMBAR SPINE SURGERY  09/13/2017    TONSILLECTOMY, ADENOIDECTOMY  1940         Anesthesia Evaluation    I have reviewed the Patient Summary Reports.    I have reviewed the Nursing Notes.    I have reviewed the Medications.     Review of Systems  Anesthesia Hx:  No problems with previous Anesthesia    Social:  Former Smoker    Hematology/Oncology:  Hematology Normal        Cardiovascular:   Hypertension hyperlipidemia ECG has been reviewed. PAH (pulmonary artery hypertension   Pulmonary:   COPD Calcified granuloma of lung   Renal/:   BPH    Hepatic/GI:  Hepatic/GI Normal    Musculoskeletal:   Arthritis  DDD  (degenerative disc disease), lumbar   Neurological:  Neurology Normal Lumbar radiculopathy   Endocrine:  Endocrine Normal        Physical Exam  General:  Well nourished    Airway/Jaw/Neck:  Airway Findings: Mouth Opening: Normal Tongue: Normal  General Airway Assessment: Adult  Mallampati: II  TM Distance: 4 - 6 cm  Jaw/Neck Findings:  Neck ROM: Normal ROM      Dental:  Dental Findings: In tact, Upper Dentures, Lower partial dentures   Chest/Lungs:  Chest/Lungs Findings: Clear to auscultation, Normal Respiratory Rate     Heart/Vascular:  Heart Findings: Rate: Normal  Rhythm: Regular Rhythm  Sounds: Normal        Mental Status:  Mental Status Findings:  Cooperative, Alert and Oriented         Anesthesia Plan  Type of Anesthesia, risks & benefits discussed:  Anesthesia Type:  general, regional  Patient's Preference:   Intra-op Monitoring Plan: standard ASA monitors  Intra-op Monitoring Plan Comments:   Post Op Pain Control Plan: multimodal analgesia and per primary service following discharge from PACU  Post Op Pain Control Plan Comments:   Induction:   IV  Beta Blocker:  Patient is not currently on a Beta-Blocker (No further documentation required).       Informed Consent: Patient understands risks and agrees with Anesthesia plan.  Questions answered. Anesthesia consent signed with patient.  ASA Score: 3     Day of Surgery Review of History & Physical:  There are no significant changes.      Anesthesia Plan Notes: Pt has severe obstructive lung disease.  I have discussed the risks associated with surgery including the possibility of post-op mechanical ventilation.  Pt understands and wishes to proceed        Ready For Surgery From Anesthesia Perspective.           Chemistry        Component Value Date/Time     09/14/2020 1111    K 4.8 09/14/2020 1111     09/14/2020 1111    CO2 26 09/14/2020 1111    BUN 30 (H) 09/14/2020 1111    CREATININE 1.0 09/14/2020 1111    GLU 84 09/14/2020 1111        Component  Value Date/Time    CALCIUM 9.3 09/14/2020 1111    ALKPHOS 65 09/14/2020 1111    AST 29 09/14/2020 1111    ALT 25 09/14/2020 1111    BILITOT 0.4 09/14/2020 1111    ESTGFRAFRICA >60.0 09/14/2020 1111    EGFRNONAA >60.0 09/14/2020 1111        Lab Results   Component Value Date    WBC 7.80 09/14/2020    HGB 12.7 (L) 09/14/2020    HCT 39.8 (L) 09/14/2020    MCV 98 09/14/2020     09/14/2020       Echo 8/12/20:  · Concentric left ventricular remodeling.  · Normal central venous pressure (3 mmHg).  · The estimated PA systolic pressure is 35 mmHg.  · Normal left ventricular systolic function. The estimated ejection fraction is 55%.  · Normal LV diastolic function.  · No wall motion abnormalities.      Sinus rhythm with Premature atrial complexes   Left axis deviation   Incomplete right bundle branch block   Anteroseptal infarct (cited on or before 14-SEP-2020)   Abnormal ECG   When compared with ECG of 06-FEB-2019 09:32,   Previous ECG has undetermined rhythm, needs review   Questionable change in initial forces of Anterior leads   QT has shortened   Confirmed by RUBEN SUGGS MD ([id number]) on 9/14/2020 3:42:37 PM       PFT's 8/5/19:  Severe obstruction. FEV1  46.41 %  predicted. (FEV1/VC< LLN and FEV1> or equal to 35% predicted and < or equal to 49% predicted).   Improvement in airflow following bronchodilator therapy suggests an asthmatic component. (FEV1>12% and >200ml and/or FVC >12% and >200mls)   Mild restriction. (TLC< LLN and > or equal to 70% of predicted).   Moderate reduction in diffusion capacity - adjusted for hemoglobin. (DLCO > or equal to 40% and < 60% predicted).   This interpretation of diffusing capacity is adjusted for patient's hemoglobin level.   Pattern of mixed obstruction and restriction. Clinical correlation suggested.   Overall there is severe ventilatory impairment. (FEV1> or equal to 35% predicted and < or equal to 49% of predicted)

## 2020-10-05 NOTE — CONSULTS
"[facility] Medicine  Consult Note    Patient Name: Chao Hawk  MRN: [id number]  Admission Date: 10/5/2020  Hospital Length of Stay: 0 days  Attending Physician: Reji Martin,*   Primary Care Provider: VY Ureña Jr, MD           Patient information was obtained from patient, relative(s) and past medical records.     Inpatient consult to Hospitalist  Consult performed by: SHELLIE Cheung-MAYA  Consult ordered by: Reji Martin MD        Subjective:     Principal Problem: <principal problem not specified>    Chief Complaint: No chief complaint on file.       HPI: 89 y/o male with PMHx of HTN, HLD, arthritis and COPD who had scheduled right reverse total shoulder arthroplasty by Dr. Martin. Hospital medicine was consulted for medical management.  He is a full code and his SDM is his daughter, Zhane.  He is admitted under the care of [facility] with hospital medicine consulted for medical management.      Past Medical History:   Diagnosis Date    Arthritis     back, hand    Back pain     Dr. Cristobal- [facility]    Bilateral leg edema 2/16/2018    COPD (chronic obstructive pulmonary disease)     Diverticulitis of large intestine with perforation without abscess or bleeding     Diverticulosis 5/16/06    colonoscopy    Hernia     x2    Hyperlipidemia     Hypertension     Lumbar radiculopathy 6/14/2017    Multiple renal cysts 7/5/2016    Nocturnal hypoxemia     Tobacco dependence     Trouble in sleeping        Past Surgical History:   Procedure Laterality Date    FINGER SURGERY Left 1980s    index- Dr. Encarnacion; to remove nodule    HERNIA REPAIR  1990s    x2    INTRAOCULAR LENS INSERTION Bilateral 2016    LUMBAR SPINE SURGERY  09/13/2017    TONSILLECTOMY, ADENOIDECTOMY  1940       Review of patient's allergies indicates:   Allergen Reactions    Penicillins Rash     "Felt like I had pins and needles in my feet" , hospitalized overnight    Bee " sting  [allergen ext-venom-honey bee] Swelling    Poison ivy extract Hives       No current facility-administered medications on file prior to encounter.      Current Outpatient Medications on File Prior to Encounter   Medication Sig    albuterol (PROVENTIL) 2.5 mg /3 mL (0.083 %) nebulizer solution Take 2.5 mg by nebulization every evening. Rescue     ARGININE, L-ARGININE, ORAL Take 500 mg by mouth once daily.    aspirin (ECOTRIN) 81 MG EC tablet Take 81 mg by mouth every evening.     atorvastatin (LIPITOR) 40 MG tablet TAKE 1 TABLET EVERY DAY (Patient taking differently: Take 40 mg by mouth once daily. )    CANNABIDIOL, CBD, EXTRACT ORAL Take 3 drops by mouth every morning.    cholecalciferol, vitamin D3, (VITAMIN D3) 2,000 unit Cap Take 1 capsule by mouth every evening.     COQ10, UBIQUINOL, ORAL Take 1 tablet by mouth nightly.     cyanocobalamin (VITAMIN B-12) 1000 MCG tablet Take 100 mcg by mouth once daily.    fluticasone-umeclidin-vilanter (TRELEGY ELLIPTA) 100-62.5-25 mcg DsDv Inhale 1 puff into the lungs once daily.    furosemide (LASIX) 20 MG tablet Take 1 tablet (20 mg total) by mouth once daily.    gabapentin (NEURONTIN) 100 MG capsule TAKE 1 CAPSULE (100 MG TOTAL) BY MOUTH 2 (TWO) TIMES DAILY. (Patient taking differently: Take 100 mg by mouth once daily. )    GLUCOSAM-MSM-CHONDROIT-VIT D3 ORAL Take 2 tablets by mouth once daily.    krill/om-3/dha/epa/phospho/ast (MEGARED OMEGA-3 KRILL OIL ORAL) Take 1 capsule by mouth nightly.     lisinopril 10 MG tablet TAKE 1 TABLET EVERY DAY    multivit-min/folic/vit K/lycop (ONE-A-DAY MEN'S MULTIVITAMIN ORAL) Take 1 tablet by mouth every evening.     potassium 99 mg Tab Take by mouth once daily.     tamsulosin (FLOMAX) 0.4 mg Cap TAKE 2 CAPSULES EVERY MORNING    epinephrine (EPIPEN) 0.3 mg/0.3 mL (1:1,000) AtIn Inject 1 each into the muscle once.    OXYGEN-AIR DELIVERY SYSTEMS [facility] Inhale 2 L into the lungs as needed (Oxygen 2L via NC  concentrator).     Family History     Problem Relation (Age of Onset)    Appendicitis Father    Atrial fibrillation Sister    COPD Mother    Emphysema Mother    Heart disease Mother, Son    Lung disease Sister (91)        Tobacco Use    Smoking status: Former Smoker     Packs/day: 1.00     Years: 40.00     Pack years: 40.00     Start date: 1949     Quit date: 1991     Years since quittin.7    Smokeless tobacco: Never Used   Substance and Sexual Activity    Alcohol use: Yes     Frequency: Monthly or less     Drinks per session: 1 or 2     Binge frequency: Never     Comment: rarely   No alcohol 72h  prior to sx    Drug use: No    Sexual activity: Never     Review of Systems   Constitutional: Negative for activity change, appetite change, chills, fatigue and fever.   HENT: Negative for dental problem, ear pain, hearing loss, mouth sores, nosebleeds, sinus pressure, sore throat, tinnitus and trouble swallowing.    Eyes: Negative for pain, discharge and visual disturbance.   Respiratory: Negative for cough, choking, chest tightness, shortness of breath and wheezing.    Cardiovascular: Negative for chest pain, palpitations and leg swelling.   Gastrointestinal: Negative for abdominal distention, abdominal pain, anal bleeding, blood in stool, constipation, diarrhea, nausea and vomiting.   Endocrine: Negative for cold intolerance, heat intolerance, polydipsia, polyphagia and polyuria.   Genitourinary: Negative for decreased urine volume, difficulty urinating, flank pain, frequency, hematuria and urgency.   Musculoskeletal: Negative for arthralgias, back pain, gait problem, myalgias, neck pain and neck stiffness.   Skin: Negative for color change, rash and wound (surgical to R shoulder).   Allergic/Immunologic: Negative for food allergies and immunocompromised state.   Neurological: Negative for dizziness, tremors, seizures, syncope, speech difficulty, weakness, light-headedness and headaches.    Hematological: Negative for adenopathy. Does not bruise/bleed easily.   Psychiatric/Behavioral: Negative for confusion and sleep disturbance. The patient is not nervous/anxious.    All other systems reviewed and are negative.    Objective:     Vital Signs (Most Recent):  Temp: 97.8 °F (36.6 °C) (10/05/20 1535)  Pulse: 81 (10/05/20 1535)  Resp: 18 (10/05/20 1535)  BP: (!) 123/58 (10/05/20 1535)  SpO2: 97 % (10/05/20 1535) Vital Signs (24h Range):  Temp:  [97.4 °F (36.3 °C)-98.4 °F (36.9 °C)] 97.8 °F (36.6 °C)  Pulse:  [75-98] 81  Resp:  [10-18] 18  SpO2:  [93 %-100 %] 97 %  BP: (116-140)/(56-81) 123/58     Weight: 84.6 kg (186 lb 8.2 oz)  Body mass index is 25.3 kg/m².    Physical Exam  Vitals signs and nursing note reviewed.   Constitutional:       General: He is not in acute distress.     Appearance: He is well-developed. He is not diaphoretic.   HENT:      Head: Normocephalic and atraumatic.      Nose: Nose normal.   Eyes:      General:         Right eye: No discharge.         Left eye: No discharge.      Conjunctiva/sclera: Conjunctivae normal.      Pupils: Pupils are equal, round, and reactive to light.   Neck:      Musculoskeletal: Normal range of motion and neck supple.      Thyroid: No thyromegaly.      Vascular: No JVD.      Trachea: No tracheal deviation.   Cardiovascular:      Rate and Rhythm: Normal rate and regular rhythm.      Heart sounds: Normal heart sounds. No murmur. No friction rub. No gallop.    Pulmonary:      Effort: Pulmonary effort is normal. No respiratory distress.      Breath sounds: Normal breath sounds. No wheezing or rales.   Chest:      Chest wall: No tenderness.   Abdominal:      General: Bowel sounds are normal. There is no distension.      Palpations: Abdomen is soft. There is no mass.      Tenderness: There is no abdominal tenderness.   Musculoskeletal: Normal range of motion.         General: No tenderness or deformity.   Skin:     General: Skin is warm and dry.      Capillary  Refill: Capillary refill takes less than 2 seconds.      Findings: No erythema or rash.      Comments: Dressing to R shoulder c/d/i with brace in place   Neurological:      Mental Status: He is alert and oriented to person, place, and time.      Motor: No abnormal muscle tone.      Coordination: Coordination normal.   Psychiatric:         Behavior: Behavior normal.           CRANIAL NERVES     CN III, IV, VI   Pupils are equal, round, and reactive to light.       Significant Labs:   Recent Lab Results     None        All pertinent labs within the past 24 hours have been reviewed.    Significant Imaging: I have reviewed all pertinent imaging results/findings within the past 24 hours.    Assessment/Plan:     S/P reverse total shoulder arthroplasty, right  --managed by ortho  --PRN analgesia        BPH (benign prostatic hyperplasia)  --continue flomax      Essential hypertension  --continue lisinopril  --cardiac diet      COPD (chronic obstructive pulmonary disease)  --PRN albuterol nebulizer        VTE Risk Mitigation (From admission, onward)         Ordered     Place JOSÉ LUIS hose  Until discontinued      10/05/20 0638     Place sequential compression device  Until discontinued      10/05/20 0638                    Thank you for your consult. I will follow-up with patient. Please contact us if you have any additional questions.    DAMIÁN Pacheco  Department of Hospital Medicine   [facility]

## 2020-10-05 NOTE — HPI
89 y/o male with PMHx of HTN, HLD, arthritis and COPD who had scheduled right reverse total shoulder arthroplasty by Dr. Martin. Hospital medicine was consulted for medical management.  He is a full code and his SDM is his daughter, Zhane.  He is admitted under the care of Ortho surgery with hospital medicine consulted for medical management.

## 2020-10-05 NOTE — TRANSFER OF CARE
Anesthesia Transfer of Care Note    Patient: Chao Hawk    Procedure(s) Performed: Procedure(s) (LRB):  ARTHROPLASTY, SHOULDER, TOTAL, REVERSE (Right)    Patient location: PACU    Anesthesia Type: general    Transport from OR: Transported from OR on room air with adequate spontaneous ventilation    Post pain: adequate analgesia    Post assessment: no apparent anesthetic complications    Post vital signs: stable    Level of consciousness: awake and alert    Nausea/Vomiting: no nausea/vomiting    Complications: none    Transfer of care protocol was followed      Last vitals:   Visit Vitals  /62 (BP Location: Left arm, Patient Position: Lying)   Pulse 88   Temp 36.7 °C (98.1 °F) (Temporal)   Resp 18   Ht 6' (1.829 m)   Wt 84.6 kg (186 lb 8.2 oz)   SpO2 (!) 93%   BMI 25.30 kg/m²

## 2020-10-05 NOTE — ANESTHESIA POSTPROCEDURE EVALUATION
Anesthesia Post Evaluation    Patient: Chao Hawk    Procedure(s) Performed: Procedure(s) (LRB):  ARTHROPLASTY, SHOULDER, TOTAL, REVERSE (Right)    Final Anesthesia Type: general    Patient location during evaluation: PACU  Patient participation: Yes- Able to Participate  Level of consciousness: awake and alert  Post-procedure vital signs: reviewed and stable  Pain management: adequate  Airway patency: patent  CASS mitigation strategies: Extubation while patient is awake  PONV status at discharge: No PONV  Anesthetic complications: no      Cardiovascular status: hemodynamically stable  Respiratory status: spontaneous ventilation  Hydration status: euvolemic  Follow-up not needed.          Vitals Value Taken Time   /57 10/05/20 1204   Temp 36.3 °C (97.4 °F) 10/05/20 1045   Pulse 83 10/05/20 1208   Resp 17 10/05/20 1208   SpO2 97 % 10/05/20 1208   Vitals shown include unvalidated device data.      No case tracking events are documented in the log.      Pain/Maritza Score: Maritza Score: 9 (10/5/2020 12:00 PM)

## 2020-10-06 VITALS
WEIGHT: 186.5 LBS | SYSTOLIC BLOOD PRESSURE: 139 MMHG | DIASTOLIC BLOOD PRESSURE: 63 MMHG | HEART RATE: 90 BPM | BODY MASS INDEX: 25.26 KG/M2 | RESPIRATION RATE: 16 BRPM | HEIGHT: 72 IN | TEMPERATURE: 98 F | OXYGEN SATURATION: 90 %

## 2020-10-06 LAB
ANION GAP SERPL CALC-SCNC: 9 MMOL/L (ref 8–16)
BUN SERPL-MCNC: 18 MG/DL (ref 8–23)
CALCIUM SERPL-MCNC: 9 MG/DL (ref 8.7–10.5)
CHLORIDE SERPL-SCNC: 104 MMOL/L (ref 95–110)
CO2 SERPL-SCNC: 28 MMOL/L (ref 23–29)
CREAT SERPL-MCNC: 1 MG/DL (ref 0.5–1.4)
ERYTHROCYTE [DISTWIDTH] IN BLOOD BY AUTOMATED COUNT: 13.8 % (ref 11.5–14.5)
EST. GFR  (AFRICAN AMERICAN): >60 ML/MIN/1.73 M^2
EST. GFR  (NON AFRICAN AMERICAN): >60 ML/MIN/1.73 M^2
GLUCOSE SERPL-MCNC: 119 MG/DL (ref 70–110)
HCT VFR BLD AUTO: 36.1 % (ref 40–54)
HGB BLD-MCNC: 11.6 G/DL (ref 14–18)
MCH RBC QN AUTO: 30.5 PG (ref 27–31)
MCHC RBC AUTO-ENTMCNC: 32.1 G/DL (ref 32–36)
MCV RBC AUTO: 95 FL (ref 82–98)
PLATELET # BLD AUTO: 170 K/UL (ref 150–350)
PMV BLD AUTO: 11.4 FL (ref 9.2–12.9)
POTASSIUM SERPL-SCNC: 4.2 MMOL/L (ref 3.5–5.1)
RBC # BLD AUTO: 3.8 M/UL (ref 4.6–6.2)
SODIUM SERPL-SCNC: 141 MMOL/L (ref 136–145)
WBC # BLD AUTO: 12.83 K/UL (ref 3.9–12.7)

## 2020-10-06 PROCEDURE — 27000221 HC OXYGEN, UP TO 24 HOURS: Mod: HCNC

## 2020-10-06 PROCEDURE — 97530 THERAPEUTIC ACTIVITIES: CPT | Mod: HCNC

## 2020-10-06 PROCEDURE — 25000003 PHARM REV CODE 250: Mod: HCNC | Performed by: HOSPITALIST

## 2020-10-06 PROCEDURE — 97161 PT EVAL LOW COMPLEX 20 MIN: CPT | Mod: HCNC

## 2020-10-06 PROCEDURE — 97110 THERAPEUTIC EXERCISES: CPT | Mod: HCNC

## 2020-10-06 PROCEDURE — 94640 AIRWAY INHALATION TREATMENT: CPT | Mod: HCNC

## 2020-10-06 PROCEDURE — 80048 BASIC METABOLIC PNL TOTAL CA: CPT | Mod: HCNC

## 2020-10-06 PROCEDURE — 85027 COMPLETE CBC AUTOMATED: CPT | Mod: HCNC

## 2020-10-06 PROCEDURE — 63600175 PHARM REV CODE 636 W HCPCS: Mod: HCNC | Performed by: NURSE PRACTITIONER

## 2020-10-06 PROCEDURE — 25000242 PHARM REV CODE 250 ALT 637 W/ HCPCS: Mod: HCNC | Performed by: STUDENT IN AN ORGANIZED HEALTH CARE EDUCATION/TRAINING PROGRAM

## 2020-10-06 PROCEDURE — 94761 N-INVAS EAR/PLS OXIMETRY MLT: CPT | Mod: HCNC

## 2020-10-06 PROCEDURE — 97116 GAIT TRAINING THERAPY: CPT | Mod: HCNC

## 2020-10-06 PROCEDURE — 25000003 PHARM REV CODE 250: Mod: HCNC | Performed by: STUDENT IN AN ORGANIZED HEALTH CARE EDUCATION/TRAINING PROGRAM

## 2020-10-06 RX ORDER — NAPROXEN 500 MG/1
500 TABLET ORAL 2 TIMES DAILY WITH MEALS
Qty: 28 TABLET | Refills: 0 | Status: SHIPPED | OUTPATIENT
Start: 2020-10-06 | End: 2020-10-20

## 2020-10-06 RX ORDER — MORPHINE SULFATE 2 MG/ML
2 INJECTION, SOLUTION INTRAMUSCULAR; INTRAVENOUS ONCE
Status: COMPLETED | OUTPATIENT
Start: 2020-10-06 | End: 2020-10-06

## 2020-10-06 RX ORDER — FAMOTIDINE 20 MG/1
20 TABLET, FILM COATED ORAL 2 TIMES DAILY
Status: DISCONTINUED | OUTPATIENT
Start: 2020-10-06 | End: 2020-10-06 | Stop reason: HOSPADM

## 2020-10-06 RX ORDER — ASPIRIN 81 MG/1
81 TABLET ORAL 2 TIMES DAILY
Qty: 28 TABLET | Refills: 0 | Status: ON HOLD | OUTPATIENT
Start: 2020-10-06 | End: 2022-10-26 | Stop reason: HOSPADM

## 2020-10-06 RX ORDER — PANTOPRAZOLE SODIUM 20 MG/1
20 TABLET, DELAYED RELEASE ORAL DAILY
Qty: 14 TABLET | Refills: 0 | Status: SHIPPED | OUTPATIENT
Start: 2020-10-06 | End: 2021-02-10

## 2020-10-06 RX ORDER — OXYCODONE HYDROCHLORIDE 5 MG/1
5 TABLET ORAL EVERY 4 HOURS PRN
Qty: 36 TABLET | Refills: 0 | Status: SHIPPED | OUTPATIENT
Start: 2020-10-06 | End: 2020-12-21

## 2020-10-06 RX ADMIN — FAMOTIDINE 20 MG: 20 TABLET ORAL at 02:10

## 2020-10-06 RX ADMIN — GABAPENTIN 100 MG: 100 CAPSULE ORAL at 08:10

## 2020-10-06 RX ADMIN — OXYCODONE HYDROCHLORIDE 10 MG: 5 TABLET ORAL at 02:10

## 2020-10-06 RX ADMIN — TAMSULOSIN HYDROCHLORIDE 0.8 MG: 0.4 CAPSULE ORAL at 06:10

## 2020-10-06 RX ADMIN — MORPHINE SULFATE 2 MG: 2 INJECTION, SOLUTION INTRAMUSCULAR; INTRAVENOUS at 10:10

## 2020-10-06 RX ADMIN — ASPIRIN 81 MG: 81 TABLET, COATED ORAL at 08:10

## 2020-10-06 RX ADMIN — CHLORHEXIDINE GLUCONATE 0.12% ORAL RINSE 10 ML: 1.2 LIQUID ORAL at 08:10

## 2020-10-06 RX ADMIN — ALBUTEROL SULFATE 2.5 MG: 2.5 SOLUTION RESPIRATORY (INHALATION) at 07:10

## 2020-10-06 RX ADMIN — FUROSEMIDE 20 MG: 20 TABLET ORAL at 08:10

## 2020-10-06 RX ADMIN — ATORVASTATIN CALCIUM 40 MG: 40 TABLET, FILM COATED ORAL at 08:10

## 2020-10-06 RX ADMIN — OXYCODONE HYDROCHLORIDE 10 MG: 5 TABLET ORAL at 08:10

## 2020-10-06 RX ADMIN — ALBUTEROL SULFATE 2.5 MG: 2.5 SOLUTION RESPIRATORY (INHALATION) at 02:10

## 2020-10-06 RX ADMIN — LISINOPRIL 10 MG: 10 TABLET ORAL at 08:10

## 2020-10-06 RX ADMIN — FAMOTIDINE 20 MG: 20 TABLET ORAL at 08:10

## 2020-10-06 RX ADMIN — DOCUSATE SODIUM 100 MG: 100 CAPSULE, LIQUID FILLED ORAL at 08:10

## 2020-10-06 NOTE — PT/OT/SLP EVAL
Occupational Therapy   Evaluation    Name: Chao Hawk  MRN: [id number]  Admitting Diagnosis:  <principal problem not specified> 1 Day Post-Op    Recommendations:     Discharge Recommendations: home health OT(WITH 24 HOUR CARE)  Discharge Equipment Recommendations:  none  Barriers to discharge:  None    Assessment:     Chao Hawk is a 88 y.o. male with a medical diagnosis of <principal problem not specified>.  He presents with DEBILITY AND GENERALIZED WEAKNESS. Performance deficits affecting function: weakness, impaired functional mobilty, gait instability, impaired endurance, impaired balance, impaired self care skills.      Rehab Prognosis: Fair; patient would benefit from acute skilled OT services to address these deficits and reach maximum level of function.       Plan:     Patient to be seen 3 x/week to address the above listed problems via self-care/home management, therapeutic activities, therapeutic exercises  · Plan of Care Expires: 10/13/20  · Plan of Care Reviewed with: patient, spouse    Subjective     Chief Complaint: DECREASE LE UE ROM/STRENGTH/ENDURANCE  Patient/Family Comments/goals:     Occupational Profile:  Living Environment: LIVES ALONE WITH RAMP 2 STORY HOUSE WITH STAIR RAIL  Previous level of function: (I) WITH ADL'S  Roles and Routines: OCCUPATIONAL THERAPY  Equipment Used at Home:  walker, rolling, cane, quad, none  Assistance upon Discharge:     Pain/Comfort:  Pain Rating 1: 0/10    Patients cultural, spiritual, Christianity conflicts given the current situation:      Objective:     Communicated with: NURSE AND Epic CHART REVIEW prior to session.  Patient found HOB elevated with   upon OT entry to room.    General Precautions: Standard, fall   Orthopedic Precautions:RUE non weight bearing   Braces: Shoulder abduction brace     Occupational Performance:    Bed Mobility:    · Patient completed Rolling/Turning to Left with  moderate assistance  · Patient completed Scooting/Bridging with  moderate assistance  · Patient completed Supine to Sit with moderate assistance    Functional Mobility/Transfers:  · Patient completed Sit <> Stand Transfer with minimum assistance  with  straight cane   · Patient completed Bed <> Chair Transfer using Step Transfer technique with minimum assistance with rolling walker  · Functional Mobility:  PT AMBULATED 20 FEET WITH MIN A  AND SPC     Activities of Daily Living:  · Upper Body Dressing: maximal assistance BETHANY R UE SLING  · Lower Body Dressing: maximal assistance BETHANY SOCKS   · Toileting: moderate assistance .    Cognitive/Visual Perceptual:  Cognitive/Psychosocial Skills:     -       Oriented to: Person, Place, Time and Situation   -       Follows Commands/attention:Follows two-step commands  -       Communication: clear/fluent  -       Memory: No Deficits noted  -       Safety awareness/insight to disability: impaired   Visual/Perceptual:      -Intact .    Physical Exam:  Upper Extremity Range of Motion:  -       Right Upper Extremity: NOT TESTED PER PROTOCOL  -       Left Upper Extremity: WFL  Upper Extremity Strength:    -       Right Upper Extremity: NOT TESTED  -       Left Upper Extremity: MMT: 3/5 GROSSLY   Strength:    -       Right Upper Extremity: NOT TESTED  -       Left Upper Extremity: MMT: 3/5 GROSSLY       AMPAC 6 Click ADL:  AMPAC Total Score: 18    Treatment & Education:  PT/DAUGHTER EDUCATED ON HEP. PT RETURN DEMONSTRATION WITH SBA WITH R UE CODMAN'S AS WELL AS ELBOW WRIST AND DIGITSEducation:   GENTLE ROM. PT FATIGUES EASILY AND REQUIRES ASSISTANCE WITH ADL'S , T/F'S AND MOBILITY. PT WILL CONTINUE TO BENIFIT FROM SKILLED OT. SEE ABOVE EVAL FOR DETAILS  Patient left up in chair with all lines intact, call button in reach, NURSE SUSAN notified and DAUGHTER present    GOALS:   Multidisciplinary Problems     Occupational Therapy Goals        Problem: Occupational Therapy Goal    Goal Priority Disciplines Outcome Interventions   Occupational  Therapy Goal     OT, PT/OT     Description: OT GOALS  TO BE MET  BY 10-13-20  MIN A WITH BETHANY /DOFF BRACE  MIN A WITH  WITH LE DRESSING  MIN A WITH TOILETING  PT WITH  SBA WITH HEP                       History:     Past Medical History:   Diagnosis Date    Arthritis     back, hand    [facility]     [facility]- [facility]    Bilateral leg edema 2/16/2018    COPD (chronic obstructive pulmonary disease)     Diverticulitis of large intestine with perforation without abscess or bleeding     Diverticulosis 5/16/06    colonoscopy    Hernia     x2    Hyperlipidemia     Hypertension     Lumbar radiculopathy 6/14/2017    Multiple renal cysts 7/5/2016    Nocturnal hypoxemia     Tobacco dependence     Trouble in sleeping          Past Surgical History:   Procedure Laterality Date    FINGER SURGERY Left 1980s    index- Dr. Encarnacion; to remove nodule    HERNIA REPAIR  1990s    x2    INTRAOCULAR LENS INSERTION Bilateral 2016    LUMBAR SPINE SURGERY  09/13/2017    TONSILLECTOMY, ADENOIDECTOMY  1940       Time Tracking:     OT Date of Treatment: 10/06/20  OT Start Time: 0915  OT Stop Time: 0955  OT Total Time (min): 40 min    Billable Minutes:Evaluation 10 MINUTES  Therapeutic Activity 15 MINUTES  Therapeutic Exercise 15 MINUTES    Chikis Reeves OT  10/6/2020

## 2020-10-06 NOTE — SUBJECTIVE & OBJECTIVE
"Principal Problem: s/p Right reverse total shoulder arthroplasty    Principal Orthopedic Problem: s/p Right reverse total shoulder arthroplasty    Interval History: admitted following the above surgery. He reports block wore off around 1am but was able to get pain reasonably well controlled. He has had some difficulty voiding.    Review of patient's allergies indicates:   Allergen Reactions    Penicillins Rash     "Felt like I had pins and needles in my feet" , hospitalized overnight    Bee sting  [allergen ext-venom-honey bee] Swelling    Poison ivy extract Hives       Current Facility-Administered Medications   Medication    acetaminophen tablet 1,000 mg    albuterol nebulizer solution 2.5 mg    albuterol nebulizer solution 2.5 mg    aspirin EC tablet 81 mg    atorvastatin tablet 40 mg    chlorhexidine 0.12 % solution 10 mL    docusate sodium capsule 100 mg    famotidine tablet 20 mg    furosemide tablet 20 mg    gabapentin capsule 100 mg    lisinopriL tablet 10 mg    nozaseptin (NOZIN) nasal     ondansetron injection 4 mg    oxyCODONE immediate release tablet 10 mg    oxyCODONE immediate release tablet 5 mg    sodium chloride 0.9% flush 10 mL    tamsulosin 24 hr capsule 0.8 mg     Objective:     Vital Signs (Most Recent):  Temp: 99.5 °F (37.5 °C) (10/06/20 0714)  Pulse: 68 (10/06/20 0729)  Resp: 16 (10/06/20 0729)  BP: 121/62 (10/06/20 0714)  SpO2: 95 % (10/06/20 0729) Vital Signs (24h Range):  Temp:  [97.4 °F (36.3 °C)-99.5 °F (37.5 °C)] 99.5 °F (37.5 °C)  Pulse:  [68-98] 68  Resp:  [10-18] 16  SpO2:  [91 %-100 %] 95 %  BP: (111-140)/(56-73) 121/62     Weight: 84.6 kg (186 lb 8.2 oz)  Height: 6' (182.9 cm)  Body mass index is 25.3 kg/m².      Intake/Output Summary (Last 24 hours) at 10/6/2020 0732  Last data filed at 10/6/2020 0550  Gross per 24 hour   Intake 2020 ml   Output 1225 ml   Net 795 ml       General    Vitals reviewed.  Cardiovascular: Normal rate.    Pulmonary/Chest: " Effort normal.             Right Hand/Wrist Exam     Other     Neuorologic Exam    Median Distribution: normal  Ulnar Distribution: normal  Radial Distribution: normal    Comments:  Demonstrates motor and sensory function in AIN, PIN, and ulnar nerve distributions      Right Shoulder Exam     Comments:  Bulky dressing removed, post-op dressing remains in place and is clean and dry    There is an area of desquamation of his upper arm that was present at the end of surgery with a few blood splotches.        Vascular Exam       Capillary Refill  Right Hand: normal capillary refill      Significant Labs: All pertinent labs within the past 24 hours have been reviewed.  awaiting lab results from POD#1, anticipate stable labs, will make any medication changes pending lab results    Significant Imaging: X-Ray: I have reviewed all pertinent results/findings and my personal findings are:  well positioned right reverse TSA implant

## 2020-10-06 NOTE — DISCHARGE INSTRUCTIONS
TOTAL SHOULDER ARTHROPLASTY    Contact the [facility] at [phone] if you have questions about your instructions or follow-up appointment.    DIET:   Start with clear liquids and light foods to minimize nausea. Once these are tolerated, advance to a regular diet.     DRESSING AND WOUND CARE:   Keep the dressing clean and dry. It is normal for there to be some drainage after surgery since the shoulder was irrigated with large amounts of fluid. Reinforce with additional gauze as necessary.  Remove the dressing the 7th day after surgery and begin changing daily with clean gauze or Band-Aids®. Keep your incisions covered until you follow up in clinic.   If you have Steri-Strips in place of stitches, allow them to stay in place as long as possible. Steri-Strips are made of a fabric material that can get wet in the shower and pat dry with a towel. They usually fall off on their own within 7 to 10 days. You may trim the edges as they begin to curl.      BATHING:   You may bathe or shower on the 7th day after surgery, but do not scrub or soak the incisions. Dry the area by gently blotting it with a gauze or towel. After it is completely dry, cover the wound with clean gauze or Band-Aids®. Do NOT submerge the incisions (bath/swim) until after the sutures are removed and the wound has completely healed.     ACTIVITY:    Ice should be applied to the shoulder for 20-30 minutes, 5-6 times a day, to help control pain and swelling. Apply additional times as needed, especially after exercise, for the first 3-4 weeks. Do not apply ice directly to the skin; use a thin barrier in between. Also, do not use heat.    Elevate the shoulder by sleeping as upright as possible using extra pillows or a recliner. Do this for the first few days to help decrease pain and swelling.   Wear the sling at all times for 6 weeks including while sleeping. The only time you may remove the sling is for bathing and exercises. Do not  lean or put your body weight on your arm.   When your block wears off, start the following exercises:  Remove the sling for 5-10 minutes, 3 times a day, to do the following exercises:  1. Fully bend & straighten your fingers, your wrist, and your elbow several times.   2. Lean forward, bracing yourself on a table/counter with your normal arm. Let your surgical arm relax and hang straight down. Shift your weight so that your arm moves side to side, front to back, and in gentle circles like a pendulum or elephant's trunk. Use your body to generate the movement for this, NOT your surgical shoulder's muscles. (see drawing below)     Physical therapy should be started within 3 days of surgery. Take your therapy prescription to the PT clinic of your choice. If you have not received a therapy prescription, please contact your surgeon's office to have a script sent to your physical therapist.     PAIN CONTROL:    It is important to stay ahead of pain as it becomes challenging to get under control if you fall behind. Ice and elevation can help and should be used as much as possible in the first few days.   Narcotic pain medications, such as hydrocodone and oxycodone, should be taken as prescribed. Wean off as soon as possible. Take these with food to decrease the chances of nausea and vomiting. Do not drink alcohol, drive a vehicle, or use heavy machinery while taking narcotic pain medications.    NSAID medications are used for pain control and to decrease inflammation. You may be prescribed an NSAID such as ketorolac (Toradol). Take as instructed. Other NSAID medications such as ibuprofen, Motrin, Advil, naproxen, or Aleve can be used once you have finished the Toradol, or if a prescription for Toradol was not provided.   Acetaminophen (Tylenol) is an effective over-the-counter pain medication that can be used with NSAID medications and non-acetaminophen containing narcotics such as plain oxycodone.    ASPIRIN FOR  PREVENTION OF BLOOD CLOTS:   You should take one 81 mg baby aspirin twice daily for two weeks starting the evening of the day you have surgery unless instructed otherwise or taking a different blood thinner such as enoxaparin or warfarin. If you are aware that you are at high risk for a blood clot, notify your physician as soon as possible.   Take aspirin at least 30 minutes before taking ibuprofen or Toradol.    CONSTIPATION PREVENTION:   Anesthesia and pain medications, changes in eating and drinking, and less activity can all lead to constipation after surgery. To prevent or reduce constipation, take an over-the-counter stool softener (brands include Colace and Miralax).  Follow the directions on the bottle. Drink plenty of water and eat high fiber foods including whole grains, fresh fruits, vegetables, beans, prunes or prune juice.     PROBLEMS TO REPORT:  1. Persistent bloody drainage that soaks through reinforced dressings.  2. Fever greater than 101F or 38C.   3. Incision that is very red, swollen, draining pus, shows red streaks, or feels hot.  4. Inability to urinate within 8 hours of surgery (a rare effect of the anesthesia).  5. If you develop a rash, generalized itching or swelling from the medications, STOP the medication and call the clinic or the orthopedic surgery resident on call.    Daytime calls should be directed to the [facility] Medicine Clinic at [phone].   Night-time and weekend calls should be directed to the [facility] nurse line at [phone].       FREQUENTLY ASKED QUESTIONS     WHAT DAILY ACTIVITIES CAN I DO?  After shoulder surgery, you may do what you feel comfortable doing in the sling.  Do not lift anything with your operative arm or put yourself at risk of falling.    CAN I DRIVE OR RIDE BY CAR/ TRAIN/ PLANE?   You should not drive while using a sling. There are no forced restrictions regarding operating a motor vehicle, however you must always be the  of whether  you are able to operate it safely. You should not drive while taking narcotic pain medications. You may ride in a car after surgery as needed. You may take a train or even fly the day after your surgery as long as you feel secure and comfortable.    WHAT ABOUT WORK?   You may return to an office-type job or to school whenever comfortable. For most patients this occurs 1-2 weeks after surgery. For more active jobs that require some lifting, you can wait until after your follow-up appointment. Any other unusual types of jobs should be discussed to determine a date for return to work.    WHAT ABOUT SWELLING?   Expect swelling as a normal process after surgery. Ice, elevation, and other treatments provided at physical therapy will allow this to improve in time. Some swelling may remain for up to 8 weeks, and this is normal.     WHAT IF IT REALLY HURTS TOO MUCH?   Surgery hurts and you cannot expect to be pain free, but our goal is for it to be tolerable. Try to use all available pain therapies such as narcotics, NSAIDS, and acetaminophen. Always try more ice and elevation. If the pain is not tolerable, call the clinic or the after hours nursing line.

## 2020-10-06 NOTE — NURSING
Pt's discharge instructions and follow up appts given and explained. Verbalized understanding IV discontinued. Belongings packed at bedside. Awaiting delivery of prescriptions

## 2020-10-06 NOTE — PLAN OF CARE
CM met with patient and patients daughter at bedside to assess for discharge needs. Pt states that he lives at home alone, and is dependent with some needs. He mainly uses a cane, nebulizers and stair lift for assistance. He does have a walker, wheelchair, shower chair and bedside commode at home if needed. He states that he would like [facility] for post surgery PT. Choice form completed. His daughter Zhane is here for a week or two to stay with him and he has a son that lives nearby and will be available and staying with him as well. CM provided a transitional care folder, information on advanced directives, information on pharmacy bedside delivery, and discharge planning begins on admission with contact information for any needs/questions.     D/C Plan: Home Health  PCP: MONSTER Ureña MD  [facility]  Discharge transportation: Family  [facility]: Active  Pharmacy Bedside Delivery: Yes       10/06/20 1140   Discharge Assessment   Assessment Type Discharge Planning Assessment   Confirmed/corrected address and phone number on facesheet? Yes   Assessment information obtained from? Patient;Caregiver   Expected Length of Stay (days)   (TBD)   Communicated expected length of stay with patient/caregiver yes   Prior to hospitilization cognitive status: Alert/Oriented   Prior to hospitalization functional status: Independent;Assistive Equipment   Current cognitive status: Alert/Oriented   Current Functional Status: Independent;Assistive Equipment   Facility Arrived From: Home   Lives With alone   Able to Return to Prior Arrangements yes   Is patient able to care for self after discharge? Unable to determine at this time (comments)   Who are your caregiver(s) and their phone number(s)? Juan Hawk, Son- [phone]   Patient's perception of discharge disposition home health;home or selfcare   Readmission Within the Last 30 Days no previous admission in last 30 days   Patient currently  being followed by outpatient case management? No   Patient currently receives any other outside agency services? No   Equipment Currently Used at Home nebulizer;cane, quad;shower chair;bedside commode;wheelchair;rollator   Do you have any problems affording any of your prescribed medications? No   Is the patient taking medications as prescribed? yes   Does the patient have transportation home? Yes   Transportation Anticipated family or friend will provide   Dialysis Name and Scheduled days NA   Does the patient receive services at the Coumadin Clinic? No   Discharge Plan A Home with family;Home Health   DME Needed Upon Discharge  none   Patient/Family in Agreement with Plan yes

## 2020-10-06 NOTE — PLAN OF CARE
Patient is in stable condition, no acute distress, remained free of injuries, pain adequately controlled with oral meds, receiving breathing treatments through respiratory therapy, dressing to right shoulder CDI, tolerating bladder training to promote urination, cane at bedside and will continue to monitor. 24 hr chart check performed.

## 2020-10-06 NOTE — PROGRESS NOTES
"[facility]  Orthopedics  Progress Note    Patient Name: Chao Hawk  MRN: [id number]  Admission Date: 10/5/2020  Hospital Length of Stay: 1 days  Attending Provider: Reji Martin,*  Primary Care Provider: VY Ureña Jr, MD  Follow-up For: Procedure(s) (LRB):  ARTHROPLASTY, SHOULDER, TOTAL, REVERSE (Right)    Post-Operative Day: 1 Day Post-Op  Subjective:     Principal Problem: s/p Right reverse total shoulder arthroplasty    Principal Orthopedic Problem: s/p Right reverse total shoulder arthroplasty    Interval History: admitted following the above surgery. He reports block wore off around 1am but was able to get pain reasonably well controlled. He has had some difficulty voiding.    Review of patient's allergies indicates:   Allergen Reactions    Penicillins Rash     "Felt like I had pins and needles in my feet" , hospitalized overnight    Bee sting  [allergen ext-venom-honey bee] Swelling    Poison ivy extract Hives       Current Facility-Administered Medications   Medication    acetaminophen tablet 1,000 mg    albuterol nebulizer solution 2.5 mg    albuterol nebulizer solution 2.5 mg    aspirin EC tablet 81 mg    atorvastatin tablet 40 mg    chlorhexidine 0.12 % solution 10 mL    docusate sodium capsule 100 mg    famotidine tablet 20 mg    furosemide tablet 20 mg    gabapentin capsule 100 mg    lisinopriL tablet 10 mg    nozaseptin (NOZIN) nasal     ondansetron injection 4 mg    oxyCODONE immediate release tablet 10 mg    oxyCODONE immediate release tablet 5 mg    sodium chloride 0.9% flush 10 mL    tamsulosin 24 hr capsule 0.8 mg     Objective:     Vital Signs (Most Recent):  Temp: 99.5 °F (37.5 °C) (10/06/20 0714)  Pulse: 68 (10/06/20 0729)  Resp: 16 (10/06/20 0729)  BP: 121/62 (10/06/20 0714)  SpO2: 95 % (10/06/20 0729) Vital Signs (24h Range):  Temp:  [97.4 °F (36.3 °C)-99.5 °F (37.5 °C)] 99.5 °F (37.5 °C)  Pulse:  [68-98] 68  Resp:  [10-18] " 16  SpO2:  [91 %-100 %] 95 %  BP: (111-140)/(56-73) 121/62     Weight: 84.6 kg (186 lb 8.2 oz)  Height: 6' (182.9 cm)  Body mass index is 25.3 kg/m².      Intake/Output Summary (Last 24 hours) at 10/6/2020 0732  Last data filed at 10/6/2020 0550  Gross per 24 hour   Intake 2020 ml   Output 1225 ml   Net 795 ml       General    Vitals reviewed.  Cardiovascular: Normal rate.    Pulmonary/Chest: Effort normal.             Right Hand/Wrist Exam     Other     Neuorologic Exam    Median Distribution: normal  Ulnar Distribution: normal  Radial Distribution: normal    Comments:  Demonstrates motor and sensory function in AIN, PIN, and ulnar nerve distributions      Right Shoulder Exam     Comments:  Bulky dressing removed, post-op dressing remains in place and is clean and dry    There is an area of desquamation of his upper arm that was present at the end of surgery with a few blood splotches.        Vascular Exam       Capillary Refill  Right Hand: normal capillary refill      Significant Labs: All pertinent labs within the past 24 hours have been reviewed.  awaiting lab results from POD#1, anticipate stable labs, will make any medication changes pending lab results    Significant Imaging: X-Ray: I have reviewed all pertinent results/findings and my personal findings are:  well positioned right reverse TSA implant    Assessment/Plan:     88 year old male s/p Right reverse TSA POD#1  -overall doing well post-operatively  -pain is manageable  -hospitalists following along for co-management, their assistance is appreciated  -PT/OT  -ASA 81mg BID, SCD's, JOSÉ LUIS hose for dvt ppx  -anticipate dc home today with assistance pending ability to void without issues      Reji Martin MD  Orthopedics  [facility]

## 2020-10-06 NOTE — NURSING
While doing bedside report, pt stated he's unable to void but has the urge to. Bladder scanned pt and it showed 317 ml. Will message provider

## 2020-10-06 NOTE — PT/OT/SLP EVAL
Physical Therapy Evaluation    Patient Name:  Chao Hawk   MRN:  [id number]    Recommendations:     Discharge Recommendations:  home health PT   Discharge Equipment Recommendations: none   Barriers to discharge: None    Assessment:     Chao Hawk is a 88 y.o. male admitted with a medical diagnosis of <principal problem not specified>.  He presents with the following impairments/functional limitations:  impaired endurance, impaired self care skills, weakness, impaired functional mobilty, gait instability, impaired balance, impaired sensation, decreased ROM, pain, decreased safety awareness, decreased lower extremity function, decreased upper extremity function, orthopedic precautions .    Rehab Prognosis: Good; patient would benefit from acute skilled PT services to address these deficits and reach maximum level of function.    Recent Surgery: Procedure(s) (LRB):  ARTHROPLASTY, SHOULDER, TOTAL, REVERSE (Right) 1 Day Post-Op    Plan:     During this hospitalization, patient to be seen   to address the identified rehab impairments via therapeutic activities, therapeutic exercises, gait training and progress toward the following goals:    · Plan of Care Expires:  10/13/20    Subjective     Chief Complaint: PAIN R SHOULDER   Patient/Family Comments/goals: INC MOBILITY   Pain/Comfort:  · Pain Rating 1: 7/10  · Location - Side 1: Right  · Location 1: shoulder  · Pain Addressed 1: Pre-medicate for activity    Patients cultural, spiritual, Church conflicts given the current situation:      Living Environment:   PT LIVES AT HOME ALONE IN A 2 STORY HOME HOWEVER HAS A LIFT CHAIR TO THE 2ND FLOOR  Prior to admission, patients level of function was MOD I WITH SPC .  Equipment used at home: cane, straight.  DME owned (not currently used): rolling walker.  Upon discharge, patient will have assistance from FAMILY.    Objective:     Communicated with NURSE DAVIS AND Epic CHART REVIEW  prior to session.  Patient found  supine with peripheral IV, PICC line  upon PT entry to room.    General Precautions: Standard, fall   Orthopedic Precautions:RUE non weight bearing   Braces: Shoulder abduction brace     Functional Mobility:  PT MET IN RM SUP>SIT EOB WITH MIN A. PT SCOOTED TO EOB WITH MIN A. P.T. ADJUSTED BRACE AND EDUCATED PT AND FAMILY TO CHECK PT SKIN WITH BRACE STRAPS TO PREVENT SKIN BREAKDOWN. PT STOOD WITH SPC AND GT TRAINED X 20' WITH MIN A AND UNSTEADY GT. PT QUICK TO FATIGUE. PT RETURNED TO RM T/F TO CHAIR WITH SPC AND MIN A. PT EDUCATED ON ROLE OF P.T. AND LEFT SEATED WITH ALL NEEDS MET     AM-PAC 6 CLICK MOBILITY  Total Score:16     Patient left up in chair with call button in reach, chair alarm on and FAMILY present.    GOALS:   Multidisciplinary Problems     Physical Therapy Goals        Problem: Physical Therapy Goal    Goal Priority Disciplines Outcome Goal Variances Interventions   Physical Therapy Goal     PT, PT/OT      Description: PT WILL BE SEEN FOR P.T. FOR A MIN OF 5 OUT OF 7 DAYS A WEEK  LTG: 10/13/20  1. PT WILL COMPLETE BED MOBILITY IND  2 PT WILL T/F TO CHAIR WITH SPC AND SBA  3. PT WILL GT TRAIN X 100' WITH SPC AND SBA                     History:     Past Medical History:   Diagnosis Date    Arthritis     back, hand    Back pain     Dr. Cristobal- [facility]    Bilateral leg edema 2/16/2018    COPD (chronic obstructive pulmonary disease)     Diverticulitis of large intestine with perforation without abscess or bleeding     Diverticulosis 5/16/06    colonoscopy    Hernia     x2    Hyperlipidemia     Hypertension     Lumbar radiculopathy 6/14/2017    Multiple renal cysts 7/5/2016    Nocturnal hypoxemia     Tobacco dependence     Trouble in sleeping        Past Surgical History:   Procedure Laterality Date    FINGER SURGERY Left 1980s    index- Dr. Encarnacion; to remove nodule    HERNIA REPAIR  1990s    x2    INTRAOCULAR LENS INSERTION Bilateral 2016    LUMBAR SPINE SURGERY  09/13/2017     TONSILLECTOMY, ADENOIDECTOMY  1940       Time Tracking:     PT Received On: 10/06/20  PT Start Time: 0905     PT Stop Time: 0930  PT Total Time (min): 25 min     Billable Minutes: Evaluation 15 and Gait Training 10      Corinne Corcoran, PT  10/06/2020

## 2020-10-06 NOTE — DISCHARGE SUMMARY
[facility] -   Orthopedics  Discharge Summary      Patient Name: Chao Hawk  MRN: [id number]  Admission Date: 10/5/2020  Hospital Length of Stay: 1 days  Discharge Date and Time:  10/06/2020 3:08 PM  Attending Physician: Reji Martin,*   Discharging Provider: Reji Martin MD  Primary Care Provider: VY Ureña Jr, MD    HPI:   88 year old male s/p Right reverse TSA POD#1    Procedure(s) (LRB):  ARTHROPLASTY, SHOULDER, TOTAL, REVERSE (Right)      Hospital Course:  Admitted for medical management after right reverse TSA    Consults (From admission, onward)        Status Ordering Provider     Inpatient consult to Hospitalist  Once     Provider:  Fausto Ward MD    Completed REJI MARTIN          Significant Diagnostic Studies: Labs: All labs within the past 24 hours have been reviewed    Pending Diagnostic Studies:     None        Final Active Diagnoses:    Diagnosis Date Noted POA    S/P reverse total shoulder arthroplasty, right [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] 10/05/2020 Not Applicable    COPD (chronic obstructive pulmonary disease) [J44.9] 12/17/2013 Yes     Chronic    Essential hypertension [I10] 12/17/2013 Yes     Chronic    BPH (benign prostatic hyperplasia) [N40.0] 12/17/2013 Yes     Chronic      Problems Resolved During this Admission:      Discharged Condition: good    Disposition: Home or Self Care    Follow Up:    Patient Instructions:      Diet Adult Regular     Change dressing (specify)   Order Comments: Leave operative dressing for 1 week, see discharge instructions     Weight bearing restrictions (specify):     Medications:  Reconciled Home Medications:      Medication List      START taking these medications    naproxen 500 MG tablet  Commonly known as: NAPROSYN  Take 1 tablet (500 mg total) by mouth 2 (two) times daily with meals. for 14 days     oxyCODONE 5 MG immediate release tablet  Commonly known as: ROXICODONE  Take 1 tablet (5 mg total) by mouth every 4  (four) hours as needed for Pain (post-op pain).     pantoprazole 20 MG tablet  Commonly known as: PROTONIX  Take 1 tablet (20 mg total) by mouth once daily.        CHANGE how you take these medications    aspirin 81 MG EC tablet  Commonly known as: ECOTRIN  Take 1 tablet (81 mg total) by mouth 2 (two) times a day.  What changed: when to take this     gabapentin 100 MG capsule  Commonly known as: NEURONTIN  TAKE 1 CAPSULE (100 MG TOTAL) BY MOUTH 2 (TWO) TIMES DAILY.  What changed: when to take this        CONTINUE taking these medications    albuterol 2.5 mg /3 mL (0.083 %) nebulizer solution  Commonly known as: PROVENTIL  Take 2.5 mg by nebulization every evening. Rescue     ARGININE (L-ARGININE) ORAL  Take 500 mg by mouth once daily.     atorvastatin 40 MG tablet  Commonly known as: LIPITOR  TAKE 1 TABLET EVERY DAY     CANNABIDIOL (CBD) EXTRACT ORAL  Take 3 drops by mouth every morning.     COQ10 (UBIQUINOL) ORAL  Take 1 tablet by mouth nightly.     cyanocobalamin 1000 MCG tablet  Commonly known as: VITAMIN B-12  Take 100 mcg by mouth once daily.     EPINEPHrine 0.3 mg/0.3 mL Atin  Commonly known as: EPIPEN  Inject 1 each into the muscle once.     fluticasone-umeclidin-vilanter 100-62.5-25 mcg Dsdv  Commonly known as: TRELEGY ELLIPTA  Inhale 1 puff into the lungs once daily.     furosemide 20 MG tablet  Commonly known as: LASIX  Take 1 tablet (20 mg total) by mouth once daily.     GLUCOSAM-MSM-CHONDROIT-VIT D3 ORAL  Take 2 tablets by mouth once daily.     lisinopriL 10 MG tablet  TAKE 1 TABLET EVERY DAY     MEGARED OMEGA-3 KRILL OIL ORAL  Take 1 capsule by mouth nightly.     ONE-A-DAY MEN'S MULTIVITAMIN ORAL  Take 1 tablet by mouth every evening.     OXYGEN-AIR DELIVERY SYSTEMS MISC  Inhale 2 L into the lungs as needed (Oxygen 2L via NC concentrator).     potassium 99 mg Tab  Take by mouth once daily.     tamsulosin 0.4 mg Cap  Commonly known as: FLOMAX  TAKE 2 CAPSULES EVERY MORNING     VITAMIN D3 50 mcg (2,000  unit) Cap  Generic drug: cholecalciferol (vitamin D3)  Take 1 capsule by mouth every evening.          NWB RUE in sling  Ok to be out of sling for pendulums, elbow, wrist ROM  ASA 81mg BID x 2wks for DVT ppx  See discharge instructions for wound care instructions  F/u 10-14 days for wound check/suture removal      Reji Martin MD  Orthopedics  [facility]

## 2020-10-06 NOTE — SUBJECTIVE & OBJECTIVE
Interval History: assisted from chair to bed. Complains of pain 8.10.    Review of Systems   Constitutional: Negative for activity change, appetite change, chills, fatigue and fever.   HENT: Negative for dental problem, ear pain, hearing loss, mouth sores, nosebleeds, sinus pressure, sore throat, tinnitus and trouble swallowing.    Eyes: Negative for pain, discharge and visual disturbance.   Respiratory: Negative for cough, choking, chest tightness, shortness of breath and wheezing.    Cardiovascular: Negative for chest pain, palpitations and leg swelling.   Gastrointestinal: Negative for abdominal distention, abdominal pain, anal bleeding, blood in stool, constipation, diarrhea, nausea and vomiting.   Endocrine: Negative for cold intolerance, heat intolerance, polydipsia, polyphagia and polyuria.   Genitourinary: Negative for decreased urine volume, difficulty urinating, flank pain, frequency, hematuria and urgency.   Musculoskeletal: Negative for arthralgias, back pain, gait problem, myalgias, neck pain and neck stiffness.   Skin: Negative for color change, rash and wound (surgical to R shoulder).   Allergic/Immunologic: Negative for food allergies and immunocompromised state.   Neurological: Negative for dizziness, tremors, seizures, syncope, speech difficulty, weakness, light-headedness and headaches.   Hematological: Negative for adenopathy. Does not bruise/bleed easily.   Psychiatric/Behavioral: Negative for confusion and sleep disturbance. The patient is not nervous/anxious.    All other systems reviewed and are negative.     Objective:     Vital Signs (Most Recent):  Temp: 97.8 °F (36.6 °C) (10/06/20 1210)  Pulse: 90 (10/06/20 1427)  Resp: 16 (10/06/20 1427)  BP: 139/63 (10/06/20 1210)  SpO2: (!) 90 % (10/06/20 1427) Vital Signs (24h Range):  Temp:  [97.8 °F (36.6 °C)-99.5 °F (37.5 °C)] 97.8 °F (36.6 °C)  Pulse:  [68-90] 90  Resp:  [16-18] 16  SpO2:  [90 %-97 %] 90 %  BP: (111-139)/(56-63) 139/63     Weight:  84.6 kg (186 lb 8.2 oz)  Body mass index is 25.3 kg/m².    Intake/Output Summary (Last 24 hours) at 10/6/2020 1510  Last data filed at 10/6/2020 1400  Gross per 24 hour   Intake 720 ml   Output 875 ml   Net -155 ml      Physical Exam  Vitals signs and nursing note reviewed.   Constitutional:       General: He is not in acute distress.     Appearance: He is well-developed. He is not diaphoretic.   HENT:      Head: Normocephalic and atraumatic.      Nose: Nose normal.   Eyes:      General:         Right eye: No discharge.         Left eye: No discharge.      Conjunctiva/sclera: Conjunctivae normal.      Pupils: Pupils are equal, round, and reactive to light.   Neck:      Musculoskeletal: Normal range of motion and neck supple.      Thyroid: No thyromegaly.      Vascular: No JVD.      Trachea: No tracheal deviation.   Cardiovascular:      Rate and Rhythm: Normal rate and regular rhythm.      Heart sounds: Normal heart sounds. No murmur. No friction rub. No gallop.    Pulmonary:      Effort: Pulmonary effort is normal. No respiratory distress.      Breath sounds: Normal breath sounds. No wheezing or rales.   Chest:      Chest wall: No tenderness.   Abdominal:      General: Bowel sounds are normal. There is no distension.      Palpations: Abdomen is soft. There is no mass.      Tenderness: There is no abdominal tenderness.   Musculoskeletal: Normal range of motion.         General: No tenderness or deformity.   Skin:     General: Skin is warm and dry.      Capillary Refill: Capillary refill takes less than 2 seconds.      Findings: No erythema or rash.      Comments: Dressing to R shoulder c/d/i with brace in place   Neurological:      Mental Status: He is alert and oriented to person, place, and time.      Motor: No abnormal muscle tone.      Coordination: Coordination normal.   Psychiatric:         Behavior: Behavior normal.       Significant Labs:   CBC:   Recent Labs   Lab 10/06/20  0719   WBC 12.83*   HGB 11.6*    HCT 36.1*        CMP:   Recent Labs   Lab 10/06/20  0720      K 4.2      CO2 28   *   BUN 18   CREATININE 1.0   CALCIUM 9.0   ANIONGAP 9   EGFRNONAA >60       Significant Imaging: I have reviewed all pertinent imaging results/findings within the past 24 hours.

## 2020-10-06 NOTE — NURSING
"Secure chat sent to hospital medicine. "Recieved oxy IR at 2325. C/o of 10/10 pain to right shoulder. Could something for breakthrough be ordered? Also, patient is c/o indigestion." Ordered pepcid and states to administer oxycodone at this time. Patient aware of POC. Administered medications per MAR. Will continue to monitor.     "

## 2020-10-06 NOTE — PROGRESS NOTES
[facility] Hospital Medicine  Progress Note    Patient Name: Chao Hawk  MRN: [id number]  Patient Class: IP- Surgery Admit   Admission Date: 10/5/2020  Length of Stay: 1 days  Attending Physician: Reji Martin,*  Primary Care Provider: VY Ureña Jr, MD    Subjective:     Principal Problem:<principal problem not specified>    HPI:  89 y/o male with PMHx of HTN, HLD, arthritis and COPD who had scheduled right reverse total shoulder arthroplasty by Dr. Martin. Hospital medicine was consulted for medical management.  He is a full code and his SDM is his daughter, Zhane.  He is admitted under the care of Ortho surgery with hospital medicine consulted for medical management.      Overview/Hospital Course:  No notes on file    Interval History: assisted from chair to bed. Complains of pain 8.10.    Review of Systems   Constitutional: Negative for activity change, appetite change, chills, fatigue and fever.   HENT: Negative for dental problem, ear pain, hearing loss, mouth sores, nosebleeds, sinus pressure, sore throat, tinnitus and trouble swallowing.    Eyes: Negative for pain, discharge and visual disturbance.   Respiratory: Negative for cough, choking, chest tightness, shortness of breath and wheezing.    Cardiovascular: Negative for chest pain, palpitations and leg swelling.   Gastrointestinal: Negative for abdominal distention, abdominal pain, anal bleeding, blood in stool, constipation, diarrhea, nausea and vomiting.   Endocrine: Negative for cold intolerance, heat intolerance, polydipsia, polyphagia and polyuria.   Genitourinary: Negative for decreased urine volume, difficulty urinating, flank pain, frequency, hematuria and urgency.   Musculoskeletal: Negative for arthralgias, back pain, gait problem, myalgias, neck pain and neck stiffness.   Skin: Negative for color change, rash and wound (surgical to R shoulder).   Allergic/Immunologic: Negative for food allergies and  immunocompromised state.   Neurological: Negative for dizziness, tremors, seizures, syncope, speech difficulty, weakness, light-headedness and headaches.   Hematological: Negative for adenopathy. Does not bruise/bleed easily.   Psychiatric/Behavioral: Negative for confusion and sleep disturbance. The patient is not nervous/anxious.    All other systems reviewed and are negative.     Objective:     Vital Signs (Most Recent):  Temp: 97.8 °F (36.6 °C) (10/06/20 1210)  Pulse: 90 (10/06/20 1427)  Resp: 16 (10/06/20 1427)  BP: 139/63 (10/06/20 1210)  SpO2: (!) 90 % (10/06/20 1427) Vital Signs (24h Range):  Temp:  [97.8 °F (36.6 °C)-99.5 °F (37.5 °C)] 97.8 °F (36.6 °C)  Pulse:  [68-90] 90  Resp:  [16-18] 16  SpO2:  [90 %-97 %] 90 %  BP: (111-139)/(56-63) 139/63     Weight: 84.6 kg (186 lb 8.2 oz)  Body mass index is 25.3 kg/m².    Intake/Output Summary (Last 24 hours) at 10/6/2020 1510  Last data filed at 10/6/2020 1400  Gross per 24 hour   Intake 720 ml   Output 875 ml   Net -155 ml      Physical Exam  Vitals signs and nursing note reviewed.   Constitutional:       General: He is not in acute distress.     Appearance: He is well-developed. He is not diaphoretic.   HENT:      Head: Normocephalic and atraumatic.      Nose: Nose normal.   Eyes:      General:         Right eye: No discharge.         Left eye: No discharge.      Conjunctiva/sclera: Conjunctivae normal.      Pupils: Pupils are equal, round, and reactive to light.   Neck:      Musculoskeletal: Normal range of motion and neck supple.      Thyroid: No thyromegaly.      Vascular: No JVD.      Trachea: No tracheal deviation.   Cardiovascular:      Rate and Rhythm: Normal rate and regular rhythm.      Heart sounds: Normal heart sounds. No murmur. No friction rub. No gallop.    Pulmonary:      Effort: Pulmonary effort is normal. No respiratory distress.      Breath sounds: Normal breath sounds. No wheezing or rales.   Chest:      Chest wall: No tenderness.    Abdominal:      General: Bowel sounds are normal. There is no distension.      Palpations: Abdomen is soft. There is no mass.      Tenderness: There is no abdominal tenderness.   Musculoskeletal: Normal range of motion.         General: No tenderness or deformity.   Skin:     General: Skin is warm and dry.      Capillary Refill: Capillary refill takes less than 2 seconds.      Findings: No erythema or rash.      Comments: Dressing to R shoulder c/d/i with brace in place   Neurological:      Mental Status: He is alert and oriented to person, place, and time.      Motor: No abnormal muscle tone.      Coordination: Coordination normal.   Psychiatric:         Behavior: Behavior normal.       Significant Labs:   CBC:   Recent Labs   Lab 10/06/20  0719   WBC 12.83*   HGB 11.6*   HCT 36.1*        CMP:   Recent Labs   Lab 10/06/20  0720      K 4.2      CO2 28   *   BUN 18   CREATININE 1.0   CALCIUM 9.0   ANIONGAP 9   EGFRNONAA >60       Significant Imaging: I have reviewed all pertinent imaging results/findings within the past 24 hours.      Assessment/Plan:      S/P reverse total shoulder arthroplasty, right  --managed by ortho  --PRN analgesia        BPH (benign prostatic hyperplasia)  --continue flomax      Essential hypertension  --continue lisinopril  --cardiac diet      COPD (chronic obstructive pulmonary disease)  --PRN albuterol nebulizer        VTE Risk Mitigation (From admission, onward)         Ordered     Place JOSÉ LUIS hose  Until discontinued      10/05/20 0638     Place sequential compression device  Until discontinued      10/05/20 0638                Discharge Planning   MICHELLE: 10/6/2020     Code Status: Prior   Is the patient medically ready for discharge?: No    Reason for patient still in hospital (select all that apply): Treatment  Discharge Plan A: Home with family, Home Health       Will sign off. Plans for discharge today.    Bailee Lara NP  Department of Hospital  Medicine   [facility]

## 2020-10-07 PROCEDURE — G0180 PR HOME HEALTH MD CERTIFICATION: ICD-10-PCS | Mod: ,,, | Performed by: STUDENT IN AN ORGANIZED HEALTH CARE EDUCATION/TRAINING PROGRAM

## 2020-10-07 PROCEDURE — G0180 MD CERTIFICATION HHA PATIENT: HCPCS | Mod: ,,, | Performed by: STUDENT IN AN ORGANIZED HEALTH CARE EDUCATION/TRAINING PROGRAM

## 2020-10-07 NOTE — PLAN OF CARE
10/07/20 0727   Final Note   Assessment Type Final Discharge Note   Anticipated Discharge Disposition Home-Health   Hospital Follow Up  Appt(s) scheduled? Yes   Right Care Referral Info   Post Acute Recommendation Home-care   Facility Name [facility]

## 2020-10-09 ENCOUNTER — TELEPHONE (OUTPATIENT)
Dept: ORTHOPEDICS | Facility: CLINIC | Age: 85
End: 2020-10-09

## 2020-10-09 NOTE — TELEPHONE ENCOUNTER
Spoke w/ Kala with [facility] and was informed that they needed home health wound care orders for patient. Expressed to her that I would send a message to Dr. Martin and he would determine what he wanted. She verbalized understand and was grateful for the call-DD      ----- Message from Janice Lezama sent at 10/9/2020  9:24 AM CDT -----  Linnea with [facility] need to speak to nurse regarding wound care orders. Call back number [phone]. Tks

## 2020-10-12 ENCOUNTER — PATIENT OUTREACH (OUTPATIENT)
Dept: ADMINISTRATIVE | Facility: HOSPITAL | Age: 85
End: 2020-10-12

## 2020-10-13 ENCOUNTER — DOCUMENT SCAN (OUTPATIENT)
Dept: HOME HEALTH SERVICES | Facility: HOSPITAL | Age: 85
End: 2020-10-13
Payer: MEDICARE

## 2020-10-15 ENCOUNTER — NURSE TRIAGE (OUTPATIENT)
Dept: ADMINISTRATIVE | Facility: CLINIC | Age: 85
End: 2020-10-15

## 2020-10-15 NOTE — TELEPHONE ENCOUNTER
Contacted pt on behalf of Post Procedural Symptom Tracker. Pt verified by first and last name and . Pt denies any fever, cough, since procedure. Pt said that he has COPD and hes always SOB will call if anything out of the ordinary Advised pt if these symptoms do arise to contact [facility] or PCP. No follow up needed.    Reason for Disposition   Health Information question, no triage required and triager able to answer question    Protocols used: INFORMATION ONLY CALL - NO TRIAGE-A-

## 2020-10-19 ENCOUNTER — TELEPHONE (OUTPATIENT)
Dept: INTERNAL MEDICINE | Facility: CLINIC | Age: 85
End: 2020-10-19

## 2020-10-19 NOTE — TELEPHONE ENCOUNTER
----- Message from Mary Lou Leblanc sent at 10/19/2020 11:09 AM CDT -----  Regarding: speak w/ nurse  Contact: Pt  Type: Requesting to speak with nurse    Who Called: PT  Regarding: Medication  Would the patient rather a call back or a response via [facility]? Call back  Best Call Back Number: [phone]  Additional Information: n/a

## 2020-10-19 NOTE — TELEPHONE ENCOUNTER
----- Message from Tata Gallagher sent at 10/19/2020  1:16 PM CDT -----  Regarding: RET  CALL  Contact: PATIENT  Type:  Patient Returning Call    Who Called:PATIENT  Who Left Message for Patient:NURSE  Does the patient know what this is regarding?:RET CALL  Would the patient rather a call back or a response via [facility]? CALL  Best Call Back Number:[phone]  Additional Information: PLEASE CALL BACK

## 2020-10-20 ENCOUNTER — OFFICE VISIT (OUTPATIENT)
Dept: ORTHOPEDICS | Facility: CLINIC | Age: 85
End: 2020-10-20
Payer: MEDICARE

## 2020-10-20 ENCOUNTER — EXTERNAL HOME HEALTH (OUTPATIENT)
Dept: HOME HEALTH SERVICES | Facility: HOSPITAL | Age: 85
End: 2020-10-20
Payer: MEDICARE

## 2020-10-20 VITALS
BODY MASS INDEX: 25.19 KG/M2 | WEIGHT: 186 LBS | SYSTOLIC BLOOD PRESSURE: 158 MMHG | HEIGHT: 72 IN | DIASTOLIC BLOOD PRESSURE: 75 MMHG | HEART RATE: 107 BPM

## 2020-10-20 DIAGNOSIS — M25.511 RIGHT SHOULDER PAIN, UNSPECIFIED CHRONICITY: Primary | ICD-10-CM

## 2020-10-20 DIAGNOSIS — Z96.611 S/P REVERSE TOTAL SHOULDER ARTHROPLASTY, RIGHT: Primary | ICD-10-CM

## 2020-10-20 PROCEDURE — 99999 PR PBB SHADOW E&M-EST. PATIENT-LVL IV: CPT | Mod: PBBFAC,HCNC,, | Performed by: STUDENT IN AN ORGANIZED HEALTH CARE EDUCATION/TRAINING PROGRAM

## 2020-10-20 PROCEDURE — 99024 POSTOP FOLLOW-UP VISIT: CPT | Mod: HCNC,S$GLB,, | Performed by: STUDENT IN AN ORGANIZED HEALTH CARE EDUCATION/TRAINING PROGRAM

## 2020-10-20 PROCEDURE — 99999 PR PBB SHADOW E&M-EST. PATIENT-LVL IV: ICD-10-PCS | Mod: PBBFAC,HCNC,, | Performed by: STUDENT IN AN ORGANIZED HEALTH CARE EDUCATION/TRAINING PROGRAM

## 2020-10-20 PROCEDURE — 99024 PR POST-OP FOLLOW-UP VISIT: ICD-10-PCS | Mod: HCNC,S$GLB,, | Performed by: STUDENT IN AN ORGANIZED HEALTH CARE EDUCATION/TRAINING PROGRAM

## 2020-10-20 NOTE — PROGRESS NOTES
Orthopaedics  Post-operative follow-up    Procedure Performed: Right reverse total shoulder arthroplasty   Date of Surgery: 10/5/2020    Subjective: Chao Hawk is now 2 weeks out from his shoulder surgery.  He is doing well with no specific complaints other than the expected post-operative pain and stiffness.  He has been compliant with post-operative restrictions.  He has home out that calms to do PT and wound care at his house.  His shoulder have also been alternating staying with him so that he is able to recover after surgery.  He reports no motor sensory deficits to the upper extremity.  His chief complaint since surgery has been enlarged prostate and urge incontinence.      Exam:  Sutures removed, incision site benign with no drainage or redness  There is some surrounding skin injury that was a result of adhesive draping, it is healing well  Mild bruising as anticipated  ROM fluid, will be formally assessed at next visit  Axillary nerve sensation and motor intact  Motor and sensory intact distally  Strong radial pulse, fingers warm and well perfused    Imaging:  No new imaging studies today    Impression:  Status post right reverse total shoulder arthroplasty, initial post-operative visit - doing well    Plan:  Discussed surgical findings, operative procedure  Reviewed post-operative instructions, restrictions, and rehabilitation  Symptomatic treatment for pain / swelling  Instructed patient to call clinic if questions or concerns    Follow-up 1 month for recheck with x-rays        Reji Martin MD

## 2020-10-30 ENCOUNTER — DOCUMENT SCAN (OUTPATIENT)
Dept: HOME HEALTH SERVICES | Facility: HOSPITAL | Age: 85
End: 2020-10-30
Payer: MEDICARE

## 2020-11-01 ENCOUNTER — HOSPITAL ENCOUNTER (EMERGENCY)
Facility: HOSPITAL | Age: 85
Discharge: HOME | End: 2020-11-01
Attending: EMERGENCY MEDICINE
Payer: MEDICARE

## 2020-11-01 ENCOUNTER — PATIENT MESSAGE (OUTPATIENT)
Dept: INTERNAL MEDICINE | Facility: CLINIC | Age: 85
End: 2020-11-01

## 2020-11-01 VITALS
WEIGHT: 186.38 LBS | OXYGEN SATURATION: 96 % | DIASTOLIC BLOOD PRESSURE: 74 MMHG | HEART RATE: 95 BPM | RESPIRATION RATE: 18 BRPM | TEMPERATURE: 98 F | SYSTOLIC BLOOD PRESSURE: 164 MMHG | BODY MASS INDEX: 25.24 KG/M2 | HEIGHT: 72 IN

## 2020-11-01 DIAGNOSIS — R31.9 HEMATURIA, UNSPECIFIED TYPE: ICD-10-CM

## 2020-11-01 DIAGNOSIS — R33.9 URINARY RETENTION: Primary | ICD-10-CM

## 2020-11-01 LAB
ALBUMIN SERPL BCP-MCNC: 3.3 G/DL (ref 3.5–5.2)
ALP SERPL-CCNC: 78 U/L (ref 55–135)
ALT SERPL W/O P-5'-P-CCNC: 27 U/L (ref 10–44)
ANION GAP SERPL CALC-SCNC: 11 MMOL/L (ref 8–16)
AST SERPL-CCNC: 22 U/L (ref 10–40)
BACTERIA #/AREA URNS HPF: ABNORMAL /HPF
BASOPHILS # BLD AUTO: 0.04 K/UL (ref 0–0.2)
BASOPHILS NFR BLD: 0.5 % (ref 0–1.9)
BILIRUB SERPL-MCNC: 0.6 MG/DL (ref 0.1–1)
BILIRUB UR QL STRIP: NEGATIVE
BUN SERPL-MCNC: 18 MG/DL (ref 8–23)
CALCIUM SERPL-MCNC: 9.5 MG/DL (ref 8.7–10.5)
CHLORIDE SERPL-SCNC: 108 MMOL/L (ref 95–110)
CLARITY UR: CLEAR
CO2 SERPL-SCNC: 24 MMOL/L (ref 23–29)
COLOR UR: YELLOW
CREAT SERPL-MCNC: 0.8 MG/DL (ref 0.5–1.4)
DIFFERENTIAL METHOD: ABNORMAL
EOSINOPHIL # BLD AUTO: 0.1 K/UL (ref 0–0.5)
EOSINOPHIL NFR BLD: 1.5 % (ref 0–8)
ERYTHROCYTE [DISTWIDTH] IN BLOOD BY AUTOMATED COUNT: 13.5 % (ref 11.5–14.5)
EST. GFR  (AFRICAN AMERICAN): >60 ML/MIN/1.73 M^2
EST. GFR  (NON AFRICAN AMERICAN): >60 ML/MIN/1.73 M^2
GLUCOSE SERPL-MCNC: 152 MG/DL (ref 70–110)
GLUCOSE UR QL STRIP: NEGATIVE
HCT VFR BLD AUTO: 37.2 % (ref 40–54)
HGB BLD-MCNC: 11.5 G/DL (ref 14–18)
HGB UR QL STRIP: ABNORMAL
HYALINE CASTS #/AREA URNS LPF: 0 /LPF
IMM GRANULOCYTES # BLD AUTO: 0.04 K/UL (ref 0–0.04)
IMM GRANULOCYTES NFR BLD AUTO: 0.5 % (ref 0–0.5)
KETONES UR QL STRIP: NEGATIVE
LEUKOCYTE ESTERASE UR QL STRIP: NEGATIVE
LYMPHOCYTES # BLD AUTO: 0.5 K/UL (ref 1–4.8)
LYMPHOCYTES NFR BLD: 6.1 % (ref 18–48)
MCH RBC QN AUTO: 29.2 PG (ref 27–31)
MCHC RBC AUTO-ENTMCNC: 30.9 G/DL (ref 32–36)
MCV RBC AUTO: 94 FL (ref 82–98)
MICROSCOPIC COMMENT: ABNORMAL
MONOCYTES # BLD AUTO: 0.5 K/UL (ref 0.3–1)
MONOCYTES NFR BLD: 6.1 % (ref 4–15)
NEUTROPHILS # BLD AUTO: 7.4 K/UL (ref 1.8–7.7)
NEUTROPHILS NFR BLD: 85.3 % (ref 38–73)
NITRITE UR QL STRIP: NEGATIVE
NRBC BLD-RTO: 0 /100 WBC
PH UR STRIP: 6 [PH] (ref 5–8)
PLATELET # BLD AUTO: 291 K/UL (ref 150–350)
PMV BLD AUTO: 10.9 FL (ref 9.2–12.9)
POTASSIUM SERPL-SCNC: 4.2 MMOL/L (ref 3.5–5.1)
PROT SERPL-MCNC: 6.8 G/DL (ref 6–8.4)
PROT UR QL STRIP: ABNORMAL
RBC # BLD AUTO: 3.94 M/UL (ref 4.6–6.2)
RBC #/AREA URNS HPF: 18 /HPF (ref 0–4)
SODIUM SERPL-SCNC: 143 MMOL/L (ref 136–145)
SP GR UR STRIP: 1.02 (ref 1–1.03)
URN SPEC COLLECT METH UR: ABNORMAL
UROBILINOGEN UR STRIP-ACNC: NEGATIVE EU/DL
WBC # BLD AUTO: 8.68 K/UL (ref 3.9–12.7)
WBC #/AREA URNS HPF: 2 /HPF (ref 0–5)

## 2020-11-01 PROCEDURE — 81000 URINALYSIS NONAUTO W/SCOPE: CPT | Mod: HCNC

## 2020-11-01 PROCEDURE — 80053 COMPREHEN METABOLIC PANEL: CPT | Mod: HCNC

## 2020-11-01 PROCEDURE — 51702 INSERT TEMP BLADDER CATH: CPT | Mod: HCNC

## 2020-11-01 PROCEDURE — 99284 EMERGENCY DEPT VISIT MOD MDM: CPT | Mod: 25,HCNC

## 2020-11-01 PROCEDURE — 25000003 PHARM REV CODE 250: Mod: HCNC | Performed by: EMERGENCY MEDICINE

## 2020-11-01 PROCEDURE — 96360 HYDRATION IV INFUSION INIT: CPT | Mod: HCNC

## 2020-11-01 PROCEDURE — 85025 COMPLETE CBC W/AUTO DIFF WBC: CPT | Mod: HCNC

## 2020-11-01 PROCEDURE — 36415 COLL VENOUS BLD VENIPUNCTURE: CPT | Mod: HCNC

## 2020-11-01 PROCEDURE — 96361 HYDRATE IV INFUSION ADD-ON: CPT | Mod: HCNC,59

## 2020-11-01 RX ADMIN — SODIUM CHLORIDE 1000 ML: 0.9 INJECTION, SOLUTION INTRAVENOUS at 11:11

## 2020-11-01 NOTE — ED PROVIDER NOTES
"SCRIBE #1 NOTE: I, Jonny Levin, am scribing for, and in the presence of, Alex Wall MD. I have scribed the entire note.      History      Chief Complaint   Patient presents with    Dysuria     PT states, "I think I have a UTI because it burns when I urinate."        Review of patient's allergies indicates:   Allergen Reactions    Penicillins Rash     "Felt like I had pins and needles in my feet" , hospitalized overnight    Bee sting  [allergen ext-venom-honey bee] Swelling    Poison ivy extract Hives        HPI   HPI    11/1/2020, 10:13 AM   History obtained from the patient      History of Present Illness: Chao Hawk is a 88 y.o. male patient who presents to the Emergency Department for dysuria, onset several days PTA. Symptoms are episodic and moderate in severity. No mitigating or exacerbating factors reported. Associated sxs include back pain. Patient denies any fever, chills, n/v/d, SOB, CP, weakness, numbness, dizziness, headache, and all other sxs at this time. No prior Tx reported. No further complaints or concerns at this time.     Arrival mode: Personal vehicle    PCP: VY Ureña Jr, MD       Past Medical History:  Past Medical History:   Diagnosis Date    Arthritis     back, hand    Back pain     Dr. Cristobal- [facility]    Bilateral leg edema 2/16/2018    COPD (chronic obstructive pulmonary disease)     Diverticulitis of large intestine with perforation without abscess or bleeding     Diverticulosis 5/16/06    colonoscopy    Hernia     x2    Hyperlipidemia     Hypertension     Lumbar radiculopathy 6/14/2017    Multiple renal cysts 7/5/2016    Nocturnal hypoxemia     Tobacco dependence     Trouble in sleeping        Past Surgical History:  Past Surgical History:   Procedure Laterality Date    FINGER SURGERY Left 1980s    index- Dr. Encarnacion; to remove nodule    HERNIA REPAIR  1990s    x2    INTRAOCULAR LENS INSERTION Bilateral 2016    LUMBAR SPINE SURGERY  09/13/2017    " REVERSE TOTAL SHOULDER ARTHROPLASTY Right 10/5/2020    Procedure: ARTHROPLASTY, SHOULDER, TOTAL, REVERSE;  Surgeon: Reji Martin MD;  Location: [facility];  Service: Orthopedics;  Laterality: Right;    TONSILLECTOMY, ADENOIDECTOMY           Family History:  Family History   Problem Relation Age of Onset    Emphysema Mother     Heart disease Mother     COPD Mother     Appendicitis Father     Heart disease Son     Atrial fibrillation Sister     Lung disease Sister 91    Colon cancer Neg Hx     Cancer Neg Hx     Stroke Neg Hx        Social History:  Social History     Tobacco Use    Smoking status: Former Smoker     Packs/day: 1.00     Years: 40.00     Pack years: 40.00     Start date: 1949     Quit date: 1991     Years since quittin.8    Smokeless tobacco: Never Used   Substance and Sexual Activity    Alcohol use: Yes     Frequency: Monthly or less     Drinks per session: 1 or 2     Binge frequency: Never     Comment: rarely   No alcohol 72h  prior to sx    Drug use: No    Sexual activity: Never       ROS   Review of Systems   Constitutional: Negative for chills and fever.   HENT: Negative for sore throat.    Respiratory: Negative for shortness of breath.    Cardiovascular: Negative for chest pain.   Gastrointestinal: Negative for diarrhea, nausea and vomiting.   Genitourinary: Positive for dysuria.   Musculoskeletal: Positive for back pain.   Skin: Negative for rash.   Neurological: Negative for dizziness, seizures, weakness, light-headedness, numbness and headaches.   Hematological: Does not bruise/bleed easily.   All other systems reviewed and are negative.    Physical Exam      Initial Vitals [20 1006]   BP Pulse Resp Temp SpO2   (!) 117/59 (!) 113 20 98.4 °F (36.9 °C) 96 %      MAP       --          Physical Exam  Nursing Notes and Vital Signs Reviewed.  Constitutional: Patient is in no acute distress. Well-developed and well-nourished.  Head: Atraumatic.  Normocephalic.  Eyes: PERRL. EOM intact. Conjunctivae are not pale. No scleral icterus.  ENT: Mucous membranes are moist. Oropharynx is clear and symmetric.    Neck: Supple. Full ROM. No lymphadenopathy.  Cardiovascular: Tachycardic Regular rhythm. No murmurs, rubs, or gallops. Distal pulses are 2+ and symmetric.  Pulmonary/Chest: No respiratory distress. Clear to auscultation bilaterally. No wheezing or rales.  Abdominal: Soft and non-distended.  There is no tenderness.  No rebound, guarding, or rigidity.  Genitourinary: No CVA tenderness.  Musculoskeletal: Moves all extremities. No obvious deformities. No edema.  Skin: Warm and dry.  Neurological:  Alert, awake, and appropriate.  Normal speech.  No acute focal neurological deficits are appreciated.  Psychiatric: Normal affect. Good eye contact. Appropriate in content.    ED Course    Procedures  ED Vital Signs:  Vitals:    11/01/20 1006 11/01/20 1035 11/01/20 1130 11/01/20 1230   BP: (!) 117/59 (!) 135/58 (!) 150/65 (!) 162/77   Pulse: (!) 113 96 98 86   Resp: 20 18 18 18   Temp: 98.4 °F (36.9 °C)      TempSrc: Oral      SpO2: 96% 95% 96% 96%   Weight: 84.5 kg (186 lb 6.4 oz)      Height: 6' (1.829 m)       11/01/20 1310 11/01/20 1330 11/01/20 1400   BP: (!) 142/66 (!) 164/74 (!) 164/74   Pulse: 86 97 95   Resp: 18 18 18   Temp:   98.3 °F (36.8 °C)   TempSrc:   Oral   SpO2: 98% 98% 96%   Weight:      Height:          Abnormal Lab Results:  Labs Reviewed   URINALYSIS, REFLEX TO URINE CULTURE - Abnormal; Notable for the following components:       Result Value    Protein, UA 2+ (*)     Occult Blood UA 2+ (*)     All other components within normal limits    Narrative:     Specimen Source->Urine   COMPREHENSIVE METABOLIC PANEL - Abnormal; Notable for the following components:    Glucose 152 (*)     Albumin 3.3 (*)     All other components within normal limits   CBC W/ AUTO DIFFERENTIAL - Abnormal; Notable for the following components:    RBC 3.94 (*)     Hemoglobin  11.5 (*)     Hematocrit 37.2 (*)     MCHC 30.9 (*)     Lymph # 0.5 (*)     Gran % 85.3 (*)     Lymph % 6.1 (*)     All other components within normal limits   URINALYSIS MICROSCOPIC - Abnormal; Notable for the following components:    RBC, UA 18 (*)     All other components within normal limits    Narrative:     Specimen Source->Urine        All Lab Results:  Results for orders placed or performed during the hospital encounter of 11/01/20   Urinalysis, Reflex to Urine Culture Urine, Clean Catch    Specimen: Urine   Result Value Ref Range    Specimen UA Urine, Clean Catch     Color, UA Yellow Yellow, Straw, Jodie    Appearance, UA Clear Clear    pH, UA 6.0 5.0 - 8.0    Specific Gravity, UA 1.020 1.005 - 1.030    Protein, UA 2+ (A) Negative    Glucose, UA Negative Negative    Ketones, UA Negative Negative    Bilirubin (UA) Negative Negative    Occult Blood UA 2+ (A) Negative    Nitrite, UA Negative Negative    Urobilinogen, UA Negative <2.0 EU/dL    Leukocytes, UA Negative Negative   Comprehensive metabolic panel   Result Value Ref Range    Sodium 143 136 - 145 mmol/L    Potassium 4.2 3.5 - 5.1 mmol/L    Chloride 108 95 - 110 mmol/L    CO2 24 23 - 29 mmol/L    Glucose 152 (H) 70 - 110 mg/dL    BUN 18 8 - 23 mg/dL    Creatinine 0.8 0.5 - 1.4 mg/dL    Calcium 9.5 8.7 - 10.5 mg/dL    Total Protein 6.8 6.0 - 8.4 g/dL    Albumin 3.3 (L) 3.5 - 5.2 g/dL    Total Bilirubin 0.6 0.1 - 1.0 mg/dL    Alkaline Phosphatase 78 55 - 135 U/L    AST 22 10 - 40 U/L    ALT 27 10 - 44 U/L    Anion Gap 11 8 - 16 mmol/L    eGFR if African American >60 >60 mL/min/1.73 m^2    eGFR if non African American >60 >60 mL/min/1.73 m^2   CBC auto differential   Result Value Ref Range    WBC 8.68 3.90 - 12.70 K/uL    RBC 3.94 (L) 4.60 - 6.20 M/uL    Hemoglobin 11.5 (L) 14.0 - 18.0 g/dL    Hematocrit 37.2 (L) 40.0 - 54.0 %    MCV 94 82 - 98 fL    MCH 29.2 27.0 - 31.0 pg    MCHC 30.9 (L) 32.0 - 36.0 g/dL    RDW 13.5 11.5 - 14.5 %    Platelets 291 150 -  350 K/uL    MPV 10.9 9.2 - 12.9 fL    Immature Granulocytes 0.5 0.0 - 0.5 %    Gran # (ANC) 7.4 1.8 - 7.7 K/uL    Immature Grans (Abs) 0.04 0.00 - 0.04 K/uL    Lymph # 0.5 (L) 1.0 - 4.8 K/uL    Mono # 0.5 0.3 - 1.0 K/uL    Eos # 0.1 0.0 - 0.5 K/uL    Baso # 0.04 0.00 - 0.20 K/uL    nRBC 0 0 /100 WBC    Gran % 85.3 (H) 38.0 - 73.0 %    Lymph % 6.1 (L) 18.0 - 48.0 %    Mono % 6.1 4.0 - 15.0 %    Eosinophil % 1.5 0.0 - 8.0 %    Basophil % 0.5 0.0 - 1.9 %    Differential Method Automated    Urinalysis Microscopic   Result Value Ref Range    RBC, UA 18 (H) 0 - 4 /hpf    WBC, UA 2 0 - 5 /hpf    Bacteria None None-Occ /hpf    Hyaline Casts, UA 0 0-1/lpf /lpf    Microscopic Comment SEE COMMENT      Imaging Results:  Imaging Results          CT Renal Stone Study ABD Pelvis WO (Final result)  Result time 11/01/20 13:43:10    Final result by Tonya Lopez MD (Timothy) (11/01/20 13:43:10)                 Impression:      The bladder is distended and there is mild bladder wall thickening which could reflect a cystitis.  There also could be a element of bladder outlet obstruction as the prostate gland is enlarged as well.    There is mild hydronephrosis of the left kidney which has developed since previous exam.  No definite cause of obstruction this could be partly related to distention of the bladder.    There are few renal cysts bilaterally.    There is a calcified gallstone.    All CT scans at this facility use dose modulation, iterative reconstructions, and/or weight base dosing when appropriate to reduce radiation dose to as low as reasonably achievable      Electronically signed by: Tonya Lopez MD  Date:    11/01/2020  Time:    13:43             Narrative:    EXAMINATION:  CT RENAL STONE STUDY ABD PELVIS WO    CLINICAL HISTORY:  Flank pain, kidney stone suspected;    COMPARISON:  CT abdomen pelvis, 04/13/2018    FINDINGS:  Stable calcified granuloma in the right middle lobe.  No infiltrates.    The liver, the spleen,  and the pancreas appear normal.    There is at least 1 calcified gallstone.  No gallbladder wall thickening or bile duct dilatation.    There is mild hydronephrosis involving the left kidney.  However no evidence of obstructing stones.  The bladder is slightly distended the bladder wall is prominent this could reflect a cystitis or bladder outlet obstruction.  The prostate gland is enlarged.  There are few renal cysts involving the kidneys.    The aorta and inferior vena cava are unremarkable.  There is atherosclerosis and mild ectasia of the abdominal aorta.    There are no acute bowel abnormalities.  Normal appendix.  No evidence of appendicitis.  No evidence of diverticulitis.    Bladder is normal. No abnormal masses or fluid collections in the pelvis.    There are degenerative changes involving the lumbar spine.  No acute bony abnormalities.                                        The Emergency Provider reviewed the vital signs and test results, which are outlined above.    ED Discussion     1:51 PM: Reassessed pt at this time. Discussed with pt all pertinent ED information and results. Discussed pt dx and plan of tx. Gave pt all f/u and return to the ED instructions. All questions and concerns were addressed at this time. Pt expresses understanding of information and instructions, and is comfortable with plan to discharge. Pt is stable for discharge.    I discussed with patient and/or family/caretaker that evaluation in the ED does not suggest any emergent or life threatening medical conditions requiring immediate intervention beyond what was provided in the ED, and I believe patient is safe for discharge.  Regardless, an unremarkable evaluation in the ED does not preclude the development or presence of a serious of life threatening condition. As such, patient was instructed to return immediately for any worsening or change in current symptoms.         ED Medication(s):  Medications   sodium chloride 0.9% bolus  1,000 mL (0 mLs Intravenous Stopped 11/1/20 8094)       Follow-up Information     Fausto Curran IV, MD In 3 days.    Specialty: Urology  Contact information:  [address] [place] [address] DR Kelli MEYER [id number] [phone]                  Discharge Medication List as of 11/1/2020  1:52 PM            Medical Decision Making    Medical Decision Making:   Clinical Tests:   Lab Tests: Ordered and Reviewed  Radiological Study: Ordered and Reviewed           Scribe Attestation:   Scribe #1: I performed the above scribed service and the documentation accurately describes the services I performed. I attest to the accuracy of the note.    Attending:   Physician Attestation Statement for Scribe #1: I, Alex Wall MD, personally performed the services described in this documentation, as scribed by Jonny Levin, in my presence, and it is both accurate and complete.          Clinical Impression       ICD-10-CM ICD-9-CM   1. Urinary retention  R33.9 788.20   2. Hematuria, unspecified type  R31.9 599.70       Disposition:   Disposition: Discharged  Condition: Stable         Alex Wall MD  11/01/20 1514

## 2020-11-01 NOTE — ED NOTES
Pt reports having shoulder surgery 4 days ago and was having urinary retention after surgery. Pt reports having to be cathederized for bladder relief and has been experiencing burning during urination ever since.

## 2020-11-02 ENCOUNTER — OFFICE VISIT (OUTPATIENT)
Dept: UROLOGY | Facility: CLINIC | Age: 85
End: 2020-11-02
Payer: MEDICARE

## 2020-11-02 ENCOUNTER — NURSE TRIAGE (OUTPATIENT)
Dept: ADMINISTRATIVE | Facility: CLINIC | Age: 85
End: 2020-11-02

## 2020-11-02 ENCOUNTER — TELEPHONE (OUTPATIENT)
Dept: UROLOGY | Facility: CLINIC | Age: 85
End: 2020-11-02

## 2020-11-02 ENCOUNTER — TELEPHONE (OUTPATIENT)
Dept: INTERNAL MEDICINE | Facility: CLINIC | Age: 85
End: 2020-11-02

## 2020-11-02 VITALS — BODY MASS INDEX: 25.23 KG/M2 | TEMPERATURE: 98 F | WEIGHT: 186 LBS

## 2020-11-02 DIAGNOSIS — R33.9 URINARY RETENTION: Primary | ICD-10-CM

## 2020-11-02 PROCEDURE — 1126F PR PAIN SEVERITY QUANTIFIED, NO PAIN PRESENT: ICD-10-PCS | Mod: HCNC,S$GLB,, | Performed by: UROLOGY

## 2020-11-02 PROCEDURE — 1126F AMNT PAIN NOTED NONE PRSNT: CPT | Mod: HCNC,S$GLB,, | Performed by: UROLOGY

## 2020-11-02 PROCEDURE — 99999 PR PBB SHADOW E&M-EST. PATIENT-LVL III: CPT | Mod: PBBFAC,HCNC,, | Performed by: UROLOGY

## 2020-11-02 PROCEDURE — 1101F PR PT FALLS ASSESS DOC 0-1 FALLS W/OUT INJ PAST YR: ICD-10-PCS | Mod: HCNC,CPTII,S$GLB, | Performed by: UROLOGY

## 2020-11-02 PROCEDURE — 99999 PR PBB SHADOW E&M-EST. PATIENT-LVL III: ICD-10-PCS | Mod: PBBFAC,HCNC,, | Performed by: UROLOGY

## 2020-11-02 PROCEDURE — 99204 PR OFFICE/OUTPT VISIT, NEW, LEVL IV, 45-59 MIN: ICD-10-PCS | Mod: HCNC,S$GLB,, | Performed by: UROLOGY

## 2020-11-02 PROCEDURE — 1159F PR MEDICATION LIST DOCUMENTED IN MEDICAL RECORD: ICD-10-PCS | Mod: HCNC,S$GLB,, | Performed by: UROLOGY

## 2020-11-02 PROCEDURE — 1159F MED LIST DOCD IN RCRD: CPT | Mod: HCNC,S$GLB,, | Performed by: UROLOGY

## 2020-11-02 PROCEDURE — 1101F PT FALLS ASSESS-DOCD LE1/YR: CPT | Mod: HCNC,CPTII,S$GLB, | Performed by: UROLOGY

## 2020-11-02 PROCEDURE — 99204 OFFICE O/P NEW MOD 45 MIN: CPT | Mod: HCNC,S$GLB,, | Performed by: UROLOGY

## 2020-11-02 NOTE — PROGRESS NOTES
Chief Complaint: Urinary Retention    HPI:   11/2/20: 89 yo man has had two occasions of retention after right shoulder surgery.  Pittman placed again last night.  Been on flomax a long time.   No abd/pelvic pain and no exac/rel factors.  No hematuria.  No urolithiasis.  No urinary bother.  No  history.  Normal sexual function.    Allergies:  Penicillins, Bee sting  [allergen ext-venom-honey bee], and Poison ivy extract    Medications:  has a current medication list which includes the following prescription(s): albuterol, arginine, aspirin, atorvastatin, cannabidiol (cbd), cholecalciferol (vitamin d3), ubiquinol, cyanocobalamin, fluticasone-umeclidin-vilanter, furosemide, gabapentin, glucosam/msm/chondroit/vit d3, krill/om-3/dha/epa/phospho/ast, lisinopril, multivit-min/folic/vit k/lycop, oxycodone, oxygen-air delivery systems, pantoprazole, potassium, and tamsulosin.    Review of Systems:  General: No fever, chills, fatigability, or weight loss.  Skin: No rashes, itching, or changes in color or texture of skin.  Chest: Denies STEVENS, cyanosis, wheezing, cough, and sputum production.  Abdomen: Appetite fine. No weight loss. Denies diarrhea, abdominal pain, hematemesis, or blood in stool.  Musculoskeletal: No joint stiffness or swelling. Denies back pain.  : As above.  All other review of systems negative.    PMH:   has a past medical history of Arthritis, Back pain, Bilateral leg edema (2/16/2018), COPD (chronic obstructive pulmonary disease), Diverticulitis of large intestine with perforation without abscess or bleeding, Diverticulosis (5/16/06), Hernia, Hyperlipidemia, Hypertension, Lumbar radiculopathy (6/14/2017), Multiple renal cysts (7/5/2016), Nocturnal hypoxemia, Tobacco dependence, and Trouble in sleeping.    PSH:   has a past surgical history that includes Finger surgery (Left, 1980s); Hernia repair (1990s); TONSILLECTOMY, ADENOIDECTOMY (1940); Lumbar spine surgery (09/13/2017); Intraocular lens insertion  (Bilateral, 2016); and Reverse total shoulder arthroplasty (Right, 10/5/2020).    FamHx: family history includes Appendicitis in his father; Atrial fibrillation in his sister; COPD in his mother; Emphysema in his mother; Heart disease in his mother and son; Lung disease (age of onset: 91) in his sister.    SocHx:  reports that he quit smoking about 29 years ago. He started smoking about 71 years ago. He has a 40.00 pack-year smoking history. He has never used smokeless tobacco. He reports current alcohol use. He reports that he does not use drugs.      Physical Exam:  Vitals:    11/02/20 1508   Temp: 98.1 °F (36.7 °C)     General: A&Ox3, no apparent distress, no deformities  Neck: No masses, normal thyroid  Lungs: normal inspiration, no use of accessory muscles  Heart: normal pulse, no arrhythmias  Abdomen: Soft, NT, ND, no masses, no hernias, no hepatosplenomegaly  Lymphatic: Neck and groin nodes negative  Skin: The skin is warm and dry. No jaundice.  Ext: No c/c/e.  : defer to cysto    Labs/Studies:   PSA    1/20: 3.2    Impression/Plan:   1. RTC for cysto/uroflow.  May need TURP.

## 2020-11-02 NOTE — TELEPHONE ENCOUNTER
----- Message from Dalia Santos sent at 11/2/2020  9:38 AM CST -----  Regarding: pt advice  Contact: pt  Pt needs someone to call him regarding bleeding from penis/catheter site. He can be reached at [phone]

## 2020-11-02 NOTE — TELEPHONE ENCOUNTER
Reason for Disposition   Tea-colored or slightly red urine lasts > 24 hours that is not cleared by increasing fluid intake, and no recent prostate or bladder surgery    Additional Information   Negative: Shock suspected (e.g., cold/pale/clammy skin, too weak to stand, low BP, rapid pulse)   Negative: Sounds like a life-threatening emergency to the triager   Negative: Catheter was accidentally pulled-out and bright red continuous bleeding   Negative: SEVERE abdominal pain   Negative: Fever > 100.4 F (38.0 C)   Negative: Drinking very little and dehydration suspected (e.g., no urine > 12 hours, very dry mouth, very lightheaded)   Negative: Patient sounds very sick or weak to the triager   Negative: Catheter was accidentally pulled-out   Negative: Bleeding around catheter (e.g., from penis or female urethra)   Negative: Lower abdominal pain or distention    Protocols used: URINARY CATHETER SYMPTOMS AND [id number]    Pt stated he had a Pittman cath placed on yesterday in the ED. Stated he had tea colored urine this morning, and slight pink tinge in the tube. Stated he was told in ED to see Dr. Gerber in Urology. Pt advised to see any urologist available today if Dr. Gerber cannot see today, or go to Urgent Care for evaluation. Pt verbalized understanding. Warm transferred to [facility] desk for assistance.

## 2020-11-02 NOTE — TELEPHONE ENCOUNTER
P#[phone] Called pt, no ans, no vm. P#[phone] S/w pt and sched for NP/hosp f/u catheter site check w/ Urologist Dr. Curran at [facility] today. Pt confirmed appt date/time and location.

## 2020-11-05 RX ORDER — ALBUTEROL SULFATE 0.83 MG/ML
2.5 SOLUTION RESPIRATORY (INHALATION) EVERY 4 HOURS PRN
Qty: 150 ML | Refills: 11 | Status: SHIPPED | OUTPATIENT
Start: 2020-11-05 | End: 2020-11-25 | Stop reason: SDUPTHER

## 2020-11-17 ENCOUNTER — OFFICE VISIT (OUTPATIENT)
Dept: ORTHOPEDICS | Facility: CLINIC | Age: 85
End: 2020-11-17
Payer: MEDICARE

## 2020-11-17 ENCOUNTER — HOSPITAL ENCOUNTER (OUTPATIENT)
Dept: RADIOLOGY | Facility: HOSPITAL | Age: 85
Discharge: HOME | End: 2020-11-17
Attending: STUDENT IN AN ORGANIZED HEALTH CARE EDUCATION/TRAINING PROGRAM
Payer: MEDICARE

## 2020-11-17 VITALS
SYSTOLIC BLOOD PRESSURE: 115 MMHG | WEIGHT: 186 LBS | HEIGHT: 72 IN | BODY MASS INDEX: 25.19 KG/M2 | HEART RATE: 88 BPM | DIASTOLIC BLOOD PRESSURE: 56 MMHG

## 2020-11-17 DIAGNOSIS — Z96.611 S/P REVERSE TOTAL SHOULDER ARTHROPLASTY, RIGHT: Primary | ICD-10-CM

## 2020-11-17 DIAGNOSIS — M25.511 RIGHT SHOULDER PAIN, UNSPECIFIED CHRONICITY: ICD-10-CM

## 2020-11-17 PROCEDURE — 73030 X-RAY EXAM OF SHOULDER: CPT | Mod: 26,HCNC,RT, | Performed by: RADIOLOGY

## 2020-11-17 PROCEDURE — 73030 X-RAY EXAM OF SHOULDER: CPT | Mod: TC,HCNC,RT

## 2020-11-17 PROCEDURE — 73030 XR SHOULDER COMPLETE 2 OR MORE VIEWS RIGHT: ICD-10-PCS | Mod: 26,HCNC,RT, | Performed by: RADIOLOGY

## 2020-11-17 PROCEDURE — 1125F AMNT PAIN NOTED PAIN PRSNT: CPT | Mod: HCNC,S$GLB,, | Performed by: STUDENT IN AN ORGANIZED HEALTH CARE EDUCATION/TRAINING PROGRAM

## 2020-11-17 PROCEDURE — 3288F PR FALLS RISK ASSESSMENT DOCUMENTED: ICD-10-PCS | Mod: HCNC,CPTII,S$GLB, | Performed by: STUDENT IN AN ORGANIZED HEALTH CARE EDUCATION/TRAINING PROGRAM

## 2020-11-17 PROCEDURE — 99999 PR PBB SHADOW E&M-EST. PATIENT-LVL IV: CPT | Mod: PBBFAC,HCNC,, | Performed by: STUDENT IN AN ORGANIZED HEALTH CARE EDUCATION/TRAINING PROGRAM

## 2020-11-17 PROCEDURE — 1101F PR PT FALLS ASSESS DOC 0-1 FALLS W/OUT INJ PAST YR: ICD-10-PCS | Mod: HCNC,CPTII,S$GLB, | Performed by: STUDENT IN AN ORGANIZED HEALTH CARE EDUCATION/TRAINING PROGRAM

## 2020-11-17 PROCEDURE — 99024 POSTOP FOLLOW-UP VISIT: CPT | Mod: HCNC,S$GLB,, | Performed by: STUDENT IN AN ORGANIZED HEALTH CARE EDUCATION/TRAINING PROGRAM

## 2020-11-17 PROCEDURE — 99024 PR POST-OP FOLLOW-UP VISIT: ICD-10-PCS | Mod: HCNC,S$GLB,, | Performed by: STUDENT IN AN ORGANIZED HEALTH CARE EDUCATION/TRAINING PROGRAM

## 2020-11-17 PROCEDURE — 1125F PR PAIN SEVERITY QUANTIFIED, PAIN PRESENT: ICD-10-PCS | Mod: HCNC,S$GLB,, | Performed by: STUDENT IN AN ORGANIZED HEALTH CARE EDUCATION/TRAINING PROGRAM

## 2020-11-17 PROCEDURE — 3288F FALL RISK ASSESSMENT DOCD: CPT | Mod: HCNC,CPTII,S$GLB, | Performed by: STUDENT IN AN ORGANIZED HEALTH CARE EDUCATION/TRAINING PROGRAM

## 2020-11-17 PROCEDURE — 99999 PR PBB SHADOW E&M-EST. PATIENT-LVL IV: ICD-10-PCS | Mod: PBBFAC,HCNC,, | Performed by: STUDENT IN AN ORGANIZED HEALTH CARE EDUCATION/TRAINING PROGRAM

## 2020-11-17 PROCEDURE — 1101F PT FALLS ASSESS-DOCD LE1/YR: CPT | Mod: HCNC,CPTII,S$GLB, | Performed by: STUDENT IN AN ORGANIZED HEALTH CARE EDUCATION/TRAINING PROGRAM

## 2020-11-17 NOTE — PROGRESS NOTES
Orthopaedics  Post-operative follow-up    Procedure Performed:  Right reverse total shoulder arthroplasty  Date of Surgery:  10/20/2020    Subjective: Chao Hawk is now 6 weeks out from his shoulder surgery.  He continues to do well after reverse total shoulder arthroplasty.  He is getting physical therapy through home health.  He has recently discontinued use of his sling.  His daughter is with him here today.  He reports that his pain is significantly improved from his last visit.  His shoulder motion is also improving.      Exam:  Forward flexion 90  External rotation 40  Abduction 80  Axillary nerve sensation and motor intact  Motor and sensory intact distally  Strong radial pulse, fingers warm and well perfused    Imaging:  Postsurgical changes right shoulder arthroplasty.  Components well seated with normal articulation maintained.  No fracture or dislocation.  No evidence of loosening.    Impression:  Six weeks status post right reverse total shoulder arthroplasty, routine healing    Plan:  Reviewed post-operative instructions, restrictions, and rehabilitation  Continue physical therapy with home health  Symptomatic treatment for pain / swelling  Instructed patient to call clinic if questions or concerns    Follow-up 3 months post-operatively, no XR at that time          Reji Martin MD

## 2020-11-18 ENCOUNTER — OFFICE VISIT (OUTPATIENT)
Dept: UROLOGY | Facility: CLINIC | Age: 85
End: 2020-11-18
Payer: MEDICARE

## 2020-11-18 ENCOUNTER — TELEPHONE (OUTPATIENT)
Dept: UROLOGY | Facility: CLINIC | Age: 85
End: 2020-11-18

## 2020-11-18 VITALS — BODY MASS INDEX: 25.2 KG/M2 | HEIGHT: 72 IN | WEIGHT: 186.06 LBS

## 2020-11-18 DIAGNOSIS — N40.0 BENIGN PROSTATIC HYPERPLASIA, UNSPECIFIED WHETHER LOWER URINARY TRACT SYMPTOMS PRESENT: Primary | ICD-10-CM

## 2020-11-18 LAB — POC RESIDUAL URINE VOLUME: 68 ML (ref 0–100)

## 2020-11-18 PROCEDURE — 51798 US URINE CAPACITY MEASURE: CPT | Mod: HCNC,S$GLB,, | Performed by: UROLOGY

## 2020-11-18 PROCEDURE — 1126F PR PAIN SEVERITY QUANTIFIED, NO PAIN PRESENT: ICD-10-PCS | Mod: HCNC,S$GLB,, | Performed by: UROLOGY

## 2020-11-18 PROCEDURE — 99999 PR PBB SHADOW E&M-EST. PATIENT-LVL III: CPT | Mod: PBBFAC,HCNC,, | Performed by: UROLOGY

## 2020-11-18 PROCEDURE — 1126F AMNT PAIN NOTED NONE PRSNT: CPT | Mod: HCNC,S$GLB,, | Performed by: UROLOGY

## 2020-11-18 PROCEDURE — 52000 PR CYSTOURETHROSCOPY: ICD-10-PCS | Mod: HCNC,S$GLB,, | Performed by: UROLOGY

## 2020-11-18 PROCEDURE — 51798 POCT BLADDER SCAN: ICD-10-PCS | Mod: HCNC,S$GLB,, | Performed by: UROLOGY

## 2020-11-18 PROCEDURE — 99499 NO LOS: ICD-10-PCS | Mod: HCNC,S$GLB,, | Performed by: UROLOGY

## 2020-11-18 PROCEDURE — 99999 PR PBB SHADOW E&M-EST. PATIENT-LVL III: ICD-10-PCS | Mod: PBBFAC,HCNC,, | Performed by: UROLOGY

## 2020-11-18 PROCEDURE — 52000 CYSTOURETHROSCOPY: CPT | Mod: HCNC,S$GLB,, | Performed by: UROLOGY

## 2020-11-18 PROCEDURE — 99499 UNLISTED E&M SERVICE: CPT | Mod: HCNC,S$GLB,, | Performed by: UROLOGY

## 2020-11-18 RX ORDER — LIDOCAINE HYDROCHLORIDE 20 MG/ML
JELLY TOPICAL
Status: COMPLETED | OUTPATIENT
Start: 2020-11-18 | End: 2020-11-18

## 2020-11-18 RX ADMIN — LIDOCAINE HYDROCHLORIDE: 20 JELLY TOPICAL at 11:11

## 2020-11-18 NOTE — TELEPHONE ENCOUNTER
----- Message from Reena Lambert sent at 11/18/2020 11:09 AM CST -----  Regarding: pt  Pt would like to know if a silver Ring was found in  office. Please call back at .[phone] (home)       Thank You,   Reena Lambert

## 2020-11-18 NOTE — PROGRESS NOTES
Chief Complaint: Urinary Retention    HPI:   11/18/20: Cysto today shows BPH, voiding okay after.  Bladder compliance low, trabeculated.  11/2/20: 87 yo man has had two occasions of retention after right shoulder surgery.  Pittman placed again last night.  Been on flomax a long time.   No abd/pelvic pain and no exac/rel factors.  No hematuria.  No urolithiasis.  No urinary bother.  No  history.  Normal sexual function.    Allergies:  Penicillins, Bee sting  [allergen ext-venom-honey bee], and Poison ivy extract    Medications:  has a current medication list which includes the following prescription(s): albuterol, arginine, aspirin, atorvastatin, cannabidiol (cbd), cholecalciferol (vitamin d3), ubiquinol, cyanocobalamin, fluticasone-umeclidin-vilanter, furosemide, gabapentin, glucosam/msm/chondroit/vit d3, krill/om-3/dha/epa/phospho/ast, lisinopril, multivit-min/folic/vit k/lycop, oxycodone, oxygen-air delivery systems, pantoprazole, potassium, and tamsulosin.    Review of Systems:  General: No fever, chills, fatigability, or weight loss.  Skin: No rashes, itching, or changes in color or texture of skin.  Chest: Denies STEVENS, cyanosis, wheezing, cough, and sputum production.  Abdomen: Appetite fine. No weight loss. Denies diarrhea, abdominal pain, hematemesis, or blood in stool.  Musculoskeletal: No joint stiffness or swelling. Denies back pain.  : As above.  All other review of systems negative.    PMH:   has a past medical history of Arthritis, Back pain, Bilateral leg edema (2/16/2018), COPD (chronic obstructive pulmonary disease), Diverticulitis of large intestine with perforation without abscess or bleeding, Diverticulosis (5/16/06), Hernia, Hyperlipidemia, Hypertension, Lumbar radiculopathy (6/14/2017), Multiple renal cysts (7/5/2016), Nocturnal hypoxemia, Tobacco dependence, and Trouble in sleeping.    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    FamHx: family history includes Appendicitis in his father; Atrial fibrillation in his sister; COPD in his mother; Emphysema in his mother; Heart disease in his mother and son; Lung disease (age of onset: 91) in his sister.    SocHx:  reports that he quit smoking about 29 years ago. He started smoking about 71 years ago. He has a 40.00 pack-year smoking history. He has never used smokeless tobacco. He reports current alcohol use. He reports that he does not use drugs.      Physical Exam:  There were no vitals filed for this visit.  General: A&Ox3, no apparent distress, no deformities  Neck: No masses, normal thyroid  Lungs: normal inspiration, no use of accessory muscles  Heart: normal pulse, no arrhythmias  Abdomen: Soft, NT, ND, no masses, no hernias, no hepatosplenomegaly  Lymphatic: Neck and groin nodes negative  Skin: The skin is warm and dry. No jaundice.  Ext: No c/c/e.  : defer to cysto    Labs/Studies:   PSA    1/20: 3.2    Procedure: Diagnostic Cystoscopy    Procedure in Detail: After proper consents were obtained, the patient was prepped and draped in normal sterile fashion for diagnostic cystoscopy. 5 ml of lidocaine jelly was instilled in the urethra. The flexible cystoscope was then introduced into the urethra, and advanced into the bladder under direct vision. The urethral mucosa appeared normal, and no strictures were noted. The sphincter appeared to be normal, and the veru montanum was unremarkable. The prostatic mucosa and the lateral lobes of the prostate were opposed and obstructing. The bladder neck was normal. Inspection of the interior of the bladder was then carried out. The trigone was unremarkable, with no mucosal lesions. The ureteral orifices were normal in position and configuration. Systematic inspection of  the mucosa of the bladder it was then carried out, rotating the cystoscope so that all areas of the left and right lateral walls, the dome of the bladder, and the posterior wall were all visualized. The cystoscope was then advanced further into the bladder, and maximum deflection of the scope was performed so that the bladder neck could be inspected. No mucosal lesions were noted there. The cystoscope was then removed, and the procedure terminated.     Findings: trabeculated bladder with mod BPH    Impression/Plan:   1. Overnight voiding trial with nurse visit bladder scan tomorrow and RTC 1 wk

## 2020-11-19 ENCOUNTER — CLINICAL SUPPORT (OUTPATIENT)
Dept: UROLOGY | Facility: CLINIC | Age: 85
End: 2020-11-19
Payer: MEDICARE

## 2020-11-19 VITALS
BODY MASS INDEX: 25.23 KG/M2 | DIASTOLIC BLOOD PRESSURE: 82 MMHG | TEMPERATURE: 98 F | WEIGHT: 186 LBS | SYSTOLIC BLOOD PRESSURE: 132 MMHG

## 2020-11-19 DIAGNOSIS — N40.0 BENIGN PROSTATIC HYPERPLASIA, UNSPECIFIED WHETHER LOWER URINARY TRACT SYMPTOMS PRESENT: Primary | ICD-10-CM

## 2020-11-19 LAB — POC RESIDUAL URINE VOLUME: 68 ML (ref 0–100)

## 2020-11-19 PROCEDURE — 99999 PR PBB SHADOW E&M-EST. PATIENT-LVL II: CPT | Mod: PBBFAC,HCNC,,

## 2020-11-19 PROCEDURE — 51798 US URINE CAPACITY MEASURE: CPT | Mod: HCNC,S$GLB,, | Performed by: UROLOGY

## 2020-11-19 PROCEDURE — 99999 PR PBB SHADOW E&M-EST. PATIENT-LVL II: ICD-10-PCS | Mod: PBBFAC,HCNC,,

## 2020-11-19 PROCEDURE — 51798 POCT BLADDER SCAN: ICD-10-PCS | Mod: HCNC,S$GLB,, | Performed by: UROLOGY

## 2020-11-25 DIAGNOSIS — J44.9 CHRONIC OBSTRUCTIVE PULMONARY DISEASE, UNSPECIFIED COPD TYPE: Primary | Chronic | ICD-10-CM

## 2020-11-25 RX ORDER — ALBUTEROL SULFATE 0.83 MG/ML
2.5 SOLUTION RESPIRATORY (INHALATION) EVERY 4 HOURS PRN
Qty: 150 ML | Refills: 11 | Status: SHIPPED | OUTPATIENT
Start: 2020-11-25

## 2020-12-02 ENCOUNTER — TELEPHONE (OUTPATIENT)
Dept: ORTHOPEDICS | Facility: CLINIC | Age: 85
End: 2020-12-02

## 2020-12-02 DIAGNOSIS — Z96.611 S/P REVERSE TOTAL SHOULDER ARTHROPLASTY, RIGHT: Primary | ICD-10-CM

## 2020-12-02 NOTE — TELEPHONE ENCOUNTER
Spoke w/ Donaldo at [facility] and expressed to him that PT order was sent in and that someone from their office would reach out to him. He verbalized understanding and was grateful for the call-DD      ----- Message from Kathy Hay sent at 12/2/2020  1:14 PM CST -----  Regarding: [facility]  Donaldo is requesting call back in regards to py needing outpatient therapy order to [place]          Pls call [phone]

## 2020-12-11 ENCOUNTER — PATIENT MESSAGE (OUTPATIENT)
Dept: OTHER | Facility: OTHER | Age: 85
End: 2020-12-11

## 2020-12-11 ENCOUNTER — CLINICAL SUPPORT (OUTPATIENT)
Dept: REHABILITATION | Facility: HOSPITAL | Age: 85
End: 2020-12-11
Attending: STUDENT IN AN ORGANIZED HEALTH CARE EDUCATION/TRAINING PROGRAM
Payer: MEDICARE

## 2020-12-11 DIAGNOSIS — Z96.611 S/P REVERSE TOTAL SHOULDER ARTHROPLASTY, RIGHT: ICD-10-CM

## 2020-12-11 DIAGNOSIS — M25.611 DECREASED RIGHT SHOULDER RANGE OF MOTION: Primary | ICD-10-CM

## 2020-12-11 PROCEDURE — 97140 MANUAL THERAPY 1/> REGIONS: CPT | Mod: HCNC

## 2020-12-11 PROCEDURE — 97110 THERAPEUTIC EXERCISES: CPT | Mod: HCNC

## 2020-12-11 PROCEDURE — 97162 PT EVAL MOD COMPLEX 30 MIN: CPT | Mod: HCNC

## 2020-12-11 NOTE — PLAN OF CARE
[facility]  Physical Therapy Initial Evaluation    Name: Chao Hawk  Clinic Number: [id number]    Therapy Diagnosis:   Encounter Diagnoses   Name Primary?    S/P reverse total shoulder arthroplasty, right     Decreased right shoulder range of motion Yes     Physician: Reji Martin     Physician Orders: PT Eval and Treat  Medical Diagnosis from Referral: s/p R reverse total shoulder arthroplasty  Evaluation Date: 12/11/2020  Authorization Period Expiration: 12/31/2020  Plan of Care Expiration: 3/11/2021  Visit # / Visits authorized: 1/1    Progress Note Due on 1/11/2021    Precautions: Standard and COPD    Time In: 12:05 pm  Time Out: 12:45 pm  Total Billable Time (timed & untimed codes): 40 minutes    SUBJECTIVE   Date of onset: 10/5/2020  History of current condition - Chao is a 88 y.o. male whom reports hx of R s/p reverse total shoulder replacement. States the L arm is also getting stiff because he has not been using it much since his surgery. Has balance issues due to neuropathy and a hx of falls; states one near fall since surgery but did not hurt the shoulder.     Current exercise regimen: therapy 2x per week at home, ended ~1 week ago. Pendulums, wall slides, wrist and elbow strength with 1# weights. Shoulder IR stretch. Resistance band exercises.     Pain:  Current 3/10, worst 7/10, best 3/10   Location: posterior R shoulder,   Description: achinch  Aggravating Factors: movement of the shoulder, repetitive lifting  Easing Factors: rest, tylenol prn       Imaging: shoulder x-ray performed on 11/17/2020  [Findings: Postsurgical changes right shoulder arthroplasty.  Components well seated with normal articulation maintained.  No fracture or dislocation.  No evidence of loosening.]    Prior Therapy: Yes  Social History: Pt lives alone (has his son come over to help him when needed)   Prior Level of Function: Independent and pain free with all ADL, IADL, community  "mobility and functional activities.   Current Level of Function: independent with all ADL, IADL, community mobility and functional activities with reports of increased pain and need for increased time and frequent breaks. Increased use of L upper extremity for activity.    Dominant Extremity: Right    Pts goals: Pt reported goals are to decrease overall pain levels in order to return to maximal functional level.       Medical History:   Past Medical History:   Diagnosis Date    Arthritis     back, hand    Back pain     Dr. Cristobal- [facility]    Bilateral leg edema 2/16/2018    COPD (chronic obstructive pulmonary disease)     Diverticulitis of large intestine with perforation without abscess or bleeding     Diverticulosis 5/16/06    colonoscopy    Hernia     x2    Hyperlipidemia     Hypertension     Lumbar radiculopathy 6/14/2017    Multiple renal cysts 7/5/2016    Nocturnal hypoxemia     Tobacco dependence     Trouble in sleeping        Surgical History:   Chao Hawk  has a past surgical history that includes Finger surgery (Left, 1980s); Hernia repair (1990s); TONSILLECTOMY, ADENOIDECTOMY (1940); Lumbar spine surgery (09/13/2017); Intraocular lens insertion (Bilateral, 2016); and Reverse total shoulder arthroplasty (Right, 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    Medications:   Chao has a current medication list which includes the following prescription(s): albuterol, arginine, aspirin, atorvastatin, cannabidiol (cbd), cholecalciferol (vitamin d3), ubiquinol, cyanocobalamin, fluticasone-umeclidin-vilanter, furosemide, gabapentin, glucosam/msm/chondroit/vit d3, krill/om-3/dha/epa/phospho/ast, lisinopril, multivit-min/folic/vit k/lycop, oxycodone, oxygen-air delivery systems, pantoprazole, potassium, and tamsulosin.    Allergies:   Review of patient's allergies indicates:   Allergen Reactions    Penicillins Rash     "Felt like I had pins and needles in my feet" , hospitalized overnight    Bee sting  [allergen ext-venom-honey " bee] Swelling    Poison ivy extract Hives        OBJECTIVE   (x = not tested due to pain and/or inability to obtain test position)    RANGE OF MOTION:    Cervical Right   (spine) Left    Pain/Dysfunction with Movement Goal   Cervical Flexion (60) 100 % --- not painful    Cervical Extension (90) 25% --- not painful    Cervical Side Bending (45) 25% 25% not painful      Shoulder AROM/PROM Right Left Pain/Dysfunction with Movement Goal   Shoulder Flexion (180) 105/145 170/170 painful    Shoulder Abduction (180) 90/115 170/170 painful    Shoulder ER (90) ~C3/58 T2/85 painful    Shoulder IR (70) R glute/50 L2/55 painful      STRENGTH:    U/E MMT Right Left Pain/Dysfunction with Movement Goal   Shoulder Flexion 4-/5 4/5 not painful 4+/5 B   Shoulder Extension 4/5 4/5 not painful 4+/5 B   Shoulder Abduction 4-/5 4/5 not painful 4+/5 B   Shoulder IR (seated) 4-/5 4+/5 not painful 4+/5 B   Shoulder ER (seated) 4-/5 4+/5 painful 4+/5 B   Elbow Flexion  4/5 4+/5 not painful 5/5 B   Elbow Extension 4/5 4+/5 not painful 5/5 B       MUSCLE LENGTH:     Muscle Tested  Right Left  Goal   Upper Trapezius  decreased decreased Normal B    Levator Scapulae  decreased decreased Normal B   Sternocleidomastoid decreased decreased Normal B   Scalenes  decreased decreased Normal B    Pectoralis Minor  decreased decreased Normal B   Pectoralis Major decreased decreased Normal B     JOINT MOBILITY:     Joint Motion Tested Right  (spine) Left  Goal   Glenohumeral distraction  Hypomobile Normal Normal B   Cervical upglides  Hypomobile Hypomobile Normal B   Cervical downglides  Hypomobile Normal Normal B   Thoracic PA glides  Hypomobile  Normal B       Palpation: Increased tone and tenderness noted with palpation of right upper trapezius, pectoralis major, pectoralis minor, deltoid and periscapular musculature.     Posture:  Pt presents with postural abnormalities which include: forward head, rounded shoulders  and increased thoracic kyphosis      Movement Analysis:   Movement Observations noted   Repeated shoulder flexion to  Shoulder hiking, scapular winging               FUNCTION:     CMS Impairment/Limitation/Restriction for FOTO Shoulder Survey    Therapist reviewed FOTO scores for Chao Hawk on 12/11/2020.   FOTO documents entered into [employer] - see Media section.    Limitation Score: 38%         TREATMENT   Total Treatment time separate from Evaluation: 23 minutes        Chao received the following manual therapy techniques: Joint mobilizations and Soft tissue Mobilization were applied to the: R shoulder for 8 minutes, including:  STM of right deltoid and periscapular musculature   R glenohumeral distraction  R glenohumeral distraction with movement (flexion, internal rotation and external rotation)         Chao received therapeutic exercises to develop strength, ROM and flexibility for 15 minutes including:     Intervention Performed Today    Shoulder PROM  x 5 minutes shoulder flexion, abduction, internal rotation, external rotation and hoizontal adduction    Shoulder flexion AAROM  x 2 x 10 with dowel    Shoulder external rotation AAROM  x 2 x 10 with dowel    Side lying shoulder external rotation  x 2 x 8                          Plan for Next Visit: scapular retractions, bicep curls, shoulder IR         Home Exercises and Patient Education Provided    Education/Self-Care provided: (charge included with therex)   Patient educated on the impairments noted above and the effects of physical therapy intervention to improve overall condition and QOL.    Patient was educated on all the above exercise prior/during/after for proper posture, positioning, and execution for safe performance with home exercise program.    Patient educated on the importance of improved core and upper extremity strength in order to improve alignment of the spine and upper extremities with static positions and dynamic movement.     Written Home Exercises Provided:  "yes.  Exercises were reviewed and Chao was able to demonstrate them prior to the end of the session.  Chao demonstrated good  understanding of the education provided.     See EMR under Patient Instructions for exercises provided 12/11/2020.    ASSESSMENT   Chao is a 88 y.o. male referred to outpatient Physical Therapy with a medical diagnosis of  s/p R reverse total shoulder arthroplasty. Pt presents with impairments including: decreased ROM, decreased strength, decreased joint mobility, decreased muscle length, postural abnormalities and decreased overall function.    Pt prognosis is Excellent.   Pt will benefit from skilled outpatient Physical Therapy to address the deficits stated above and in the chart below, provide pt/family education, and to maximize pt's level of independence.     Plan of care discussed with patient: Yes  Pt's spiritual, cultural and educational needs considered and patient is agreeable to the plan of care and goals as stated below:     Anticipated Barriers for therapy: co-morbidities, sedentary lifestyle and adherence to treatment plan    Medical Necessity is demonstrated by the following  History  Co-morbidities and personal factors that may impact the plan of care Co-morbidities:   advanced age, COPD/asthma, high BMI and atherosclerosis of aorta, abdominal aortic aneurysm without rupture, pulmonary artery hypertension    Personal Factors:   age  lifestyle     high   Examination  Body Structures and Functions, activity limitations and participation restrictions that may impact the plan of care Body Regions:   neck  upper extremities  trunk    Body Systems:    ROM  strength  gross coordinated movement    Participation Restrictions:   See above in "Current Level of Function"     Activity limitations:   Learning and applying knowledge  no deficits    General Tasks and Commands  no deficits    Communication  no deficits    Mobility  lifting and carrying objects    Self care  washing " oneself (bathing, drying, washing hands)  dressing    Domestic Life  shopping  cooking  doing house work (cleaning house, washing dishes, laundry)  assisting others    Interactions/Relationships  no deficits    Life Areas  no deficits    Community and Social Life  no deficits         moderate   Clinical Presentation evolving clinical presentation with changing clinical characteristics moderate   Decision Making/ Complexity Score: moderate       GOALS:    Short Term Goals:  6 weeks Progress   1. Pain: Pt will demonstrate improved pain by reports of less than or equal to 4/10 worst pain on the verbal rating scale in order to progress toward maximal functional ability and improve QOL.    2. Function: Patient will demonstrate improved function as indicated by a functional limitation score of less than or equal to 33 out of 100 on FOTO.    3. Mobility: Patient will improve AROM to 50% of stated goals, listed in objective measures above, in order to progress towards independence with functional activities.     4. Strength: Patient will improve strength to 50% of stated goals, listed in objective measures above, in order to progress towards independence with functional activities.     5. HEP: Patient will demonstrate independence with HEP in order to progress toward functional independence.      Long Term Goals:  12 weeks Progress   1. Pain: Pt will demonstrate improved pain by reports of less than or equal to 1/10 worst pain on the verbal rating scale in order to progress toward maximal functional ability and improve QOL.      2. Function: Patient will demonstrate improved function as indicated by a functional limitation score of less than or equal to 29 out of 100 on FOTO.    3. Mobility: Patient will improve AROM to stated goals, listed in objective measures above, in order to return to maximal functional potential and improve quality of life.    4. Strength: Patient will improve strength to stated goals, listed in  objective measures above, in order to improve functional independence and quality of life.    5. Patient will return to normal ADL's, IADL's, community involvement, recreational activities, and work-related activities with less than or equal to 1/10 pain and maximal function.         PLAN   Plan of care Certification: 12/11/2020 to 3/11/2021.    Outpatient Physical Therapy 2 times weekly for 12 weeks to include any combination of the following interventions: virtual visits, dry needling, modalities, electrical stimulation (IFC, Pre-Mod, Attended with Functional Dry Needling), Cervical/Lumbar Traction, Manual Therapy, Neuromuscular Re-ed, Patient Education, Self Care, Therapeutic Activites and Therapeutic Exercise     Thank you for this referral.    These services are reasonable and necessary for the conditions set forth above while under my care.    Jennifer Hui, PT, DPT

## 2020-12-14 PROBLEM — M25.611 DECREASED RIGHT SHOULDER RANGE OF MOTION: Status: ACTIVE | Noted: 2020-12-14

## 2020-12-14 PROBLEM — M62.81 PROXIMAL MUSCLE WEAKNESS: Status: ACTIVE | Noted: 2019-05-10

## 2020-12-17 ENCOUNTER — CLINICAL SUPPORT (OUTPATIENT)
Dept: REHABILITATION | Facility: HOSPITAL | Age: 85
End: 2020-12-17
Payer: MEDICARE

## 2020-12-17 DIAGNOSIS — R29.3 POOR POSTURE: ICD-10-CM

## 2020-12-17 DIAGNOSIS — M62.81 PROXIMAL MUSCLE WEAKNESS: ICD-10-CM

## 2020-12-17 DIAGNOSIS — M25.611 DECREASED RIGHT SHOULDER RANGE OF MOTION: ICD-10-CM

## 2020-12-17 PROCEDURE — 97110 THERAPEUTIC EXERCISES: CPT | Mod: HCNC

## 2020-12-17 NOTE — PROGRESS NOTES
Physical Therapy Daily Treatment Note     Name: Chao Hawk  Clinic Number: [id number]    Therapy Diagnosis:   Encounter Diagnoses   Name Primary?    Decreased right shoulder range of motion     Proximal muscle weakness     Poor posture      Physician: Reji Martin    Visit Date: 12/17/2020  Physician Orders: PT Eval and Treat  Medical Diagnosis from Referral: s/p R reverse total shoulder arthroplasty  Evaluation Date: 12/11/2020  Authorization Period Expiration: 6/14/2021  Plan of Care Expiration: 3/11/2021  Visit # / Visits authorized: 1/20    Time In: 1130  Time Out: 1213  Total Billable Time: 43 minutes    Precautions: Standard and Weightbearing   DOS: 10/5/2020  Reverse Total Shoulder Arthroplasty protocol  NWB RUE in sling  Ok to be out of sling for pendulums, elbow, wrist ROM  ASA 81mg BID x 2wks for DVT ppx  F/u 10-14 days for wound check/suture removal    Subjective     Pt reports: he's doing well. Has been consistently working on exercises at home without any issues.  He was compliant with home exercise program.  Response to previous treatment: no adverse effects  Functional change: improving functional AROM    Pain: 0/10  Location: right shoulder      Objective     Chao received therapeutic exercises to develop strength, endurance, ROM, flexibility, posture and core stabilization for 43 minutes including:  Skilled PROM all directions  Supine dowel flexion 1# 2x15  Supine top of head reach 2x15  Towel slide on wall 3D x15 ea  Seated cable row 3x10 20# TC for scap initiation  Seated bilat shoulder ext GTB 2x15    Home Exercises Provided and Patient Education Provided     Education provided:   - role of PT, PT POC, precautions/contraindications    Written Home Exercises Provided: Patient instructed to cont prior HEP.  Exercises were reviewed and Chao was able to demonstrate them prior to the end of the session.  Chao demonstrated good  understanding of the education provided.     See EMR  under Patient Instructions for exercises provided prior visit.    Assessment     10 weeks 3 days s/p R shoulder rTSA    Progressing really nicely at this time. Presents with excellent functional AROM today, able to reach top of head and back pocket without issue. Not quite able to reach contralateral shoulder without discomfort. Good tolerance to progression of therex today including repetitive AROM in functional patterns and initiation of some postural strengthening work in rowing and shoulder extension patterns.    Chao is progressing well towards his goals.   Pt prognosis is Good.     Pt will continue to benefit from skilled outpatient physical therapy to address the deficits listed in the problem list box on initial evaluation, provide pt/family education and to maximize pt's level of independence in the home and community environment.     Pt's spiritual, cultural and educational needs considered and pt agreeable to plan of care and goals.    Anticipated barriers to physical therapy: Covid-19; old age    Goals:   Short Term Goals: 6 weeks (progressing, not met)   1. Pain: Pt will demonstrate improved pain by reports of less than or equal to 4/10 worst pain on the verbal rating scale in order to progress toward maximal functional ability and improve QOL.   2. Function: Patient will demonstrate improved function as indicated by a functional limitation score of less than or equal to 33 out of 100 on FOTO.   3. Mobility: Patient will improve AROM to 50% of stated goals, listed in objective measures above, in order to progress towards independence with functional activities.    4. Strength: Patient will improve strength to 50% of stated goals, listed in objective measures above, in order to progress towards independence with functional activities.    5. HEP: Patient will demonstrate independence with HEP in order to progress toward functional independence.      Long Term Goals: 12 weeks (progressing, not met)    1. Pain: Pt will demonstrate improved pain by reports of less than or equal to 1/10 worst pain on the verbal rating scale in order to progress toward maximal functional ability and improve QOL.     2. Function: Patient will demonstrate improved function as indicated by a functional limitation score of less than or equal to 29 out of 100 on FOTO.   3. Mobility: Patient will improve AROM to stated goals, listed in objective measures above, in order to return to maximal functional potential and improve quality of life.   4. Strength: Patient will improve strength to stated goals, listed in objective measures above, in order to improve functional independence and quality of life.   5. Patient will return to normal ADL's, IADL's, community involvement, recreational activities, and work-related activities with less than or equal to 1/10 pain and maximal function.        Plan     Continue w/ current POC emphasizing restoration of good functional AROM and slow progression of strengthening work for ADLs    Plan of care Certification: 12/11/2020 to 3/11/2021.  Outpatient Physical Therapy 2 times weekly for 12 weeks to include any combination of the following interventions: virtual visits, dry needling, modalities, electrical stimulation (IFC, Pre-Mod, Attended with Functional Dry Needling), Cervical/Lumbar Traction, Manual Therapy, Neuromuscular Re-ed, Patient Education, Self Care, Therapeutic Activites and Therapeutic Exercise     Sharon Payne, PT

## 2020-12-21 ENCOUNTER — OFFICE VISIT (OUTPATIENT)
Dept: UROLOGY | Facility: CLINIC | Age: 85
End: 2020-12-21
Payer: MEDICARE

## 2020-12-21 VITALS
WEIGHT: 181 LBS | HEIGHT: 72 IN | DIASTOLIC BLOOD PRESSURE: 60 MMHG | SYSTOLIC BLOOD PRESSURE: 122 MMHG | BODY MASS INDEX: 24.52 KG/M2 | TEMPERATURE: 99 F

## 2020-12-21 DIAGNOSIS — N40.0 BENIGN PROSTATIC HYPERPLASIA, UNSPECIFIED WHETHER LOWER URINARY TRACT SYMPTOMS PRESENT: Primary | ICD-10-CM

## 2020-12-21 LAB — POC RESIDUAL URINE VOLUME: 53 ML (ref 0–100)

## 2020-12-21 PROCEDURE — 99213 OFFICE O/P EST LOW 20 MIN: CPT | Mod: HCNC,S$GLB,, | Performed by: UROLOGY

## 2020-12-21 PROCEDURE — 1125F PR PAIN SEVERITY QUANTIFIED, PAIN PRESENT: ICD-10-PCS | Mod: HCNC,S$GLB,, | Performed by: UROLOGY

## 2020-12-21 PROCEDURE — 1159F MED LIST DOCD IN RCRD: CPT | Mod: HCNC,S$GLB,, | Performed by: UROLOGY

## 2020-12-21 PROCEDURE — 1159F PR MEDICATION LIST DOCUMENTED IN MEDICAL RECORD: ICD-10-PCS | Mod: HCNC,S$GLB,, | Performed by: UROLOGY

## 2020-12-21 PROCEDURE — 51798 US URINE CAPACITY MEASURE: CPT | Mod: HCNC,S$GLB,, | Performed by: UROLOGY

## 2020-12-21 PROCEDURE — 3288F PR FALLS RISK ASSESSMENT DOCUMENTED: ICD-10-PCS | Mod: HCNC,CPTII,S$GLB, | Performed by: UROLOGY

## 2020-12-21 PROCEDURE — 99999 PR PBB SHADOW E&M-EST. PATIENT-LVL III: CPT | Mod: PBBFAC,HCNC,, | Performed by: UROLOGY

## 2020-12-21 PROCEDURE — 1101F PT FALLS ASSESS-DOCD LE1/YR: CPT | Mod: HCNC,CPTII,S$GLB, | Performed by: UROLOGY

## 2020-12-21 PROCEDURE — 51798 POCT BLADDER SCAN: ICD-10-PCS | Mod: HCNC,S$GLB,, | Performed by: UROLOGY

## 2020-12-21 PROCEDURE — 1101F PR PT FALLS ASSESS DOC 0-1 FALLS W/OUT INJ PAST YR: ICD-10-PCS | Mod: HCNC,CPTII,S$GLB, | Performed by: UROLOGY

## 2020-12-21 PROCEDURE — 99999 PR PBB SHADOW E&M-EST. PATIENT-LVL III: ICD-10-PCS | Mod: PBBFAC,HCNC,, | Performed by: UROLOGY

## 2020-12-21 PROCEDURE — 3288F FALL RISK ASSESSMENT DOCD: CPT | Mod: HCNC,CPTII,S$GLB, | Performed by: UROLOGY

## 2020-12-21 PROCEDURE — 1125F AMNT PAIN NOTED PAIN PRSNT: CPT | Mod: HCNC,S$GLB,, | Performed by: UROLOGY

## 2020-12-21 PROCEDURE — 99213 PR OFFICE/OUTPT VISIT, EST, LEVL III, 20-29 MIN: ICD-10-PCS | Mod: HCNC,S$GLB,, | Performed by: UROLOGY

## 2020-12-21 RX ORDER — FINASTERIDE 5 MG/1
5 TABLET, FILM COATED ORAL DAILY
Qty: 30 TABLET | Refills: 11 | Status: SHIPPED | OUTPATIENT
Start: 2020-12-21 | End: 2021-12-29

## 2020-12-21 NOTE — PROGRESS NOTES
Chief Complaint: Urinary Retention    HPI:   12/21/20: Daytime frequency and low void, nocturia x2.  Drinks 8 cups coffee a day.  Reviewed history in detail.   11/18/20: Cysto today shows BPH, voiding okay after.  Bladder compliance low, trabeculated.  11/2/20: 89 yo man has had two occasions of retention after right shoulder surgery.  Pittman placed again last night.  Been on flomax a long time.   No abd/pelvic pain and no exac/rel factors.  No hematuria.  No urolithiasis.  No urinary bother.  No  history.  Normal sexual function.    Allergies:  Penicillins, Bee sting  [allergen ext-venom-honey bee], and Poison ivy extract    Medications:  has a current medication list which includes the following prescription(s): albuterol, arginine, aspirin, atorvastatin, cholecalciferol (vitamin d3), ubiquinol, cyanocobalamin, furosemide, gabapentin, glucosam/msm/chondroit/vit d3, krill/om-3/dha/epa/phospho/ast, lisinopril, multivit-min/folic/vit k/lycop, oxygen-air delivery systems, pantoprazole, potassium, tamsulosin, and cannabidiol (cbd).    Review of Systems:  General: No fever, chills, fatigability, or weight loss.  Skin: No rashes, itching, or changes in color or texture of skin.  Chest: Denies STEVENS, cyanosis, wheezing, cough, and sputum production.  Abdomen: Appetite fine. No weight loss. Denies diarrhea, abdominal pain, hematemesis, or blood in stool.  Musculoskeletal: No joint stiffness or swelling. Denies back pain.  : As above.  All other review of systems negative.    PMH:   has a past medical history of Arthritis, Back pain, Bilateral leg edema (2/16/2018), COPD (chronic obstructive pulmonary disease), Diverticulitis of large intestine with perforation without abscess or bleeding, Diverticulosis (5/16/06), Hernia, Hyperlipidemia, Hypertension, Lumbar radiculopathy (6/14/2017), Multiple renal cysts (7/5/2016), Nocturnal hypoxemia, Tobacco dependence, and Trouble in sleeping.    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    FamHx: family history includes Appendicitis in his father; Atrial fibrillation in his sister; COPD in his mother; Emphysema in his mother; Heart disease in his mother and son; Lung disease (age of onset: 91) in his sister.    SocHx:  reports that he quit smoking about 29 years ago. He started smoking about 72 years ago. He has a 40.00 pack-year smoking history. He has never used smokeless tobacco. He reports current alcohol use. He reports that he does not use drugs.      Physical Exam:  Vitals:    12/21/20 1323   BP: 122/60   Temp: 98.6 °F (37 °C)     General: A&Ox3, no apparent distress, no deformities  Neck: No masses, normal thyroid  Lungs: normal inspiration, no use of accessory muscles  Heart: normal pulse, no arrhythmias  Abdomen: Soft, NT, ND, no masses, no hernias, no hepatosplenomegaly  Lymphatic: Neck and groin nodes negative  Skin: The skin is warm and dry. No jaundice.  Ext: No c/c/e.  : normal ROSAURA at cysto    Labs/Studies:   PSA    1/20: 3.2    Impression/Plan:   1. Adding finasteride to flomax.  Recheck in 6 mo.

## 2020-12-28 ENCOUNTER — CLINICAL SUPPORT (OUTPATIENT)
Dept: REHABILITATION | Facility: HOSPITAL | Age: 85
End: 2020-12-28
Payer: MEDICARE

## 2020-12-28 DIAGNOSIS — M62.81 PROXIMAL MUSCLE WEAKNESS: ICD-10-CM

## 2020-12-28 DIAGNOSIS — R29.3 POOR POSTURE: ICD-10-CM

## 2020-12-28 DIAGNOSIS — M25.611 DECREASED RIGHT SHOULDER RANGE OF MOTION: ICD-10-CM

## 2020-12-28 PROCEDURE — 97110 THERAPEUTIC EXERCISES: CPT | Mod: HCNC

## 2020-12-28 NOTE — PROGRESS NOTES
"Physical Therapy Daily Treatment Note     Name: Chao Hawk  Clinic Number: [id number]    Therapy Diagnosis:   Encounter Diagnoses   Name Primary?    Decreased right shoulder range of motion     Proximal muscle weakness     Poor posture      Physician: Reji Martin    Visit Date: 12/28/2020  Physician Orders: PT Eval and Treat  Medical Diagnosis from Referral: s/p R reverse total shoulder arthroplasty  Evaluation Date: 12/11/2020  Authorization Period Expiration: 6/14/2021  Plan of Care Expiration: 3/11/2021  Visit # / Visits authorized: 2/20    Time In: 1445  Time Out: 1528  Total Billable Time: 43 minutes    Precautions: Standard and Weightbearing   DOS: 10/5/2020  Reverse Total Shoulder Arthroplasty protocol  NWB RUE in sling  Ok to be out of sling for pendulums, elbow, wrist ROM  ASA 81mg BID x 2wks for DVT ppx  F/u 10-14 days for wound check/suture removal    Subjective     Pt reports: he had a bit of muscle soreness after last session, but is feeling good today.  He was compliant with home exercise program.  Response to previous treatment: no adverse effects  Functional change: improving functional AROM    Pain: 0/10  Location: right shoulder      Objective     R shoulder PROM   Right   Scaption 140   ER at 45 55   IR at 45 30       Chao received therapeutic exercises to develop strength, endurance, ROM, flexibility, posture and core stabilization for 43 minutes including:  Skilled PROM all directions  Towel slide on wall 3D 2x30  Seated top of head reach 3x15  Supine dowel flexion 2# 2x15  Supine flexion pulses  3x30"  Seated scap retraining retraction/depression 2x15 (5")    Home Exercises Provided and Patient Education Provided     Education provided:   - role of PT, PT POC, precautions/contraindications    Written Home Exercises Provided: Patient instructed to cont prior HEP.  Exercises were reviewed and Chao was able to demonstrate them prior to the end of the session.  Chao " demonstrated good  understanding of the education provided.     See EMR under Patient Instructions for exercises provided prior visit.    Assessment     12 weeks s/p R shoulder rTSA    Progressing nicely. Continued gradual improvements in R shoulder AROM and functional strength. Still a bit limited with cross body reaching with some pain with this movement. Does note improvements in ease and endurance with elevation at this point.    Chao is progressing well towards his goals.   Pt prognosis is Good.     Pt will continue to benefit from skilled outpatient physical therapy to address the deficits listed in the problem list box on initial evaluation, provide pt/family education and to maximize pt's level of independence in the home and community environment.     Pt's spiritual, cultural and educational needs considered and pt agreeable to plan of care and goals.    Anticipated barriers to physical therapy: Covid-19; old age    Goals:   Short Term Goals: 6 weeks (progressing, not met)   1. Pain: Pt will demonstrate improved pain by reports of less than or equal to 4/10 worst pain on the verbal rating scale in order to progress toward maximal functional ability and improve QOL.   2. Function: Patient will demonstrate improved function as indicated by a functional limitation score of less than or equal to 33 out of 100 on FOTO.   3. Mobility: Patient will improve AROM to 50% of stated goals, listed in objective measures above, in order to progress towards independence with functional activities.    4. Strength: Patient will improve strength to 50% of stated goals, listed in objective measures above, in order to progress towards independence with functional activities.    5. HEP: Patient will demonstrate independence with HEP in order to progress toward functional independence.      Long Term Goals: 12 weeks (progressing, not met)   1. Pain: Pt will demonstrate improved pain by reports of less than or equal to 1/10  worst pain on the verbal rating scale in order to progress toward maximal functional ability and improve QOL.     2. Function: Patient will demonstrate improved function as indicated by a functional limitation score of less than or equal to 29 out of 100 on FOTO.   3. Mobility: Patient will improve AROM to stated goals, listed in objective measures above, in order to return to maximal functional potential and improve quality of life.   4. Strength: Patient will improve strength to stated goals, listed in objective measures above, in order to improve functional independence and quality of life.   5. Patient will return to normal ADL's, IADL's, community involvement, recreational activities, and work-related activities with less than or equal to 1/10 pain and maximal function.        Plan     Continue w/ current POC emphasizing restoration of good functional AROM and slow progression of strengthening work for ADLs    Plan of care Certification: 12/11/2020 to 3/11/2021.  Outpatient Physical Therapy 2 times weekly for 12 weeks to include any combination of the following interventions: virtual visits, dry needling, modalities, electrical stimulation (IFC, Pre-Mod, Attended with Functional Dry Needling), Cervical/Lumbar Traction, Manual Therapy, Neuromuscular Re-ed, Patient Education, Self Care, Therapeutic Activites and Therapeutic Exercise     Sharon Payne, PT

## 2020-12-29 ENCOUNTER — OFFICE VISIT (OUTPATIENT)
Dept: ORTHOPEDICS | Facility: CLINIC | Age: 85
End: 2020-12-29
Payer: MEDICARE

## 2020-12-29 VITALS
HEART RATE: 89 BPM | HEIGHT: 72 IN | WEIGHT: 181 LBS | DIASTOLIC BLOOD PRESSURE: 85 MMHG | SYSTOLIC BLOOD PRESSURE: 174 MMHG | BODY MASS INDEX: 24.52 KG/M2

## 2020-12-29 DIAGNOSIS — Z96.611 S/P REVERSE TOTAL SHOULDER ARTHROPLASTY, RIGHT: Primary | ICD-10-CM

## 2020-12-29 DIAGNOSIS — M25.511 RIGHT SHOULDER PAIN, UNSPECIFIED CHRONICITY: Primary | ICD-10-CM

## 2020-12-29 PROCEDURE — 99999 PR PBB SHADOW E&M-EST. PATIENT-LVL IV: CPT | Mod: PBBFAC,HCNC,, | Performed by: STUDENT IN AN ORGANIZED HEALTH CARE EDUCATION/TRAINING PROGRAM

## 2020-12-29 PROCEDURE — 99024 POSTOP FOLLOW-UP VISIT: CPT | Mod: HCNC,S$GLB,, | Performed by: STUDENT IN AN ORGANIZED HEALTH CARE EDUCATION/TRAINING PROGRAM

## 2020-12-29 PROCEDURE — 3288F PR FALLS RISK ASSESSMENT DOCUMENTED: ICD-10-PCS | Mod: HCNC,CPTII,S$GLB, | Performed by: STUDENT IN AN ORGANIZED HEALTH CARE EDUCATION/TRAINING PROGRAM

## 2020-12-29 PROCEDURE — 1100F PTFALLS ASSESS-DOCD GE2>/YR: CPT | Mod: HCNC,CPTII,S$GLB, | Performed by: STUDENT IN AN ORGANIZED HEALTH CARE EDUCATION/TRAINING PROGRAM

## 2020-12-29 PROCEDURE — 99999 PR PBB SHADOW E&M-EST. PATIENT-LVL IV: ICD-10-PCS | Mod: PBBFAC,HCNC,, | Performed by: STUDENT IN AN ORGANIZED HEALTH CARE EDUCATION/TRAINING PROGRAM

## 2020-12-29 PROCEDURE — 99024 PR POST-OP FOLLOW-UP VISIT: ICD-10-PCS | Mod: HCNC,S$GLB,, | Performed by: STUDENT IN AN ORGANIZED HEALTH CARE EDUCATION/TRAINING PROGRAM

## 2020-12-29 PROCEDURE — 3288F FALL RISK ASSESSMENT DOCD: CPT | Mod: HCNC,CPTII,S$GLB, | Performed by: STUDENT IN AN ORGANIZED HEALTH CARE EDUCATION/TRAINING PROGRAM

## 2020-12-29 PROCEDURE — 1126F PR PAIN SEVERITY QUANTIFIED, NO PAIN PRESENT: ICD-10-PCS | Mod: HCNC,S$GLB,, | Performed by: STUDENT IN AN ORGANIZED HEALTH CARE EDUCATION/TRAINING PROGRAM

## 2020-12-29 PROCEDURE — 1126F AMNT PAIN NOTED NONE PRSNT: CPT | Mod: HCNC,S$GLB,, | Performed by: STUDENT IN AN ORGANIZED HEALTH CARE EDUCATION/TRAINING PROGRAM

## 2020-12-29 PROCEDURE — 1100F PR PT FALLS ASSESS DOC 2+ FALLS/FALL W/INJURY/YR: ICD-10-PCS | Mod: HCNC,CPTII,S$GLB, | Performed by: STUDENT IN AN ORGANIZED HEALTH CARE EDUCATION/TRAINING PROGRAM

## 2020-12-31 ENCOUNTER — CLINICAL SUPPORT (OUTPATIENT)
Dept: REHABILITATION | Facility: HOSPITAL | Age: 85
End: 2020-12-31
Payer: MEDICARE

## 2020-12-31 DIAGNOSIS — R29.3 POOR POSTURE: ICD-10-CM

## 2020-12-31 DIAGNOSIS — M62.81 PROXIMAL MUSCLE WEAKNESS: ICD-10-CM

## 2020-12-31 DIAGNOSIS — M25.611 DECREASED RIGHT SHOULDER RANGE OF MOTION: ICD-10-CM

## 2020-12-31 PROCEDURE — 97110 THERAPEUTIC EXERCISES: CPT | Mod: HCNC

## 2020-12-31 NOTE — PROGRESS NOTES
Physical Therapy Daily Treatment Note     Name: Chao Hawk  Clinic Number: [id number]    Therapy Diagnosis:   Encounter Diagnoses   Name Primary?    Decreased right shoulder range of motion     Proximal muscle weakness     Poor posture      Physician: Reji Martin    Visit Date: 12/31/2020  Physician Orders: PT Eval and Treat  Medical Diagnosis from Referral: s/p R reverse total shoulder arthroplasty  Evaluation Date: 12/11/2020  Authorization Period Expiration: 6/14/2021  Plan of Care Expiration: 3/11/2021  Visit # / Visits authorized: 3/20    Time In: 1315  Time Out: 1358  Total Billable Time: 43 minutes    Precautions: Standard and Weightbearing   DOS: 10/5/2020  Reverse Total Shoulder Arthroplasty protocol  NWB RUE in sling  Ok to be out of sling for pendulums, elbow, wrist ROM  ASA 81mg BID x 2wks for DVT ppx  F/u 10-14 days for wound check/suture removal    Subjective     Pt reports: doing pretty well, a bit sore from exercises yesterday. Saw Dr. Martin on Tuesday and states that he was pleased with progress and won't see him back until one year post-op.  He was compliant with home exercise program.  Response to previous treatment: no adverse effects  Functional change: improving functional AROM    Pain: 0/10  Location: right shoulder      Objective     R shoulder PROM   Right   Scaption 140   ER at 45 55   IR at 45 30       Chao received therapeutic exercises to develop strength, endurance, ROM, flexibility, posture and core stabilization for 43 minutes including:  Skilled PROM all directions  Pulley scaption 2x20  Standing scaption w/ mirror 3x10  Seated band row GTB 3x12  Seated behind the back reach sub max 2x15  Supine dowel press 2x12 1#    Home Exercises Provided and Patient Education Provided     Education provided:   - role of PT, PT POC, precautions/contraindications    Written Home Exercises Provided: Patient instructed to cont prior HEP.  Exercises were reviewed and Chao was  able to demonstrate them prior to the end of the session.  Chao demonstrated good  understanding of the education provided.     See EMR under Patient Instructions for exercises provided prior visit.    Assessment     12 weeks 3 days s/p R shoulder rTSA    Initiated elevation AROM today with no issues. Easily able to elevate above 90 deg for multiple repetitions. Continues w/ some intolerance to cross body reaching and behind the back reaching, but slowly improving. Progressed loading w/ row pattern with good response.    Chao is progressing well towards his goals.   Pt prognosis is Good.     Pt will continue to benefit from skilled outpatient physical therapy to address the deficits listed in the problem list box on initial evaluation, provide pt/family education and to maximize pt's level of independence in the home and community environment.     Pt's spiritual, cultural and educational needs considered and pt agreeable to plan of care and goals.    Anticipated barriers to physical therapy: Covid-19; old age    Goals:   Short Term Goals: 6 weeks (progressing, not met)   1. Pain: Pt will demonstrate improved pain by reports of less than or equal to 4/10 worst pain on the verbal rating scale in order to progress toward maximal functional ability and improve QOL.   2. Function: Patient will demonstrate improved function as indicated by a functional limitation score of less than or equal to 33 out of 100 on FOTO.   3. Mobility: Patient will improve AROM to 50% of stated goals, listed in objective measures above, in order to progress towards independence with functional activities.    4. Strength: Patient will improve strength to 50% of stated goals, listed in objective measures above, in order to progress towards independence with functional activities.    5. HEP: Patient will demonstrate independence with HEP in order to progress toward functional independence.      Long Term Goals: 12 weeks (progressing, not met)    1. Pain: Pt will demonstrate improved pain by reports of less than or equal to 1/10 worst pain on the verbal rating scale in order to progress toward maximal functional ability and improve QOL.     2. Function: Patient will demonstrate improved function as indicated by a functional limitation score of less than or equal to 29 out of 100 on FOTO.   3. Mobility: Patient will improve AROM to stated goals, listed in objective measures above, in order to return to maximal functional potential and improve quality of life.   4. Strength: Patient will improve strength to stated goals, listed in objective measures above, in order to improve functional independence and quality of life.   5. Patient will return to normal ADL's, IADL's, community involvement, recreational activities, and work-related activities with less than or equal to 1/10 pain and maximal function.        Plan     Continue w/ current POC emphasizing restoration of good functional AROM and slow progression of strengthening work for ADLs    Plan of care Certification: 12/11/2020 to 3/11/2021.  Outpatient Physical Therapy 2 times weekly for 12 weeks to include any combination of the following interventions: virtual visits, dry needling, modalities, electrical stimulation (IFC, Pre-Mod, Attended with Functional Dry Needling), Cervical/Lumbar Traction, Manual Therapy, Neuromuscular Re-ed, Patient Education, Self Care, Therapeutic Activites and Therapeutic Exercise     Sharon Payne, PT

## 2021-01-04 ENCOUNTER — CLINICAL SUPPORT (OUTPATIENT)
Dept: REHABILITATION | Facility: HOSPITAL | Age: 86
End: 2021-01-04
Payer: MEDICARE

## 2021-01-04 DIAGNOSIS — R29.3 POOR POSTURE: ICD-10-CM

## 2021-01-04 DIAGNOSIS — M62.81 PROXIMAL MUSCLE WEAKNESS: ICD-10-CM

## 2021-01-04 DIAGNOSIS — M25.611 DECREASED RIGHT SHOULDER RANGE OF MOTION: ICD-10-CM

## 2021-01-04 PROCEDURE — 97110 THERAPEUTIC EXERCISES: CPT | Mod: HCNC

## 2021-01-04 PROCEDURE — 97140 MANUAL THERAPY 1/> REGIONS: CPT | Mod: HCNC

## 2021-01-07 ENCOUNTER — CLINICAL SUPPORT (OUTPATIENT)
Dept: REHABILITATION | Facility: HOSPITAL | Age: 86
End: 2021-01-07
Payer: MEDICARE

## 2021-01-07 DIAGNOSIS — R29.3 POOR POSTURE: ICD-10-CM

## 2021-01-07 DIAGNOSIS — M62.81 PROXIMAL MUSCLE WEAKNESS: ICD-10-CM

## 2021-01-07 DIAGNOSIS — M25.611 DECREASED RIGHT SHOULDER RANGE OF MOTION: ICD-10-CM

## 2021-01-07 PROCEDURE — 97110 THERAPEUTIC EXERCISES: CPT | Mod: HCNC

## 2021-01-11 ENCOUNTER — CLINICAL SUPPORT (OUTPATIENT)
Dept: REHABILITATION | Facility: HOSPITAL | Age: 86
End: 2021-01-11
Payer: MEDICARE

## 2021-01-11 DIAGNOSIS — M25.611 DECREASED RIGHT SHOULDER RANGE OF MOTION: ICD-10-CM

## 2021-01-11 DIAGNOSIS — M62.81 PROXIMAL MUSCLE WEAKNESS: ICD-10-CM

## 2021-01-11 DIAGNOSIS — R29.3 POOR POSTURE: ICD-10-CM

## 2021-01-11 PROCEDURE — 97110 THERAPEUTIC EXERCISES: CPT

## 2021-01-14 ENCOUNTER — CLINICAL SUPPORT (OUTPATIENT)
Dept: REHABILITATION | Facility: HOSPITAL | Age: 86
End: 2021-01-14
Payer: MEDICARE

## 2021-01-14 ENCOUNTER — LAB VISIT (OUTPATIENT)
Dept: LAB | Facility: HOSPITAL | Age: 86
End: 2021-01-14
Attending: PEDIATRICS
Payer: MEDICARE

## 2021-01-14 DIAGNOSIS — M25.611 DECREASED RIGHT SHOULDER RANGE OF MOTION: ICD-10-CM

## 2021-01-14 DIAGNOSIS — M62.81 PROXIMAL MUSCLE WEAKNESS: ICD-10-CM

## 2021-01-14 DIAGNOSIS — I10 ESSENTIAL HYPERTENSION: Chronic | ICD-10-CM

## 2021-01-14 DIAGNOSIS — E78.00 PURE HYPERCHOLESTEROLEMIA: Chronic | ICD-10-CM

## 2021-01-14 DIAGNOSIS — R29.3 POOR POSTURE: ICD-10-CM

## 2021-01-14 LAB
BASOPHILS # BLD AUTO: 0.04 K/UL (ref 0–0.2)
BASOPHILS NFR BLD: 0.4 % (ref 0–1.9)
DIFFERENTIAL METHOD: ABNORMAL
EOSINOPHIL # BLD AUTO: 0.2 K/UL (ref 0–0.5)
EOSINOPHIL NFR BLD: 2.1 % (ref 0–8)
ERYTHROCYTE [DISTWIDTH] IN BLOOD BY AUTOMATED COUNT: 15.6 % (ref 11.5–14.5)
HCT VFR BLD AUTO: 37.7 % (ref 40–54)
HGB BLD-MCNC: 11.7 G/DL (ref 14–18)
IMM GRANULOCYTES # BLD AUTO: 0.04 K/UL (ref 0–0.04)
IMM GRANULOCYTES NFR BLD AUTO: 0.4 % (ref 0–0.5)
LYMPHOCYTES # BLD AUTO: 0.9 K/UL (ref 1–4.8)
LYMPHOCYTES NFR BLD: 9 % (ref 18–48)
MCH RBC QN AUTO: 28.2 PG (ref 27–31)
MCHC RBC AUTO-ENTMCNC: 31 G/DL (ref 32–36)
MCV RBC AUTO: 91 FL (ref 82–98)
MONOCYTES # BLD AUTO: 0.8 K/UL (ref 0.3–1)
MONOCYTES NFR BLD: 7.4 % (ref 4–15)
NEUTROPHILS # BLD AUTO: 8.2 K/UL (ref 1.8–7.7)
NEUTROPHILS NFR BLD: 80.7 % (ref 38–73)
NRBC BLD-RTO: 0 /100 WBC
PLATELET # BLD AUTO: 275 K/UL (ref 150–350)
PMV BLD AUTO: 11.1 FL (ref 9.2–12.9)
RBC # BLD AUTO: 4.15 M/UL (ref 4.6–6.2)
WBC # BLD AUTO: 10.14 K/UL (ref 3.9–12.7)

## 2021-01-14 PROCEDURE — 84460 ALANINE AMINO (ALT) (SGPT): CPT

## 2021-01-14 PROCEDURE — 36415 COLL VENOUS BLD VENIPUNCTURE: CPT

## 2021-01-14 PROCEDURE — 80061 LIPID PANEL: CPT

## 2021-01-14 PROCEDURE — 85025 COMPLETE CBC W/AUTO DIFF WBC: CPT

## 2021-01-14 PROCEDURE — 84450 TRANSFERASE (AST) (SGOT): CPT

## 2021-01-14 PROCEDURE — 80048 BASIC METABOLIC PNL TOTAL CA: CPT

## 2021-01-14 PROCEDURE — 97110 THERAPEUTIC EXERCISES: CPT

## 2021-01-15 LAB
ALT SERPL W/O P-5'-P-CCNC: 20 U/L (ref 10–44)
ANION GAP SERPL CALC-SCNC: 11 MMOL/L (ref 8–16)
AST SERPL-CCNC: 20 U/L (ref 10–40)
BUN SERPL-MCNC: 26 MG/DL (ref 8–23)
CALCIUM SERPL-MCNC: 9.5 MG/DL (ref 8.7–10.5)
CHLORIDE SERPL-SCNC: 107 MMOL/L (ref 95–110)
CHOLEST SERPL-MCNC: 115 MG/DL (ref 120–199)
CHOLEST/HDLC SERPL: 2.3 {RATIO} (ref 2–5)
CO2 SERPL-SCNC: 25 MMOL/L (ref 23–29)
CREAT SERPL-MCNC: 1 MG/DL (ref 0.5–1.4)
EST. GFR  (AFRICAN AMERICAN): >60 ML/MIN/1.73 M^2
EST. GFR  (NON AFRICAN AMERICAN): >60 ML/MIN/1.73 M^2
GLUCOSE SERPL-MCNC: 130 MG/DL (ref 70–110)
HDLC SERPL-MCNC: 50 MG/DL (ref 40–75)
HDLC SERPL: 43.5 % (ref 20–50)
LDLC SERPL CALC-MCNC: 44.8 MG/DL (ref 63–159)
NONHDLC SERPL-MCNC: 65 MG/DL
POTASSIUM SERPL-SCNC: 4.3 MMOL/L (ref 3.5–5.1)
SODIUM SERPL-SCNC: 143 MMOL/L (ref 136–145)
TRIGL SERPL-MCNC: 101 MG/DL (ref 30–150)

## 2021-01-19 ENCOUNTER — CLINICAL SUPPORT (OUTPATIENT)
Dept: REHABILITATION | Facility: HOSPITAL | Age: 86
End: 2021-01-19
Payer: MEDICARE

## 2021-01-19 DIAGNOSIS — M62.81 PROXIMAL MUSCLE WEAKNESS: ICD-10-CM

## 2021-01-19 DIAGNOSIS — M25.611 DECREASED RIGHT SHOULDER RANGE OF MOTION: ICD-10-CM

## 2021-01-19 DIAGNOSIS — R29.3 POOR POSTURE: ICD-10-CM

## 2021-01-19 PROCEDURE — 97110 THERAPEUTIC EXERCISES: CPT

## 2021-01-19 PROCEDURE — 97140 MANUAL THERAPY 1/> REGIONS: CPT

## 2021-01-21 ENCOUNTER — OFFICE VISIT (OUTPATIENT)
Dept: INTERNAL MEDICINE | Facility: CLINIC | Age: 86
End: 2021-01-21
Payer: MEDICARE

## 2021-01-21 ENCOUNTER — CLINICAL SUPPORT (OUTPATIENT)
Dept: REHABILITATION | Facility: HOSPITAL | Age: 86
End: 2021-01-21
Payer: MEDICARE

## 2021-01-21 VITALS
RESPIRATION RATE: 16 BRPM | HEART RATE: 90 BPM | BODY MASS INDEX: 24.61 KG/M2 | TEMPERATURE: 99 F | DIASTOLIC BLOOD PRESSURE: 74 MMHG | WEIGHT: 181.69 LBS | HEIGHT: 72 IN | OXYGEN SATURATION: 96 % | SYSTOLIC BLOOD PRESSURE: 114 MMHG

## 2021-01-21 DIAGNOSIS — M75.41 IMPINGEMENT SYNDROME OF RIGHT SHOULDER: ICD-10-CM

## 2021-01-21 DIAGNOSIS — M51.36 DDD (DEGENERATIVE DISC DISEASE), LUMBAR: ICD-10-CM

## 2021-01-21 DIAGNOSIS — R60.0 BILATERAL LEG EDEMA: ICD-10-CM

## 2021-01-21 DIAGNOSIS — R29.3 POOR POSTURE: ICD-10-CM

## 2021-01-21 DIAGNOSIS — I70.0 ATHEROSCLEROSIS OF AORTA: ICD-10-CM

## 2021-01-21 DIAGNOSIS — N40.1 BENIGN PROSTATIC HYPERPLASIA WITH WEAK URINARY STREAM: Chronic | ICD-10-CM

## 2021-01-21 DIAGNOSIS — M62.81 PROXIMAL MUSCLE WEAKNESS: ICD-10-CM

## 2021-01-21 DIAGNOSIS — I10 ESSENTIAL HYPERTENSION: Chronic | ICD-10-CM

## 2021-01-21 DIAGNOSIS — I27.21 PAH (PULMONARY ARTERY HYPERTENSION): ICD-10-CM

## 2021-01-21 DIAGNOSIS — M25.611 DECREASED RIGHT SHOULDER RANGE OF MOTION: ICD-10-CM

## 2021-01-21 DIAGNOSIS — E78.00 PURE HYPERCHOLESTEROLEMIA: Chronic | ICD-10-CM

## 2021-01-21 DIAGNOSIS — I71.40 ABDOMINAL AORTIC ANEURYSM WITHOUT RUPTURE: ICD-10-CM

## 2021-01-21 DIAGNOSIS — G62.9 PERIPHERAL POLYNEUROPATHY: ICD-10-CM

## 2021-01-21 DIAGNOSIS — J44.9 CHRONIC OBSTRUCTIVE PULMONARY DISEASE, UNSPECIFIED COPD TYPE: Primary | Chronic | ICD-10-CM

## 2021-01-21 DIAGNOSIS — R39.12 BENIGN PROSTATIC HYPERPLASIA WITH WEAK URINARY STREAM: Chronic | ICD-10-CM

## 2021-01-21 DIAGNOSIS — R79.89 LOW TESTOSTERONE: ICD-10-CM

## 2021-01-21 PROCEDURE — 1100F PR PT FALLS ASSESS DOC 2+ FALLS/FALL W/INJURY/YR: ICD-10-PCS | Mod: CPTII,S$GLB,, | Performed by: PEDIATRICS

## 2021-01-21 PROCEDURE — 1125F AMNT PAIN NOTED PAIN PRSNT: CPT | Mod: S$GLB,,, | Performed by: PEDIATRICS

## 2021-01-21 PROCEDURE — 99999 PR PBB SHADOW E&M-EST. PATIENT-LVL V: CPT | Mod: PBBFAC,,, | Performed by: PEDIATRICS

## 2021-01-21 PROCEDURE — 1159F MED LIST DOCD IN RCRD: CPT | Mod: S$GLB,,, | Performed by: PEDIATRICS

## 2021-01-21 PROCEDURE — G0008 ADMIN INFLUENZA VIRUS VAC: HCPCS | Mod: S$GLB,,, | Performed by: PEDIATRICS

## 2021-01-21 PROCEDURE — 97140 MANUAL THERAPY 1/> REGIONS: CPT

## 2021-01-21 PROCEDURE — 90694 VACC AIIV4 NO PRSRV 0.5ML IM: CPT | Mod: S$GLB,,, | Performed by: PEDIATRICS

## 2021-01-21 PROCEDURE — 1159F PR MEDICATION LIST DOCUMENTED IN MEDICAL RECORD: ICD-10-PCS | Mod: S$GLB,,, | Performed by: PEDIATRICS

## 2021-01-21 PROCEDURE — 1100F PTFALLS ASSESS-DOCD GE2>/YR: CPT | Mod: CPTII,S$GLB,, | Performed by: PEDIATRICS

## 2021-01-21 PROCEDURE — 99214 PR OFFICE/OUTPT VISIT, EST, LEVL IV, 30-39 MIN: ICD-10-PCS | Mod: 25,S$GLB,, | Performed by: PEDIATRICS

## 2021-01-21 PROCEDURE — 99999 PR PBB SHADOW E&M-EST. PATIENT-LVL V: ICD-10-PCS | Mod: PBBFAC,,, | Performed by: PEDIATRICS

## 2021-01-21 PROCEDURE — 1125F PR PAIN SEVERITY QUANTIFIED, PAIN PRESENT: ICD-10-PCS | Mod: S$GLB,,, | Performed by: PEDIATRICS

## 2021-01-21 PROCEDURE — G0008 FLU VACCINE - QUADRIVALENT - ADJUVANTED: ICD-10-PCS | Mod: S$GLB,,, | Performed by: PEDIATRICS

## 2021-01-21 PROCEDURE — 97110 THERAPEUTIC EXERCISES: CPT

## 2021-01-21 PROCEDURE — 3288F PR FALLS RISK ASSESSMENT DOCUMENTED: ICD-10-PCS | Mod: CPTII,S$GLB,, | Performed by: PEDIATRICS

## 2021-01-21 PROCEDURE — 3288F FALL RISK ASSESSMENT DOCD: CPT | Mod: CPTII,S$GLB,, | Performed by: PEDIATRICS

## 2021-01-21 PROCEDURE — 99214 OFFICE O/P EST MOD 30 MIN: CPT | Mod: 25,S$GLB,, | Performed by: PEDIATRICS

## 2021-01-21 PROCEDURE — 90694 FLU VACCINE - QUADRIVALENT - ADJUVANTED: ICD-10-PCS | Mod: S$GLB,,, | Performed by: PEDIATRICS

## 2021-01-21 RX ORDER — ACETAMINOPHEN 500 MG
500 TABLET ORAL
COMMUNITY

## 2021-01-22 ENCOUNTER — PATIENT MESSAGE (OUTPATIENT)
Dept: ADMINISTRATIVE | Facility: OTHER | Age: 86
End: 2021-01-22

## 2021-01-25 ENCOUNTER — PATIENT MESSAGE (OUTPATIENT)
Dept: INTERNAL MEDICINE | Facility: CLINIC | Age: 86
End: 2021-01-25

## 2021-01-26 ENCOUNTER — CLINICAL SUPPORT (OUTPATIENT)
Dept: REHABILITATION | Facility: HOSPITAL | Age: 86
End: 2021-01-26
Payer: MEDICARE

## 2021-01-26 DIAGNOSIS — M25.611 DECREASED RIGHT SHOULDER RANGE OF MOTION: ICD-10-CM

## 2021-01-26 DIAGNOSIS — M62.81 PROXIMAL MUSCLE WEAKNESS: ICD-10-CM

## 2021-01-26 DIAGNOSIS — R29.3 POOR POSTURE: ICD-10-CM

## 2021-01-26 PROCEDURE — 97110 THERAPEUTIC EXERCISES: CPT

## 2021-01-26 PROCEDURE — 97140 MANUAL THERAPY 1/> REGIONS: CPT

## 2021-01-28 ENCOUNTER — CLINICAL SUPPORT (OUTPATIENT)
Dept: REHABILITATION | Facility: HOSPITAL | Age: 86
End: 2021-01-28
Payer: MEDICARE

## 2021-01-28 DIAGNOSIS — M25.611 DECREASED RIGHT SHOULDER RANGE OF MOTION: ICD-10-CM

## 2021-01-28 DIAGNOSIS — R29.3 POOR POSTURE: ICD-10-CM

## 2021-01-28 DIAGNOSIS — M62.81 PROXIMAL MUSCLE WEAKNESS: ICD-10-CM

## 2021-01-28 PROCEDURE — 97110 THERAPEUTIC EXERCISES: CPT

## 2021-02-01 ENCOUNTER — CLINICAL SUPPORT (OUTPATIENT)
Dept: REHABILITATION | Facility: HOSPITAL | Age: 86
End: 2021-02-01
Payer: MEDICARE

## 2021-02-01 DIAGNOSIS — R29.3 POOR POSTURE: ICD-10-CM

## 2021-02-01 DIAGNOSIS — M62.81 PROXIMAL MUSCLE WEAKNESS: ICD-10-CM

## 2021-02-01 DIAGNOSIS — M25.611 DECREASED RIGHT SHOULDER RANGE OF MOTION: ICD-10-CM

## 2021-02-01 PROCEDURE — 97110 THERAPEUTIC EXERCISES: CPT

## 2021-02-03 ENCOUNTER — PES CALL (OUTPATIENT)
Dept: ADMINISTRATIVE | Facility: CLINIC | Age: 86
End: 2021-02-03

## 2021-02-04 ENCOUNTER — IMMUNIZATION (OUTPATIENT)
Dept: INTERNAL MEDICINE | Facility: CLINIC | Age: 86
End: 2021-02-04
Payer: MEDICARE

## 2021-02-04 ENCOUNTER — CLINICAL SUPPORT (OUTPATIENT)
Dept: REHABILITATION | Facility: HOSPITAL | Age: 86
End: 2021-02-04
Payer: MEDICARE

## 2021-02-04 DIAGNOSIS — R29.3 POOR POSTURE: ICD-10-CM

## 2021-02-04 DIAGNOSIS — M25.611 DECREASED RIGHT SHOULDER RANGE OF MOTION: ICD-10-CM

## 2021-02-04 DIAGNOSIS — M62.81 PROXIMAL MUSCLE WEAKNESS: ICD-10-CM

## 2021-02-04 DIAGNOSIS — Z23 NEED FOR VACCINATION: Primary | ICD-10-CM

## 2021-02-04 PROCEDURE — 91300 COVID-19, MRNA, LNP-S, PF, 30 MCG/0.3 ML DOSE VACCINE: CPT | Mod: ,,, | Performed by: FAMILY MEDICINE

## 2021-02-04 PROCEDURE — 0001A COVID-19, MRNA, LNP-S, PF, 30 MCG/0.3 ML DOSE VACCINE: ICD-10-PCS | Mod: CV19,,, | Performed by: FAMILY MEDICINE

## 2021-02-04 PROCEDURE — 91300 COVID-19, MRNA, LNP-S, PF, 30 MCG/0.3 ML DOSE VACCINE: ICD-10-PCS | Mod: ,,, | Performed by: FAMILY MEDICINE

## 2021-02-04 PROCEDURE — 97110 THERAPEUTIC EXERCISES: CPT

## 2021-02-04 PROCEDURE — 0001A COVID-19, MRNA, LNP-S, PF, 30 MCG/0.3 ML DOSE VACCINE: CPT | Mod: CV19,,, | Performed by: FAMILY MEDICINE

## 2021-02-08 ENCOUNTER — CLINICAL SUPPORT (OUTPATIENT)
Dept: REHABILITATION | Facility: HOSPITAL | Age: 86
End: 2021-02-08
Payer: MEDICARE

## 2021-02-08 DIAGNOSIS — R29.3 POOR POSTURE: ICD-10-CM

## 2021-02-08 DIAGNOSIS — M62.81 PROXIMAL MUSCLE WEAKNESS: ICD-10-CM

## 2021-02-08 DIAGNOSIS — M25.611 DECREASED RIGHT SHOULDER RANGE OF MOTION: ICD-10-CM

## 2021-02-08 PROCEDURE — 97110 THERAPEUTIC EXERCISES: CPT

## 2021-02-10 ENCOUNTER — OFFICE VISIT (OUTPATIENT)
Dept: INTERNAL MEDICINE | Facility: CLINIC | Age: 86
End: 2021-02-10
Payer: MEDICARE

## 2021-02-10 VITALS
DIASTOLIC BLOOD PRESSURE: 78 MMHG | BODY MASS INDEX: 24.04 KG/M2 | WEIGHT: 177.5 LBS | HEIGHT: 72 IN | SYSTOLIC BLOOD PRESSURE: 130 MMHG

## 2021-02-10 DIAGNOSIS — N40.1 BENIGN PROSTATIC HYPERPLASIA WITH WEAK URINARY STREAM: Chronic | ICD-10-CM

## 2021-02-10 DIAGNOSIS — G47.34 NOCTURNAL HYPOXEMIA: ICD-10-CM

## 2021-02-10 DIAGNOSIS — I70.0 ATHEROSCLEROSIS OF AORTA: ICD-10-CM

## 2021-02-10 DIAGNOSIS — Z99.89 DEPENDENCE ON OTHER ENABLING MACHINES AND DEVICES: ICD-10-CM

## 2021-02-10 DIAGNOSIS — Z00.00 ENCOUNTER FOR PREVENTIVE HEALTH EXAMINATION: Primary | ICD-10-CM

## 2021-02-10 DIAGNOSIS — I27.21 PAH (PULMONARY ARTERY HYPERTENSION): ICD-10-CM

## 2021-02-10 DIAGNOSIS — R60.0 BILATERAL LEG EDEMA: ICD-10-CM

## 2021-02-10 DIAGNOSIS — I71.40 ABDOMINAL AORTIC ANEURYSM WITHOUT RUPTURE: ICD-10-CM

## 2021-02-10 DIAGNOSIS — G62.9 PERIPHERAL POLYNEUROPATHY: ICD-10-CM

## 2021-02-10 DIAGNOSIS — Z96.611 S/P REVERSE TOTAL SHOULDER ARTHROPLASTY, RIGHT: ICD-10-CM

## 2021-02-10 DIAGNOSIS — J44.9 CHRONIC OBSTRUCTIVE PULMONARY DISEASE, UNSPECIFIED COPD TYPE: Chronic | ICD-10-CM

## 2021-02-10 DIAGNOSIS — R94.120 ABNORMAL HEARING SCREEN: ICD-10-CM

## 2021-02-10 DIAGNOSIS — R39.12 BENIGN PROSTATIC HYPERPLASIA WITH WEAK URINARY STREAM: Chronic | ICD-10-CM

## 2021-02-10 DIAGNOSIS — M51.36 DDD (DEGENERATIVE DISC DISEASE), LUMBAR: ICD-10-CM

## 2021-02-10 DIAGNOSIS — I10 ESSENTIAL HYPERTENSION: Chronic | ICD-10-CM

## 2021-02-10 DIAGNOSIS — J84.10 CALCIFIED GRANULOMA OF LUNG: ICD-10-CM

## 2021-02-10 DIAGNOSIS — M47.816 LUMBAR ARTHROPATHY: ICD-10-CM

## 2021-02-10 DIAGNOSIS — E78.00 PURE HYPERCHOLESTEROLEMIA: Chronic | ICD-10-CM

## 2021-02-10 PROCEDURE — 99499 UNLISTED E&M SERVICE: CPT | Mod: S$GLB,,, | Performed by: PHYSICIAN ASSISTANT

## 2021-02-10 PROCEDURE — 1158F ADVNC CARE PLAN TLK DOCD: CPT | Mod: S$GLB,,, | Performed by: PHYSICIAN ASSISTANT

## 2021-02-10 PROCEDURE — 1126F AMNT PAIN NOTED NONE PRSNT: CPT | Mod: S$GLB,,, | Performed by: PHYSICIAN ASSISTANT

## 2021-02-10 PROCEDURE — 1101F PR PT FALLS ASSESS DOC 0-1 FALLS W/OUT INJ PAST YR: ICD-10-PCS | Mod: CPTII,S$GLB,, | Performed by: PHYSICIAN ASSISTANT

## 2021-02-10 PROCEDURE — 1101F PT FALLS ASSESS-DOCD LE1/YR: CPT | Mod: CPTII,S$GLB,, | Performed by: PHYSICIAN ASSISTANT

## 2021-02-10 PROCEDURE — G0439 PPPS, SUBSEQ VISIT: HCPCS | Mod: S$GLB,,, | Performed by: PHYSICIAN ASSISTANT

## 2021-02-10 PROCEDURE — 3288F FALL RISK ASSESSMENT DOCD: CPT | Mod: CPTII,S$GLB,, | Performed by: PHYSICIAN ASSISTANT

## 2021-02-10 PROCEDURE — 99999 PR PBB SHADOW E&M-EST. PATIENT-LVL III: CPT | Mod: PBBFAC,,, | Performed by: PHYSICIAN ASSISTANT

## 2021-02-10 PROCEDURE — 3288F PR FALLS RISK ASSESSMENT DOCUMENTED: ICD-10-PCS | Mod: CPTII,S$GLB,, | Performed by: PHYSICIAN ASSISTANT

## 2021-02-10 PROCEDURE — 1158F PR ADVANCE CARE PLANNING DISCUSS DOCUMENTED IN MEDICAL RECORD: ICD-10-PCS | Mod: S$GLB,,, | Performed by: PHYSICIAN ASSISTANT

## 2021-02-10 PROCEDURE — 1126F PR PAIN SEVERITY QUANTIFIED, NO PAIN PRESENT: ICD-10-PCS | Mod: S$GLB,,, | Performed by: PHYSICIAN ASSISTANT

## 2021-02-10 PROCEDURE — 99499 RISK ADDL DX/OHS AUDIT: ICD-10-PCS | Mod: S$GLB,,, | Performed by: PHYSICIAN ASSISTANT

## 2021-02-10 PROCEDURE — 99999 PR PBB SHADOW E&M-EST. PATIENT-LVL III: ICD-10-PCS | Mod: PBBFAC,,, | Performed by: PHYSICIAN ASSISTANT

## 2021-02-10 PROCEDURE — G0439 PR MEDICARE ANNUAL WELLNESS SUBSEQUENT VISIT: ICD-10-PCS | Mod: S$GLB,,, | Performed by: PHYSICIAN ASSISTANT

## 2021-02-22 DIAGNOSIS — U07.1 COVID-19: ICD-10-CM

## 2021-02-25 ENCOUNTER — IMMUNIZATION (OUTPATIENT)
Dept: INTERNAL MEDICINE | Facility: CLINIC | Age: 86
End: 2021-02-25
Payer: MEDICARE

## 2021-02-25 DIAGNOSIS — Z23 NEED FOR VACCINATION: Primary | ICD-10-CM

## 2021-02-25 PROCEDURE — 91300 COVID-19, MRNA, LNP-S, PF, 30 MCG/0.3 ML DOSE VACCINE: ICD-10-PCS | Mod: S$GLB,,, | Performed by: FAMILY MEDICINE

## 2021-02-25 PROCEDURE — 91300 COVID-19, MRNA, LNP-S, PF, 30 MCG/0.3 ML DOSE VACCINE: CPT | Mod: S$GLB,,, | Performed by: FAMILY MEDICINE

## 2021-02-25 PROCEDURE — 0002A COVID-19, MRNA, LNP-S, PF, 30 MCG/0.3 ML DOSE VACCINE: ICD-10-PCS | Mod: CV19,S$GLB,, | Performed by: FAMILY MEDICINE

## 2021-02-25 PROCEDURE — 0002A COVID-19, MRNA, LNP-S, PF, 30 MCG/0.3 ML DOSE VACCINE: CPT | Mod: CV19,S$GLB,, | Performed by: FAMILY MEDICINE

## 2021-04-16 ENCOUNTER — LAB VISIT (OUTPATIENT)
Dept: OTOLARYNGOLOGY | Facility: CLINIC | Age: 86
End: 2021-04-16
Payer: MEDICARE

## 2021-04-16 DIAGNOSIS — U07.1 COVID-19: ICD-10-CM

## 2021-04-16 PROCEDURE — U0003 INFECTIOUS AGENT DETECTION BY NUCLEIC ACID (DNA OR RNA); SEVERE ACUTE RESPIRATORY SYNDROME CORONAVIRUS 2 (SARS-COV-2) (CORONAVIRUS DISEASE [COVID-19]), AMPLIFIED PROBE TECHNIQUE, MAKING USE OF HIGH THROUGHPUT TECHNOLOGIES AS DESCRIBED BY CMS-2020-01-R: HCPCS | Performed by: NURSE PRACTITIONER

## 2021-04-16 PROCEDURE — U0005 INFEC AGEN DETEC AMPLI PROBE: HCPCS | Performed by: NURSE PRACTITIONER

## 2021-04-17 LAB — SARS-COV-2 RNA RESP QL NAA+PROBE: NOT DETECTED

## 2021-04-19 ENCOUNTER — CLINICAL SUPPORT (OUTPATIENT)
Dept: PULMONOLOGY | Facility: CLINIC | Age: 86
End: 2021-04-19
Payer: MEDICARE

## 2021-04-19 ENCOUNTER — OFFICE VISIT (OUTPATIENT)
Dept: PULMONOLOGY | Facility: CLINIC | Age: 86
End: 2021-04-19
Payer: MEDICARE

## 2021-04-19 VITALS
DIASTOLIC BLOOD PRESSURE: 78 MMHG | WEIGHT: 178.56 LBS | RESPIRATION RATE: 15 BRPM | HEART RATE: 82 BPM | HEIGHT: 72 IN | BODY MASS INDEX: 24.18 KG/M2 | OXYGEN SATURATION: 94 % | SYSTOLIC BLOOD PRESSURE: 110 MMHG

## 2021-04-19 DIAGNOSIS — J44.9 CHRONIC OBSTRUCTIVE PULMONARY DISEASE, UNSPECIFIED COPD TYPE: Primary | ICD-10-CM

## 2021-04-19 DIAGNOSIS — J84.10 CALCIFIED GRANULOMA OF LUNG: ICD-10-CM

## 2021-04-19 DIAGNOSIS — I27.21 PAH (PULMONARY ARTERY HYPERTENSION): ICD-10-CM

## 2021-04-19 DIAGNOSIS — J44.9 CHRONIC OBSTRUCTIVE PULMONARY DISEASE, UNSPECIFIED COPD TYPE: Chronic | ICD-10-CM

## 2021-04-19 LAB
BRPFT: ABNORMAL
FEF 25 75 LLN: 0.56
FEF 25 75 PRE REF: 29.1 %
FEF 25 75 REF: 1.69
FEV1 FVC LLN: 58
FEV1 FVC PRE REF: 59.5 %
FEV1 FVC REF: 74
FEV1 LLN: 1.69
FEV1 PRE REF: 55.5 %
FEV1 REF: 2.53
FVC LLN: 2.45
FVC PRE REF: 92.1 %
FVC REF: 3.48
PEF LLN: 3.51
PEF PRE REF: 63.5 %
PEF REF: 5.75
PRE FEF 25 75: 0.49 L/S (ref 0.56–2.83)
PRE FET 100: 8.58 SEC
PRE FEV1 FVC: 43.94 % (ref 57.82–89.81)
PRE FEV1: 1.41 L (ref 1.69–3.38)
PRE FVC: 3.2 L (ref 2.45–4.51)
PRE PEF: 3.65 L/S (ref 3.51–8)

## 2021-04-19 PROCEDURE — 94010 BREATHING CAPACITY TEST: CPT | Mod: S$GLB,,, | Performed by: INTERNAL MEDICINE

## 2021-04-19 PROCEDURE — 3288F PR FALLS RISK ASSESSMENT DOCUMENTED: ICD-10-PCS | Mod: CPTII,S$GLB,, | Performed by: NURSE PRACTITIONER

## 2021-04-19 PROCEDURE — 99214 OFFICE O/P EST MOD 30 MIN: CPT | Mod: 25,S$GLB,, | Performed by: NURSE PRACTITIONER

## 2021-04-19 PROCEDURE — 1159F PR MEDICATION LIST DOCUMENTED IN MEDICAL RECORD: ICD-10-PCS | Mod: S$GLB,,, | Performed by: NURSE PRACTITIONER

## 2021-04-19 PROCEDURE — 94010 BREATHING CAPACITY TEST: ICD-10-PCS | Mod: S$GLB,,, | Performed by: INTERNAL MEDICINE

## 2021-04-19 PROCEDURE — 3288F FALL RISK ASSESSMENT DOCD: CPT | Mod: CPTII,S$GLB,, | Performed by: NURSE PRACTITIONER

## 2021-04-19 PROCEDURE — 99999 PR PBB SHADOW E&M-EST. PATIENT-LVL IV: ICD-10-PCS | Mod: PBBFAC,,, | Performed by: NURSE PRACTITIONER

## 2021-04-19 PROCEDURE — 99999 PR PBB SHADOW E&M-EST. PATIENT-LVL IV: CPT | Mod: PBBFAC,,, | Performed by: NURSE PRACTITIONER

## 2021-04-19 PROCEDURE — 1101F PT FALLS ASSESS-DOCD LE1/YR: CPT | Mod: CPTII,S$GLB,, | Performed by: NURSE PRACTITIONER

## 2021-04-19 PROCEDURE — 99214 PR OFFICE/OUTPT VISIT, EST, LEVL IV, 30-39 MIN: ICD-10-PCS | Mod: 25,S$GLB,, | Performed by: NURSE PRACTITIONER

## 2021-04-19 PROCEDURE — 1101F PR PT FALLS ASSESS DOC 0-1 FALLS W/OUT INJ PAST YR: ICD-10-PCS | Mod: CPTII,S$GLB,, | Performed by: NURSE PRACTITIONER

## 2021-04-19 PROCEDURE — 1159F MED LIST DOCD IN RCRD: CPT | Mod: S$GLB,,, | Performed by: NURSE PRACTITIONER

## 2021-06-22 ENCOUNTER — OFFICE VISIT (OUTPATIENT)
Dept: UROLOGY | Facility: CLINIC | Age: 86
End: 2021-06-22
Payer: MEDICARE

## 2021-06-22 VITALS
BODY MASS INDEX: 24.52 KG/M2 | HEIGHT: 72 IN | HEART RATE: 79 BPM | SYSTOLIC BLOOD PRESSURE: 128 MMHG | WEIGHT: 181 LBS | DIASTOLIC BLOOD PRESSURE: 64 MMHG | TEMPERATURE: 98 F

## 2021-06-22 DIAGNOSIS — R39.12 BENIGN PROSTATIC HYPERPLASIA WITH WEAK URINARY STREAM: Primary | Chronic | ICD-10-CM

## 2021-06-22 DIAGNOSIS — N40.1 BENIGN PROSTATIC HYPERPLASIA WITH WEAK URINARY STREAM: Primary | Chronic | ICD-10-CM

## 2021-06-22 PROCEDURE — 3288F PR FALLS RISK ASSESSMENT DOCUMENTED: ICD-10-PCS | Mod: CPTII,S$GLB,, | Performed by: UROLOGY

## 2021-06-22 PROCEDURE — 1159F PR MEDICATION LIST DOCUMENTED IN MEDICAL RECORD: ICD-10-PCS | Mod: S$GLB,,, | Performed by: UROLOGY

## 2021-06-22 PROCEDURE — 3288F FALL RISK ASSESSMENT DOCD: CPT | Mod: CPTII,S$GLB,, | Performed by: UROLOGY

## 2021-06-22 PROCEDURE — 1159F MED LIST DOCD IN RCRD: CPT | Mod: S$GLB,,, | Performed by: UROLOGY

## 2021-06-22 PROCEDURE — 99213 PR OFFICE/OUTPT VISIT, EST, LEVL III, 20-29 MIN: ICD-10-PCS | Mod: S$GLB,,, | Performed by: UROLOGY

## 2021-06-22 PROCEDURE — 1101F PT FALLS ASSESS-DOCD LE1/YR: CPT | Mod: CPTII,S$GLB,, | Performed by: UROLOGY

## 2021-06-22 PROCEDURE — 1126F PR PAIN SEVERITY QUANTIFIED, NO PAIN PRESENT: ICD-10-PCS | Mod: S$GLB,,, | Performed by: UROLOGY

## 2021-06-22 PROCEDURE — 99213 OFFICE O/P EST LOW 20 MIN: CPT | Mod: S$GLB,,, | Performed by: UROLOGY

## 2021-06-22 PROCEDURE — 1101F PR PT FALLS ASSESS DOC 0-1 FALLS W/OUT INJ PAST YR: ICD-10-PCS | Mod: CPTII,S$GLB,, | Performed by: UROLOGY

## 2021-06-22 PROCEDURE — 99999 PR PBB SHADOW E&M-EST. PATIENT-LVL IV: ICD-10-PCS | Mod: PBBFAC,,, | Performed by: UROLOGY

## 2021-06-22 PROCEDURE — 1126F AMNT PAIN NOTED NONE PRSNT: CPT | Mod: S$GLB,,, | Performed by: UROLOGY

## 2021-06-22 PROCEDURE — 99999 PR PBB SHADOW E&M-EST. PATIENT-LVL IV: CPT | Mod: PBBFAC,,, | Performed by: UROLOGY

## 2021-07-15 ENCOUNTER — LAB VISIT (OUTPATIENT)
Dept: LAB | Facility: HOSPITAL | Age: 86
End: 2021-07-15
Attending: PEDIATRICS
Payer: MEDICARE

## 2021-07-15 DIAGNOSIS — E78.00 PURE HYPERCHOLESTEROLEMIA: Chronic | ICD-10-CM

## 2021-07-15 DIAGNOSIS — I10 ESSENTIAL HYPERTENSION: Chronic | ICD-10-CM

## 2021-07-15 LAB
ALBUMIN SERPL BCP-MCNC: 3.5 G/DL (ref 3.5–5.2)
ALP SERPL-CCNC: 74 U/L (ref 55–135)
ALT SERPL W/O P-5'-P-CCNC: 18 U/L (ref 10–44)
ANION GAP SERPL CALC-SCNC: 8 MMOL/L (ref 8–16)
AST SERPL-CCNC: 20 U/L (ref 10–40)
BASOPHILS # BLD AUTO: 0.04 K/UL (ref 0–0.2)
BASOPHILS NFR BLD: 0.5 % (ref 0–1.9)
BILIRUB SERPL-MCNC: 0.4 MG/DL (ref 0.1–1)
BUN SERPL-MCNC: 30 MG/DL (ref 8–23)
CALCIUM SERPL-MCNC: 9.4 MG/DL (ref 8.7–10.5)
CHLORIDE SERPL-SCNC: 109 MMOL/L (ref 95–110)
CHOLEST SERPL-MCNC: 119 MG/DL (ref 120–199)
CHOLEST/HDLC SERPL: 2.4 {RATIO} (ref 2–5)
CO2 SERPL-SCNC: 23 MMOL/L (ref 23–29)
CREAT SERPL-MCNC: 1 MG/DL (ref 0.5–1.4)
DIFFERENTIAL METHOD: ABNORMAL
EOSINOPHIL # BLD AUTO: 0.2 K/UL (ref 0–0.5)
EOSINOPHIL NFR BLD: 2.1 % (ref 0–8)
ERYTHROCYTE [DISTWIDTH] IN BLOOD BY AUTOMATED COUNT: 17 % (ref 11.5–14.5)
EST. GFR  (AFRICAN AMERICAN): >60 ML/MIN/1.73 M^2
EST. GFR  (NON AFRICAN AMERICAN): >60 ML/MIN/1.73 M^2
GLUCOSE SERPL-MCNC: 104 MG/DL (ref 70–110)
HCT VFR BLD AUTO: 36.4 % (ref 40–54)
HDLC SERPL-MCNC: 49 MG/DL (ref 40–75)
HDLC SERPL: 41.2 % (ref 20–50)
HGB BLD-MCNC: 11.5 G/DL (ref 14–18)
IMM GRANULOCYTES # BLD AUTO: 0.03 K/UL (ref 0–0.04)
IMM GRANULOCYTES NFR BLD AUTO: 0.4 % (ref 0–0.5)
LDLC SERPL CALC-MCNC: 61.2 MG/DL (ref 63–159)
LYMPHOCYTES # BLD AUTO: 1.1 K/UL (ref 1–4.8)
LYMPHOCYTES NFR BLD: 14.1 % (ref 18–48)
MCH RBC QN AUTO: 28.8 PG (ref 27–31)
MCHC RBC AUTO-ENTMCNC: 31.6 G/DL (ref 32–36)
MCV RBC AUTO: 91 FL (ref 82–98)
MONOCYTES # BLD AUTO: 0.7 K/UL (ref 0.3–1)
MONOCYTES NFR BLD: 9.4 % (ref 4–15)
NEUTROPHILS # BLD AUTO: 5.5 K/UL (ref 1.8–7.7)
NEUTROPHILS NFR BLD: 73.5 % (ref 38–73)
NONHDLC SERPL-MCNC: 70 MG/DL
NRBC BLD-RTO: 0 /100 WBC
PLATELET # BLD AUTO: 190 K/UL (ref 150–450)
PMV BLD AUTO: 11.2 FL (ref 9.2–12.9)
POTASSIUM SERPL-SCNC: 4.3 MMOL/L (ref 3.5–5.1)
PROT SERPL-MCNC: 7 G/DL (ref 6–8.4)
RBC # BLD AUTO: 3.99 M/UL (ref 4.6–6.2)
SODIUM SERPL-SCNC: 140 MMOL/L (ref 136–145)
TRIGL SERPL-MCNC: 44 MG/DL (ref 30–150)
WBC # BLD AUTO: 7.52 K/UL (ref 3.9–12.7)

## 2021-07-15 PROCEDURE — 80053 COMPREHEN METABOLIC PANEL: CPT | Performed by: PEDIATRICS

## 2021-07-15 PROCEDURE — 36415 COLL VENOUS BLD VENIPUNCTURE: CPT | Performed by: PEDIATRICS

## 2021-07-15 PROCEDURE — 80061 LIPID PANEL: CPT | Performed by: PEDIATRICS

## 2021-07-15 PROCEDURE — 85025 COMPLETE CBC W/AUTO DIFF WBC: CPT | Performed by: PEDIATRICS

## 2021-07-22 ENCOUNTER — TELEPHONE (OUTPATIENT)
Dept: RADIOLOGY | Facility: HOSPITAL | Age: 86
End: 2021-07-22

## 2021-07-22 ENCOUNTER — OFFICE VISIT (OUTPATIENT)
Dept: INTERNAL MEDICINE | Facility: CLINIC | Age: 86
End: 2021-07-22
Payer: MEDICARE

## 2021-07-22 VITALS
WEIGHT: 184.5 LBS | HEART RATE: 88 BPM | SYSTOLIC BLOOD PRESSURE: 118 MMHG | TEMPERATURE: 99 F | DIASTOLIC BLOOD PRESSURE: 60 MMHG | OXYGEN SATURATION: 97 % | RESPIRATION RATE: 16 BRPM | BODY MASS INDEX: 25.03 KG/M2

## 2021-07-22 DIAGNOSIS — N40.1 BENIGN PROSTATIC HYPERPLASIA WITH WEAK URINARY STREAM: Chronic | ICD-10-CM

## 2021-07-22 DIAGNOSIS — I70.0 ATHEROSCLEROSIS OF AORTA: ICD-10-CM

## 2021-07-22 DIAGNOSIS — M47.816 LUMBAR ARTHROPATHY: ICD-10-CM

## 2021-07-22 DIAGNOSIS — I71.40 ABDOMINAL AORTIC ANEURYSM WITHOUT RUPTURE: ICD-10-CM

## 2021-07-22 DIAGNOSIS — R79.89 LOW TESTOSTERONE: ICD-10-CM

## 2021-07-22 DIAGNOSIS — E66.3 OVERWEIGHT (BMI 25.0-29.9): ICD-10-CM

## 2021-07-22 DIAGNOSIS — E78.00 PURE HYPERCHOLESTEROLEMIA: Chronic | ICD-10-CM

## 2021-07-22 DIAGNOSIS — I10 ESSENTIAL HYPERTENSION: Primary | Chronic | ICD-10-CM

## 2021-07-22 DIAGNOSIS — J44.9 CHRONIC OBSTRUCTIVE PULMONARY DISEASE, UNSPECIFIED COPD TYPE: Chronic | ICD-10-CM

## 2021-07-22 DIAGNOSIS — G62.9 PERIPHERAL POLYNEUROPATHY: ICD-10-CM

## 2021-07-22 DIAGNOSIS — R39.9 UTI SYMPTOMS: ICD-10-CM

## 2021-07-22 DIAGNOSIS — R39.12 BENIGN PROSTATIC HYPERPLASIA WITH WEAK URINARY STREAM: Chronic | ICD-10-CM

## 2021-07-22 PROCEDURE — 99999 PR PBB SHADOW E&M-EST. PATIENT-LVL IV: CPT | Mod: PBBFAC,,, | Performed by: NURSE PRACTITIONER

## 2021-07-22 PROCEDURE — 99999 PR PBB SHADOW E&M-EST. PATIENT-LVL IV: ICD-10-PCS | Mod: PBBFAC,,, | Performed by: NURSE PRACTITIONER

## 2021-07-22 PROCEDURE — 99499 UNLISTED E&M SERVICE: CPT | Mod: HCNC,S$GLB,, | Performed by: NURSE PRACTITIONER

## 2021-07-22 PROCEDURE — 99397 PER PM REEVAL EST PAT 65+ YR: CPT | Mod: S$GLB,,, | Performed by: NURSE PRACTITIONER

## 2021-07-22 PROCEDURE — 99397 PR PREVENTIVE VISIT,EST,65 & OVER: ICD-10-PCS | Mod: S$GLB,,, | Performed by: NURSE PRACTITIONER

## 2021-07-22 PROCEDURE — 99499 RISK ADDL DX/OHS AUDIT: ICD-10-PCS | Mod: HCNC,S$GLB,, | Performed by: NURSE PRACTITIONER

## 2021-07-22 RX ORDER — DOXYCYCLINE 100 MG/1
100 CAPSULE ORAL EVERY 12 HOURS
Qty: 14 CAPSULE | Refills: 0 | Status: SHIPPED | OUTPATIENT
Start: 2021-07-22 | End: 2021-07-29

## 2021-07-22 RX ORDER — GABAPENTIN 100 MG/1
100 CAPSULE ORAL 2 TIMES DAILY
Qty: 180 CAPSULE | Refills: 3 | Status: SHIPPED | OUTPATIENT
Start: 2021-07-22 | End: 2022-08-30

## 2021-07-26 ENCOUNTER — HOSPITAL ENCOUNTER (OUTPATIENT)
Dept: RADIOLOGY | Facility: HOSPITAL | Age: 86
Discharge: HOME | End: 2021-07-26
Attending: NURSE PRACTITIONER
Payer: MEDICARE

## 2021-07-26 DIAGNOSIS — I71.40 ABDOMINAL AORTIC ANEURYSM WITHOUT RUPTURE: ICD-10-CM

## 2021-07-26 PROCEDURE — 76775 US EXAM ABDO BACK WALL LIM: CPT | Mod: TC

## 2021-07-26 PROCEDURE — 76775 US EXAM ABDO BACK WALL LIM: CPT | Mod: 26,,, | Performed by: RADIOLOGY

## 2021-07-26 PROCEDURE — 76775 US ABDOMINAL AORTA: ICD-10-PCS | Mod: 26,,, | Performed by: RADIOLOGY

## 2021-08-03 ENCOUNTER — OFFICE VISIT (OUTPATIENT)
Dept: DERMATOLOGY | Facility: CLINIC | Age: 86
End: 2021-08-03
Payer: MEDICARE

## 2021-08-03 DIAGNOSIS — L30.8 ASTEATOTIC ECZEMA: Primary | ICD-10-CM

## 2021-08-03 PROCEDURE — 1126F PR PAIN SEVERITY QUANTIFIED, NO PAIN PRESENT: ICD-10-PCS | Mod: CPTII,S$GLB,, | Performed by: STUDENT IN AN ORGANIZED HEALTH CARE EDUCATION/TRAINING PROGRAM

## 2021-08-03 PROCEDURE — 99202 PR OFFICE/OUTPT VISIT, NEW, LEVL II, 15-29 MIN: ICD-10-PCS | Mod: S$GLB,,, | Performed by: STUDENT IN AN ORGANIZED HEALTH CARE EDUCATION/TRAINING PROGRAM

## 2021-08-03 PROCEDURE — 1101F PT FALLS ASSESS-DOCD LE1/YR: CPT | Mod: CPTII,S$GLB,, | Performed by: STUDENT IN AN ORGANIZED HEALTH CARE EDUCATION/TRAINING PROGRAM

## 2021-08-03 PROCEDURE — 99999 PR PBB SHADOW E&M-EST. PATIENT-LVL III: CPT | Mod: PBBFAC,,, | Performed by: STUDENT IN AN ORGANIZED HEALTH CARE EDUCATION/TRAINING PROGRAM

## 2021-08-03 PROCEDURE — 1160F RVW MEDS BY RX/DR IN RCRD: CPT | Mod: CPTII,S$GLB,, | Performed by: STUDENT IN AN ORGANIZED HEALTH CARE EDUCATION/TRAINING PROGRAM

## 2021-08-03 PROCEDURE — 1159F MED LIST DOCD IN RCRD: CPT | Mod: CPTII,S$GLB,, | Performed by: STUDENT IN AN ORGANIZED HEALTH CARE EDUCATION/TRAINING PROGRAM

## 2021-08-03 PROCEDURE — 1101F PR PT FALLS ASSESS DOC 0-1 FALLS W/OUT INJ PAST YR: ICD-10-PCS | Mod: CPTII,S$GLB,, | Performed by: STUDENT IN AN ORGANIZED HEALTH CARE EDUCATION/TRAINING PROGRAM

## 2021-08-03 PROCEDURE — 99999 PR PBB SHADOW E&M-EST. PATIENT-LVL III: ICD-10-PCS | Mod: PBBFAC,,, | Performed by: STUDENT IN AN ORGANIZED HEALTH CARE EDUCATION/TRAINING PROGRAM

## 2021-08-03 PROCEDURE — 3288F PR FALLS RISK ASSESSMENT DOCUMENTED: ICD-10-PCS | Mod: CPTII,S$GLB,, | Performed by: STUDENT IN AN ORGANIZED HEALTH CARE EDUCATION/TRAINING PROGRAM

## 2021-08-03 PROCEDURE — 99202 OFFICE O/P NEW SF 15 MIN: CPT | Mod: S$GLB,,, | Performed by: STUDENT IN AN ORGANIZED HEALTH CARE EDUCATION/TRAINING PROGRAM

## 2021-08-03 PROCEDURE — 1160F PR REVIEW ALL MEDS BY PRESCRIBER/CLIN PHARMACIST DOCUMENTED: ICD-10-PCS | Mod: CPTII,S$GLB,, | Performed by: STUDENT IN AN ORGANIZED HEALTH CARE EDUCATION/TRAINING PROGRAM

## 2021-08-03 PROCEDURE — 1126F AMNT PAIN NOTED NONE PRSNT: CPT | Mod: CPTII,S$GLB,, | Performed by: STUDENT IN AN ORGANIZED HEALTH CARE EDUCATION/TRAINING PROGRAM

## 2021-08-03 PROCEDURE — 3288F FALL RISK ASSESSMENT DOCD: CPT | Mod: CPTII,S$GLB,, | Performed by: STUDENT IN AN ORGANIZED HEALTH CARE EDUCATION/TRAINING PROGRAM

## 2021-08-03 PROCEDURE — 1159F PR MEDICATION LIST DOCUMENTED IN MEDICAL RECORD: ICD-10-PCS | Mod: CPTII,S$GLB,, | Performed by: STUDENT IN AN ORGANIZED HEALTH CARE EDUCATION/TRAINING PROGRAM

## 2021-08-03 RX ORDER — BETAMETHASONE VALERATE 1.2 MG/G
OINTMENT TOPICAL 2 TIMES DAILY
Qty: 45 G | Refills: 1 | Status: SHIPPED | OUTPATIENT
Start: 2021-08-03

## 2021-09-28 ENCOUNTER — PATIENT OUTREACH (OUTPATIENT)
Dept: ADMINISTRATIVE | Facility: OTHER | Age: 86
End: 2021-09-28

## 2021-09-29 ENCOUNTER — HOSPITAL ENCOUNTER (OUTPATIENT)
Dept: RADIOLOGY | Facility: HOSPITAL | Age: 86
Discharge: HOME | End: 2021-09-29
Attending: STUDENT IN AN ORGANIZED HEALTH CARE EDUCATION/TRAINING PROGRAM
Payer: MEDICARE

## 2021-09-29 ENCOUNTER — OFFICE VISIT (OUTPATIENT)
Dept: DERMATOLOGY | Facility: CLINIC | Age: 86
End: 2021-09-29
Payer: MEDICARE

## 2021-09-29 ENCOUNTER — OFFICE VISIT (OUTPATIENT)
Dept: ORTHOPEDICS | Facility: CLINIC | Age: 86
End: 2021-09-29
Payer: MEDICARE

## 2021-09-29 VITALS — BODY MASS INDEX: 24.92 KG/M2 | HEIGHT: 72 IN | WEIGHT: 184 LBS

## 2021-09-29 DIAGNOSIS — L30.9 DERMATITIS: Primary | ICD-10-CM

## 2021-09-29 DIAGNOSIS — Z96.611 S/P REVERSE TOTAL SHOULDER ARTHROPLASTY, RIGHT: Primary | ICD-10-CM

## 2021-09-29 DIAGNOSIS — M25.511 RIGHT SHOULDER PAIN, UNSPECIFIED CHRONICITY: ICD-10-CM

## 2021-09-29 PROCEDURE — 99213 PR OFFICE/OUTPT VISIT, EST, LEVL III, 20-29 MIN: ICD-10-PCS | Mod: HCNC,S$GLB,, | Performed by: STUDENT IN AN ORGANIZED HEALTH CARE EDUCATION/TRAINING PROGRAM

## 2021-09-29 PROCEDURE — 1101F PR PT FALLS ASSESS DOC 0-1 FALLS W/OUT INJ PAST YR: ICD-10-PCS | Mod: HCNC,CPTII,S$GLB, | Performed by: STUDENT IN AN ORGANIZED HEALTH CARE EDUCATION/TRAINING PROGRAM

## 2021-09-29 PROCEDURE — 3288F FALL RISK ASSESSMENT DOCD: CPT | Mod: HCNC,CPTII,S$GLB, | Performed by: STUDENT IN AN ORGANIZED HEALTH CARE EDUCATION/TRAINING PROGRAM

## 2021-09-29 PROCEDURE — 73030 X-RAY EXAM OF SHOULDER: CPT | Mod: TC,HCNC,RT

## 2021-09-29 PROCEDURE — 1159F MED LIST DOCD IN RCRD: CPT | Mod: HCNC,CPTII,S$GLB, | Performed by: STUDENT IN AN ORGANIZED HEALTH CARE EDUCATION/TRAINING PROGRAM

## 2021-09-29 PROCEDURE — 1126F PR PAIN SEVERITY QUANTIFIED, NO PAIN PRESENT: ICD-10-PCS | Mod: HCNC,CPTII,S$GLB, | Performed by: STUDENT IN AN ORGANIZED HEALTH CARE EDUCATION/TRAINING PROGRAM

## 2021-09-29 PROCEDURE — 1160F RVW MEDS BY RX/DR IN RCRD: CPT | Mod: HCNC,CPTII,S$GLB, | Performed by: STUDENT IN AN ORGANIZED HEALTH CARE EDUCATION/TRAINING PROGRAM

## 2021-09-29 PROCEDURE — 1160F PR REVIEW ALL MEDS BY PRESCRIBER/CLIN PHARMACIST DOCUMENTED: ICD-10-PCS | Mod: HCNC,CPTII,S$GLB, | Performed by: STUDENT IN AN ORGANIZED HEALTH CARE EDUCATION/TRAINING PROGRAM

## 2021-09-29 PROCEDURE — 99999 PR PBB SHADOW E&M-EST. PATIENT-LVL III: ICD-10-PCS | Mod: PBBFAC,HCNC,, | Performed by: STUDENT IN AN ORGANIZED HEALTH CARE EDUCATION/TRAINING PROGRAM

## 2021-09-29 PROCEDURE — 99999 PR PBB SHADOW E&M-EST. PATIENT-LVL III: CPT | Mod: PBBFAC,HCNC,, | Performed by: STUDENT IN AN ORGANIZED HEALTH CARE EDUCATION/TRAINING PROGRAM

## 2021-09-29 PROCEDURE — 99213 OFFICE O/P EST LOW 20 MIN: CPT | Mod: HCNC,S$GLB,, | Performed by: STUDENT IN AN ORGANIZED HEALTH CARE EDUCATION/TRAINING PROGRAM

## 2021-09-29 PROCEDURE — 1126F AMNT PAIN NOTED NONE PRSNT: CPT | Mod: HCNC,CPTII,S$GLB, | Performed by: STUDENT IN AN ORGANIZED HEALTH CARE EDUCATION/TRAINING PROGRAM

## 2021-09-29 PROCEDURE — 1159F PR MEDICATION LIST DOCUMENTED IN MEDICAL RECORD: ICD-10-PCS | Mod: HCNC,CPTII,S$GLB, | Performed by: STUDENT IN AN ORGANIZED HEALTH CARE EDUCATION/TRAINING PROGRAM

## 2021-09-29 PROCEDURE — 73030 XR SHOULDER COMPLETE 2 OR MORE VIEWS RIGHT: ICD-10-PCS | Mod: 26,HCNC,RT, | Performed by: RADIOLOGY

## 2021-09-29 PROCEDURE — 1101F PT FALLS ASSESS-DOCD LE1/YR: CPT | Mod: HCNC,CPTII,S$GLB, | Performed by: STUDENT IN AN ORGANIZED HEALTH CARE EDUCATION/TRAINING PROGRAM

## 2021-09-29 PROCEDURE — 73030 X-RAY EXAM OF SHOULDER: CPT | Mod: 26,HCNC,RT, | Performed by: RADIOLOGY

## 2021-09-29 PROCEDURE — 3288F PR FALLS RISK ASSESSMENT DOCUMENTED: ICD-10-PCS | Mod: HCNC,CPTII,S$GLB, | Performed by: STUDENT IN AN ORGANIZED HEALTH CARE EDUCATION/TRAINING PROGRAM

## 2021-10-18 ENCOUNTER — OFFICE VISIT (OUTPATIENT)
Dept: PULMONOLOGY | Facility: CLINIC | Age: 86
End: 2021-10-18
Payer: MEDICARE

## 2021-10-18 ENCOUNTER — IMMUNIZATION (OUTPATIENT)
Dept: INTERNAL MEDICINE | Facility: CLINIC | Age: 86
End: 2021-10-18
Payer: MEDICARE

## 2021-10-18 ENCOUNTER — HOSPITAL ENCOUNTER (OUTPATIENT)
Dept: RADIOLOGY | Facility: HOSPITAL | Age: 86
Discharge: HOME | End: 2021-10-18
Attending: NURSE PRACTITIONER
Payer: MEDICARE

## 2021-10-18 VITALS
WEIGHT: 184.06 LBS | HEIGHT: 72 IN | DIASTOLIC BLOOD PRESSURE: 74 MMHG | BODY MASS INDEX: 24.93 KG/M2 | OXYGEN SATURATION: 94 % | SYSTOLIC BLOOD PRESSURE: 116 MMHG | HEART RATE: 87 BPM | RESPIRATION RATE: 17 BRPM

## 2021-10-18 DIAGNOSIS — J84.10 CALCIFIED GRANULOMA OF LUNG: ICD-10-CM

## 2021-10-18 DIAGNOSIS — I27.21 PAH (PULMONARY ARTERY HYPERTENSION): ICD-10-CM

## 2021-10-18 DIAGNOSIS — J44.9 CHRONIC OBSTRUCTIVE PULMONARY DISEASE, UNSPECIFIED COPD TYPE: ICD-10-CM

## 2021-10-18 DIAGNOSIS — J43.1 PANLOBULAR EMPHYSEMA: ICD-10-CM

## 2021-10-18 DIAGNOSIS — J44.9 CHRONIC OBSTRUCTIVE PULMONARY DISEASE, UNSPECIFIED COPD TYPE: Primary | ICD-10-CM

## 2021-10-18 PROCEDURE — 1160F PR REVIEW ALL MEDS BY PRESCRIBER/CLIN PHARMACIST DOCUMENTED: ICD-10-PCS | Mod: HCNC,CPTII,S$GLB, | Performed by: NURSE PRACTITIONER

## 2021-10-18 PROCEDURE — 99214 PR OFFICE/OUTPT VISIT, EST, LEVL IV, 30-39 MIN: ICD-10-PCS | Mod: HCNC,S$GLB,, | Performed by: NURSE PRACTITIONER

## 2021-10-18 PROCEDURE — 90694 VACC AIIV4 NO PRSRV 0.5ML IM: CPT | Mod: HCNC,S$GLB,, | Performed by: PEDIATRICS

## 2021-10-18 PROCEDURE — 71046 XR CHEST PA AND LATERAL: ICD-10-PCS | Mod: 26,HCNC,, | Performed by: RADIOLOGY

## 2021-10-18 PROCEDURE — G0008 FLU VACCINE - QUADRIVALENT - ADJUVANTED: ICD-10-PCS | Mod: HCNC,S$GLB,, | Performed by: PEDIATRICS

## 2021-10-18 PROCEDURE — 99499 RISK ADDL DX/OHS AUDIT: ICD-10-PCS | Mod: HCNC,S$GLB,, | Performed by: NURSE PRACTITIONER

## 2021-10-18 PROCEDURE — 99499 UNLISTED E&M SERVICE: CPT | Mod: HCNC,S$GLB,, | Performed by: NURSE PRACTITIONER

## 2021-10-18 PROCEDURE — 1160F RVW MEDS BY RX/DR IN RCRD: CPT | Mod: HCNC,CPTII,S$GLB, | Performed by: NURSE PRACTITIONER

## 2021-10-18 PROCEDURE — 3288F PR FALLS RISK ASSESSMENT DOCUMENTED: ICD-10-PCS | Mod: HCNC,CPTII,S$GLB, | Performed by: NURSE PRACTITIONER

## 2021-10-18 PROCEDURE — G0008 ADMIN INFLUENZA VIRUS VAC: HCPCS | Mod: HCNC,S$GLB,, | Performed by: PEDIATRICS

## 2021-10-18 PROCEDURE — 99999 PR PBB SHADOW E&M-EST. PATIENT-LVL IV: CPT | Mod: PBBFAC,HCNC,, | Performed by: NURSE PRACTITIONER

## 2021-10-18 PROCEDURE — 71046 X-RAY EXAM CHEST 2 VIEWS: CPT | Mod: TC,HCNC

## 2021-10-18 PROCEDURE — 3288F FALL RISK ASSESSMENT DOCD: CPT | Mod: HCNC,CPTII,S$GLB, | Performed by: NURSE PRACTITIONER

## 2021-10-18 PROCEDURE — 1159F MED LIST DOCD IN RCRD: CPT | Mod: HCNC,CPTII,S$GLB, | Performed by: NURSE PRACTITIONER

## 2021-10-18 PROCEDURE — 90694 FLU VACCINE - QUADRIVALENT - ADJUVANTED: ICD-10-PCS | Mod: HCNC,S$GLB,, | Performed by: PEDIATRICS

## 2021-10-18 PROCEDURE — 1101F PR PT FALLS ASSESS DOC 0-1 FALLS W/OUT INJ PAST YR: ICD-10-PCS | Mod: HCNC,CPTII,S$GLB, | Performed by: NURSE PRACTITIONER

## 2021-10-18 PROCEDURE — 99999 PR PBB SHADOW E&M-EST. PATIENT-LVL IV: ICD-10-PCS | Mod: PBBFAC,HCNC,, | Performed by: NURSE PRACTITIONER

## 2021-10-18 PROCEDURE — 71046 X-RAY EXAM CHEST 2 VIEWS: CPT | Mod: 26,HCNC,, | Performed by: RADIOLOGY

## 2021-10-18 PROCEDURE — 1101F PT FALLS ASSESS-DOCD LE1/YR: CPT | Mod: HCNC,CPTII,S$GLB, | Performed by: NURSE PRACTITIONER

## 2021-10-18 PROCEDURE — 99214 OFFICE O/P EST MOD 30 MIN: CPT | Mod: HCNC,S$GLB,, | Performed by: NURSE PRACTITIONER

## 2021-10-18 PROCEDURE — 1159F PR MEDICATION LIST DOCUMENTED IN MEDICAL RECORD: ICD-10-PCS | Mod: HCNC,CPTII,S$GLB, | Performed by: NURSE PRACTITIONER

## 2021-10-19 ENCOUNTER — TELEPHONE (OUTPATIENT)
Dept: INTERNAL MEDICINE | Facility: CLINIC | Age: 86
End: 2021-10-19

## 2021-10-19 ENCOUNTER — OFFICE VISIT (OUTPATIENT)
Dept: INTERNAL MEDICINE | Facility: CLINIC | Age: 86
End: 2021-10-19
Payer: MEDICARE

## 2021-10-19 VITALS
DIASTOLIC BLOOD PRESSURE: 50 MMHG | WEIGHT: 184.5 LBS | TEMPERATURE: 99 F | HEIGHT: 72 IN | SYSTOLIC BLOOD PRESSURE: 96 MMHG | HEART RATE: 91 BPM | OXYGEN SATURATION: 95 % | BODY MASS INDEX: 24.99 KG/M2

## 2021-10-19 DIAGNOSIS — I10 ESSENTIAL HYPERTENSION: ICD-10-CM

## 2021-10-19 DIAGNOSIS — L02.92 FURUNCLE: Primary | ICD-10-CM

## 2021-10-19 PROCEDURE — 99213 PR OFFICE/OUTPT VISIT, EST, LEVL III, 20-29 MIN: ICD-10-PCS | Mod: HCNC,S$GLB,, | Performed by: FAMILY MEDICINE

## 2021-10-19 PROCEDURE — 99999 PR PBB SHADOW E&M-EST. PATIENT-LVL IV: ICD-10-PCS | Mod: PBBFAC,HCNC,, | Performed by: FAMILY MEDICINE

## 2021-10-19 PROCEDURE — 1101F PT FALLS ASSESS-DOCD LE1/YR: CPT | Mod: HCNC,CPTII,S$GLB, | Performed by: FAMILY MEDICINE

## 2021-10-19 PROCEDURE — 99999 PR PBB SHADOW E&M-EST. PATIENT-LVL IV: CPT | Mod: PBBFAC,HCNC,, | Performed by: FAMILY MEDICINE

## 2021-10-19 PROCEDURE — 1159F MED LIST DOCD IN RCRD: CPT | Mod: HCNC,CPTII,S$GLB, | Performed by: FAMILY MEDICINE

## 2021-10-19 PROCEDURE — 1159F PR MEDICATION LIST DOCUMENTED IN MEDICAL RECORD: ICD-10-PCS | Mod: HCNC,CPTII,S$GLB, | Performed by: FAMILY MEDICINE

## 2021-10-19 PROCEDURE — 1125F PR PAIN SEVERITY QUANTIFIED, PAIN PRESENT: ICD-10-PCS | Mod: HCNC,CPTII,S$GLB, | Performed by: FAMILY MEDICINE

## 2021-10-19 PROCEDURE — 99213 OFFICE O/P EST LOW 20 MIN: CPT | Mod: HCNC,S$GLB,, | Performed by: FAMILY MEDICINE

## 2021-10-19 PROCEDURE — 1101F PR PT FALLS ASSESS DOC 0-1 FALLS W/OUT INJ PAST YR: ICD-10-PCS | Mod: HCNC,CPTII,S$GLB, | Performed by: FAMILY MEDICINE

## 2021-10-19 PROCEDURE — 3288F PR FALLS RISK ASSESSMENT DOCUMENTED: ICD-10-PCS | Mod: HCNC,CPTII,S$GLB, | Performed by: FAMILY MEDICINE

## 2021-10-19 PROCEDURE — 1125F AMNT PAIN NOTED PAIN PRSNT: CPT | Mod: HCNC,CPTII,S$GLB, | Performed by: FAMILY MEDICINE

## 2021-10-19 PROCEDURE — 3288F FALL RISK ASSESSMENT DOCD: CPT | Mod: HCNC,CPTII,S$GLB, | Performed by: FAMILY MEDICINE

## 2021-10-19 RX ORDER — SULFAMETHOXAZOLE AND TRIMETHOPRIM 800; 160 MG/1; MG/1
1 TABLET ORAL 2 TIMES DAILY
Qty: 14 TABLET | Refills: 0 | Status: SHIPPED | OUTPATIENT
Start: 2021-10-19 | End: 2021-10-26 | Stop reason: SDUPTHER

## 2021-10-26 ENCOUNTER — OFFICE VISIT (OUTPATIENT)
Dept: UROLOGY | Facility: CLINIC | Age: 86
End: 2021-10-26
Payer: MEDICARE

## 2021-10-26 ENCOUNTER — OFFICE VISIT (OUTPATIENT)
Dept: INTERNAL MEDICINE | Facility: CLINIC | Age: 86
End: 2021-10-26
Payer: MEDICARE

## 2021-10-26 VITALS
WEIGHT: 182.31 LBS | HEIGHT: 72 IN | BODY MASS INDEX: 24.69 KG/M2 | DIASTOLIC BLOOD PRESSURE: 60 MMHG | TEMPERATURE: 98 F | HEART RATE: 101 BPM | SYSTOLIC BLOOD PRESSURE: 114 MMHG

## 2021-10-26 VITALS
BODY MASS INDEX: 24.69 KG/M2 | TEMPERATURE: 98 F | DIASTOLIC BLOOD PRESSURE: 60 MMHG | SYSTOLIC BLOOD PRESSURE: 114 MMHG | WEIGHT: 182.31 LBS | OXYGEN SATURATION: 95 % | HEIGHT: 72 IN | HEART RATE: 101 BPM

## 2021-10-26 DIAGNOSIS — L02.214 INGUINAL ABSCESS: Primary | ICD-10-CM

## 2021-10-26 DIAGNOSIS — L02.214 ABSCESS OF LEFT GROIN: Primary | ICD-10-CM

## 2021-10-26 DIAGNOSIS — L02.91 ABSCESS: ICD-10-CM

## 2021-10-26 PROCEDURE — 3288F PR FALLS RISK ASSESSMENT DOCUMENTED: ICD-10-PCS | Mod: HCNC,CPTII,S$GLB, | Performed by: FAMILY MEDICINE

## 2021-10-26 PROCEDURE — 99999 PR PBB SHADOW E&M-EST. PATIENT-LVL IV: CPT | Mod: PBBFAC,HCNC,, | Performed by: NURSE PRACTITIONER

## 2021-10-26 PROCEDURE — 99213 PR OFFICE/OUTPT VISIT, EST, LEVL III, 20-29 MIN: ICD-10-PCS | Mod: HCNC,S$GLB,, | Performed by: FAMILY MEDICINE

## 2021-10-26 PROCEDURE — 99999 PR PBB SHADOW E&M-EST. PATIENT-LVL IV: ICD-10-PCS | Mod: PBBFAC,HCNC,, | Performed by: FAMILY MEDICINE

## 2021-10-26 PROCEDURE — 1159F PR MEDICATION LIST DOCUMENTED IN MEDICAL RECORD: ICD-10-PCS | Mod: HCNC,CPTII,S$GLB, | Performed by: NURSE PRACTITIONER

## 2021-10-26 PROCEDURE — 99215 PR OFFICE/OUTPT VISIT, EST, LEVL V, 40-54 MIN: ICD-10-PCS | Mod: HCNC,S$GLB,, | Performed by: NURSE PRACTITIONER

## 2021-10-26 PROCEDURE — 3288F FALL RISK ASSESSMENT DOCD: CPT | Mod: HCNC,CPTII,S$GLB, | Performed by: FAMILY MEDICINE

## 2021-10-26 PROCEDURE — 1101F PR PT FALLS ASSESS DOC 0-1 FALLS W/OUT INJ PAST YR: ICD-10-PCS | Mod: HCNC,CPTII,S$GLB, | Performed by: FAMILY MEDICINE

## 2021-10-26 PROCEDURE — 1159F MED LIST DOCD IN RCRD: CPT | Mod: HCNC,CPTII,S$GLB, | Performed by: FAMILY MEDICINE

## 2021-10-26 PROCEDURE — 1101F PT FALLS ASSESS-DOCD LE1/YR: CPT | Mod: HCNC,CPTII,S$GLB, | Performed by: FAMILY MEDICINE

## 2021-10-26 PROCEDURE — 99999 PR PBB SHADOW E&M-EST. PATIENT-LVL IV: CPT | Mod: PBBFAC,HCNC,, | Performed by: FAMILY MEDICINE

## 2021-10-26 PROCEDURE — 99999 PR PBB SHADOW E&M-EST. PATIENT-LVL IV: ICD-10-PCS | Mod: PBBFAC,HCNC,, | Performed by: NURSE PRACTITIONER

## 2021-10-26 PROCEDURE — 99213 OFFICE O/P EST LOW 20 MIN: CPT | Mod: HCNC,S$GLB,, | Performed by: FAMILY MEDICINE

## 2021-10-26 PROCEDURE — 87070 CULTURE OTHR SPECIMN AEROBIC: CPT | Mod: HCNC | Performed by: NURSE PRACTITIONER

## 2021-10-26 PROCEDURE — 1159F PR MEDICATION LIST DOCUMENTED IN MEDICAL RECORD: ICD-10-PCS | Mod: HCNC,CPTII,S$GLB, | Performed by: FAMILY MEDICINE

## 2021-10-26 PROCEDURE — 1159F MED LIST DOCD IN RCRD: CPT | Mod: HCNC,CPTII,S$GLB, | Performed by: NURSE PRACTITIONER

## 2021-10-26 PROCEDURE — 99215 OFFICE O/P EST HI 40 MIN: CPT | Mod: HCNC,S$GLB,, | Performed by: NURSE PRACTITIONER

## 2021-10-26 RX ORDER — SULFAMETHOXAZOLE AND TRIMETHOPRIM 800; 160 MG/1; MG/1
1 TABLET ORAL 2 TIMES DAILY
Qty: 20 TABLET | Refills: 0 | Status: SHIPPED | OUTPATIENT
Start: 2021-10-26 | End: 2021-11-05

## 2021-10-26 RX ORDER — SULFAMETHOXAZOLE AND TRIMETHOPRIM 800; 160 MG/1; MG/1
1 TABLET ORAL 2 TIMES DAILY
Qty: 14 TABLET | Refills: 0 | Status: SHIPPED | OUTPATIENT
Start: 2021-10-26 | End: 2021-10-26

## 2021-10-28 LAB — BACTERIA SPEC AEROBE CULT: NORMAL

## 2021-10-29 ENCOUNTER — TELEPHONE (OUTPATIENT)
Dept: UROLOGY | Facility: CLINIC | Age: 86
End: 2021-10-29
Payer: MEDICARE

## 2021-12-10 ENCOUNTER — TELEPHONE (OUTPATIENT)
Dept: UROLOGY | Facility: CLINIC | Age: 86
End: 2021-12-10
Payer: MEDICARE

## 2021-12-10 ENCOUNTER — PATIENT MESSAGE (OUTPATIENT)
Dept: UROLOGY | Facility: CLINIC | Age: 86
End: 2021-12-10
Payer: MEDICARE

## 2021-12-13 ENCOUNTER — TELEPHONE (OUTPATIENT)
Dept: UROLOGY | Facility: CLINIC | Age: 86
End: 2021-12-13
Payer: MEDICARE

## 2021-12-20 ENCOUNTER — PATIENT MESSAGE (OUTPATIENT)
Dept: UROLOGY | Facility: CLINIC | Age: 86
End: 2021-12-20
Payer: MEDICARE

## 2021-12-27 ENCOUNTER — PATIENT OUTREACH (OUTPATIENT)
Dept: ADMINISTRATIVE | Facility: OTHER | Age: 86
End: 2021-12-27
Payer: MEDICARE

## 2021-12-29 ENCOUNTER — OFFICE VISIT (OUTPATIENT)
Dept: UROLOGY | Facility: CLINIC | Age: 86
End: 2021-12-29
Payer: MEDICARE

## 2021-12-29 VITALS
SYSTOLIC BLOOD PRESSURE: 117 MMHG | HEART RATE: 101 BPM | HEIGHT: 72 IN | TEMPERATURE: 99 F | DIASTOLIC BLOOD PRESSURE: 75 MMHG | BODY MASS INDEX: 25.35 KG/M2 | WEIGHT: 187.19 LBS

## 2021-12-29 DIAGNOSIS — N40.1 BENIGN PROSTATIC HYPERPLASIA WITH WEAK URINARY STREAM: Primary | ICD-10-CM

## 2021-12-29 DIAGNOSIS — R39.12 BENIGN PROSTATIC HYPERPLASIA WITH WEAK URINARY STREAM: Primary | ICD-10-CM

## 2021-12-29 PROCEDURE — 3288F PR FALLS RISK ASSESSMENT DOCUMENTED: ICD-10-PCS | Mod: HCNC,CPTII,S$GLB, | Performed by: UROLOGY

## 2021-12-29 PROCEDURE — 1160F PR REVIEW ALL MEDS BY PRESCRIBER/CLIN PHARMACIST DOCUMENTED: ICD-10-PCS | Mod: HCNC,CPTII,S$GLB, | Performed by: UROLOGY

## 2021-12-29 PROCEDURE — 99999 PR PBB SHADOW E&M-EST. PATIENT-LVL IV: CPT | Mod: PBBFAC,HCNC,, | Performed by: UROLOGY

## 2021-12-29 PROCEDURE — 99999 PR PBB SHADOW E&M-EST. PATIENT-LVL IV: ICD-10-PCS | Mod: PBBFAC,HCNC,, | Performed by: UROLOGY

## 2021-12-29 PROCEDURE — 1159F MED LIST DOCD IN RCRD: CPT | Mod: HCNC,CPTII,S$GLB, | Performed by: UROLOGY

## 2021-12-29 PROCEDURE — 3288F FALL RISK ASSESSMENT DOCD: CPT | Mod: HCNC,CPTII,S$GLB, | Performed by: UROLOGY

## 2021-12-29 PROCEDURE — 1160F RVW MEDS BY RX/DR IN RCRD: CPT | Mod: HCNC,CPTII,S$GLB, | Performed by: UROLOGY

## 2021-12-29 PROCEDURE — 99213 OFFICE O/P EST LOW 20 MIN: CPT | Mod: HCNC,S$GLB,, | Performed by: UROLOGY

## 2021-12-29 PROCEDURE — 1159F PR MEDICATION LIST DOCUMENTED IN MEDICAL RECORD: ICD-10-PCS | Mod: HCNC,CPTII,S$GLB, | Performed by: UROLOGY

## 2021-12-29 PROCEDURE — 1101F PR PT FALLS ASSESS DOC 0-1 FALLS W/OUT INJ PAST YR: ICD-10-PCS | Mod: HCNC,CPTII,S$GLB, | Performed by: UROLOGY

## 2021-12-29 PROCEDURE — 1126F AMNT PAIN NOTED NONE PRSNT: CPT | Mod: HCNC,CPTII,S$GLB, | Performed by: UROLOGY

## 2021-12-29 PROCEDURE — 1126F PR PAIN SEVERITY QUANTIFIED, NO PAIN PRESENT: ICD-10-PCS | Mod: HCNC,CPTII,S$GLB, | Performed by: UROLOGY

## 2021-12-29 PROCEDURE — 1101F PT FALLS ASSESS-DOCD LE1/YR: CPT | Mod: HCNC,CPTII,S$GLB, | Performed by: UROLOGY

## 2021-12-29 PROCEDURE — 99213 PR OFFICE/OUTPT VISIT, EST, LEVL III, 20-29 MIN: ICD-10-PCS | Mod: HCNC,S$GLB,, | Performed by: UROLOGY

## 2021-12-29 RX ORDER — DUTASTERIDE 0.5 MG/1
0.5 CAPSULE, LIQUID FILLED ORAL DAILY
Qty: 90 CAPSULE | Refills: 3 | Status: SHIPPED | OUTPATIENT
Start: 2021-12-29 | End: 2022-12-29

## 2022-01-04 ENCOUNTER — IMMUNIZATION (OUTPATIENT)
Dept: PHARMACY | Facility: CLINIC | Age: 87
End: 2022-01-04
Payer: MEDICARE

## 2022-01-04 DIAGNOSIS — Z23 NEED FOR VACCINATION: Primary | ICD-10-CM

## 2022-01-14 ENCOUNTER — LAB VISIT (OUTPATIENT)
Dept: LAB | Facility: HOSPITAL | Age: 87
End: 2022-01-14
Attending: NURSE PRACTITIONER
Payer: MEDICARE

## 2022-01-14 DIAGNOSIS — I10 ESSENTIAL HYPERTENSION: Chronic | ICD-10-CM

## 2022-01-14 DIAGNOSIS — E78.00 PURE HYPERCHOLESTEROLEMIA: Chronic | ICD-10-CM

## 2022-01-14 LAB
ALBUMIN SERPL BCP-MCNC: 3.7 G/DL (ref 3.5–5.2)
ALP SERPL-CCNC: 77 U/L (ref 55–135)
ALT SERPL W/O P-5'-P-CCNC: 19 U/L (ref 10–44)
ANION GAP SERPL CALC-SCNC: 8 MMOL/L (ref 8–16)
AST SERPL-CCNC: 23 U/L (ref 10–40)
BASOPHILS # BLD AUTO: 0.04 K/UL (ref 0–0.2)
BASOPHILS NFR BLD: 0.4 % (ref 0–1.9)
BILIRUB SERPL-MCNC: 0.5 MG/DL (ref 0.1–1)
BUN SERPL-MCNC: 27 MG/DL (ref 8–23)
CALCIUM SERPL-MCNC: 9.5 MG/DL (ref 8.7–10.5)
CHLORIDE SERPL-SCNC: 104 MMOL/L (ref 95–110)
CHOLEST SERPL-MCNC: 128 MG/DL (ref 120–199)
CHOLEST/HDLC SERPL: 2.6 {RATIO} (ref 2–5)
CO2 SERPL-SCNC: 27 MMOL/L (ref 23–29)
CREAT SERPL-MCNC: 1 MG/DL (ref 0.5–1.4)
DIFFERENTIAL METHOD: ABNORMAL
EOSINOPHIL # BLD AUTO: 0.2 K/UL (ref 0–0.5)
EOSINOPHIL NFR BLD: 1.6 % (ref 0–8)
ERYTHROCYTE [DISTWIDTH] IN BLOOD BY AUTOMATED COUNT: 15.3 % (ref 11.5–14.5)
EST. GFR  (AFRICAN AMERICAN): >60 ML/MIN/1.73 M^2
EST. GFR  (NON AFRICAN AMERICAN): >60 ML/MIN/1.73 M^2
GLUCOSE SERPL-MCNC: 64 MG/DL (ref 70–110)
HCT VFR BLD AUTO: 41.1 % (ref 40–54)
HDLC SERPL-MCNC: 50 MG/DL (ref 40–75)
HDLC SERPL: 39.1 % (ref 20–50)
HGB BLD-MCNC: 12.8 G/DL (ref 14–18)
IMM GRANULOCYTES # BLD AUTO: 0.04 K/UL (ref 0–0.04)
IMM GRANULOCYTES NFR BLD AUTO: 0.4 % (ref 0–0.5)
LDLC SERPL CALC-MCNC: 60.4 MG/DL (ref 63–159)
LYMPHOCYTES # BLD AUTO: 0.8 K/UL (ref 1–4.8)
LYMPHOCYTES NFR BLD: 8.6 % (ref 18–48)
MCH RBC QN AUTO: 29.8 PG (ref 27–31)
MCHC RBC AUTO-ENTMCNC: 31.1 G/DL (ref 32–36)
MCV RBC AUTO: 96 FL (ref 82–98)
MONOCYTES # BLD AUTO: 0.9 K/UL (ref 0.3–1)
MONOCYTES NFR BLD: 8.9 % (ref 4–15)
NEUTROPHILS # BLD AUTO: 7.8 K/UL (ref 1.8–7.7)
NEUTROPHILS NFR BLD: 80.1 % (ref 38–73)
NONHDLC SERPL-MCNC: 78 MG/DL
NRBC BLD-RTO: 0 /100 WBC
PLATELET # BLD AUTO: 241 K/UL (ref 150–450)
PMV BLD AUTO: 11 FL (ref 9.2–12.9)
POTASSIUM SERPL-SCNC: 4.5 MMOL/L (ref 3.5–5.1)
PROT SERPL-MCNC: 6.6 G/DL (ref 6–8.4)
RBC # BLD AUTO: 4.3 M/UL (ref 4.6–6.2)
SODIUM SERPL-SCNC: 139 MMOL/L (ref 136–145)
TRIGL SERPL-MCNC: 88 MG/DL (ref 30–150)
WBC # BLD AUTO: 9.75 K/UL (ref 3.9–12.7)

## 2022-01-14 PROCEDURE — 85025 COMPLETE CBC W/AUTO DIFF WBC: CPT | Mod: HCNC | Performed by: NURSE PRACTITIONER

## 2022-01-14 PROCEDURE — 36415 COLL VENOUS BLD VENIPUNCTURE: CPT | Mod: HCNC | Performed by: NURSE PRACTITIONER

## 2022-01-14 PROCEDURE — 80053 COMPREHEN METABOLIC PANEL: CPT | Mod: HCNC | Performed by: NURSE PRACTITIONER

## 2022-01-14 PROCEDURE — 80061 LIPID PANEL: CPT | Mod: HCNC | Performed by: NURSE PRACTITIONER

## 2022-01-20 DIAGNOSIS — I10 ESSENTIAL HYPERTENSION: Chronic | ICD-10-CM

## 2022-01-20 NOTE — TELEPHONE ENCOUNTER
No new care gaps identified.  Powered by [employer]. Reference number: [id number].   1/20/2022 3:16:23 PM CST

## 2022-01-21 ENCOUNTER — HOSPITAL ENCOUNTER (OUTPATIENT)
Dept: CARDIOLOGY | Facility: HOSPITAL | Age: 87
Discharge: HOME | End: 2022-01-21
Attending: PEDIATRICS
Payer: MEDICARE

## 2022-01-21 ENCOUNTER — OFFICE VISIT (OUTPATIENT)
Dept: CARDIOLOGY | Facility: CLINIC | Age: 87
End: 2022-01-21
Attending: PEDIATRICS
Payer: MEDICARE

## 2022-01-21 ENCOUNTER — OFFICE VISIT (OUTPATIENT)
Dept: INTERNAL MEDICINE | Facility: CLINIC | Age: 87
End: 2022-01-21
Payer: MEDICARE

## 2022-01-21 ENCOUNTER — HOSPITAL ENCOUNTER (OUTPATIENT)
Dept: RADIOLOGY | Facility: HOSPITAL | Age: 87
Discharge: HOME | End: 2022-01-21
Attending: PEDIATRICS
Payer: MEDICARE

## 2022-01-21 VITALS
TEMPERATURE: 97 F | DIASTOLIC BLOOD PRESSURE: 56 MMHG | WEIGHT: 190.25 LBS | BODY MASS INDEX: 25.8 KG/M2 | HEART RATE: 98 BPM | RESPIRATION RATE: 20 BRPM | SYSTOLIC BLOOD PRESSURE: 114 MMHG | OXYGEN SATURATION: 93 %

## 2022-01-21 VITALS
WEIGHT: 190.25 LBS | DIASTOLIC BLOOD PRESSURE: 68 MMHG | HEART RATE: 70 BPM | BODY MASS INDEX: 25.77 KG/M2 | SYSTOLIC BLOOD PRESSURE: 122 MMHG | HEIGHT: 72 IN | OXYGEN SATURATION: 95 %

## 2022-01-21 VITALS
WEIGHT: 190 LBS | HEIGHT: 72 IN | BODY MASS INDEX: 25.73 KG/M2 | DIASTOLIC BLOOD PRESSURE: 78 MMHG | SYSTOLIC BLOOD PRESSURE: 122 MMHG

## 2022-01-21 DIAGNOSIS — Z74.09 IMPAIRED FUNCTIONAL MOBILITY, BALANCE, GAIT, AND ENDURANCE: ICD-10-CM

## 2022-01-21 DIAGNOSIS — I10 ESSENTIAL HYPERTENSION: Chronic | ICD-10-CM

## 2022-01-21 DIAGNOSIS — R07.9 LEFT-SIDED CHEST PAIN: ICD-10-CM

## 2022-01-21 DIAGNOSIS — J44.9 CHRONIC OBSTRUCTIVE PULMONARY DISEASE, UNSPECIFIED COPD TYPE: Chronic | ICD-10-CM

## 2022-01-21 DIAGNOSIS — R60.0 BILATERAL LEG EDEMA: ICD-10-CM

## 2022-01-21 DIAGNOSIS — N40.1 BENIGN PROSTATIC HYPERPLASIA WITH WEAK URINARY STREAM: Chronic | ICD-10-CM

## 2022-01-21 DIAGNOSIS — I10 ESSENTIAL HYPERTENSION: Primary | Chronic | ICD-10-CM

## 2022-01-21 DIAGNOSIS — E66.3 OVERWEIGHT (BMI 25.0-29.9): ICD-10-CM

## 2022-01-21 DIAGNOSIS — E78.00 PURE HYPERCHOLESTEROLEMIA: Chronic | ICD-10-CM

## 2022-01-21 DIAGNOSIS — Z87.19 HISTORY OF DIVERTICULITIS: ICD-10-CM

## 2022-01-21 DIAGNOSIS — I71.40 ABDOMINAL AORTIC ANEURYSM WITHOUT RUPTURE: ICD-10-CM

## 2022-01-21 DIAGNOSIS — M47.816 LUMBAR ARTHROPATHY: ICD-10-CM

## 2022-01-21 DIAGNOSIS — I70.0 ATHEROSCLEROSIS OF AORTA: ICD-10-CM

## 2022-01-21 DIAGNOSIS — I27.21 PAH (PULMONARY ARTERY HYPERTENSION): ICD-10-CM

## 2022-01-21 DIAGNOSIS — R39.12 BENIGN PROSTATIC HYPERPLASIA WITH WEAK URINARY STREAM: Chronic | ICD-10-CM

## 2022-01-21 DIAGNOSIS — R07.9 CHEST PAIN SYNDROME: Primary | ICD-10-CM

## 2022-01-21 DIAGNOSIS — G62.9 PERIPHERAL POLYNEUROPATHY: ICD-10-CM

## 2022-01-21 LAB
AORTIC ROOT ANNULUS: 3.44 CM
AV INDEX (PROSTH): 0.72
AV MEAN GRADIENT: 6 MMHG
AV PEAK GRADIENT: 10 MMHG
AV REGURGITATION PRESSURE HALF TIME: 877.14 MS
AV VALVE AREA: 2.3 CM2
AV VELOCITY RATIO: 0.72
BSA FOR ECHO PROCEDURE: 2.09 M2
CV ECHO LV RWT: 0.4 CM
DOP CALC AO PEAK VEL: 1.62 M/S
DOP CALC AO VTI: 39.5 CM
DOP CALC LVOT AREA: 3.2 CM2
DOP CALC LVOT DIAMETER: 2.01 CM
DOP CALC LVOT PEAK VEL: 1.16 M/S
DOP CALC LVOT STROKE VOLUME: 90.7 CM3
DOP CALC RVOT PEAK VEL: 0.6 M/S
DOP CALC RVOT VTI: 16.3 CM
DOP CALCLVOT PEAK VEL VTI: 28.6 CM
E WAVE DECELERATION TIME: 291.46 MSEC
E/A RATIO: 0.69
E/E' RATIO: 8.11 M/S
ECHO EF ESTIMATED: 63 %
ECHO LV POSTERIOR WALL: 0.96 CM (ref 0.6–1.1)
EJECTION FRACTION: 55 %
FRACTIONAL SHORTENING: 34 % (ref 28–44)
INTERVENTRICULAR SEPTUM: 1 CM (ref 0.6–1.1)
IVRT: 91.34 MSEC
LA MAJOR: 4.78 CM
LA MINOR: 5.06 CM
LA WIDTH: 2.9 CM
LEFT ATRIUM SIZE: 3.84 CM
LEFT ATRIUM VOLUME INDEX MOD: 16 ML/M2
LEFT ATRIUM VOLUME INDEX: 22.4 ML/M2
LEFT ATRIUM VOLUME MOD: 33.18 CM3
LEFT ATRIUM VOLUME: 46.53 CM3
LEFT INTERNAL DIMENSION IN SYSTOLE: 3.14 CM (ref 2.1–4)
LEFT VENTRICLE DIASTOLIC VOLUME INDEX: 51.4 ML/M2
LEFT VENTRICLE DIASTOLIC VOLUME: 106.92 ML
LEFT VENTRICLE MASS INDEX: 79 G/M2
LEFT VENTRICLE SYSTOLIC VOLUME INDEX: 18.8 ML/M2
LEFT VENTRICLE SYSTOLIC VOLUME: 39.07 ML
LEFT VENTRICULAR INTERNAL DIMENSION IN DIASTOLE: 4.79 CM (ref 3.5–6)
LEFT VENTRICULAR MASS: 165.04 G
LV LATERAL E/E' RATIO: 8.56 M/S
LV SEPTAL E/E' RATIO: 7.7 M/S
LVOT MG: 2.68 MMHG
LVOT MV: 0.75 CM/S
MV PEAK A VEL: 1.11 M/S
MV PEAK E VEL: 0.77 M/S
PISA AR MAX VEL: 3.33 M/S
PISA TR MAX VEL: 3.3 M/S
PV MEAN GRADIENT: 0.84 MMHG
PV MV: 0.59 M/S
PV PEAK VELOCITY: 0.9 CM/S
RA MAJOR: 4.5 CM
RA PRESSURE: 3 MMHG
RA WIDTH: 3.15 CM
RIGHT VENTRICULAR END-DIASTOLIC DIMENSION: 2.43 CM
SINUS: 2.48 CM
STJ: 2.41 CM
TDI LATERAL: 0.09 M/S
TDI SEPTAL: 0.1 M/S
TDI: 0.1 M/S
TR MAX PG: 44 MMHG
TRICUSPID ANNULAR PLANE SYSTOLIC EXCURSION: 2.56 CM
TV REST PULMONARY ARTERY PRESSURE: 47 MMHG

## 2022-01-21 PROCEDURE — 1160F RVW MEDS BY RX/DR IN RCRD: CPT | Mod: HCNC,CPTII,S$GLB, | Performed by: PEDIATRICS

## 2022-01-21 PROCEDURE — 99999 PR PBB SHADOW E&M-EST. PATIENT-LVL V: ICD-10-PCS | Mod: PBBFAC,HCNC,, | Performed by: PEDIATRICS

## 2022-01-21 PROCEDURE — 99214 OFFICE O/P EST MOD 30 MIN: CPT | Mod: HCNC,S$GLB,, | Performed by: INTERNAL MEDICINE

## 2022-01-21 PROCEDURE — 71046 XR CHEST PA AND LATERAL: ICD-10-PCS | Mod: 26,HCNC,, | Performed by: RADIOLOGY

## 2022-01-21 PROCEDURE — 1101F PR PT FALLS ASSESS DOC 0-1 FALLS W/OUT INJ PAST YR: ICD-10-PCS | Mod: HCNC,CPTII,S$GLB, | Performed by: PEDIATRICS

## 2022-01-21 PROCEDURE — 1126F PR PAIN SEVERITY QUANTIFIED, NO PAIN PRESENT: ICD-10-PCS | Mod: HCNC,CPTII,S$GLB, | Performed by: INTERNAL MEDICINE

## 2022-01-21 PROCEDURE — 3288F FALL RISK ASSESSMENT DOCD: CPT | Mod: HCNC,CPTII,S$GLB, | Performed by: PEDIATRICS

## 2022-01-21 PROCEDURE — 3288F PR FALLS RISK ASSESSMENT DOCUMENTED: ICD-10-PCS | Mod: HCNC,CPTII,S$GLB, | Performed by: INTERNAL MEDICINE

## 2022-01-21 PROCEDURE — 93010 EKG 12-LEAD: ICD-10-PCS | Mod: HCNC,,, | Performed by: INTERNAL MEDICINE

## 2022-01-21 PROCEDURE — 1101F PT FALLS ASSESS-DOCD LE1/YR: CPT | Mod: HCNC,CPTII,S$GLB, | Performed by: PEDIATRICS

## 2022-01-21 PROCEDURE — 99214 PR OFFICE/OUTPT VISIT, EST, LEVL IV, 30-39 MIN: ICD-10-PCS | Mod: HCNC,S$GLB,, | Performed by: INTERNAL MEDICINE

## 2022-01-21 PROCEDURE — 3288F FALL RISK ASSESSMENT DOCD: CPT | Mod: HCNC,CPTII,S$GLB, | Performed by: INTERNAL MEDICINE

## 2022-01-21 PROCEDURE — 1159F PR MEDICATION LIST DOCUMENTED IN MEDICAL RECORD: ICD-10-PCS | Mod: HCNC,CPTII,S$GLB, | Performed by: PEDIATRICS

## 2022-01-21 PROCEDURE — 93306 TTE W/DOPPLER COMPLETE: CPT | Mod: 26,HCNC,, | Performed by: INTERNAL MEDICINE

## 2022-01-21 PROCEDURE — 1160F PR REVIEW ALL MEDS BY PRESCRIBER/CLIN PHARMACIST DOCUMENTED: ICD-10-PCS | Mod: HCNC,CPTII,S$GLB, | Performed by: INTERNAL MEDICINE

## 2022-01-21 PROCEDURE — 3288F PR FALLS RISK ASSESSMENT DOCUMENTED: ICD-10-PCS | Mod: HCNC,CPTII,S$GLB, | Performed by: PEDIATRICS

## 2022-01-21 PROCEDURE — 1101F PR PT FALLS ASSESS DOC 0-1 FALLS W/OUT INJ PAST YR: ICD-10-PCS | Mod: HCNC,CPTII,S$GLB, | Performed by: INTERNAL MEDICINE

## 2022-01-21 PROCEDURE — 99999 PR PBB SHADOW E&M-EST. PATIENT-LVL IV: CPT | Mod: PBBFAC,HCNC,, | Performed by: INTERNAL MEDICINE

## 2022-01-21 PROCEDURE — 1101F PT FALLS ASSESS-DOCD LE1/YR: CPT | Mod: HCNC,CPTII,S$GLB, | Performed by: INTERNAL MEDICINE

## 2022-01-21 PROCEDURE — 1126F AMNT PAIN NOTED NONE PRSNT: CPT | Mod: HCNC,CPTII,S$GLB, | Performed by: INTERNAL MEDICINE

## 2022-01-21 PROCEDURE — 1159F PR MEDICATION LIST DOCUMENTED IN MEDICAL RECORD: ICD-10-PCS | Mod: HCNC,CPTII,S$GLB, | Performed by: INTERNAL MEDICINE

## 2022-01-21 PROCEDURE — 99999 PR PBB SHADOW E&M-EST. PATIENT-LVL V: CPT | Mod: PBBFAC,HCNC,, | Performed by: PEDIATRICS

## 2022-01-21 PROCEDURE — 99214 OFFICE O/P EST MOD 30 MIN: CPT | Mod: HCNC,S$GLB,, | Performed by: PEDIATRICS

## 2022-01-21 PROCEDURE — 1159F MED LIST DOCD IN RCRD: CPT | Mod: HCNC,CPTII,S$GLB, | Performed by: INTERNAL MEDICINE

## 2022-01-21 PROCEDURE — 93005 ELECTROCARDIOGRAM TRACING: CPT | Mod: HCNC

## 2022-01-21 PROCEDURE — 99999 PR PBB SHADOW E&M-EST. PATIENT-LVL IV: ICD-10-PCS | Mod: PBBFAC,HCNC,, | Performed by: INTERNAL MEDICINE

## 2022-01-21 PROCEDURE — 1159F MED LIST DOCD IN RCRD: CPT | Mod: HCNC,CPTII,S$GLB, | Performed by: PEDIATRICS

## 2022-01-21 PROCEDURE — 99499 RISK ADDL DX/OHS AUDIT: ICD-10-PCS | Mod: S$GLB,,, | Performed by: PEDIATRICS

## 2022-01-21 PROCEDURE — 99214 PR OFFICE/OUTPT VISIT, EST, LEVL IV, 30-39 MIN: ICD-10-PCS | Mod: HCNC,S$GLB,, | Performed by: PEDIATRICS

## 2022-01-21 PROCEDURE — 99499 UNLISTED E&M SERVICE: CPT | Mod: S$GLB,,, | Performed by: PEDIATRICS

## 2022-01-21 PROCEDURE — 1126F PR PAIN SEVERITY QUANTIFIED, NO PAIN PRESENT: ICD-10-PCS | Mod: HCNC,CPTII,S$GLB, | Performed by: PEDIATRICS

## 2022-01-21 PROCEDURE — 71046 X-RAY EXAM CHEST 2 VIEWS: CPT | Mod: TC,HCNC

## 2022-01-21 PROCEDURE — 1126F AMNT PAIN NOTED NONE PRSNT: CPT | Mod: HCNC,CPTII,S$GLB, | Performed by: PEDIATRICS

## 2022-01-21 PROCEDURE — 93306 TTE W/DOPPLER COMPLETE: CPT | Mod: HCNC

## 2022-01-21 PROCEDURE — 93010 ELECTROCARDIOGRAM REPORT: CPT | Mod: HCNC,,, | Performed by: INTERNAL MEDICINE

## 2022-01-21 PROCEDURE — 93306 ECHO (CUPID ONLY): ICD-10-PCS | Mod: 26,HCNC,, | Performed by: INTERNAL MEDICINE

## 2022-01-21 PROCEDURE — 71046 X-RAY EXAM CHEST 2 VIEWS: CPT | Mod: 26,HCNC,, | Performed by: RADIOLOGY

## 2022-01-21 PROCEDURE — 1160F PR REVIEW ALL MEDS BY PRESCRIBER/CLIN PHARMACIST DOCUMENTED: ICD-10-PCS | Mod: HCNC,CPTII,S$GLB, | Performed by: PEDIATRICS

## 2022-01-21 PROCEDURE — 1160F RVW MEDS BY RX/DR IN RCRD: CPT | Mod: HCNC,CPTII,S$GLB, | Performed by: INTERNAL MEDICINE

## 2022-01-21 NOTE — PROGRESS NOTES
Subjective:       Patient ID: Chao Hawk is a 89 y.o. male.    Chief Complaint: Follow-up    Chao Hawk is a 89 y.o. male who present to clinic for a follow up. Pt has been dealing with sharp L side pain which began in December. Does not worsen with movement and only notices it when at rest. He remembers running into a door jam     1) HTN: B/P normal, no HTNive symptoms  2) LIPIDS:following D&E, tolerating and compliant with med(s).    3) BPH on meds, not taking full dose because he feels it causes itching. Urology aware. No longer gets PSAs, ROSAURA every 6 months  4) COPD:Sees pulmonary, compliant with meds  5) Low T:On depo testosterone.  6) Back:s/p surgery, uses canes only for balance and support for uneven surface, has residual feet numbness. Worse in AM until he gets around. Uses only CBD oil. Not any other OTC or tramadol. Fall risk is now minimal, uses cane, no falls  7) Pulmonary artery HTN/COPD: Saw pulmonary. no unusual CP/Orthopnea/hemoptysis.using nocturnal O2  8) AAA: medical management. CT 2018: distal abdominal aortic aneurysm measuring 3.6 cm AP dimension 3.3 cm transverse dimension.  There is also fusiform aneurysmal dilatation of the mid infrarenal abdominal aorta measuring approximately 3.2 cm in diameter.  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     LABS REVIEWED AND DISCUSSED WITH PATIENT: CBC, CMP, lipid panel    Review of Systems   Constitutional: Negative for activity change, appetite change, chills, diaphoresis, fatigue, fever and unexpected weight change.   HENT: Negative for nasal congestion, ear pain, mouth sores, nosebleeds, postnasal drip, rhinorrhea, sneezing and sore throat.    Eyes: Negative for photophobia, pain, discharge, redness and visual disturbance.   Respiratory: Positive for shortness of breath. Negative for cough, chest tightness, wheezing and stridor.    Cardiovascular: Positive for chest pain. Negative for palpitations and leg swelling.    Gastrointestinal: Negative for abdominal distention, blood in stool, constipation, diarrhea, nausea and vomiting.   Genitourinary: Positive for difficulty urinating (BPH). Negative for decreased urine volume, dysuria, flank pain, frequency, genital sores, hematuria and urgency.   Musculoskeletal: Positive for arthralgias, back pain and gait problem. Negative for joint swelling, neck pain and neck stiffness.   Integumentary:  Negative for color change, pallor, rash and wound.   Neurological: Negative for dizziness, syncope, speech difficulty, weakness, light-headedness and headaches.   Hematological: Negative for adenopathy. Does not bruise/bleed easily.   Psychiatric/Behavioral: Negative for confusion, decreased concentration, dysphoric mood, hallucinations, sleep disturbance and suicidal ideas. The patient is not nervous/anxious.    All other systems reviewed and are negative.        Objective:      Physical Exam  Vitals and nursing note reviewed.   Constitutional:       General: He is not in acute distress.     Appearance: He is well-developed.   Neck:      Thyroid: No thyromegaly.      Vascular: No JVD.   Cardiovascular:      Rate and Rhythm: Normal rate and regular rhythm.      Heart sounds: Normal heart sounds. No murmur heard.      Pulmonary:      Effort: Pulmonary effort is normal. No respiratory distress.      Breath sounds: Normal breath sounds. No wheezing or rales.   Chest:      Chest wall: No tenderness.   Abdominal:      General: There is no distension.      Palpations: Abdomen is soft. There is no mass.      Tenderness: There is no abdominal tenderness. There is no guarding.   Musculoskeletal:      Right lower leg: No edema.      Left lower leg: No edema.   Lymphadenopathy:      Cervical: No cervical adenopathy.   Skin:     Capillary Refill: Capillary refill takes less than 2 seconds.      Findings: No rash.      Comments: dry flakey patches on his low chest   Neurological:      General: No focal  deficit present.      Mental Status: He is alert and oriented to person, place, and time.      Cranial Nerves: No cranial nerve deficit.      Coordination: Coordination normal.      Gait: Gait abnormal (shuffling, stooped, unsteady.).   Psychiatric:         Mood and Affect: Mood normal.         Behavior: Behavior normal.         Thought Content: Thought content normal.         Judgment: Judgment normal.         Assessment:       Problem List Items Addressed This Visit     COPD (chronic obstructive pulmonary disease) (Chronic)    Essential hypertension - Primary (Chronic)    Relevant Orders    Lipid Panel    Comprehensive Metabolic Panel    CBC Auto Differential    Pure hypercholesterolemia (Chronic)    BPH (benign prostatic hyperplasia) (Chronic)    Atherosclerosis of aorta    Abdominal aortic aneurysm without rupture    Lumbar arthropathy    PAH (pulmonary artery hypertension)    Impaired functional mobility, balance, gait, and endurance    Peripheral neuropathy      Other Visit Diagnoses     Left-sided chest pain        Relevant Orders    X-Ray Chest PA And Lateral    EKG 12-lead    Ambulatory referral/consult to Cardiology    Echo          Plan:     Essential hypertension  -     Lipid Panel; Future; Expected date: 01/21/2022  -     Comprehensive Metabolic Panel; Future; Expected date: 01/21/2022  -     CBC Auto Differential; Future; Expected date: 01/21/2022    Chronic obstructive pulmonary disease, unspecified COPD type    Lumbar arthropathy    Benign prostatic hyperplasia with weak urinary stream    Impaired functional mobility, balance, gait, and endurance    Atherosclerosis of aorta    Abdominal aortic aneurysm without rupture    Peripheral polyneuropathy    PAH (pulmonary artery hypertension)    Left-sided chest pain  -     X-Ray Chest PA And Lateral; Future; Expected date: 01/21/2022  -     EKG 12-lead; Future  -     Ambulatory referral/consult to Cardiology; Future; Expected date: 01/28/2022  -     Echo;  Future    Pure hypercholesterolemia    He does not wish to undergo PT, fall precautions d/w patient. His CP does not seem to be MS, see cardiology, we are able to get all today. Otherwise at goal for lipid and b/p, maintain meds and subspecialty care. F/u 6 months with labs.  Scribe Attestation:   I, Andrew Granda, am scribing for, and in the presence of, Dr. Amari Ureña Jr. I performed the above scribed service and the documentation accurately describes the services I performed. I attest to the accuracy of the note.    I, Dr. Amari Ureña Jr, reviewed documentation as scribed above. I personally performed the services described in this documentation.  I agree that the record reflects my personal performance and is accurate and complete. Amari Ureña Jr., MD.  01/21/2022

## 2022-01-21 NOTE — PROGRESS NOTES
Subjective:   Patient ID:  Chao Hawk is a 89 y.o. male who presents for follow up of No chief complaint on file.      HPI   1/30/2019  An 87 yo male with pah copd  htn hlp is here for f /u. He has not been in clinic for 1 year. Has no new issues except that eh noticed around 2 months ago recurrent intermittent left sided chest pain with exertion associated with shortness of breath it is very short lived resolved with rest.  He is doing his daily house work .has no syncope  chf palpitation tia or claudication  He wears compression socks. He is compliant with salt intake. His heart function is normal by echo.  1/21/2022   Here at the request of DR BELL . HE HAS LEFT LATERAL CHEST PAIN THAT IS INTERMITTENT STARTED IN December IT OCCURS MOSTLY WHEN LYING DOWN AND SLEEPING ON THAT SIDE. THE PAIN IS SHARP AND DULL AT THE SAME TIME PULSATING AT TIMES  IT IS SELF LIMTED HE USUALLY GET OFF LEFT SIDE AND THE PAIN RESOLVES. HAS NO EXERTIONAL SYMPTOMS HIS  EKG HA SNO ACUTE CHANGES. HIS LAST PAIN WAS 1 WEEK AGO. HAS AN ABDOMINAL AAA MEASURING MAX 3.8 HE FOLLOWS WITH US BY DR BELL . HE AHS A CXR AND ECHO SCHEDULED. EKG UNCHANGED FROM PREVIOUS   Past Medical History:   Diagnosis Date    Anemia     Arthritis     back, hand    Back pain     Dr. Cristobal- [facility]    Bilateral leg edema 2/16/2018    Chronic bronchitis     COPD (chronic obstructive pulmonary disease)     Diverticulitis of large intestine with perforation without abscess or bleeding     Diverticulosis 5/16/06    colonoscopy    Hearing loss, bilateral     Hernia     x2    Hyperlipidemia     Hypertension     Lumbar radiculopathy 6/14/2017    Multiple renal cysts 7/5/2016    Nocturnal hypoxemia     Polyneuropathy     Tobacco dependence     Trouble in sleeping        Past Surgical History:   Procedure Laterality Date    FINGER SURGERY Left 1980s    index- Dr. Encarnacion; to remove nodule    HERNIA REPAIR  1990s    x2    INTRAOCULAR LENS INSERTION  Bilateral 2016    LUMBAR SPINE SURGERY  2017    REVERSE TOTAL SHOULDER ARTHROPLASTY Right 10/5/2020    Procedure: ARTHROPLASTY, SHOULDER, TOTAL, REVERSE;  Surgeon: Reji Martin MD;  Location: [facility];  Service: Orthopedics;  Laterality: Right;    TONSILLECTOMY, ADENOIDECTOMY         Social History     Tobacco Use    Smoking status: Former Smoker     Packs/day: 1.00     Years: 40.00     Pack years: 40.00     Start date: 1949     Quit date: 1991     Years since quittin.0    Smokeless tobacco: Never Used   Substance Use Topics    Alcohol use: Yes     Comment: rarely   No alcohol 72h  prior to sx    Drug use: No       Family History   Problem Relation Age of Onset    Emphysema Mother     Heart disease Mother     COPD Mother     Appendicitis Father     Heart disease Son     Atrial fibrillation Sister     Lung disease Sister 91    Colon cancer Neg Hx     Cancer Neg Hx     Stroke Neg Hx        Current Outpatient Medications   Medication Sig    acetaminophen (TYLENOL) 500 MG tablet Take 500 mg by mouth as needed for Pain.    albuterol (PROVENTIL) 2.5 mg /3 mL (0.083 %) nebulizer solution Take 3 mLs (2.5 mg total) by nebulization every 4 (four) hours as needed for Wheezing. Rescue    ARGININE, L-ARGININE, ORAL Take 500 mg by mouth once daily.    aspirin (ECOTRIN) 81 MG EC tablet Take 1 tablet (81 mg total) by mouth 2 (two) times a day. (Patient taking differently: Take 81 mg by mouth once daily.)    atorvastatin (LIPITOR) 40 MG tablet TAKE 1 TABLET EVERY DAY    betamethasone valerate 0.1% (VALISONE) 0.1 % Oint Apply topically 2 (two) times daily.    CANNABIDIOL, CBD, EXTRACT ORAL Take 3 drops by mouth every morning.    cholecalciferol, vitamin D3, (VITAMIN D3) 50 mcg (2,000 unit) Cap Take 1 capsule by mouth every evening.     COQ10, UBIQUINOL, ORAL Take 1 tablet by mouth nightly.     cyanocobalamin (VITAMIN B-12) 1000 MCG tablet Take 100 mcg by mouth once daily.     dutasteride (AVODART) 0.5 mg capsule Take 1 capsule (0.5 mg total) by mouth once daily.    furosemide (LASIX) 20 MG tablet TAKE 1 TABLET (20 MG TOTAL) BY MOUTH ONCE DAILY.    gabapentin (NEURONTIN) 100 MG capsule Take 1 capsule (100 mg total) by mouth 2 (two) times daily.    GLUCOSAM-MSM-CHONDROIT-VIT D3 ORAL Take 2 tablets by mouth once daily.    krill/om-3/dha/epa/phospho/ast (MEGARED OMEGA-3 KRILL OIL ORAL) Take 1 capsule by mouth nightly.     lisinopriL 10 MG tablet TAKE 1 TABLET EVERY DAY    multivit-min/folic/vit K/lycop (ONE-A-DAY MEN'S MULTIVITAMIN ORAL) Take 1 tablet by mouth every evening.     potassium 99 mg Tab Take by mouth once daily.     sars-cov-2, covid-19, (PFIZER COVID-19) 30 mcg/0.3 ml injection Inject into the muscle.    tamsulosin (FLOMAX) 0.4 mg Cap TAKE 2 CAPSULES EVERY MORNING    TRELEGY ELLIPTA 100-62.5-25 mcg DsDv INHALE 1 PUFF INTO THE LUNGS ONCE DAILY     No current facility-administered medications for this visit.     Current Outpatient Medications on File Prior to Visit   Medication Sig    acetaminophen (TYLENOL) 500 MG tablet Take 500 mg by mouth as needed for Pain.    albuterol (PROVENTIL) 2.5 mg /3 mL (0.083 %) nebulizer solution Take 3 mLs (2.5 mg total) by nebulization every 4 (four) hours as needed for Wheezing. Rescue    ARGININE, L-ARGININE, ORAL Take 500 mg by mouth once daily.    aspirin (ECOTRIN) 81 MG EC tablet Take 1 tablet (81 mg total) by mouth 2 (two) times a day. (Patient taking differently: Take 81 mg by mouth once daily.)    atorvastatin (LIPITOR) 40 MG tablet TAKE 1 TABLET EVERY DAY    betamethasone valerate 0.1% (VALISONE) 0.1 % Oint Apply topically 2 (two) times daily.    CANNABIDIOL, CBD, EXTRACT ORAL Take 3 drops by mouth every morning.    cholecalciferol, vitamin D3, (VITAMIN D3) 50 mcg (2,000 unit) Cap Take 1 capsule by mouth every evening.     COQ10, UBIQUINOL, ORAL Take 1 tablet by mouth nightly.     cyanocobalamin (VITAMIN B-12) 1000  MCG tablet Take 100 mcg by mouth once daily.    dutasteride (AVODART) 0.5 mg capsule Take 1 capsule (0.5 mg total) by mouth once daily.    furosemide (LASIX) 20 MG tablet TAKE 1 TABLET (20 MG TOTAL) BY MOUTH ONCE DAILY.    gabapentin (NEURONTIN) 100 MG capsule Take 1 capsule (100 mg total) by mouth 2 (two) times daily.    GLUCOSAM-MSM-CHONDROIT-VIT D3 ORAL Take 2 tablets by mouth once daily.    krill/om-3/dha/epa/phospho/ast (MEGARED OMEGA-3 KRILL OIL ORAL) Take 1 capsule by mouth nightly.     lisinopriL 10 MG tablet TAKE 1 TABLET EVERY DAY    multivit-min/folic/vit K/lycop (ONE-A-DAY MEN'S MULTIVITAMIN ORAL) Take 1 tablet by mouth every evening.     potassium 99 mg Tab Take by mouth once daily.     sars-cov-2, covid-19, (PFIZER COVID-19) 30 mcg/0.3 ml injection Inject into the muscle.    tamsulosin (FLOMAX) 0.4 mg Cap TAKE 2 CAPSULES EVERY MORNING    TRELEGY ELLIPTA 100-62.5-25 mcg DsDv INHALE 1 PUFF INTO THE LUNGS ONCE DAILY     No current facility-administered medications on file prior to visit.       Review of Systems   Constitutional: Negative for malaise/fatigue.   Eyes: Negative for blurred vision.   Cardiovascular: Positive for chest pain. Negative for claudication, cyanosis, dyspnea on exertion, irregular heartbeat, leg swelling, near-syncope, orthopnea, palpitations and paroxysmal nocturnal dyspnea.   Respiratory: Negative for cough, hemoptysis and shortness of breath.    Hematologic/Lymphatic: Negative for bleeding problem. Does not bruise/bleed easily.   Skin: Negative for dry skin and itching.   Musculoskeletal: Positive for arthritis, back pain and joint pain. Negative for falls, muscle weakness and myalgias.   Gastrointestinal: Negative for abdominal pain, diarrhea, heartburn, hematemesis, hematochezia and melena.   Genitourinary: Negative for flank pain and hematuria.   Neurological: Negative for dizziness, focal weakness, headaches, light-headedness, numbness, paresthesias, seizures and  weakness.   Psychiatric/Behavioral: Negative for altered mental status and memory loss. The patient is not nervous/anxious.    Allergic/Immunologic: Negative for hives.       Objective:   Physical Exam  Vitals and nursing note reviewed.   Constitutional:       General: He is not in acute distress.     Appearance: He is well-developed and well-nourished. He is not diaphoretic.   HENT:      Head: Normocephalic and atraumatic.   Eyes:      General:         Right eye: No discharge.         Left eye: No discharge.      Extraocular Movements: EOM normal.      Pupils: Pupils are equal, round, and reactive to light.   Neck:      Thyroid: No thyromegaly.      Vascular: No JVD.   Cardiovascular:      Rate and Rhythm: Normal rate and regular rhythm.      Pulses: Normal pulses and intact distal pulses.      Heart sounds: Normal heart sounds. No murmur heard.  No friction rub. No gallop.    Pulmonary:      Effort: Pulmonary effort is normal. No respiratory distress.      Breath sounds: Normal breath sounds. No wheezing or rales.   Chest:      Chest wall: No tenderness.   Abdominal:      General: Bowel sounds are normal. There is no distension.      Palpations: Abdomen is soft.      Tenderness: There is no abdominal tenderness.   Musculoskeletal:         General: No edema. Normal range of motion.      Cervical back: Neck supple.      Right lower leg: No edema.      Left lower leg: No edema.   Skin:     General: Skin is warm and dry.      Findings: No erythema or rash.   Neurological:      Mental Status: He is alert and oriented to person, place, and time.      Cranial Nerves: No cranial nerve deficit.   Psychiatric:         Mood and Affect: Mood and affect normal.         Behavior: Behavior normal.         Judgment: Judgment normal.       Vitals:    01/21/22 1010 01/21/22 1011   BP: 120/70 122/68   BP Location: Right arm Left arm   Patient Position: Sitting Sitting   BP Method: Medium (Manual) Medium (Manual)   Pulse: 70    SpO2:  95%    Weight: 86.3 kg (190 lb 4.1 oz)    Height: 6' (1.829 m)      Lab Results   Component Value Date    CHOL 128 01/14/2022    CHOL 119 (L) 07/15/2021    CHOL 115 (L) 01/14/2021     Lab Results   Component Value Date    HDL 50 01/14/2022    HDL 49 07/15/2021    HDL 50 01/14/2021     Lab Results   Component Value Date    LDLCALC 60.4 (L) 01/14/2022    LDLCALC 61.2 (L) 07/15/2021    LDLCALC 44.8 (L) 01/14/2021     Lab Results   Component Value Date    TRIG 88 01/14/2022    TRIG 44 07/15/2021    TRIG 101 01/14/2021     Lab Results   Component Value Date    CHOLHDL 39.1 01/14/2022    CHOLHDL 41.2 07/15/2021    CHOLHDL 43.5 01/14/2021       Chemistry        Component Value Date/Time     01/14/2022 0846    K 4.5 01/14/2022 0846     01/14/2022 0846    CO2 27 01/14/2022 0846    BUN 27 (H) 01/14/2022 0846    CREATININE 1.0 01/14/2022 0846    GLU 64 (L) 01/14/2022 0846        Component Value Date/Time    CALCIUM 9.5 01/14/2022 0846    ALKPHOS 77 01/14/2022 0846    AST 23 01/14/2022 0846    ALT 19 01/14/2022 0846    BILITOT 0.5 01/14/2022 0846    ESTGFRAFRICA >60.0 01/14/2022 0846    EGFRNONAA >60.0 01/14/2022 0846            Lab Results   Component Value Date    TSH 2.14 05/16/2011     Lab Results   Component Value Date    INR 1.0 04/13/2018    INR 1.0 02/08/2018    INR 1.0 08/21/2017     Lab Results   Component Value Date    WBC 9.75 01/14/2022    HGB 12.8 (L) 01/14/2022    HCT 41.1 01/14/2022    MCV 96 01/14/2022     01/14/2022     BMP  Sodium   Date Value Ref Range Status   01/14/2022 139 136 - 145 mmol/L Final     Potassium   Date Value Ref Range Status   01/14/2022 4.5 3.5 - 5.1 mmol/L Final     Chloride   Date Value Ref Range Status   01/14/2022 104 95 - 110 mmol/L Final     CO2   Date Value Ref Range Status   01/14/2022 27 23 - 29 mmol/L Final     BUN   Date Value Ref Range Status   01/14/2022 27 (H) 8 - 23 mg/dL Final     Creatinine   Date Value Ref Range Status   01/14/2022 1.0 0.5 - 1.4 mg/dL  Final     Calcium   Date Value Ref Range Status   01/14/2022 9.5 8.7 - 10.5 mg/dL Final     Anion Gap   Date Value Ref Range Status   01/14/2022 8 8 - 16 mmol/L Final     eGFR if    Date Value Ref Range Status   01/14/2022 >60.0 >60 mL/min/1.73 m^2 Final     eGFR if non    Date Value Ref Range Status   01/14/2022 >60.0 >60 mL/min/1.73 m^2 Final     Comment:     Calculation used to obtain the estimated glomerular filtration  rate (eGFR) is the CKD-EPI equation.        CrCl cannot be calculated (Patient's most recent lab result is older than the maximum 7 days allowed.).    Assessment:     1. Chest pain syndrome    2. Left-sided chest pain    3. Peripheral polyneuropathy    4. Chronic obstructive pulmonary disease, unspecified COPD type    5. PAH (pulmonary artery hypertension)    6. Abdominal aortic aneurysm without rupture    7. Atherosclerosis of aorta    8. Essential hypertension    9. Pure hypercholesterolemia    10. Overweight (BMI 25.0-29.9)    11. History of diverticulitis    12. Bilateral leg edema      HAS ATYPICAL LEFT SIDED CHEST PAIN THAT APPEARS MUSCULOSKELETAL IN NATURE AGREE WITH CXR AND  ECHO.   EKG UNCHANGED FROM PREVIOUS  AAA STABLE NO SYMPTOMS SURVEILLANCE PER DR BELL.   HTN CONTROLLED CONTINUE THE SAME   [facility] ON [facility] NO CHANGE IN THERAPY CONTINUE THE SAME.   Plan:     AS PER ABOVE   CHECK STUDIES   REASSURE.

## 2022-02-07 RX ORDER — LISINOPRIL 10 MG/1
TABLET ORAL
Qty: 90 TABLET | Refills: 3 | Status: SHIPPED | OUTPATIENT
Start: 2022-02-07

## 2022-02-07 NOTE — TELEPHONE ENCOUNTER
Refill Authorization Note   Chao Hawk  is requesting a refill authorization.  Brief Assessment and Rationale for Refill:  Approve     Medication Therapy Plan:       Medication Reconciliation Completed: No   Comments:   --->Care Gap information included below if applicable.       Requested Prescriptions   Pending Prescriptions Disp Refills    lisinopriL 10 MG tablet [Pharmacy Med Name: LISINOPRIL 10 MG Tablet] 90 tablet 3     Sig: TAKE 1 TABLET EVERY DAY       Cardiovascular:  ACE Inhibitors Passed - 1/20/2022  3:15 PM        Passed - Patient is at least 18 years old        Passed - Last BP in normal range within 360 days     BP Readings from Last 1 Encounters:   01/21/22 122/68               Passed - Valid encounter within last 15 months     Recent Visits  Date Type Provider Dept   01/21/22 Office Visit MONSTER Ureña Jr., MD [facility]   01/21/21 Office Visit MONSTER Ureña Jr., MD [facility]   07/21/20 Office Visit MONSTER Ureña Jr., MD [facility]   Showing recent visits within past 720 days and meeting all other requirements  Future Appointments  No visits were found meeting these conditions.  Showing future appointments within next 150 days and meeting all other requirements      Future Appointments              In 2 months Obed Sotelo MD [facility] 3rd Fl, [facility]    In 4 months Tone Pineda MD [facility] 3rd Fl, [facility]    In 5 months LABORATORY, [facility] 1st Fl, [place]    In 5 months MONSTER Ureña Jr., MD [facility] 2nd Fl, [place]                Passed - Cr is 1.39 or below and within 360 days     Lab Results   Component Value Date    CREATININE 1.0 01/14/2022    CREATININE 1.0 07/15/2021    CREATININE 1.0 01/14/2021              Passed - K is 5.2 or below and within 360 days     Potassium   Date Value Ref Range Status   01/14/2022 4.5 3.5 - 5.1 mmol/L Final   07/15/2021 4.3 3.5 - 5.1 mmol/L  Final   01/14/2021 4.3 3.5 - 5.1 mmol/L Final              Passed - eGFR within 360 days     Lab Results   Component Value Date    EGFRNONAA >60.0 01/14/2022    EGFRNONAA >60.0 07/15/2021    EGFRNONAA >60.0 01/14/2021                    Appointments  past 12m or future 3m with PCP    Date Provider   Last Visit   1/21/2021 MONSTER Ureña Jr., MD   Next Visit   1/21/2022 MONSTER Ureña Jr., MD   ED visits in past 90 days: 0     Note composed:2:04 PM 02/07/2022

## 2022-03-09 ENCOUNTER — PATIENT OUTREACH (OUTPATIENT)
Dept: ADMINISTRATIVE | Facility: OTHER | Age: 87
End: 2022-03-09
Payer: MEDICARE

## 2022-03-09 NOTE — PROGRESS NOTES
Chief complaint:   Chief Complaint   Patient presents with    Hearing Loss    Cerumen Impaction        History of Present Illness:     Mr. Hawk is a 89 y.o. male presenting for evaluation of hearing loss.     He describes a equally, bilateral sided hearing loss starting about 4-5 months and has been stable.      The patient reports the following risk factors for hearing loss (Negative unless checked off):   [] Familial deafness   [] Ototoxic medication exposure  [x] Acoustic trauma  []  Occupational exposure  [] Head injury or trauma  [] Otologic infection  [] History of meningitis  [] Ear surgery (other than pediatric tympanostomy tubes)  [] Metabolic disease      Severity: moderate  Quality: muffled  Modifying factors:  None  Associated symptoms include   [] Vertigo  [] Tinnitus  [] Otalgia  [] Drainage or pain   Previous treatments include none.    The patient denies significant eustachian tube risk factors: ear pressure, ear pain, sensation of clogging, ear symptoms with a cold or sinusitis, popping or crackling sensation, ringing in the ear, and muffled hearing.     The patient also denies pain deep within the ear, tenderness around the ear canal or pre-auricular area, or headaches.         Review of Systems     A complete 10 point ROS was completed and are positive as per above HPI.    Otherwise negative for fever, diplopia, chest pain, shortness of breath, vomiting, blood in urine, joint pain, skin rash, seizures and unusual bleeding.       History        Past Medical History:   Past Medical History:   Diagnosis Date    Anemia     Arthritis     back, hand    Back pain     Dr. Cristobal- [facility]    Bilateral leg edema 2/16/2018    Chronic bronchitis     COPD (chronic obstructive pulmonary disease)     Diverticulitis of large intestine with perforation without abscess or bleeding     Diverticulosis 5/16/06    colonoscopy    Hearing loss, bilateral     Hernia     x2    Hyperlipidemia     Hypertension   "   Lumbar radiculopathy 6/14/2017    Multiple renal cysts 7/5/2016    Nocturnal hypoxemia     Polyneuropathy     Tobacco dependence     Trouble in sleeping     .          Past Surgical History:  Past Surgical History:   Procedure Laterality Date    FINGER SURGERY Left 1980s    index- Dr. Encarnacion; to remove nodule    HERNIA REPAIR  1990s    x2    INTRAOCULAR LENS INSERTION Bilateral 2016    LUMBAR SPINE SURGERY  09/13/2017    REVERSE TOTAL SHOULDER ARTHROPLASTY Right 10/5/2020    Procedure: ARTHROPLASTY, SHOULDER, TOTAL, REVERSE;  Surgeon: Reji Martin MD;  Location[facility];  Service: Orthopedics;  Laterality: Right;    TONSILLECTOMY, ADENOIDECTOMY  1940   .         Medications: Medication list was reviewed. He  has a current medication list which includes the following prescription(s): acetaminophen, albuterol, arginine, aspirin, atorvastatin, betamethasone valerate 0.1%, cholecalciferol (vitamin d3), ubiquinol, cyanocobalamin, dutasteride, furosemide, gabapentin, glucosam/msm/chondroit/vit d3, krill/om-3/dha/epa/phospho/ast, lisinopril, multivit-min/folic/vit k/lycop, potassium, sars-cov-2 (covid-19), tamsulosin, trelegy ellipta, and cannabidiol (cbd).         Allergies:   Review of patient's allergies indicates:   Allergen Reactions    Penicillins Rash     "Felt like I had pins and needles in my feet" , hospitalized overnight    Bee sting  [allergen ext-venom-honey bee] Swelling    Poison ivy extract Hives            Family history: family history includes Appendicitis in his father; Atrial fibrillation in his sister; COPD in his mother; Emphysema in his mother; Heart disease in his mother and son; Lung disease (age of onset: 91) in his sister.         Social History          Alcohol use:  reports current alcohol use.            Tobacco:  reports that he quit smoking about 31 years ago. He started smoking about 73 years ago. He has a 40.00 pack-year smoking history. He has never " used smokeless tobacco.         Please see the patient's intake form for full details of past medical history, past surgical history, family history, social history and review of systems. ?This information was reviewed by me and noted.      Physical Examination     General: Well developed, well nourished, well hydrated. Verbal with a strong voice and not dysphonic.     Head/Face: Normocephalic, atraumatic. No scars or lesions. Facial musculature equal.     Eyes: No scleral icterus or conjunctival hemorrhage. EOMI. PERRLA.     Ears: after disimpaction    · Right ear: No gross deformity. EAC is clear of debris and erythema. The TM is intact with a pneumatized middle ear. No signs of retraction, fluid or infection.      · Left ear: No gross deformity. EAC is clear of debris and erythema. The TM is intact with a pneumatized middle ear. No signs of retraction, fluid or infection.     Nose: No gross deformity or lesions. No purulent discharge. No significant NSD.      Mouth/Oropharynx: Lips without any lesions. No mucosal lesions within the oropharynx. No tonsillar exudate or lesions. Pharyngeal walls symmetrical. Uvula midline. Tongue midline without lesions.     Neck: Trachea midline. No masses. No thyromegaly or nodules palpated.     Lymphatic: No lymphadenopathy in the neck.     Extremities: No cyanosis. Warm and well-perfused.     Skin: No scars or lesions on face or neck.      Neurologic: Moving all extremities without gross abnormality.CN II-XII grossly intact. House-Brackmann 1/6. No signs of nystagmus.      Psych: Alert and oriented to person, place, and time with an appropriate mood and affect.       Audiogram     Audiogram, 03/10/2022 was independently reviewed          Procedure: ear microscopy with removal of cerumen    Description: The patient was in agreement with the examination and debridement of the ears in order to visualize the TMs and middle ear. Removal of the cerumen required use of an operating  microscope and multiple micro-instruments.     With the patient in the supine position, we used the operating microscope to examine both ears with the appropriate sized ear speculum.  A variety of sterile, micro-instruments were utilized to remove the cerumen atraumatically.  I performed the procedure which required a significant amount of time and effort. The tympanic membrane was then well visualized.  The patient tolerated the procedure well and there were no complications.    Findings:   Right ear had significant wax, the EAC was normal, and the tympanic membrane was intact with no evidence of middle ear fluid.    Left ear had moderate wax, the EAC was normal, and the tympanic membrane was intact with no evidence of middle ear fluid.       Assessment     Hearing loss, unspecified hearing loss type, unspecified laterality     Plan:          I spent a considerable amount of time educating the patient on hearing and hearing loss.  We discussed the basic characteristics of conductive hearing loss versus sensorineural hearing loss and the significant differences in treatment options between the two categories.      We discussed that in cases of conductive hearing loss, this suggests a mechanical disorder that sometimes can be improved with medications &/or surgery.  It may occur secondary to external ear pathology (atresia, otitis externa, etc), tympanic membrane disorders (large perforations, immobility due to scarring or eustachian tube dysfunction), and middle ear disorders (effusions, ossicular disorders).    Sensorineural hearing loss is the expected hearing loss pattern with aging, but some disorders such as Meniere's may accelerate this process.  Additionally, amplification with hearing aids is generally the best option for hearing rehabilitation, except where the hearing loss is profound.  We discussed that this generally does not represent a dangerous condition, but in cases where there is a large  discrepancy between the two ears in terms of nerve function, more investigation is often necessary due to the possiblity of vestibular schwannomas or meningiomas at the cerebellopontine angle.  The definitive test for this is an MRI with gadolinium.  However, these masses are usually very slow growing (1-2mm/year), so patients may elect to repeat an audiogram in about 6 months or obtain an ABR so long as they understand that this may result in a delay in diagnosis (although very unlikely that this would have a significant clinical impact on their outcome due to the slow growing nature of these masses) with the understanding that if ABR is abnormal or asymmetry increases, an MRI would then be required.    Chao  presents with what appears to be sensorineural hearing loss, with a low fz asymmetry.  He has elected to proceed with repeat audio in 3-6 months and MRI if asymmetric component is worsening if other symptoms arise. He will proceed with hearing aid consultation at this time.        Ruddy Louis MD  [facility]   [facility]  [id number] [place] [address].  BETSY Mckeon [id number]  P: [phone]  F: [phone]

## 2022-03-10 ENCOUNTER — CLINICAL SUPPORT (OUTPATIENT)
Dept: AUDIOLOGY | Facility: CLINIC | Age: 87
End: 2022-03-10
Payer: MEDICARE

## 2022-03-10 ENCOUNTER — OFFICE VISIT (OUTPATIENT)
Dept: OTOLARYNGOLOGY | Facility: CLINIC | Age: 87
End: 2022-03-10
Payer: MEDICARE

## 2022-03-10 VITALS
SYSTOLIC BLOOD PRESSURE: 120 MMHG | HEART RATE: 60 BPM | BODY MASS INDEX: 25.09 KG/M2 | DIASTOLIC BLOOD PRESSURE: 55 MMHG | TEMPERATURE: 98 F | WEIGHT: 185 LBS

## 2022-03-10 DIAGNOSIS — H90.3 SENSORINEURAL HEARING LOSS, BILATERAL: ICD-10-CM

## 2022-03-10 DIAGNOSIS — H90.3 SENSORINEURAL HEARING LOSS (SNHL) OF BOTH EARS: ICD-10-CM

## 2022-03-10 DIAGNOSIS — H91.90 HEARING LOSS, UNSPECIFIED HEARING LOSS TYPE, UNSPECIFIED LATERALITY: Primary | ICD-10-CM

## 2022-03-10 PROCEDURE — 1126F AMNT PAIN NOTED NONE PRSNT: CPT | Mod: CPTII,S$GLB,, | Performed by: STUDENT IN AN ORGANIZED HEALTH CARE EDUCATION/TRAINING PROGRAM

## 2022-03-10 PROCEDURE — 3288F FALL RISK ASSESSMENT DOCD: CPT | Mod: CPTII,S$GLB,, | Performed by: STUDENT IN AN ORGANIZED HEALTH CARE EDUCATION/TRAINING PROGRAM

## 2022-03-10 PROCEDURE — 69210 REMOVE IMPACTED EAR WAX UNI: CPT | Mod: S$GLB,,, | Performed by: STUDENT IN AN ORGANIZED HEALTH CARE EDUCATION/TRAINING PROGRAM

## 2022-03-10 PROCEDURE — 92567 PR TYMPA2METRY: ICD-10-PCS | Mod: S$GLB,,, | Performed by: AUDIOLOGIST-HEARING AID FITTER

## 2022-03-10 PROCEDURE — 92567 TYMPANOMETRY: CPT | Mod: S$GLB,,, | Performed by: AUDIOLOGIST-HEARING AID FITTER

## 2022-03-10 PROCEDURE — 99203 OFFICE O/P NEW LOW 30 MIN: CPT | Mod: 25,S$GLB,, | Performed by: STUDENT IN AN ORGANIZED HEALTH CARE EDUCATION/TRAINING PROGRAM

## 2022-03-10 PROCEDURE — 69210 PR REMOVAL IMPACTED CERUMEN REQUIRING INSTRUMENTATION, UNILATERAL: ICD-10-PCS | Mod: S$GLB,,, | Performed by: STUDENT IN AN ORGANIZED HEALTH CARE EDUCATION/TRAINING PROGRAM

## 2022-03-10 PROCEDURE — 99999 PR PBB SHADOW E&M-EST. PATIENT-LVL IV: CPT | Mod: PBBFAC,,, | Performed by: STUDENT IN AN ORGANIZED HEALTH CARE EDUCATION/TRAINING PROGRAM

## 2022-03-10 PROCEDURE — 1159F PR MEDICATION LIST DOCUMENTED IN MEDICAL RECORD: ICD-10-PCS | Mod: CPTII,S$GLB,, | Performed by: STUDENT IN AN ORGANIZED HEALTH CARE EDUCATION/TRAINING PROGRAM

## 2022-03-10 PROCEDURE — 1159F MED LIST DOCD IN RCRD: CPT | Mod: CPTII,S$GLB,, | Performed by: STUDENT IN AN ORGANIZED HEALTH CARE EDUCATION/TRAINING PROGRAM

## 2022-03-10 PROCEDURE — 99203 PR OFFICE/OUTPT VISIT, NEW, LEVL III, 30-44 MIN: ICD-10-PCS | Mod: 25,S$GLB,, | Performed by: STUDENT IN AN ORGANIZED HEALTH CARE EDUCATION/TRAINING PROGRAM

## 2022-03-10 PROCEDURE — 99999 PR PBB SHADOW E&M-EST. PATIENT-LVL I: ICD-10-PCS | Mod: PBBFAC,,, | Performed by: AUDIOLOGIST-HEARING AID FITTER

## 2022-03-10 PROCEDURE — 1101F PR PT FALLS ASSESS DOC 0-1 FALLS W/OUT INJ PAST YR: ICD-10-PCS | Mod: CPTII,S$GLB,, | Performed by: STUDENT IN AN ORGANIZED HEALTH CARE EDUCATION/TRAINING PROGRAM

## 2022-03-10 PROCEDURE — 1101F PT FALLS ASSESS-DOCD LE1/YR: CPT | Mod: CPTII,S$GLB,, | Performed by: STUDENT IN AN ORGANIZED HEALTH CARE EDUCATION/TRAINING PROGRAM

## 2022-03-10 PROCEDURE — 3288F PR FALLS RISK ASSESSMENT DOCUMENTED: ICD-10-PCS | Mod: CPTII,S$GLB,, | Performed by: STUDENT IN AN ORGANIZED HEALTH CARE EDUCATION/TRAINING PROGRAM

## 2022-03-10 PROCEDURE — 99999 PR PBB SHADOW E&M-EST. PATIENT-LVL IV: ICD-10-PCS | Mod: PBBFAC,,, | Performed by: STUDENT IN AN ORGANIZED HEALTH CARE EDUCATION/TRAINING PROGRAM

## 2022-03-10 PROCEDURE — 92557 COMPREHENSIVE HEARING TEST: CPT | Mod: S$GLB,,, | Performed by: AUDIOLOGIST-HEARING AID FITTER

## 2022-03-10 PROCEDURE — 1126F PR PAIN SEVERITY QUANTIFIED, NO PAIN PRESENT: ICD-10-PCS | Mod: CPTII,S$GLB,, | Performed by: STUDENT IN AN ORGANIZED HEALTH CARE EDUCATION/TRAINING PROGRAM

## 2022-03-10 PROCEDURE — 92557 PR COMPREHENSIVE HEARING TEST: ICD-10-PCS | Mod: S$GLB,,, | Performed by: AUDIOLOGIST-HEARING AID FITTER

## 2022-03-10 PROCEDURE — 99999 PR PBB SHADOW E&M-EST. PATIENT-LVL I: CPT | Mod: PBBFAC,,, | Performed by: AUDIOLOGIST-HEARING AID FITTER

## 2022-03-10 NOTE — PROGRESS NOTES
Health Maintenance Due   Topic Date Due    Shingles Vaccine (2 of 3) 10/27/2009     Updates were requested from care everywhere.  Chart was reviewed for overdue Proactive [facility] Encounters ([facility]) topics (CRS, Breast Cancer Screening, Eye exam)  Health Maintenance has been updated.  LINKS immunization registry triggered.  Immunizations were reconciled.

## 2022-03-21 NOTE — PROGRESS NOTES
Referring Provider: Dr. Heriberto Hawk was seen 03/10/2022 for an audiological evaluation.  Patient complains of longstanding bilateral hearing loss. He wishes to pursue hearing aids via his insurance. Denies otalgia, aural fullness, and vertigo.Does have a hx of noise exposure.    Results reveal a mild-to-profound sensorineural hearing loss 250-8000 Hz for the right ear, and a mild-to-profound sensorineural hearing loss 250-8000 Hz for the left ear.   Speech Reception Thresholds were  35 dBHL for the right ear and 40 dBHL for the left ear.   Word recognition scores were good for the right ear and good for the left ear.   Tympanograms were Type A, normal for the right ear and unable to obtain for the left ear.    Patient was counseled on the above findings.    Recommendations:  1. Binaural hearing aids through [employer]. Provided a copy of his audiogram to patient today.  2. Annual Audiograms.  3. Hearing protection in noise.

## 2022-04-14 ENCOUNTER — PATIENT OUTREACH (OUTPATIENT)
Dept: ADMINISTRATIVE | Facility: OTHER | Age: 87
End: 2022-04-14
Payer: MEDICARE

## 2022-04-21 ENCOUNTER — PATIENT MESSAGE (OUTPATIENT)
Dept: INTERNAL MEDICINE | Facility: CLINIC | Age: 87
End: 2022-04-21
Payer: MEDICARE

## 2022-04-21 DIAGNOSIS — M79.642 PAIN IN BOTH HANDS: ICD-10-CM

## 2022-04-21 DIAGNOSIS — M12.9 ARTHRITIS/ARTHROPATHY OF MULTIPLE JOINTS: ICD-10-CM

## 2022-04-21 DIAGNOSIS — M79.641 PAIN IN BOTH HANDS: ICD-10-CM

## 2022-04-21 DIAGNOSIS — M51.36 DDD (DEGENERATIVE DISC DISEASE), LUMBAR: ICD-10-CM

## 2022-04-22 RX ORDER — PREDNISOLONE ACETATE 10 MG/ML
1 SUSPENSION/ DROPS OPHTHALMIC 2 TIMES DAILY
COMMUNITY
Start: 2022-04-21

## 2022-04-22 RX ORDER — TRAMADOL HYDROCHLORIDE 50 MG/1
50 TABLET ORAL DAILY PRN
COMMUNITY
End: 2022-04-22 | Stop reason: SDUPTHER

## 2022-04-22 NOTE — TELEPHONE ENCOUNTER
No new care gaps identified.  Powered by [employer]. Reference number: [id number].   4/22/2022 7:27:34 AM CDT

## 2022-04-22 NOTE — TELEPHONE ENCOUNTER
LA  Verified    Mychart refill request sent to Dr. Ureña for review [Tramadol].    See pt's Imaggahart message RE arthritic hand pain, referral to Ortho please advise?

## 2022-04-26 RX ORDER — TRAMADOL HYDROCHLORIDE 50 MG/1
50 TABLET ORAL DAILY PRN
Qty: 30 TABLET | Refills: 0 | Status: SHIPPED | OUTPATIENT
Start: 2022-04-26 | End: 2023-01-12 | Stop reason: SDUPTHER

## 2022-05-03 ENCOUNTER — OFFICE VISIT (OUTPATIENT)
Dept: ORTHOPEDICS | Facility: CLINIC | Age: 87
End: 2022-05-03
Payer: MEDICARE

## 2022-05-03 ENCOUNTER — HOSPITAL ENCOUNTER (OUTPATIENT)
Dept: RADIOLOGY | Facility: HOSPITAL | Age: 87
Discharge: HOME | End: 2022-05-03
Attending: ORTHOPAEDIC SURGERY
Payer: MEDICARE

## 2022-05-03 VITALS — BODY MASS INDEX: 25.06 KG/M2 | HEIGHT: 72 IN | WEIGHT: 185 LBS

## 2022-05-03 DIAGNOSIS — M79.642 BILATERAL HAND PAIN: Primary | ICD-10-CM

## 2022-05-03 DIAGNOSIS — M18.0 ARTHRITIS OF CARPOMETACARPAL (CMC) JOINTS OF BOTH THUMBS: Primary | ICD-10-CM

## 2022-05-03 DIAGNOSIS — M79.642 PAIN IN BOTH HANDS: ICD-10-CM

## 2022-05-03 DIAGNOSIS — M79.641 BILATERAL HAND PAIN: ICD-10-CM

## 2022-05-03 DIAGNOSIS — M79.641 PAIN IN BOTH HANDS: ICD-10-CM

## 2022-05-03 DIAGNOSIS — M12.9 ARTHRITIS/ARTHROPATHY OF MULTIPLE JOINTS: ICD-10-CM

## 2022-05-03 DIAGNOSIS — M79.641 BILATERAL HAND PAIN: Primary | ICD-10-CM

## 2022-05-03 DIAGNOSIS — M79.642 BILATERAL HAND PAIN: ICD-10-CM

## 2022-05-03 PROCEDURE — 73130 X-RAY EXAM OF HAND: CPT | Mod: 26,50,, | Performed by: RADIOLOGY

## 2022-05-03 PROCEDURE — 1160F PR REVIEW ALL MEDS BY PRESCRIBER/CLIN PHARMACIST DOCUMENTED: ICD-10-PCS | Mod: CPTII,S$GLB,, | Performed by: ORTHOPAEDIC SURGERY

## 2022-05-03 PROCEDURE — 99203 PR OFFICE/OUTPT VISIT, NEW, LEVL III, 30-44 MIN: ICD-10-PCS | Mod: 25,S$GLB,, | Performed by: ORTHOPAEDIC SURGERY

## 2022-05-03 PROCEDURE — 1125F PR PAIN SEVERITY QUANTIFIED, PAIN PRESENT: ICD-10-PCS | Mod: CPTII,S$GLB,, | Performed by: ORTHOPAEDIC SURGERY

## 2022-05-03 PROCEDURE — 1160F RVW MEDS BY RX/DR IN RCRD: CPT | Mod: CPTII,S$GLB,, | Performed by: ORTHOPAEDIC SURGERY

## 2022-05-03 PROCEDURE — 20600 DRAIN/INJ JOINT/BURSA W/O US: CPT | Mod: 50,S$GLB,, | Performed by: ORTHOPAEDIC SURGERY

## 2022-05-03 PROCEDURE — 1101F PT FALLS ASSESS-DOCD LE1/YR: CPT | Mod: CPTII,S$GLB,, | Performed by: ORTHOPAEDIC SURGERY

## 2022-05-03 PROCEDURE — 1159F MED LIST DOCD IN RCRD: CPT | Mod: CPTII,S$GLB,, | Performed by: ORTHOPAEDIC SURGERY

## 2022-05-03 PROCEDURE — 1101F PR PT FALLS ASSESS DOC 0-1 FALLS W/OUT INJ PAST YR: ICD-10-PCS | Mod: CPTII,S$GLB,, | Performed by: ORTHOPAEDIC SURGERY

## 2022-05-03 PROCEDURE — 99999 PR PBB SHADOW E&M-EST. PATIENT-LVL IV: CPT | Mod: PBBFAC,,, | Performed by: ORTHOPAEDIC SURGERY

## 2022-05-03 PROCEDURE — 20600 SMALL JOINT ASPIRATION/INJECTION: R THUMB CMC, L THUMB CMC: ICD-10-PCS | Mod: 50,S$GLB,, | Performed by: ORTHOPAEDIC SURGERY

## 2022-05-03 PROCEDURE — 3288F FALL RISK ASSESSMENT DOCD: CPT | Mod: CPTII,S$GLB,, | Performed by: ORTHOPAEDIC SURGERY

## 2022-05-03 PROCEDURE — 1125F AMNT PAIN NOTED PAIN PRSNT: CPT | Mod: CPTII,S$GLB,, | Performed by: ORTHOPAEDIC SURGERY

## 2022-05-03 PROCEDURE — 99999 PR PBB SHADOW E&M-EST. PATIENT-LVL IV: ICD-10-PCS | Mod: PBBFAC,,, | Performed by: ORTHOPAEDIC SURGERY

## 2022-05-03 PROCEDURE — 99203 OFFICE O/P NEW LOW 30 MIN: CPT | Mod: 25,S$GLB,, | Performed by: ORTHOPAEDIC SURGERY

## 2022-05-03 PROCEDURE — 73130 X-RAY EXAM OF HAND: CPT | Mod: TC,50

## 2022-05-03 PROCEDURE — 73130 XR HAND COMPLETE 3 VIEWS BILATERAL: ICD-10-PCS | Mod: 26,50,, | Performed by: RADIOLOGY

## 2022-05-03 PROCEDURE — 1159F PR MEDICATION LIST DOCUMENTED IN MEDICAL RECORD: ICD-10-PCS | Mod: CPTII,S$GLB,, | Performed by: ORTHOPAEDIC SURGERY

## 2022-05-03 PROCEDURE — 3288F PR FALLS RISK ASSESSMENT DOCUMENTED: ICD-10-PCS | Mod: CPTII,S$GLB,, | Performed by: ORTHOPAEDIC SURGERY

## 2022-05-03 RX ORDER — TRIAMCINOLONE ACETONIDE 40 MG/ML
40 INJECTION, SUSPENSION INTRA-ARTICULAR; INTRAMUSCULAR
Status: DISCONTINUED | OUTPATIENT
Start: 2022-05-03 | End: 2022-05-03 | Stop reason: HOSPADM

## 2022-05-03 RX ADMIN — TRIAMCINOLONE ACETONIDE 40 MG: 40 INJECTION, SUSPENSION INTRA-ARTICULAR; INTRAMUSCULAR at 03:05

## 2022-05-03 NOTE — PROCEDURES
Small Joint Aspiration/Injection: R thumb CMC, L thumb CMC    Date/Time: 5/3/2022 3:45 PM  Performed by: Griffin Gutierrez MD  Authorized by: Griffin Gutierrez MD     Consent Done?:  Yes (Verbal)  Indications:  Arthritis  Timeout: prior to procedure the correct patient, procedure, and site was verified    Prep: patient was prepped and draped in usual sterile fashion      Local anesthesia used?: Yes    Local anesthetic:  Lidocaine 2% without epinephrine  Anesthetic total (ml):  1    Location:  Thumb  Site:  R thumb CMC and L thumb CMC  Ultrasonic guidance for needle placement?: No    Needle size:  25 G  Approach:  Dorsal  Medications:  40 mg triamcinolone acetonide 40 mg/mL

## 2022-05-03 NOTE — PROGRESS NOTES
Subjective:     Patient ID: Chao Hawk is a 89 y.o. male.    Chief Complaint: Pain of the Right Hand and Pain of the Left Hand      HPI:  The patient is a 89-year-old male with bilateral thumb basal joint arthritis.  He he has tried splinting without improvement.  He requests injection basal joints of both thumbs.    Past Medical History:   Diagnosis Date    Anemia     Arthritis     back, hand    [facility]     [facility]- [facility]    Bilateral leg edema 2/16/2018    Chronic bronchitis     COPD (chronic obstructive pulmonary disease)     Diverticulitis of large intestine with perforation without abscess or bleeding     Diverticulosis 5/16/06    colonoscopy    Hearing loss, bilateral     Hernia     x2    Hyperlipidemia     Hypertension     Lumbar radiculopathy 6/14/2017    Multiple renal cysts 7/5/2016    Nocturnal hypoxemia     Polyneuropathy     Tobacco dependence     Trouble in sleeping      Past Surgical History:   Procedure Laterality Date    FINGER SURGERY Left 1980s    index- Dr. Encarnacion; to remove nodule    HERNIA REPAIR  1990s    x2    INTRAOCULAR LENS INSERTION Bilateral 2016    LUMBAR SPINE SURGERY  09/13/2017    REVERSE TOTAL SHOULDER ARTHROPLASTY Right 10/5/2020    Procedure: ARTHROPLASTY, SHOULDER, TOTAL, REVERSE;  Surgeon: Reji Martin MD;  Location: [facility];  Service: Orthopedics;  Laterality: Right;    TONSILLECTOMY, ADENOIDECTOMY  1940     Family History   Problem Relation Age of Onset    Emphysema Mother     Heart disease Mother     COPD Mother     Appendicitis Father     Heart disease Son     Atrial fibrillation Sister     Lung disease Sister 91    Colon cancer Neg Hx     Cancer Neg Hx     Stroke Neg Hx      Social History     Socioeconomic History    Marital status:      Spouse name: Linsey    Number of children: 4    Years of education: 12 + 4   Occupational History    Occupation:  retired age 60     Comment: [employer]    Tobacco Use    Smoking status: Former Smoker     Packs/day: 1.00     Years: 40.00     Pack years: 40.00     Start date: 1949     Quit date: 1991     Years since quittin.3    Smokeless tobacco: Never Used   Substance and Sexual Activity    Alcohol use: Yes     Comment: rarely   No alcohol 72h  prior to sx    Drug use: No    Sexual activity: Never   Social History Narrative    No smokers or pets in household.     Medication List with Changes/Refills   Current Medications    ACETAMINOPHEN (TYLENOL) 500 MG TABLET    Take 500 mg by mouth as needed for Pain.    ALBUTEROL (PROVENTIL) 2.5 MG /3 ML (0.083 %) NEBULIZER SOLUTION    Take 3 mLs (2.5 mg total) by nebulization every 4 (four) hours as needed for Wheezing. Rescue    ARGININE, L-ARGININE, ORAL    Take 500 mg by mouth once daily.    ASPIRIN (ECOTRIN) 81 MG EC TABLET    Take 1 tablet (81 mg total) by mouth 2 (two) times a day.    ATORVASTATIN (LIPITOR) 40 MG TABLET    TAKE 1 TABLET EVERY DAY    BETAMETHASONE VALERATE 0.1% (VALISONE) 0.1 % OINT    Apply topically 2 (two) times daily.    CANNABIDIOL, CBD, EXTRACT ORAL    Take 3 drops by mouth every morning.    CHOLECALCIFEROL, VITAMIN D3, (VITAMIN D3) 50 MCG (2,000 UNIT) CAP    Take 1 capsule by mouth every evening.     COQ10, UBIQUINOL, ORAL    Take 1 tablet by mouth nightly.     CYANOCOBALAMIN (VITAMIN B-12) 1000 MCG TABLET    Take 100 mcg by mouth once daily.    DUTASTERIDE (AVODART) 0.5 MG CAPSULE    Take 1 capsule (0.5 mg total) by mouth once daily.    FUROSEMIDE (LASIX) 20 MG TABLET    TAKE 1 TABLET (20 MG TOTAL) BY MOUTH ONCE DAILY.    GABAPENTIN (NEURONTIN) 100 MG CAPSULE    Take 1 capsule (100 mg total) by mouth 2 (two) times daily.    GLUCOSAM-MSM-CHONDROIT-VIT D3 ORAL    Take 2 tablets by mouth once daily.    KRILL/OM-3/DHA/EPA/PHOSPHO/AST (MEGARED OMEGA-3 KRILL OIL ORAL)    Take 1 capsule by mouth nightly.     LISINOPRIL 10 MG TABLET    TAKE 1 TABLET EVERY DAY    MULTIVIT-MIN/FOLIC/VIT  "K/LYCOP (ONE-A-DAY MEN'S MULTIVITAMIN ORAL)    Take 1 tablet by mouth every evening.     POTASSIUM 99 MG TAB    Take by mouth once daily.     PREDNISOLONE ACETATE (PRED FORTE) 1 % DRPS    Place 1 drop into the right eye 2 (two) times daily.    SARS-COV-2, COVID-19, (PFIZER COVID-19) 30 MCG/0.3 ML INJECTION    Inject into the muscle.    TAMSULOSIN (FLOMAX) 0.4 MG CAP    TAKE 2 CAPSULES EVERY MORNING    TRAMADOL (ULTRAM) 50 MG TABLET    Take 1 tablet (50 mg total) by mouth daily as needed for Pain.    TRELEGY ELLIPTA 100-62.5-25 MCG DSDV    INHALE 1 PUFF INTO THE LUNGS ONCE DAILY     Review of patient's allergies indicates:   Allergen Reactions    Penicillins Rash     "Felt like I had pins and needles in my feet" , hospitalized overnight    Bee sting  [allergen ext-venom-honey bee] Swelling    Poison ivy extract Hives     Review of Systems   Constitutional: Negative for malaise/fatigue.   HENT: Negative for hearing loss.    Eyes: Negative for double vision and visual disturbance.   Cardiovascular: Positive for chest pain.   Respiratory: Positive for shortness of breath.    Endocrine: Negative for cold intolerance.   Hematologic/Lymphatic: Does not bruise/bleed easily.   Skin: Negative for poor wound healing and suspicious lesions.   Musculoskeletal: Positive for arthritis, back pain, joint pain and joint swelling. Negative for gout.   Gastrointestinal: Positive for abdominal pain. Negative for nausea and vomiting.   Genitourinary: Positive for frequency, hesitancy, incomplete emptying and nocturia. Negative for dysuria.   Neurological: Positive for numbness, paresthesias, sensory change and weakness.   Psychiatric/Behavioral: Negative for depression, memory loss and substance abuse. The patient is not nervous/anxious.    Allergic/Immunologic: Negative for persistent infections.       Objective:   Body mass index is 25.09 kg/m².  There were no vitals filed for this visit.             General    Constitutional: He is " oriented to person, place, and time. He appears well-developed and well-nourished. No distress.   HENT:   Head: Normocephalic.   Eyes: EOM are normal.   Pulmonary/Chest: Effort normal.   Neurological: He is oriented to person, place, and time.   Psychiatric: He has a normal mood and affect.             Right Hand/Wrist Exam     Inspection   Scars: Wrist - absent Hand -  absent  Effusion: Wrist - absent Hand -  absent    Pain   Wrist - The patient exhibits pain of the CMC.    Other     Neuorologic Exam    Median Distribution: normal  Ulnar Distribution: normal  Radial Distribution: normal    Comments:  The patient is tender right thumb basal joint with a positive axial circumduction grind test.  There are no motor or sensory deficits.      Left Hand/Wrist Exam     Inspection   Scars: Wrist - absent Hand -  absent  Effusion: Wrist - absent Hand -  present    Pain   Wrist - The patient exhibits pain of the CMC.    Other     Sensory Exam  Median Distribution: normal  Ulnar Distribution: normal  Radial Distribution: normal    Comments:  The patient is tender left thumb basal joint with a positive axial circumduction grind test.  There are no motor or sensory deficits.          Vascular Exam       Capillary Refill  Right Hand: normal capillary refill  Left Hand: normal capillary refill            Relevant imaging results reviewed and interpreted by me, discussed with the patient and / or family today radiographs of both hands reviewed which showed osteoarthritis basal joint both thumbs  Assessment:     Encounter Diagnoses   Name Primary?    Pain in both hands     Arthritis/arthropathy of multiple joints     Arthritis of carpometacarpal (CMC) joints of both thumbs Yes        Plan:     The patient injected bilateral thumb basal joints each with 0.5 cc of Kenalog and 0.5 cc of 2% plain lidocaine under sterile technique.  He will return in 3 weeks if not improved.  He request standard follow-up every 3  months                Disclaimer: This note was prepared using a voice recognition system and is likely to have sound alike errors within the text.

## 2022-08-16 ENCOUNTER — OFFICE VISIT (OUTPATIENT)
Dept: UROLOGY | Facility: CLINIC | Age: 87
End: 2022-08-16
Payer: MEDICARE

## 2022-08-16 VITALS
SYSTOLIC BLOOD PRESSURE: 110 MMHG | DIASTOLIC BLOOD PRESSURE: 68 MMHG | HEART RATE: 84 BPM | HEIGHT: 72 IN | WEIGHT: 185 LBS | TEMPERATURE: 98 F | BODY MASS INDEX: 25.06 KG/M2

## 2022-08-16 DIAGNOSIS — R39.12 BENIGN PROSTATIC HYPERPLASIA WITH WEAK URINARY STREAM: Primary | ICD-10-CM

## 2022-08-16 DIAGNOSIS — N40.1 BENIGN PROSTATIC HYPERPLASIA WITH WEAK URINARY STREAM: Primary | ICD-10-CM

## 2022-08-16 PROCEDURE — 99999 PR PBB SHADOW E&M-EST. PATIENT-LVL V: CPT | Mod: PBBFAC,,, | Performed by: UROLOGY

## 2022-08-16 PROCEDURE — 1101F PR PT FALLS ASSESS DOC 0-1 FALLS W/OUT INJ PAST YR: ICD-10-PCS | Mod: CPTII,S$GLB,, | Performed by: UROLOGY

## 2022-08-16 PROCEDURE — 1101F PT FALLS ASSESS-DOCD LE1/YR: CPT | Mod: CPTII,S$GLB,, | Performed by: UROLOGY

## 2022-08-16 PROCEDURE — 1126F AMNT PAIN NOTED NONE PRSNT: CPT | Mod: CPTII,S$GLB,, | Performed by: UROLOGY

## 2022-08-16 PROCEDURE — 1159F MED LIST DOCD IN RCRD: CPT | Mod: CPTII,S$GLB,, | Performed by: UROLOGY

## 2022-08-16 PROCEDURE — 99213 OFFICE O/P EST LOW 20 MIN: CPT | Mod: S$GLB,,, | Performed by: UROLOGY

## 2022-08-16 PROCEDURE — 1160F RVW MEDS BY RX/DR IN RCRD: CPT | Mod: CPTII,S$GLB,, | Performed by: UROLOGY

## 2022-08-16 PROCEDURE — 3288F PR FALLS RISK ASSESSMENT DOCUMENTED: ICD-10-PCS | Mod: CPTII,S$GLB,, | Performed by: UROLOGY

## 2022-08-16 PROCEDURE — 3288F FALL RISK ASSESSMENT DOCD: CPT | Mod: CPTII,S$GLB,, | Performed by: UROLOGY

## 2022-08-16 PROCEDURE — 99999 PR PBB SHADOW E&M-EST. PATIENT-LVL V: ICD-10-PCS | Mod: PBBFAC,,, | Performed by: UROLOGY

## 2022-08-16 PROCEDURE — 1159F PR MEDICATION LIST DOCUMENTED IN MEDICAL RECORD: ICD-10-PCS | Mod: CPTII,S$GLB,, | Performed by: UROLOGY

## 2022-08-16 PROCEDURE — 1160F PR REVIEW ALL MEDS BY PRESCRIBER/CLIN PHARMACIST DOCUMENTED: ICD-10-PCS | Mod: CPTII,S$GLB,, | Performed by: UROLOGY

## 2022-08-16 PROCEDURE — 99213 PR OFFICE/OUTPT VISIT, EST, LEVL III, 20-29 MIN: ICD-10-PCS | Mod: S$GLB,,, | Performed by: UROLOGY

## 2022-08-16 PROCEDURE — 1126F PR PAIN SEVERITY QUANTIFIED, NO PAIN PRESENT: ICD-10-PCS | Mod: CPTII,S$GLB,, | Performed by: UROLOGY

## 2022-08-16 NOTE — PROGRESS NOTES
Chief Complaint: LUTS    HPI:   08/16/2022 - returns for follow-up, no issues with the dutasteride, voiding well, no GH or dysuria    12/29/2021 - presents for follow-up, has been taking the finasteride but it gives him a rash, denies any gross hematuria, notes incomplete emptying as well as some urgency    06/22/2021 - patient presents for follow-up, thinks this stream has improved and is now okay, had some itchiness with the finasteride and thus he cuts the 1 pill in to 2 halves and takes these during the day, denies any gross hematuria, now down to 4 cups of coffee a day, still has nocturia x2    12/21/20: Daytime frequency and low void, nocturia x2.  Drinks 8 cups coffee a day.  Reviewed history in detail.   11/18/20: Cysto today shows BPH, voiding okay after.  Bladder compliance low, trabeculated.  11/2/20: 87 yo man has had two occasions of retention after right shoulder surgery.  Pittman placed again last night.  Been on flomax a long time.   No abd/pelvic pain and no exac/rel factors.  No hematuria.  No urolithiasis.  No urinary bother.  No  history.  Normal sexual function.    Allergies:  Penicillins, Bee sting  [allergen ext-venom-honey bee], and Poison ivy extract    Medications:  has a current medication list which includes the following prescription(s): acetaminophen, albuterol, arginine, aspirin, atorvastatin, betamethasone valerate 0.1%, cannabidiol (cbd), cholecalciferol (vitamin d3), ubiquinol, cyanocobalamin, dutasteride, furosemide, gabapentin, glucosam/msm/chondroit/vit d3, krill/om-3/dha/epa/phospho/ast, lisinopril, multivit-min/folic/vit k/lycop, potassium, prednisolone acetate, sars-cov-2 (covid-19), tamsulosin, tramadol, and trelegy ellipta.    Review of Systems:  General: No fever, chills  Skin: No rashes  Chest:  Denies cough and sputum production  Heart: Denies chest pain  Resp: Denies dyspnea  Abdomen: Denies diarrhea, abdominal pain, hematemesis, or blood in stool.  Musculoskeletal: No  joint stiffness or swelling. Denies back pain.  : see HPI  Neuro: no dizziness or weakness      PMH:   has a past medical history of Anemia, Arthritis, Back pain, Bilateral leg edema (2/16/2018), Chronic bronchitis, COPD (chronic obstructive pulmonary disease), Diverticulitis of large intestine with perforation without abscess or bleeding, Diverticulosis (5/16/06), Hearing loss, bilateral, Hernia, Hyperlipidemia, Hypertension, Lumbar radiculopathy (6/14/2017), Multiple renal cysts (7/5/2016), Nocturnal hypoxemia, Polyneuropathy, Tobacco dependence, and Trouble in sleeping.    PSH:   has a past surgical history that includes Finger surgery (Left, 1980s); Hernia repair (1990s); TONSILLECTOMY, ADENOIDECTOMY (1940); Lumbar spine surgery (09/13/2017); Intraocular lens insertion (Bilateral, 2016); and Reverse total shoulder arthroplasty (Right, 10/5/2020).    FamHx: family history includes Appendicitis in his father; Atrial fibrillation in his sister; COPD in his mother; Emphysema in his mother; Heart disease in his mother and son; Lung disease (age of onset: 91) in his sister.    SocHx:  reports that he quit smoking about 31 years ago. He started smoking about 73 years ago. He has a 40.00 pack-year smoking history. He has never used smokeless tobacco. He reports current alcohol use. He reports that he does not use drugs.      Physical Exam:  Vitals:    08/16/22 1615   BP: 110/68   Pulse: 84   Temp: 97.5 °F (36.4 °C)     General: awake, alert, cooperative  Head: NC/AT  Ears: external ears normal  Eyes: sclera normal  Lungs: normal inspiration, NAD  Heart: well-perfused  Skin: The skin is warm and dry  Ext: No c/c/e.  Neuro: grossly intact, AOx3    Labs/Studies:   Lab Results   Component Value Date    WBC 9.75 01/14/2022    HGB 12.8 (L) 01/14/2022    HCT 41.1 01/14/2022     01/14/2022     01/14/2022    K 4.5 01/14/2022     01/14/2022    CREATININE 1.0 01/14/2022    BUN 27 (H) 01/14/2022    CO2 27  01/14/2022    TSH 2.14 05/16/2011    PSA 3.2 01/10/2020    INR 1.0 04/13/2018    GLUF 96 08/31/2012    CHOL 128 01/14/2022    TRIG 88 01/14/2022    HDL 50 01/14/2022    ALT 19 01/14/2022    AST 23 01/14/2022     PSA    1/20: 3.2    Impression/Plan:   89 yo M with:    LUTS - continue flomax and dutasteride, f/u 6 months    Prostate Cancer Screening - had a long discussion with the patient regarding prostate cancer screening, patient not interested in further prostate cancer screening, no more PSAs or DREs      Tone Pineda MD

## 2022-08-25 DIAGNOSIS — E78.5 HYPERLIPIDEMIA, UNSPECIFIED HYPERLIPIDEMIA TYPE: Primary | ICD-10-CM

## 2022-08-25 DIAGNOSIS — D64.9 ANEMIA, UNSPECIFIED TYPE: ICD-10-CM

## 2022-08-29 ENCOUNTER — OFFICE VISIT (OUTPATIENT)
Dept: INTERNAL MEDICINE | Facility: CLINIC | Age: 87
End: 2022-08-29
Payer: MEDICARE

## 2022-08-29 VITALS
HEART RATE: 106 BPM | TEMPERATURE: 98 F | OXYGEN SATURATION: 96 % | DIASTOLIC BLOOD PRESSURE: 68 MMHG | BODY MASS INDEX: 24.94 KG/M2 | SYSTOLIC BLOOD PRESSURE: 118 MMHG | WEIGHT: 183.88 LBS

## 2022-08-29 DIAGNOSIS — I70.0 ATHEROSCLEROSIS OF AORTA: ICD-10-CM

## 2022-08-29 DIAGNOSIS — E78.00 PURE HYPERCHOLESTEROLEMIA: Chronic | ICD-10-CM

## 2022-08-29 DIAGNOSIS — I10 ESSENTIAL HYPERTENSION: Primary | Chronic | ICD-10-CM

## 2022-08-29 DIAGNOSIS — I73.9 PVD (PERIPHERAL VASCULAR DISEASE): ICD-10-CM

## 2022-08-29 DIAGNOSIS — I27.21 PAH (PULMONARY ARTERY HYPERTENSION): ICD-10-CM

## 2022-08-29 DIAGNOSIS — I71.40 ABDOMINAL AORTIC ANEURYSM WITHOUT RUPTURE: ICD-10-CM

## 2022-08-29 DIAGNOSIS — G62.9 PERIPHERAL POLYNEUROPATHY: ICD-10-CM

## 2022-08-29 DIAGNOSIS — J44.9 CHRONIC OBSTRUCTIVE PULMONARY DISEASE, UNSPECIFIED COPD TYPE: Chronic | ICD-10-CM

## 2022-08-29 DIAGNOSIS — Z74.09 IMPAIRED FUNCTIONAL MOBILITY, BALANCE, GAIT, AND ENDURANCE: ICD-10-CM

## 2022-08-29 DIAGNOSIS — D64.9 CHRONIC ANEMIA: ICD-10-CM

## 2022-08-29 PROCEDURE — 1101F PR PT FALLS ASSESS DOC 0-1 FALLS W/OUT INJ PAST YR: ICD-10-PCS | Mod: CPTII,S$GLB,, | Performed by: PEDIATRICS

## 2022-08-29 PROCEDURE — 1101F PT FALLS ASSESS-DOCD LE1/YR: CPT | Mod: CPTII,S$GLB,, | Performed by: PEDIATRICS

## 2022-08-29 PROCEDURE — 1160F RVW MEDS BY RX/DR IN RCRD: CPT | Mod: CPTII,S$GLB,, | Performed by: PEDIATRICS

## 2022-08-29 PROCEDURE — 99999 PR PBB SHADOW E&M-EST. PATIENT-LVL V: CPT | Mod: PBBFAC,,, | Performed by: PEDIATRICS

## 2022-08-29 PROCEDURE — 99214 OFFICE O/P EST MOD 30 MIN: CPT | Mod: S$GLB,,, | Performed by: PEDIATRICS

## 2022-08-29 PROCEDURE — 1159F MED LIST DOCD IN RCRD: CPT | Mod: CPTII,S$GLB,, | Performed by: PEDIATRICS

## 2022-08-29 PROCEDURE — 3288F PR FALLS RISK ASSESSMENT DOCUMENTED: ICD-10-PCS | Mod: CPTII,S$GLB,, | Performed by: PEDIATRICS

## 2022-08-29 PROCEDURE — 3288F FALL RISK ASSESSMENT DOCD: CPT | Mod: CPTII,S$GLB,, | Performed by: PEDIATRICS

## 2022-08-29 PROCEDURE — 99999 PR PBB SHADOW E&M-EST. PATIENT-LVL V: ICD-10-PCS | Mod: PBBFAC,,, | Performed by: PEDIATRICS

## 2022-08-29 PROCEDURE — 1160F PR REVIEW ALL MEDS BY PRESCRIBER/CLIN PHARMACIST DOCUMENTED: ICD-10-PCS | Mod: CPTII,S$GLB,, | Performed by: PEDIATRICS

## 2022-08-29 PROCEDURE — 99214 PR OFFICE/OUTPT VISIT, EST, LEVL IV, 30-39 MIN: ICD-10-PCS | Mod: S$GLB,,, | Performed by: PEDIATRICS

## 2022-08-29 PROCEDURE — 1159F PR MEDICATION LIST DOCUMENTED IN MEDICAL RECORD: ICD-10-PCS | Mod: CPTII,S$GLB,, | Performed by: PEDIATRICS

## 2022-08-29 NOTE — PROGRESS NOTES
Subjective:       Patient ID: Chao Hawk is a 89 y.o. male.    Chief Complaint: Follow-up (6 month)    Chao Hawk is a 89 y.o. male who presents to clinic for a follow up    1) HTN: B/P normal, no HTNive symptoms  2) LIPIDS:following D&E, tolerating and compliant with med(s).    3) BPH: on meds and Sx acceptable seeing Urology   4) COPD/Pulmonary artery HTN:S ees pulmonary, compliant with meds  5) Low T: no longer On depo testosterone.  6) Back:s/p surgery, uses canes only for balance and support for uneven surface, has residual feet numbness. Worse in AM until he gets around. Uses only CBD oil. Not any other OTC or tramadol. 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   8) AAA: medical management. CT 2018: distal abdominal aortic aneurysm measuring 3.6 cm AP dimension 3.3 cm transverse dimension.  There is also fusiform aneurysmal dilatation of the mid infrarenal abdominal aorta measuring approximately 3.2 cm in diameter.    LABS REVIEWED AND DISCUSSED WITH PATIENT: CBC, CMP, and lipids    Review of Systems   Constitutional:  Negative for activity change, appetite change, chills, diaphoresis, fatigue, fever and unexpected weight change.   HENT:  Negative for nasal congestion, ear pain, mouth sores, nosebleeds, postnasal drip, rhinorrhea, sneezing and sore throat.    Eyes:  Negative for photophobia, pain, discharge, redness and visual disturbance.   Respiratory:  Negative for cough, chest tightness, shortness of breath, wheezing and stridor.    Cardiovascular:  Negative for chest pain, palpitations and leg swelling.   Gastrointestinal:  Negative for abdominal distention, blood in stool, constipation, diarrhea, nausea and vomiting.   Genitourinary:  Negative for decreased urine volume, difficulty urinating, dysuria, flank pain, frequency, genital sores, hematuria and urgency.   Musculoskeletal:  Negative for arthralgias, back pain, joint swelling, neck  pain and neck stiffness.   Integumentary:  Negative for color change, pallor, rash and wound.   Neurological:  Negative for dizziness, syncope, speech difficulty, weakness, light-headedness and headaches.   Hematological:  Negative for adenopathy. Does not bruise/bleed easily.   Psychiatric/Behavioral:  Negative for confusion, decreased concentration, dysphoric mood, hallucinations, sleep disturbance and suicidal ideas. The patient is not nervous/anxious.    All other systems reviewed and are negative.      Objective:      Physical Exam  Vitals and nursing note reviewed.   Constitutional:       General: He is not in acute distress.     Appearance: He is well-developed.   Neck:      Thyroid: No thyromegaly.      Vascular: No JVD.   Cardiovascular:      Rate and Rhythm: Normal rate and regular rhythm.      Heart sounds: Normal heart sounds. No murmur heard.  Pulmonary:      Effort: Pulmonary effort is normal. No respiratory distress.      Breath sounds: Normal breath sounds. No wheezing or rales.   Abdominal:      General: There is no distension.      Palpations: Abdomen is soft. There is no mass.      Tenderness: There is no abdominal tenderness. There is no guarding.   Musculoskeletal:      Right lower leg: Edema (bilateral trace edema, mild varicosities, dorsalis pedis pulses absent but just palpable present in groin) present.      Left lower leg: Edema present.   Lymphadenopathy:      Cervical: No cervical adenopathy.   Skin:     Capillary Refill: Capillary refill takes less than 2 seconds.      Findings: No rash.   Neurological:      General: No focal deficit present.      Mental Status: He is alert and oriented to person, place, and time.      Cranial Nerves: No cranial nerve deficit.      Coordination: Coordination normal.   Psychiatric:         Mood and Affect: Mood normal.         Behavior: Behavior normal.         Thought Content: Thought content normal.         Judgment: Judgment normal.       Assessment:        Problem List Items Addressed This Visit       COPD (chronic obstructive pulmonary disease) (Chronic)    Relevant Orders    Ambulatory referral/consult to Pulmonology    Essential hypertension - Primary (Chronic)    Pure hypercholesterolemia (Chronic)    Relevant Orders    Lipid Panel    Comprehensive Metabolic Panel    Atherosclerosis of aorta    Abdominal aortic aneurysm without rupture    PAH (pulmonary artery hypertension)    Impaired functional mobility, balance, gait, and endurance    Peripheral neuropathy    Chronic anemia     Other Visit Diagnoses       PVD (peripheral vascular disease)        Relevant Orders    Ankle Brachial Indices (IRLANDA)            Plan:     Essential hypertension    PAH (pulmonary artery hypertension)    Peripheral polyneuropathy    Pure hypercholesterolemia  -     Lipid Panel; Future; Expected date: 08/29/2022  -     Comprehensive Metabolic Panel; Future; Expected date: 08/29/2022    Atherosclerosis of aorta    Abdominal aortic aneurysm without rupture    Chronic obstructive pulmonary disease, unspecified COPD type  -     Ambulatory referral/consult to Pulmonology; Future; Expected date: 09/05/2022    Impaired functional mobility, balance, gait, and endurance    PVD (peripheral vascular disease)  -     Ankle Brachial Indices (IRLANDA); Future    Chronic anemia      IRLANDA to rule out PVD. If abnormal see Cards. Otherwise at goal for B/P, lipids, and A1c. Maintain meds, self monitoring D&E, weight moderation. F/U 6 months with labs.     Scribe Attestation:   I, Andrew Granda, am scribing for, and in the presence of, Dr. Amari Ureña Jr. I performed the above scribed service and the documentation accurately describes the services I performed. I attest to the accuracy of the note.    I, Dr. Amari Ureña Jr, reviewed documentation as scribed above. I personally performed the services described in this documentation.  I agree that the record reflects my personal performance and is  accurate and complete. Amari Ureña Jr., MD.  08/29/2022

## 2022-09-01 ENCOUNTER — HOSPITAL ENCOUNTER (OUTPATIENT)
Dept: CARDIOLOGY | Facility: HOSPITAL | Age: 87
Discharge: HOME | End: 2022-09-01
Attending: PEDIATRICS
Payer: MEDICARE

## 2022-09-01 DIAGNOSIS — I73.9 PVD (PERIPHERAL VASCULAR DISEASE): ICD-10-CM

## 2022-09-01 LAB
LEFT ABI: 0.94
LEFT ARM BP: 123 MMHG
LEFT DORSALIS PEDIS: 125 MMHG
LEFT POSTERIOR TIBIAL: 118 MMHG
LEFT TBI: 0.7
LEFT TOE PRESSURE: 93 MMHG
RIGHT ABI: 1.03
RIGHT ARM BP: 133 MMHG
RIGHT DORSALIS PEDIS: 137 MMHG
RIGHT POSTERIOR TIBIAL: 109 MMHG
RIGHT TBI: 0.68
RIGHT TOE PRESSURE: 91 MMHG

## 2022-09-01 PROCEDURE — 93922 ANKLE BRACHIAL INDICES (ABI): ICD-10-PCS | Mod: 26,,, | Performed by: INTERNAL MEDICINE

## 2022-09-01 PROCEDURE — 93922 UPR/L XTREMITY ART 2 LEVELS: CPT

## 2022-09-01 PROCEDURE — 93922 UPR/L XTREMITY ART 2 LEVELS: CPT | Mod: 26,,, | Performed by: INTERNAL MEDICINE

## 2022-09-02 ENCOUNTER — HOSPITAL ENCOUNTER (EMERGENCY)
Facility: HOSPITAL | Age: 87
Discharge: HOME | End: 2022-09-02
Attending: EMERGENCY MEDICINE
Payer: MEDICARE

## 2022-09-02 VITALS
SYSTOLIC BLOOD PRESSURE: 140 MMHG | DIASTOLIC BLOOD PRESSURE: 64 MMHG | TEMPERATURE: 99 F | OXYGEN SATURATION: 95 % | HEART RATE: 98 BPM | BODY MASS INDEX: 29.64 KG/M2 | WEIGHT: 184.44 LBS | RESPIRATION RATE: 20 BRPM | HEIGHT: 66 IN
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
## 2022-09-02 DIAGNOSIS — L03.115 CELLULITIS OF RIGHT LOWER EXTREMITY: Primary | ICD-10-CM

## 2022-09-02 PROCEDURE — 63600175 PHARM REV CODE 636 W HCPCS: Performed by: PHYSICIAN ASSISTANT

## 2022-09-02 PROCEDURE — 90715 TDAP VACCINE 7 YRS/> IM: CPT | Performed by: PHYSICIAN ASSISTANT

## 2022-09-02 PROCEDURE — 99285 EMERGENCY DEPT VISIT HI MDM: CPT | Mod: 25

## 2022-09-02 PROCEDURE — 25000003 PHARM REV CODE 250: Performed by: PHYSICIAN ASSISTANT

## 2022-09-02 PROCEDURE — 90471 IMMUNIZATION ADMIN: CPT | Performed by: PHYSICIAN ASSISTANT

## 2022-09-02 RX ORDER — CLINDAMYCIN HYDROCHLORIDE 150 MG/1
300 CAPSULE ORAL EVERY 8 HOURS
Qty: 42 CAPSULE | Refills: 0 | Status: SHIPPED | OUTPATIENT
Start: 2022-09-02 | End: 2022-09-09

## 2022-09-02 RX ORDER — MUPIROCIN 20 MG/G
OINTMENT TOPICAL 3 TIMES DAILY
Qty: 15 G | Refills: 0 | Status: SHIPPED | OUTPATIENT
Start: 2022-09-02 | End: 2023-02-28

## 2022-09-02 RX ORDER — CLINDAMYCIN HYDROCHLORIDE 150 MG/1
300 CAPSULE ORAL
Status: COMPLETED | OUTPATIENT
Start: 2022-09-02 | End: 2022-09-02

## 2022-09-02 RX ADMIN — TETANUS TOXOID, REDUCED DIPHTHERIA TOXOID AND ACELLULAR PERTUSSIS VACCINE, ADSORBED 0.5 ML: 5; 2.5; 8; 8; 2.5 SUSPENSION INTRAMUSCULAR at 03:09

## 2022-09-02 RX ADMIN — CLINDAMYCIN HYDROCHLORIDE 300 MG: 150 CAPSULE ORAL at 03:09

## 2022-09-04 NOTE — ED PROVIDER NOTES
"   History      Chief Complaint   Patient presents with    Leg Injury     Pt fell on Tuesday and complaining of redness/cut to R lower leg; - LOC, - blood thinners       Review of patient's allergies indicates:   Allergen Reactions    Penicillins Rash     "Felt like I had pins and needles in my feet" , hospitalized overnight    Bee sting  [allergen ext-venom-honey bee] Swelling    Poison ivy extract Hives        HPI   HPI    9/4/2022, 2:10 PM   History obtained from the patient      History of Present Illness: Chao Hawk is a 89 y.o. male patient who presents to the Emergency Department for redness to rle surrounding abrasion he sustained Tuesday when he tripped and fell.  Denies head injury.  Symptoms are moderate in severity.     No further complaints or concerns at this time.           PCP: To Obtain Unable       Past Medical History:  Past Medical History:   Diagnosis Date    Anemia     Arthritis     back, hand    Back pain     Dr. Cristobal- [facility]    Bilateral leg edema 2/16/2018    Chronic bronchitis     COPD (chronic obstructive pulmonary disease)     Diverticulitis of large intestine with perforation without abscess or bleeding     Diverticulosis 5/16/06    colonoscopy    Hearing loss, bilateral     Hernia     x2    Hyperlipidemia     Hypertension     Lumbar radiculopathy 6/14/2017    Multiple renal cysts 7/5/2016    Nocturnal hypoxemia     Polyneuropathy     Tobacco dependence     Trouble in sleeping          Past Surgical History:  Past Surgical History:   Procedure Laterality Date    FINGER SURGERY Left 1980s    index- Dr. Encarnacion; to remove nodule    HERNIA REPAIR  1990s    x2    INTRAOCULAR LENS INSERTION Bilateral 2016    LUMBAR SPINE SURGERY  09/13/2017    REVERSE TOTAL SHOULDER ARTHROPLASTY Right 10/5/2020    Procedure: ARTHROPLASTY, SHOULDER, TOTAL, REVERSE;  Surgeon: Reji Martin MD;  Location: [facility];  Service: Orthopedics;  Laterality: Right;    TONSILLECTOMY, ADENOIDECTOMY  1940 "           Family History:  Family History   Problem Relation Age of Onset    Emphysema Mother     Heart disease Mother     COPD Mother     Appendicitis Father     Heart disease Son     Atrial fibrillation Sister     Lung disease Sister 91    Colon cancer Neg Hx     Cancer Neg Hx     Stroke Neg Hx            Social History:  Social History     Tobacco Use    Smoking status: Former     Packs/day: 1.00     Years: 40.00     Pack years: 40.00     Types: Cigarettes     Start date: 1949     Quit date: 1991     Years since quittin.6    Smokeless tobacco: Never   Substance and Sexual Activity    Alcohol use: Yes     Comment: rarely   No alcohol 72h  prior to sx    Drug use: No    Sexual activity: Never       ROS     Review of Systems   Constitutional:  Negative for fever.   HENT:  Negative for sore throat.    Respiratory:  Negative for shortness of breath.    Cardiovascular:  Negative for chest pain.   Gastrointestinal:  Negative for nausea.   Genitourinary:  Negative for dysuria.   Musculoskeletal:  Negative for back pain.   Skin:  Negative for rash.   Neurological:  Negative for weakness.   Hematological:  Does not bruise/bleed easily.   All other systems reviewed and are negative.    Physical Exam      Initial Vitals [22 1406]   BP Pulse Resp Temp SpO2   (!) 140/64 98 20 98.8 °F (37.1 °C) 95 %      MAP       --         Physical Exam  Vital signs and nursing notes reviewed.  Constitutional: Patient is in NAD. Awake and alert. Well-developed and well-nourished.  Head: Atraumatic. Normocephalic.  Eyes: PERRL. EOM intact. Conjunctivae nl. No scleral icterus.  ENT: Mucous membranes are moist. Oropharynx is clear.  Neck: Supple. No JVD. No lymphadenopathy.  No meningismus  Cardiovascular: Regular rate and rhythm. No murmurs, rubs, or gallops. Distal pulses are 2+ and symmetric.  Pulmonary/Chest: No respiratory distress. Clear to auscultation bilaterally. No wheezing, rales, or rhonchi.  Abdominal: Soft.  "Non-distended. No TTP. No rebound, guarding, or rigidity. Good bowel sounds.  Genitourinary: No CVA tenderness  Musculoskeletal: Moves all extremities. No edema.   Skin: Warm and dry.  RLE with abrasion with 5 cm of surrounding erythema.  Mild edema to RLE.  Left knee with abrasion  Neurological: Awake and alert. No acute focal neurological deficits are appreciated.  Psychiatric: Normal affect. Good eye contact. Appropriate in content.      ED Course          Procedures  ED Vital Signs:  Vitals:    09/02/22 1406   BP: (!) 140/64   Pulse: 98   Resp: 20   Temp: 98.8 °F (37.1 °C)   SpO2: 95%   Weight: 83.7 kg (184 lb 6.6 oz)   Height: 5' 6" (1.676 m)                 Imaging Results:  Imaging Results              US Lower Extremity Veins Right (Final result)  Result time 09/02/22 14:55:09      Final result by Percy Pinzon MD (09/02/22 14:55:09)                   Impression:      No evidence of deep venous thrombosis in the right lower extremity.      Electronically signed by: Percy Pinzon MD  Date:    09/02/2022  Time:    14:55               Narrative:    EXAMINATION:  US LOWER EXTREMITY VEINS RIGHT    CLINICAL HISTORY:  Fall on same level from slipping, tripping and stumbling without subsequent striking against object, initial encounter    TECHNIQUE:  Duplex and color flow Doppler evaluation and graded compression of the right lower extremity veins was performed.    COMPARISON:  None    FINDINGS:  Right thigh veins: The common femoral, femoral, popliteal, upper greater saphenous, and deep femoral veins are patent and free of thrombus. The veins are normally compressible and have normal phasic flow and augmentation response.    Right calf veins: The visualized calf veins are patent.    Contralateral CFV: The contralateral (left) common femoral vein is patent and free of thrombus.    Miscellaneous: None                                       X-Ray Tibia Fibula 2 View Right (Final result)  Result time 09/02/22 " 14:39:58      Final result by SELWYN Medina Sr., MD (09/02/22 14:39:58)                   Impression:      1. No fracture or dislocation  2. There is mild prominence of the soft tissue thickness lateral to the ankle.  This is consistent with the patient's history and characteristic of a soft tissue contusion.      Electronically signed by: Luis Medina MD  Date:    09/02/2022  Time:    14:39               Narrative:    EXAMINATION:  XR TIBIA FIBULA 2 VIEW RIGHT    CLINICAL HISTORY:  Fall on same level from slipping, tripping and stumbling without subsequent striking against object, initial encounter    COMPARISON:  None    FINDINGS:  There is no fracture. There is no dislocation.  There is mild prominence of the soft tissue thickness lateral to the ankle.  There is a mild amount of atherosclerosis.                                       X-Ray Knee Complete 4 or More Views Left (Final result)  Result time 09/02/22 14:35:11      Final result by Sussy Lynn MD (09/02/22 14:35:11)                   Impression:      No acute abnormality identified.      Electronically signed by: Sussy Lynn  Date:    09/02/2022  Time:    14:35               Narrative:    EXAMINATION:  XR KNEE COMP 4 OR MORE VIEWS LEFT    CLINICAL HISTORY:  Fall on same level from slipping, tripping and stumbling without subsequent striking against object, initial encounter    TECHNIQUE:  4 views of the left knee were performed.    COMPARISON:  None    FINDINGS:  No evidence of fracture or dislocation.  No significant suprapatellar knee effusion.  Anatomic alignment.  Joint spaces appear well maintained.  Soft tissues demonstrate prominent vascular calcification.  No foreign body.                                         The Emergency Provider reviewed the vital signs and test results, which are outlined above.    ED Discussion             Medication(s) given in the ER:  Medications   Tdap (BOOSTRIX) vaccine injection 0.5 mL (0.5 mLs  Intramuscular Given 9/2/22 1511)   clindamycin capsule 300 mg (300 mg Oral Given 9/2/22 1511)            Follow-up Information       [facility] - Emergency Dept..    Specialty: Emergency Medicine  Why: If symptoms worsen  Contact information:  [address]  [place] [address] [phone]             Your Primary Care Doctor In 2 days.                                    Medication List        START taking these medications      clindamycin 150 MG capsule  Commonly known as: CLEOCIN  Take 2 capsules (300 mg total) by mouth every 8 (eight) hours. for 7 days     mupirocin 2 % ointment  Commonly known as: BACTROBAN  Apply topically 3 (three) times daily.            ASK your doctor about these medications      acetaminophen 500 MG tablet  Commonly known as: TYLENOL     albuterol 2.5 mg /3 mL (0.083 %) nebulizer solution  Commonly known as: PROVENTIL  Take 3 mLs (2.5 mg total) by nebulization every 4 (four) hours as needed for Wheezing. Rescue     ARGININE (L-ARGININE) ORAL     aspirin 81 MG EC tablet  Commonly known as: ECOTRIN  Take 1 tablet (81 mg total) by mouth 2 (two) times a day.     atorvastatin 40 MG tablet  Commonly known as: LIPITOR  TAKE 1 TABLET EVERY DAY     betamethasone valerate 0.1% 0.1 % Oint  Commonly known as: VALISONE  Apply topically 2 (two) times daily.     CANNABIDIOL (CBD) EXTRACT ORAL     cholecalciferol (vitamin D3) 50 mcg (2,000 unit) Cap capsule  Commonly known as: VITAMIN D3     COQ10 (UBIQUINOL) ORAL     cyanocobalamin 1000 MCG tablet  Commonly known as: VITAMIN B-12     dutasteride 0.5 mg capsule  Commonly known as: AVODART  Take 1 capsule (0.5 mg total) by mouth once daily.     furosemide 20 MG tablet  Commonly known as: LASIX  TAKE 1 TABLET EVERY DAY     gabapentin 100 MG capsule  Commonly known as: NEURONTIN  TAKE ONE CAPSULE BY MOUTH TWICE DAILY     GLUCOSAM-MSM-CHONDROIT-VIT D3 ORAL     lisinopriL 10 MG tablet  TAKE 1 TABLET EVERY DAY     MEGARED OMEGA-3 KRILL  OIL ORAL     ONE-A-DAY MEN'S MULTIVITAMIN ORAL     potassium 99 mg Tab     prednisoLONE acetate 1 % Drps  Commonly known as: PRED FORTE     sars-cov-2 (covid-19) 30 mcg/0.3 ml injection  Commonly known as: Pfizer COVID-19  Inject into the muscle.     tamsulosin 0.4 mg Cap  Commonly known as: FLOMAX  TAKE 2 CAPSULES EVERY MORNING     traMADoL 50 mg tablet  Commonly known as: ULTRAM  Take 1 tablet (50 mg total) by mouth daily as needed for Pain.     TRELEGY ELLIPTA 100-62.5-25 mcg Dsdv  Generic drug: fluticasone-umeclidin-vilanter  INHALE 1 PUFF INTO THE LUNGS ONCE DAILY               Where to Get Your Medications        These medications were sent to [id number] - [place] - [address] [facility]  [address] [place] [address]      Phone: [phone]   clindamycin 150 MG capsule  mupirocin 2 % ointment             Medical Decision Making        All findings were reviewed with the patient/family in detail.   All remaining questions and concerns were addressed at that time.  Patient/family has been counseled regarding the need for follow-up as well as the indication to return to the emergency room should new or worrisome developments occur.        MDM                 Clinical Impression:        ICD-10-CM ICD-9-CM   1. Cellulitis of right lower extremity  L03.115 682.6   2. Fall from slip, trip, or stumble, initial encounter  W01.0XXA E885.9               Edith Dow PA-C  09/04/22 [id number]

## 2022-09-12 ENCOUNTER — CLINICAL SUPPORT (OUTPATIENT)
Dept: AUDIOLOGY | Facility: CLINIC | Age: 87
End: 2022-09-12
Payer: MEDICARE

## 2022-09-12 DIAGNOSIS — H90.3 SENSORINEURAL HEARING LOSS, BILATERAL: Primary | ICD-10-CM

## 2022-09-12 PROCEDURE — 92553 AUDIOMETRY AIR & BONE: CPT | Mod: S$GLB,,, | Performed by: AUDIOLOGIST-HEARING AID FITTER

## 2022-09-12 PROCEDURE — 92553 PR AUDIOMETRY, AIR & BONE: ICD-10-PCS | Mod: S$GLB,,, | Performed by: AUDIOLOGIST-HEARING AID FITTER

## 2022-09-15 NOTE — TELEPHONE ENCOUNTER
----- Message from Emily Neri sent at 9/15/2022 11:18 AM CDT -----  Regarding: refill  Contact: patient  Type:  RX Refill Request    Who Called: patient  Refill or New Rx:refill  RX Name and Strength:Tamsulosin  How is the patient currently taking it? (ex. 1XDay):daily  Is this a 30 day or 90 day RX:90  Preferred Pharmacy with phone number:[facility]  Local or Mail Order:mail order  Ordering Provider:Dr Ureña  Would the patient rather a call back or a response via [facility]? call  Best Call Back Number:[phone]   Additional Information:

## 2022-09-16 RX ORDER — TAMSULOSIN HYDROCHLORIDE 0.4 MG/1
2 CAPSULE ORAL EVERY MORNING
Qty: 180 CAPSULE | Refills: 3 | Status: SHIPPED | OUTPATIENT
Start: 2022-09-16 | End: 2023-11-24

## 2022-09-26 NOTE — PROGRESS NOTES
Referring Provider:    Chao VY Kenn was seen 09/12/2022 for a repeat audiological evaluation.  Denies any changes since last audio 6 months ago. Has hearing aids now from [facility].    Results reveal a mild-to-severe sensorineural hearing loss 250-8000 Hz for the right ear, and a moderate-to-severe sensorineural hearing loss 250-8000 Hz for the left ear.     Tympanograms were unable to obtain for the right ear and unable to obtain for the left ear.    Patient was counseled on the above findings.    Recommendations:  1.Continued HA use.  2. Annual Audiograms.  3. ENT as needed.

## 2022-10-25 ENCOUNTER — TELEPHONE (OUTPATIENT)
Dept: INTERNAL MEDICINE | Facility: CLINIC | Age: 87
End: 2022-10-25
Payer: MEDICARE

## 2022-10-25 ENCOUNTER — HOSPITAL ENCOUNTER (OUTPATIENT)
Facility: HOSPITAL | Age: 87
Discharge: HOME | End: 2022-10-26
Attending: EMERGENCY MEDICINE | Admitting: INTERNAL MEDICINE
Payer: MEDICARE

## 2022-10-25 ENCOUNTER — TELEPHONE (OUTPATIENT)
Dept: CARDIOLOGY | Facility: CLINIC | Age: 87
End: 2022-10-25
Payer: MEDICARE

## 2022-10-25 DIAGNOSIS — K92.2 GASTROINTESTINAL HEMORRHAGE, UNSPECIFIED GASTROINTESTINAL HEMORRHAGE TYPE: ICD-10-CM

## 2022-10-25 DIAGNOSIS — R19.5 HEME POSITIVE STOOL: ICD-10-CM

## 2022-10-25 DIAGNOSIS — R53.1 WEAKNESS: ICD-10-CM

## 2022-10-25 DIAGNOSIS — K92.1 GASTROINTESTINAL HEMORRHAGE WITH MELENA: ICD-10-CM

## 2022-10-25 DIAGNOSIS — J44.9 CHRONIC OBSTRUCTIVE PULMONARY DISEASE, UNSPECIFIED COPD TYPE: Chronic | ICD-10-CM

## 2022-10-25 DIAGNOSIS — D64.9 ACUTE ON CHRONIC ANEMIA: Primary | ICD-10-CM

## 2022-10-25 DIAGNOSIS — K92.1 MELENA: ICD-10-CM

## 2022-10-25 LAB
ABO + RH BLD: NORMAL
ALBUMIN SERPL BCP-MCNC: 3.4 G/DL (ref 3.5–5.2)
ALP SERPL-CCNC: 63 U/L (ref 55–135)
ALT SERPL W/O P-5'-P-CCNC: 19 U/L (ref 10–44)
ANION GAP SERPL CALC-SCNC: 7 MMOL/L (ref 8–16)
AST SERPL-CCNC: 19 U/L (ref 10–40)
BASOPHILS # BLD AUTO: 0.02 K/UL (ref 0–0.2)
BASOPHILS # BLD AUTO: 0.03 K/UL (ref 0–0.2)
BASOPHILS NFR BLD: 0.3 % (ref 0–1.9)
BASOPHILS NFR BLD: 0.3 % (ref 0–1.9)
BILIRUB SERPL-MCNC: 0.4 MG/DL (ref 0.1–1)
BLD GP AB SCN CELLS X3 SERPL QL: NORMAL
BUN SERPL-MCNC: 42 MG/DL (ref 8–23)
CALCIUM SERPL-MCNC: 9.3 MG/DL (ref 8.7–10.5)
CHLORIDE SERPL-SCNC: 113 MMOL/L (ref 95–110)
CO2 SERPL-SCNC: 21 MMOL/L (ref 23–29)
CREAT SERPL-MCNC: 1 MG/DL (ref 0.5–1.4)
DIFFERENTIAL METHOD: ABNORMAL
DIFFERENTIAL METHOD: ABNORMAL
EOSINOPHIL # BLD AUTO: 0 K/UL (ref 0–0.5)
EOSINOPHIL # BLD AUTO: 0.1 K/UL (ref 0–0.5)
EOSINOPHIL NFR BLD: 0.3 % (ref 0–8)
EOSINOPHIL NFR BLD: 0.8 % (ref 0–8)
ERYTHROCYTE [DISTWIDTH] IN BLOOD BY AUTOMATED COUNT: 15.2 % (ref 11.5–14.5)
ERYTHROCYTE [DISTWIDTH] IN BLOOD BY AUTOMATED COUNT: 15.3 % (ref 11.5–14.5)
EST. GFR  (NO RACE VARIABLE): >60 ML/MIN/1.73 M^2
GLUCOSE SERPL-MCNC: 117 MG/DL (ref 70–110)
HCT VFR BLD AUTO: 26.6 % (ref 40–54)
HCT VFR BLD AUTO: 30.1 % (ref 40–54)
HGB BLD-MCNC: 8.5 G/DL (ref 14–18)
HGB BLD-MCNC: 9.5 G/DL (ref 14–18)
IMM GRANULOCYTES # BLD AUTO: 0.04 K/UL (ref 0–0.04)
IMM GRANULOCYTES # BLD AUTO: 0.06 K/UL (ref 0–0.04)
IMM GRANULOCYTES NFR BLD AUTO: 0.5 % (ref 0–0.5)
IMM GRANULOCYTES NFR BLD AUTO: 0.6 % (ref 0–0.5)
INR PPP: 1 (ref 0.8–1.2)
LYMPHOCYTES # BLD AUTO: 1 K/UL (ref 1–4.8)
LYMPHOCYTES # BLD AUTO: 1.2 K/UL (ref 1–4.8)
LYMPHOCYTES NFR BLD: 12.7 % (ref 18–48)
LYMPHOCYTES NFR BLD: 12.9 % (ref 18–48)
MCH RBC QN AUTO: 29 PG (ref 27–31)
MCH RBC QN AUTO: 29.4 PG (ref 27–31)
MCHC RBC AUTO-ENTMCNC: 31.6 G/DL (ref 32–36)
MCHC RBC AUTO-ENTMCNC: 32 G/DL (ref 32–36)
MCV RBC AUTO: 92 FL (ref 82–98)
MCV RBC AUTO: 92 FL (ref 82–98)
MONOCYTES # BLD AUTO: 0.5 K/UL (ref 0.3–1)
MONOCYTES # BLD AUTO: 0.6 K/UL (ref 0.3–1)
MONOCYTES NFR BLD: 6.2 % (ref 4–15)
MONOCYTES NFR BLD: 6.6 % (ref 4–15)
NEUTROPHILS # BLD AUTO: 6 K/UL (ref 1.8–7.7)
NEUTROPHILS # BLD AUTO: 7.8 K/UL (ref 1.8–7.7)
NEUTROPHILS NFR BLD: 78.9 % (ref 38–73)
NEUTROPHILS NFR BLD: 79.9 % (ref 38–73)
NRBC BLD-RTO: 0 /100 WBC
NRBC BLD-RTO: 0 /100 WBC
OB PNL STL: POSITIVE
PLATELET # BLD AUTO: 193 K/UL (ref 150–450)
PLATELET # BLD AUTO: 245 K/UL (ref 150–450)
PMV BLD AUTO: 10.5 FL (ref 9.2–12.9)
PMV BLD AUTO: 10.7 FL (ref 9.2–12.9)
POTASSIUM SERPL-SCNC: 4 MMOL/L (ref 3.5–5.1)
PROT SERPL-MCNC: 6.5 G/DL (ref 6–8.4)
PROTHROMBIN TIME: 10.4 SEC (ref 9–12.5)
RBC # BLD AUTO: 2.89 M/UL (ref 4.6–6.2)
RBC # BLD AUTO: 3.28 M/UL (ref 4.6–6.2)
SARS-COV-2 RDRP RESP QL NAA+PROBE: NEGATIVE
SODIUM SERPL-SCNC: 141 MMOL/L (ref 136–145)
WBC # BLD AUTO: 7.59 K/UL (ref 3.9–12.7)
WBC # BLD AUTO: 9.72 K/UL (ref 3.9–12.7)

## 2022-10-25 PROCEDURE — 96361 HYDRATE IV INFUSION ADD-ON: CPT

## 2022-10-25 PROCEDURE — C9113 INJ PANTOPRAZOLE SODIUM, VIA: HCPCS | Performed by: INTERNAL MEDICINE

## 2022-10-25 PROCEDURE — 85610 PROTHROMBIN TIME: CPT | Mod: ER | Performed by: REGISTERED NURSE

## 2022-10-25 PROCEDURE — 25000003 PHARM REV CODE 250: Performed by: EMERGENCY MEDICINE

## 2022-10-25 PROCEDURE — 99285 EMERGENCY DEPT VISIT HI MDM: CPT | Mod: 25

## 2022-10-25 PROCEDURE — 85025 COMPLETE CBC W/AUTO DIFF WBC: CPT | Performed by: INTERNAL MEDICINE

## 2022-10-25 PROCEDURE — 25000242 PHARM REV CODE 250 ALT 637 W/ HCPCS: Performed by: INTERNAL MEDICINE

## 2022-10-25 PROCEDURE — 63600175 PHARM REV CODE 636 W HCPCS: Performed by: INTERNAL MEDICINE

## 2022-10-25 PROCEDURE — C9113 INJ PANTOPRAZOLE SODIUM, VIA: HCPCS | Performed by: EMERGENCY MEDICINE

## 2022-10-25 PROCEDURE — 94640 AIRWAY INHALATION TREATMENT: CPT

## 2022-10-25 PROCEDURE — 94761 N-INVAS EAR/PLS OXIMETRY MLT: CPT

## 2022-10-25 PROCEDURE — 96376 TX/PRO/DX INJ SAME DRUG ADON: CPT

## 2022-10-25 PROCEDURE — 93005 ELECTROCARDIOGRAM TRACING: CPT

## 2022-10-25 PROCEDURE — 63600175 PHARM REV CODE 636 W HCPCS: Performed by: EMERGENCY MEDICINE

## 2022-10-25 PROCEDURE — G0378 HOSPITAL OBSERVATION PER HR: HCPCS

## 2022-10-25 PROCEDURE — 25000003 PHARM REV CODE 250: Performed by: INTERNAL MEDICINE

## 2022-10-25 PROCEDURE — 93010 EKG 12-LEAD: ICD-10-PCS | Mod: ,,, | Performed by: INTERNAL MEDICINE

## 2022-10-25 PROCEDURE — 86920 COMPATIBILITY TEST SPIN: CPT | Performed by: INTERNAL MEDICINE

## 2022-10-25 PROCEDURE — 86850 RBC ANTIBODY SCREEN: CPT | Performed by: INTERNAL MEDICINE

## 2022-10-25 PROCEDURE — 85025 COMPLETE CBC W/AUTO DIFF WBC: CPT | Mod: 91 | Performed by: REGISTERED NURSE

## 2022-10-25 PROCEDURE — 82272 OCCULT BLD FECES 1-3 TESTS: CPT | Performed by: REGISTERED NURSE

## 2022-10-25 PROCEDURE — U0002 COVID-19 LAB TEST NON-CDC: HCPCS | Performed by: EMERGENCY MEDICINE

## 2022-10-25 PROCEDURE — 80053 COMPREHEN METABOLIC PANEL: CPT | Performed by: REGISTERED NURSE

## 2022-10-25 PROCEDURE — 93010 ELECTROCARDIOGRAM REPORT: CPT | Mod: ,,, | Performed by: INTERNAL MEDICINE

## 2022-10-25 PROCEDURE — 96374 THER/PROPH/DIAG INJ IV PUSH: CPT

## 2022-10-25 RX ORDER — TAMSULOSIN HYDROCHLORIDE 0.4 MG/1
2 CAPSULE ORAL EVERY MORNING
Status: DISCONTINUED | OUTPATIENT
Start: 2022-10-26 | End: 2022-10-26 | Stop reason: HOSPADM

## 2022-10-25 RX ORDER — ACETAMINOPHEN 325 MG/1
650 TABLET ORAL EVERY 6 HOURS PRN
Status: DISCONTINUED | OUTPATIENT
Start: 2022-10-25 | End: 2022-10-26 | Stop reason: HOSPADM

## 2022-10-25 RX ORDER — GABAPENTIN 100 MG/1
100 CAPSULE ORAL 2 TIMES DAILY
Status: DISCONTINUED | OUTPATIENT
Start: 2022-10-25 | End: 2022-10-26 | Stop reason: HOSPADM

## 2022-10-25 RX ORDER — SODIUM CHLORIDE 9 MG/ML
INJECTION, SOLUTION INTRAVENOUS CONTINUOUS
Status: DISCONTINUED | OUTPATIENT
Start: 2022-10-25 | End: 2022-10-26 | Stop reason: HOSPADM

## 2022-10-25 RX ORDER — ARFORMOTEROL TARTRATE 15 UG/2ML
15 SOLUTION RESPIRATORY (INHALATION) 2 TIMES DAILY
Status: DISCONTINUED | OUTPATIENT
Start: 2022-10-25 | End: 2022-10-26 | Stop reason: HOSPADM

## 2022-10-25 RX ORDER — PANTOPRAZOLE SODIUM 40 MG/10ML
40 INJECTION, POWDER, LYOPHILIZED, FOR SOLUTION INTRAVENOUS 2 TIMES DAILY
Status: DISCONTINUED | OUTPATIENT
Start: 2022-10-25 | End: 2022-10-25

## 2022-10-25 RX ORDER — HYDRALAZINE HYDROCHLORIDE 20 MG/ML
10 INJECTION INTRAMUSCULAR; INTRAVENOUS EVERY 8 HOURS PRN
Status: DISCONTINUED | OUTPATIENT
Start: 2022-10-25 | End: 2022-10-26 | Stop reason: HOSPADM

## 2022-10-25 RX ORDER — PREDNISOLONE ACETATE 10 MG/ML
1 SUSPENSION/ DROPS OPHTHALMIC 2 TIMES DAILY
Status: DISCONTINUED | OUTPATIENT
Start: 2022-10-25 | End: 2022-10-25

## 2022-10-25 RX ORDER — PANTOPRAZOLE SODIUM 40 MG/10ML
40 INJECTION, POWDER, LYOPHILIZED, FOR SOLUTION INTRAVENOUS 2 TIMES DAILY
Status: DISCONTINUED | OUTPATIENT
Start: 2022-10-25 | End: 2022-10-26 | Stop reason: HOSPADM

## 2022-10-25 RX ORDER — BUDESONIDE 0.5 MG/2ML
0.5 INHALANT ORAL EVERY 12 HOURS
Status: DISCONTINUED | OUTPATIENT
Start: 2022-10-25 | End: 2022-10-26 | Stop reason: HOSPADM

## 2022-10-25 RX ORDER — ONDANSETRON 2 MG/ML
4 INJECTION INTRAMUSCULAR; INTRAVENOUS EVERY 6 HOURS PRN
Status: DISCONTINUED | OUTPATIENT
Start: 2022-10-25 | End: 2022-10-26 | Stop reason: HOSPADM

## 2022-10-25 RX ORDER — DUTASTERIDE 0.5 MG/1
0.5 CAPSULE, LIQUID FILLED ORAL DAILY
Status: DISCONTINUED | OUTPATIENT
Start: 2022-10-26 | End: 2022-10-26 | Stop reason: HOSPADM

## 2022-10-25 RX ORDER — TRAMADOL HYDROCHLORIDE 50 MG/1
50 TABLET ORAL EVERY 6 HOURS PRN
Status: DISCONTINUED | OUTPATIENT
Start: 2022-10-25 | End: 2022-10-26 | Stop reason: HOSPADM

## 2022-10-25 RX ORDER — IPRATROPIUM BROMIDE AND ALBUTEROL SULFATE 2.5; .5 MG/3ML; MG/3ML
3 SOLUTION RESPIRATORY (INHALATION) EVERY 6 HOURS PRN
Status: DISCONTINUED | OUTPATIENT
Start: 2022-10-25 | End: 2022-10-26 | Stop reason: HOSPADM

## 2022-10-25 RX ORDER — PANTOPRAZOLE SODIUM 40 MG/10ML
40 INJECTION, POWDER, LYOPHILIZED, FOR SOLUTION INTRAVENOUS
Status: COMPLETED | OUTPATIENT
Start: 2022-10-25 | End: 2022-10-25

## 2022-10-25 RX ORDER — ATORVASTATIN CALCIUM 40 MG/1
40 TABLET, FILM COATED ORAL DAILY
Status: DISCONTINUED | OUTPATIENT
Start: 2022-10-26 | End: 2022-10-26 | Stop reason: HOSPADM

## 2022-10-25 RX ORDER — HYDROCODONE BITARTRATE AND ACETAMINOPHEN 500; 5 MG/1; MG/1
TABLET ORAL
Status: DISCONTINUED | OUTPATIENT
Start: 2022-10-25 | End: 2022-10-26 | Stop reason: HOSPADM

## 2022-10-25 RX ORDER — TALC
6 POWDER (GRAM) TOPICAL NIGHTLY PRN
Status: DISCONTINUED | OUTPATIENT
Start: 2022-10-25 | End: 2022-10-26 | Stop reason: HOSPADM

## 2022-10-25 RX ADMIN — SODIUM CHLORIDE: 0.9 INJECTION, SOLUTION INTRAVENOUS at 09:10

## 2022-10-25 RX ADMIN — BUDESONIDE 0.5 MG: 0.5 INHALANT ORAL at 07:10

## 2022-10-25 RX ADMIN — PANTOPRAZOLE SODIUM 40 MG: 40 INJECTION, POWDER, FOR SOLUTION INTRAVENOUS at 09:10

## 2022-10-25 RX ADMIN — ARFORMOTEROL TARTRATE 15 MCG: 15 SOLUTION RESPIRATORY (INHALATION) at 07:10

## 2022-10-25 RX ADMIN — PANTOPRAZOLE SODIUM 40 MG: 40 INJECTION, POWDER, FOR SOLUTION INTRAVENOUS at 05:10

## 2022-10-25 RX ADMIN — Medication 6 MG: at 09:10

## 2022-10-25 RX ADMIN — SODIUM CHLORIDE 1000 ML: 0.9 INJECTION, SOLUTION INTRAVENOUS at 05:10

## 2022-10-25 RX ADMIN — GABAPENTIN 100 MG: 100 CAPSULE ORAL at 09:10

## 2022-10-25 NOTE — FIRST PROVIDER EVALUATION
Medical screening examination initiated.  I have conducted a focused provider triage encounter, findings are as follows:    Brief history of present illness:  Rectal bleeding    There were no vitals filed for this visit.    Pertinent physical exam:  No acute distress, patient alert and oriented    Brief workup plan:  Workup and further evaluation    Preliminary workup initiated; this workup will be continued and followed by the physician or advanced practice provider that is assigned to the patient when roomed.

## 2022-10-25 NOTE — SUBJECTIVE & OBJECTIVE
"Past Medical History:   Diagnosis Date    Anemia     Arthritis     back, hand    Back pain     Dr. Cristobal- [facility]    Bilateral leg edema 2/16/2018    Chronic bronchitis     COPD (chronic obstructive pulmonary disease)     Diverticulitis of large intestine with perforation without abscess or bleeding     Diverticulosis 5/16/06    colonoscopy    Hearing loss, bilateral     Hernia     x2    Hyperlipidemia     Hypertension     Lumbar radiculopathy 6/14/2017    Multiple renal cysts 7/5/2016    Nocturnal hypoxemia     Polyneuropathy     Tobacco dependence     Trouble in sleeping        Past Surgical History:   Procedure Laterality Date    FINGER SURGERY Left 1980s    index- Dr. Encarnacion; to remove nodule    HERNIA REPAIR  1990s    x2    INTRAOCULAR LENS INSERTION Bilateral 2016    LUMBAR SPINE SURGERY  09/13/2017    REVERSE TOTAL SHOULDER ARTHROPLASTY Right 10/5/2020    Procedure: ARTHROPLASTY, SHOULDER, TOTAL, REVERSE;  Surgeon: Reji Martin MD;  Location: [facility];  Service: Orthopedics;  Laterality: Right;    TONSILLECTOMY, ADENOIDECTOMY  1940       Review of patient's allergies indicates:   Allergen Reactions    Bee sting  [allergen ext-venom-honey bee] Swelling    Penicillins Rash     "Felt like I had pins and needles in my feet" , hospitalized overnight    Poison ivy extract Hives       No current facility-administered medications on file prior to encounter.     Current Outpatient Medications on File Prior to Encounter   Medication Sig    acetaminophen (TYLENOL) 500 MG tablet Take 500 mg by mouth as needed for Pain.    aspirin (ECOTRIN) 81 MG EC tablet Take 1 tablet (81 mg total) by mouth 2 (two) times a day. (Patient taking differently: Take 81 mg by mouth once daily.)    atorvastatin (LIPITOR) 40 MG tablet TAKE 1 TABLET EVERY DAY    cholecalciferol, vitamin D3, (VITAMIN D3) 50 mcg (2,000 unit) Cap Take 1 capsule by mouth every evening.     COQ10, UBIQUINOL, ORAL Take 1 tablet by mouth nightly.     " cyanocobalamin (VITAMIN B-12) 1000 MCG tablet Take 100 mcg by mouth once daily.    furosemide (LASIX) 20 MG tablet TAKE 1 TABLET EVERY DAY    gabapentin (NEURONTIN) 100 MG capsule TAKE ONE CAPSULE BY MOUTH TWICE DAILY    lisinopriL 10 MG tablet TAKE 1 TABLET EVERY DAY    potassium 99 mg Tab Take by mouth once daily.     tamsulosin (FLOMAX) 0.4 mg Cap Take 2 capsules (0.8 mg total) by mouth every morning.    traMADoL (ULTRAM) 50 mg tablet Take 1 tablet (50 mg total) by mouth daily as needed for Pain.    TRELEGY ELLIPTA 100-62.5-25 mcg DsDv INHALE 1 PUFF INTO THE LUNGS ONCE DAILY    albuterol (PROVENTIL) 2.5 mg /3 mL (0.083 %) nebulizer solution Take 3 mLs (2.5 mg total) by nebulization every 4 (four) hours as needed for Wheezing. Rescue    ARGININE, L-ARGININE, ORAL Take 500 mg by mouth once daily.    betamethasone valerate 0.1% (VALISONE) 0.1 % Oint Apply topically 2 (two) times daily.    CANNABIDIOL, CBD, EXTRACT ORAL Take 3 drops by mouth every morning.    dutasteride (AVODART) 0.5 mg capsule Take 1 capsule (0.5 mg total) by mouth once daily.    GLUCOSAM-MSM-CHONDROIT-VIT D3 ORAL Take 2 tablets by mouth once daily.    krill/om-3/dha/epa/phospho/ast (MEGARED OMEGA-3 KRILL OIL ORAL) Take 1 capsule by mouth nightly.     multivit-min/folic/vit K/lycop (ONE-A-DAY MEN'S MULTIVITAMIN ORAL) Take 1 tablet by mouth every evening.     mupirocin (BACTROBAN) 2 % ointment Apply topically 3 (three) times daily.    prednisoLONE acetate (PRED FORTE) 1 % DrpS Place 1 drop into the right eye 2 (two) times daily.    sars-cov-2, covid-19, (PFIZER COVID-19) 30 mcg/0.3 ml injection Inject into the muscle.     Family History       Problem Relation (Age of Onset)    Appendicitis Father    Atrial fibrillation Sister    COPD Mother    Emphysema Mother    Heart disease Mother, Son    Lung disease Sister (91)          Tobacco Use    Smoking status: Former     Packs/day: 1.00     Years: 40.00     Pack years: 40.00     Types: Cigarettes      Start date: 1949     Quit date: 1991     Years since quittin.8    Smokeless tobacco: Never   Substance and Sexual Activity    Alcohol use: Not Currently     Comment: rarely   No alcohol 72h  prior to sx    Drug use: No    Sexual activity: Never     Review of Systems   Constitutional:  Negative for appetite change, chills and unexpected weight change.   HENT:  Negative for congestion, mouth sores, sinus pressure, sore throat and trouble swallowing.    Eyes:  Negative for photophobia and visual disturbance.   Respiratory:  Negative for cough, shortness of breath and wheezing.    Cardiovascular:  Negative for chest pain, palpitations and leg swelling.   Gastrointestinal:  Positive for blood in stool. Negative for abdominal distention and abdominal pain.   Endocrine: Negative for polydipsia, polyphagia and polyuria.   Genitourinary:  Negative for flank pain, frequency and hematuria.   Musculoskeletal:  Positive for back pain. Negative for neck stiffness.   Skin:  Negative for pallor and rash.   Allergic/Immunologic: Negative for immunocompromised state.   Neurological:  Negative for dizziness, tremors, seizures, syncope, speech difficulty, weakness and headaches.   Hematological:  Negative for adenopathy. Does not bruise/bleed easily.   Psychiatric/Behavioral:  Negative for agitation, confusion and suicidal ideas.    Objective:     Vital Signs (Most Recent):  Temp: 98.6 °F (37 °C) (10/25/22 1446)  Pulse: 82 (10/25/22 1747)  Resp: 15 (10/25/22 1747)  BP: (!) 115/58 (10/25/22 1747)  SpO2: 98 % (10/25/22 1747)   Vital Signs (24h Range):  Temp:  [98.6 °F (37 °C)] 98.6 °F (37 °C)  Pulse:  [] 82  Resp:  [15-21] 15  SpO2:  [94 %-98 %] 98 %  BP: ()/(51-59) 115/58     Weight: 78.9 kg (173 lb 15.1 oz)  Body mass index is 28.08 kg/m².    Physical Exam  Constitutional:       Appearance: Normal appearance.   HENT:      Head: Normocephalic and atraumatic.      Comments: [place]  Hearing aids in place     Nose: No  congestion or rhinorrhea.      Mouth/Throat:      Pharynx: No posterior oropharyngeal erythema.   Eyes:      General: No scleral icterus.     Extraocular Movements: Extraocular movements intact.      Pupils: Pupils are equal, round, and reactive to light.   Neck:      Vascular: No carotid bruit.   Cardiovascular:      Rate and Rhythm: Normal rate and regular rhythm.   Pulmonary:      Effort: Pulmonary effort is normal. No respiratory distress.      Breath sounds: Normal breath sounds. No stridor. No wheezing.   Abdominal:      General: There is no distension.      Palpations: Abdomen is soft.      Tenderness: There is no abdominal tenderness. There is no guarding.      Hernia: A hernia is present.   Musculoskeletal:         General: No swelling or deformity. Normal range of motion.      Cervical back: Normal range of motion and neck supple. No rigidity.   Skin:     General: Skin is warm and dry.      Capillary Refill: Capillary refill takes less than 2 seconds.      Coloration: Skin is not jaundiced.      Findings: Petechiae present. No erythema or rash.   Neurological:      Mental Status: He is alert and oriented to person, place, and time.      Motor: No weakness.   Psychiatric:         Mood and Affect: Mood normal.         Behavior: Behavior normal.         Thought Content: Thought content normal.         Judgment: Judgment normal.         CRANIAL NERVES     CN III, IV, VI   Pupils are equal, round, and reactive to light.     Significant Labs: All pertinent labs within the past 24 hours have been reviewed.    Significant Imaging: I have reviewed all pertinent imaging results/findings within the past 24 hours.

## 2022-10-25 NOTE — ED NOTES
Bed: 16  Expected date:   Expected time:   Means of arrival: Personal Transportation  Comments:   DISCHARGE

## 2022-10-25 NOTE — ASSESSMENT & PLAN NOTE
- GI bleed pathway initiated   - IVF resuscitation   -Monitor H/H per GI bleed pathway . Transfuse P-RBC for Hgb 7 or under   -PPI IV per pathway   -GI consult

## 2022-10-25 NOTE — ASSESSMENT & PLAN NOTE
-Hold oral antihypertensives in the setting of GI bleed  -Monitor BP trend   -Hydralazine prn for  or higher

## 2022-10-25 NOTE — ED PROVIDER NOTES
"SCRIBE #1 NOTE: I, Jonny Levin, am scribing for, and in the presence of, Jody Sarmiento MD. I have scribed the entire note.      History      Chief Complaint   Patient presents with    Melena     PT c/o black stools and left sided abdominal pain    Abdominal Pain       Review of patient's allergies indicates:   Allergen Reactions    Bee sting  [allergen ext-venom-honey bee] Swelling    Penicillins Rash     "Felt like I had pins and needles in my feet" , hospitalized overnight    Poison ivy extract Hives        HPI   HPI    10/25/2022, 4:51 PM   History obtained from the patient      History of Present Illness: Chao Hawk is a 89 y.o. male patient who presents to the Emergency Department for blood in stool, onset 1 day PTA. Pt reports black stool. Symptoms are episodic and moderate in severity. No mitigating or exacerbating factors reported. Associated sxs include L-sided abdominal pain. Patient denies any fever, chills, n/v/d, SOB, CP, weakness, numbness, dizziness, headache, and all other sxs at this time. Pt is on 81 mg ASA daily. No further complaints or concerns at this time.     Arrival mode: Personal vehicle    PCP: To Obtain Unable       Past Medical History:  Past Medical History:   Diagnosis Date    Anemia     Arthritis     back, hand    Back pain     Dr. Cristobal- [facility]    Bilateral leg edema 2/16/2018    Chronic bronchitis     COPD (chronic obstructive pulmonary disease)     Diverticulitis of large intestine with perforation without abscess or bleeding     Diverticulosis 5/16/06    colonoscopy    Hearing loss, bilateral     Hernia     x2    Hyperlipidemia     Hypertension     Lumbar radiculopathy 6/14/2017    Multiple renal cysts 7/5/2016    Nocturnal hypoxemia     Polyneuropathy     Tobacco dependence     Trouble in sleeping        Past Surgical History:  Past Surgical History:   Procedure Laterality Date    FINGER SURGERY Left 1980s    index- Dr. Encarnacion; to remove nodule    HERNIA REPAIR  1990s    " x2    INTRAOCULAR LENS INSERTION Bilateral 2016    LUMBAR SPINE SURGERY  2017    REVERSE TOTAL SHOULDER ARTHROPLASTY Right 10/5/2020    Procedure: ARTHROPLASTY, SHOULDER, TOTAL, REVERSE;  Surgeon: Reji Martin MD;  Location: [facility];  Service: Orthopedics;  Laterality: Right;    TONSILLECTOMY, ADENOIDECTOMY           Family History:  Family History   Problem Relation Age of Onset    Emphysema Mother     Heart disease Mother     COPD Mother     Appendicitis Father     Heart disease Son     Atrial fibrillation Sister     Lung disease Sister 91    Colon cancer Neg Hx     Cancer Neg Hx     Stroke Neg Hx        Social History:  Social History     Tobacco Use    Smoking status: Former     Packs/day: 1.00     Years: 40.00     Pack years: 40.00     Types: Cigarettes     Start date: 1949     Quit date: 1991     Years since quittin.8    Smokeless tobacco: Never   Substance and Sexual Activity    Alcohol use: Not Currently     Comment: rarely   No alcohol 72h  prior to sx    Drug use: No    Sexual activity: Never       ROS   Review of Systems   Constitutional:  Negative for chills and fever.   HENT:  Negative for sore throat.    Respiratory:  Negative for shortness of breath.    Cardiovascular:  Negative for chest pain.   Gastrointestinal:  Positive for abdominal pain (L) and blood in stool (black). Negative for diarrhea, nausea and vomiting.   Genitourinary:  Negative for dysuria.   Musculoskeletal:  Negative for back pain.   Skin:  Negative for rash.   Neurological:  Negative for dizziness, weakness, light-headedness, numbness and headaches.   Hematological:  Does not bruise/bleed easily.   All other systems reviewed and are negative.    Physical Exam      Initial Vitals [10/25/22 1446]   BP Pulse Resp Temp SpO2   (!) 129/53 105 20 98.6 °F (37 °C) 98 %      MAP       --          Physical Exam  Nursing Notes and Vital Signs Reviewed.  Constitutional: Patient is in no acute distress.  "Well-developed and well-nourished. Non-toxic appearing.  Head: Atraumatic. Normocephalic.  Eyes: PERRL. EOM intact. Conjunctivae are not pale. No scleral icterus.  ENT: Mucous membranes are moist. Oropharynx is clear and symmetric.    Neck: Supple. Full ROM.   Cardiovascular: Regular rate. Regular rhythm. No murmurs, rubs, or gallops. Distal pulses are 2+ and symmetric.  Pulmonary/Chest: No respiratory distress. Clear to auscultation bilaterally. No wheezing or rales.  Abdominal: Soft and non-distended.  There is no tenderness.  No rebound, guarding, or rigidity.   Rectal: Male chaperone present for the duration of the rectal exam.There is no tenderness. No masses or hemorrhoids. Normal sphincter tone. The stool color is normal. No melanotic stool.  Musculoskeletal: Moves all extremities. No obvious deformities. No edema.  Skin: Warm and dry.  Neurological:  Alert, awake, and appropriate.  Normal speech.  No acute focal neurological deficits are appreciated.  Psychiatric: Normal affect. Good eye contact. Appropriate in content.    ED Course    Procedures  ED Vital Signs:  Vitals:    10/25/22 1446 10/25/22 1621 10/25/22 1647 10/25/22 1708   BP: (!) 129/53 (!) 128/59 (!) 122/56 (!) 97/51   Pulse: 105 93 91 82   Resp: 20 18 (!) 21    Temp: 98.6 °F (37 °C)      TempSrc: Oral      SpO2: 98% 97% 97% 95%   Weight: 78.9 kg (173 lb 15.1 oz)      Height: 5' 6" (1.676 m)       10/25/22 1716 10/25/22 1747   BP: (!) 116/57 (!) 115/58   Pulse: 85 82   Resp: 15 15   Temp:     TempSrc:     SpO2: (!) 94% 98%   Weight:     Height:         Abnormal Lab Results:  Labs Reviewed   CBC W/ AUTO DIFFERENTIAL - Abnormal; Notable for the following components:       Result Value    RBC 3.28 (*)     Hemoglobin 9.5 (*)     Hematocrit 30.1 (*)     MCHC 31.6 (*)     RDW 15.3 (*)     Immature Granulocytes 0.6 (*)     Gran # (ANC) 7.8 (*)     Immature Grans (Abs) 0.06 (*)     Gran % 79.9 (*)     Lymph % 12.7 (*)     All other components within " normal limits   COMPREHENSIVE METABOLIC PANEL - Abnormal; Notable for the following components:    Chloride 113 (*)     CO2 21 (*)     Glucose 117 (*)     BUN 42 (*)     Albumin 3.4 (*)     Anion Gap 7 (*)     All other components within normal limits   OCCULT BLOOD X 1, STOOL - Abnormal; Notable for the following components:    Occult Blood Positive (*)     All other components within normal limits   PROTIME-INR   SARS-COV-2 RNA AMPLIFICATION, QUAL   TYPE & SCREEN        All Lab Results:  Results for orders placed or performed during the hospital encounter of 10/25/22   CBC auto differential   Result Value Ref Range    WBC 9.72 3.90 - 12.70 K/uL    RBC 3.28 (L) 4.60 - 6.20 M/uL    Hemoglobin 9.5 (L) 14.0 - 18.0 g/dL    Hematocrit 30.1 (L) 40.0 - 54.0 %    MCV 92 82 - 98 fL    MCH 29.0 27.0 - 31.0 pg    MCHC 31.6 (L) 32.0 - 36.0 g/dL    RDW 15.3 (H) 11.5 - 14.5 %    Platelets 245 150 - 450 K/uL    MPV 10.7 9.2 - 12.9 fL    Immature Granulocytes 0.6 (H) 0.0 - 0.5 %    Gran # (ANC) 7.8 (H) 1.8 - 7.7 K/uL    Immature Grans (Abs) 0.06 (H) 0.00 - 0.04 K/uL    Lymph # 1.2 1.0 - 4.8 K/uL    Mono # 0.6 0.3 - 1.0 K/uL    Eos # 0.0 0.0 - 0.5 K/uL    Baso # 0.03 0.00 - 0.20 K/uL    nRBC 0 0 /100 WBC    Gran % 79.9 (H) 38.0 - 73.0 %    Lymph % 12.7 (L) 18.0 - 48.0 %    Mono % 6.2 4.0 - 15.0 %    Eosinophil % 0.3 0.0 - 8.0 %    Basophil % 0.3 0.0 - 1.9 %    Differential Method Automated    Comprehensive metabolic panel   Result Value Ref Range    Sodium 141 136 - 145 mmol/L    Potassium 4.0 3.5 - 5.1 mmol/L    Chloride 113 (H) 95 - 110 mmol/L    CO2 21 (L) 23 - 29 mmol/L    Glucose 117 (H) 70 - 110 mg/dL    BUN 42 (H) 8 - 23 mg/dL    Creatinine 1.0 0.5 - 1.4 mg/dL    Calcium 9.3 8.7 - 10.5 mg/dL    Total Protein 6.5 6.0 - 8.4 g/dL    Albumin 3.4 (L) 3.5 - 5.2 g/dL    Total Bilirubin 0.4 0.1 - 1.0 mg/dL    Alkaline Phosphatase 63 55 - 135 U/L    AST 19 10 - 40 U/L    ALT 19 10 - 44 U/L    Anion Gap 7 (L) 8 - 16 mmol/L    eGFR >60  >60 mL/min/1.73 m^2   Occult blood x 1, stool    Specimen: Stool   Result Value Ref Range    Occult Blood Positive (A) Negative     Imaging Results:  Imaging Results    None        The EKG was ordered, reviewed, and independently interpreted by the ED provider.  Interpretation time: 16:18  Rate: 94 BPM  Rhythm: Sinus rhythm with premature atrial complexes  Interpretation: Left axis deviation. Septal infarct, age undetermined. No STEMI.  When compared to EKG performed on 1/21/22, there are no significant changes.           The Emergency Provider reviewed the vital signs and test results, which are outlined above.    ED Discussion     5:27 PM: Discussed pt's case with Dr. Duran (Gastroenterology) who recommends starting IV PPI and keeping the pt NPO after midnight, with plan for EGD tomorrow.    5:38 PM: Discussed case with Silva Rojas NP (Hospital Medicine). Dr. Waterman agrees with current care and management of pt and accepts admission.   Admitting Service: Hospital Medicine  Admitting Physician: Dr. Waterman  Admit to: Obs Unit    5:39 PM: Re-evaluated pt. I have discussed test results, shared treatment plan, and the need for admission with patient and family at bedside. Pt and family express understanding at this time and agree with all information. All questions answered. Pt and family have no further questions or concerns at this time. Pt is ready for admit.         ED Medication(s):  Medications   sodium chloride 0.9% bolus 1,000 mL (1,000 mLs Intravenous New Bag 10/25/22 1732)   pantoprazole injection 40 mg (40 mg Intravenous Given 10/25/22 1736)         New Prescriptions    No medications on file         Medical Decision Making    Medical Decision Making:   Clinical Tests:   Lab Tests: Ordered and Reviewed  Medical Tests: Ordered and Reviewed         Scribe Attestation:   Scribe #1: I performed the above scribed service and the documentation accurately describes the services I performed. I attest to the  accuracy of the note.    Attending:   Physician Attestation Statement for Scribe #1: I, Jody Sarmiento MD, personally performed the services described in this documentation, as scribed by Jonny Levin, in my presence, and it is both accurate and complete.          Clinical Impression       ICD-10-CM ICD-9-CM   1. Acute on chronic anemia  D64.9 285.9   2. Weakness  R53.1 780.79   3. Heme positive stool  R19.5 792.1       Disposition:   Disposition: Placed in Observation  Condition: Fair       Jody Sarmiento MD  10/25/22 [id number]

## 2022-10-25 NOTE — HPI
Chao Hawk is a 89 y.o. male patient whose  current medical conditions include: Abdominal aortic aneurysm without rupture MEASURING MAX 3.8, Anemia, Arthritis, Back pain, Chronic bronchitis, COPD (not on home oxygen), Prior h/o Diverticulitis of large intestine with perforation without abscess or bleeding,Hyperlipidemia, Hypertension, Lumbar radiculopathy (6/14/2017), Multiple renal cysts (7/5/2016), who presents to the ED  for blood in stool, onset 2 days PTA. Pt reports dark stool. Denies abd pain. Patient denies any fever, chills, n/v/d, SOB, CP, weakness, numbness, dizziness, headache, and all other sxs at this time. Pt is on 81 mg ASA daily but hasn't taken it in 2 days. Patient states he lives by alone and drives to appointments himself.  In the ED: Hgb 9.5 (baseline around 11-12), Cr 1.0 and BUN 42. FOBT positive. GI consulted and plan for EGD in the AM. NPO at midnight  HM contacted for OBS admission. DNR

## 2022-10-25 NOTE — H&P
Formerly [facility] Emergency Dept.  [facility] Medicine  History & Physical    Patient Name: Chao Hawk  MRN: [id number]  Patient Class: OP- Observation  Admission Date: 10/25/2022  Attending Physician: Frank Waterman MD  Primary Care Provider: To Obtain Unable         Patient information was obtained from patient, past medical records and ER records.     Subjective:     Principal Problem:GI bleed    Chief Complaint:   Chief Complaint   Patient presents with    Melena     PT c/o black stools and left sided abdominal pain    Abdominal Pain        HPI: Chao Hawk is a 89 y.o. male patient whose  current medical conditions include: Abdominal aortic aneurysm without rupture MEASURING MAX 3.8, Anemia, Arthritis, Back pain, Chronic bronchitis, COPD (not on home oxygen), Prior h/o Diverticulitis of large intestine with perforation without abscess or bleeding,Hyperlipidemia, Hypertension, Lumbar radiculopathy (6/14/2017), Multiple renal cysts (7/5/2016), who presents to the ED  for blood in stool, onset 2 days PTA. Pt reports dark stool. Denies abd pain. Patient denies any fever, chills, n/v/d, SOB, CP, weakness, numbness, dizziness, headache, and all other sxs at this time. Pt is on 81 mg ASA daily but hasn't taken it in 2 days. Patient states he lives by alone and drives to appointments himself.  In the ED: Hgb 9.5 (baseline around 11-12), Cr 1.0 and BUN 42. FOBT positive. GI consulted and plan for EGD in the AM. NPO at midnight  HM contacted for OBS admission. DNR      Past Medical History:   Diagnosis Date    Anemia     Arthritis     back, hand    Back pain     Dr. Cristobal- [facility]    Bilateral leg edema 2/16/2018    Chronic bronchitis     COPD (chronic obstructive pulmonary disease)     Diverticulitis of large intestine with perforation without abscess or bleeding     Diverticulosis 5/16/06    colonoscopy    Hearing loss, bilateral     Hernia     x2    Hyperlipidemia     Hypertension     Lumbar radiculopathy  "6/14/2017    Multiple renal cysts 7/5/2016    Nocturnal hypoxemia     Polyneuropathy     Tobacco dependence     Trouble in sleeping        Past Surgical History:   Procedure Laterality Date    FINGER SURGERY Left 1980s    index- Dr. Encarnacion; to remove nodule    HERNIA REPAIR  1990s    x2    INTRAOCULAR LENS INSERTION Bilateral 2016    LUMBAR SPINE SURGERY  09/13/2017    REVERSE TOTAL SHOULDER ARTHROPLASTY Right 10/5/2020    Procedure: ARTHROPLASTY, SHOULDER, TOTAL, REVERSE;  Surgeon: Reji Martin MD;  Location: [facility];  Service: Orthopedics;  Laterality: Right;    TONSILLECTOMY, ADENOIDECTOMY  1940       Review of patient's allergies indicates:   Allergen Reactions    Bee sting  [allergen ext-venom-honey bee] Swelling    Penicillins Rash     "Felt like I had pins and needles in my feet" , hospitalized overnight    Poison ivy extract Hives       No current facility-administered medications on file prior to encounter.     Current Outpatient Medications on File Prior to Encounter   Medication Sig    acetaminophen (TYLENOL) 500 MG tablet Take 500 mg by mouth as needed for Pain.    aspirin (ECOTRIN) 81 MG EC tablet Take 1 tablet (81 mg total) by mouth 2 (two) times a day. (Patient taking differently: Take 81 mg by mouth once daily.)    atorvastatin (LIPITOR) 40 MG tablet TAKE 1 TABLET EVERY DAY    cholecalciferol, vitamin D3, (VITAMIN D3) 50 mcg (2,000 unit) Cap Take 1 capsule by mouth every evening.     COQ10, UBIQUINOL, ORAL Take 1 tablet by mouth nightly.     cyanocobalamin (VITAMIN B-12) 1000 MCG tablet Take 100 mcg by mouth once daily.    furosemide (LASIX) 20 MG tablet TAKE 1 TABLET EVERY DAY    gabapentin (NEURONTIN) 100 MG capsule TAKE ONE CAPSULE BY MOUTH TWICE DAILY    lisinopriL 10 MG tablet TAKE 1 TABLET EVERY DAY    potassium 99 mg Tab Take by mouth once daily.     tamsulosin (FLOMAX) 0.4 mg Cap Take 2 capsules (0.8 mg total) by mouth every morning.    traMADoL " (ULTRAM) 50 mg tablet Take 1 tablet (50 mg total) by mouth daily as needed for Pain.    TRELEGY ELLIPTA 100-62.5-25 mcg DsDv INHALE 1 PUFF INTO THE LUNGS ONCE DAILY    albuterol (PROVENTIL) 2.5 mg /3 mL (0.083 %) nebulizer solution Take 3 mLs (2.5 mg total) by nebulization every 4 (four) hours as needed for Wheezing. Rescue    ARGININE, L-ARGININE, ORAL Take 500 mg by mouth once daily.    betamethasone valerate 0.1% (VALISONE) 0.1 % Oint Apply topically 2 (two) times daily.    CANNABIDIOL, CBD, EXTRACT ORAL Take 3 drops by mouth every morning.    dutasteride (AVODART) 0.5 mg capsule Take 1 capsule (0.5 mg total) by mouth once daily.    GLUCOSAM-MSM-CHONDROIT-VIT D3 ORAL Take 2 tablets by mouth once daily.    krill/om-3/dha/epa/phospho/ast (MEGARED OMEGA-3 KRILL OIL ORAL) Take 1 capsule by mouth nightly.     multivit-min/folic/vit K/lycop (ONE-A-DAY MEN'S MULTIVITAMIN ORAL) Take 1 tablet by mouth every evening.     mupirocin (BACTROBAN) 2 % ointment Apply topically 3 (three) times daily.    prednisoLONE acetate (PRED FORTE) 1 % DrpS Place 1 drop into the right eye 2 (two) times daily.    sars-cov-2, covid-19, (PFIZER COVID-19) 30 mcg/0.3 ml injection Inject into the muscle.     Family History       Problem Relation (Age of Onset)    Appendicitis Father    Atrial fibrillation Sister    COPD Mother    Emphysema Mother    Heart disease Mother, Son    Lung disease Sister (91)          Tobacco Use    Smoking status: Former     Packs/day: 1.00     Years: 40.00     Pack years: 40.00     Types: Cigarettes     Start date: 1949     Quit date: 1991     Years since quittin.8    Smokeless tobacco: Never   Substance and Sexual Activity    Alcohol use: Not Currently     Comment: rarely   No alcohol 72h  prior to sx    Drug use: No    Sexual activity: Never     Review of Systems   Constitutional:  Negative for appetite change, chills and unexpected weight change.   HENT:  Negative for congestion, mouth  sores, sinus pressure, sore throat and trouble swallowing.    Eyes:  Negative for photophobia and visual disturbance.   Respiratory:  Negative for cough, shortness of breath and wheezing.    Cardiovascular:  Negative for chest pain, palpitations and leg swelling.   Gastrointestinal:  Positive for blood in stool. Negative for abdominal distention and abdominal pain.   Endocrine: Negative for polydipsia, polyphagia and polyuria.   Genitourinary:  Negative for flank pain, frequency and hematuria.   Musculoskeletal:  Positive for back pain. Negative for neck stiffness.   Skin:  Negative for pallor and rash.   Allergic/Immunologic: Negative for immunocompromised state.   Neurological:  Negative for dizziness, tremors, seizures, syncope, speech difficulty, weakness and headaches.   Hematological:  Negative for adenopathy. Does not bruise/bleed easily.   Psychiatric/Behavioral:  Negative for agitation, confusion and suicidal ideas.    Objective:     Vital Signs (Most Recent):  Temp: 98.6 °F (37 °C) (10/25/22 1446)  Pulse: 82 (10/25/22 1747)  Resp: 15 (10/25/22 1747)  BP: (!) 115/58 (10/25/22 1747)  SpO2: 98 % (10/25/22 1747)   Vital Signs (24h Range):  Temp:  [98.6 °F (37 °C)] 98.6 °F (37 °C)  Pulse:  [] 82  Resp:  [15-21] 15  SpO2:  [94 %-98 %] 98 %  BP: ()/(51-59) 115/58     Weight: 78.9 kg (173 lb 15.1 oz)  Body mass index is 28.08 kg/m².    Physical Exam  Constitutional:       Appearance: Normal appearance.   HENT:      Head: Normocephalic and atraumatic.      Comments: [place]  Hearing aids in place     Nose: No congestion or rhinorrhea.      Mouth/Throat:      Pharynx: No posterior oropharyngeal erythema.   Eyes:      General: No scleral icterus.     Extraocular Movements: Extraocular movements intact.      Pupils: Pupils are equal, round, and reactive to light.   Neck:      Vascular: No carotid bruit.   Cardiovascular:      Rate and Rhythm: Normal rate and regular rhythm.   Pulmonary:      Effort: Pulmonary  effort is normal. No respiratory distress.      Breath sounds: Normal breath sounds. No stridor. No wheezing.   Abdominal:      General: There is no distension.      Palpations: Abdomen is soft.      Tenderness: There is no abdominal tenderness. There is no guarding.      Hernia: A hernia is present.   Musculoskeletal:         General: No swelling or deformity. Normal range of motion.      Cervical back: Normal range of motion and neck supple. No rigidity.   Skin:     General: Skin is warm and dry.      Capillary Refill: Capillary refill takes less than 2 seconds.      Coloration: Skin is not jaundiced.      Findings: Petechiae present. No erythema or rash.   Neurological:      Mental Status: He is alert and oriented to person, place, and time.      Motor: No weakness.   Psychiatric:         Mood and Affect: Mood normal.         Behavior: Behavior normal.         Thought Content: Thought content normal.         Judgment: Judgment normal.         CRANIAL NERVES     CN III, IV, VI   Pupils are equal, round, and reactive to light.     Significant Labs: All pertinent labs within the past 24 hours have been reviewed.    Significant Imaging: I have reviewed all pertinent imaging results/findings within the past 24 hours.    Assessment/Plan:     * GI bleed with Melena  - GI bleed pathway initiated   - IVF resuscitation   -Monitor H/H per GI bleed pathway . Transfuse P-RBC for Hgb 7 or under   -PPI IV per pathway   -GI consult       COPD (chronic obstructive pulmonary disease)  - No signs of exacerbation at this time   -Continue maintenance bronchodilators and ICS       Essential hypertension  -Hold oral antihypertensives in the setting of GI bleed  -Monitor BP trend   -Hydralazine prn for  or higher       Peripheral neuropathy  - Resume home meds Gapapentin       BPH (benign prostatic hyperplasia)  - Resume home meds- Flomax and Avodart       Abdominal aortic aneurysm without rupture  -Last U/S in Epic 7/2021 ,  size-  diameter of 3.8 cm  - Outpatient follow up with PCP and Cardiology         VTE Risk Mitigation (From admission, onward)         Ordered     IP VTE HIGH RISK PATIENT  Once         10/25/22 1812     Place sequential compression device  Until discontinued         10/25/22 1812                   Frank Waterman MD  Department of Hospital Medicine   '[facility] - Emergency Dept.

## 2022-10-25 NOTE — ASSESSMENT & PLAN NOTE
-Last U/S in Epic 7/2021 , size-  diameter of 3.8 cm  - Outpatient follow up with PCP and Cardiology

## 2022-10-26 ENCOUNTER — ANESTHESIA (OUTPATIENT)
Dept: ENDOSCOPY | Facility: HOSPITAL | Age: 87
End: 2022-10-26
Payer: MEDICARE

## 2022-10-26 ENCOUNTER — ANESTHESIA EVENT (OUTPATIENT)
Dept: ENDOSCOPY | Facility: HOSPITAL | Age: 87
End: 2022-10-26
Payer: MEDICARE

## 2022-10-26 ENCOUNTER — TELEPHONE (OUTPATIENT)
Dept: INTERNAL MEDICINE | Facility: CLINIC | Age: 87
End: 2022-10-26
Payer: MEDICARE

## 2022-10-26 VITALS
BODY MASS INDEX: 27.97 KG/M2 | HEIGHT: 66 IN | DIASTOLIC BLOOD PRESSURE: 59 MMHG | RESPIRATION RATE: 18 BRPM | WEIGHT: 174 LBS | TEMPERATURE: 98 F | SYSTOLIC BLOOD PRESSURE: 124 MMHG | HEART RATE: 84 BPM | OXYGEN SATURATION: 94 %

## 2022-10-26 DIAGNOSIS — J44.9 COPD (CHRONIC OBSTRUCTIVE PULMONARY DISEASE): Primary | Chronic | ICD-10-CM

## 2022-10-26 PROBLEM — D62 ACUTE BLOOD LOSS ANEMIA: Status: ACTIVE | Noted: 2022-10-26

## 2022-10-26 PROBLEM — K92.1 MELENA: Status: ACTIVE | Noted: 2022-10-26

## 2022-10-26 LAB
ALBUMIN SERPL BCP-MCNC: 2.8 G/DL (ref 3.5–5.2)
ALP SERPL-CCNC: 59 U/L (ref 55–135)
ALT SERPL W/O P-5'-P-CCNC: 17 U/L (ref 10–44)
ANION GAP SERPL CALC-SCNC: 5 MMOL/L (ref 8–16)
AST SERPL-CCNC: 15 U/L (ref 10–40)
BASOPHILS # BLD AUTO: 0.02 K/UL (ref 0–0.2)
BASOPHILS # BLD AUTO: 0.03 K/UL (ref 0–0.2)
BASOPHILS # BLD AUTO: 0.03 K/UL (ref 0–0.2)
BASOPHILS NFR BLD: 0.3 % (ref 0–1.9)
BASOPHILS NFR BLD: 0.4 % (ref 0–1.9)
BASOPHILS NFR BLD: 0.4 % (ref 0–1.9)
BILIRUB SERPL-MCNC: 0.4 MG/DL (ref 0.1–1)
BUN SERPL-MCNC: 31 MG/DL (ref 8–23)
CALCIUM SERPL-MCNC: 8.4 MG/DL (ref 8.7–10.5)
CHLORIDE SERPL-SCNC: 118 MMOL/L (ref 95–110)
CO2 SERPL-SCNC: 22 MMOL/L (ref 23–29)
CREAT SERPL-MCNC: 0.8 MG/DL (ref 0.5–1.4)
DIFFERENTIAL METHOD: ABNORMAL
EOSINOPHIL # BLD AUTO: 0.1 K/UL (ref 0–0.5)
EOSINOPHIL # BLD AUTO: 0.2 K/UL (ref 0–0.5)
EOSINOPHIL # BLD AUTO: 0.2 K/UL (ref 0–0.5)
EOSINOPHIL NFR BLD: 1.2 % (ref 0–8)
EOSINOPHIL NFR BLD: 1.8 % (ref 0–8)
EOSINOPHIL NFR BLD: 2 % (ref 0–8)
ERYTHROCYTE [DISTWIDTH] IN BLOOD BY AUTOMATED COUNT: 15 % (ref 11.5–14.5)
ERYTHROCYTE [DISTWIDTH] IN BLOOD BY AUTOMATED COUNT: 15.3 % (ref 11.5–14.5)
ERYTHROCYTE [DISTWIDTH] IN BLOOD BY AUTOMATED COUNT: 15.4 % (ref 11.5–14.5)
EST. GFR  (NO RACE VARIABLE): >60 ML/MIN/1.73 M^2
ESTIMATED AVG GLUCOSE: 123 MG/DL (ref 68–131)
GLUCOSE SERPL-MCNC: 89 MG/DL (ref 70–110)
HBA1C MFR BLD: 5.9 % (ref 4–5.6)
HCT VFR BLD AUTO: 26.2 % (ref 40–54)
HCT VFR BLD AUTO: 28.3 % (ref 40–54)
HCT VFR BLD AUTO: 29 % (ref 40–54)
HGB BLD-MCNC: 8.2 G/DL (ref 14–18)
HGB BLD-MCNC: 8.8 G/DL (ref 14–18)
HGB BLD-MCNC: 9.1 G/DL (ref 14–18)
IMM GRANULOCYTES # BLD AUTO: 0.03 K/UL (ref 0–0.04)
IMM GRANULOCYTES # BLD AUTO: 0.04 K/UL (ref 0–0.04)
IMM GRANULOCYTES # BLD AUTO: 0.06 K/UL (ref 0–0.04)
IMM GRANULOCYTES NFR BLD AUTO: 0.4 % (ref 0–0.5)
IMM GRANULOCYTES NFR BLD AUTO: 0.5 % (ref 0–0.5)
IMM GRANULOCYTES NFR BLD AUTO: 0.7 % (ref 0–0.5)
INR PPP: 1 (ref 0.8–1.2)
LYMPHOCYTES # BLD AUTO: 0.8 K/UL (ref 1–4.8)
LYMPHOCYTES # BLD AUTO: 1.1 K/UL (ref 1–4.8)
LYMPHOCYTES # BLD AUTO: 1.2 K/UL (ref 1–4.8)
LYMPHOCYTES NFR BLD: 10.3 % (ref 18–48)
LYMPHOCYTES NFR BLD: 14.5 % (ref 18–48)
LYMPHOCYTES NFR BLD: 15 % (ref 18–48)
MAGNESIUM SERPL-MCNC: 2 MG/DL (ref 1.6–2.6)
MCH RBC QN AUTO: 28.9 PG (ref 27–31)
MCH RBC QN AUTO: 29 PG (ref 27–31)
MCH RBC QN AUTO: 29.2 PG (ref 27–31)
MCHC RBC AUTO-ENTMCNC: 31.1 G/DL (ref 32–36)
MCHC RBC AUTO-ENTMCNC: 31.3 G/DL (ref 32–36)
MCHC RBC AUTO-ENTMCNC: 31.4 G/DL (ref 32–36)
MCV RBC AUTO: 92 FL (ref 82–98)
MCV RBC AUTO: 93 FL (ref 82–98)
MCV RBC AUTO: 93 FL (ref 82–98)
MONOCYTES # BLD AUTO: 0.5 K/UL (ref 0.3–1)
MONOCYTES # BLD AUTO: 0.6 K/UL (ref 0.3–1)
MONOCYTES # BLD AUTO: 0.6 K/UL (ref 0.3–1)
MONOCYTES NFR BLD: 6.2 % (ref 4–15)
MONOCYTES NFR BLD: 7 % (ref 4–15)
MONOCYTES NFR BLD: 7.5 % (ref 4–15)
NEUTROPHILS # BLD AUTO: 5.7 K/UL (ref 1.8–7.7)
NEUTROPHILS # BLD AUTO: 6.2 K/UL (ref 1.8–7.7)
NEUTROPHILS # BLD AUTO: 6.2 K/UL (ref 1.8–7.7)
NEUTROPHILS NFR BLD: 75.1 % (ref 38–73)
NEUTROPHILS NFR BLD: 75.3 % (ref 38–73)
NEUTROPHILS NFR BLD: 81.4 % (ref 38–73)
NRBC BLD-RTO: 0 /100 WBC
PHOSPHATE SERPL-MCNC: 3.2 MG/DL (ref 2.7–4.5)
PLATELET # BLD AUTO: 186 K/UL (ref 150–450)
PLATELET # BLD AUTO: 197 K/UL (ref 150–450)
PLATELET # BLD AUTO: 218 K/UL (ref 150–450)
PMV BLD AUTO: 10.8 FL (ref 9.2–12.9)
PMV BLD AUTO: 11 FL (ref 9.2–12.9)
PMV BLD AUTO: 11.1 FL (ref 9.2–12.9)
POTASSIUM SERPL-SCNC: 4.1 MMOL/L (ref 3.5–5.1)
PROT SERPL-MCNC: 4.9 G/DL (ref 6–8.4)
PROTHROMBIN TIME: 10.9 SEC (ref 9–12.5)
RBC # BLD AUTO: 2.81 M/UL (ref 4.6–6.2)
RBC # BLD AUTO: 3.03 M/UL (ref 4.6–6.2)
RBC # BLD AUTO: 3.15 M/UL (ref 4.6–6.2)
SODIUM SERPL-SCNC: 145 MMOL/L (ref 136–145)
WBC # BLD AUTO: 7.5 K/UL (ref 3.9–12.7)
WBC # BLD AUTO: 7.6 K/UL (ref 3.9–12.7)
WBC # BLD AUTO: 8.24 K/UL (ref 3.9–12.7)

## 2022-10-26 PROCEDURE — C9113 INJ PANTOPRAZOLE SODIUM, VIA: HCPCS | Performed by: INTERNAL MEDICINE

## 2022-10-26 PROCEDURE — 63600175 PHARM REV CODE 636 W HCPCS: Performed by: INTERNAL MEDICINE

## 2022-10-26 PROCEDURE — 83036 HEMOGLOBIN GLYCOSYLATED A1C: CPT | Performed by: NURSE PRACTITIONER

## 2022-10-26 PROCEDURE — 84100 ASSAY OF PHOSPHORUS: CPT | Performed by: INTERNAL MEDICINE

## 2022-10-26 PROCEDURE — 63600175 PHARM REV CODE 636 W HCPCS: Performed by: NURSE ANESTHETIST, CERTIFIED REGISTERED

## 2022-10-26 PROCEDURE — 25000242 PHARM REV CODE 250 ALT 637 W/ HCPCS: Performed by: INTERNAL MEDICINE

## 2022-10-26 PROCEDURE — 83735 ASSAY OF MAGNESIUM: CPT | Performed by: INTERNAL MEDICINE

## 2022-10-26 PROCEDURE — 25000003 PHARM REV CODE 250: Performed by: NURSE ANESTHETIST, CERTIFIED REGISTERED

## 2022-10-26 PROCEDURE — 25000003 PHARM REV CODE 250: Performed by: INTERNAL MEDICINE

## 2022-10-26 PROCEDURE — 97166 OT EVAL MOD COMPLEX 45 MIN: CPT

## 2022-10-26 PROCEDURE — 43235 PR EGD, FLEX, DIAGNOSTIC: ICD-10-PCS | Mod: ,,, | Performed by: INTERNAL MEDICINE

## 2022-10-26 PROCEDURE — 97530 THERAPEUTIC ACTIVITIES: CPT

## 2022-10-26 PROCEDURE — 37000008 HC ANESTHESIA 1ST 15 MINUTES: Performed by: INTERNAL MEDICINE

## 2022-10-26 PROCEDURE — 99204 PR OFFICE/OUTPT VISIT, NEW, LEVL IV, 45-59 MIN: ICD-10-PCS | Mod: ,,, | Performed by: INTERNAL MEDICINE

## 2022-10-26 PROCEDURE — 96361 HYDRATE IV INFUSION ADD-ON: CPT | Mod: 59

## 2022-10-26 PROCEDURE — 99204 OFFICE O/P NEW MOD 45 MIN: CPT | Mod: ,,, | Performed by: INTERNAL MEDICINE

## 2022-10-26 PROCEDURE — 85610 PROTHROMBIN TIME: CPT | Mod: ER | Performed by: INTERNAL MEDICINE

## 2022-10-26 PROCEDURE — 80053 COMPREHEN METABOLIC PANEL: CPT | Performed by: INTERNAL MEDICINE

## 2022-10-26 PROCEDURE — 94761 N-INVAS EAR/PLS OXIMETRY MLT: CPT

## 2022-10-26 PROCEDURE — 85025 COMPLETE CBC W/AUTO DIFF WBC: CPT | Performed by: INTERNAL MEDICINE

## 2022-10-26 PROCEDURE — 43235 EGD DIAGNOSTIC BRUSH WASH: CPT | Mod: ,,, | Performed by: INTERNAL MEDICINE

## 2022-10-26 PROCEDURE — 94640 AIRWAY INHALATION TREATMENT: CPT

## 2022-10-26 PROCEDURE — 43235 EGD DIAGNOSTIC BRUSH WASH: CPT | Performed by: INTERNAL MEDICINE

## 2022-10-26 PROCEDURE — 36415 COLL VENOUS BLD VENIPUNCTURE: CPT | Performed by: INTERNAL MEDICINE

## 2022-10-26 PROCEDURE — 96376 TX/PRO/DX INJ SAME DRUG ADON: CPT

## 2022-10-26 PROCEDURE — G0378 HOSPITAL OBSERVATION PER HR: HCPCS

## 2022-10-26 RX ORDER — EPHEDRINE SULFATE 50 MG/ML
10 INJECTION, SOLUTION INTRAVENOUS ONCE
Status: DISCONTINUED | OUTPATIENT
Start: 2022-10-26 | End: 2022-10-26

## 2022-10-26 RX ORDER — EPHEDRINE SULFATE 50 MG/ML
10 INJECTION, SOLUTION INTRAVENOUS ONCE
Status: COMPLETED | OUTPATIENT
Start: 2022-10-26 | End: 2022-10-26

## 2022-10-26 RX ORDER — PROPOFOL 10 MG/ML
VIAL (ML) INTRAVENOUS
Status: DISCONTINUED | OUTPATIENT
Start: 2022-10-26 | End: 2022-10-26

## 2022-10-26 RX ORDER — LIDOCAINE HYDROCHLORIDE 20 MG/ML
INJECTION, SOLUTION EPIDURAL; INFILTRATION; INTRACAUDAL; PERINEURAL
Status: DISCONTINUED | OUTPATIENT
Start: 2022-10-26 | End: 2022-10-26

## 2022-10-26 RX ORDER — PANTOPRAZOLE SODIUM 40 MG/1
40 TABLET, DELAYED RELEASE ORAL 2 TIMES DAILY
Qty: 120 TABLET | Refills: 0 | Status: SHIPPED | OUTPATIENT
Start: 2022-10-26 | End: 2023-02-28

## 2022-10-26 RX ADMIN — LIDOCAINE HYDROCHLORIDE 100 MG: 20 INJECTION, SOLUTION EPIDURAL; INFILTRATION; INTRACAUDAL; PERINEURAL at 11:10

## 2022-10-26 RX ADMIN — SODIUM CHLORIDE: 0.9 INJECTION, SOLUTION INTRAVENOUS at 07:10

## 2022-10-26 RX ADMIN — SODIUM CHLORIDE, POTASSIUM CHLORIDE, SODIUM LACTATE AND CALCIUM CHLORIDE: 600; 310; 30; 20 INJECTION, SOLUTION INTRAVENOUS at 11:10

## 2022-10-26 RX ADMIN — GABAPENTIN 100 MG: 100 CAPSULE ORAL at 08:10

## 2022-10-26 RX ADMIN — TAMSULOSIN HYDROCHLORIDE 0.8 MG: 0.4 CAPSULE ORAL at 07:10

## 2022-10-26 RX ADMIN — ARFORMOTEROL TARTRATE 15 MCG: 15 SOLUTION RESPIRATORY (INHALATION) at 07:10

## 2022-10-26 RX ADMIN — PANTOPRAZOLE SODIUM 40 MG: 40 INJECTION, POWDER, FOR SOLUTION INTRAVENOUS at 08:10

## 2022-10-26 RX ADMIN — ATORVASTATIN CALCIUM 40 MG: 40 TABLET, FILM COATED ORAL at 08:10

## 2022-10-26 RX ADMIN — PROPOFOL 50 MG: 10 INJECTION, EMULSION INTRAVENOUS at 11:10

## 2022-10-26 RX ADMIN — EPHEDRINE SULFATE 10 MG: 50 INJECTION INTRAVENOUS at 11:10

## 2022-10-26 RX ADMIN — DUTASTERIDE 0.5 MG: 0.5 CAPSULE, LIQUID FILLED ORAL at 08:10

## 2022-10-26 RX ADMIN — BUDESONIDE 0.5 MG: 0.5 INHALANT ORAL at 07:10

## 2022-10-26 NOTE — CONSULTS
[facility] - Telemetry ([facility])  Gastroenterology  Consult Note    Patient Name: Chao Hawk  MRN: [id number]  Admission Date: 10/25/2022  Hospital Length of Stay: 0 days  Code Status: DNR   Attending Provider: Frank Waterman MD   Consulting Provider: Gabriele Allen PA-C  Primary Care Physician: To Obtain Unable  Principal Problem:GI bleed    Inpatient consult to Gastroenterology  Consult performed by: Gabriele Allen PA-C  Consult ordered by: Frank Waterman MD  Reason for consult: GI bleed        Subjective:     HPI:  The patient presented to the ER for black stool and left sided abdominal pain. Symptoms started the day prior to arrival. ER work up revealed a Hgb of 8.5, MCV 92, , BUN 42 and creatinine 1.0. Hgb in August was 12.6. The patient is hemodynamically stable. The rectal exam done by the ER provider was unremarkable. The patient takes ASA 81 mg daily. He denies using NSAIDs, Pepto or an iron supplement. He reports decreased appetite but denies nausea, vomiting, change in bowel habits or hematochezia. He has never had GI bleeding or an EGD. He thinks he may have had a colonoscopy in the past.       Past Medical History:   Diagnosis Date    Anemia     Arthritis     back, hand    Back pain     Dr. Cristobal- [facility]    Bilateral leg edema 2/16/2018    Chronic bronchitis     COPD (chronic obstructive pulmonary disease)     Diverticulitis of large intestine with perforation without abscess or bleeding     Diverticulosis 5/16/06    colonoscopy    Hearing loss, bilateral     Hernia     x2    Hyperlipidemia     Hypertension     Lumbar radiculopathy 6/14/2017    Multiple renal cysts 7/5/2016    Nocturnal hypoxemia     Polyneuropathy     Tobacco dependence     Trouble in sleeping        Past Surgical History:   Procedure Laterality Date    FINGER SURGERY Left 1980s    index- Dr. Encarnacion; to remove nodule    HERNIA REPAIR  1990s    x2    INTRAOCULAR LENS INSERTION Bilateral 2016    LUMBAR  "SPINE SURGERY  2017    REVERSE TOTAL SHOULDER ARTHROPLASTY Right 10/5/2020    Procedure: ARTHROPLASTY, SHOULDER, TOTAL, REVERSE;  Surgeon: Reji Martin MD;  Location: [facility];  Service: Orthopedics;  Laterality: Right;    TONSILLECTOMY, ADENOIDECTOMY         Review of patient's allergies indicates:   Allergen Reactions    Bee sting  [allergen ext-venom-honey bee] Swelling    Penicillins Rash     "Felt like I had pins and needles in my feet" , hospitalized overnight    Poison ivy extract Hives     Family History       Problem Relation (Age of Onset)    Appendicitis Father    Atrial fibrillation Sister    COPD Mother    Emphysema Mother    Heart disease Mother, Son    Lung disease Sister (91)          Tobacco Use    Smoking status: Former     Packs/day: 1.00     Years: 40.00     Pack years: 40.00     Types: Cigarettes     Start date: 1949     Quit date: 1991     Years since quittin.8    Smokeless tobacco: Never   Substance and Sexual Activity    Alcohol use: Not Currently     Comment: rarely   No alcohol 72h  prior to sx    Drug use: No    Sexual activity: Never     Review of Systems   Constitutional:  Negative for fever.   HENT:  Negative for hearing loss.    Eyes:  Negative for visual disturbance.   Respiratory:  Positive for shortness of breath (at baseline). Negative for cough.    Cardiovascular:  Negative for chest pain and palpitations.   Gastrointestinal:         As per HPI.   Genitourinary:  Positive for frequency (nocturia x 3). Negative for dysuria and hematuria.   Musculoskeletal:  Positive for back pain. Negative for arthralgias.   Skin:  Negative for color change and rash.   Neurological:  Positive for weakness (yesterday with walking). Negative for seizures, syncope, numbness and headaches.   Hematological:  Does not bruise/bleed easily.   Psychiatric/Behavioral:  The patient is not nervous/anxious.    Objective:     Vital Signs (Most Recent):  Temp: 97.5 °F " (36.4 °C) (10/26/22 0719)  Pulse: 72 (10/26/22 0738)  Resp: 18 (10/26/22 0738)  BP: (!) 112/57 (10/26/22 0719)  SpO2: 96 % (10/26/22 0738)   Vital Signs (24h Range):  Temp:  [97.5 °F (36.4 °C)-98.6 °F (37 °C)] 97.5 °F (36.4 °C)  Pulse:  [] 72  Resp:  [15-21] 18  SpO2:  [94 %-98 %] 96 %  BP: ()/(51-59) 112/57     Weight: 79 kg (174 lb 2.6 oz) (10/26/22 0300)  Body mass index is 28.11 kg/m².    No intake or output data in the 24 hours ending 10/26/22 0800    Lines/Drains/Airways       Peripheral Intravenous Line  Duration                  Peripheral IV - Single Lumen 10/25/22 1900 18 G Right Antecubital <1 day                    Physical Exam  Constitutional:       Appearance: He is well-developed. He is not ill-appearing.   HENT:      Head: Normocephalic and atraumatic.   Eyes:      Extraocular Movements: Extraocular movements intact.   Cardiovascular:      Rate and Rhythm: Normal rate and regular rhythm.      Heart sounds: Normal heart sounds. No murmur heard.  Pulmonary:      Effort: Pulmonary effort is normal. No respiratory distress.      Breath sounds: Normal breath sounds. No wheezing.   Abdominal:      General: Bowel sounds are normal. There is no distension.      Palpations: Abdomen is soft. There is no hepatomegaly or mass.      Tenderness: There is abdominal tenderness (mild, across the lower abdomen). There is no guarding or rebound.   Musculoskeletal:      Cervical back: Normal range of motion and neck supple.      Right lower leg: No edema.      Left lower leg: No edema.   Skin:     General: Skin is warm and dry.      Findings: No rash.   Neurological:      Mental Status: He is alert and oriented to person, place, and time.      Cranial Nerves: No cranial nerve deficit.   Psychiatric:         Behavior: Behavior normal.       Significant Labs:  CBC:   Recent Labs   Lab 10/25/22  1632 10/25/22  1923 10/26/22  0523   WBC 9.72 7.59 7.50   HGB 9.5* 8.5* 8.2*   HCT 30.1* 26.6* 26.2*    193  186     CMP:   Recent Labs   Lab 10/26/22  0523   GLU 89   CALCIUM 8.4*   ALBUMIN 2.8*   PROT 4.9*      K 4.1   CO2 22*   *   BUN 31*   CREATININE 0.8   ALKPHOS 59   ALT 17   AST 15   BILITOT 0.4     Coagulation:   Recent Labs   Lab 10/26/22  0523   INR 1.0       Significant Imaging:  Imaging results within the past 24 hours have been reviewed.    Assessment/Plan:     * GI bleed with Melena  Presumed GI bleed with report of black stools, acute anemia and elevated BUN.   Will plan on EGD today.   Agree with IV Protonix.  ASA on hold.      Acute blood loss anemia  Hgb stable overnight. Continue to monitor and transfuse as indicated.     COPD (chronic obstructive pulmonary disease)  Stable. Monitor.         Thank you for your consult. I will follow-up with patient. Please contact us if you have any additional questions.    Gabriele Allen PA-C  Gastroenterology  [facility] - Telemetry ([facility])

## 2022-10-26 NOTE — PLAN OF CARE
[facility] - Telemetry (Hospital)  Discharge Assessment    Primary Care Provider: To Obtain Unable     Discharge Assessment (most recent)       BRIEF DISCHARGE ASSESSMENT - 10/26/22 1312          Discharge Planning    Assessment Type Discharge Planning Brief Assessment     Resource/Environmental Concerns none     Support Systems Children     Equipment Currently Used at Home cane, straight;shower chair;grab bar;wheelchair;walker, rolling     Current Living Arrangements home/apartment/condo     Patient/Family Anticipates Transition to home with family;home     Patient/Family Anticipated Services at Transition none     DME Needed Upon Discharge  none     Discharge Plan A Home

## 2022-10-26 NOTE — ANESTHESIA POSTPROCEDURE EVALUATION
Anesthesia Post Evaluation    Patient: Chao Hawk    Procedure(s) Performed: Procedure(s) (LRB):  EGD (ESOPHAGOGASTRODUODENOSCOPY) (N/A)    Final Anesthesia Type: MAC      Patient location during evaluation: GI PACU  Patient participation: Yes- Able to Participate  Level of consciousness: awake and alert  Post-procedure vital signs: reviewed and stable  Pain management: adequate  Airway patency: patent  CASS mitigation strategies: Multimodal analgesia  PONV status at discharge: No PONV  Anesthetic complications: no      Cardiovascular status: blood pressure returned to baseline  Respiratory status: unassisted, room air and spontaneous ventilation  Hydration status: euvolemic  Follow-up not needed.                No case tracking events are documented in the log.      Pain/Maritza Score: No data recorded

## 2022-10-26 NOTE — DISCHARGE INSTRUCTIONS
Resume Aspirin after 7 days  Take Protonix twice daily for 2months  GI referral sent  Follow up with PCP for post hospital visit stay check up  DO NOT take NSAIDs

## 2022-10-26 NOTE — PLAN OF CARE
Dr. Duran bedside reviewing results with pt. Back to baseline. No pain, no n/v. No bleeding. VSS. Results folder received. Floor nurse called and given report. Pt transferred

## 2022-10-26 NOTE — SUBJECTIVE & OBJECTIVE
"Past Medical History:   Diagnosis Date    Anemia     Arthritis     back, hand    Back pain     Dr. Cristobal- [facility]    Bilateral leg edema 2018    Chronic bronchitis     COPD (chronic obstructive pulmonary disease)     Diverticulitis of large intestine with perforation without abscess or bleeding     Diverticulosis 06    colonoscopy    Hearing loss, bilateral     Hernia     x2    Hyperlipidemia     Hypertension     Lumbar radiculopathy 2017    Multiple renal cysts 2016    Nocturnal hypoxemia     Polyneuropathy     Tobacco dependence     Trouble in sleeping        Past Surgical History:   Procedure Laterality Date    FINGER SURGERY Left     index- Dr. Encarnacion; to remove nodule    HERNIA REPAIR      x2    INTRAOCULAR LENS INSERTION Bilateral 2016    LUMBAR SPINE SURGERY  2017    REVERSE TOTAL SHOULDER ARTHROPLASTY Right 10/5/2020    Procedure: ARTHROPLASTY, SHOULDER, TOTAL, REVERSE;  Surgeon: Reji Martin MD;  Location: [facility];  Service: Orthopedics;  Laterality: Right;    TONSILLECTOMY, ADENOIDECTOMY         Review of patient's allergies indicates:   Allergen Reactions    Bee sting  [allergen ext-venom-honey bee] Swelling    Penicillins Rash     "Felt like I had pins and needles in my feet" , hospitalized overnight    Poison ivy extract Hives     Family History       Problem Relation (Age of Onset)    Appendicitis Father    Atrial fibrillation Sister    COPD Mother    Emphysema Mother    Heart disease Mother, Son    Lung disease Sister (91)          Tobacco Use    Smoking status: Former     Packs/day: 1.00     Years: 40.00     Pack years: 40.00     Types: Cigarettes     Start date: 1949     Quit date: 1991     Years since quittin.8    Smokeless tobacco: Never   Substance and Sexual Activity    Alcohol use: Not Currently     Comment: rarely   No alcohol 72h  prior to sx    Drug use: No    Sexual activity: Never     Review of Systems   Constitutional:  " Negative for fever.   HENT:  Negative for hearing loss.    Eyes:  Negative for visual disturbance.   Respiratory:  Positive for shortness of breath (at baseline). Negative for cough.    Cardiovascular:  Negative for chest pain and palpitations.   Gastrointestinal:         As per HPI.   Genitourinary:  Positive for frequency (nocturia x 3). Negative for dysuria and hematuria.   Musculoskeletal:  Positive for back pain. Negative for arthralgias.   Skin:  Negative for color change and rash.   Neurological:  Positive for weakness (yesterday with walking). Negative for seizures, syncope, numbness and headaches.   Hematological:  Does not bruise/bleed easily.   Psychiatric/Behavioral:  The patient is not nervous/anxious.    Objective:     Vital Signs (Most Recent):  Temp: 97.5 °F (36.4 °C) (10/26/22 0719)  Pulse: 72 (10/26/22 0738)  Resp: 18 (10/26/22 0738)  BP: (!) 112/57 (10/26/22 0719)  SpO2: 96 % (10/26/22 0738)   Vital Signs (24h Range):  Temp:  [97.5 °F (36.4 °C)-98.6 °F (37 °C)] 97.5 °F (36.4 °C)  Pulse:  [] 72  Resp:  [15-21] 18  SpO2:  [94 %-98 %] 96 %  BP: ()/(51-59) 112/57     Weight: 79 kg (174 lb 2.6 oz) (10/26/22 0300)  Body mass index is 28.11 kg/m².    No intake or output data in the 24 hours ending 10/26/22 0800    Lines/Drains/Airways       Peripheral Intravenous Line  Duration                  Peripheral IV - Single Lumen 10/25/22 1900 18 G Right Antecubital <1 day                    Physical Exam  Constitutional:       Appearance: He is well-developed. He is not ill-appearing.   HENT:      Head: Normocephalic and atraumatic.   Eyes:      Extraocular Movements: Extraocular movements intact.   Cardiovascular:      Rate and Rhythm: Normal rate and regular rhythm.      Heart sounds: Normal heart sounds. No murmur heard.  Pulmonary:      Effort: Pulmonary effort is normal. No respiratory distress.      Breath sounds: Normal breath sounds. No wheezing.   Abdominal:      General: Bowel sounds are  normal. There is no distension.      Palpations: Abdomen is soft. There is no hepatomegaly or mass.      Tenderness: There is abdominal tenderness (mild, across the lower abdomen). There is no guarding or rebound.   Musculoskeletal:      Cervical back: Normal range of motion and neck supple.      Right lower leg: No edema.      Left lower leg: No edema.   Skin:     General: Skin is warm and dry.      Findings: No rash.   Neurological:      Mental Status: He is alert and oriented to person, place, and time.      Cranial Nerves: No cranial nerve deficit.   Psychiatric:         Behavior: Behavior normal.       Significant Labs:  CBC:   Recent Labs   Lab 10/25/22  1632 10/25/22  1923 10/26/22  0523   WBC 9.72 7.59 7.50   HGB 9.5* 8.5* 8.2*   HCT 30.1* 26.6* 26.2*    193 186     CMP:   Recent Labs   Lab 10/26/22  0523   GLU 89   CALCIUM 8.4*   ALBUMIN 2.8*   PROT 4.9*      K 4.1   CO2 22*   *   BUN 31*   CREATININE 0.8   ALKPHOS 59   ALT 17   AST 15   BILITOT 0.4     Coagulation:   Recent Labs   Lab 10/26/22  0523   INR 1.0       Significant Imaging:  Imaging results within the past 24 hours have been reviewed.

## 2022-10-26 NOTE — PLAN OF CARE
OT thanh completed. Sup>sit SBA, sit>stand CGA, functional mobility x40ft x2 trials with RW and CGA, step>pivot to bedside chair with RW and CGA. Recommending HHOT and use of RW at d/c.

## 2022-10-26 NOTE — PLAN OF CARE
[facility] - Telemetry (Hospital)  Discharge Final Note    Primary Care Provider: To Obtain Unable    Expected Discharge Date: 10/26/2022    Final Discharge Note (most recent)       Final Note - 10/26/22 1316          Final Note    Assessment Type Final Discharge Note     Anticipated Discharge Disposition Home or Self Care     Hospital Resources/Appts/Education Provided Appointments scheduled by Navigator/Coordinator                     Important Message from Medicare             Contact Info       To Obtain Unable   Relationship: PCP - General        Next Steps: Follow up          PCP f/u scheduled with Dr. Ureña at the [facility], unable to schedule within 1 week.  Message sent to clinic staff.

## 2022-10-26 NOTE — PT/OT/SLP EVAL
"Occupational Therapy   Evaluation    Name: Chao Hawk  MRN: [id number]  Admitting Diagnosis:  GI bleed  Recent Surgery: Procedure(s) (LRB):  EGD (ESOPHAGOGASTRODUODENOSCOPY) (N/A) Day of Surgery    Recommendations:     Discharge Recommendations: nursing facility, skilled  Discharge Equipment Recommendations:   (recommending use of RW at d/c)  Barriers to discharge:   (son on vacation in [place].)    Assessment:     Chao Hawk is a 89 y.o. male with a medical diagnosis of GI bleed.  He presents with the following performance deficits affecting function: weakness, impaired endurance, impaired self care skills, impaired functional mobility, impaired balance, impaired cardiopulmonary response to activity.      Rehab Prognosis: Good; patient would benefit from acute skilled OT services to address these deficits and reach maximum level of function.       Plan:     Patient to be seen 2 x/week to address the above listed problems via self-care/home management, therapeutic activities, therapeutic exercises  Plan of Care Expires: 11/09/22  Plan of Care Reviewed with: patient    Subjective     Chief Complaint: Reported "It feels so good to be up."  Patient/Family Comments/goals: get stronger    Occupational Profile:  Living Environment: Patient resides, alone, in a 2 story home with an upstairs bedroom and bathroom. Reports having a platform ramp over threshold to enter and stair lift for access of 2nd floor of home.  Previous level of function: Patient was mod I with limited community distance ambulation via SPC and ADLs at baseline.  Roles and Routines: Patient was an active  and retired writer for newspaper.  Equipment Used at Home:  cane, straight, shower chair, grab bar, wheelchair, walker, rolling (only used SPC at baseline)  Assistance upon Discharge: son (upon return from vacation)    Pain/Comfort:  Pain Rating 1: 0/10    Objective:     Communicated with: NurseAmanda, prior to session.  Patient found supine " with bed alarm, telemetry, peripheral IV upon OT entry to room.    General Precautions: Standard, fall   Orthopedic Precautions:N/A   Braces: N/A  Respiratory Status: Room air    Bed Mobility:    Patient completed Supine to Sit with stand by assistance and with side rail    Functional Mobility/Transfers:  Patient completed Sit <> Stand Transfer with contact guard assistance  with  rolling walker   Patient completed Bed <> Chair Transfer using Step Transfer technique with contact guard assistance with rolling walker  Functional Mobility: Patient completed x40ft x2 trials functional mobility with RW and CGA to increase dynamic standing balance and activity tolerance needed for ADL completion.    Activities of Daily Living:  Grooming: setup .  Upper Body Dressing: setup .    Cognitive/Visual Perceptual:  Cognitive/Psychosocial Skills:     -       Oriented to: Person, Place, Time, and Situation   -       Follows Commands/attention:Follows two-step commands    Physical Exam:  Balance:    -       sitting: WFL, static standing: fair +, dynamic standing: fair  Upper Extremity Range of Motion:     -       Right Upper Extremity: WFL  -       Left Upper Extremity: WFL  Upper Extremity Strength:    -       Right Upper Extremity: Deficits: grossly 4/5  -       Left Upper Extremity: Deficits: grossly 4/5   Strength:    -       Right Upper Extremity: Deficits: fair  -       Left Upper Extremity: Deficits: fair    AMPAC 6 Click ADL:  AMPAC Total Score: 20    Treatment & Education:  Patient educated on role of OT in acute setting and benefits of participation. Educated on techniques to use to increase independence and decrease fall risk with functional transfers. Educated on importance of OOB activity and calling for A to transfer back to bed. Encouraged completion of B UE AROM therex throughout the day to tolerance to increase functional strength and activity tolerance. Patient stated understanding and in agreement with  POC.    Patient left up in chair with all lines intact, call button in reach, and nurse notified    GOALS:   Multidisciplinary Problems       Occupational Therapy Goals          Problem: Occupational Therapy    Goal Priority Disciplines Outcome Interventions   Occupational Therapy Goal     OT, PT/OT     Description: Goals to be met by: 11/9/22     Patient will increase functional independence with ADLs by performing:    Toileting from bedside commode with Modified [place] for hygiene and clothing management.   Toilet transfer to bedside commode with Modified [place].  Increased functional strength in B UE grossly by 1/2 MM grade.                         History:     Past Medical History:   Diagnosis Date    Anemia     Arthritis     back, hand    Back pain     Dr. Cristobal- [facility]    Bilateral leg edema 2/16/2018    Chronic bronchitis     COPD (chronic obstructive pulmonary disease)     Diverticulitis of large intestine with perforation without abscess or bleeding     Diverticulosis 5/16/06    colonoscopy    Hearing loss, bilateral     Hernia     x2    Hyperlipidemia     Hypertension     Lumbar radiculopathy 6/14/2017    Multiple renal cysts 7/5/2016    Nocturnal hypoxemia     Polyneuropathy     Tobacco dependence     Trouble in sleeping          Past Surgical History:   Procedure Laterality Date    FINGER SURGERY Left 1980s    index- Dr. Encarnacion; to remove nodule    HERNIA REPAIR  1990s    x2    INTRAOCULAR LENS INSERTION Bilateral 2016    LUMBAR SPINE SURGERY  09/13/2017    REVERSE TOTAL SHOULDER ARTHROPLASTY Right 10/5/2020    Procedure: ARTHROPLASTY, SHOULDER, TOTAL, REVERSE;  Surgeon: Reji Martin MD;  Location: [facility];  Service: Orthopedics;  Laterality: Right;    TONSILLECTOMY, ADENOIDECTOMY  1940       Time Tracking:     OT Date of Treatment: 10/26/22  OT Start Time: 0725  OT Stop Time: 0750  OT Total Time (min): 25 min    Billable Minutes:Evaluation 15  Therapeutic Activity  10    10/26/2022

## 2022-10-26 NOTE — PLAN OF CARE
EGD completed which showed two clean based duodenal ulcers.     Recommendations:    - Bleeding has stopped and Hb is now stable  - Recommend Protonix 40 mg po BID x 2 months  - NSAID/ASA avoidance  - No further GI recs at this time. Will sign off. Please call with any additional questions/concerns    Mallika Duran MD  Gastroenterology and Hepatology

## 2022-10-26 NOTE — NURSING
Patient IV removed. Tele monitor removed and returned to tele room. Meds delivered to pts room by pharmacy. Discharge instructions reviewed with patient and copy given. All questions answered.  Patient discharged to home.

## 2022-10-26 NOTE — [employer] PATIENT INSTRUCTIONS
Discharge Summary/Instructions after an Endoscopic Procedure  Patient Name: Chao Hawk  Patient MRN: [id number]  Patient YOB: 1932 Wednesday, October 26, 2022 Mallika Duran MD  Dear patient,  As a result of recent federal legislation (The Federal Cures Act), you may   receive lab or pathology results from your procedure in your [facility]   account before your physician is able to contact you. Your physician or   their representative will relay the results to you with their   recommendations at their soonest availability.  Thank you,  RESTRICTIONS:  During your procedure today, you received medications for sedation.  These   medications may affect your judgment, balance and coordination.  Therefore,   for 24 hours, you have the following restrictions:   - DO NOT drive a car, operate machinery, make legal/financial decisions,   sign important papers or drink alcohol.    ACTIVITY:  Today: no heavy lifting, straining or running due to procedural   sedation/anesthesia.  The following day: return to full activity including work.  DIET:  Eat and drink normally unless instructed otherwise.     TREATMENT FOR COMMON SIDE EFFECTS:  - Mild abdominal pain, nausea, belching, bloating or excessive gas:  rest,   eat lightly and use a heating pad.  - Sore Throat: treat with throat lozenges and/or gargle with warm salt   water.  - Because air was used during the procedure, expelling large amounts of air   from your rectum or belching is normal.  - If a bowel prep was taken, you may not have a bowel movement for 1-3 days.    This is normal.  SYMPTOMS TO WATCH FOR AND REPORT TO YOUR PHYSICIAN:  1. Abdominal pain or bloating, other than gas cramps.  2. Chest pain.  3. Back pain.  4. Signs of infection such as: chills or fever occurring within 24 hours   after the procedure.  5. Rectal bleeding, which would show as bright red, maroon, or black stools.   (A tablespoon of blood from the rectum is not serious, especially  if   hemorrhoids are present.)  6. Vomiting.  7. Weakness or dizziness.  GO DIRECTLY TO THE NEAREST EMERGENCY ROOM IF YOU HAVE ANY OF THE FOLLOWING:      Difficulty breathing              Chills and/or fever over 101 F   Persistent vomiting and/or vomiting blood   Severe abdominal pain   Severe chest pain   Black, tarry stools   Bleeding- more than one tablespoon   Any other symptom or condition that you feel may need urgent attention  Your doctor recommends these additional instructions:  If any biopsies were taken, your doctors clinic will contact you in 1 to 2   weeks with any results.  - [facility] patient to [facility] for [facility] care.   - Resume previous diet.   - Use Protonix (pantoprazole) 40 mg PO BID for 2 months.   - No aspirin, ibuprofen, naproxen, or other non-steroidal anti-inflammatory   drugs.  For questions, problems or results please call your physician Mallika Duran MD at Work:  [phone]  If you have any questions about the above instructions, call the GI   department at [phone] or call the endoscopy unit at [phone]   from 7am until 3 pm.  [facility], EMERGENCY ROOM PHONE NUMBER:   [phone]  IF A COMPLICATION OR EMERGENCY SITUATION ARISES AND YOU ARE UNABLE TO REACH   YOUR PHYSICIAN - GO DIRECTLY TO THE EMERGENCY ROOM.  I have read or have had read to me these discharge instructions for my   procedure and have received a written copy.  I understand these   instructions and will follow-up with my physician if I have any questions.     __________________________________       _____________________________________  Nurse Signature                                          Patient/Designated   Responsible Party Signature  MD Mallika Wise MD  10/26/2022 11:25:12 AM  [employer]

## 2022-10-26 NOTE — PT/OT/SLP PROGRESS
Physical Therapy      Patient Name:  Chao Hawk   MRN:  [id number]    Chart review via Epic completed and attempt made however, patient not seen today secondary to pt undergoing EGD. Will follow-up as able/appropriate.

## 2022-10-26 NOTE — TRANSFER OF CARE
"Anesthesia Transfer of Care Note    Patient: Chao Hawk    Procedure(s) Performed: Procedure(s) (LRB):  EGD (ESOPHAGOGASTRODUODENOSCOPY) (N/A)    Patient location: GI    Anesthesia Type: MAC    Transport from OR: Transported from OR on room air with adequate spontaneous ventilation    Post pain: adequate analgesia    Post assessment: no apparent anesthetic complications    Post vital signs: stable    Level of consciousness: awake    Nausea/Vomiting: no nausea/vomiting    Complications: none    Transfer of care protocol was followed      Last vitals:   Visit Vitals  BP (!) 107/50 (BP Location: Left arm, Patient Position: Lying)   Pulse 74   Temp 36.9 °C (98.4 °F) (Temporal)   Resp 18   Ht 5' 6" (1.676 m)   Wt 78.9 kg (174 lb)   SpO2 (!) 94%   BMI 28.08 kg/m²     "

## 2022-10-26 NOTE — TELEPHONE ENCOUNTER
----- Message from Emily Neri sent at 10/25/2022  8:33 AM CDT -----  Regarding: bowel movment  Contact: pateint  Patient needs to speak to nurse about having black bowel movements last night, has hx of aneurism  , patient concerned, please call him back at [phone]

## 2022-10-26 NOTE — ASSESSMENT & PLAN NOTE
Presumed GI bleed with report of black stools, acute anemia and elevated BUN.   Will plan on EGD today.   Agree with IV Protonix.  ASA on hold.

## 2022-10-26 NOTE — ANESTHESIA PREPROCEDURE EVALUATION
10/26/2022  Chao Hawk is a 89 y.o., male.      Pre-op Assessment    I have reviewed the Patient Summary Reports.     I have reviewed the Nursing Notes. I have reviewed the NPO Status.   I have reviewed the Medications.     Review of Systems  Social:  Former Smoker    Hematology/Oncology:         -- Anemia:   Cardiovascular:   Hypertension ECG has been reviewed. 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   The estimated ejection fraction is 55%.   Grade I left ventricular diastolic dysfunction.   Normal right ventricular size with normal right ventricular systolic function.   Mild aortic regurgitation.   Mild mitral regurgitation.   Moderate tricuspid regurgitation.   Normal central venous pressure (3 mmHg).   The estimated PA systolic pressure is 47 mmHg.   There is pulmonary hypertension.   Trivial pericardial effusion.   Pulmonary:   COPD Pulm HTN   Nocturnal hypoxemia    Renal/:   Chronic Renal Disease BPH    Hepatic/GI:   GI bleed   Diverticulosis    Musculoskeletal:   Arthritis   Spine Disorders: Disc disease and Degenerative disease    Neurological:   Peripheral Neuropathy        Physical Exam  General: Well nourished, Cooperative and Alert    Airway:  Mallampati: II   Mouth Opening: Normal  TM Distance: Normal  Tongue: Normal  Neck ROM: Normal ROM    Dental:  Partial Dentures, Dentures  Upper denture, partial lower       Anesthesia Plan  Type of Anesthesia, risks & benefits discussed:    Anesthesia Type: MAC  Intra-op Monitoring Plan: Standard ASA Monitors  Post Op Pain Control Plan: multimodal analgesia  Induction:  IV  Informed Consent: Informed consent signed with the Patient and all parties understand the risks and  agree with anesthesia plan.  All questions answered.   ASA Score: 4  Day of Surgery Review of History & Physical: H&P Update referred to the surgeon/provider.I have interviewed and examined the patient. I have reviewed the patient's H&P dated: There are no significant changes.     Ready For Surgery From Anesthesia Perspective.     .

## 2022-10-26 NOTE — DISCHARGE SUMMARY
[facility] Medicine  Discharge Summary      Patient Name: Chao Hawk  MRN: [id number]  Patient Class: OP- Observation  Admission Date: 10/25/2022  Hospital Length of Stay: 0 days  Discharge Date and Time: No discharge date for patient encounter.  Attending Physician: Frank Waterman MD   Discharging Provider: Darrion Mae NP  Primary Care Provider: To Obtain Unable      HPI:   Chao Hawk is a 89 y.o. male patient whose  current medical conditions include: Abdominal aortic aneurysm without rupture MEASURING MAX 3.8, Anemia, Arthritis, Back pain, Chronic bronchitis, COPD (not on home oxygen), Prior h/o Diverticulitis of large intestine with perforation without abscess or bleeding,Hyperlipidemia, Hypertension, Lumbar radiculopathy (6/14/2017), Multiple renal cysts (7/5/2016), who presents to the ED  for blood in stool, onset 2 days PTA. Pt reports dark stool. Denies abd pain. Patient denies any fever, chills, n/v/d, SOB, CP, weakness, numbness, dizziness, headache, and all other sxs at this time. Pt is on 81 mg ASA daily but hasn't taken it in 2 days. Patient states he lives by alone and drives to appointments himself.  In the ED: Hgb 9.5 (baseline around 11-12), Cr 1.0 and BUN 42. FOBT positive. GI consulted and plan for EGD in the AM. NPO at midnight  HM contacted for OBS admission. DNR      Procedure(s) (LRB):  EGD (ESOPHAGOGASTRODUODENOSCOPY) (N/A)      Hospital Course:   Chao Hawk is a 89 y.o. male patient whose  current medical conditions include: Abdominal aortic aneurysm without rupture MEASURING MAX 3.8, Anemia, Arthritis, Back pain, Chronic bronchitis, COPD (not on home oxygen), Prior h/o Diverticulitis of large intestine with perforation without abscess or bleeding,Hyperlipidemia, Hypertension, Lumbar radiculopathy (6/14/2017), Multiple renal cysts (7/5/2016), who presents to the ED  for blood in stool, onset 2 days PTA. Pt reports dark stool.Pt is on 81 mg ASA daily  but hasn't taken it in 2 days. Patient states he lives by alone and drives to appointments himself.  EGD performed per GI, findings included x2 clean based duodenal ulcers. GI recs PPI BID for 2 mos and avoid NSAID use. Instructed patient to HOLD ASA for 7 days and then resume. Labs reviewed. In stable condition at time of discharge and patient agreeable with plan. Instructed to follow up with PCP for post hospital visit stay check up. GI referrals sent. POC explained in detail to patient and family and questions answered to their satisfaction. Meds delivered bedside. Referred to NP at home program as well.             Goals of Care Treatment Preferences:  Code Status: DNR      Consults:   Consults (From admission, onward)        Status Ordering Provider     IP consult to case management  Once        Provider:  (Not yet assigned)    Completed KIRK ALVAREZ     Inpatient consult to Gastroenterology  Once        Provider:  (Not yet assigned)    Completed KIRK ALVAREZ          No new Assessment & Plan notes have been filed under this hospital service since the last note was generated.  Service: Hospital Medicine    Final Active Diagnoses:    Diagnosis Date Noted POA    PRINCIPAL PROBLEM:  GI bleed with Melena [K92.2] 10/25/2022 Yes    Acute blood loss anemia [D62] 10/26/2022 Yes    Melena [K92.1] 10/26/2022 Yes    Peripheral neuropathy [G62.9] 09/23/2020 Yes    Abdominal aortic aneurysm without rupture [I71.40] 01/20/2016 Yes    Essential hypertension [I10] 12/17/2013 Yes     Chronic    BPH (benign prostatic hyperplasia) [N40.0] 12/17/2013 Yes     Chronic    COPD (chronic obstructive pulmonary disease) [J44.9] 12/17/2013 Yes     Chronic      Problems Resolved During this Admission:       Discharged Condition: stable    Disposition: Home or Self Care    Follow Up:   Follow-up Information     To Obtain Unable Follow up.                     Patient Instructions:      WALKER FOR HOME USE     Order Specific  "Question Answer Comments   Type of Walker: Adult (5'4"-6'6")    With wheels? Yes    Height: 5' 6" (1.676 m)    Weight: 78.9 kg (174 lb)    Length of need (1-99 months): 99    Does patient have medical equipment at home? cane, straight    Does patient have medical equipment at home? shower chair    Does patient have medical equipment at home? grab bar    Does patient have medical equipment at home? wheelchair    Does patient have medical equipment at home? walker, rolling    Please check all that apply: Patient's condition impairs ambulation.      Ambulatory referral/consult to Gastroenterology   Standing Status: Future   Referral Priority: Routine Referral Type: Consultation   Referral Reason: Specialty Services Required   Requested Specialty: Gastroenterology   Number of Visits Requested: 1     Ambulatory referral/consult to [facility]   Standing Status: Future   Referral Priority: Routine Referral Type: Consultation   Referral Reason: Specialty Services Required   Number of Visits Requested: 1     Diet Cardiac     Activity as tolerated       Significant Diagnostic Studies:   Results for orders placed or performed during the hospital encounter of 10/25/22   CBC auto differential   Result Value Ref Range    WBC 9.72 3.90 - 12.70 K/uL    RBC 3.28 (L) 4.60 - 6.20 M/uL    Hemoglobin 9.5 (L) 14.0 - 18.0 g/dL    Hematocrit 30.1 (L) 40.0 - 54.0 %    MCV 92 82 - 98 fL    MCH 29.0 27.0 - 31.0 pg    MCHC 31.6 (L) 32.0 - 36.0 g/dL    RDW 15.3 (H) 11.5 - 14.5 %    Platelets 245 150 - 450 K/uL    MPV 10.7 9.2 - 12.9 fL    Immature Granulocytes 0.6 (H) 0.0 - 0.5 %    Gran # (ANC) 7.8 (H) 1.8 - 7.7 K/uL    Immature Grans (Abs) 0.06 (H) 0.00 - 0.04 K/uL    Lymph # 1.2 1.0 - 4.8 K/uL    Mono # 0.6 0.3 - 1.0 K/uL    Eos # 0.0 0.0 - 0.5 K/uL    Baso # 0.03 0.00 - 0.20 K/uL    nRBC 0 0 /100 WBC    Gran % 79.9 (H) 38.0 - 73.0 %    Lymph % 12.7 (L) 18.0 - 48.0 %    Mono % 6.2 4.0 - 15.0 %    Eosinophil % 0.3 0.0 - 8.0 % "    Basophil % 0.3 0.0 - 1.9 %    Differential Method Automated    Comprehensive metabolic panel   Result Value Ref Range    Sodium 141 136 - 145 mmol/L    Potassium 4.0 3.5 - 5.1 mmol/L    Chloride 113 (H) 95 - 110 mmol/L    CO2 21 (L) 23 - 29 mmol/L    Glucose 117 (H) 70 - 110 mg/dL    BUN 42 (H) 8 - 23 mg/dL    Creatinine 1.0 0.5 - 1.4 mg/dL    Calcium 9.3 8.7 - 10.5 mg/dL    Total Protein 6.5 6.0 - 8.4 g/dL    Albumin 3.4 (L) 3.5 - 5.2 g/dL    Total Bilirubin 0.4 0.1 - 1.0 mg/dL    Alkaline Phosphatase 63 55 - 135 U/L    AST 19 10 - 40 U/L    ALT 19 10 - 44 U/L    Anion Gap 7 (L) 8 - 16 mmol/L    eGFR >60 >60 mL/min/1.73 m^2   Protime-INR   Result Value Ref Range    Prothrombin Time 10.4 9.0 - 12.5 sec    INR 1.0 0.8 - 1.2   Occult blood x 1, stool    Specimen: Stool   Result Value Ref Range    Occult Blood Positive (A) Negative   COVID-19 Rapid Screening   Result Value Ref Range    SARS-CoV-2 RNA, Amplification, Qual Negative Negative   CBC auto differential   Result Value Ref Range    WBC 7.59 3.90 - 12.70 K/uL    RBC 2.89 (L) 4.60 - 6.20 M/uL    Hemoglobin 8.5 (L) 14.0 - 18.0 g/dL    Hematocrit 26.6 (L) 40.0 - 54.0 %    MCV 92 82 - 98 fL    MCH 29.4 27.0 - 31.0 pg    MCHC 32.0 32.0 - 36.0 g/dL    RDW 15.2 (H) 11.5 - 14.5 %    Platelets 193 150 - 450 K/uL    MPV 10.5 9.2 - 12.9 fL    Immature Granulocytes 0.5 0.0 - 0.5 %    Gran # (ANC) 6.0 1.8 - 7.7 K/uL    Immature Grans (Abs) 0.04 0.00 - 0.04 K/uL    Lymph # 1.0 1.0 - 4.8 K/uL    Mono # 0.5 0.3 - 1.0 K/uL    Eos # 0.1 0.0 - 0.5 K/uL    Baso # 0.02 0.00 - 0.20 K/uL    nRBC 0 0 /100 WBC    Gran % 78.9 (H) 38.0 - 73.0 %    Lymph % 12.9 (L) 18.0 - 48.0 %    Mono % 6.6 4.0 - 15.0 %    Eosinophil % 0.8 0.0 - 8.0 %    Basophil % 0.3 0.0 - 1.9 %    Differential Method Automated    Protime-INR   Result Value Ref Range    Prothrombin Time 10.9 9.0 - 12.5 sec    INR 1.0 0.8 - 1.2   CBC auto differential   Result Value Ref Range    WBC 7.50 3.90 - 12.70 K/uL    RBC 2.81  (L) 4.60 - 6.20 M/uL    Hemoglobin 8.2 (L) 14.0 - 18.0 g/dL    Hematocrit 26.2 (L) 40.0 - 54.0 %    MCV 93 82 - 98 fL    MCH 29.2 27.0 - 31.0 pg    MCHC 31.3 (L) 32.0 - 36.0 g/dL    RDW 15.0 (H) 11.5 - 14.5 %    Platelets 186 150 - 450 K/uL    MPV 11.1 9.2 - 12.9 fL    Immature Granulocytes 0.4 0.0 - 0.5 %    Gran # (ANC) 5.7 1.8 - 7.7 K/uL    Immature Grans (Abs) 0.03 0.00 - 0.04 K/uL    Lymph # 1.1 1.0 - 4.8 K/uL    Mono # 0.6 0.3 - 1.0 K/uL    Eos # 0.2 0.0 - 0.5 K/uL    Baso # 0.02 0.00 - 0.20 K/uL    nRBC 0 0 /100 WBC    Gran % 75.3 (H) 38.0 - 73.0 %    Lymph % 14.5 (L) 18.0 - 48.0 %    Mono % 7.5 4.0 - 15.0 %    Eosinophil % 2.0 0.0 - 8.0 %    Basophil % 0.3 0.0 - 1.9 %    Differential Method Automated    Comprehensive metabolic panel   Result Value Ref Range    Sodium 145 136 - 145 mmol/L    Potassium 4.1 3.5 - 5.1 mmol/L    Chloride 118 (H) 95 - 110 mmol/L    CO2 22 (L) 23 - 29 mmol/L    Glucose 89 70 - 110 mg/dL    BUN 31 (H) 8 - 23 mg/dL    Creatinine 0.8 0.5 - 1.4 mg/dL    Calcium 8.4 (L) 8.7 - 10.5 mg/dL    Total Protein 4.9 (L) 6.0 - 8.4 g/dL    Albumin 2.8 (L) 3.5 - 5.2 g/dL    Total Bilirubin 0.4 0.1 - 1.0 mg/dL    Alkaline Phosphatase 59 55 - 135 U/L    AST 15 10 - 40 U/L    ALT 17 10 - 44 U/L    Anion Gap 5 (L) 8 - 16 mmol/L    eGFR >60 >60 mL/min/1.73 m^2   Magnesium   Result Value Ref Range    Magnesium 2.0 1.6 - 2.6 mg/dL   Phosphorus   Result Value Ref Range    Phosphorus 3.2 2.7 - 4.5 mg/dL   Hemoglobin A1c   Result Value Ref Range    Hemoglobin A1C 5.9 (H) 4.0 - 5.6 %    Estimated Avg Glucose 123 68 - 131 mg/dL   CBC auto differential   Result Value Ref Range    WBC 8.24 3.90 - 12.70 K/uL    RBC 3.15 (L) 4.60 - 6.20 M/uL    Hemoglobin 9.1 (L) 14.0 - 18.0 g/dL    Hematocrit 29.0 (L) 40.0 - 54.0 %    MCV 92 82 - 98 fL    MCH 28.9 27.0 - 31.0 pg    MCHC 31.4 (L) 32.0 - 36.0 g/dL    RDW 15.3 (H) 11.5 - 14.5 %    Platelets 218 150 - 450 K/uL    MPV 10.8 9.2 - 12.9 fL    Immature Granulocytes 0.7  (H) 0.0 - 0.5 %    Gran # (ANC) 6.2 1.8 - 7.7 K/uL    Immature Grans (Abs) 0.06 (H) 0.00 - 0.04 K/uL    Lymph # 1.2 1.0 - 4.8 K/uL    Mono # 0.6 0.3 - 1.0 K/uL    Eos # 0.2 0.0 - 0.5 K/uL    Baso # 0.03 0.00 - 0.20 K/uL    nRBC 0 0 /100 WBC    Gran % 75.1 (H) 38.0 - 73.0 %    Lymph % 15.0 (L) 18.0 - 48.0 %    Mono % 7.0 4.0 - 15.0 %    Eosinophil % 1.8 0.0 - 8.0 %    Basophil % 0.4 0.0 - 1.9 %    Differential Method Automated    CBC auto differential   Result Value Ref Range    WBC 7.60 3.90 - 12.70 K/uL    RBC 3.03 (L) 4.60 - 6.20 M/uL    Hemoglobin 8.8 (L) 14.0 - 18.0 g/dL    Hematocrit 28.3 (L) 40.0 - 54.0 %    MCV 93 82 - 98 fL    MCH 29.0 27.0 - 31.0 pg    MCHC 31.1 (L) 32.0 - 36.0 g/dL    RDW 15.4 (H) 11.5 - 14.5 %    Platelets 197 150 - 450 K/uL    MPV 11.0 9.2 - 12.9 fL    Immature Granulocytes 0.5 0.0 - 0.5 %    Gran # (ANC) 6.2 1.8 - 7.7 K/uL    Immature Grans (Abs) 0.04 0.00 - 0.04 K/uL    Lymph # 0.8 (L) 1.0 - 4.8 K/uL    Mono # 0.5 0.3 - 1.0 K/uL    Eos # 0.1 0.0 - 0.5 K/uL    Baso # 0.03 0.00 - 0.20 K/uL    nRBC 0 0 /100 WBC    Gran % 81.4 (H) 38.0 - 73.0 %    Lymph % 10.3 (L) 18.0 - 48.0 %    Mono % 6.2 4.0 - 15.0 %    Eosinophil % 1.2 0.0 - 8.0 %    Basophil % 0.4 0.0 - 1.9 %    Differential Method Automated    Type & Screen   Result Value Ref Range    Group & Rh O POS     Indirect Gregory NEG    Prepare RBC 1 Unit   Result Value Ref Range    UNIT NUMBER [id number]     Product Code [id number]     DISPENSE STATUS CROSSMATCHED     CODING SYSTEM [id number]     Unit Blood Type Code 5100     Unit Blood Type O POS     Unit Expiration [id number]          Pending Diagnostic Studies:     None         Medications:  Reconciled Home Medications:      Medication List      START taking these medications    pantoprazole 40 MG tablet  Commonly known as: PROTONIX  Take 1 tablet (40 mg total) by mouth 2 (two) times daily.        CONTINUE taking these medications    acetaminophen 500 MG tablet  Commonly known as:  TYLENOL  Take 500 mg by mouth as needed for Pain.     albuterol 2.5 mg /3 mL (0.083 %) nebulizer solution  Commonly known as: PROVENTIL  Take 3 mLs (2.5 mg total) by nebulization every 4 (four) hours as needed for Wheezing. Rescue     ARGININE (L-ARGININE) ORAL  Take 500 mg by mouth once daily.     atorvastatin 40 MG tablet  Commonly known as: LIPITOR  TAKE 1 TABLET EVERY DAY     betamethasone valerate 0.1% 0.1 % Oint  Commonly known as: VALISONE  Apply topically 2 (two) times daily.     CANNABIDIOL (CBD) EXTRACT ORAL  Take 3 drops by mouth every morning.     cholecalciferol (vitamin D3) 50 mcg (2,000 unit) Cap capsule  Commonly known as: VITAMIN D3  Take 1 capsule by mouth every evening.     COQ10 (UBIQUINOL) ORAL  Take 1 tablet by mouth nightly.     cyanocobalamin 1000 MCG tablet  Commonly known as: VITAMIN B-12  Take 100 mcg by mouth once daily.     dutasteride 0.5 mg capsule  Commonly known as: AVODART  Take 1 capsule (0.5 mg total) by mouth once daily.     furosemide 20 MG tablet  Commonly known as: LASIX  TAKE 1 TABLET EVERY DAY     gabapentin 100 MG capsule  Commonly known as: NEURONTIN  TAKE ONE CAPSULE BY MOUTH TWICE DAILY     GLUCOSAM-MSM-CHONDROIT-VIT D3 ORAL  Take 2 tablets by mouth once daily.     lisinopriL 10 MG tablet  TAKE 1 TABLET EVERY DAY     MEGARED OMEGA-3 KRILL OIL ORAL  Take 1 capsule by mouth nightly.     mupirocin 2 % ointment  Commonly known as: BACTROBAN  Apply topically 3 (three) times daily.     ONE-A-DAY MEN'S MULTIVITAMIN ORAL  Take 1 tablet by mouth every evening.     potassium 99 mg Tab  Take by mouth once daily.     prednisoLONE acetate 1 % Drps  Commonly known as: PRED FORTE  Place 1 drop into the right eye 2 (two) times daily.     sars-cov-2 (covid-19) 30 mcg/0.3 ml injection  Commonly known as: Pfizer COVID-19  Inject into the muscle.     tamsulosin 0.4 mg Cap  Commonly known as: FLOMAX  Take 2 capsules (0.8 mg total) by mouth every morning.     traMADoL 50 mg tablet  Commonly  known as: ULTRAM  Take 1 tablet (50 mg total) by mouth daily as needed for Pain.     TRELEGY ELLIPTA 100-62.5-25 mcg Dsdv  Generic drug: fluticasone-umeclidin-vilanter  INHALE 1 PUFF INTO THE LUNGS ONCE DAILY        STOP taking these medications    aspirin 81 MG EC tablet  Commonly known as: ECOTRIN            Indwelling Lines/Drains at time of discharge:   Lines/Drains/Airways     None                 Time spent on the discharge of patient: 40 minutes         Darrion Mae NP  Department of Hospital Medicine  [facility] ([facility])

## 2022-10-26 NOTE — HOSPITAL COURSE
Chao Hawk is a 89 y.o. male patient whose  current medical conditions include: Abdominal aortic aneurysm without rupture MEASURING MAX 3.8, Anemia, Arthritis, Back pain, Chronic bronchitis, COPD (not on home oxygen), Prior h/o Diverticulitis of large intestine with perforation without abscess or bleeding,Hyperlipidemia, Hypertension, Lumbar radiculopathy (6/14/2017), Multiple renal cysts (7/5/2016), who presents to the ED  for blood in stool, onset 2 days PTA. Pt reports dark stool.Pt is on 81 mg ASA daily but hasn't taken it in 2 days. Patient states he lives by alone and drives to appointments himself.  EGD performed per GI, findings included x2 clean based duodenal ulcers. GI recs PPI BID for 2 mos and avoid NSAID use. Instructed patient to HOLD ASA for 7 days and then resume. Labs reviewed. In stable condition at time of discharge and patient agreeable with plan. Instructed to follow up with PCP for post hospital visit stay check up. GI referrals sent. POC explained in detail to patient and family and questions answered to their satisfaction. Meds delivered bedside. Referred to NP at home program as well.

## 2022-10-26 NOTE — H&P
Short Stay Endoscopy History and Physical    PCP - To Obtain Unable    Procedure - EGD  ASA - 3  Mallampati - per anesthesia  History of Anesthesia problems - no  Family history Anesthesia problems -  no     HPI:  This is a 89 y.o. male here for evaluation of :   Active Hospital Problems    Diagnosis  POA    *GI bleed with Melena [K92.2]  Yes    Acute blood loss anemia [D62]  Yes    Peripheral neuropathy [G62.9]  Yes    Abdominal aortic aneurysm without rupture [I71.40]  Yes    Essential hypertension [I10]  Yes     Chronic    BPH (benign prostatic hyperplasia) [N40.0]  Yes     Chronic    COPD (chronic obstructive pulmonary disease) [J44.9]  Yes     Chronic     Trelegy, oxygen.         Resolved Hospital Problems   No resolved problems to display.       ROS:  CONSTITUTIONAL: Denies weight change,  fatigue, fevers, chills, night sweats.  CARDIOVASCULAR: Denies chest pain, shortness of breath, orthopnea and edema.  RESPIRATORY: Denies cough, hemoptysis, dyspnea, and wheezing.  GI: See HPI.    Medical History:   Past Medical History:   Diagnosis Date    Anemia     Arthritis     back, hand    Back pain     Dr. Cristobal- [facility]    Bilateral leg edema 2/16/2018    Chronic bronchitis     COPD (chronic obstructive pulmonary disease)     Diverticulitis of large intestine with perforation without abscess or bleeding     Diverticulosis 5/16/06    colonoscopy    Hearing loss, bilateral     Hernia     x2    Hyperlipidemia     Hypertension     Lumbar radiculopathy 6/14/2017    Multiple renal cysts 7/5/2016    Nocturnal hypoxemia     Polyneuropathy     Tobacco dependence     Trouble in sleeping        Surgical History:   Past Surgical History:   Procedure Laterality Date    FINGER SURGERY Left 1980s    index- Dr. Encarnacion; to remove nodule    HERNIA REPAIR  1990s    x2    INTRAOCULAR LENS INSERTION Bilateral 2016    LUMBAR SPINE SURGERY  09/13/2017    REVERSE TOTAL SHOULDER ARTHROPLASTY Right 10/5/2020    Procedure: ARTHROPLASTY,  "SHOULDER, TOTAL, REVERSE;  Surgeon: Reji Martin MD;  Location: [facility];  Service: Orthopedics;  Laterality: Right;    TONSILLECTOMY, ADENOIDECTOMY         Family History:  Family History   Problem Relation Age of Onset    Emphysema Mother     Heart disease Mother     COPD Mother     Appendicitis Father     Heart disease Son     Atrial fibrillation Sister     Lung disease Sister 91    Colon cancer Neg Hx     Cancer Neg Hx     Stroke Neg Hx        Social History:   Social History     Tobacco Use    Smoking status: Former     Packs/day: 1.00     Years: 40.00     Pack years: 40.00     Types: Cigarettes     Start date: 1949     Quit date: 1991     Years since quittin.8    Smokeless tobacco: Never   Substance Use Topics    Alcohol use: Not Currently     Comment: rarely   No alcohol 72h  prior to sx    Drug use: No       Allergy  Review of patient's allergies indicates:   Allergen Reactions    Bee sting  [allergen ext-venom-honey bee] Swelling    Penicillins Rash     "Felt like I had pins and needles in my feet" , hospitalized overnight    Poison ivy extract Hives       Medications:   No current facility-administered medications on file prior to encounter.     Current Outpatient Medications on File Prior to Encounter   Medication Sig Dispense Refill    acetaminophen (TYLENOL) 500 MG tablet Take 500 mg by mouth as needed for Pain.      aspirin (ECOTRIN) 81 MG EC tablet Take 1 tablet (81 mg total) by mouth 2 (two) times a day. (Patient taking differently: Take 81 mg by mouth once daily.) 28 tablet 0    atorvastatin (LIPITOR) 40 MG tablet TAKE 1 TABLET EVERY DAY 90 tablet 3    cholecalciferol, vitamin D3, (VITAMIN D3) 50 mcg (2,000 unit) Cap Take 1 capsule by mouth every evening.       COQ10, UBIQUINOL, ORAL Take 1 tablet by mouth nightly.       cyanocobalamin (VITAMIN B-12) 1000 MCG tablet Take 100 mcg by mouth once daily.      furosemide (LASIX) 20 MG tablet TAKE 1 TABLET EVERY DAY 90 tablet 3 "    gabapentin (NEURONTIN) 100 MG capsule TAKE ONE CAPSULE BY MOUTH TWICE DAILY 180 capsule 3    lisinopriL 10 MG tablet TAKE 1 TABLET EVERY DAY 90 tablet 3    potassium 99 mg Tab Take by mouth once daily.       tamsulosin (FLOMAX) 0.4 mg Cap Take 2 capsules (0.8 mg total) by mouth every morning. 180 capsule 3    traMADoL (ULTRAM) 50 mg tablet Take 1 tablet (50 mg total) by mouth daily as needed for Pain. 30 tablet 0    TRELEGY ELLIPTA 100-62.5-25 mcg DsDv INHALE 1 PUFF INTO THE LUNGS ONCE DAILY 60 each 11    albuterol (PROVENTIL) 2.5 mg /3 mL (0.083 %) nebulizer solution Take 3 mLs (2.5 mg total) by nebulization every 4 (four) hours as needed for Wheezing. Rescue 150 mL 11    ARGININE, L-ARGININE, ORAL Take 500 mg by mouth once daily.      betamethasone valerate 0.1% (VALISONE) 0.1 % Oint Apply topically 2 (two) times daily. 45 g 1    CANNABIDIOL, CBD, EXTRACT ORAL Take 3 drops by mouth every morning.      dutasteride (AVODART) 0.5 mg capsule Take 1 capsule (0.5 mg total) by mouth once daily. 90 capsule 3    GLUCOSAM-MSM-CHONDROIT-VIT D3 ORAL Take 2 tablets by mouth once daily.      krill/om-3/dha/epa/phospho/ast (MEGARED OMEGA-3 KRILL OIL ORAL) Take 1 capsule by mouth nightly.       multivit-min/folic/vit K/lycop (ONE-A-DAY MEN'S MULTIVITAMIN ORAL) Take 1 tablet by mouth every evening.       mupirocin (BACTROBAN) 2 % ointment Apply topically 3 (three) times daily. 15 g 0    prednisoLONE acetate (PRED FORTE) 1 % DrpS Place 1 drop into the right eye 2 (two) times daily.      sars-cov-2, covid-19, (PFIZER COVID-19) 30 mcg/0.3 ml injection Inject into the muscle. 0.3 mL 0       Physical Exam:  Vital Signs:   Vitals:    10/26/22 0905   BP:    Pulse: 76   Resp:    Temp:      General Appearance: Well appearing in no acute distress  ENT: OP clear  Chest: CTA B  CV: RRR, no m/r/g  Abd: s/nt/nd/nabs  Ext: no edema    Labs:  Reviewed    IMP:  Active Hospital Problems    Diagnosis  POA    *GI bleed with Melena [K92.2]  Yes     Acute blood loss anemia [D62]  Yes    Peripheral neuropathy [G62.9]  Yes    Abdominal aortic aneurysm without rupture [I71.40]  Yes    Essential hypertension [I10]  Yes     Chronic    BPH (benign prostatic hyperplasia) [N40.0]  Yes     Chronic    COPD (chronic obstructive pulmonary disease) [J44.9]  Yes     Chronic     Trelegy, oxygen.         Resolved Hospital Problems   No resolved problems to display.         Plan:  I have explained the risks and benefits of endoscopy procedures to the patient including but not limited to bleeding, perforation, infection, and death. The patient wishes to proceed.

## 2022-10-29 LAB
BLD PROD TYP BPU: NORMAL
BLOOD UNIT EXPIRATION DATE: NORMAL
BLOOD UNIT TYPE CODE: 5100
BLOOD UNIT TYPE: NORMAL
CODING SYSTEM: NORMAL
DISPENSE STATUS: NORMAL
NUM UNITS TRANS PACKED RBC: NORMAL

## 2022-11-01 ENCOUNTER — PES CALL (OUTPATIENT)
Dept: ADMINISTRATIVE | Facility: CLINIC | Age: 87
End: 2022-11-01
Payer: MEDICARE

## 2022-11-01 ENCOUNTER — PATIENT MESSAGE (OUTPATIENT)
Dept: INTERNAL MEDICINE | Facility: CLINIC | Age: 87
End: 2022-11-01
Payer: MEDICARE

## 2022-11-03 ENCOUNTER — PES CALL (OUTPATIENT)
Dept: ADMINISTRATIVE | Facility: CLINIC | Age: 87
End: 2022-11-03
Payer: MEDICARE

## 2022-11-04 ENCOUNTER — PES CALL (OUTPATIENT)
Dept: ADMINISTRATIVE | Facility: CLINIC | Age: 87
End: 2022-11-04
Payer: MEDICARE

## 2022-11-16 ENCOUNTER — PATIENT OUTREACH (OUTPATIENT)
Dept: ADMINISTRATIVE | Facility: HOSPITAL | Age: 87
End: 2022-11-16
Payer: MEDICARE

## 2022-11-16 NOTE — PROGRESS NOTES
HOSPITAL FOLLOW UP: Attempting to contact patient to confirm hospital follow up appointment with Dr Ureña on 11/17/22.  Unable to reach patient at this time left voice mail.

## 2022-11-17 ENCOUNTER — OFFICE VISIT (OUTPATIENT)
Dept: INTERNAL MEDICINE | Facility: CLINIC | Age: 87
End: 2022-11-17
Payer: MEDICARE

## 2022-11-17 ENCOUNTER — LAB VISIT (OUTPATIENT)
Dept: LAB | Facility: HOSPITAL | Age: 87
End: 2022-11-17
Attending: PEDIATRICS
Payer: MEDICARE

## 2022-11-17 VITALS
HEART RATE: 82 BPM | SYSTOLIC BLOOD PRESSURE: 128 MMHG | RESPIRATION RATE: 18 BRPM | DIASTOLIC BLOOD PRESSURE: 78 MMHG | OXYGEN SATURATION: 100 % | WEIGHT: 182.56 LBS | BODY MASS INDEX: 29.46 KG/M2

## 2022-11-17 DIAGNOSIS — M19.011 GLENOHUMERAL ARTHRITIS, RIGHT: ICD-10-CM

## 2022-11-17 DIAGNOSIS — K92.2 GASTROINTESTINAL HEMORRHAGE, UNSPECIFIED GASTROINTESTINAL HEMORRHAGE TYPE: ICD-10-CM

## 2022-11-17 DIAGNOSIS — J44.9 CHRONIC OBSTRUCTIVE PULMONARY DISEASE, UNSPECIFIED COPD TYPE: Chronic | ICD-10-CM

## 2022-11-17 DIAGNOSIS — I10 ESSENTIAL HYPERTENSION: Chronic | ICD-10-CM

## 2022-11-17 DIAGNOSIS — D62 ACUTE BLOOD LOSS ANEMIA: Primary | ICD-10-CM

## 2022-11-17 DIAGNOSIS — D62 ACUTE BLOOD LOSS ANEMIA: ICD-10-CM

## 2022-11-17 DIAGNOSIS — K26.4 DUODENAL ULCER WITH HEMORRHAGE: ICD-10-CM

## 2022-11-17 DIAGNOSIS — M47.816 LUMBAR ARTHROPATHY: ICD-10-CM

## 2022-11-17 LAB
BASOPHILS # BLD AUTO: 0.03 K/UL (ref 0–0.2)
BASOPHILS NFR BLD: 0.3 % (ref 0–1.9)
DIFFERENTIAL METHOD: ABNORMAL
EOSINOPHIL # BLD AUTO: 0.2 K/UL (ref 0–0.5)
EOSINOPHIL NFR BLD: 1.8 % (ref 0–8)
ERYTHROCYTE [DISTWIDTH] IN BLOOD BY AUTOMATED COUNT: 15.9 % (ref 11.5–14.5)
HCT VFR BLD AUTO: 32.9 % (ref 40–54)
HGB BLD-MCNC: 10.3 G/DL (ref 14–18)
IMM GRANULOCYTES # BLD AUTO: 0.03 K/UL (ref 0–0.04)
IMM GRANULOCYTES NFR BLD AUTO: 0.3 % (ref 0–0.5)
LYMPHOCYTES # BLD AUTO: 1 K/UL (ref 1–4.8)
LYMPHOCYTES NFR BLD: 10.8 % (ref 18–48)
MCH RBC QN AUTO: 29.2 PG (ref 27–31)
MCHC RBC AUTO-ENTMCNC: 31.3 G/DL (ref 32–36)
MCV RBC AUTO: 93 FL (ref 82–98)
MONOCYTES # BLD AUTO: 0.8 K/UL (ref 0.3–1)
MONOCYTES NFR BLD: 9 % (ref 4–15)
NEUTROPHILS # BLD AUTO: 6.9 K/UL (ref 1.8–7.7)
NEUTROPHILS NFR BLD: 77.8 % (ref 38–73)
NRBC BLD-RTO: 0 /100 WBC
PLATELET # BLD AUTO: 260 K/UL (ref 150–450)
PMV BLD AUTO: 9.9 FL (ref 9.2–12.9)
RBC # BLD AUTO: 3.53 M/UL (ref 4.6–6.2)
WBC # BLD AUTO: 8.86 K/UL (ref 3.9–12.7)

## 2022-11-17 PROCEDURE — 90694 VACC AIIV4 NO PRSRV 0.5ML IM: CPT | Mod: S$GLB,,, | Performed by: PEDIATRICS

## 2022-11-17 PROCEDURE — 99214 OFFICE O/P EST MOD 30 MIN: CPT | Mod: S$GLB,,, | Performed by: PEDIATRICS

## 2022-11-17 PROCEDURE — 1160F PR REVIEW ALL MEDS BY PRESCRIBER/CLIN PHARMACIST DOCUMENTED: ICD-10-PCS | Mod: CPTII,S$GLB,, | Performed by: PEDIATRICS

## 2022-11-17 PROCEDURE — 1126F PR PAIN SEVERITY QUANTIFIED, NO PAIN PRESENT: ICD-10-PCS | Mod: CPTII,S$GLB,, | Performed by: PEDIATRICS

## 2022-11-17 PROCEDURE — 36415 COLL VENOUS BLD VENIPUNCTURE: CPT | Performed by: PEDIATRICS

## 2022-11-17 PROCEDURE — 85025 COMPLETE CBC W/AUTO DIFF WBC: CPT | Performed by: PEDIATRICS

## 2022-11-17 PROCEDURE — 3288F FALL RISK ASSESSMENT DOCD: CPT | Mod: CPTII,S$GLB,, | Performed by: PEDIATRICS

## 2022-11-17 PROCEDURE — G0008 FLU VACCINE - QUADRIVALENT - ADJUVANTED: ICD-10-PCS | Mod: S$GLB,,, | Performed by: PEDIATRICS

## 2022-11-17 PROCEDURE — 1159F PR MEDICATION LIST DOCUMENTED IN MEDICAL RECORD: ICD-10-PCS | Mod: CPTII,S$GLB,, | Performed by: PEDIATRICS

## 2022-11-17 PROCEDURE — 1101F PT FALLS ASSESS-DOCD LE1/YR: CPT | Mod: CPTII,S$GLB,, | Performed by: PEDIATRICS

## 2022-11-17 PROCEDURE — G0008 ADMIN INFLUENZA VIRUS VAC: HCPCS | Mod: S$GLB,,, | Performed by: PEDIATRICS

## 2022-11-17 PROCEDURE — 1101F PR PT FALLS ASSESS DOC 0-1 FALLS W/OUT INJ PAST YR: ICD-10-PCS | Mod: CPTII,S$GLB,, | Performed by: PEDIATRICS

## 2022-11-17 PROCEDURE — 3288F PR FALLS RISK ASSESSMENT DOCUMENTED: ICD-10-PCS | Mod: CPTII,S$GLB,, | Performed by: PEDIATRICS

## 2022-11-17 PROCEDURE — 99999 PR PBB SHADOW E&M-EST. PATIENT-LVL V: CPT | Mod: PBBFAC,,, | Performed by: PEDIATRICS

## 2022-11-17 PROCEDURE — 99214 PR OFFICE/OUTPT VISIT, EST, LEVL IV, 30-39 MIN: ICD-10-PCS | Mod: S$GLB,,, | Performed by: PEDIATRICS

## 2022-11-17 PROCEDURE — 90694 FLU VACCINE - QUADRIVALENT - ADJUVANTED: ICD-10-PCS | Mod: S$GLB,,, | Performed by: PEDIATRICS

## 2022-11-17 PROCEDURE — 99999 PR PBB SHADOW E&M-EST. PATIENT-LVL V: ICD-10-PCS | Mod: PBBFAC,,, | Performed by: PEDIATRICS

## 2022-11-17 PROCEDURE — 1126F AMNT PAIN NOTED NONE PRSNT: CPT | Mod: CPTII,S$GLB,, | Performed by: PEDIATRICS

## 2022-11-17 PROCEDURE — 1159F MED LIST DOCD IN RCRD: CPT | Mod: CPTII,S$GLB,, | Performed by: PEDIATRICS

## 2022-11-17 PROCEDURE — 1160F RVW MEDS BY RX/DR IN RCRD: CPT | Mod: CPTII,S$GLB,, | Performed by: PEDIATRICS

## 2022-11-17 RX ORDER — FLUTICASONE FUROATE, UMECLIDINIUM BROMIDE AND VILANTEROL TRIFENATATE 200; 62.5; 25 UG/1; UG/1; UG/1
1 POWDER RESPIRATORY (INHALATION) DAILY
Qty: 3 EACH | Refills: 3 | Status: SHIPPED | OUTPATIENT
Start: 2022-11-17 | End: 2023-03-06 | Stop reason: ALTCHOICE

## 2022-11-17 NOTE — PROGRESS NOTES
Subjective:       Patient ID: Chao Hawk is a 89 y.o. male.    Chief Complaint: Follow-up (PT PRESENTS TO CLINIC FOR HOSPITAL FOLLOW-UP, PT ALSO REQUESTED A FLU AND SHINGLES SHOT.)    Chao Hawk is a 89 y.o. male who presents to clinic with daughter for hospital follow up due to Melena. Was seen at [facility] on 10/25/22. Had EGD, biopsy not obtained due to active GI bleed. Discharged hemoglobin 8.8. Was placed on 2mo PPI, self decreased to 1x/day PPI due to diarrhea. Is slowly feeling better, has spurts of energy, improved stools. Has chronic SOB from COPD, not particularly worse post hospitalization.     EGD Impression:                         - Non-obstructing Schatzki ring.                          - Small hiatal hernia.                          - Gastritis.                          - Non-bleeding duodenal ulcers with a clean ulcer                          base (Nura Class III).                          - Duodenitis.                          - Normal second portion of the duodenum.                          - No specimens collected.     Review of Systems   Constitutional:  Negative for activity change, appetite change, chills, diaphoresis, fatigue, fever and unexpected weight change.   HENT:  Negative for nasal congestion, ear pain, mouth sores, nosebleeds, postnasal drip, rhinorrhea, sneezing and sore throat.    Eyes:  Negative for photophobia, pain, discharge, redness and visual disturbance.   Respiratory:  Negative for cough, chest tightness, shortness of breath, wheezing and stridor.    Cardiovascular:  Negative for chest pain, palpitations and leg swelling.   Gastrointestinal:  Negative for abdominal distention, blood in stool, constipation, diarrhea, nausea and vomiting.   Genitourinary:  Negative for decreased urine volume, difficulty urinating, dysuria, flank pain, frequency, genital sores, hematuria and urgency.   Musculoskeletal:  Negative for arthralgias, back pain, joint swelling, neck pain and  neck stiffness.   Integumentary:  Negative for color change, pallor, rash and wound.   Neurological:  Negative for dizziness, syncope, speech difficulty, weakness, light-headedness and headaches.   Hematological:  Negative for adenopathy. Does not bruise/bleed easily.   Psychiatric/Behavioral:  Negative for confusion, decreased concentration, dysphoric mood, hallucinations, sleep disturbance and suicidal ideas. The patient is not nervous/anxious.    All other systems reviewed and are negative.      Objective:      Physical Exam  Vitals and nursing note reviewed.   Constitutional:       General: He is not in acute distress.     Appearance: He is well-developed.   HENT:      Mouth/Throat:      Comments: mucus membranes slightly pale    Neck:      Thyroid: No thyromegaly.      Vascular: No JVD.   Cardiovascular:      Rate and Rhythm: Normal rate and regular rhythm.      Heart sounds: Normal heart sounds. No murmur heard.  Pulmonary:      Effort: Pulmonary effort is normal. No respiratory distress.      Breath sounds: Normal breath sounds. No wheezing or rales.   Abdominal:      General: There is no distension.      Palpations: Abdomen is soft. There is no mass.      Tenderness: There is no abdominal tenderness. There is no guarding.   Musculoskeletal:      Right lower leg: No edema.      Left lower leg: No edema.   Lymphadenopathy:      Cervical: No cervical adenopathy.   Skin:     Capillary Refill: Capillary refill takes less than 2 seconds.      Findings: No rash.   Neurological:      General: No focal deficit present.      Mental Status: He is alert and oriented to person, place, and time.      Cranial Nerves: No cranial nerve deficit.      Coordination: Coordination normal.   Psychiatric:         Mood and Affect: Mood normal.         Behavior: Behavior normal.         Thought Content: Thought content normal.         Judgment: Judgment normal.       Assessment:       Problem List Items Addressed This Visit        COPD (chronic obstructive pulmonary disease) (Chronic)    Essential hypertension (Chronic)    Acute blood loss anemia - Primary    GI bleed with Melena    Lumbar arthropathy    Right glenohumeral arthritis -end stage     Other Visit Diagnoses       Duodenal ulcer with hemorrhage                  Plan:     Acute blood loss anemia  -     CBC Auto Differential; Future; Expected date: 11/17/2022    Gastrointestinal hemorrhage, unspecified gastrointestinal hemorrhage type  -     CBC Auto Differential; Future; Expected date: 11/17/2022    Duodenal ulcer with hemorrhage  -     CBC Auto Differential; Future; Expected date: 11/17/2022    Essential hypertension    Chronic obstructive pulmonary disease, unspecified COPD type    Right glenohumeral arthritis -end stage    Lumbar arthropathy    Other orders  -     fluticasone-umeclidin-vilanter (TRELEGY ELLIPTA) 200-62.5-25 mcg inhaler; Inhale 1 puff into the lungs once daily.  Dispense: 3 each; Refill: 3  -     Influenza - Quadrivalent (Adjuvanted)    Repeat CBC today to document stability. Flu today. Increase trelegy to 200mg return to PPI BID for 2mo. If any further return of melena seek immediate care. He has follow up in February, may consider repeat EGD around that time.     Transitional Care Note    Family and/or Caretaker present at visit?  Yes.  Diagnostic tests reviewed/disposition: I have reviewed all completed as well as pending diagnostic tests at the time of discharge.  Disease/illness education: given  Home health/community services discussion/referrals: Patient does not have home health established from hospital visit.  They do not need home health.  If needed, we will set up home health for the patient.   Establishment or re-establishment of referral orders for community resources: No other necessary community resources.   Discussion with other health care providers: No discussion with other health care providers necessary.          Scribe Attestation:   Andrew HANKS  Jesus Alberto, am scribing for, and in the presence of, Dr. Amari Ureña Jr. I performed the above scribed service and the documentation accurately describes the services I performed. I attest to the accuracy of the note.    I, Dr. Amari Ureña Jr, reviewed documentation as scribed above. I personally performed the services described in this documentation.  I agree that the record reflects my personal performance and is accurate and complete. Amari Ureña Jr., MD.  11/17/2022

## 2022-11-21 ENCOUNTER — TELEPHONE (OUTPATIENT)
Dept: ADMINISTRATIVE | Facility: HOSPITAL | Age: 87
End: 2022-11-21
Payer: MEDICARE

## 2022-12-20 ENCOUNTER — OFFICE VISIT (OUTPATIENT)
Dept: ORTHOPEDICS | Facility: CLINIC | Age: 87
End: 2022-12-20
Payer: MEDICARE

## 2022-12-20 VITALS — WEIGHT: 182.56 LBS | HEIGHT: 66 IN | BODY MASS INDEX: 29.34 KG/M2

## 2022-12-20 DIAGNOSIS — M18.0 ARTHRITIS OF CARPOMETACARPAL (CMC) JOINTS OF BOTH THUMBS: ICD-10-CM

## 2022-12-20 DIAGNOSIS — M19.032 PRIMARY OSTEOARTHRITIS OF LEFT WRIST: Primary | ICD-10-CM

## 2022-12-20 PROCEDURE — 99213 OFFICE O/P EST LOW 20 MIN: CPT | Mod: HCNC,25,S$GLB,

## 2022-12-20 PROCEDURE — 20605 INTERMEDIATE JOINT ASPIRATION/INJECTION: L RADIOCARPAL: ICD-10-PCS | Mod: HCNC,LT,S$GLB,

## 2022-12-20 PROCEDURE — 1159F MED LIST DOCD IN RCRD: CPT | Mod: HCNC,CPTII,S$GLB,

## 2022-12-20 PROCEDURE — 20605 DRAIN/INJ JOINT/BURSA W/O US: CPT | Mod: HCNC,LT,S$GLB,

## 2022-12-20 PROCEDURE — 99999 PR PBB SHADOW E&M-EST. PATIENT-LVL III: ICD-10-PCS | Mod: PBBFAC,HCNC,,

## 2022-12-20 PROCEDURE — 3288F FALL RISK ASSESSMENT DOCD: CPT | Mod: HCNC,CPTII,S$GLB,

## 2022-12-20 PROCEDURE — 99999 PR PBB SHADOW E&M-EST. PATIENT-LVL III: CPT | Mod: PBBFAC,HCNC,,

## 2022-12-20 PROCEDURE — 99213 PR OFFICE/OUTPT VISIT, EST, LEVL III, 20-29 MIN: ICD-10-PCS | Mod: HCNC,25,S$GLB,

## 2022-12-20 PROCEDURE — 1125F AMNT PAIN NOTED PAIN PRSNT: CPT | Mod: HCNC,CPTII,S$GLB,

## 2022-12-20 PROCEDURE — 1101F PT FALLS ASSESS-DOCD LE1/YR: CPT | Mod: HCNC,CPTII,S$GLB,

## 2022-12-20 PROCEDURE — 1101F PR PT FALLS ASSESS DOC 0-1 FALLS W/OUT INJ PAST YR: ICD-10-PCS | Mod: HCNC,CPTII,S$GLB,

## 2022-12-20 PROCEDURE — 1125F PR PAIN SEVERITY QUANTIFIED, PAIN PRESENT: ICD-10-PCS | Mod: HCNC,CPTII,S$GLB,

## 2022-12-20 PROCEDURE — 1159F PR MEDICATION LIST DOCUMENTED IN MEDICAL RECORD: ICD-10-PCS | Mod: HCNC,CPTII,S$GLB,

## 2022-12-20 PROCEDURE — 3288F PR FALLS RISK ASSESSMENT DOCUMENTED: ICD-10-PCS | Mod: HCNC,CPTII,S$GLB,

## 2022-12-20 RX ORDER — TRIAMCINOLONE ACETONIDE 40 MG/ML
40 INJECTION, SUSPENSION INTRA-ARTICULAR; INTRAMUSCULAR
Status: DISCONTINUED | OUTPATIENT
Start: 2022-12-20 | End: 2022-12-20 | Stop reason: HOSPADM

## 2022-12-20 RX ORDER — DICLOFENAC SODIUM 10 MG/G
2 GEL TOPICAL 3 TIMES DAILY
Qty: 1 EACH | Refills: 3 | Status: SHIPPED | OUTPATIENT
Start: 2022-12-20

## 2022-12-20 RX ADMIN — TRIAMCINOLONE ACETONIDE 40 MG: 40 INJECTION, SUSPENSION INTRA-ARTICULAR; INTRAMUSCULAR at 01:12

## 2022-12-20 NOTE — PROGRESS NOTES
SUBJECTIVE:      Chief Complaint: Pain of the Left Hand and Pain of the Right Hand    Referring Provider: Self, Aaareferral     History of Present Illness:  Patient is a 89 y.o. left hand dominant male who presents today with complaints of bilateral hand pain, L>R.  Last bilateral CMC injection was on 05/03/2022 with good relief, states that his thumbs are no longer bothering him.  Onset of symptoms was 3-4 months ago. Denies injury or trauma to area. The location of the pain is the top of the wrist joint and into the fingers.  He states that he feels like he can not close his hand all the way.  Pain is 4/10 and throbbing in quality.  He takes Tylenol in the morning for pain relief.  States that he has tried cortisone cream as well but that did not help much.  Denies numbness, tingling, swelling of the hand.  The patient denies any fevers, chills, N/V, D/C and presents for evaluation.    Interval history 05/03/2022: The patient is a 89-year-old male with bilateral thumb basal joint arthritis.  He he has tried splinting without improvement.  He requests injection basal joints of both thumbs.    Past Medical History:   Diagnosis Date    Anemia     Arthritis     back, hand    Back pain     Dr. Vannessa MANCIA    Bilateral leg edema 2/16/2018    Chronic bronchitis     COPD (chronic obstructive pulmonary disease)     Diverticulitis of large intestine with perforation without abscess or bleeding     Diverticulosis 5/16/06    colonoscopy    Hearing loss, bilateral     Hernia     x2    Hyperlipidemia     Hypertension     Lumbar radiculopathy 6/14/2017    Multiple renal cysts 7/5/2016    Nocturnal hypoxemia     Polyneuropathy     Tobacco dependence     Trouble in sleeping      Past Surgical History:   Procedure Laterality Date    ESOPHAGOGASTRODUODENOSCOPY N/A 10/26/2022    Procedure: EGD (ESOPHAGOGASTRODUODENOSCOPY);  Surgeon: Mallika Duran MD;  Location: [facility];  Service: Endoscopy;  Laterality: N/A;    FINGER  "SURGERY Left 1980s    index- Dr. Encarnacion; to remove nodule    HERNIA REPAIR  1990s    x2    INTRAOCULAR LENS INSERTION Bilateral 2016    LUMBAR SPINE SURGERY  09/13/2017    REVERSE TOTAL SHOULDER ARTHROPLASTY Right 10/5/2020    Procedure: ARTHROPLASTY, SHOULDER, TOTAL, REVERSE;  Surgeon: Reji Martin MD;  Location: [facility];  Service: Orthopedics;  Laterality: Right;    TONSILLECTOMY, ADENOIDECTOMY  1940     Review of patient's allergies indicates:   Allergen Reactions    Bee sting  [allergen ext-venom-honey bee] Swelling    Penicillins Rash     "Felt like I had pins and needles in my feet" , hospitalized overnight    Poison ivy extract Hives     Social History     Social History Narrative    No smokers or pets in household.     Family History   Problem Relation Age of Onset    Emphysema Mother     Heart disease Mother     COPD Mother     Appendicitis Father     Heart disease Son     Atrial fibrillation Sister     Lung disease Sister 91    Colon cancer Neg Hx     Cancer Neg Hx     Stroke Neg Hx          Current Outpatient Medications:     acetaminophen (TYLENOL) 500 MG tablet, Take 500 mg by mouth as needed for Pain., Disp: , Rfl:     albuterol (PROVENTIL) 2.5 mg /3 mL (0.083 %) nebulizer solution, Take 3 mLs (2.5 mg total) by nebulization every 4 (four) hours as needed for Wheezing. Rescue, Disp: 150 mL, Rfl: 11    ARGININE, L-ARGININE, ORAL, Take 500 mg by mouth once daily., Disp: , Rfl:     atorvastatin (LIPITOR) 40 MG tablet, TAKE 1 TABLET EVERY DAY, Disp: 90 tablet, Rfl: 3    betamethasone valerate 0.1% (VALISONE) 0.1 % Oint, Apply topically 2 (two) times daily., Disp: 45 g, Rfl: 1    CANNABIDIOL, CBD, EXTRACT ORAL, Take 3 drops by mouth every morning., Disp: , Rfl:     cholecalciferol, vitamin D3, (VITAMIN D3) 50 mcg (2,000 unit) Cap, Take 1 capsule by mouth every evening. , Disp: , Rfl:     COQ10, UBIQUINOL, ORAL, Take 1 tablet by mouth nightly. , Disp: , Rfl:     cyanocobalamin (VITAMIN B-12) " 1000 MCG tablet, Take 100 mcg by mouth once daily., Disp: , Rfl:     dutasteride (AVODART) 0.5 mg capsule, Take 1 capsule (0.5 mg total) by mouth once daily., Disp: 90 capsule, Rfl: 3    fluticasone-umeclidin-vilanter (TRELEGY ELLIPTA) 200-62.5-25 mcg inhaler, Inhale 1 puff into the lungs once daily., Disp: 3 each, Rfl: 3    furosemide (LASIX) 20 MG tablet, TAKE 1 TABLET EVERY DAY, Disp: 90 tablet, Rfl: 3    gabapentin (NEURONTIN) 100 MG capsule, TAKE ONE CAPSULE BY MOUTH TWICE DAILY, Disp: 180 capsule, Rfl: 3    GLUCOSAM-MSM-CHONDROIT-VIT D3 ORAL, Take 2 tablets by mouth once daily., Disp: , Rfl:     krill/om-3/dha/epa/phospho/ast (MEGARED OMEGA-3 KRILL OIL ORAL), Take 1 capsule by mouth nightly. , Disp: , Rfl:     lisinopriL 10 MG tablet, TAKE 1 TABLET EVERY DAY, Disp: 90 tablet, Rfl: 3    multivit-min/folic/vit K/lycop (ONE-A-DAY MEN'S MULTIVITAMIN ORAL), Take 1 tablet by mouth every evening. , Disp: , Rfl:     mupirocin (BACTROBAN) 2 % ointment, Apply topically 3 (three) times daily., Disp: 15 g, Rfl: 0    pantoprazole (PROTONIX) 40 MG tablet, Take 1 tablet (40 mg total) by mouth 2 (two) times daily., Disp: 120 tablet, Rfl: 0    potassium 99 mg Tab, Take by mouth once daily. , Disp: , Rfl:     prednisoLONE acetate (PRED FORTE) 1 % DrpS, Place 1 drop into the right eye 2 (two) times daily., Disp: , Rfl:     sars-cov-2, covid-19, (PFIZER COVID-19) 30 mcg/0.3 ml injection, Inject into the muscle., Disp: 0.3 mL, Rfl: 0    tamsulosin (FLOMAX) 0.4 mg Cap, Take 2 capsules (0.8 mg total) by mouth every morning., Disp: 180 capsule, Rfl: 3    traMADoL (ULTRAM) 50 mg tablet, Take 1 tablet (50 mg total) by mouth daily as needed for Pain., Disp: 30 tablet, Rfl: 0    diclofenac sodium (VOLTAREN) 1 % Gel, Apply 2 g topically 3 (three) times daily., Disp: 1 each, Rfl: 3      Review of Systems:  Constitutional: Negative for chills and fever.   Respiratory: Negative for cough and shortness of breath.    Gastrointestinal: Negative  "for nausea and vomiting.   Skin: Negative for rash.   Neurological: Negative for dizziness and headaches.   Psychiatric/Behavioral: Negative for depression.   MSK as in HPI     OBJECTIVE:      Vital Signs (Most Recent):  Vitals:    12/20/22 1310   Weight: 82.8 kg (182 lb 8.7 oz)   Height: 5' 6" (1.676 m)     Body mass index is 29.46 kg/m².      Physical Exam:  Constitutional: The patient appears well-developed and well-nourished. No distress.   Skin: No lesions appreciated  Head: Normocephalic and atraumatic.   Nose: Nose normal.   Ears: No deformities seen  Eyes: Conjunctivae and EOM are normal.   Neck: No tracheal deviation present.   Cardiovascular: Normal rate and intact distal pulses.    Pulmonary/Chest: Effort normal. No respiratory distress.   Abdominal: There is no guarding.   Neurological: The patient is alert.   Psychiatric: The patient has a normal mood and affect.     General    Vitals reviewed.            Right Hand/Wrist Exam     Inspection   Scars: Wrist - absent Hand -  absent  Effusion: Wrist - absent Hand -  absent  Deformity: Wrist - deformity (arthritic changes)       Left Hand/Wrist Exam     Inspection   Scars: Wrist - absent Hand -  absent  Effusion: Wrist - absent Hand -  absent  Deformity: Wrist - present (arthritic changes)     Other     Sensory Exam  Median Distribution: normal  Ulnar Distribution: normal  Radial Distribution: normal    Comments:  Tender to palpation to hand at the medial side of dorsal joint line  Able to make a composite fist  No motor or sensory deficits   No signs of infection          Vascular Exam       Capillary Refill  Left Hand: normal capillary refill        Diagnostic Results:    EXAMINATION:  XR HAND COMPLETE 3 VIEWS BILATERAL     CLINICAL HISTORY:  . Pain in right hand     TECHNIQUE:  PA, lateral, and oblique views of both hands were performed.     COMPARISON:  None     FINDINGS:  No acute fracture or dislocation.  There are at least moderate degenerative " changes seen at the 1st CMC joints bilaterally.  Mild degenerative changes seen at the triscaphe joints bilaterally.  Degenerative changes seen scattered throughout the interphalangeal joints bilaterally and greatest at the 5th PIP joint on the left.     Impression:     As above        Electronically signed by: Eddie Baires DO  Date:                                            05/03/2022  Time:                                           15:41     Imaging - I independently viewed the patient's imaging as well as the radiology report.  Xrays of the patient's bilateral hands demonstrate scattered degenerative changes, CMC arthritis, no evidence of any acute fractures or dislocations.      ASSESSMENT/PLAN:        ICD-10-CM ICD-9-CM   1. Primary osteoarthritis of left wrist  M19.032 715.13   2. Arthritis of carpometacarpal (CMC) joints of both thumbs  M18.0 716.94     Orders Placed This Encounter    Intermediate Joint Aspiration/Injection: L radiocarpal    diclofenac sodium (VOLTAREN) 1 % Gel     Orders Placed This Encounter   Procedures    Intermediate Joint Aspiration/Injection: L radiocarpal       Plan:     - left radiocarpal joint injection today.  Patient must wait at least 3 months between injections  - continue conservative treatment:  Ice/heat as needed, Tylenol as needed, activity modifications  - Voltaren gel sent to pharmacy, instructed to use TID as needed  - Patient declines wrist brace today  - Follow-up as needed    PROCEDURE:  I have explained the risks, benefits, and alternatives of the procedure in detail.  The patient voices understanding and all questions have been answered.  The patient agrees to proceed as planned. So after a sterile prep of the skin in the normal fashion the left radiocarpal joint is injected from the volar approach using a 25 gauge needle with a combination of 0.5cc 2% plain lidocaine and 0.5cc of kenalog.  The patient is cautioned and immediate relief of pain is secondary to the  local anesthetic and will be temporary.  After the anesthetic wears off there may be a increase in pain that may last for a few hours or a few days and they should use ice to help alleviate this flare up of pain. Patient tolerated the procedure well.      Should the patient's symptoms worsen, persist, or fail to improve they should return for reevaluation and I would be happy to see them back anytime.        Lizy Patterson PA-C   [facility]     Please be aware that this note has been generated with the assistance of [email or web address] voice-to-text.  Please excuse any spelling or grammatical errors.

## 2022-12-20 NOTE — PROCEDURES
Intermediate Joint Aspiration/Injection: L radiocarpal    Date/Time: 12/20/2022 1:30 PM  Performed by: Lizy Patterson PA-C  Authorized by: Lizy Patterson PA-C     Consent Done?:  Yes (Verbal)  Indications:  Arthritis and pain  Site marked: The procedure site was marked    Timeout: Prior to procedure the correct patient, procedure, and site was verified      Location:  Wrist  Site:  L radiocarpal  Prep: Patient was prepped and draped in usual sterile fashion    Ultrasonic Guidance for needle placement: No  Needle size:  25 G  Approach:  Dorsal  Medications:  40 mg triamcinolone acetonide 40 mg/mL  Patient tolerance:  Patient tolerated the procedure well with no immediate complications

## 2023-01-03 ENCOUNTER — TELEPHONE (OUTPATIENT)
Dept: ADMINISTRATIVE | Facility: HOSPITAL | Age: 88
End: 2023-01-03
Payer: MEDICARE

## 2023-01-19 ENCOUNTER — TELEPHONE (OUTPATIENT)
Dept: UROLOGY | Facility: CLINIC | Age: 88
End: 2023-01-19
Payer: MEDICARE

## 2023-01-19 ENCOUNTER — PATIENT MESSAGE (OUTPATIENT)
Dept: UROLOGY | Facility: CLINIC | Age: 88
End: 2023-01-19
Payer: MEDICARE

## 2023-01-19 NOTE — TELEPHONE ENCOUNTER
Left msg on pts v/m and spoke to son.  Informed him that pts pending appt with Dr. Pinead needed to be rescheduled.

## 2023-01-19 NOTE — TELEPHONE ENCOUNTER
Attempted to contact patient regarding 2-17-23 urology appointment. Dr. Pineda will not be on clinic during the appointment time. Need to reschedule to a different date.   No answer, left message for patient to return call.  When patient calls back please reschedule next available appointment with Dr. Pineda.  In-basket message sent

## 2023-01-20 ENCOUNTER — PATIENT MESSAGE (OUTPATIENT)
Dept: GASTROENTEROLOGY | Facility: CLINIC | Age: 88
End: 2023-01-20
Payer: MEDICARE

## 2023-01-24 DIAGNOSIS — K92.1 GASTROINTESTINAL HEMORRHAGE WITH MELENA: Primary | ICD-10-CM

## 2023-01-30 PROBLEM — K92.2 GI BLEED: Status: RESOLVED | Noted: 2022-10-25 | Resolved: 2023-01-30

## 2023-02-09 DIAGNOSIS — Z00.00 ENCOUNTER FOR MEDICARE ANNUAL WELLNESS EXAM: ICD-10-CM

## 2023-02-23 ENCOUNTER — LAB VISIT (OUTPATIENT)
Dept: LAB | Facility: HOSPITAL | Age: 88
End: 2023-02-23
Attending: PEDIATRICS
Payer: MEDICARE

## 2023-02-23 DIAGNOSIS — D64.9 ANEMIA, UNSPECIFIED TYPE: ICD-10-CM

## 2023-02-23 DIAGNOSIS — E78.5 HYPERLIPIDEMIA, UNSPECIFIED HYPERLIPIDEMIA TYPE: ICD-10-CM

## 2023-02-23 LAB
ALBUMIN SERPL BCP-MCNC: 3.3 G/DL (ref 3.5–5.2)
ALP SERPL-CCNC: 76 U/L (ref 55–135)
ALT SERPL W/O P-5'-P-CCNC: 22 U/L (ref 10–44)
ANION GAP SERPL CALC-SCNC: 8 MMOL/L (ref 8–16)
AST SERPL-CCNC: 20 U/L (ref 10–40)
BASOPHILS # BLD AUTO: 0.02 K/UL (ref 0–0.2)
BASOPHILS NFR BLD: 0.2 % (ref 0–1.9)
BILIRUB SERPL-MCNC: 0.4 MG/DL (ref 0.1–1)
BUN SERPL-MCNC: 17 MG/DL (ref 8–23)
CALCIUM SERPL-MCNC: 9.5 MG/DL (ref 8.7–10.5)
CHLORIDE SERPL-SCNC: 109 MMOL/L (ref 95–110)
CHOLEST SERPL-MCNC: 113 MG/DL (ref 120–199)
CHOLEST/HDLC SERPL: 2.6 {RATIO} (ref 2–5)
CO2 SERPL-SCNC: 24 MMOL/L (ref 23–29)
CREAT SERPL-MCNC: 0.8 MG/DL (ref 0.5–1.4)
DIFFERENTIAL METHOD: ABNORMAL
EOSINOPHIL # BLD AUTO: 0.1 K/UL (ref 0–0.5)
EOSINOPHIL NFR BLD: 1.3 % (ref 0–8)
ERYTHROCYTE [DISTWIDTH] IN BLOOD BY AUTOMATED COUNT: 18.3 % (ref 11.5–14.5)
EST. GFR  (NO RACE VARIABLE): >60 ML/MIN/1.73 M^2
GLUCOSE SERPL-MCNC: 102 MG/DL (ref 70–110)
HCT VFR BLD AUTO: 34.9 % (ref 40–54)
HDLC SERPL-MCNC: 44 MG/DL (ref 40–75)
HDLC SERPL: 38.9 % (ref 20–50)
HGB BLD-MCNC: 10.7 G/DL (ref 14–18)
IMM GRANULOCYTES # BLD AUTO: 0.04 K/UL (ref 0–0.04)
IMM GRANULOCYTES NFR BLD AUTO: 0.4 % (ref 0–0.5)
LDLC SERPL CALC-MCNC: 51 MG/DL (ref 63–159)
LYMPHOCYTES # BLD AUTO: 0.9 K/UL (ref 1–4.8)
LYMPHOCYTES NFR BLD: 8.6 % (ref 18–48)
MCH RBC QN AUTO: 25.7 PG (ref 27–31)
MCHC RBC AUTO-ENTMCNC: 30.7 G/DL (ref 32–36)
MCV RBC AUTO: 84 FL (ref 82–98)
MONOCYTES # BLD AUTO: 0.9 K/UL (ref 0.3–1)
MONOCYTES NFR BLD: 7.8 % (ref 4–15)
NEUTROPHILS # BLD AUTO: 8.9 K/UL (ref 1.8–7.7)
NEUTROPHILS NFR BLD: 81.7 % (ref 38–73)
NONHDLC SERPL-MCNC: 69 MG/DL
NRBC BLD-RTO: 0 /100 WBC
PLATELET # BLD AUTO: 300 K/UL (ref 150–450)
PMV BLD AUTO: 11.1 FL (ref 9.2–12.9)
POTASSIUM SERPL-SCNC: 4 MMOL/L (ref 3.5–5.1)
PROT SERPL-MCNC: 6.6 G/DL (ref 6–8.4)
RBC # BLD AUTO: 4.16 M/UL (ref 4.6–6.2)
SODIUM SERPL-SCNC: 141 MMOL/L (ref 136–145)
TRIGL SERPL-MCNC: 90 MG/DL (ref 30–150)
WBC # BLD AUTO: 10.84 K/UL (ref 3.9–12.7)

## 2023-02-23 PROCEDURE — 36415 COLL VENOUS BLD VENIPUNCTURE: CPT | Mod: HCNC | Performed by: PEDIATRICS

## 2023-02-23 PROCEDURE — 80053 COMPREHEN METABOLIC PANEL: CPT | Mod: HCNC | Performed by: PEDIATRICS

## 2023-02-23 PROCEDURE — 85025 COMPLETE CBC W/AUTO DIFF WBC: CPT | Mod: HCNC | Performed by: PEDIATRICS

## 2023-02-23 PROCEDURE — 80061 LIPID PANEL: CPT | Mod: HCNC | Performed by: PEDIATRICS

## 2023-02-28 ENCOUNTER — LAB VISIT (OUTPATIENT)
Dept: LAB | Facility: HOSPITAL | Age: 88
End: 2023-02-28
Attending: PEDIATRICS
Payer: MEDICARE

## 2023-02-28 ENCOUNTER — CLINICAL SUPPORT (OUTPATIENT)
Dept: PULMONOLOGY | Facility: CLINIC | Age: 88
End: 2023-02-28
Payer: MEDICARE

## 2023-02-28 ENCOUNTER — OFFICE VISIT (OUTPATIENT)
Dept: INTERNAL MEDICINE | Facility: CLINIC | Age: 88
End: 2023-02-28
Payer: MEDICARE

## 2023-02-28 VITALS
WEIGHT: 183 LBS | HEIGHT: 66 IN | OXYGEN SATURATION: 90 % | HEART RATE: 89 BPM | BODY MASS INDEX: 29.41 KG/M2 | RESPIRATION RATE: 16 BRPM

## 2023-02-28 VITALS
BODY MASS INDEX: 29.53 KG/M2 | WEIGHT: 183 LBS | OXYGEN SATURATION: 94 % | DIASTOLIC BLOOD PRESSURE: 70 MMHG | SYSTOLIC BLOOD PRESSURE: 124 MMHG | TEMPERATURE: 98 F | HEART RATE: 89 BPM

## 2023-02-28 DIAGNOSIS — I71.40 ABDOMINAL AORTIC ANEURYSM (AAA) WITHOUT RUPTURE, UNSPECIFIED PART: ICD-10-CM

## 2023-02-28 DIAGNOSIS — I10 ESSENTIAL HYPERTENSION: Chronic | ICD-10-CM

## 2023-02-28 DIAGNOSIS — I70.0 ATHEROSCLEROSIS OF AORTA: ICD-10-CM

## 2023-02-28 DIAGNOSIS — M51.36 DDD (DEGENERATIVE DISC DISEASE), LUMBAR: ICD-10-CM

## 2023-02-28 DIAGNOSIS — I27.21 PAH (PULMONARY ARTERY HYPERTENSION): ICD-10-CM

## 2023-02-28 DIAGNOSIS — J44.9 CHRONIC OBSTRUCTIVE PULMONARY DISEASE, UNSPECIFIED COPD TYPE: Primary | Chronic | ICD-10-CM

## 2023-02-28 DIAGNOSIS — R39.12 BENIGN PROSTATIC HYPERPLASIA WITH WEAK URINARY STREAM: Chronic | ICD-10-CM

## 2023-02-28 DIAGNOSIS — D64.9 CHRONIC ANEMIA: ICD-10-CM

## 2023-02-28 DIAGNOSIS — G62.9 PERIPHERAL POLYNEUROPATHY: ICD-10-CM

## 2023-02-28 DIAGNOSIS — Z74.09 IMPAIRED FUNCTIONAL MOBILITY, BALANCE, GAIT, AND ENDURANCE: ICD-10-CM

## 2023-02-28 DIAGNOSIS — J44.9 COPD (CHRONIC OBSTRUCTIVE PULMONARY DISEASE): Chronic | ICD-10-CM

## 2023-02-28 DIAGNOSIS — J44.9 COPD (CHRONIC OBSTRUCTIVE PULMONARY DISEASE): Primary | ICD-10-CM

## 2023-02-28 DIAGNOSIS — E78.00 PURE HYPERCHOLESTEROLEMIA: Chronic | ICD-10-CM

## 2023-02-28 DIAGNOSIS — K29.70 GASTRITIS, PRESENCE OF BLEEDING UNSPECIFIED, UNSPECIFIED CHRONICITY, UNSPECIFIED GASTRITIS TYPE: ICD-10-CM

## 2023-02-28 DIAGNOSIS — N40.1 BENIGN PROSTATIC HYPERPLASIA WITH WEAK URINARY STREAM: Chronic | ICD-10-CM

## 2023-02-28 PROCEDURE — 1160F RVW MEDS BY RX/DR IN RCRD: CPT | Mod: HCNC,CPTII,S$GLB, | Performed by: PEDIATRICS

## 2023-02-28 PROCEDURE — 99999 PR PBB SHADOW E&M-EST. PATIENT-LVL III: CPT | Mod: PBBFAC,HCNC,,

## 2023-02-28 PROCEDURE — 36415 COLL VENOUS BLD VENIPUNCTURE: CPT | Mod: HCNC | Performed by: PEDIATRICS

## 2023-02-28 PROCEDURE — 99214 PR OFFICE/OUTPT VISIT, EST, LEVL IV, 30-39 MIN: ICD-10-PCS | Mod: HCNC,S$GLB,, | Performed by: PEDIATRICS

## 2023-02-28 PROCEDURE — 3288F FALL RISK ASSESSMENT DOCD: CPT | Mod: HCNC,CPTII,S$GLB, | Performed by: PEDIATRICS

## 2023-02-28 PROCEDURE — 1100F PTFALLS ASSESS-DOCD GE2>/YR: CPT | Mod: HCNC,CPTII,S$GLB, | Performed by: PEDIATRICS

## 2023-02-28 PROCEDURE — 3288F PR FALLS RISK ASSESSMENT DOCUMENTED: ICD-10-PCS | Mod: HCNC,CPTII,S$GLB, | Performed by: PEDIATRICS

## 2023-02-28 PROCEDURE — 99999 PR PBB SHADOW E&M-EST. PATIENT-LVL V: ICD-10-PCS | Mod: PBBFAC,HCNC,, | Performed by: PEDIATRICS

## 2023-02-28 PROCEDURE — 99214 OFFICE O/P EST MOD 30 MIN: CPT | Mod: HCNC,S$GLB,, | Performed by: PEDIATRICS

## 2023-02-28 PROCEDURE — 82728 ASSAY OF FERRITIN: CPT | Mod: HCNC | Performed by: PEDIATRICS

## 2023-02-28 PROCEDURE — 1126F PR PAIN SEVERITY QUANTIFIED, NO PAIN PRESENT: ICD-10-PCS | Mod: HCNC,CPTII,S$GLB, | Performed by: PEDIATRICS

## 2023-02-28 PROCEDURE — 99999 PR PBB SHADOW E&M-EST. PATIENT-LVL V: CPT | Mod: PBBFAC,HCNC,, | Performed by: PEDIATRICS

## 2023-02-28 PROCEDURE — 84466 ASSAY OF TRANSFERRIN: CPT | Mod: HCNC | Performed by: PEDIATRICS

## 2023-02-28 PROCEDURE — 1126F AMNT PAIN NOTED NONE PRSNT: CPT | Mod: HCNC,CPTII,S$GLB, | Performed by: PEDIATRICS

## 2023-02-28 PROCEDURE — 1159F PR MEDICATION LIST DOCUMENTED IN MEDICAL RECORD: ICD-10-PCS | Mod: HCNC,CPTII,S$GLB, | Performed by: PEDIATRICS

## 2023-02-28 PROCEDURE — 99999 PR PBB SHADOW E&M-EST. PATIENT-LVL III: ICD-10-PCS | Mod: PBBFAC,HCNC,,

## 2023-02-28 PROCEDURE — 1159F MED LIST DOCD IN RCRD: CPT | Mod: HCNC,CPTII,S$GLB, | Performed by: PEDIATRICS

## 2023-02-28 PROCEDURE — 1160F PR REVIEW ALL MEDS BY PRESCRIBER/CLIN PHARMACIST DOCUMENTED: ICD-10-PCS | Mod: HCNC,CPTII,S$GLB, | Performed by: PEDIATRICS

## 2023-02-28 PROCEDURE — 1100F PR PT FALLS ASSESS DOC 2+ FALLS/FALL W/INJURY/YR: ICD-10-PCS | Mod: HCNC,CPTII,S$GLB, | Performed by: PEDIATRICS

## 2023-02-28 NOTE — PATIENT INSTRUCTIONS
What is COPD?  COPD stands for chronic obstructive pulmonary disease. It means the airways in your lungs are blocked (obstructed). Because of this, it is hard to breathe. You may have trouble with daily activities because of shortness of breath. Over time the shortness of breath usually worsens making it more and more difficult to take care of yourself and take part in activities. Chronic bronchitis and emphysema are two common types of COPD.  What happens in chronic bronchitis?  The cells in the airways make more mucus than normal. The mucus builds up, narrowing the airways. This means less air travels into and out of the lungs. The lining of the airways may also become inflamed (swollen) and causes the airways to narrow even more.            What happens in emphysema?  The small airways are damaged and lose their stretchiness. The airways collapse when you exhale, causing air to get trapped in the air sacs. This means that less oxygen enters the blood vessels and less oxygen is delivered to all of the cells of your body. This makes it hard to breathe.         Damage to cilia  Cilia are small hairs that line and protect the airways. Smoking damages the cilia. Damaged cilia can't sweep mucus and particles away. Some of the cilia are destroyed. This damage worsens COPD.      How did I get COPD?  Most people get COPD from smoking. Cigarette smoke damages lungs, which can develop into COPD over many years.  How COPD affects you  COPD makes you work harder to breathe. Air may get trapped in the lungs, which prevents your lungs from filling completely with fresh oxygen-filled air when you inhale (breathe in). It's harder to take deep breaths especially when you are active and start breathing faster. Over time, your lungs may become enlarged filling the lung with air that does not transfer oxygen into the blood. These problems cause you to have shortness of breath (also called dyspnea). Wheezing (hoarse, whistling  breathing), chronic cough, and fatigue (feeling tired and worn out) are also common.   © 5767-6512 [employer]. [address] [place] [address], [place] [address]. All rights reserved. This information is not intended as a substitute for professional medical care. Always follow your healthcare professional's instructions.             Asthma Trigger Checklist  Allergens, irritants, and other things may trigger your asthma. Check the box next to each of your triggers. After each trigger is a list of ways to avoid it.   Dust mites. Dust mites live in mattresses, bedding, carpets, curtains, and indoor dust.  To kill dust mites, wash bedding in hot water (130°F) each week.  Cover mattress and pillows with special dust-mite-proof cases.  Don't use upholstered furniture like sofas or chairs in the bedroom.  Use allergy-proof filters for air conditioners and furnaces. Replace or clean them as instructed.  If you can, replace carpeting with wood or tile vadim, especially in the bedroom.  Animals. Animals with fur or feathers shed dander (allergens).  It's best to choose a pet that doesn't have fur or feathers, such as a fish or a reptile.  If you have pets, keep them off your bed and out of your bedroom.  Wash your hands and clothes after handling pets.    Mold. Mold grows in damp places, such as bathrooms, basements, and closets.  Ask someone to clean damp areas in your home every week. Or try wearing a face mask while you clean.  Run an exhaust fan while bathing. Or leave a window open in the bathroom.  Repair water leaks in or around your home.  Have someone else cut grass or rake leaves, if possible.  Don't use vaporizers or humidifiers. They encourage mold growth.    Pollen. Pollen from trees, grasses, and weeds is a common allergen. (Flower pollens are generally not a problem).  Try to learn what types of pollen affect you most. Pollen levels vary depending on the plant, the season, and the time of day.  If  possible, use air conditioning instead of opening the windows in your home or car.  Have someone else do yard work, if possible.    Cockroaches. Roaches are found in many homes and produce allergens.  Keep your kitchen clean and dry. A leaky faucet or drain can attract roaches.  Remove garbage from your home daily.  Store food in tightly sealed containers. Wash dishes as soon as they are used.  Use bait stations or traps to control roaches. Avoid using chemical sprays.    Smoke. Smoke may be from cigarettes, cigars, pipes, incense or candles, barbecues or grills, and fireplaces.  Don't smoke. And don't let people smoke in your home or car.  When you travel, ask for nonsmoking rental cars and hotel rooms.  Avoid fireplaces and wood stoves. If you can't, sit away from them. Make sure the smoke is directed outside.  Don't burn incense or use candles.  Move away from smoky outdoor cooking grills.    Smog.  Smog is from car exhaust and other pollution.  Read or listen to local air-quality reports. These let you know when air quality is poor.  Stay indoors as much as you can on smoggy days. If possible, use air conditioning instead of opening the windows.  In your car, set air conditioning to recirculate air, so less pollution gets in.    Strong odors. These include air fresheners, deodorizers, and cleaning products; perfume, deodorant, and other beauty products; incense and candles; and insect sprays and other sprays.  Use scent-free products like deodorant or body lotion.  Avoid using cleaning products with bleach and ammonia. Make your own cleaning solution with white vinegar, baking soda, or mild dish soap.  Use exhaust fans while cooking. Or open a window, if possible.   Avoid perfumes, air fresheners, potpourri, and other scented products.          Other irritants. These include dust, aerosol sprays, and powders.  Wear a face mask while doing tasks like sanding, dusting, sweeping, and yard work. Open doors and  windows if working indoors.  Use pump spray bottles instead of aerosols.  Pour liquid  onto a rag or cloth instead of spraying them.    Weather. Weather conditions can trigger symptoms or make them worse.  Watch for very high or low temperatures, very humid conditions, or a lot of wind, as these conditions can make symptoms worse.  Limit outdoor activity during the type of weather that affects you.  Wear a scarf over your mouth and nose in cold weather.    Colds, flu, and sinus infections. Upper respiratory infections can trigger asthma.  Wash your hands often with soap and warm water or use a hand  containing alcohol.  Get a yearly flu shot. And ask if you should get a pneumonia vaccine.  Take care of your general health. Get plenty of sleep. And eat a variety of healthy foods.    Food additives. Food additives can trigger asthma flare-ups in some people.  Check food labels for sulfites or other similar ingredients. These are often found in foods such as wine, beer, and dried fruits.  Avoid foods that contain these additives.    Medicine. Aspirin, NSAIDS like ibuprofen and naproxen, and heart medicines like beta-blockers may be triggers.  Tell your health care provider if you think certain medicines trigger symptoms.   Be sure to read the labels on over-the-counter medicines. They may have ingredients that cause symptoms for you.   , Emotions. Laughing, crying, or feeling excited are triggers for some people.   To help you stay calm: Try breathing in slowly through your nose for a count of 2 seconds. Close your lips and breathe out for 4 seconds. Repeat.  Try to focus on a soothing image in your mind. This will help relax you and calm your breathing.  Remember to take your daily controller medicines. When you're upset or under stress, it's easy to forget.    Exercise. For some people, exercise can trigger symptoms. Don't let this keep you from being active.   If you have not been exercising  regularly, start slow and work up gradually.  Take all of your medicines as prescribed.  If you use quick-relief medicine, make sure you have it with you when you exercise.  Stop if you have any symptoms. Make sure you talk with your provider about these symptoms.  © 1927-0763 [employer] [address] [place] [address]. All rights reserved. This information is not intended as a substitute for professional medical care. Always follow your healthcare professional's instructions.              Coronavirus Disease 2019 (COVID-19): Prevention  The best prevention is to have no contact with the SARS-CoV-2 virus. The FDA has approved vaccines to prevent COVID-19 in people older than 18 (one vaccine has been approved for people as young as 16). Pregnant or breastfeeding people may choose to be vaccinated. Expert groups, including ACOG and the CDC, advise pregnant or breastfeeding people to talk with their healthcare provider about the vaccine.   The vaccines are being rolled out to the public in phases. Check your local health department for your community's roll-out plans. The vaccines are given as a shot (injection) in the arm muscle. A1-dose or 2-dose vaccine may be given. If you get the 2-dose vaccine, the second dose is given several weeks after the first.   During a pandemic, it's especially important to keep up on recommended vaccines for other illnesses. This is more true if you're at higher risk for severe illness from COVID-19, the flu, or pneumonia. This includes older adults and those who have long-term (chronic) health conditions. Getting a yearly flu vaccine is advised for everyone 6 months old and older, with rare exceptions. Health experts advise the flu vaccine to protect you and others. The flu vaccine helps protect those at high-risk for serious illness, and lowers the strain on hospitals during the COVID-19 pandemic.   Canceling travel and other outings  Stay informed about  "COVID-19 in your area. Follow local instructions about being in public. Be aware of events in your community that may be postponed or canceled, such as school and sporting events. You may be advised not to attend public gatherings.    You will be advised to stay at least 6 feet from others as much as possible. This is called "social distancing." You may be advised to stay at home and isolate yourself as much as possible if COVID-19 is in your area. You may hear terms such as "self isolate, "quarantine," stay at home, and shelter in place.   The CDC advises that people should not travel to areas where there are COVID-19 outbreaks right now for any reason that is not urgent. For the most current CDC travel advisories, visit the CDC website at www.cdc.gov/coronavirus/2019-ncov/travelers. Dont go on cruises or do non-essential travel right now.      When you are at home  Wash your hands often. Use soap and clean, running water for at least 20 seconds.   If you don't have access to soap and water, use an alcohol-based hand  often. Make sure it has at least 60% alcohol.   Don't touch your eyes, nose, or mouth unless you have clean hands.   Dont kiss someone who is sick.   If you need to cough or sneeze, do it into a tissue. Then throw the tissue into the trash. If you don't have tissues, cough or sneeze into the bend of your elbow.   When possible, don't touch "high-touch" shared surfaces such as doorknobs and handles, cabinet handles, and light switches.   Clean frequently-touched home surfaces often with disinfectant. This includes desk surfaces, printers, phones, kitchen counters, tables, fridge door handle, bathroom surfaces, and any soiled surface. Closely follow disinfectant label instructions. Go to the CDCs detailed cleaning website at www.cdc.gov/coronavirus/2019-ncov/prepare/cleaning-disinfection.html.   Check your home supplies. Consider keeping a 2-week supply of medicines, food, and other " "needed household items.   Make a plan for childcare, work, and ways to stay in touch with others. Know who will help you if you get sick.   Don't be around people who are sick.   There is no evidence right now that animals spread SARS-CoV-2. But it's always a good idea to wash your hands after touching any animals. Don't touch animals that may be sick.   Dont share eating or drinking utensils with sick people.     If you leave home       Stay at least 6 feet away from all people. This is called "social distancing."   When possible, don't touch "high-touch" public surfaces such as doorknobs and handles, cabinet handles, and light switches. If you touch these surfaces, try to clean them first with a disinfecting wipe. Or touch them using a tissue or paper towel.   Use an alcohol-based hand  often. Make sure it has at least 60% alcohol.   Don't touch your eyes, nose, or mouth unless you have clean hands.   If you need to cough or sneeze, do it into a tissue. Then throw the tissue into the trash. If you don't have tissues, cough or sneeze into the bend of your elbow.   Don't attend public gatherings if possible. If you do attend public gatherings, follow social distancing rules. Don't share food or personal items such as water bottles.   The CDC advises wearing a cloth face mask with two or more layers of washable, breathable fabric while in public or when indoors with people who don't live with you. Or you can wear a disposable paper mask with a cloth mask over it. You can make a cloth face mask of your own. . The CDC has instructions on how to make a mask. Wear the mask so that it covers both your nose and mouth.   The CDC advises all people over age 2 to wear cloth face masks in public settings when around people outside of their household, especially when it's hard to socially distance. For example, wear a mask in populated places such as public transit, public protests and marches, and crowded stores, " "bars, and restaurants. Cloth masks may help prevent people who have COVID-19 form spreading the virus to others. Cloth masks are most likely to reduce COVID-19 spread when masks are widely used by people who are out in the public.   Certain people should not wear a face mask. This includes:   Children younger than 2 years old   Anyone with a health, developmental, or mental health condition that can be made worse by wearing a mask   Anyone who is unconscious or unable to remove the face covering without help. See the CDC's guidance on who should not wear a face mask.     If you are at a work site  If you haven't been fully vaccinated against COVID-19 and are exposed , go home and stay home if you feel sick in any way.   Tell your supervisor if you are well but live with someone who has COVID-19.   Stay at least 6 feet away from all people.   Don't shake hands with anyone.   Don't attend in-person meetings, or limit how many you attend. Meet over phone or video if possible.   Don't use other people's desks, phones, equipment, or offices, if possible.   Wash your hands often. Use soap and clean, running water for at least 20 seconds.   If you don't have access to soap and water, use an alcohol-based hand  often. Make sure it has at least 60% alcohol.   Don't touch your eyes, nose, or mouth unless you have clean hands.   The CDC advises wearing a cloth face mask with two or more layers of washable, breathable fabric while in public or when indoors with people who don't live with you. You can make a cloth face mask of your own. . The CDC has instructions on how to make a mask. Wear the mask so that it covers both your nose and mouth. .   When possible, don't touch "high-touch" public surfaces such as doorknobs and handles, cabinet handles, and light switches. If you touch these surfaces, clean them first with a disinfecting wipe. Or touch them using a tissue or paper towel.   Use office junior one person at a " time.   Consider not having office coffee or tea, or group foods.   Dont have meals in groups.   Clean work surfaces often with disinfectant. This includes desk surfaces, photocopier, printer, phones, kitchen counters, fridge door handle, bathroom surfaces, and others.   Dont touch other peoples personal work tools, such as phones, keyboards, pens, and other items.   Dont touch other peoples eating or drinking utensils.   If you need to cough or sneeze, do it into a tissue. Then throw the tissue into the trash. If you don't have tissues, cough or sneeze into the bend of your elbow.     If you have been exposed to a person with COVID-19   If you've been fully vaccinated against COVID-19, you don't need to stay home away from others if you've been exposed.   If you haven't been fully vaccinated and d have been exposed to someone who is suspected of having COVID-19 or has tested positive for it:   Call your healthcare provider and follow all instructions. Stay home away from others and monitor your health. This is called quarantine. The CDC advises that you quarantine to help prevent the spread of COVID-19 once you've been exposed to someone with the infection. The CDC still supports quarantine for 14 days after exposure, but they recognize the hardship for many who need to work. The CDC now recommends two options for how long quarantine should last. If you have been exposed but don't have symptoms, you can stop quarantine:   10 days after exposure if you don't get a diagnostic (viral) test, or   7 days after getting a negative viral test result.   Take your temperature every morning and evening for 14 days after exposure. This is to check for fever. Keep a record of the readings. If possible, stay away from others, especially those who are at higher risk of getting very sick from COVID-19.   Watch for symptoms of the virus such as cough or trouble breathing. If you develop any symptoms, isolate yourself right  away. Call your provider first before going to any clinic or hospital. See the CDC's symptom .   Your limits are different if you've had COVID-19 in the last 3 months but are fully recovered without symptoms and you have been exposed to someone with COVID-19. If you are symptom-free, you don't need to quarantine or be retested. [facility] doesn't recommend retesting unless you have symptoms of COVID-19 and your new symptoms can't be linked to another illness. Contact your healthcare provider if you have any questions. If you develop symptoms, stay home. If you had COVID-19 more than 3 months ago and have been exposed again, treat it like you've never had COVID-19 and stay home, limit your contact with others, call your provider, and monitor for symptoms.     When to call your healthcare provider  Call your healthcare provider if you think you have COVID-19 symptoms. These can include fever, cough, and trouble breathing. They may also include body aches, headache, chills or repeated shaking with chills, sore throat, loss of taste or smell, or diarrhea. Dont go to a healthcare facility before speaking to a healthcare provider.   ACTION PLAN    GREEN ZONE  My sputum is clear/white/usual color and easily cleared.  My breathing is no harder than usual.  I can do my usual activities.  I can think clearly.   Take your usual medicines, including oxygen, as you are told to do so by your health care provider.   YELLOW ZONE  My sputum has change (color, thickness, amount).  I am more short of breath than usual.  I cough or wheeze more.  I weigh more and my legs/feet swell.  I cannot do my usual activities without resting.   Call your health care provider. You will probably be told to begin taking an antibiotic and prednisone. Have your pharmacy phone number available.   RED ZONE  I cannot cough out my sputum.  I am much more short of breath than normal.  I need to sit up to breathe  I cannot do my usual activities.  I am  unable to speak more than one or two words at a time.  I am confused.   Call your health care provider. You may be asked to come in to be seen, told to go to the emergency room, or told to call 9-1-1.

## 2023-02-28 NOTE — PROGRESS NOTES
Subjective:       Patient ID: Chao Hawk is a 89 y.o. male.    Chief Complaint: No chief complaint on file.    Chao Hawk is a 89 y.o. male who presents to clinic for a follow up. Since last visit, post hospital flow up of GI bleed, he has not had any improvement in his exercise capacity. No active [place] or rectal bleeding. No abdominal pain.     1) HTN: B/P normal, no HTNive symptoms  2) LIPIDS:following D&E, tolerating and compliant with med(s).    3) BPH: on meds and Sx acceptable seeing Urology   4) COPD/Pulmonary artery HTN: sees pulmonary, worsening, very easily SOB, using inhalers but has not noticed much help  5) Low T: no longer On depo testosterone.  6) Back:s/p surgery, uses canes only for balance and support for uneven surface, has residual feet numbness. Worse in AM until he gets around. Uses only CBD oil. Not any other OTC or tramadol. 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   8) AAA: medical management. CT 2018: distal abdominal aortic aneurysm measuring 3.6 cm AP dimension 3.3 cm transverse dimension.  There is also fusiform aneurysmal dilatation of the mid infrarenal abdominal aorta measuring approximately 3.2 cm in diameter.  9) GI bleed/gastritis/GERD: on protonix, no active Sx but exercise capacity has not improved    LABS REVIEWED AND DISCUSSED WITH PATIENT: CBC, and lipids    Review of Systems   Constitutional:  Negative for activity change, appetite change, chills, diaphoresis, fatigue, fever and unexpected weight change.   HENT:  Negative for nasal congestion, ear pain, mouth sores, nosebleeds, postnasal drip, rhinorrhea, sneezing and sore throat.    Eyes:  Negative for photophobia, pain, discharge, redness and visual disturbance.   Respiratory:  Positive for shortness of breath. Negative for cough, chest tightness, wheezing and stridor.    Cardiovascular:  Negative for chest pain, palpitations and leg swelling.    Gastrointestinal:  Negative for abdominal distention, blood in stool, constipation, diarrhea, nausea and vomiting.   Genitourinary:  Negative for decreased urine volume, difficulty urinating, dysuria, flank pain, frequency, genital sores, hematuria and urgency.   Musculoskeletal:  Negative for arthralgias, back pain, joint swelling, neck pain and neck stiffness.   Integumentary:  Negative for color change, pallor, rash and wound.   Neurological:  Negative for dizziness, syncope, speech difficulty, weakness, light-headedness and headaches.   Hematological:  Negative for adenopathy. Does not bruise/bleed easily.   Psychiatric/Behavioral:  Negative for confusion, decreased concentration, dysphoric mood, hallucinations, sleep disturbance and suicidal ideas. The patient is not nervous/anxious.    All other systems reviewed and are negative.      Objective:      Physical Exam  Vitals and nursing note reviewed.   Constitutional:       General: He is not in acute distress.     Appearance: He is well-developed.   Neck:      Thyroid: No thyromegaly.      Vascular: No JVD.   Cardiovascular:      Rate and Rhythm: Normal rate and regular rhythm.      Heart sounds: Normal heart sounds. No murmur heard.  Pulmonary:      Effort: Pulmonary effort is normal. No respiratory distress.      Breath sounds: Normal breath sounds. No wheezing or rales.   Abdominal:      General: There is no distension.      Palpations: Abdomen is soft. There is no mass.      Tenderness: There is no abdominal tenderness. There is no guarding.   Musculoskeletal:      Right lower leg: Edema (bilateral trace edema, mild varicosities, dorsalis pedis pulses absent but just palpable present in groin) present.      Left lower leg: Edema present.   Lymphadenopathy:      Cervical: No cervical adenopathy.   Skin:     Capillary Refill: Capillary refill takes less than 2 seconds.      Findings: No rash.   Neurological:      General: No focal deficit present.       Mental Status: He is alert and oriented to person, place, and time.      Cranial Nerves: No cranial nerve deficit.      Coordination: Coordination normal.   Psychiatric:         Mood and Affect: Mood normal.         Behavior: Behavior normal.         Thought Content: Thought content normal.         Judgment: Judgment normal.       Assessment:       Problem List Items Addressed This Visit          Neuro    DDD (degenerative disc disease), lumbar    Peripheral neuropathy       Pulmonary    COPD (chronic obstructive pulmonary disease) - Primary (Chronic)    Relevant Orders    Ambulatory referral/consult to Pulmonology    PAH (pulmonary artery hypertension)       Cardiac/Vascular    Essential hypertension (Chronic)    Pure hypercholesterolemia (Chronic)    Atherosclerosis of aorta    Abdominal aortic aneurysm without rupture       Renal/    BPH (benign prostatic hyperplasia) (Chronic)       Oncology    Chronic anemia    Relevant Orders    Iron and TIBC    Ferritin    Ambulatory referral/consult to Hematology / Oncology       Other    Impaired functional mobility, balance, gait, and endurance     Other Visit Diagnoses       Gastritis, presence of bleeding unspecified, unspecified chronicity, unspecified gastritis type        Relevant Orders    Ambulatory referral/consult to Endo Procedure             Plan:     Chronic obstructive pulmonary disease, unspecified COPD type  -     Ambulatory referral/consult to Pulmonology; Future; Expected date: 03/07/2023    Chronic anemia  -     Iron and TIBC; Future; Expected date: 02/28/2023  -     Ferritin; Future; Expected date: 02/28/2023  -     Ambulatory referral/consult to Hematology / Oncology; Future; Expected date: 03/07/2023    Benign prostatic hyperplasia with weak urinary stream    Atherosclerosis of aorta    Abdominal aortic aneurysm (AAA) without rupture, unspecified part    DDD (degenerative disc disease), lumbar    Essential hypertension    Impaired functional  mobility, balance, gait, and endurance    PAH (pulmonary artery hypertension)    Peripheral polyneuropathy    Pure hypercholesterolemia    Gastritis, presence of bleeding unspecified, unspecified chronicity, unspecified gastritis type  -     Ambulatory referral/consult to Endo Procedure ; Future; Expected date: 03/01/2023     at goal for B/P, lipids. Maintain meds, self monitoring D&E, weight moderation. F/U 6 months with labs. His SOB and STEVENS is slowly worsening since winter. Urgent referral to GI, pulmonary, and hematology.    Scribe Attestation:   I, Andrew Granda, am scribing for, and in the presence of, Dr. Amari Ureña Jr. I performed the above scribed service and the documentation accurately describes the services I performed. I attest to the accuracy of the note.    I, Dr. Amari Ureña Jr, reviewed documentation as scribed above. I personally performed the services described in this documentation.  I agree that the record reflects my personal performance and is accurate and complete. Amari Ureña Jr., MD.  02/28/2023

## 2023-02-28 NOTE — PROGRESS NOTES
Pulmonary Disease Management  [facility]  Initial Visit    Referring Provider: BARAK Enamorado RRT  Diagnosis: COPD, PAH  Last Hospital Admission: 10/25/22  Last provider visit: 10/18/21      Current Outpatient Medications:     acetaminophen (TYLENOL) 500 MG tablet, Take 500 mg by mouth as needed for Pain., Disp: , Rfl:     albuterol (PROVENTIL) 2.5 mg /3 mL (0.083 %) nebulizer solution, Take 3 mLs (2.5 mg total) by nebulization every 4 (four) hours as needed for Wheezing. Rescue, Disp: 150 mL, Rfl: 11    ARGININE, L-ARGININE, ORAL, Take 500 mg by mouth once daily., Disp: , Rfl:     atorvastatin (LIPITOR) 40 MG tablet, TAKE 1 TABLET EVERY DAY, Disp: 90 tablet, Rfl: 3    betamethasone valerate 0.1% (VALISONE) 0.1 % Oint, Apply topically 2 (two) times daily., Disp: 45 g, Rfl: 1    CANNABIDIOL, CBD, EXTRACT ORAL, Take 3 drops by mouth every morning., Disp: , Rfl:     cholecalciferol, vitamin D3, (VITAMIN D3) 50 mcg (2,000 unit) Cap, Take 1 capsule by mouth every evening. , Disp: , Rfl:     COQ10, UBIQUINOL, ORAL, Take 1 tablet by mouth nightly. , Disp: , Rfl:     cyanocobalamin (VITAMIN B-12) 1000 MCG tablet, Take 100 mcg by mouth once daily., Disp: , Rfl:     diclofenac sodium (VOLTAREN) 1 % Gel, Apply 2 g topically 3 (three) times daily., Disp: 1 each, Rfl: 3    dutasteride (AVODART) 0.5 mg capsule, Take 1 capsule (0.5 mg total) by mouth once daily., Disp: 90 capsule, Rfl: 3    fluticasone-umeclidin-vilanter (TRELEGY ELLIPTA) 200-62.5-25 mcg inhaler, Inhale 1 puff into the lungs once daily., Disp: 3 each, Rfl: 3    furosemide (LASIX) 20 MG tablet, TAKE 1 TABLET EVERY DAY, Disp: 90 tablet, Rfl: 3    gabapentin (NEURONTIN) 100 MG capsule, TAKE ONE CAPSULE BY MOUTH TWICE DAILY, Disp: 180 capsule, Rfl: 3    GLUCOSAM-MSM-CHONDROIT-VIT D3 ORAL, Take 2 tablets by mouth once daily., Disp: , Rfl:     krill/om-3/dha/epa/phospho/ast (MEGARED OMEGA-3 KRILL OIL ORAL), Take 1 capsule by mouth nightly. , Disp: , Rfl:      "lisinopriL 10 MG tablet, TAKE 1 TABLET EVERY DAY, Disp: 90 tablet, Rfl: 3    multivit-min/folic/vit K/lycop (ONE-A-DAY MEN'S MULTIVITAMIN ORAL), Take 1 tablet by mouth every evening. , Disp: , Rfl:     pantoprazole (PROTONIX) 40 MG tablet, Take 1 tablet (40 mg total) by mouth 2 (two) times daily., Disp: 120 tablet, Rfl: 0    potassium 99 mg Tab, Take by mouth once daily. , Disp: , Rfl:     prednisoLONE acetate (PRED FORTE) 1 % DrpS, Place 1 drop into the right eye 2 (two) times daily., Disp: , Rfl:     tamsulosin (FLOMAX) 0.4 mg Cap, Take 2 capsules (0.8 mg total) by mouth every morning., Disp: 180 capsule, Rfl: 3    traMADoL (ULTRAM) 50 mg tablet, Take 1 tablet (50 mg total) by mouth daily as needed for Pain., Disp: 30 tablet, Rfl: 0    Review of patient's allergies indicates:   Allergen Reactions    Bee sting  [allergen ext-venom-honey bee] Swelling    Penicillins Rash     "Felt like I had pins and needles in my feet" , hospitalized overnight    Poison ivy extract Hives       Smoking history:   Social History     Tobacco Use   Smoking Status Former    Packs/day: 1.00    Years: 40.00    Pack years: 40.00    Types: Cigarettes    Start date: 1949    Quit date: 1991    Years since quittin.1   Smokeless Tobacco Never       Pulse: 89  SpO2: (!) 90 % (Room air)  Resp: 16  Height: 5' 6" (167.6 cm)  Weight: 83 kg (182 lb 15.7 oz)  BMI (kg/m2): 29.6  Current Oxygen Use: No (Scheduled patient for 6MWT)  DME Provider: To be determined     COPD Questionnaire:  COPD Questionnaire  How often do you cough?: Some of the time  How often do you have phlegm (mucus) in your chest?: All of the time  How often does your chest feel tight?: Never  When you walk up a hill or one flight of stairs, how often are you breathless?: All of the time  How often are you limited doing any activities at home?: Most of the time  How often are you confident leaving the house despite your lung condition?: A little of the time  How often do " you sleep soundly?: Almost never  How often do you have energy?: Almost never  Total score: 28    Has this patient had any pulmonary studies (PFTS)?: Yes  PFT Results:  Pre FVC   Date/Time Value Ref Range Status   04/19/2021 12:53 PM 3.20 2.45 - 4.51 L Final     Pre FEV1   Date/Time Value Ref Range Status   04/19/2021 12:53 PM 1.41 (L) 1.69 - 3.38 L Final     Pre FEV1 FVC   Date/Time Value Ref Range Status   04/19/2021 12:53 PM 43.94 (L) 57.82 - 89.81 % Final     Pre TLC   Date/Time Value Ref Range Status   08/05/2019 05:06 PM 6.16 (L) 6.39 - 8.69 L Final     Pre DLCO   Date/Time Value Ref Range Status   08/05/2019 05:06 PM 10.26 (L) 18.63 - 32.49 ml/(min*mmHg) Final       Is this patient a candidate for pulmonary rehab?: Yes  Method Used for Education: Literature, Demonstration, Verbal  Did the patient demonstrate understanding?: Yes  What tests has the patient had done today?: Spirometry, Six Minute Walk        Educational assessment:   [x]            Good  []            Fair  []            Poor    Readiness to learn:   [x]            Good  []            Fair  []            Poor    Vision Status:   [x]            Good  []            Fair  []            Poor    Reading Ability:  [x]            Good  []            Fair  []            Poor    Knowledge of condition:   [x]            Good  []            Fair  []            Poor    Language Barriers:   []            Good  []            Fair  []            Poor  [x]            None    Cognitive/ Physical Barriers:   []            Good  []            Fair  []            Poor  [x]            None    Learning best by:                       [x]            Seeing  []            Hearing  []            Reading                         [x]            Doing    Describe any barrier /Limitation or financial implications of care choices identified     []            Financial  []            Emotional  []            Education  []            Vision/Hearing  []            Physical  [x]             None  []                TOPIC /CONTENT FOR TODAY:    [x]            MDI with or without spacer  [x]            Dry powder inhaler  []            Acapella   []           Peak Flow meter  [x]            COPD action plan  [x]            Nebulizer use  [x]            Oxygen use safety  [x]            Breathing and cough techniques  [x]            Energy conservation  [x]            Infection prevention  []           OTHER________________________        Learner:    [x]            Patient   []            Caregiver    Method:    [x]            Verbal explanation  [x]            Audio visual    [x]            Literature  [x]            Teach back      Evaluation:    [x]            Teach back  [x]            Demonstrate  [x]            Follow up phone call    []            2 weeks     [x]            4 weeks   [x]            PRN        Patient Concerns or Expectations:   Patient previously returned prescribed supplemental oxygen AMA. Patient has concerns regarding dyspnea with exertion and believes he would benefit from supplemental oxygen at this time.   -Scheduled 6MWT to assess need for supplemental oxygen.  -Provided patient with pulse oximeter and instructions for use of device. Understanding was verbalized.     Therapist Comments:   Patient was seen today to review respiratory medication purpose and proper technique for use of inhalers. Patient practiced proper technique using MDI with valved holding chamber (spacer) and DPI inhalers. Patient demonstrated understanding. Literature was given to patient.    Patient understands the difference between rescue versus maintenance medications. Patient adheres to prescribed respiratory therapy medication regimen. Reminded patient to rinse mouth thoroughly after ICS use. Understanding was verbalized.      Asthma trigger checklist was verbally reviewed and literature given to patient.     Infection prevention was discussed. Patient was reminded to get influenza vaccine.  Hand hygiene and cleaning of respiratory equipment was also discussed. Patient verbalized understanding.      COPD action plan was reviewed and literature was given to patient. Patient verbalized understanding.        Plan:  Monthly Pulmonary Disease Management Questionnaire  Follow-up PDM appointment scheduled for 3/6/23   Reinforce education  Meds: Trelegy, albuterol   DME Needs: To be determined   Action Plan: COPD   Immunization: Pneumococcal- current, Flu-current, Covid- current   Next Provider Visit: 3/6/23  Next 6MWT: 3/6/23   Approximate time spent with patient: 75 mins

## 2023-03-01 ENCOUNTER — TELEPHONE (OUTPATIENT)
Dept: HEMATOLOGY/ONCOLOGY | Facility: CLINIC | Age: 88
End: 2023-03-01
Payer: MEDICARE

## 2023-03-01 ENCOUNTER — PATIENT MESSAGE (OUTPATIENT)
Dept: HEMATOLOGY/ONCOLOGY | Facility: CLINIC | Age: 88
End: 2023-03-01
Payer: MEDICARE

## 2023-03-01 DIAGNOSIS — J44.9 COPD (CHRONIC OBSTRUCTIVE PULMONARY DISEASE): Primary | ICD-10-CM

## 2023-03-01 LAB
FERRITIN SERPL-MCNC: 145 NG/ML (ref 20–300)
IRON SERPL-MCNC: 29 UG/DL (ref 45–160)
SATURATED IRON: 10 % (ref 20–50)
TOTAL IRON BINDING CAPACITY: 290 UG/DL (ref 250–450)
TRANSFERRIN SERPL-MCNC: 196 MG/DL (ref 200–375)

## 2023-03-01 NOTE — TELEPHONE ENCOUNTER
"Attempted to call pt in reference to Hematology appt cxl d/t provider unavailable and need to r/s.  No answer message "unable to complete call at this time."  Jermainener message sent.   "

## 2023-03-06 ENCOUNTER — HOSPITAL ENCOUNTER (OUTPATIENT)
Dept: RADIOLOGY | Facility: HOSPITAL | Age: 88
Discharge: HOME | End: 2023-03-06
Attending: INTERNAL MEDICINE
Payer: MEDICARE

## 2023-03-06 ENCOUNTER — OFFICE VISIT (OUTPATIENT)
Dept: PULMONOLOGY | Facility: CLINIC | Age: 88
End: 2023-03-06
Payer: MEDICARE

## 2023-03-06 ENCOUNTER — CLINICAL SUPPORT (OUTPATIENT)
Dept: PULMONOLOGY | Facility: CLINIC | Age: 88
End: 2023-03-06
Payer: MEDICARE

## 2023-03-06 VITALS
WEIGHT: 194 LBS | HEIGHT: 66 IN | BODY MASS INDEX: 29.05 KG/M2 | HEART RATE: 82 BPM | HEIGHT: 66 IN | BODY MASS INDEX: 31.18 KG/M2 | DIASTOLIC BLOOD PRESSURE: 66 MMHG | SYSTOLIC BLOOD PRESSURE: 126 MMHG | OXYGEN SATURATION: 94 % | RESPIRATION RATE: 18 BRPM | WEIGHT: 180.75 LBS

## 2023-03-06 DIAGNOSIS — J44.9 CHRONIC OBSTRUCTIVE PULMONARY DISEASE, UNSPECIFIED COPD TYPE: Primary | Chronic | ICD-10-CM

## 2023-03-06 DIAGNOSIS — J44.9 COPD (CHRONIC OBSTRUCTIVE PULMONARY DISEASE): Primary | Chronic | ICD-10-CM

## 2023-03-06 DIAGNOSIS — J44.9 CHRONIC OBSTRUCTIVE PULMONARY DISEASE, UNSPECIFIED COPD TYPE: Chronic | ICD-10-CM

## 2023-03-06 DIAGNOSIS — J84.10 CALCIFIED GRANULOMA OF LUNG: ICD-10-CM

## 2023-03-06 DIAGNOSIS — J44.9 COPD (CHRONIC OBSTRUCTIVE PULMONARY DISEASE): ICD-10-CM

## 2023-03-06 DIAGNOSIS — J44.1 COPD EXACERBATION: Primary | ICD-10-CM

## 2023-03-06 LAB
BRPFT: NORMAL
DLCO ADJ PRE: 7.43 ML/(MIN*MMHG)
DLCO SINGLE BREATH LLN: 12.87
DLCO SINGLE BREATH PRE REF: 37.5 %
DLCO SINGLE BREATH REF: 19.79
DLCOC SBVA LLN: 1.89
DLCOC SBVA PRE REF: 57.9 %
DLCOC SBVA REF: 3.12
DLCOC SINGLE BREATH LLN: 12.87
DLCOC SINGLE BREATH PRE REF: 37.5 %
DLCOC SINGLE BREATH REF: 19.79
DLCOVA LLN: 1.89
DLCOVA PRE REF: 57.9 %
DLCOVA PRE: 1.81 ML/(MIN*MMHG*L)
DLCOVA REF: 3.12
DLVAADJ PRE: 1.81 ML/(MIN*MMHG*L)
FEF 25 75 LLN: 0.34
FEF 25 75 PRE REF: 22.9 %
FEF 25 75 REF: 2.05
FEV1 FVC LLN: 60
FEV1 FVC PRE REF: 60.2 %
FEV1 FVC REF: 69
FEV1 LLN: 1.5
FEV1 PRE REF: 59.2 %
FEV1 REF: 2.27
FVC LLN: 2.17
FVC PRE REF: 104 %
FVC REF: 3.11
IVC PRE: 2.8 L
IVC SINGLE BREATH LLN: 2.17
IVC SINGLE BREATH PRE REF: 90.1 %
IVC SINGLE BREATH REF: 3.11
PEF LLN: 2.78
PEF PRE REF: 71.5 %
PEF REF: 4.85
PRE DLCO: 7.43 ML/(MIN*MMHG)
PRE FEF 25 75: 0.47 L/S
PRE FET 100: 8.7 SEC
PRE FEV1 FVC: 41.67 %
PRE FEV1: 1.35 L
PRE FVC: 3.23 L
PRE PEF: 3.47 L/S
VA PRE: 4.12 L
VA SINGLE BREATH LLN: 6.19
VA SINGLE BREATH PRE REF: 66.5 %
VA SINGLE BREATH REF: 6.19

## 2023-03-06 PROCEDURE — 1126F PR PAIN SEVERITY QUANTIFIED, NO PAIN PRESENT: ICD-10-PCS | Mod: HCNC,CPTII,S$GLB, | Performed by: INTERNAL MEDICINE

## 2023-03-06 PROCEDURE — 99214 PR OFFICE/OUTPT VISIT, EST, LEVL IV, 30-39 MIN: ICD-10-PCS | Mod: 25,HCNC,S$GLB, | Performed by: INTERNAL MEDICINE

## 2023-03-06 PROCEDURE — 71046 XR CHEST PA AND LATERAL: ICD-10-PCS | Mod: 26,HCNC,, | Performed by: RADIOLOGY

## 2023-03-06 PROCEDURE — 1126F AMNT PAIN NOTED NONE PRSNT: CPT | Mod: HCNC,CPTII,S$GLB, | Performed by: INTERNAL MEDICINE

## 2023-03-06 PROCEDURE — 1100F PTFALLS ASSESS-DOCD GE2>/YR: CPT | Mod: HCNC,CPTII,S$GLB, | Performed by: INTERNAL MEDICINE

## 2023-03-06 PROCEDURE — 1159F MED LIST DOCD IN RCRD: CPT | Mod: HCNC,CPTII,S$GLB, | Performed by: INTERNAL MEDICINE

## 2023-03-06 PROCEDURE — 99499 UNLISTED E&M SERVICE: CPT | Mod: S$GLB,,, | Performed by: INTERNAL MEDICINE

## 2023-03-06 PROCEDURE — 94010 BREATHING CAPACITY TEST: CPT | Mod: HCNC,S$GLB,, | Performed by: INTERNAL MEDICINE

## 2023-03-06 PROCEDURE — 99999 PR PBB SHADOW E&M-EST. PATIENT-LVL V: CPT | Mod: PBBFAC,HCNC,, | Performed by: INTERNAL MEDICINE

## 2023-03-06 PROCEDURE — 94729 DIFFUSING CAPACITY: CPT | Mod: HCNC,S$GLB,, | Performed by: INTERNAL MEDICINE

## 2023-03-06 PROCEDURE — 94010 BREATHING CAPACITY TEST: ICD-10-PCS | Mod: HCNC,S$GLB,, | Performed by: INTERNAL MEDICINE

## 2023-03-06 PROCEDURE — 99999 PR PBB SHADOW E&M-EST. PATIENT-LVL V: ICD-10-PCS | Mod: PBBFAC,HCNC,, | Performed by: INTERNAL MEDICINE

## 2023-03-06 PROCEDURE — 3288F FALL RISK ASSESSMENT DOCD: CPT | Mod: HCNC,CPTII,S$GLB, | Performed by: INTERNAL MEDICINE

## 2023-03-06 PROCEDURE — 99214 OFFICE O/P EST MOD 30 MIN: CPT | Mod: 25,HCNC,S$GLB, | Performed by: INTERNAL MEDICINE

## 2023-03-06 PROCEDURE — 1159F PR MEDICATION LIST DOCUMENTED IN MEDICAL RECORD: ICD-10-PCS | Mod: HCNC,CPTII,S$GLB, | Performed by: INTERNAL MEDICINE

## 2023-03-06 PROCEDURE — 1100F PR PT FALLS ASSESS DOC 2+ FALLS/FALL W/INJURY/YR: ICD-10-PCS | Mod: HCNC,CPTII,S$GLB, | Performed by: INTERNAL MEDICINE

## 2023-03-06 PROCEDURE — 99999 PR PBB SHADOW E&M-EST. PATIENT-LVL I: ICD-10-PCS | Mod: PBBFAC,HCNC,,

## 2023-03-06 PROCEDURE — 3288F PR FALLS RISK ASSESSMENT DOCUMENTED: ICD-10-PCS | Mod: HCNC,CPTII,S$GLB, | Performed by: INTERNAL MEDICINE

## 2023-03-06 PROCEDURE — 94729 PR C02/MEMBANE DIFFUSE CAPACITY: ICD-10-PCS | Mod: HCNC,S$GLB,, | Performed by: INTERNAL MEDICINE

## 2023-03-06 PROCEDURE — 71046 X-RAY EXAM CHEST 2 VIEWS: CPT | Mod: TC,HCNC

## 2023-03-06 PROCEDURE — 99499 RISK ADDL DX/OHS AUDIT: ICD-10-PCS | Mod: S$GLB,,, | Performed by: INTERNAL MEDICINE

## 2023-03-06 PROCEDURE — 99999 PR PBB SHADOW E&M-EST. PATIENT-LVL I: CPT | Mod: PBBFAC,HCNC,,

## 2023-03-06 PROCEDURE — 71046 X-RAY EXAM CHEST 2 VIEWS: CPT | Mod: 26,HCNC,, | Performed by: RADIOLOGY

## 2023-03-06 RX ORDER — PREDNISONE 20 MG/1
TABLET ORAL
Qty: 20 TABLET | Refills: 0 | Status: SHIPPED | OUTPATIENT
Start: 2023-03-06 | End: 2023-09-08

## 2023-03-06 RX ORDER — FLUTICASONE PROPIONATE AND SALMETEROL 250; 50 UG/1; UG/1
1 POWDER RESPIRATORY (INHALATION) 2 TIMES DAILY
Qty: 1 EACH | Refills: 11 | Status: SHIPPED | OUTPATIENT
Start: 2023-03-06 | End: 2023-09-08

## 2023-03-06 RX ORDER — DOXYCYCLINE 100 MG/1
100 CAPSULE ORAL EVERY 12 HOURS
Qty: 20 CAPSULE | Refills: 1 | Status: SHIPPED | OUTPATIENT
Start: 2023-03-06 | End: 2023-09-08

## 2023-03-06 RX ORDER — ALBUTEROL SULFATE 0.83 MG/ML
2.5 SOLUTION RESPIRATORY (INHALATION)
Qty: 360 ML | Refills: 11 | Status: SHIPPED | OUTPATIENT
Start: 2023-03-06 | End: 2024-03-05

## 2023-03-06 NOTE — PROGRESS NOTES
Subjective:       Patient ID: Chao Hawk is a 89 y.o. male.    Chief Complaint: chest congestion   HPI COPD  He presents for evaluation and treatment of COPD. The patient is currently having symptoms / an exacerbation. Current symptoms include chronic dyspnea, dyspnea after 1/2 blocks, cough productive of green sputum in small amounts, and wheezing. Symptoms have been present since several months ago and have been gradually worsening. He denies chills and fever. Associated symptoms include poor exercise tolerance.  This episode appears to have been triggered by pollens. Treatments tried for the current exacerbation: none. The patient has been having similar episodes for approximately 5 years. He uses 1 pillows at night. Patient currently is not on home oxygen therapy.. The patient is having no constitutional symptoms, denying fever, chills, anorexia, or weight loss. The patient has not been hospitalized for this condition before. He has a history of 40 pack years. The patient is experiencing exercise intolerance (difficulty walking 1/2 blocks on flat ground).      Past Medical History:   Diagnosis Date    Anemia     Arthritis     back, hand    Back pain     Dr. Cristobal- [facility]    Bilateral leg edema 2/16/2018    Chronic bronchitis     COPD (chronic obstructive pulmonary disease)     Diverticulitis of large intestine with perforation without abscess or bleeding     Diverticulosis 5/16/06    colonoscopy    Hearing loss, bilateral     Hernia     x2    Hyperlipidemia     Hypertension     Lumbar radiculopathy 6/14/2017    Multiple renal cysts 7/5/2016    Nocturnal hypoxemia     Polyneuropathy     Tobacco dependence     Trouble in sleeping      Past Surgical History:   Procedure Laterality Date    ESOPHAGOGASTRODUODENOSCOPY N/A 10/26/2022    Procedure: EGD (ESOPHAGOGASTRODUODENOSCOPY);  Surgeon: Mallika Duran MD;  Location: [facility];  Service: Endoscopy;  Laterality: N/A;    FINGER SURGERY Left 1980s    index-  Dr. Encarnacion; to remove nodule    HERNIA REPAIR      x2    INTRAOCULAR LENS INSERTION Bilateral 2016    LUMBAR SPINE SURGERY  2017    REVERSE TOTAL SHOULDER ARTHROPLASTY Right 10/5/2020    Procedure: ARTHROPLASTY, SHOULDER, TOTAL, REVERSE;  Surgeon: Reji Martin MD;  Location: [facility];  Service: Orthopedics;  Laterality: Right;    TONSILLECTOMY, ADENOIDECTOMY       Social History     Socioeconomic History    Marital status:      Spouse name: Linsey    Number of children: 4    Years of education: 12 + 4   Occupational History    Occupation:  retired age 60     Comment: [employer]   Tobacco Use    Smoking status: Former     Packs/day: 1.00     Years: 40.00     Pack years: 40.00     Types: Cigarettes     Start date: 1949     Quit date: 1991     Years since quittin.1    Smokeless tobacco: Never   Substance and Sexual Activity    Alcohol use: Not Currently     Comment: rarely   No alcohol 72h  prior to sx    Drug use: No    Sexual activity: Never   Social History Narrative    No smokers or pets in household.     @FAM@  Review of Systems   Constitutional:  Positive for fatigue. Negative for fever.   HENT:  Positive for postnasal drip, rhinorrhea and congestion.    Eyes:  Negative for redness and itching.   Respiratory:  Positive for cough, sputum production, shortness of breath, dyspnea on extertion, use of rescue inhaler and Paroxysmal Nocturnal Dyspnea.    Cardiovascular:  Negative for chest pain, palpitations and leg swelling.   Genitourinary:  Negative for difficulty urinating and hematuria.   Endocrine:  Negative for cold intolerance and heat intolerance.    Musculoskeletal:  Positive for arthralgias, back pain and gait problem.   Skin:  Negative for rash.   Gastrointestinal:  Negative for nausea and abdominal pain.   Neurological:  Negative for dizziness, syncope, weakness and light-headedness.   Hematological:  Negative for adenopathy. Does not  "bruise/bleed easily.   Psychiatric/Behavioral:  Negative for sleep disturbance. The patient is not nervous/anxious.      Objective:      /66   Pulse 82   Resp 18   Ht 5' 6" (1.676 m)   Wt 88 kg (194 lb 0.1 oz)   SpO2 (!) 94%   BMI 31.31 kg/m²   Physical Exam  Vitals and nursing note reviewed.   Constitutional:       Appearance: He is well-developed.   HENT:      Head: Normocephalic and atraumatic.      Nose: Congestion and rhinorrhea present.   Eyes:      Conjunctiva/sclera: Conjunctivae normal.      Pupils: Pupils are equal, round, and reactive to light.   Neck:      Thyroid: No thyromegaly.      Vascular: No JVD.      Trachea: No tracheal deviation.   Cardiovascular:      Rate and Rhythm: Normal rate. Rhythm irregular.      Heart sounds: Normal heart sounds. No murmur heard.  Pulmonary:      Effort: Pulmonary effort is normal.      Breath sounds: Examination of the right-lower field reveals wheezing. Examination of the left-lower field reveals wheezing. Decreased breath sounds and wheezing present. No rhonchi or rales.   Abdominal:      General: Bowel sounds are normal.      Palpations: Abdomen is soft.   Musculoskeletal:         General: No tenderness.      Cervical back: Neck supple.      Comments: Walks with cane   Lymphadenopathy:      Cervical: No cervical adenopathy.   Skin:     General: Skin is warm and dry.   Neurological:      Mental Status: He is alert and oriented to person, place, and time.      Coordination: Coordination abnormal.     Personal Diagnostic Review  Chest X-Ray: I personally reviewed the films and findings are:, air trapping/emphysema  X-Ray Chest PA And Lateral  Narrative: EXAM: XR CHEST PA AND LATERAL    CLINICAL HISTORY:  Chronic obstructive pulmonary disease, unspecified.    TECHNIQUE:   Chest x-ray 2 views, 3 images.    FINDINGS:  Comparison study 10/25/2022, 01/21/2022.  No change.  Normal size heart.  Aortic atherosclerosis.  No congestion.  Minimal basilar scarring.  " Otherwise, lungs are clear.  Degenerative spine.  Right shoulder replacement.  Impression:  Stable chest x-ray.    Finalized on: 3/6/2023 12:12 PM By:  Landon Smith MD  [facility]# [id number]      2023-03-06 12:14:16.135    [facility]    Pulmonary function tests:  na    Pulmonary Studies Review 3/6/2023 3/6/2023 2/28/2023 2/28/2023 12/20/2022 11/17/2022 10/26/2022   SpO2 - 94 90 94 - 100 94   Ordering Provider Obed Sotelo MD - - - - - -   Interpreting Provider Obed Sotelo MD - - - - - -   Performing nurse/tech/RT VT, RT - - - - - -   Diagnosis COPD - - - - - -   Height 66 66 66 - 66 - -   Weight 2892.44 3104.08 2927.71 2927.71 2920.65 2920.65 -   BMI (Calculated) 29.2 31.3 29.5 - 29.5 - -   Predicted Distance 191.65 179.87 189.97 355.46 196.91 362.4 -   Patient Race  - - - - - -   6MWT Status completed with stops - - - - - -   Patient Reported Dyspnea;Other (Comment) - - - - - -   Was O2 used? No - - - - - -   6MW Distance walked (feet) 300 - - - - - -   Distance walked (meters) 91.44 - - - - - -   Did patient stop? Yes - - - - - -   How many times? 1 - - - - - -   Stop Time 1 230 - - - - - -   Restart Time 1 360 - - - - - -   Did patient restart? No - - - - - -   Type of assistive device(s) used? a cane - - - - - -   Is extra documentation required for this patient? Yes - - - - - -   Oxygen Saturation 94 - - - - - -   Supplemental Oxygen Room Air - - - - - -   Heart Rate 82 - - - - - -   Blood Pressure 126/67 - - - - - -   Katie Dyspnea Rating  light - - - - - -   Oxygen Saturation 95 - - - - - -   Supplemental Oxygen Room Air - - - - - -   Heart Rate 100 - - - - - -   Blood Pressure 169/77 - - - - - -   Katie Dyspnea Rating  heavy - - - - - -   Recovery Time (seconds) 240 - - - - - -   Oxygen Saturation 96 - - - - - -   Supplemental Oxygen Room Air - - - - - -   Heart Rate 80 - - - - - -   Blood Pressure 128/82 - - - - - -   Katie Dyspnea Rating  moderate - - - - - -   Is procedure ready for interpretation? Yes - -  - - - -   Did the patient stop or pause? Yes - - - - - -   How many times did the patient stop or pause? 1 - - - - - -   Stop Time 1 230 - - - - - -   Restart Time 1 360 - - - - - -   Pause Time 1 130 - - - - - -   Total Time Walked (Calculated) 230 - - - - - -   Total Laps Walked 1 - - - - - -   Final Partial Lap Distance (feet) 100 - - - - - -   Total Distance Feet (Calculated) 300 - - - - - -   Total Distance Meters (Calculated) 91.44 - - - - - -   Predicted Distance Meters (Calculated) 358.59 348.03 356.83 - 362.2 - -   Percentage of Predicted (Calculated) 25.5 - - - - - -   Peak VO2 (Calculated) 6.72 - - - - - -   Mets 1.92 - - - - - -   Has The Patient Had a Previous Six Minute Walk Test? Yes - - - - - -   Oxygen Qualification? No - - - - - -     No flowsheet data found.      Assessment:       1. COPD exacerbation    2. Chronic obstructive pulmonary disease, unspecified COPD type    3. Calcified granuloma of lung               Outpatient Encounter Medications as of 3/6/2023   Medication Sig Dispense Refill    acetaminophen (TYLENOL) 500 MG tablet Take 500 mg by mouth as needed for Pain.      albuterol (PROVENTIL) 2.5 mg /3 mL (0.083 %) nebulizer solution Take 3 mLs (2.5 mg total) by nebulization every 4 (four) hours as needed for Wheezing. Rescue 150 mL 11    ARGININE, L-ARGININE, ORAL Take 500 mg by mouth once daily.      atorvastatin (LIPITOR) 40 MG tablet TAKE 1 TABLET EVERY DAY 90 tablet 3    betamethasone valerate 0.1% (VALISONE) 0.1 % Oint Apply topically 2 (two) times daily. 45 g 1    CANNABIDIOL, CBD, EXTRACT ORAL Take 3 drops by mouth every morning.      cholecalciferol, vitamin D3, (VITAMIN D3) 50 mcg (2,000 unit) Cap Take 1 capsule by mouth every evening.       COQ10, UBIQUINOL, ORAL Take 1 tablet by mouth nightly.       cyanocobalamin (VITAMIN B-12) 1000 MCG tablet Take 100 mcg by mouth once daily.      diclofenac sodium (VOLTAREN) 1 % Gel Apply 2 g topically 3 (three) times daily. 1 each 3     furosemide (LASIX) 20 MG tablet TAKE 1 TABLET EVERY DAY 90 tablet 3    gabapentin (NEURONTIN) 100 MG capsule TAKE ONE CAPSULE BY MOUTH TWICE DAILY 180 capsule 3    GLUCOSAM-MSM-CHONDROIT-VIT D3 ORAL Take 2 tablets by mouth once daily.      krill/om-3/dha/epa/phospho/ast (MEGARED OMEGA-3 KRILL OIL ORAL) Take 1 capsule by mouth nightly.       lisinopriL 10 MG tablet TAKE 1 TABLET EVERY DAY 90 tablet 3    multivit-min/folic/vit K/lycop (ONE-A-DAY MEN'S MULTIVITAMIN ORAL) Take 1 tablet by mouth every evening.       potassium 99 mg Tab Take by mouth once daily.       prednisoLONE acetate (PRED FORTE) 1 % DrpS Place 1 drop into the right eye 2 (two) times daily.      tamsulosin (FLOMAX) 0.4 mg Cap Take 2 capsules (0.8 mg total) by mouth every morning. 180 capsule 3    traMADoL (ULTRAM) 50 mg tablet Take 1 tablet (50 mg total) by mouth daily as needed for Pain. 30 tablet 0    [DISCONTINUED] fluticasone-umeclidin-vilanter (TRELEGY ELLIPTA) 200-62.5-25 mcg inhaler Inhale 1 puff into the lungs once daily. 3 each 3    albuterol (PROVENTIL) 2.5 mg /3 mL (0.083 %) nebulizer solution Take 3 mLs (2.5 mg total) by nebulization every 4 to 6 hours as needed for Wheezing or Shortness of Breath. 360 mL 11    doxycycline (MONODOX) 100 MG capsule Take 1 capsule (100 mg total) by mouth every 12 (twelve) hours. 20 capsule 1    dutasteride (AVODART) 0.5 mg capsule Take 1 capsule (0.5 mg total) by mouth once daily. 90 capsule 3    fluticasone-salmeterol diskus inhaler 250-50 mcg Inhale 1 puff into the lungs 2 (two) times daily. Wash out mouth after use. 1 each 11    pantoprazole (PROTONIX) 40 MG tablet Take 1 tablet (40 mg total) by mouth 2 (two) times daily. 120 tablet 0    predniSONE (DELTASONE) 20 MG tablet Prednisone 60 mg/ day for 3 days, 40 mg/day for 3 days,20 mg/ day for 3 days, (1/2 tablet )10 mg a day for 3 days. 20 tablet 0     No facility-administered encounter medications on file as of 3/6/2023.     No orders of the defined  types were placed in this encounter.    Plan:       Requested Prescriptions     Signed Prescriptions Disp Refills    predniSONE (DELTASONE) 20 MG tablet 20 tablet 0     Sig: Prednisone 60 mg/ day for 3 days, 40 mg/day for 3 days,20 mg/ day for 3 days, (1/2 tablet )10 mg a day for 3 days.    doxycycline (MONODOX) 100 MG capsule 20 capsule 1     Sig: Take 1 capsule (100 mg total) by mouth every 12 (twelve) hours.    albuterol (PROVENTIL) 2.5 mg /3 mL (0.083 %) nebulizer solution 360 mL 11     Sig: Take 3 mLs (2.5 mg total) by nebulization every 4 to 6 hours as needed for Wheezing or Shortness of Breath.    fluticasone-salmeterol diskus inhaler 250-50 mcg 1 each 11     Sig: Inhale 1 puff into the lungs 2 (two) times daily. Wash out mouth after use.     COPD exacerbation  -     predniSONE (DELTASONE) 20 MG tablet; Prednisone 60 mg/ day for 3 days, 40 mg/day for 3 days,20 mg/ day for 3 days, (1/2 tablet )10 mg a day for 3 days.  Dispense: 20 tablet; Refill: 0  -     doxycycline (MONODOX) 100 MG capsule; Take 1 capsule (100 mg total) by mouth every 12 (twelve) hours.  Dispense: 20 capsule; Refill: 1  -     albuterol (PROVENTIL) 2.5 mg /3 mL (0.083 %) nebulizer solution; Take 3 mLs (2.5 mg total) by nebulization every 4 to 6 hours as needed for Wheezing or Shortness of Breath.  Dispense: 360 mL; Refill: 11    Chronic obstructive pulmonary disease, unspecified COPD type  -     Ambulatory referral/consult to Pulmonology  -     predniSONE (DELTASONE) 20 MG tablet; Prednisone 60 mg/ day for 3 days, 40 mg/day for 3 days,20 mg/ day for 3 days, (1/2 tablet )10 mg a day for 3 days.  Dispense: 20 tablet; Refill: 0  -     doxycycline (MONODOX) 100 MG capsule; Take 1 capsule (100 mg total) by mouth every 12 (twelve) hours.  Dispense: 20 capsule; Refill: 1  -     albuterol (PROVENTIL) 2.5 mg /3 mL (0.083 %) nebulizer solution; Take 3 mLs (2.5 mg total) by nebulization every 4 to 6 hours as needed for Wheezing or Shortness of Breath.   Dispense: 360 mL; Refill: 11  -     fluticasone-salmeterol diskus inhaler 250-50 mcg; Inhale 1 puff into the lungs 2 (two) times daily. Wash out mouth after use.  Dispense: 1 each; Refill: 11    Calcified granuloma of lung      Follow up in about 6 months (around 9/6/2023) for Review progress.    MEDICAL DECISION MAKING: Moderate to high complexity.  Overall, the multiple problems listed are of moderate to high severity that may impact quality of life and activities of daily living. Side effects of medications, treatment plan as well as options and alternatives reviewed and discussed with patient. There was counseling of patient concerning these issues.    Total time spent in counseling and coordination of care - 35  minutes of total time spent on the encounter, which includes face to face time and non-face to face time preparing to see the patient (eg, review of tests), Obtaining and/or reviewing separately obtained history, Documenting clinical information in the electronic or other health record, Independently interpreting results (not separately reported) and communicating results to the patient/family/caregiver, or Care coordination (not separately reported).    Time was used in discussion of prognosis, risks, benefits of treatment, instructions and compliance with regimen . Discussion with other physicians and/or health care providers - home health or for use of durable medical equipment (oxygen, nebulizers, CPAP, BiPAP) occurred.

## 2023-03-06 NOTE — PROCEDURES
"[facility]  Six Minute Walk   SUMMARY   Ordering Provider: Obed Sotelo MD   Interpreting Provider: Obed Sotelo MD  Performing nurse/tech/RT: VT, RT  Diagnosis: COPD  Height: 5' 6" (167.6 cm)  Weight: 82 kg (180 lb 12.4 oz)  BMI (Calculated): 29.2   Patient Race:     Phase Oxygen Assessment Supplemental O2 Heart   Rate Blood Pressure Katie Dyspnea Scale Rating   Resting 94 % Room Air 82 bpm 126/67 2   Exercise        Minute        1 92 % Room Air 111 bpm     2 92 % Room Air 124 bpm     3 92 % Room Air 131 bpm     4 92 % Room Air 140 bpm     5 93 % Room Air 108 bpm     6  95 % Room Air 100 bpm 169/77 5-6   Recovery        Minute        1 96 % Room Air 92 bpm     2 96 % Room Air 88 bpm     3 96 % Room Air 81 bpm     4 96 % Room Air 80 bpm 128/82 3     Six Minute Walk Summary  6MWT Status: completed with stops  Patient Reported: Dyspnea, Other (Comment) (back pain)     Interpretation:  Did the patient stop or pause?: Yes  How many times did the patient stop or pause?: 1  Stop Time 1: 230  Restart Time 1: 360  Pause Time 1: 130 seconds            Total Time Walked (Calculated): 230 seconds  Final Partial Lap Distance (feet): 100 feet  Total Distance Meters (Calculated): 91.44 meters  Predicted Distance Meters (Calculated): 358.59 meters  Percentage of Predicted (Calculated): 25.5  Peak VO2 (Calculated): 6.72  Mets: 1.92  Has The Patient Had a Previous Six Minute Walk Test?: Yes       Previous 6MWT Results  Has The Patient Had a Previous Six Minute Walk Test?: Yes  Date of Previous Test: 09/11/20  Total Time Walked: 360 seconds  Total Distance (meters): 160.02  Predicted Distance (meters): 481.55 meters  Percentage of Predicted: 33.23  Percent Change (Calculated): 0.43    Interpretation:  Total distance walked in six minutes is severely reduced indicating a reduction in overall  functional capacity. The patient did not meet criteria for supplemental oxygen prescription.    Clinical " correlation suggested.  [] Mild exercise-induced hypoxemia described as an arterial oxygen saturation of 93-95% (or 3-4% less than at rest),  [x] Moderate exercise-induced hypoxemia as 89-93%  [] Severe exercise induced hypoxemia as < 89% O2 saturation.  Medicare Criteria for oxygen prescription comments:   When arterial oxygen saturation is at or below 88% during exercise on room air (severe exercise induced hypoxemia) then the patient falls under Medicare Group 1 criteria for supplemental oxygen prescription.  Details about Medicare Group Criteria coverage can be found at http://www.cms.hhs.gov/manuals/downloads/     Obed Sotelo MD

## 2023-03-06 NOTE — PROGRESS NOTES
Pulmonary Disease Management  [facility]  Follow up Visit  Chronic Care Management    Chao Hawk  was seen 3/6/2023  11:00 AM in the Pulmonary Disease Management Clinic for evaluation, education, reinforcement of self management techniques and exacerbation action plan.    Teo Mo    Past Medical History:   Diagnosis Date    Anemia     Arthritis     back, hand    Back pain     Dr. Cristobal- [facility]    Bilateral leg edema 2/16/2018    Chronic bronchitis     COPD (chronic obstructive pulmonary disease)     Diverticulitis of large intestine with perforation without abscess or bleeding     Diverticulosis 5/16/06    colonoscopy    Hearing loss, bilateral     Hernia     x2    Hyperlipidemia     Hypertension     Lumbar radiculopathy 6/14/2017    Multiple renal cysts 7/5/2016    Nocturnal hypoxemia     Polyneuropathy     Tobacco dependence     Trouble in sleeping        Patient's Medications   New Prescriptions    No medications on file   Previous Medications    ACETAMINOPHEN (TYLENOL) 500 MG TABLET    Take 500 mg by mouth as needed for Pain.    ALBUTEROL (PROVENTIL) 2.5 MG /3 ML (0.083 %) NEBULIZER SOLUTION    Take 3 mLs (2.5 mg total) by nebulization every 4 (four) hours as needed for Wheezing. Rescue    ALBUTEROL (PROVENTIL) 2.5 MG /3 ML (0.083 %) NEBULIZER SOLUTION    Take 3 mLs (2.5 mg total) by nebulization every 4 to 6 hours as needed for Wheezing or Shortness of Breath.    ARGININE, L-ARGININE, ORAL    Take 500 mg by mouth once daily.    ATORVASTATIN (LIPITOR) 40 MG TABLET    TAKE 1 TABLET EVERY DAY    BETAMETHASONE VALERATE 0.1% (VALISONE) 0.1 % OINT    Apply topically 2 (two) times daily.    CANNABIDIOL, CBD, EXTRACT ORAL    Take 3 drops by mouth every morning.    CHOLECALCIFEROL, VITAMIN D3, (VITAMIN D3) 50 MCG (2,000 UNIT) CAP    Take 1 capsule by mouth every evening.     COQ10, UBIQUINOL, ORAL    Take 1 tablet by mouth nightly.     CYANOCOBALAMIN (VITAMIN B-12) 1000 MCG TABLET    Take 100 mcg  by mouth once daily.    DICLOFENAC SODIUM (VOLTAREN) 1 % GEL    Apply 2 g topically 3 (three) times daily.    DOXYCYCLINE (MONODOX) 100 MG CAPSULE    Take 1 capsule (100 mg total) by mouth every 12 (twelve) hours.    DUTASTERIDE (AVODART) 0.5 MG CAPSULE    Take 1 capsule (0.5 mg total) by mouth once daily.    FLUTICASONE-SALMETEROL DISKUS INHALER 250-50 MCG    Inhale 1 puff into the lungs 2 (two) times daily. Wash out mouth after use.    FUROSEMIDE (LASIX) 20 MG TABLET    TAKE 1 TABLET EVERY DAY    GABAPENTIN (NEURONTIN) 100 MG CAPSULE    TAKE ONE CAPSULE BY MOUTH TWICE DAILY    GLUCOSAM-MSM-CHONDROIT-VIT D3 ORAL    Take 2 tablets by mouth once daily.    KRILL/OM-3/DHA/EPA/PHOSPHO/AST (MEGARED OMEGA-3 KRILL OIL ORAL)    Take 1 capsule by mouth nightly.     LISINOPRIL 10 MG TABLET    TAKE 1 TABLET EVERY DAY    MULTIVIT-MIN/FOLIC/VIT K/LYCOP (ONE-A-DAY MEN'S MULTIVITAMIN ORAL)    Take 1 tablet by mouth every evening.     PANTOPRAZOLE (PROTONIX) 40 MG TABLET    Take 1 tablet (40 mg total) by mouth 2 (two) times daily.    POTASSIUM 99 MG TAB    Take by mouth once daily.     PREDNISOLONE ACETATE (PRED FORTE) 1 % DRPS    Place 1 drop into the right eye 2 (two) times daily.    PREDNISONE (DELTASONE) 20 MG TABLET    Prednisone 60 mg/ day for 3 days, 40 mg/day for 3 days,20 mg/ day for 3 days, (1/2 tablet )10 mg a day for 3 days.    TAMSULOSIN (FLOMAX) 0.4 MG CAP    Take 2 capsules (0.8 mg total) by mouth every morning.    TRAMADOL (ULTRAM) 50 MG TABLET    Take 1 tablet (50 mg total) by mouth daily as needed for Pain.   Modified Medications    No medications on file   Discontinued Medications    No medications on file             Educational assessment:   [x]            Good  []            Fair  []            Poor    Readiness to learn:   [x]            Good  []            Fair  []            Poor    Vision Status:   [x]            Good  []            Fair  []            Poor    Reading Ability:  [x]            Good  []             Fair  []            Poor    Knowledge of condition:   [x]            Good  []            Fair  []            Poor    Language Barriers:   []            Good  []            Fair  []            Poor  [x]            None    Cognitive/ Physical Barriers:   []            Good  []            Fair  []            Poor  [x]            None    Learning best by:                       [x]            Seeing  []            Hearing  []            Reading                         [x]            Doing    Describe any barrier /Limitation or financial implications of care choices identified     []            Financial  []            Emotional  []            Education  []            Vision/Hearing  []            Physical  [x]            None  []                TOPIC /CONTENT FOR TODAY:    []            MDI with or without spacer  [x]            Dry powder inhaler  []            Acapella   []           Peak Flow meter  [x]            COPD action plan  []            Nebulizer use  [x]            Oxygen use safety  [x]            Breathing and cough techniques  [x]            Energy conservation  []            Infection prevention  []           OTHER________________________        Learner:    [x]            Patient   []            Caregiver    Method:    [x]            Verbal explanation  [x]            Audio visual    [x]            Literature  [x]            Teach back      Evaluation:    [x]            Teach back  [x]            Demonstrate  [x]            Follow up phone call    []            2 weeks     [x]            4 weeks   [x]            PRN    Additional comments:   Patient was seen today to review respiratory medication purpose and proper technique for use of inhalers. Patient practiced proper technique using MDI with valved holding chamber (spacer) and DPI inhalers. Patient demonstrated understanding. Literature was given to patient.     Asthma trigger checklist was verbally reviewed and literature given to patient.      Infection prevention was discussed. Patient was reminded to get influenza vaccine. Hand hygiene and cleaning of respiratory equipment was also discussed. Patient verbalized understanding.      COPD action plan was reviewed and literature was given to patient. Patient verbalized understanding.      Plan:  Monthly Pulmonary Disease Management Questionnaire  Follow-up PDM appointment scheduled for 8/16/23    Reinforce education  Meds: Albuterol, Advair Diskus  DME Needs: [facility]  Action Plan: COPD  Immunization: Pneumococcal- current, Flu-current , Covid 19- current  Next Provider Visit: 9/7/23  Next Spirometry/CPFT: Not scheduled   Approximate time spent with patient: 30 mins

## 2023-03-24 ENCOUNTER — TELEPHONE (OUTPATIENT)
Dept: ADMINISTRATIVE | Facility: HOSPITAL | Age: 88
End: 2023-03-24
Payer: MEDICARE

## 2023-03-24 ENCOUNTER — PATIENT MESSAGE (OUTPATIENT)
Dept: ADMINISTRATIVE | Facility: HOSPITAL | Age: 88
End: 2023-03-24
Payer: MEDICARE

## 2023-04-11 ENCOUNTER — TELEPHONE (OUTPATIENT)
Dept: ORTHOPEDICS | Facility: CLINIC | Age: 88
End: 2023-04-11
Payer: MEDICARE

## 2023-04-11 NOTE — TELEPHONE ENCOUNTER
----- Message from Cherry Granados MA sent at 4/11/2023 10:55 AM CDT -----  Contact: Chao    ----- Message -----  From: Belen Lambert  Sent: 4/11/2023  10:42 AM CDT  To: Leonardo Medel Staff    Chao is needing a call back in regards to his hands. He stated that he would like to get another injection in his hands because they are in pain. Please call him back at [phone]

## 2023-04-11 NOTE — TELEPHONE ENCOUNTER
Returned pt call in regards to getting an appointment for re-injection for his hands. Pt now has an appt with Lizy Patterson PA-C on 4/20/23 at 3:30pm. Pt confirmed date/time/location of appt.

## 2023-04-19 NOTE — PROGRESS NOTES
SUBJECTIVE:      Chief Complaint: Pain of the Right Wrist and Pain of the Left Wrist    Referring Provider: No ref. provider found     History of Present Illness: Patient is a 89 yo male who presents today with complaints of bilateral wrist pain. Last CSI was to L radiocarpal joint on 12/20/22 with good relief until recently.  No injury or trauma since his last visit.  Pain is 7/10 today and is constant, sharp in nature.  He states the pain is worst early in the morning and late at night.  He takes Tylenol as needed for the pain.  He requests repeat injections today.  Denies fever, chills, sweats.    Interval hx 12/20/22: Patient is a 89 y.o. left hand dominant male who presents today with complaints of bilateral hand pain, L>R.  Last bilateral CMC injection was on 05/03/2022 with good relief, states that his thumbs are no longer bothering him.  Onset of symptoms was 3-4 months ago. Denies injury or trauma to area. The location of the pain is the top of the wrist joint and into the fingers.  He states that he feels like he can not close his hand all the way.  Pain is 4/10 and throbbing in quality.  He takes Tylenol in the morning for pain relief.  States that he has tried cortisone cream as well but that did not help much.  Denies numbness, tingling, swelling of the hand.  The patient denies any fevers, chills, N/V, D/C and presents for evaluation.    Interval history 05/03/2022: The patient is a 89-year-old male with bilateral thumb basal joint arthritis.  He he has tried splinting without improvement.  He requests injection basal joints of both thumbs.    Past Medical History:   Diagnosis Date    Anemia     Arthritis     back, hand    Back pain     Dr. Cristobal- [facility]    Bilateral leg edema 2/16/2018    Chronic bronchitis     COPD (chronic obstructive pulmonary disease)     Diverticulitis of large intestine with perforation without abscess or bleeding     Diverticulosis 5/16/06    colonoscopy    Hearing loss,  "bilateral     Hernia     x2    Hyperlipidemia     Hypertension     Lumbar radiculopathy 6/14/2017    Multiple renal cysts 7/5/2016    Nocturnal hypoxemia     Polyneuropathy     Tobacco dependence     Trouble in sleeping      Past Surgical History:   Procedure Laterality Date    ESOPHAGOGASTRODUODENOSCOPY N/A 10/26/2022    Procedure: EGD (ESOPHAGOGASTRODUODENOSCOPY);  Surgeon: Mallika Duran MD;  Location: [facility] ENDO;  Service: Endoscopy;  Laterality: N/A;    FINGER SURGERY Left 1980s    index- Dr. Encarnacion; to remove nodule    HERNIA REPAIR  1990s    x2    INTRAOCULAR LENS INSERTION Bilateral 2016    LUMBAR SPINE SURGERY  09/13/2017    REVERSE TOTAL SHOULDER ARTHROPLASTY Right 10/5/2020    Procedure: ARTHROPLASTY, SHOULDER, TOTAL, REVERSE;  Surgeon: Reji Martin MD;  Location: [facility] OR;  Service: Orthopedics;  Laterality: Right;    TONSILLECTOMY, ADENOIDECTOMY  1940     Review of patient's allergies indicates:   Allergen Reactions    Bee sting  [allergen ext-venom-honey bee] Swelling    Penicillins Rash     "Felt like I had pins and needles in my feet" , hospitalized overnight    Poison ivy extract Hives     Social History     Social History Narrative    No smokers or pets in household.     Family History   Problem Relation Age of Onset    Emphysema Mother     Heart disease Mother     COPD Mother     Appendicitis Father     Heart disease Son     Atrial fibrillation Sister     Lung disease Sister 91    Colon cancer Neg Hx     Cancer Neg Hx     Stroke Neg Hx          Current Outpatient Medications:     acetaminophen (TYLENOL) 500 MG tablet, Take 500 mg by mouth as needed for Pain., Disp: , Rfl:     albuterol (PROVENTIL) 2.5 mg /3 mL (0.083 %) nebulizer solution, Take 3 mLs (2.5 mg total) by nebulization every 4 (four) hours as needed for Wheezing. Rescue, Disp: 150 mL, Rfl: 11    albuterol (PROVENTIL) 2.5 mg /3 mL (0.083 %) nebulizer solution, Take 3 mLs (2.5 mg total) by nebulization every 4 to 6 hours " as needed for Wheezing or Shortness of Breath., Disp: 360 mL, Rfl: 11    ARGININE, L-ARGININE, ORAL, Take 500 mg by mouth once daily., Disp: , Rfl:     atorvastatin (LIPITOR) 40 MG tablet, TAKE 1 TABLET EVERY DAY, Disp: 90 tablet, Rfl: 3    betamethasone valerate 0.1% (VALISONE) 0.1 % Oint, Apply topically 2 (two) times daily., Disp: 45 g, Rfl: 1    CANNABIDIOL, CBD, EXTRACT ORAL, Take 3 drops by mouth every morning., Disp: , Rfl:     cholecalciferol, vitamin D3, (VITAMIN D3) 50 mcg (2,000 unit) Cap, Take 1 capsule by mouth every evening. , Disp: , Rfl:     COQ10, UBIQUINOL, ORAL, Take 1 tablet by mouth nightly. , Disp: , Rfl:     cyanocobalamin (VITAMIN B-12) 1000 MCG tablet, Take 100 mcg by mouth once daily., Disp: , Rfl:     diclofenac sodium (VOLTAREN) 1 % Gel, Apply 2 g topically 3 (three) times daily., Disp: 1 each, Rfl: 3    doxycycline (MONODOX) 100 MG capsule, Take 1 capsule (100 mg total) by mouth every 12 (twelve) hours., Disp: 20 capsule, Rfl: 1    fluticasone-salmeterol diskus inhaler 250-50 mcg, Inhale 1 puff into the lungs 2 (two) times daily. Wash out mouth after use., Disp: 1 each, Rfl: 11    furosemide (LASIX) 20 MG tablet, TAKE 1 TABLET EVERY DAY, Disp: 90 tablet, Rfl: 3    gabapentin (NEURONTIN) 100 MG capsule, TAKE ONE CAPSULE BY MOUTH TWICE DAILY, Disp: 180 capsule, Rfl: 3    GLUCOSAM-MSM-CHONDROIT-VIT D3 ORAL, Take 2 tablets by mouth once daily., Disp: , Rfl:     krill/om-3/dha/epa/phospho/ast (MEGARED OMEGA-3 KRILL OIL ORAL), Take 1 capsule by mouth nightly. , Disp: , Rfl:     lisinopriL 10 MG tablet, TAKE 1 TABLET EVERY DAY, Disp: 90 tablet, Rfl: 3    multivit-min/folic/vit K/lycop (ONE-A-DAY MEN'S MULTIVITAMIN ORAL), Take 1 tablet by mouth every evening. , Disp: , Rfl:     potassium 99 mg Tab, Take by mouth once daily. , Disp: , Rfl:     prednisoLONE acetate (PRED FORTE) 1 % DrpS, Place 1 drop into the right eye 2 (two) times daily., Disp: , Rfl:     predniSONE (DELTASONE) 20 MG tablet,  "Prednisone 60 mg/ day for 3 days, 40 mg/day for 3 days,20 mg/ day for 3 days, (1/2 tablet )10 mg a day for 3 days., Disp: 20 tablet, Rfl: 0    tamsulosin (FLOMAX) 0.4 mg Cap, Take 2 capsules (0.8 mg total) by mouth every morning., Disp: 180 capsule, Rfl: 3    traMADoL (ULTRAM) 50 mg tablet, Take 1 tablet (50 mg total) by mouth daily as needed for Pain., Disp: 30 tablet, Rfl: 0    dutasteride (AVODART) 0.5 mg capsule, Take 1 capsule (0.5 mg total) by mouth once daily., Disp: 90 capsule, Rfl: 3    pantoprazole (PROTONIX) 40 MG tablet, Take 1 tablet (40 mg total) by mouth 2 (two) times daily., Disp: 120 tablet, Rfl: 0      Review of Systems:  Constitutional: Negative for chills and fever.   Respiratory: Negative for cough and shortness of breath.    Gastrointestinal: Negative for nausea and vomiting.   Skin: Negative for rash.   Neurological: Negative for dizziness and headaches.   Psychiatric/Behavioral: Negative for depression.   MSK as in HPI     OBJECTIVE:      Vital Signs (Most Recent):  Vitals:    04/20/23 1536   Weight: 82 kg (180 lb 12.4 oz)   Height: 5' 6" (1.676 m)       Body mass index is 29.18 kg/m².      Physical Exam:  Constitutional: The patient appears well-developed and well-nourished. No distress.   Skin: No lesions appreciated  Head: Normocephalic and atraumatic.   Nose: Nose normal.   Ears: No deformities seen  Eyes: Conjunctivae and EOM are normal.   Neck: No tracheal deviation present.   Cardiovascular: Normal rate and intact distal pulses.    Pulmonary/Chest: Effort normal. No respiratory distress.   Abdominal: There is no guarding.   Neurological: The patient is alert.   Psychiatric: The patient has a normal mood and affect.     General    Vitals reviewed.            Right Hand/Wrist Exam     Inspection   Scars: Wrist - absent Hand -  absent  Effusion: Wrist - absent Hand -  absent  Deformity: Wrist - deformity (arthritic changes)     Other     Neuorologic Exam    Median Distribution: " normal  Ulnar Distribution: normal  Radial Distribution: normal    Comments:  TTP at dorsal joint line of wrist near DRUJ  Wrist ROM WNL  +pain with wrist flexion and extension  Able to make a composite fist  No motor or sensory deficits   No signs of infection      Left Hand/Wrist Exam     Inspection   Scars: Wrist - absent Hand -  absent  Effusion: Wrist - absent Hand -  absent  Deformity: Wrist - present (arthritic changes)     Other     Sensory Exam  Median Distribution: normal  Ulnar Distribution: normal  Radial Distribution: normal    Comments:  TTP at dorsal joint line of wrist near DRUJ  Wrist ROM WNL  +pain with wrist flexion and extension  Able to make a composite fist  No motor or sensory deficits   No signs of infection          Vascular Exam       Capillary Refill  Right Hand: normal capillary refill  Left Hand: normal capillary refill        Diagnostic Results:    EXAMINATION:  XR HAND COMPLETE 3 VIEWS BILATERAL     CLINICAL HISTORY:  . Pain in right hand     TECHNIQUE:  PA, lateral, and oblique views of both hands were performed.     COMPARISON:  None     FINDINGS:  No acute fracture or dislocation.  There are at least moderate degenerative changes seen at the 1st CMC joints bilaterally.  Mild degenerative changes seen at the triscaphe joints bilaterally.  Degenerative changes seen scattered throughout the interphalangeal joints bilaterally and greatest at the 5th PIP joint on the left.     Impression:     As above        Electronically signed by: Eddie Baires DO  Date:                                            05/03/2022  Time:                                           15:41     Imaging - I independently viewed the patient's imaging as well as the radiology report.  Xrays of the patient's bilateral hands demonstrate scattered degenerative changes, CMC arthritis, no evidence of any acute fractures or dislocations.      ASSESSMENT/PLAN:        ICD-10-CM ICD-9-CM   1. Primary osteoarthritis of left  wrist  M19.032 715.13   2. Primary osteoarthritis of right wrist  M19.031 715.13   3. Arthritis of carpometacarpal (CMC) joints of both thumbs  M18.0 716.94     Orders Placed This Encounter    Intermediate Joint Aspiration/Injection: R radiocarpal, L radiocarpal       Orders Placed This Encounter   Procedures    Intermediate Joint Aspiration/Injection: R radiocarpal, L radiocarpal     Plan:     - bilateral radiocarpal joint injection today.  Patient must wait at least 3 months between injections  - continue conservative treatment:  Ice/heat as needed, Tylenol as needed, activity modifications, voltaren gel  - Follow-up as needed    PROCEDURE:  I have explained the risks, benefits, and alternatives of the procedure in detail.  The patient voices understanding and all questions have been answered.  The patient agrees to proceed as planned. So after a sterile prep of the skin in the normal fashion the bilateral radiocarpal joint is injected from the volar approach using a 25 gauge needle with a combination of 0.5cc 2% plain lidocaine and 0.5cc of kenalog.  The patient is cautioned and immediate relief of pain is secondary to the local anesthetic and will be temporary.  After the anesthetic wears off there may be a increase in pain that may last for a few hours or a few days and they should use ice to help alleviate this flare up of pain. Patient tolerated the procedure well.      Should the patient's symptoms worsen, persist, or fail to improve they should return for reevaluation and I would be happy to see them back anytime.        Lizy Patterson PA-C   [facility]     Please be aware that this note has been generated with the assistance of [employer] voice-to-text.  Please excuse any spelling or grammatical errors.

## 2023-04-20 ENCOUNTER — OFFICE VISIT (OUTPATIENT)
Dept: ORTHOPEDICS | Facility: CLINIC | Age: 88
End: 2023-04-20
Payer: MEDICARE

## 2023-04-20 VITALS — WEIGHT: 180.75 LBS | BODY MASS INDEX: 29.05 KG/M2 | HEIGHT: 66 IN

## 2023-04-20 DIAGNOSIS — M18.0 ARTHRITIS OF CARPOMETACARPAL (CMC) JOINTS OF BOTH THUMBS: ICD-10-CM

## 2023-04-20 DIAGNOSIS — M19.032 PRIMARY OSTEOARTHRITIS OF LEFT WRIST: Primary | ICD-10-CM

## 2023-04-20 DIAGNOSIS — M19.031 PRIMARY OSTEOARTHRITIS OF RIGHT WRIST: ICD-10-CM

## 2023-04-20 PROCEDURE — 3288F FALL RISK ASSESSMENT DOCD: CPT | Mod: HCNC,CPTII,S$GLB,

## 2023-04-20 PROCEDURE — 1101F PR PT FALLS ASSESS DOC 0-1 FALLS W/OUT INJ PAST YR: ICD-10-PCS | Mod: HCNC,CPTII,S$GLB,

## 2023-04-20 PROCEDURE — 1125F AMNT PAIN NOTED PAIN PRSNT: CPT | Mod: HCNC,CPTII,S$GLB,

## 2023-04-20 PROCEDURE — 1159F PR MEDICATION LIST DOCUMENTED IN MEDICAL RECORD: ICD-10-PCS | Mod: HCNC,CPTII,S$GLB,

## 2023-04-20 PROCEDURE — 99999 PR PBB SHADOW E&M-EST. PATIENT-LVL III: ICD-10-PCS | Mod: PBBFAC,HCNC,,

## 2023-04-20 PROCEDURE — 99999 PR PBB SHADOW E&M-EST. PATIENT-LVL III: CPT | Mod: PBBFAC,HCNC,,

## 2023-04-20 PROCEDURE — 99213 PR OFFICE/OUTPT VISIT, EST, LEVL III, 20-29 MIN: ICD-10-PCS | Mod: 25,HCNC,S$GLB,

## 2023-04-20 PROCEDURE — 20605 DRAIN/INJ JOINT/BURSA W/O US: CPT | Mod: 50,HCNC,S$GLB,

## 2023-04-20 PROCEDURE — 3288F PR FALLS RISK ASSESSMENT DOCUMENTED: ICD-10-PCS | Mod: HCNC,CPTII,S$GLB,

## 2023-04-20 PROCEDURE — 1159F MED LIST DOCD IN RCRD: CPT | Mod: HCNC,CPTII,S$GLB,

## 2023-04-20 PROCEDURE — 20605 INTERMEDIATE JOINT ASPIRATION/INJECTION: R RADIOCARPAL, L RADIOCARPAL: ICD-10-PCS | Mod: 50,HCNC,S$GLB,

## 2023-04-20 PROCEDURE — 1101F PT FALLS ASSESS-DOCD LE1/YR: CPT | Mod: HCNC,CPTII,S$GLB,

## 2023-04-20 PROCEDURE — 99213 OFFICE O/P EST LOW 20 MIN: CPT | Mod: 25,HCNC,S$GLB,

## 2023-04-20 PROCEDURE — 1125F PR PAIN SEVERITY QUANTIFIED, PAIN PRESENT: ICD-10-PCS | Mod: HCNC,CPTII,S$GLB,

## 2023-04-20 RX ADMIN — TRIAMCINOLONE ACETONIDE 40 MG: 40 INJECTION, SUSPENSION INTRA-ARTICULAR; INTRAMUSCULAR at 03:04

## 2023-04-21 RX ORDER — TRIAMCINOLONE ACETONIDE 40 MG/ML
40 INJECTION, SUSPENSION INTRA-ARTICULAR; INTRAMUSCULAR
Status: DISCONTINUED | OUTPATIENT
Start: 2023-04-20 | End: 2023-04-21 | Stop reason: HOSPADM

## 2023-04-21 NOTE — PROCEDURES
Intermediate Joint Aspiration/Injection: R radiocarpal, L radiocarpal    Date/Time: 4/20/2023 3:30 PM  Performed by: Lizy Patterson PA-C  Authorized by: Lizy Patterson PA-C     Consent Done?:  Yes (Verbal)  Indications:  Pain and arthritis  Site marked: The procedure site was marked    Timeout: Prior to procedure the correct patient, procedure, and site was verified      Location:  Wrist  Site:  R radiocarpal and L radiocarpal  Prep: Patient was prepped and draped in usual sterile fashion    Ultrasonic Guidance for needle placement: No  Needle size:  25 G  Approach:  Dorsal  Medications:  40 mg triamcinolone acetonide 40 mg/mL; 40 mg triamcinolone acetonide 40 mg/mL  Patient tolerance:  Patient tolerated the procedure well with no immediate complications

## 2023-05-09 ENCOUNTER — TELEPHONE (OUTPATIENT)
Dept: PULMONOLOGY | Facility: HOSPITAL | Age: 88
End: 2023-05-09
Payer: MEDICARE

## 2023-05-16 ENCOUNTER — PATIENT OUTREACH (OUTPATIENT)
Dept: PULMONOLOGY | Facility: CLINIC | Age: 88
End: 2023-05-16
Payer: MEDICARE

## 2023-05-16 NOTE — TELEPHONE ENCOUNTER
Chronic Disease Management  Called patient to complete Pulmonary Disease Management Questionnaire.    Unable to reach patient.

## 2023-06-21 ENCOUNTER — PATIENT OUTREACH (OUTPATIENT)
Dept: PULMONOLOGY | Facility: CLINIC | Age: 88
End: 2023-06-21
Payer: MEDICARE

## 2023-06-22 ENCOUNTER — PES CALL (OUTPATIENT)
Dept: ADMINISTRATIVE | Facility: CLINIC | Age: 88
End: 2023-06-22
Payer: MEDICARE

## 2023-08-04 ENCOUNTER — HOSPITAL ENCOUNTER (OUTPATIENT)
Dept: PREADMISSION TESTING | Facility: HOSPITAL | Age: 88
Discharge: HOME | End: 2023-08-04
Attending: PEDIATRICS
Payer: MEDICARE

## 2023-08-04 DIAGNOSIS — K29.70 GASTRITIS, PRESENCE OF BLEEDING UNSPECIFIED, UNSPECIFIED CHRONICITY, UNSPECIFIED GASTRITIS TYPE: ICD-10-CM

## 2023-08-08 ENCOUNTER — TELEPHONE (OUTPATIENT)
Dept: PULMONOLOGY | Facility: HOSPITAL | Age: 88
End: 2023-08-08
Payer: MEDICARE

## 2023-08-08 NOTE — TELEPHONE ENCOUNTER
Called pt regarding Pulmonary Rehab. Pt declined enrolling into [facility] at this time. Per pt he is unable to come in twice a week.

## 2023-08-15 ENCOUNTER — TELEPHONE (OUTPATIENT)
Dept: PULMONOLOGY | Facility: CLINIC | Age: 88
End: 2023-08-15
Payer: MEDICARE

## 2023-08-16 ENCOUNTER — CLINICAL SUPPORT (OUTPATIENT)
Dept: PULMONOLOGY | Facility: CLINIC | Age: 88
End: 2023-08-16
Payer: MEDICARE

## 2023-08-16 VITALS
OXYGEN SATURATION: 94 % | HEART RATE: 110 BPM | BODY MASS INDEX: 28.23 KG/M2 | HEIGHT: 66 IN | WEIGHT: 175.69 LBS | RESPIRATION RATE: 20 BRPM

## 2023-08-16 DIAGNOSIS — J44.9 COPD (CHRONIC OBSTRUCTIVE PULMONARY DISEASE): Primary | ICD-10-CM

## 2023-08-16 PROCEDURE — 99999 PR PBB SHADOW E&M-EST. PATIENT-LVL IV: ICD-10-PCS | Mod: PBBFAC,HCNC,,

## 2023-08-16 PROCEDURE — 99999 PR PBB SHADOW E&M-EST. PATIENT-LVL IV: CPT | Mod: PBBFAC,HCNC,,

## 2023-08-16 NOTE — PROGRESS NOTES
Pulmonary Disease Management  [facility]  Final Follow up Visit  Chronic Care Management    Chao Hawk  was seen 8/16/2023  10:30 AM in the Pul[facility] Management [facility] for evaluation, education, reinforcement of self management techniques and exacerbation action plan.    Teo Mo    Past Medical History:   Diagnosis Date    Anemia     Arthritis     back, hand    Back pain     Dr. Cristobal- BLANCHE    Bilateral leg edema 2/16/2018    Chronic bronchitis     COPD (chronic obstructive pulmonary disease)     Diverticulitis of large intestine with perforation without abscess or bleeding     Diverticulosis 5/16/06    colonoscopy    Hearing loss, bilateral     Hernia     x2    Hyperlipidemia     Hypertension     Lumbar radiculopathy 6/14/2017    Multiple renal cysts 7/5/2016    Nocturnal hypoxemia     Polyneuropathy     Tobacco dependence     Trouble in sleeping        Patient's Medications   New Prescriptions    No medications on file   Previous Medications    ACETAMINOPHEN (TYLENOL) 500 MG TABLET    Take 500 mg by mouth as needed for Pain.    ALBUTEROL (PROVENTIL) 2.5 MG /3 ML (0.083 %) NEBULIZER SOLUTION    Take 3 mLs (2.5 mg total) by nebulization every 4 (four) hours as needed for Wheezing. Rescue    ALBUTEROL (PROVENTIL) 2.5 MG /3 ML (0.083 %) NEBULIZER SOLUTION    Take 3 mLs (2.5 mg total) by nebulization every 4 to 6 hours as needed for Wheezing or Shortness of Breath.    ARGININE, L-ARGININE, ORAL    Take 500 mg by mouth once daily.    ATORVASTATIN (LIPITOR) 40 MG TABLET    TAKE 1 TABLET EVERY DAY    BETAMETHASONE VALERATE 0.1% (VALISONE) 0.1 % OINT    Apply topically 2 (two) times daily.    CANNABIDIOL, CBD, EXTRACT ORAL    Take 3 drops by mouth every morning.    CHOLECALCIFEROL, VITAMIN D3, (VITAMIN D3) 50 MCG (2,000 UNIT) CAP    Take 1 capsule by mouth every evening.     COQ10, UBIQUINOL, ORAL    Take 1 tablet by mouth nightly.     CYANOCOBALAMIN (VITAMIN B-12) 1000 MCG TABLET    Take  100 mcg by mouth once daily.    DICLOFENAC SODIUM (VOLTAREN) 1 % GEL    Apply 2 g topically 3 (three) times daily.    DOXYCYCLINE (MONODOX) 100 MG CAPSULE    Take 1 capsule (100 mg total) by mouth every 12 (twelve) hours.    DUTASTERIDE (AVODART) 0.5 MG CAPSULE    Take 1 capsule (0.5 mg total) by mouth once daily.    FLUTICASONE-SALMETEROL DISKUS INHALER 250-50 MCG    Inhale 1 puff into the lungs 2 (two) times daily. Wash out mouth after use.    FUROSEMIDE (LASIX) 20 MG TABLET    TAKE 1 TABLET EVERY DAY    GABAPENTIN (NEURONTIN) 100 MG CAPSULE    TAKE ONE CAPSULE BY MOUTH TWICE DAILY    GLUCOSAM-MSM-CHONDROIT-VIT D3 ORAL    Take 2 tablets by mouth once daily.    KRILL/OM-3/DHA/EPA/PHOSPHO/AST (MEGARED OMEGA-3 KRILL OIL ORAL)    Take 1 capsule by mouth nightly.     LISINOPRIL 10 MG TABLET    TAKE 1 TABLET EVERY DAY    MULTIVIT-MIN/FOLIC/VIT K/LYCOP (ONE-A-DAY MEN'S MULTIVITAMIN ORAL)    Take 1 tablet by mouth every evening.     PANTOPRAZOLE (PROTONIX) 40 MG TABLET    Take 1 tablet (40 mg total) by mouth 2 (two) times daily.    POTASSIUM 99 MG TAB    Take by mouth once daily.     PREDNISOLONE ACETATE (PRED FORTE) 1 % DRPS    Place 1 drop into the right eye 2 (two) times daily.    PREDNISONE (DELTASONE) 20 MG TABLET    Prednisone 60 mg/ day for 3 days, 40 mg/day for 3 days,20 mg/ day for 3 days, (1/2 tablet )10 mg a day for 3 days.    TAMSULOSIN (FLOMAX) 0.4 MG CAP    Take 2 capsules (0.8 mg total) by mouth every morning.    TRAMADOL (ULTRAM) 50 MG TABLET    Take 1 tablet (50 mg total) by mouth daily as needed for Pain.   Modified Medications    No medications on file   Discontinued Medications    No medications on file             Educational assessment:   [x]            Good  []            Fair  []            Poor    Readiness to learn:   [x]            Good  []            Fair  []            Poor    Vision Status:   [x]            Good  []            Fair  []            Poor    Reading Ability:  [x]             Good  []            Fair  []            Poor    Knowledge of condition:   [x]            Good  []            Fair  []            Poor    Language Barriers:   []            Good  []            Fair  []            Poor  [x]            None    Cognitive/ Physical Barriers:   []            Good  []            Fair  []            Poor  [x]            None    Learning best by:                       [x]            Seeing  []            Hearing  []            Reading                         [x]            Doing    Describe any barrier /Limitation or financial implications of care choices identified     []            Financial  []            Emotional  []            Education  []            Vision/Hearing  []            Physical  [x]            None  []                TOPIC /CONTENT FOR TODAY:    []            MDI with or without spacer  [x]            Dry powder inhaler  []            Acapella   []           Peak Flow meter  [x]            COPD action plan  [x]            Nebulizer use  []            Oxygen use safety  [x]            Breathing and cough techniques  []            Energy conservation  []            Infection prevention  []           OTHER________________________        Learner:    [x]            Patient   []            Caregiver    Method:    [x]            Verbal explanation  [x]            Audio visual    [x]            Literature  [x]            Teach back      Evaluation:    [x]            Teach back  [x]            Demonstrate  [x]            Follow up phone call    []            2 weeks     []            4 weeks   [x]            PRN    Additional comments:   Patient was seen today to review respiratory medication purpose and proper technique for use of inhalers. Patient practiced proper technique using DPI inhalers. Patient demonstrated understanding. Literature was given to patient.    Reviewed breathing techniques such as pursed-lip breathing, hernandez-cough technique, and diaphragmatic breathing. Patient  practiced proper technique and verbalized understanding. Literature given to patient.       Asthma trigger checklist was verbally reviewed and literature given to patient.     Infection prevention was discussed. Patient was reminded to get influenza vaccine. Hand hygiene and cleaning of respiratory equipment was also discussed. Patient verbalized understanding.      COPD action plan was reviewed and literature was given to patient. Patient verbalized understanding.      Plan:  Monthly Pulmonary Disease Management Questionnaire  Meds: Advair Diskus, albuterol   DME Needs: OHME   Action Plan: COPD   Immunization: Pneumococcal- current, Flu-current , Covid 19- current   Next Provider Visit: 9/8/23  Next Spirometry/CPFT: not scheduled   Approximate time spent with patient: 30 mins

## 2023-08-16 NOTE — PATIENT INSTRUCTIONS
What is COPD?  COPD stands for chronic obstructive pulmonary disease. It means the airways in your lungs are blocked (obstructed). Because of this, it is hard to breathe. You may have trouble with daily activities because of shortness of breath. Over time the shortness of breath usually worsens making it more and more difficult to take care of yourself and take part in activities. Chronic bronchitis and emphysema are two common types of COPD.  What happens in chronic bronchitis?  The cells in the airways make more mucus than normal. The mucus builds up, narrowing the airways. This means less air travels into and out of the lungs. The lining of the airways may also become inflamed (swollen) and causes the airways to narrow even more.            What happens in emphysema?  The small airways are damaged and lose their stretchiness. The airways collapse when you exhale, causing air to get trapped in the air sacs. This means that less oxygen enters the blood vessels and less oxygen is delivered to all of the cells of your body. This makes it hard to breathe.         Damage to cilia  Cilia are small hairs that line and protect the airways. Smoking damages the cilia. Damaged cilia can't sweep mucus and particles away. Some of the cilia are destroyed. This damage worsens COPD.      How did I get COPD?  Most people get COPD from smoking. Cigarette smoke damages lungs, which can develop into COPD over many years.  How COPD affects you  COPD makes you work harder to breathe. Air may get trapped in the lungs, which prevents your lungs from filling completely with fresh oxygen-filled air when you inhale (breathe in). It's harder to take deep breaths especially when you are active and start breathing faster. Over time, your lungs may become enlarged filling the lung with air that does not transfer oxygen into the blood. These problems cause you to have shortness of breath (also called dyspnea). Wheezing (hoarse, whistling  breathing), chronic cough, and fatigue (feeling tired and worn out) are also common.   © 8706-5678 [employer]. [address] [place] [address], [place] [address]. All rights reserved. This information is not intended as a substitute for professional medical care. Always follow your healthcare professional's instructions.             Asthma Trigger Checklist  Allergens, irritants, and other things may trigger your asthma. Check the box next to each of your triggers. After each trigger is a list of ways to avoid it.   Dust mites. Dust mites live in mattresses, bedding, carpets, curtains, and indoor dust.  To kill dust mites, wash bedding in hot water (130°F) each week.  Cover mattress and pillows with special dust-mite-proof cases.  Don't use upholstered furniture like sofas or chairs in the bedroom.  Use allergy-proof filters for air conditioners and furnaces. Replace or clean them as instructed.  If you can, replace carpeting with wood or tile vadim, especially in the bedroom.  Animals. Animals with fur or feathers shed dander (allergens).  It's best to choose a pet that doesn't have fur or feathers, such as a fish or a reptile.  If you have pets, keep them off your bed and out of your bedroom.  Wash your hands and clothes after handling pets.    Mold. Mold grows in damp places, such as bathrooms, basements, and closets.  Ask someone to clean damp areas in your home every week. Or try wearing a face mask while you clean.  Run an exhaust fan while bathing. Or leave a window open in the bathroom.  Repair water leaks in or around your home.  Have someone else cut grass or rake leaves, if possible.  Don't use vaporizers or humidifiers. They encourage mold growth.    Pollen. Pollen from trees, grasses, and weeds is a common allergen. (Flower pollens are generally not a problem).  Try to learn what types of pollen affect you most. Pollen levels vary depending on the plant, the season, and the time of day.  If  possible, use air conditioning instead of opening the windows in your home or car.  Have someone else do yard work, if possible.    Cockroaches. Roaches are found in many homes and produce allergens.  Keep your kitchen clean and dry. A leaky faucet or drain can attract roaches.  Remove garbage from your home daily.  Store food in tightly sealed containers. Wash dishes as soon as they are used.  Use bait stations or traps to control roaches. Avoid using chemical sprays.    Smoke. Smoke may be from cigarettes, cigars, pipes, incense or candles, barbecues or grills, and fireplaces.  Don't smoke. And don't let people smoke in your home or car.  When you travel, ask for nonsmoking rental cars and hotel rooms.  Avoid fireplaces and wood stoves. If you can't, sit away from them. Make sure the smoke is directed outside.  Don't burn incense or use candles.  Move away from smoky outdoor cooking grills.    Smog.  Smog is from car exhaust and other pollution.  Read or listen to local air-quality reports. These let you know when air quality is poor.  Stay indoors as much as you can on smoggy days. If possible, use air conditioning instead of opening the windows.  In your car, set air conditioning to recirculate air, so less pollution gets in.    Strong odors. These include air fresheners, deodorizers, and cleaning products; perfume, deodorant, and other beauty products; incense and candles; and insect sprays and other sprays.  Use scent-free products like deodorant or body lotion.  Avoid using cleaning products with bleach and ammonia. Make your own cleaning solution with white vinegar, baking soda, or mild dish soap.  Use exhaust fans while cooking. Or open a window, if possible.   Avoid perfumes, air fresheners, potpourri, and other scented products.          Other irritants. These include dust, aerosol sprays, and powders.  Wear a face mask while doing tasks like sanding, dusting, sweeping, and yard work. Open doors and  windows if working indoors.  Use pump spray bottles instead of aerosols.  Pour liquid  onto a rag or cloth instead of spraying them.    Weather. Weather conditions can trigger symptoms or make them worse.  Watch for very high or low temperatures, very humid conditions, or a lot of wind, as these conditions can make symptoms worse.  Limit outdoor activity during the type of weather that affects you.  Wear a scarf over your mouth and nose in cold weather.    Colds, flu, and sinus infections. Upper respiratory infections can trigger asthma.  Wash your hands often with soap and warm water or use a hand  containing alcohol.  Get a yearly flu shot. And ask if you should get a pneumonia vaccine.  Take care of your general health. Get plenty of sleep. And eat a variety of healthy foods.    Food additives. Food additives can trigger asthma flare-ups in some people.  Check food labels for sulfites or other similar ingredients. These are often found in foods such as wine, beer, and dried fruits.  Avoid foods that contain these additives.    Medicine. Aspirin, NSAIDS like ibuprofen and naproxen, and heart medicines like beta-blockers may be triggers.  Tell your health care provider if you think certain medicines trigger symptoms.   Be sure to read the labels on over-the-counter medicines. They may have ingredients that cause symptoms for you.   , Emotions. Laughing, crying, or feeling excited are triggers for some people.   To help you stay calm: Try breathing in slowly through your nose for a count of 2 seconds. Close your lips and breathe out for 4 seconds. Repeat.  Try to focus on a soothing image in your mind. This will help relax you and calm your breathing.  Remember to take your daily controller medicines. When you're upset or under stress, it's easy to forget.    Exercise. For some people, exercise can trigger symptoms. Don't let this keep you from being active.   If you have not been exercising  regularly, start slow and work up gradually.  Take all of your medicines as prescribed.  If you use quick-relief medicine, make sure you have it with you when you exercise.  Stop if you have any symptoms. Make sure you talk with your provider about these symptoms.  © 2643-2856 [employer]. [address] [place] [address]. All rights reserved. This information is not intended as a substitute for professional medical care. Always follow your healthcare professional's instructions.                Using Dry-Powder Inhalers (DPIs)  Some asthma medications are inhaled using inhalers. Dry-powder inhalers (DPIs) dispense measured doses of powdered medicine into your lungs. DPIs often have counters to track the number of doses used. Keep in mind that DPIs don't all work the same way. So be sure you know how to use yours properly.  Using a DPI  Load the prescribed dose of medicine by following the instructions that came with the inhaler.  Breathe out normally, holding the inhaler away from your mouth. Hold your chin up.  Put the mouthpiece between your lips. Breathe in deeply through the inhaler--not through your nose. You may not feel or taste the medicine as you breathe in. This is normal.  Take the mouthpiece out of your mouth. Hold your breath for a count of 10, or as long as you can comfortably.  Breathe out slowly--but do not breathe out through the inhaler. Moisture from your breath can make the powder stick inside the inhaler.  Be sure to close the inhaler and store it in a dry place.  Rinse your mouth with water and spit it out, don't swallow it.  Do not wash your DPI with soap and water. To clean the mouthpiece, wipe with a dry cloth as needed.    © 2373-1019 [employer]. [address] [place] [address]. All rights reserved. This information is not intended as a substitute for professional medical care. Always follow your healthcare professional's instructions.             ACTION PLAN    GREEN ZONE  My sputum is clear/white/usual color and easily cleared.  My breathing is no harder than usual.  I can do my usual activities.  I can think clearly.   Take your usual medicines, including oxygen, as you are told to do so by your health care provider.   YELLOW ZONE  My sputum has change (color, thickness, amount).  I am more short of breath than usual.  I cough or wheeze more.  I weigh more and my legs/feet swell.  I cannot do my usual activities without resting.   Call your health care provider. You will probably be told to begin taking an antibiotic and prednisone. Have your pharmacy phone number available.   RED ZONE  I cannot cough out my sputum.  I am much more short of breath than normal.  I need to sit up to breathe  I cannot do my usual activities.  I am unable to speak more than one or two words at a time.  I am confused.   Call your health care provider. You may be asked to come in to be seen, told to go to the emergency room, or told to call 9-1-1.

## 2023-08-28 ENCOUNTER — LAB VISIT (OUTPATIENT)
Dept: LAB | Facility: HOSPITAL | Age: 88
End: 2023-08-28
Attending: PEDIATRICS
Payer: MEDICARE

## 2023-08-28 DIAGNOSIS — E78.00 PURE HYPERCHOLESTEROLEMIA: Chronic | ICD-10-CM

## 2023-08-28 DIAGNOSIS — D64.9 CHRONIC ANEMIA: ICD-10-CM

## 2023-08-28 LAB
ALBUMIN SERPL BCP-MCNC: 3.1 G/DL (ref 3.5–5.2)
ALP SERPL-CCNC: 72 U/L (ref 55–135)
ALT SERPL W/O P-5'-P-CCNC: 25 U/L (ref 10–44)
ANION GAP SERPL CALC-SCNC: 9 MMOL/L (ref 8–16)
AST SERPL-CCNC: 24 U/L (ref 10–40)
BASOPHILS # BLD AUTO: 0.03 K/UL (ref 0–0.2)
BASOPHILS NFR BLD: 0.4 % (ref 0–1.9)
BILIRUB SERPL-MCNC: 0.3 MG/DL (ref 0.1–1)
BUN SERPL-MCNC: 21 MG/DL (ref 10–30)
CALCIUM SERPL-MCNC: 9.3 MG/DL (ref 8.7–10.5)
CHLORIDE SERPL-SCNC: 109 MMOL/L (ref 95–110)
CHOLEST SERPL-MCNC: 119 MG/DL (ref 120–199)
CHOLEST/HDLC SERPL: 2.8 {RATIO} (ref 2–5)
CO2 SERPL-SCNC: 24 MMOL/L (ref 23–29)
CREAT SERPL-MCNC: 0.9 MG/DL (ref 0.5–1.4)
DIFFERENTIAL METHOD: ABNORMAL
EOSINOPHIL # BLD AUTO: 0.2 K/UL (ref 0–0.5)
EOSINOPHIL NFR BLD: 2.1 % (ref 0–8)
ERYTHROCYTE [DISTWIDTH] IN BLOOD BY AUTOMATED COUNT: 16.7 % (ref 11.5–14.5)
EST. GFR  (NO RACE VARIABLE): >60 ML/MIN/1.73 M^2
GLUCOSE SERPL-MCNC: 147 MG/DL (ref 70–110)
HCT VFR BLD AUTO: 37.2 % (ref 40–54)
HDLC SERPL-MCNC: 42 MG/DL (ref 40–75)
HDLC SERPL: 35.3 % (ref 20–50)
HGB BLD-MCNC: 11.3 G/DL (ref 14–18)
IMM GRANULOCYTES # BLD AUTO: 0.02 K/UL (ref 0–0.04)
IMM GRANULOCYTES NFR BLD AUTO: 0.3 % (ref 0–0.5)
LDLC SERPL CALC-MCNC: 64.6 MG/DL (ref 63–159)
LYMPHOCYTES # BLD AUTO: 1 K/UL (ref 1–4.8)
LYMPHOCYTES NFR BLD: 12.2 % (ref 18–48)
MCH RBC QN AUTO: 26.7 PG (ref 27–31)
MCHC RBC AUTO-ENTMCNC: 30.4 G/DL (ref 32–36)
MCV RBC AUTO: 88 FL (ref 82–98)
MONOCYTES # BLD AUTO: 0.6 K/UL (ref 0.3–1)
MONOCYTES NFR BLD: 7.2 % (ref 4–15)
NEUTROPHILS # BLD AUTO: 6.1 K/UL (ref 1.8–7.7)
NEUTROPHILS NFR BLD: 77.8 % (ref 38–73)
NONHDLC SERPL-MCNC: 77 MG/DL
NRBC BLD-RTO: 0 /100 WBC
PLATELET # BLD AUTO: 278 K/UL (ref 150–450)
PMV BLD AUTO: 11 FL (ref 9.2–12.9)
POTASSIUM SERPL-SCNC: 3.9 MMOL/L (ref 3.5–5.1)
PROT SERPL-MCNC: 6.6 G/DL (ref 6–8.4)
RBC # BLD AUTO: 4.23 M/UL (ref 4.6–6.2)
SODIUM SERPL-SCNC: 142 MMOL/L (ref 136–145)
TRIGL SERPL-MCNC: 62 MG/DL (ref 30–150)
WBC # BLD AUTO: 7.77 K/UL (ref 3.9–12.7)

## 2023-08-28 PROCEDURE — 80061 LIPID PANEL: CPT | Mod: HCNC | Performed by: PEDIATRICS

## 2023-08-28 PROCEDURE — 36415 COLL VENOUS BLD VENIPUNCTURE: CPT | Mod: HCNC | Performed by: PEDIATRICS

## 2023-08-28 PROCEDURE — 85025 COMPLETE CBC W/AUTO DIFF WBC: CPT | Mod: HCNC | Performed by: PEDIATRICS

## 2023-08-28 PROCEDURE — 80053 COMPREHEN METABOLIC PANEL: CPT | Mod: HCNC | Performed by: PEDIATRICS

## 2023-09-05 ENCOUNTER — OFFICE VISIT (OUTPATIENT)
Dept: INTERNAL MEDICINE | Facility: CLINIC | Age: 88
End: 2023-09-05
Payer: MEDICARE

## 2023-09-05 VITALS
RESPIRATION RATE: 12 BRPM | WEIGHT: 174.63 LBS | HEIGHT: 66 IN | TEMPERATURE: 98 F | BODY MASS INDEX: 28.06 KG/M2 | OXYGEN SATURATION: 95 % | HEART RATE: 107 BPM | SYSTOLIC BLOOD PRESSURE: 120 MMHG | DIASTOLIC BLOOD PRESSURE: 70 MMHG

## 2023-09-05 DIAGNOSIS — I10 ESSENTIAL HYPERTENSION: Chronic | ICD-10-CM

## 2023-09-05 DIAGNOSIS — R39.12 BENIGN PROSTATIC HYPERPLASIA WITH WEAK URINARY STREAM: Primary | Chronic | ICD-10-CM

## 2023-09-05 DIAGNOSIS — I27.21 PAH (PULMONARY ARTERY HYPERTENSION): ICD-10-CM

## 2023-09-05 DIAGNOSIS — N40.1 BENIGN PROSTATIC HYPERPLASIA WITH WEAK URINARY STREAM: Primary | Chronic | ICD-10-CM

## 2023-09-05 DIAGNOSIS — J44.9 CHRONIC OBSTRUCTIVE PULMONARY DISEASE, UNSPECIFIED COPD TYPE: Chronic | ICD-10-CM

## 2023-09-05 DIAGNOSIS — R73.09 ELEVATED GLUCOSE: ICD-10-CM

## 2023-09-05 DIAGNOSIS — D64.9 CHRONIC ANEMIA: ICD-10-CM

## 2023-09-05 DIAGNOSIS — G62.9 PERIPHERAL POLYNEUROPATHY: ICD-10-CM

## 2023-09-05 DIAGNOSIS — M62.81 PROXIMAL MUSCLE WEAKNESS: ICD-10-CM

## 2023-09-05 DIAGNOSIS — Z74.09 IMPAIRED FUNCTIONAL MOBILITY, BALANCE, GAIT, AND ENDURANCE: ICD-10-CM

## 2023-09-05 DIAGNOSIS — E78.00 PURE HYPERCHOLESTEROLEMIA: Chronic | ICD-10-CM

## 2023-09-05 PROCEDURE — 99214 PR OFFICE/OUTPT VISIT, EST, LEVL IV, 30-39 MIN: ICD-10-PCS | Mod: HCNC,S$GLB,, | Performed by: PEDIATRICS

## 2023-09-05 PROCEDURE — 1159F PR MEDICATION LIST DOCUMENTED IN MEDICAL RECORD: ICD-10-PCS | Mod: HCNC,CPTII,S$GLB, | Performed by: PEDIATRICS

## 2023-09-05 PROCEDURE — 1101F PR PT FALLS ASSESS DOC 0-1 FALLS W/OUT INJ PAST YR: ICD-10-PCS | Mod: HCNC,CPTII,S$GLB, | Performed by: PEDIATRICS

## 2023-09-05 PROCEDURE — 1160F RVW MEDS BY RX/DR IN RCRD: CPT | Mod: HCNC,CPTII,S$GLB, | Performed by: PEDIATRICS

## 2023-09-05 PROCEDURE — 1101F PT FALLS ASSESS-DOCD LE1/YR: CPT | Mod: HCNC,CPTII,S$GLB, | Performed by: PEDIATRICS

## 2023-09-05 PROCEDURE — 1159F MED LIST DOCD IN RCRD: CPT | Mod: HCNC,CPTII,S$GLB, | Performed by: PEDIATRICS

## 2023-09-05 PROCEDURE — 3288F PR FALLS RISK ASSESSMENT DOCUMENTED: ICD-10-PCS | Mod: HCNC,CPTII,S$GLB, | Performed by: PEDIATRICS

## 2023-09-05 PROCEDURE — 99999 PR PBB SHADOW E&M-EST. PATIENT-LVL V: ICD-10-PCS | Mod: PBBFAC,HCNC,, | Performed by: PEDIATRICS

## 2023-09-05 PROCEDURE — 1126F AMNT PAIN NOTED NONE PRSNT: CPT | Mod: HCNC,CPTII,S$GLB, | Performed by: PEDIATRICS

## 2023-09-05 PROCEDURE — 1126F PR PAIN SEVERITY QUANTIFIED, NO PAIN PRESENT: ICD-10-PCS | Mod: HCNC,CPTII,S$GLB, | Performed by: PEDIATRICS

## 2023-09-05 PROCEDURE — 99999 PR PBB SHADOW E&M-EST. PATIENT-LVL V: CPT | Mod: PBBFAC,HCNC,, | Performed by: PEDIATRICS

## 2023-09-05 PROCEDURE — 3288F FALL RISK ASSESSMENT DOCD: CPT | Mod: HCNC,CPTII,S$GLB, | Performed by: PEDIATRICS

## 2023-09-05 PROCEDURE — 99214 OFFICE O/P EST MOD 30 MIN: CPT | Mod: HCNC,S$GLB,, | Performed by: PEDIATRICS

## 2023-09-05 PROCEDURE — 1160F PR REVIEW ALL MEDS BY PRESCRIBER/CLIN PHARMACIST DOCUMENTED: ICD-10-PCS | Mod: HCNC,CPTII,S$GLB, | Performed by: PEDIATRICS

## 2023-09-05 NOTE — PROGRESS NOTES
Subjective     Patient ID: Chao Hawk is a 89 y.o. male.    Chief Complaint: No chief complaint on file.    Chao Hawk is a 89 y.o. male who presents to the clinic for a follow up. Pt reports whenever he is eating his nose begins to run.     1) HTN: B/P normal, no HTNive symptoms  2) LIPIDS:following D&E, tolerating and compliant with med(s).    3) BPH: on meds and Sx acceptable seeing Urology, takes a couple of benadryl a day which has helped with some of his swelling   4) COPD/Pulmonary artery HTN: upcoming appointment with pulmonary on 9/8, worsening, very easily SOB, using inhalers but has not noticed much help  5) Low T: no longer On depo testosterone.  6) Back:s/p surgery, uses canes only for balance and support for uneven surface, has residual feet numbness. Worse in AM until he gets around. Uses only CBD oil. Not any other OTC or tramadol. 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   8) AAA: medical management. CT 2018: distal abdominal aortic aneurysm measuring 3.6 cm AP dimension 3.3 cm transverse dimension.  There is also fusiform aneurysmal dilatation of the mid infrarenal abdominal aorta measuring approximately 3.2 cm in diameter.  9) Hx of GI bleed/gastritis/GERD: on protonix        LABS REVIEWED: CBC, CMP, lipid panel, iron and TIBC,, and ferritin       Review of Systems   Constitutional:  Negative for chills, diaphoresis, fatigue and fever.   HENT:  Negative for sore throat and trouble swallowing.    Respiratory:  Negative for cough and shortness of breath.    Cardiovascular:  Negative for chest pain.   Gastrointestinal:  Positive for reflux. Negative for abdominal pain, anal bleeding, constipation, diarrhea, nausea and vomiting.   Endocrine: Negative for cold intolerance, heat intolerance, polydipsia, polyphagia and polyuria.   Musculoskeletal:  Positive for arthralgias, back pain and gait problem.   All other systems reviewed and  are negative.         Objective     Physical Exam  Constitutional:       General: He is not in acute distress.     Appearance: Normal appearance. He is not ill-appearing or toxic-appearing.   Cardiovascular:      Rate and Rhythm: Normal rate and regular rhythm.      Pulses: Normal pulses.      Heart sounds: Normal heart sounds. No murmur heard.     No friction rub. No gallop.   Pulmonary:      Effort: Pulmonary effort is normal.      Breath sounds: Normal breath sounds.   Abdominal:      General: There is no distension.      Palpations: There is no mass.      Tenderness: There is no abdominal tenderness. There is no guarding or rebound.      Hernia: No hernia is present.   Neurological:      Mental Status: He is alert and oriented to person, place, and time.      Gait: Gait abnormal (with cane).   Psychiatric:         Mood and Affect: Mood normal.         Behavior: Behavior normal.         Thought Content: Thought content normal.         Judgment: Judgment normal.            Assessment and Plan     1. Benign prostatic hyperplasia with weak urinary stream    2. Chronic anemia  -     CBC Auto Differential; Future; Expected date: 09/05/2023    3. Chronic obstructive pulmonary disease, unspecified COPD type  Overview:  Trelegy, oxygen.       4. Essential hypertension    5. Impaired functional mobility, balance, gait, and endurance    6. PAH (pulmonary artery hypertension)  Overview:  NC 2lm for sleep  Echo 8/12/2020  Concentric left ventricular remodeling.  Normal central venous pressure (3 mmHg).  The estimated PA systolic pressure is 35 mmHg.  Normal left ventricular systolic function. The estimated ejection fraction is 55%.  Normal LV diastolic function.  No wall motion abnormalities.      7. Peripheral polyneuropathy    8. Proximal muscle weakness    9. Pure hypercholesterolemia  -     Lipid Panel; Future; Expected date: 09/05/2023  -     Comprehensive Metabolic Panel; Future; Expected date: 09/05/2023    10.  Elevated glucose  -     Hemoglobin A1C; Future; Expected date: 09/05/2023        BP and lipids at goal. Serial monitoring of BS through labs. Maintain meds. HMI discussed with pt. Up to date with flu vaccination. Covid and RSV vaccines in fall discussed. Pt reports he has already had his first shingles vaccine. Follow up in 6 months with labs. Try claritin, astelin, flonase for presumed vasomotor rhinitis.         Follow up in about 6 months (around 3/5/2024).

## 2023-09-08 ENCOUNTER — OFFICE VISIT (OUTPATIENT)
Dept: PULMONOLOGY | Facility: CLINIC | Age: 88
End: 2023-09-08
Payer: MEDICARE

## 2023-09-08 VITALS
WEIGHT: 175.06 LBS | DIASTOLIC BLOOD PRESSURE: 70 MMHG | HEIGHT: 66 IN | SYSTOLIC BLOOD PRESSURE: 120 MMHG | HEART RATE: 87 BPM | RESPIRATION RATE: 18 BRPM | BODY MASS INDEX: 28.14 KG/M2 | OXYGEN SATURATION: 94 %

## 2023-09-08 DIAGNOSIS — J44.9 CHRONIC OBSTRUCTIVE PULMONARY DISEASE, UNSPECIFIED COPD TYPE: Primary | Chronic | ICD-10-CM

## 2023-09-08 DIAGNOSIS — J84.10 CALCIFIED GRANULOMA OF LUNG: ICD-10-CM

## 2023-09-08 DIAGNOSIS — R06.09 DOE (DYSPNEA ON EXERTION): ICD-10-CM

## 2023-09-08 PROCEDURE — 1101F PT FALLS ASSESS-DOCD LE1/YR: CPT | Mod: HCNC,CPTII,S$GLB, | Performed by: NURSE PRACTITIONER

## 2023-09-08 PROCEDURE — 99214 PR OFFICE/OUTPT VISIT, EST, LEVL IV, 30-39 MIN: ICD-10-PCS | Mod: HCNC,S$GLB,, | Performed by: NURSE PRACTITIONER

## 2023-09-08 PROCEDURE — 1159F PR MEDICATION LIST DOCUMENTED IN MEDICAL RECORD: ICD-10-PCS | Mod: HCNC,CPTII,S$GLB, | Performed by: NURSE PRACTITIONER

## 2023-09-08 PROCEDURE — 1160F RVW MEDS BY RX/DR IN RCRD: CPT | Mod: HCNC,CPTII,S$GLB, | Performed by: NURSE PRACTITIONER

## 2023-09-08 PROCEDURE — 99999 PR PBB SHADOW E&M-EST. PATIENT-LVL V: CPT | Mod: PBBFAC,HCNC,, | Performed by: NURSE PRACTITIONER

## 2023-09-08 PROCEDURE — 1101F PR PT FALLS ASSESS DOC 0-1 FALLS W/OUT INJ PAST YR: ICD-10-PCS | Mod: HCNC,CPTII,S$GLB, | Performed by: NURSE PRACTITIONER

## 2023-09-08 PROCEDURE — 3288F PR FALLS RISK ASSESSMENT DOCUMENTED: ICD-10-PCS | Mod: HCNC,CPTII,S$GLB, | Performed by: NURSE PRACTITIONER

## 2023-09-08 PROCEDURE — 99214 OFFICE O/P EST MOD 30 MIN: CPT | Mod: HCNC,S$GLB,, | Performed by: NURSE PRACTITIONER

## 2023-09-08 PROCEDURE — 1159F MED LIST DOCD IN RCRD: CPT | Mod: HCNC,CPTII,S$GLB, | Performed by: NURSE PRACTITIONER

## 2023-09-08 PROCEDURE — 99999 PR PBB SHADOW E&M-EST. PATIENT-LVL V: ICD-10-PCS | Mod: PBBFAC,HCNC,, | Performed by: NURSE PRACTITIONER

## 2023-09-08 PROCEDURE — 3288F FALL RISK ASSESSMENT DOCD: CPT | Mod: HCNC,CPTII,S$GLB, | Performed by: NURSE PRACTITIONER

## 2023-09-08 PROCEDURE — 1160F PR REVIEW ALL MEDS BY PRESCRIBER/CLIN PHARMACIST DOCUMENTED: ICD-10-PCS | Mod: HCNC,CPTII,S$GLB, | Performed by: NURSE PRACTITIONER

## 2023-09-08 RX ORDER — FLUTICASONE FUROATE, UMECLIDINIUM BROMIDE AND VILANTEROL TRIFENATATE 200; 62.5; 25 UG/1; UG/1; UG/1
1 POWDER RESPIRATORY (INHALATION) DAILY
Qty: 60 EACH | Refills: 11 | Status: SHIPPED | OUTPATIENT
Start: 2023-09-08

## 2023-09-08 NOTE — PROGRESS NOTES
"Subjective:      Patient ID: Chao Hawk is a 89 y.o. male.    Chief Complaint: COPD    COPD      89 year old male with COPD. He becomes fatigued easy and frequent breaks. No wheezing. He is on advair now. He did well with trelegy.    No fever, chills, or hemoptysis. No pleuritic type chest pain. Ambulates with a cane.       Patient Active Problem List   Diagnosis    COPD (chronic obstructive pulmonary disease)    Low testosterone    Essential hypertension    Pure hypercholesterolemia    BPH (benign prostatic hyperplasia)    Atherosclerosis of aorta    Abdominal aortic aneurysm without rupture    DDD (degenerative disc disease), lumbar    Multiple renal cysts    Overweight (BMI 25.0-29.9)    Lumbar arthropathy    Calcified granuloma of lung    Bilateral leg edema    PAH (pulmonary artery hypertension)    History of diverticulitis    Nocturnal hypoxemia    Trochanteric bursitis of left hip    Impingement syndrome of right shoulder    Shoulder arthritis    Poor posture    Impaired functional mobility, balance, gait, and endurance    Decreased range of motion    Proximal muscle weakness    Rotator cuff arthropathy of right shoulder    Right glenohumeral arthritis -end stage    Peripheral neuropathy    S/P reverse total shoulder arthroplasty, right    Decreased right shoulder range of motion    Inguinal abscess    Chest pain syndrome    Chronic anemia    Acute blood loss anemia    Melena     /70   Pulse 87   Resp 18   Ht 5' 6" (1.676 m)   Wt 79.4 kg (175 lb 0.7 oz)   SpO2 (!) 94%   BMI 28.25 kg/m²   Body mass index is 28.25 kg/m².    Review of Systems   Constitutional: Negative.    HENT: Negative.     Respiratory:  Positive for dyspnea on extertion.    Cardiovascular: Negative.    Musculoskeletal:  Positive for gait problem.   Gastrointestinal: Negative.    Psychiatric/Behavioral: Negative.       Objective:      Physical Exam  Constitutional:       Appearance: Normal appearance. He is obese.   HENT:      " Head: Normocephalic and atraumatic.      Nose: Nose normal.   Cardiovascular:      Rate and Rhythm: Normal rate and regular rhythm.   Pulmonary:      Effort: Pulmonary effort is normal.      Breath sounds: Normal breath sounds.   Abdominal:      Palpations: Abdomen is soft.      Tenderness: There is no abdominal tenderness.   Musculoskeletal:         General: Normal range of motion.      Cervical back: Normal range of motion and neck supple.   Skin:     General: Skin is warm and dry.   Neurological:      General: No focal deficit present.      Mental Status: He is alert and oriented to person, place, and time.   Psychiatric:         Mood and Affect: Mood normal.         Behavior: Behavior normal.       Personal Diagnostic Review    Results for orders placed during the hospital encounter of 03/06/23    X-Ray Chest PA And Lateral    Narrative  EXAM: XR CHEST PA AND LATERAL    CLINICAL HISTORY:  Chronic obstructive pulmonary disease, unspecified.    TECHNIQUE:   Chest x-ray 2 views, 3 images.    FINDINGS:  Comparison study 10/25/2022, 01/21/2022.  No change.  Normal size heart.  Aortic atherosclerosis.  No congestion.  Minimal basilar scarring.  Otherwise, lungs are clear.  Degenerative spine.  Right shoulder replacement.    Impression  Stable chest x-ray.    Finalized on: 3/6/2023 12:12 PM By:  Landon Smith MD  [facility]# [id number]      2023-03-06 12:14:16.135    [facility]        Assessment:       1. Chronic obstructive pulmonary disease, unspecified COPD type    2. Calcified granuloma of lung    3. STEVENS (dyspnea on exertion)        Outpatient Encounter Medications as of 9/8/2023   Medication Sig Dispense Refill    acetaminophen (TYLENOL) 500 MG tablet Take 500 mg by mouth as needed for Pain.      albuterol (PROVENTIL) 2.5 mg /3 mL (0.083 %) nebulizer solution Take 3 mLs (2.5 mg total) by nebulization every 4 (four) hours as needed for Wheezing. Rescue 150 mL 11    albuterol (PROVENTIL) 2.5 mg /3 mL (0.083 %) nebulizer solution  Take 3 mLs (2.5 mg total) by nebulization every 4 to 6 hours as needed for Wheezing or Shortness of Breath. 360 mL 11    ARGININE, L-ARGININE, ORAL Take 500 mg by mouth once daily.      atorvastatin (LIPITOR) 40 MG tablet TAKE 1 TABLET EVERY DAY 90 tablet 3    betamethasone valerate 0.1% (VALISONE) 0.1 % Oint Apply topically 2 (two) times daily. 45 g 1    CANNABIDIOL, CBD, EXTRACT ORAL Take 3 drops by mouth every morning.      cholecalciferol, vitamin D3, (VITAMIN D3) 50 mcg (2,000 unit) Cap Take 1 capsule by mouth every evening.       COQ10, UBIQUINOL, ORAL Take 1 tablet by mouth nightly.       cyanocobalamin (VITAMIN B-12) 1000 MCG tablet Take 100 mcg by mouth once daily.      diclofenac sodium (VOLTAREN) 1 % Gel Apply 2 g topically 3 (three) times daily. 1 each 3    furosemide (LASIX) 20 MG tablet TAKE 1 TABLET EVERY DAY 90 tablet 3    gabapentin (NEURONTIN) 100 MG capsule TAKE ONE CAPSULE BY MOUTH TWICE DAILY 180 capsule 3    GLUCOSAM-MSM-CHONDROIT-VIT D3 ORAL Take 2 tablets by mouth once daily.      krill/om-3/dha/epa/phospho/ast (MEGARED OMEGA-3 KRILL OIL ORAL) Take 1 capsule by mouth nightly.       lisinopriL 10 MG tablet TAKE 1 TABLET EVERY DAY 90 tablet 3    multivit-min/folic/vit K/lycop (ONE-A-DAY MEN'S MULTIVITAMIN ORAL) Take 1 tablet by mouth every evening.       potassium 99 mg Tab Take by mouth once daily.       prednisoLONE acetate (PRED FORTE) 1 % DrpS Place 1 drop into the right eye 2 (two) times daily.      tamsulosin (FLOMAX) 0.4 mg Cap Take 2 capsules (0.8 mg total) by mouth every morning. 180 capsule 3    traMADoL (ULTRAM) 50 mg tablet Take 1 tablet (50 mg total) by mouth daily as needed for Pain. 30 tablet 0    [DISCONTINUED] fluticasone-salmeterol diskus inhaler 250-50 mcg Inhale 1 puff into the lungs 2 (two) times daily. Wash out mouth after use. 1 each 11    dutasteride (AVODART) 0.5 mg capsule Take 1 capsule (0.5 mg total) by mouth once daily. 90 capsule 3    fluticasone-umeclidin-vilanter  (TRELEGY ELLIPTA) 200-62.5-25 mcg inhaler Inhale 1 puff into the lungs once daily. 60 each 11    pantoprazole (PROTONIX) 40 MG tablet Take 1 tablet (40 mg total) by mouth 2 (two) times daily. 120 tablet 0    [DISCONTINUED] doxycycline (MONODOX) 100 MG capsule Take 1 capsule (100 mg total) by mouth every 12 (twelve) hours. 20 capsule 1    [DISCONTINUED] predniSONE (DELTASONE) 20 MG tablet Prednisone 60 mg/ day for 3 days, 40 mg/day for 3 days,20 mg/ day for 3 days, (1/2 tablet )10 mg a day for 3 days. 20 tablet 0     No facility-administered encounter medications on file as of 9/8/2023.     No orders of the defined types were placed in this encounter.    Plan:   Prescribe Trelegy. Denies issues with coverage or side effects.   He will contact me if any issues.  Portion of STEVENS more than likely due to muscle weakness. Recovers quickly with rest.   Self monitoring oxygen saturation at home.     Problem List Items Addressed This Visit          Pulmonary    COPD (chronic obstructive pulmonary disease) - Primary (Chronic)    Overview     Advair, oxygen.          Relevant Medications    fluticasone-umeclidin-vilanter (TRELEGY ELLIPTA) 200-62.5-25 mcg inhaler    Calcified granuloma of lung    Overview     CT abdomen 11-14-17 in Care Everywhere: Calcified granuloma of right middle lobe lung         Current Assessment & Plan     Stable          Other Visit Diagnoses       STEVENS (dyspnea on exertion)                             Elizabeth LeJeune, ACNP, ANP

## 2023-09-14 ENCOUNTER — OFFICE VISIT (OUTPATIENT)
Dept: FAMILY MEDICINE | Facility: CLINIC | Age: 88
End: 2023-09-14
Payer: MEDICARE

## 2023-09-14 VITALS
BODY MASS INDEX: 27.92 KG/M2 | HEIGHT: 66 IN | SYSTOLIC BLOOD PRESSURE: 136 MMHG | TEMPERATURE: 98 F | WEIGHT: 173.75 LBS | DIASTOLIC BLOOD PRESSURE: 78 MMHG | RESPIRATION RATE: 20 BRPM | HEART RATE: 80 BPM | OXYGEN SATURATION: 96 %

## 2023-09-14 DIAGNOSIS — I27.21 PAH (PULMONARY ARTERY HYPERTENSION): ICD-10-CM

## 2023-09-14 DIAGNOSIS — J44.9 CHRONIC OBSTRUCTIVE PULMONARY DISEASE, UNSPECIFIED COPD TYPE: Chronic | ICD-10-CM

## 2023-09-14 DIAGNOSIS — Z74.09 IMPAIRED FUNCTIONAL MOBILITY, BALANCE, GAIT, AND ENDURANCE: ICD-10-CM

## 2023-09-14 DIAGNOSIS — N40.1 BENIGN PROSTATIC HYPERPLASIA WITH WEAK URINARY STREAM: Chronic | ICD-10-CM

## 2023-09-14 DIAGNOSIS — I71.40 ABDOMINAL AORTIC ANEURYSM (AAA) WITHOUT RUPTURE, UNSPECIFIED PART: ICD-10-CM

## 2023-09-14 DIAGNOSIS — G47.34 NOCTURNAL HYPOXEMIA: ICD-10-CM

## 2023-09-14 DIAGNOSIS — R39.12 BENIGN PROSTATIC HYPERPLASIA WITH WEAK URINARY STREAM: Chronic | ICD-10-CM

## 2023-09-14 DIAGNOSIS — I70.0 ATHEROSCLEROSIS OF AORTA: ICD-10-CM

## 2023-09-14 DIAGNOSIS — Z00.00 ENCOUNTER FOR PREVENTIVE HEALTH EXAMINATION: ICD-10-CM

## 2023-09-14 DIAGNOSIS — D64.9 CHRONIC ANEMIA: ICD-10-CM

## 2023-09-14 DIAGNOSIS — M51.36 DDD (DEGENERATIVE DISC DISEASE), LUMBAR: ICD-10-CM

## 2023-09-14 DIAGNOSIS — I10 ESSENTIAL HYPERTENSION: Chronic | ICD-10-CM

## 2023-09-14 DIAGNOSIS — R60.0 BILATERAL LEG EDEMA: ICD-10-CM

## 2023-09-14 DIAGNOSIS — Z87.11 HISTORY OF GASTRIC ULCER: ICD-10-CM

## 2023-09-14 DIAGNOSIS — J84.10 CALCIFIED GRANULOMA OF LUNG: ICD-10-CM

## 2023-09-14 DIAGNOSIS — Z00.00 ENCOUNTER FOR MEDICARE ANNUAL WELLNESS EXAM: Primary | ICD-10-CM

## 2023-09-14 DIAGNOSIS — E78.00 PURE HYPERCHOLESTEROLEMIA: Chronic | ICD-10-CM

## 2023-09-14 PROCEDURE — 1170F PR FUNCTIONAL STATUS ASSESSED: ICD-10-PCS | Mod: HCNC,CPTII,S$GLB, | Performed by: NURSE PRACTITIONER

## 2023-09-14 PROCEDURE — 1170F FXNL STATUS ASSESSED: CPT | Mod: HCNC,CPTII,S$GLB, | Performed by: NURSE PRACTITIONER

## 2023-09-14 PROCEDURE — 1159F PR MEDICATION LIST DOCUMENTED IN MEDICAL RECORD: ICD-10-PCS | Mod: HCNC,CPTII,S$GLB, | Performed by: NURSE PRACTITIONER

## 2023-09-14 PROCEDURE — G0439 PPPS, SUBSEQ VISIT: HCPCS | Mod: HCNC,S$GLB,, | Performed by: NURSE PRACTITIONER

## 2023-09-14 PROCEDURE — 99999 PR PBB SHADOW E&M-EST. PATIENT-LVL V: CPT | Mod: PBBFAC,HCNC,, | Performed by: NURSE PRACTITIONER

## 2023-09-14 PROCEDURE — 1101F PT FALLS ASSESS-DOCD LE1/YR: CPT | Mod: HCNC,CPTII,S$GLB, | Performed by: NURSE PRACTITIONER

## 2023-09-14 PROCEDURE — 1126F AMNT PAIN NOTED NONE PRSNT: CPT | Mod: HCNC,CPTII,S$GLB, | Performed by: NURSE PRACTITIONER

## 2023-09-14 PROCEDURE — 1160F RVW MEDS BY RX/DR IN RCRD: CPT | Mod: HCNC,CPTII,S$GLB, | Performed by: NURSE PRACTITIONER

## 2023-09-14 PROCEDURE — 1160F PR REVIEW ALL MEDS BY PRESCRIBER/CLIN PHARMACIST DOCUMENTED: ICD-10-PCS | Mod: HCNC,CPTII,S$GLB, | Performed by: NURSE PRACTITIONER

## 2023-09-14 PROCEDURE — 1126F PR PAIN SEVERITY QUANTIFIED, NO PAIN PRESENT: ICD-10-PCS | Mod: HCNC,CPTII,S$GLB, | Performed by: NURSE PRACTITIONER

## 2023-09-14 PROCEDURE — 3288F PR FALLS RISK ASSESSMENT DOCUMENTED: ICD-10-PCS | Mod: HCNC,CPTII,S$GLB, | Performed by: NURSE PRACTITIONER

## 2023-09-14 PROCEDURE — 3288F FALL RISK ASSESSMENT DOCD: CPT | Mod: HCNC,CPTII,S$GLB, | Performed by: NURSE PRACTITIONER

## 2023-09-14 PROCEDURE — 1101F PR PT FALLS ASSESS DOC 0-1 FALLS W/OUT INJ PAST YR: ICD-10-PCS | Mod: HCNC,CPTII,S$GLB, | Performed by: NURSE PRACTITIONER

## 2023-09-14 PROCEDURE — G0439 PR MEDICARE ANNUAL WELLNESS SUBSEQUENT VISIT: ICD-10-PCS | Mod: HCNC,S$GLB,, | Performed by: NURSE PRACTITIONER

## 2023-09-14 PROCEDURE — 99999 PR PBB SHADOW E&M-EST. PATIENT-LVL V: ICD-10-PCS | Mod: PBBFAC,HCNC,, | Performed by: NURSE PRACTITIONER

## 2023-09-14 PROCEDURE — 1159F MED LIST DOCD IN RCRD: CPT | Mod: HCNC,CPTII,S$GLB, | Performed by: NURSE PRACTITIONER

## 2023-09-14 NOTE — PATIENT INSTRUCTIONS
Counseling and Referral of Other Preventative  (Italic type indicates deductible and co-insurance are waived)    Patient Name: Chao Hawk  Today's Date: 9/14/2023    Health Maintenance       Date Due Completion Date    COVID-19 Vaccine (4 - Pfizer series) 03/01/2022 1/4/2022    Shingles Vaccine (3 of 3) 10/30/2023 9/4/2023    Lipid Panel 08/28/2024 8/28/2023    TETANUS VACCINE 09/02/2032 9/2/2022        No orders of the defined types were placed in this encounter.      The following information is provided to all patients.  This information is to help you find resources for any of the problems found today that may be affecting your health:                Living healthy guide: www.[facility].[email or web address].[facility]      Understanding Diabetes: www.diabetes.org      Eating healthy: www.cdc.gov/healthyweight      [facility] home safety checklist: www.cdc.gov/steadi/patient.html      Agency on Aging: www.[facility].[place]      Alcoholics anonymous (AA): www.aa.org      Physical Activity: www.jojo.nih.gov/uk1tkqs      Tobacco use: www.quitwithusla.org     Counseling and Referral of Other Preventative  (Italic type indicates deductible and co-insurance are waived)    Patient Name: Chao Hawk  Today's Date: 9/14/2023    Health Maintenance       Date Due Completion Date    COVID-19 Vaccine (4 - Pfizer series) 03/01/2022 1/4/2022    Shingles Vaccine (3 of 3) 10/30/2023 9/4/2023    Lipid Panel 08/28/2024 8/28/2023    TETANUS VACCINE 09/02/2032 9/2/2022        No orders of the defined types were placed in this encounter.      The following information is provided to all patients.  This information is to help you find resources for any of the problems found today that may be affecting your health:                Living healthy guide: www.[facility].[place].[facility]      Understanding Diabetes: www.diabetes.org      Eating healthy: www.cdc.gov/healthyweight      CDC home safety checklist: www.cdc.gov/steadi/patient.html      Agency on Aging:  www.[facility].[place].[facility]      Alcoholics anonymous (AA): www.aa.org      Physical Activity: www.jojo.nih.gov/em7tnzh      Tobacco use: www.quitwithusla.org

## 2023-09-14 NOTE — PROGRESS NOTES
"  Chao Hawk presented for a  Medicare AWV and comprehensive Health Risk Assessment today. The following components were reviewed and updated:    Medical history  Family History  Social history  Allergies and Current Medications  Health Risk Assessment  Health Maintenance  Care Team         ** See Completed Assessments for Annual Wellness Visit within the encounter summary.**         The following assessments were completed:  Living Situation  CAGE  Depression Screening  Timed Get Up and Go  Whisper Test  Cognitive Function Screening  Nutrition Screening  ADL Screening  PAQ Screening        Vitals:    23 1507   BP: 136/78   Pulse: 80   Resp: 20   Temp: 98.1 °F (36.7 °C)   SpO2: 96%   Weight: 78.8 kg (173 lb 11.6 oz)   Height: 5' 6" (1.676 m)     Body mass index is 28.04 kg/m².  Physical Exam  Vitals and nursing note reviewed.   Constitutional:       General: He is not in acute distress.     Appearance: He is well-developed.   HENT:      Head: Normocephalic and atraumatic.   Eyes:      Pupils: Pupils are equal, round, and reactive to light.   Neck:      Vascular: No carotid bruit.   Cardiovascular:      Rate and Rhythm: Normal rate and regular rhythm.      Pulses: Normal pulses.      Heart sounds: Normal heart sounds. No murmur heard.     No gallop.   Pulmonary:      Effort: Pulmonary effort is normal.      Breath sounds: Examination of the right-upper field reveals decreased breath sounds. Examination of the left-upper field reveals decreased breath sounds. Decreased breath sounds present.      Comments: Scattered crackles to lower lobes  Abdominal:      General: Bowel sounds are normal. There is no distension.      Palpations: Abdomen is soft.      Tenderness: There is no abdominal tenderness.   Musculoskeletal:         General: Normal range of motion.      Right lower le+ Edema present.      Left lower le+ Edema present.   Skin:     General: Skin is warm and dry.   Neurological:      Motor: " Weakness present. No abnormal muscle tone.      Gait: Gait abnormal.      Comments: Use of cane    Psychiatric:         Speech: Speech normal.         Behavior: Behavior normal.         Thought Content: Thought content normal.         Judgment: Judgment normal.           Current Outpatient Medications   Medication Instructions    acetaminophen (TYLENOL) 500 mg, Oral, As needed (PRN)    albuterol (PROVENTIL) 2.5 mg, Nebulization, Every 4 hours PRN, Rescue     albuterol (PROVENTIL) 2.5 mg, Nebulization, Every 4-6 hours PRN    ARGININE, L-ARGININE, ORAL 500 mg, Oral, Daily    atorvastatin (LIPITOR) 40 MG tablet TAKE 1 TABLET EVERY DAY    betamethasone valerate 0.1% (VALISONE) 0.1 % Oint Topical (Top), 2 times daily    CANNABIDIOL, CBD, EXTRACT ORAL 3 drops, Oral, Every morning    cholecalciferol, vitamin D3, (VITAMIN D3) 50 mcg (2,000 unit) Cap 1 capsule, Oral, Nightly    COQ10, UBIQUINOL, ORAL 1 tablet, Oral, Nightly    cyanocobalamin (VITAMIN B-12) 100 mcg, Oral, Daily    diclofenac sodium (VOLTAREN) 2 g, Topical (Top), 3 times daily    dutasteride (AVODART) 0.5 mg, Oral, Daily    fluticasone-umeclidin-vilanter (TRELEGY ELLIPTA) 200-62.5-25 mcg inhaler 1 puff, Inhalation, Daily    furosemide (LASIX) 20 MG tablet TAKE 1 TABLET EVERY DAY    gabapentin (NEURONTIN) 100 MG capsule TAKE ONE CAPSULE BY MOUTH TWICE DAILY    GLUCOSAM-MSM-CHONDROIT-VIT D3 ORAL 2 tablets, Oral, Daily    krill/om-3/dha/epa/phospho/ast (MEGARED OMEGA-3 KRILL OIL ORAL) 1 capsule, Oral, Nightly    lisinopriL 10 MG tablet TAKE 1 TABLET EVERY DAY    multivit-min/folic/vit K/lycop (ONE-A-DAY MEN'S MULTIVITAMIN ORAL) 1 tablet, Oral, Nightly    pantoprazole (PROTONIX) 40 mg, Oral, 2 times daily    potassium 99 mg Tab Oral, Daily    prednisoLONE acetate (PRED FORTE) 1 % DrpS 1 drop, Right Eye, 2 times daily    tamsulosin (FLOMAX) 0.8 mg, Oral, Every morning    traMADoL (ULTRAM) 50 mg, Oral, Daily PRN         Diagnoses and health  risks identified today and associated recommendations/orders:    1. Encounter for Medicare annual wellness exam  Ambulatory Referral/Consult to Enhanced Annual Wellness Visit (eAWV)    2. Encounter for preventive health examination  Review for Opioid Screening: Patient does not have rx for Opioids.  Review for Substance Use Disorders: Patient does not use substance.    3. Chronic obstructive pulmonary disease, unspecified COPD type  Chronic/ Monitored/Stable on  Trelegy/Albuterol as directed.  Followed by pulmo Md    4. Calcified granuloma of lung  Chronic/ Monitored/Stable on  Trelegy/Albuterol as directed.  Followed by pulm md    5. Pure hypercholesterolemia  Chronic/ Monitored/Stable on  Lipitor as directed.  Followed by PCP    6. PAH (pulmonary artery hypertension)  Chronic/ Monitored/Stable on Lisinopril  as directed.  Followed by PCP    7. Essential hypertension  Chronic/ Monitored/Stable on Lisinopril  as directed.  Followed by PCP    8. Abdominal aortic aneurysm (AAA) without rupture, unspecified part   7/ 2021...The distal abdominal aorta again demonstrates aneurysmal dilatation with maximum diameter of 3.8 cm, slightly increased from 3.6 cm previously though this could be due to technical differences.    Followed by PCP    9. Atherosclerosis of aorta  Chronic/ Monitored/Stable on Lipitor  as directed.  Followed by PCP    10. Benign prostatic hyperplasia with weak urinary stream  Chronic/ Monitored/Stable on  Flomax as directed.  Followed by PCP    11. Chronic anemia  Chronic/ Monitored/Stable on blood counts  as directed.  Followed by PCP    12. Nocturnal hypoxemia  Stable- states no need for oxygen at night per pt    13. Bilateral leg edema  Chronic/ Monitored/Stable on Lasix as  needed    Followed by PCP    14. DDD (degenerative disc disease), lumbar  Chronic/ Monitored/Stable on  Gabapentin/exercise as directed.  Followed by PCP    15. Impaired functional mobility, balance, gait, and endurance  Chronic/  Monitored/Stable on cane -denies recent fapps  Followed by PCP    16. History of gastric ulcer  Chronic/ Monitored/Stable on Protonix  EGD 10/22  Denies bleeding   Schedule to see GI 11/23    Provided Chao with a 5-10 year written screening schedule and personal prevention plan. Recommendations were developed using the USPSTF age appropriate recommendations. Education, counseling, and referrals were provided as needed. After Visit Summary printed and given to patient which includes a list of additional screenings\tests needed.    Follow up in about 1 year (around 9/14/2024).    Mere Jacobson NP

## 2023-10-16 ENCOUNTER — PATIENT OUTREACH (OUTPATIENT)
Dept: PULMONOLOGY | Facility: CLINIC | Age: 88
End: 2023-10-16
Payer: MEDICARE

## 2023-11-28 ENCOUNTER — TELEPHONE (OUTPATIENT)
Dept: GASTROENTEROLOGY | Facility: CLINIC | Age: 88
End: 2023-11-28
Payer: MEDICARE

## 2023-11-28 NOTE — TELEPHONE ENCOUNTER
Spoke with pt, gave him new appointment date/time. Pt verbalized understanding and [facility]

## 2023-12-13 ENCOUNTER — PATIENT OUTREACH (OUTPATIENT)
Dept: PULMONOLOGY | Facility: CLINIC | Age: 88
End: 2023-12-13
Payer: MEDICARE

## 2023-12-13 NOTE — TELEPHONE ENCOUNTER
Chronic Disease Management:  Called patient to complete PDM questionnaire.             Time spent on call    mins

## 2023-12-29 ENCOUNTER — TELEPHONE (OUTPATIENT)
Dept: INTERNAL MEDICINE | Facility: CLINIC | Age: 88
End: 2023-12-29
Payer: MEDICARE

## 2024-01-05 DIAGNOSIS — M47.816 LUMBAR ARTHROPATHY: ICD-10-CM

## 2024-01-05 RX ORDER — GABAPENTIN 100 MG/1
CAPSULE ORAL
Qty: 180 CAPSULE | Refills: 3 | Status: SHIPPED | OUTPATIENT
Start: 2024-01-05

## 2024-01-05 NOTE — TELEPHONE ENCOUNTER
No care due was identified.  [facility] Embedded Care Due Messages. Reference number: [id number].   1/05/2024 9:55:03 AM CST

## 2024-01-30 RX ORDER — ATORVASTATIN CALCIUM 40 MG/1
40 TABLET, FILM COATED ORAL DAILY
Qty: 90 TABLET | Refills: 1 | Status: SHIPPED | OUTPATIENT
Start: 2024-01-30

## 2024-01-30 NOTE — TELEPHONE ENCOUNTER
No care due was identified.  [facility] Embedded Care Due Messages. Reference number: [id number].   1/30/2024 2:26:10 PM CST   never intubated

## 2024-01-30 NOTE — TELEPHONE ENCOUNTER
Refill Routing Note   Medication(s) are not appropriate for processing by [facility] for the following reason(s):        Due for refill >6 months ago:     [facility] action(s):  Defer      Medication Therapy Plan:         Appointments  past 12m or future 3m with PCP    Date Provider   Last Visit   9/5/2023 Amari Ureña MD   Next Visit   Visit date not found Amari Ureña MD   ED visits in past 90 days: 0        Note composed:3:14 PM 01/30/2024

## 2024-02-16 ENCOUNTER — OFFICE VISIT (OUTPATIENT)
Dept: ORTHOPEDICS | Facility: CLINIC | Age: 89
End: 2024-02-16
Payer: MEDICARE

## 2024-02-16 VITALS — HEIGHT: 66 IN | BODY MASS INDEX: 27.92 KG/M2 | WEIGHT: 173.75 LBS

## 2024-02-16 DIAGNOSIS — M18.0 ARTHRITIS OF CARPOMETACARPAL (CMC) JOINTS OF BOTH THUMBS: Primary | ICD-10-CM

## 2024-02-16 DIAGNOSIS — M19.031 PRIMARY OSTEOARTHRITIS OF RIGHT WRIST: ICD-10-CM

## 2024-02-16 DIAGNOSIS — M12.9 ARTHRITIS/ARTHROPATHY OF MULTIPLE JOINTS: ICD-10-CM

## 2024-02-16 PROCEDURE — 3288F FALL RISK ASSESSMENT DOCD: CPT | Mod: HCNC,CPTII,S$GLB, | Performed by: ORTHOPAEDIC SURGERY

## 2024-02-16 PROCEDURE — 1160F RVW MEDS BY RX/DR IN RCRD: CPT | Mod: HCNC,CPTII,S$GLB, | Performed by: ORTHOPAEDIC SURGERY

## 2024-02-16 PROCEDURE — 20600 DRAIN/INJ JOINT/BURSA W/O US: CPT | Mod: 50,HCNC,51,XS | Performed by: ORTHOPAEDIC SURGERY

## 2024-02-16 PROCEDURE — 20605 DRAIN/INJ JOINT/BURSA W/O US: CPT | Mod: HCNC,RT,S$GLB, | Performed by: ORTHOPAEDIC SURGERY

## 2024-02-16 PROCEDURE — 1125F AMNT PAIN NOTED PAIN PRSNT: CPT | Mod: HCNC,CPTII,S$GLB, | Performed by: ORTHOPAEDIC SURGERY

## 2024-02-16 PROCEDURE — 99999 PR PBB SHADOW E&M-EST. PATIENT-LVL III: CPT | Mod: PBBFAC,HCNC,, | Performed by: ORTHOPAEDIC SURGERY

## 2024-02-16 PROCEDURE — 1101F PT FALLS ASSESS-DOCD LE1/YR: CPT | Mod: HCNC,CPTII,S$GLB, | Performed by: ORTHOPAEDIC SURGERY

## 2024-02-16 PROCEDURE — 99213 OFFICE O/P EST LOW 20 MIN: CPT | Mod: HCNC,25,S$GLB, | Performed by: ORTHOPAEDIC SURGERY

## 2024-02-16 PROCEDURE — 1159F MED LIST DOCD IN RCRD: CPT | Mod: HCNC,CPTII,S$GLB, | Performed by: ORTHOPAEDIC SURGERY

## 2024-02-16 RX ORDER — TRIAMCINOLONE ACETONIDE 40 MG/ML
40 INJECTION, SUSPENSION INTRA-ARTICULAR; INTRAMUSCULAR
Status: DISCONTINUED | OUTPATIENT
Start: 2024-02-16 | End: 2024-02-16 | Stop reason: HOSPADM

## 2024-02-16 RX ADMIN — TRIAMCINOLONE ACETONIDE 40 MG: 40 INJECTION, SUSPENSION INTRA-ARTICULAR; INTRAMUSCULAR at 09:02

## 2024-02-16 NOTE — PROGRESS NOTES
Subjective:     Patient ID: Chao Hawk is a 89 y.o. male.    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  He was last injected 04/20/2023 with good results until recently and requests injection right wrist and bilateral thumb basal joints    Past Medical History:   Diagnosis Date    Anemia     Arthritis     back, hand    Back pain     Dr. Cristobal- [facility]    Bilateral leg edema 2/16/2018    Chronic bronchitis     COPD (chronic obstructive pulmonary disease)     Diverticulitis of large intestine with perforation without abscess or bleeding     Diverticulosis 5/16/06    colonoscopy    Hearing loss, bilateral     Hernia     x2    Hyperlipidemia     Hypertension     Lumbar radiculopathy 6/14/2017    Multiple renal cysts 7/5/2016    Nocturnal hypoxemia     Polyneuropathy     Tobacco dependence     Trouble in sleeping      Past Surgical History:   Procedure Laterality Date    ESOPHAGOGASTRODUODENOSCOPY N/A 10/26/2022    Procedure: EGD (ESOPHAGOGASTRODUODENOSCOPY);  Surgeon: Mallika Duran MD;  Location: [facility];  Service: Endoscopy;  Laterality: N/A;    FINGER SURGERY Left 1980s    index- Dr. Encarnacion; to remove nodule    HERNIA REPAIR  1990s    x2    INTRAOCULAR LENS INSERTION Bilateral 2016    LUMBAR SPINE SURGERY  09/13/2017    REVERSE TOTAL SHOULDER ARTHROPLASTY Right 10/5/2020    Procedure: ARTHROPLASTY, SHOULDER, TOTAL, REVERSE;  Surgeon: Reji Martin MD;  Location: [facility];  Service: Orthopedics;  Laterality: Right;    TONSILLECTOMY, ADENOIDECTOMY  1940     Family History   Problem Relation Age of Onset    Emphysema Mother     Heart disease Mother     COPD Mother     Appendicitis Father     Heart disease Son     Atrial fibrillation Sister     Lung disease Sister 91    Colon cancer Neg Hx     Cancer Neg Hx      Stroke Neg Hx      Social History     Socioeconomic History    Marital status:      Spouse name: Linsey    Number of children: 4    Years of education: 12 + 4   Occupational History    Occupation:  retired age 60     Comment: [employer]   Tobacco Use    Smoking status: Former     Current packs/day: 0.00     Average packs/day: 1 pack/day for 42.0 years (42.0 ttl pk-yrs)     Types: Cigarettes     Start date: 1949     Quit date: 1991     Years since quittin.1    Smokeless tobacco: Former   Substance and Sexual Activity    Alcohol use: Not Currently     Comment: rarely   No alcohol 72h  prior to sx    Drug use: No    Sexual activity: Never   Social History Narrative    No smokers or pets in household.     Social Determinants of Health     Financial Resource Strain: Low Risk  (2023)    Overall Financial Resource Strain (CARDIA)     Difficulty of Paying Living Expenses: Not very hard   Transportation Needs: No Transportation Needs (2023)    PRAPARE - Transportation     Lack of Transportation (Medical): No     Lack of Transportation (Non-Medical): No   Physical Activity: Inactive (2023)    Exercise Vital Sign     Days of Exercise per Week: 0 days     Minutes of Exercise per Session: 0 min   Stress: No Stress Concern Present (2023)    [employer] [place] [employer] - Occupational Stress Questionnaire     Feeling of Stress : Not at all   Social Connections: Moderately Integrated (2023)    Social Connection and Isolation Panel [NHANES]     Frequency of Communication with Friends and Family: More than three times a week     Frequency of Social Gatherings with Friends and Family: Once a week     Attends Buddhism Services: More than 4 times per year     Attends Club or Organization Meetings: More than 4 times per year     Marital Status:    Housing Stability: Unknown (2023)    Housing Stability Vital Sign     Unable to Pay for Housing in the  Last Year: No     Unstable Housing in the Last Year: No     Medication List with Changes/Refills   Current Medications    ACETAMINOPHEN (TYLENOL) 500 MG TABLET    Take 500 mg by mouth as needed for Pain.    ALBUTEROL (PROVENTIL) 2.5 MG /3 ML (0.083 %) NEBULIZER SOLUTION    Take 3 mLs (2.5 mg total) by nebulization every 4 (four) hours as needed for Wheezing. Rescue    ALBUTEROL (PROVENTIL) 2.5 MG /3 ML (0.083 %) NEBULIZER SOLUTION    Take 3 mLs (2.5 mg total) by nebulization every 4 to 6 hours as needed for Wheezing or Shortness of Breath.    ARGININE, L-ARGININE, ORAL    Take 500 mg by mouth once daily.    ATORVASTATIN (LIPITOR) 40 MG TABLET    Take 1 tablet (40 mg total) by mouth once daily.    BETAMETHASONE VALERATE 0.1% (VALISONE) 0.1 % OINT    Apply topically 2 (two) times daily.    CANNABIDIOL, CBD, EXTRACT ORAL    Take 3 drops by mouth every morning.    CHOLECALCIFEROL, VITAMIN D3, (VITAMIN D3) 50 MCG (2,000 UNIT) CAP    Take 1 capsule by mouth every evening.     COQ10, UBIQUINOL, ORAL    Take 1 tablet by mouth nightly.     CYANOCOBALAMIN (VITAMIN B-12) 1000 MCG TABLET    Take 100 mcg by mouth once daily.    DICLOFENAC SODIUM (VOLTAREN) 1 % GEL    Apply 2 g topically 3 (three) times daily.    DUTASTERIDE (AVODART) 0.5 MG CAPSULE    Take 1 capsule (0.5 mg total) by mouth once daily.    FLUTICASONE-UMECLIDIN-VILANTER (TRELEGY ELLIPTA) 200-62.5-25 MCG INHALER    Inhale 1 puff into the lungs once daily.    FUROSEMIDE (LASIX) 20 MG TABLET    TAKE 1 TABLET EVERY DAY    GABAPENTIN (NEURONTIN) 100 MG CAPSULE    TAKE ONE CAPSULE BY MOUTH TWICE DAILY    GLUCOSAM-MSM-CHONDROIT-VIT D3 ORAL    Take 2 tablets by mouth once daily.    KRILL/OM-3/DHA/EPA/PHOSPHO/AST (MEGARED OMEGA-3 KRILL OIL ORAL)    Take 1 capsule by mouth nightly.     LISINOPRIL 10 MG TABLET    TAKE 1 TABLET EVERY DAY    MULTIVIT-MIN/FOLIC/VIT K/LYCOP (ONE-A-DAY MEN'S MULTIVITAMIN ORAL)    Take 1 tablet by mouth every evening.     PANTOPRAZOLE (PROTONIX)  "40 MG TABLET    Take 1 tablet (40 mg total) by mouth 2 (two) times daily.    POTASSIUM 99 MG TAB    Take by mouth once daily.     PREDNISOLONE ACETATE (PRED FORTE) 1 % DRPS    Place 1 drop into the right eye 2 (two) times daily.    TAMSULOSIN (FLOMAX) 0.4 MG CAP    TAKE TWO CAPSULES BY MOUTH IN THE MORNING    TRAMADOL (ULTRAM) 50 MG TABLET    Take 1 tablet (50 mg total) by mouth daily as needed for Pain.     Review of patient's allergies indicates:   Allergen Reactions    Bee sting  [allergen ext-venom-honey bee] Swelling    Penicillins Rash     "Felt like I had pins and needles in my feet" , hospitalized overnight    Poison ivy extract Hives     Review of Systems   Constitutional: Negative for malaise/fatigue.   HENT:  Negative for hearing loss.    Eyes:  Negative for double vision and visual disturbance.   Cardiovascular:  Positive for chest pain.   Respiratory:  Positive for shortness of breath and sleep disturbances due to breathing.    Endocrine: Negative for cold intolerance.   Hematologic/Lymphatic: Does not bruise/bleed easily.   Skin:  Negative for poor wound healing and suspicious lesions.   Musculoskeletal:  Positive for arthritis, back pain, joint pain and joint swelling. Negative for gout.   Gastrointestinal:  Positive for abdominal pain. Negative for nausea and vomiting.   Genitourinary:  Positive for frequency, hesitancy, incomplete emptying and nocturia. Negative for dysuria.   Neurological:  Positive for focal weakness. Negative for numbness, paresthesias and sensory change.   Psychiatric/Behavioral:  Negative for depression, memory loss and substance abuse. The patient is not nervous/anxious.    Allergic/Immunologic: Negative for persistent infections.       Objective:   Body mass index is 28.04 kg/m².  There were no vitals filed for this visit.             General    Constitutional: He is oriented to person, place, and time. He appears well-developed and well-nourished. No distress.   HENT: "   Head: Normocephalic.   Eyes: EOM are normal.   Pulmonary/Chest: Effort normal.   Neurological: He is oriented to person, place, and time.   Psychiatric: He has a normal mood and affect.             Right Hand/Wrist Exam     Inspection   Scars: Wrist - absent Hand -  absent  Effusion: Wrist - absent Hand -  absent    Pain   Wrist - The patient exhibits pain of the CMC and scapholunate/lunate ECU.    Other     Neuorologic Exam    Median Distribution: normal  Ulnar Distribution: normal  Radial Distribution: normal    Comments:  The patient has tenderness right thumb basal joint with a positive axial circumduction grind test.  He is also tender about the radiocarpal joint dorsally.      Left Hand/Wrist Exam     Inspection   Scars: Wrist - absent Hand -  absent  Effusion: Wrist - absent Hand -  absent    Pain   Wrist - The patient exhibits pain of the CMC.    Other     Sensory Exam  Median Distribution: normal  Ulnar Distribution: normal  Radial Distribution: normal    Comments:  The patient has tenderness left thumb basal joint with a positive axial circumduction grind test          Vascular Exam       Capillary Refill  Right Hand: normal capillary refill  Left Hand: normal capillary refill          Relevant imaging results reviewed and interpreted by me, discussed with the patient and / or family today radiographs were not obtained today.  Previous radiographs showed bilateral thumb basal joint arthritis and arthritic change radiocarpal joint  Assessment:     Encounter Diagnoses   Name Primary?    Arthritis of carpometacarpal (CMC) joints of both thumbs Yes    Arthritis/arthropathy of multiple joints     Primary osteoarthritis of right wrist         Plan:     The patient injected bilateral thumb basal joints each with 0.5 cc of Kenalog and 0.5 cc of 2% plain lidocaine in the right wrist radiocarpal joint with 1 cc Kenalog and 1 cc 2% plain lidocaine.  He will wait at least 3 months between  injections.                Disclaimer: This note was prepared using a voice recognition system and is likely to have sound alike errors within the text.

## 2024-02-16 NOTE — PROCEDURES
Intermediate Joint Aspiration/Injection: R radiocarpal    Date/Time: 2/16/2024 9:30 AM    Performed by: Griffin Gutierrez MD  Authorized by: Griffin Gutierrez MD    Consent Done?:  Yes (Verbal)  Indications:  Arthritis  Timeout: Prior to procedure the correct patient, procedure, and site was verified      Location:  Wrist  Site:  R radiocarpal  Prep: Patient was prepped and draped in usual sterile fashion    Ultrasonic Guidance for needle placement: No  Needle size:  25 G  Approach:  Dorsal  Medications:  40 mg triamcinolone acetonide 40 mg/mL

## 2024-02-16 NOTE — PROCEDURES
Small Joint Aspiration/Injection: R thumb CMC, L thumb CMC    Date/Time: 2/16/2024 9:30 AM    Performed by: Griffin Gutierrez MD  Authorized by: Griffin Gutierrez MD    Consent Done?:  Yes (Verbal)  Indications:  Pain  Site marked: the procedure site was marked    Timeout: prior to procedure the correct patient, procedure, and site was verified    Prep: patient was prepped and draped in usual sterile fashion      Local anesthesia used?: Yes    Local anesthetic:  Lidocaine 2% without epinephrine  Anesthetic total (ml):  1    Location:  Thumb  Site:  R thumb CMC and L thumb CMC  Ultrasonic guidance for needle placement?: No    Needle size:  25 G  Approach:  Dorsal  Medications:  40 mg triamcinolone acetonide 40 mg/mL; 40 mg triamcinolone acetonide 40 mg/mL

## 2024-02-22 DIAGNOSIS — M25.473 ANKLE SWELLING, UNSPECIFIED LATERALITY: ICD-10-CM

## 2024-02-22 RX ORDER — FUROSEMIDE 20 MG/1
20 TABLET ORAL DAILY
Qty: 90 TABLET | Refills: 3 | Status: SHIPPED | OUTPATIENT
Start: 2024-02-22

## 2024-02-22 NOTE — TELEPHONE ENCOUNTER
No care due was identified.  [facility] Embedded Care Due Messages. Reference number: [id number].   2/22/2024 11:27:56 AM CST

## 2024-02-22 NOTE — TELEPHONE ENCOUNTER
----- Message from Raymundo Saleh sent at 2/22/2024 11:16 AM CST -----  Contact: [phone]  Patient needs a refill on furosemide (LASIX) 20 MG tablet  called into [facility]  pharmacy at [phone] .  Please call patient at [phone]  if you have any questions.            [facility] - [place], [place] - [address]  [address]  [facility] [address]  Phone: [phone] Fax: [phone]          Thanks KB

## 2024-02-29 ENCOUNTER — LAB VISIT (OUTPATIENT)
Dept: LAB | Facility: HOSPITAL | Age: 89
End: 2024-02-29
Attending: PEDIATRICS
Payer: MEDICARE

## 2024-02-29 DIAGNOSIS — R73.09 ELEVATED GLUCOSE: ICD-10-CM

## 2024-02-29 DIAGNOSIS — D64.9 CHRONIC ANEMIA: ICD-10-CM

## 2024-02-29 DIAGNOSIS — E78.00 PURE HYPERCHOLESTEROLEMIA: Chronic | ICD-10-CM

## 2024-02-29 LAB
ALBUMIN SERPL BCP-MCNC: 3.2 G/DL (ref 3.5–5.2)
ALP SERPL-CCNC: 86 U/L (ref 55–135)
ALT SERPL W/O P-5'-P-CCNC: 29 U/L (ref 10–44)
ANION GAP SERPL CALC-SCNC: 8 MMOL/L (ref 8–16)
AST SERPL-CCNC: 30 U/L (ref 10–40)
BASOPHILS # BLD AUTO: 0.04 K/UL (ref 0–0.2)
BASOPHILS NFR BLD: 0.4 % (ref 0–1.9)
BILIRUB SERPL-MCNC: 0.2 MG/DL (ref 0.1–1)
BUN SERPL-MCNC: 14 MG/DL (ref 10–30)
CALCIUM SERPL-MCNC: 9.1 MG/DL (ref 8.7–10.5)
CHLORIDE SERPL-SCNC: 110 MMOL/L (ref 95–110)
CO2 SERPL-SCNC: 25 MMOL/L (ref 23–29)
CREAT SERPL-MCNC: 0.9 MG/DL (ref 0.5–1.4)
DIFFERENTIAL METHOD BLD: ABNORMAL
EOSINOPHIL # BLD AUTO: 0.2 K/UL (ref 0–0.5)
EOSINOPHIL NFR BLD: 2.1 % (ref 0–8)
ERYTHROCYTE [DISTWIDTH] IN BLOOD BY AUTOMATED COUNT: 16.4 % (ref 11.5–14.5)
EST. GFR  (NO RACE VARIABLE): >60 ML/MIN/1.73 M^2
ESTIMATED AVG GLUCOSE: 126 MG/DL (ref 68–131)
GLUCOSE SERPL-MCNC: 96 MG/DL (ref 70–110)
HBA1C MFR BLD: 6 % (ref 4–5.6)
HCT VFR BLD AUTO: 42.6 % (ref 40–54)
HGB BLD-MCNC: 13 G/DL (ref 14–18)
IMM GRANULOCYTES # BLD AUTO: 0.04 K/UL (ref 0–0.04)
IMM GRANULOCYTES NFR BLD AUTO: 0.4 % (ref 0–0.5)
LYMPHOCYTES # BLD AUTO: 0.9 K/UL (ref 1–4.8)
LYMPHOCYTES NFR BLD: 10.3 % (ref 18–48)
MCH RBC QN AUTO: 28.4 PG (ref 27–31)
MCHC RBC AUTO-ENTMCNC: 30.5 G/DL (ref 32–36)
MCV RBC AUTO: 93 FL (ref 82–98)
MONOCYTES # BLD AUTO: 0.7 K/UL (ref 0.3–1)
MONOCYTES NFR BLD: 7.7 % (ref 4–15)
NEUTROPHILS # BLD AUTO: 7.2 K/UL (ref 1.8–7.7)
NEUTROPHILS NFR BLD: 79.1 % (ref 38–73)
NRBC BLD-RTO: 0 /100 WBC
PLATELET # BLD AUTO: 220 K/UL (ref 150–450)
PMV BLD AUTO: 11.3 FL (ref 9.2–12.9)
POTASSIUM SERPL-SCNC: 4.6 MMOL/L (ref 3.5–5.1)
PROT SERPL-MCNC: 6.5 G/DL (ref 6–8.4)
RBC # BLD AUTO: 4.58 M/UL (ref 4.6–6.2)
SODIUM SERPL-SCNC: 143 MMOL/L (ref 136–145)
WBC # BLD AUTO: 9.15 K/UL (ref 3.9–12.7)

## 2024-02-29 PROCEDURE — 85025 COMPLETE CBC W/AUTO DIFF WBC: CPT | Mod: HCNC | Performed by: PEDIATRICS

## 2024-02-29 PROCEDURE — 80053 COMPREHEN METABOLIC PANEL: CPT | Mod: HCNC | Performed by: PEDIATRICS

## 2024-02-29 PROCEDURE — 36415 COLL VENOUS BLD VENIPUNCTURE: CPT | Mod: HCNC | Performed by: PEDIATRICS

## 2024-02-29 PROCEDURE — 83036 HEMOGLOBIN GLYCOSYLATED A1C: CPT | Mod: HCNC | Performed by: PEDIATRICS

## 2024-03-04 DIAGNOSIS — M25.473 ANKLE SWELLING, UNSPECIFIED LATERALITY: ICD-10-CM

## 2024-03-04 NOTE — TELEPHONE ENCOUNTER
No care due was identified.  Health system Embedded Care Due Messages. Reference number: [id number].   3/04/2024 12:51:36 PM CST

## 2024-03-05 ENCOUNTER — OFFICE VISIT (OUTPATIENT)
Dept: INTERNAL MEDICINE | Facility: CLINIC | Age: 89
End: 2024-03-05
Payer: MEDICARE

## 2024-03-05 VITALS
RESPIRATION RATE: 16 BRPM | WEIGHT: 178.81 LBS | BODY MASS INDEX: 28.74 KG/M2 | DIASTOLIC BLOOD PRESSURE: 78 MMHG | HEIGHT: 66 IN | HEART RATE: 80 BPM | TEMPERATURE: 98 F | SYSTOLIC BLOOD PRESSURE: 122 MMHG | OXYGEN SATURATION: 96 %

## 2024-03-05 DIAGNOSIS — J44.9 CHRONIC OBSTRUCTIVE PULMONARY DISEASE, UNSPECIFIED COPD TYPE: ICD-10-CM

## 2024-03-05 DIAGNOSIS — R29.3 POOR POSTURE: ICD-10-CM

## 2024-03-05 DIAGNOSIS — I70.0 ATHEROSCLEROSIS OF AORTA: ICD-10-CM

## 2024-03-05 DIAGNOSIS — R79.89 LOW TESTOSTERONE: ICD-10-CM

## 2024-03-05 DIAGNOSIS — I71.40 ABDOMINAL AORTIC ANEURYSM (AAA) WITHOUT RUPTURE, UNSPECIFIED PART: ICD-10-CM

## 2024-03-05 DIAGNOSIS — D64.9 CHRONIC ANEMIA: ICD-10-CM

## 2024-03-05 DIAGNOSIS — J84.10 CALCIFIED GRANULOMA OF LUNG: ICD-10-CM

## 2024-03-05 DIAGNOSIS — I10 ESSENTIAL HYPERTENSION: Primary | Chronic | ICD-10-CM

## 2024-03-05 DIAGNOSIS — R73.03 PRE-DIABETES: ICD-10-CM

## 2024-03-05 DIAGNOSIS — M47.816 LUMBAR ARTHROPATHY: ICD-10-CM

## 2024-03-05 DIAGNOSIS — Z74.09 IMPAIRED FUNCTIONAL MOBILITY, BALANCE, GAIT, AND ENDURANCE: ICD-10-CM

## 2024-03-05 DIAGNOSIS — L71.9 ROSACEA: ICD-10-CM

## 2024-03-05 DIAGNOSIS — G62.9 PERIPHERAL POLYNEUROPATHY: ICD-10-CM

## 2024-03-05 DIAGNOSIS — E78.00 PURE HYPERCHOLESTEROLEMIA: Chronic | ICD-10-CM

## 2024-03-05 DIAGNOSIS — I27.21 PAH (PULMONARY ARTERY HYPERTENSION): ICD-10-CM

## 2024-03-05 DIAGNOSIS — M51.36 DDD (DEGENERATIVE DISC DISEASE), LUMBAR: ICD-10-CM

## 2024-03-05 PROCEDURE — 1160F RVW MEDS BY RX/DR IN RCRD: CPT | Mod: HCNC,CPTII,S$GLB, | Performed by: PEDIATRICS

## 2024-03-05 PROCEDURE — 3288F FALL RISK ASSESSMENT DOCD: CPT | Mod: HCNC,CPTII,S$GLB, | Performed by: PEDIATRICS

## 2024-03-05 PROCEDURE — 1159F MED LIST DOCD IN RCRD: CPT | Mod: HCNC,CPTII,S$GLB, | Performed by: PEDIATRICS

## 2024-03-05 PROCEDURE — 99214 OFFICE O/P EST MOD 30 MIN: CPT | Mod: HCNC,S$GLB,, | Performed by: PEDIATRICS

## 2024-03-05 PROCEDURE — 1101F PT FALLS ASSESS-DOCD LE1/YR: CPT | Mod: HCNC,CPTII,S$GLB, | Performed by: PEDIATRICS

## 2024-03-05 PROCEDURE — 1126F AMNT PAIN NOTED NONE PRSNT: CPT | Mod: HCNC,CPTII,S$GLB, | Performed by: PEDIATRICS

## 2024-03-05 PROCEDURE — 99999 PR PBB SHADOW E&M-EST. PATIENT-LVL V: CPT | Mod: PBBFAC,HCNC,, | Performed by: PEDIATRICS

## 2024-03-05 RX ORDER — METRONIDAZOLE 7.5 MG/G
GEL TOPICAL 2 TIMES DAILY
Qty: 45 G | Refills: 3 | Status: SHIPPED | OUTPATIENT
Start: 2024-03-05 | End: 2025-03-05

## 2024-03-05 RX ORDER — FUROSEMIDE 20 MG/1
20 TABLET ORAL 2 TIMES DAILY
Qty: 60 TABLET | Refills: 0 | OUTPATIENT
Start: 2024-03-05

## 2024-03-05 NOTE — TELEPHONE ENCOUNTER
Refill Decision Note   Chao Hawk  is requesting a refill authorization.  Brief Assessment and Rationale for Refill:  Quick Discontinue     Medication Therapy Plan:  Receipt confirmed by pharmacy (2/22/2024  1:14 PM CST)      Comments:     Note composed:2:39 AM 03/05/2024

## 2024-03-05 NOTE — PROGRESS NOTES
Subjective     Patient ID: Chao Hawk is a 89 y.o. male.    Chief Complaint: Follow-up    Chao Hawk is a 89 year old male here for his 6 month follow up.    PMHx, PSHx, SocHx, and FHx reviewed and discussed with patient.    1) HTN: B/P normal, no HTNive symptoms  2) LIPIDS: following D&E, tolerating and compliant with med(s).    3) BPH: on meds and Sx acceptable seeing Urology.  4) COPD/Pulmonary artery HTN: sees pulmonary, worsening, chronically SOB but stable, using inhalers but has not noticed much help  5) Low T: no longer On depo testosterone.  6) Back/Neuropathy: s/p surgery, uses canes only for balance and support for uneven surface, has residual feet numbness. Worse in AM until he gets around. Uses only CBD oil. Not any other OTC or tramadol. Fall risk is now minimal, uses cane, no falls.  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   8) AAA: medical management. CT 2018: distal abdominal aortic aneurysm measuring 3.6 cm AP dimension 3.3 cm transverse dimension.  There is also fusiform aneurysmal dilatation of the mid infrarenal abdominal aorta measuring approximately 3.2 cm in diameter. Last US 2021  9) Anemia/Hx of GI bleed/gastritis/GERD: on protonix  10) Prediabetes: no hyperglycemic sxs    LABS REVIEWED AND DISCUSSED WITH PATIENT      Review of Systems   Constitutional:  Negative for chills and fever.   HENT:  Negative for nasal congestion and rhinorrhea.    Respiratory:  Negative for cough and shortness of breath.    Cardiovascular:  Negative for chest pain.   Gastrointestinal:  Negative for abdominal pain, blood in stool, change in bowel habit, constipation, diarrhea and nausea.   Endocrine: Negative for cold intolerance, heat intolerance, polydipsia, polyphagia and polyuria.   Genitourinary:  Negative for dysuria.   Integumentary:  Positive for rash.   Neurological:  Negative for weakness.          Objective     Physical Exam  Constitutional:       General: He is not in acute  distress.     Appearance: Normal appearance. He is normal weight. He is not ill-appearing, toxic-appearing or diaphoretic.   HENT:      Head: Atraumatic.   Cardiovascular:      Rate and Rhythm: Normal rate and regular rhythm.      Pulses: Normal pulses.      Heart sounds: Normal heart sounds. No murmur heard.  Pulmonary:      Effort: Pulmonary effort is normal. No respiratory distress.      Breath sounds: Normal breath sounds. No stridor. No wheezing, rhonchi or rales.   Chest:      Chest wall: No tenderness.   Abdominal:      General: Abdomen is flat. Bowel sounds are normal. There is no distension.      Palpations: Abdomen is soft. There is no mass.      Tenderness: There is no abdominal tenderness. There is no guarding.   Skin:     Comments: Mild rosacea across nose   Neurological:      General: No focal deficit present.      Mental Status: He is alert and oriented to person, place, and time. Mental status is at baseline.      Cranial Nerves: No cranial nerve deficit.      Sensory: No sensory deficit.      Motor: No weakness.      Gait: Gait abnormal (shuffling due to djd).   Psychiatric:         Mood and Affect: Mood normal.         Behavior: Behavior normal.         Thought Content: Thought content normal.         Judgment: Judgment normal.            Assessment and Plan     1. Essential hypertension    2. Chronic obstructive pulmonary disease, unspecified COPD type  Overview:  Advair, oxygen.       3. PAH (pulmonary artery hypertension)  Overview:  NC 2lm for sleep  Echo 8/12/2020  Concentric left ventricular remodeling.  Normal central venous pressure (3 mmHg).  The estimated PA systolic pressure is 35 mmHg.  Normal left ventricular systolic function. The estimated ejection fraction is 55%.  Normal LV diastolic function.  No wall motion abnormalities.      4. Atherosclerosis of aorta  Overview:  CXR 8/21/17      5. Calcified granuloma of lung  Overview:  CT abdomen 11-14-17 in Care Everywhere: Calcified  granuloma of right middle lobe lung      6. Pure hypercholesterolemia  -     Comprehensive Metabolic Panel; Future; Expected date: 03/05/2024  -     Lipid Panel; Future; Expected date: 03/05/2024    7. Abdominal aortic aneurysm (AAA) without rupture, unspecified part  Overview:   7/ 2021...The distal abdominal aorta again demonstrates aneurysmal dilatation with maximum diameter of 3.8 cm, slightly increased from 3.6 cm previously though this could be due to technical differences.       Orders:  -     Radiology US Abdominal Aorta; Future; Expected date: 03/05/2024    8. Lumbar arthropathy    9. Peripheral polyneuropathy    10. Chronic anemia  -     CBC Auto Differential; Future; Expected date: 03/05/2024    11. Low testosterone    12. Poor posture    13. Impaired functional mobility, balance, gait, and endurance    14. DDD (degenerative disc disease), lumbar    15. Pre-diabetes  -     Hemoglobin A1C; Future; Expected date: 03/05/2024    16. Rosacea  -     metroNIDAZOLE (METROGEL) 0.75 % gel; Apply topically 2 (two) times daily.  Dispense: 45 g; Refill: 3        Metrogel prescribed. At goal for B/P, lipids, and A1c. Maintain meds, self monitoring D&E, weight moderation. RSV vaccine recommended. F/U 6 months with labs.           Follow up in about 6 months (around 9/5/2024).

## 2024-03-08 ENCOUNTER — HOSPITAL ENCOUNTER (OUTPATIENT)
Dept: RADIOLOGY | Facility: HOSPITAL | Age: 89
Discharge: HOME | End: 2024-03-08
Attending: PEDIATRICS
Payer: MEDICARE

## 2024-03-08 ENCOUNTER — OFFICE VISIT (OUTPATIENT)
Dept: PULMONOLOGY | Facility: CLINIC | Age: 89
End: 2024-03-08
Payer: MEDICARE

## 2024-03-08 VITALS
SYSTOLIC BLOOD PRESSURE: 110 MMHG | HEART RATE: 89 BPM | HEIGHT: 66 IN | DIASTOLIC BLOOD PRESSURE: 76 MMHG | OXYGEN SATURATION: 94 % | RESPIRATION RATE: 17 BRPM | WEIGHT: 176.13 LBS | BODY MASS INDEX: 28.31 KG/M2

## 2024-03-08 DIAGNOSIS — J84.10 CALCIFIED GRANULOMA OF LUNG: ICD-10-CM

## 2024-03-08 DIAGNOSIS — I27.21 PAH (PULMONARY ARTERY HYPERTENSION): ICD-10-CM

## 2024-03-08 DIAGNOSIS — I71.40 ABDOMINAL AORTIC ANEURYSM (AAA) WITHOUT RUPTURE, UNSPECIFIED PART: ICD-10-CM

## 2024-03-08 DIAGNOSIS — J44.9 CHRONIC OBSTRUCTIVE PULMONARY DISEASE, UNSPECIFIED COPD TYPE: Primary | Chronic | ICD-10-CM

## 2024-03-08 PROCEDURE — 76775 US EXAM ABDO BACK WALL LIM: CPT | Mod: TC,HCNC

## 2024-03-08 PROCEDURE — 99999 PR PBB SHADOW E&M-EST. PATIENT-LVL V: CPT | Mod: PBBFAC,HCNC,, | Performed by: NURSE PRACTITIONER

## 2024-03-08 PROCEDURE — 1101F PT FALLS ASSESS-DOCD LE1/YR: CPT | Mod: HCNC,CPTII,S$GLB, | Performed by: NURSE PRACTITIONER

## 2024-03-08 PROCEDURE — 1126F AMNT PAIN NOTED NONE PRSNT: CPT | Mod: HCNC,CPTII,S$GLB, | Performed by: NURSE PRACTITIONER

## 2024-03-08 PROCEDURE — 99213 OFFICE O/P EST LOW 20 MIN: CPT | Mod: HCNC,S$GLB,, | Performed by: NURSE PRACTITIONER

## 2024-03-08 PROCEDURE — 1159F MED LIST DOCD IN RCRD: CPT | Mod: HCNC,CPTII,S$GLB, | Performed by: NURSE PRACTITIONER

## 2024-03-08 PROCEDURE — 3288F FALL RISK ASSESSMENT DOCD: CPT | Mod: HCNC,CPTII,S$GLB, | Performed by: NURSE PRACTITIONER

## 2024-03-08 PROCEDURE — 1160F RVW MEDS BY RX/DR IN RCRD: CPT | Mod: HCNC,CPTII,S$GLB, | Performed by: NURSE PRACTITIONER

## 2024-03-08 PROCEDURE — 76775 US EXAM ABDO BACK WALL LIM: CPT | Mod: 26,HCNC,, | Performed by: RADIOLOGY

## 2024-03-08 NOTE — PROGRESS NOTES
"Subjective:      Patient ID: Chao Hawk is a 89 y.o. male.    Chief Complaint: COPD    COPD      89 year old male with COPD. He becomes fatigued easy and frequent breaks. No wheezing.    No fever, chills, or hemoptysis. No pleuritic type chest pain. Ambulates with a cane.   Compliant with Trelegy.    Patient Active Problem List   Diagnosis    COPD (chronic obstructive pulmonary disease)    Low testosterone    Essential hypertension    Pure hypercholesterolemia    BPH (benign prostatic hyperplasia)    Atherosclerosis of aorta    Abdominal aortic aneurysm without rupture    DDD (degenerative disc disease), lumbar    Multiple renal cysts    Overweight (BMI 25.0-29.9)    Lumbar arthropathy    Calcified granuloma of lung    Bilateral leg edema    PAH (pulmonary artery hypertension)    History of diverticulitis    Nocturnal hypoxemia    Trochanteric bursitis of left hip    Impingement syndrome of right shoulder    Shoulder arthritis    Poor posture    Impaired functional mobility, balance, gait, and endurance    Decreased range of motion    Proximal muscle weakness    Rotator cuff arthropathy of right shoulder    Right glenohumeral arthritis -end stage    Peripheral neuropathy    S/P reverse total shoulder arthroplasty, right    Decreased right shoulder range of motion    Inguinal abscess    Chest pain syndrome    Chronic anemia    Acute blood loss anemia    Melena    Pre-diabetes     /76   Pulse 89   Resp 17   Ht 5' 6" (1.676 m)   Wt 79.9 kg (176 lb 2.4 oz)   SpO2 (!) 94%   BMI 28.43 kg/m²   Body mass index is 28.43 kg/m².    Review of Systems   Constitutional: Negative.    HENT: Negative.     Respiratory:  Positive for dyspnea on extertion.    Cardiovascular: Negative.    Musculoskeletal:  Positive for gait problem.   Gastrointestinal: Negative.    Psychiatric/Behavioral: Negative.       Objective:      Physical Exam  Constitutional:       Appearance: Normal appearance. He is obese.   HENT:      Head: " Normocephalic and atraumatic.      Nose: Nose normal.   Cardiovascular:      Rate and Rhythm: Normal rate and regular rhythm.   Pulmonary:      Effort: Pulmonary effort is normal.      Breath sounds: Normal breath sounds.   Abdominal:      Palpations: Abdomen is soft.      Tenderness: There is no abdominal tenderness.   Musculoskeletal:         General: Normal range of motion.      Cervical back: Normal range of motion and neck supple.   Skin:     General: Skin is warm and dry.   Neurological:      General: No focal deficit present.      Mental Status: He is alert and oriented to person, place, and time.   Psychiatric:         Mood and Affect: Mood normal.         Behavior: Behavior normal.       Personal Diagnostic Review    Results for orders placed during the hospital encounter of 03/06/23    X-Ray Chest PA And Lateral    Narrative  EXAM: XR CHEST PA AND LATERAL    CLINICAL HISTORY:  Chronic obstructive pulmonary disease, unspecified.    TECHNIQUE:   Chest x-ray 2 views, 3 images.    FINDINGS:  Comparison study 10/25/2022, 01/21/2022.  No change.  Normal size heart.  Aortic atherosclerosis.  No congestion.  Minimal basilar scarring.  Otherwise, lungs are clear.  Degenerative spine.  Right shoulder replacement.    Impression  Stable chest x-ray.    Finalized on: 3/6/2023 12:12 PM By:  Landon Smith MD  [facility]# [id number]      2023-03-06 12:14:16.135    [facility]        Assessment:       1. Chronic obstructive pulmonary disease, unspecified COPD type    2. Calcified granuloma of lung    3. PAH (pulmonary artery hypertension)        Outpatient Encounter Medications as of 3/8/2024   Medication Sig Dispense Refill    acetaminophen (TYLENOL) 500 MG tablet Take 500 mg by mouth as needed for Pain.      albuterol (PROVENTIL) 2.5 mg /3 mL (0.083 %) nebulizer solution Take 3 mLs (2.5 mg total) by nebulization every 4 (four) hours as needed for Wheezing. Rescue 150 mL 11    ARGININE, L-ARGININE, ORAL Take 500 mg by mouth once daily.       atorvastatin (LIPITOR) 40 MG tablet Take 1 tablet (40 mg total) by mouth once daily. 90 tablet 1    betamethasone valerate 0.1% (VALISONE) 0.1 % Oint Apply topically 2 (two) times daily. 45 g 1    CANNABIDIOL, CBD, EXTRACT ORAL Take 3 drops by mouth every morning.      cholecalciferol, vitamin D3, (VITAMIN D3) 50 mcg (2,000 unit) Cap Take 1 capsule by mouth every evening.       COQ10, UBIQUINOL, ORAL Take 1 tablet by mouth nightly.       cyanocobalamin (VITAMIN B-12) 1000 MCG tablet Take 100 mcg by mouth once daily.      diclofenac sodium (VOLTAREN) 1 % Gel Apply 2 g topically 3 (three) times daily. 1 each 3    fluticasone-umeclidin-vilanter (TRELEGY ELLIPTA) 200-62.5-25 mcg inhaler Inhale 1 puff into the lungs once daily. 60 each 11    furosemide (LASIX) 20 MG tablet Take 1 tablet (20 mg total) by mouth once daily. 90 tablet 3    gabapentin (NEURONTIN) 100 MG capsule TAKE ONE CAPSULE BY MOUTH TWICE DAILY 180 capsule 3    GLUCOSAM-MSM-CHONDROIT-VIT D3 ORAL Take 2 tablets by mouth once daily.      krill/om-3/dha/epa/phospho/ast (MEGARED OMEGA-3 KRILL OIL ORAL) Take 1 capsule by mouth nightly.       lisinopriL 10 MG tablet TAKE 1 TABLET EVERY DAY 90 tablet 3    metroNIDAZOLE (METROGEL) 0.75 % gel Apply topically 2 (two) times daily. 45 g 3    multivit-min/folic/vit K/lycop (ONE-A-DAY MEN'S MULTIVITAMIN ORAL) Take 1 tablet by mouth every evening.       potassium 99 mg Tab Take by mouth once daily.       prednisoLONE acetate (PRED FORTE) 1 % DrpS Place 1 drop into the right eye 2 (two) times daily.      tamsulosin (FLOMAX) 0.4 mg Cap TAKE TWO CAPSULES BY MOUTH IN THE MORNING 180 capsule 3    traMADoL (ULTRAM) 50 mg tablet Take 1 tablet (50 mg total) by mouth daily as needed for Pain. 30 tablet 0    albuterol (PROVENTIL) 2.5 mg /3 mL (0.083 %) nebulizer solution Take 3 mLs (2.5 mg total) by nebulization every 4 to 6 hours as needed for Wheezing or Shortness of Breath. 360 mL 11    dutasteride (AVODART) 0.5 mg capsule  Take 1 capsule (0.5 mg total) by mouth once daily. 90 capsule 3    pantoprazole (PROTONIX) 40 MG tablet Take 1 tablet (40 mg total) by mouth 2 (two) times daily. 120 tablet 0    [DISCONTINUED] furosemide (LASIX) 20 MG tablet TAKE 1 TABLET EVERY DAY 90 tablet 3     No facility-administered encounter medications on file as of 3/8/2024.     No orders of the defined types were placed in this encounter.    Plan:   Continue Trelegy.   Stable     Problem List Items Addressed This Visit          Pulmonary    COPD (chronic obstructive pulmonary disease) - Primary (Chronic)    Overview     Advair, oxygen.          Calcified granuloma of lung    Overview     CT abdomen 11-14-17 in Care Everywhere: Calcified granuloma of right middle lobe lung            Cardiac/Vascular    PAH (pulmonary artery hypertension)    Overview     NC 2lm for sleep- returned  Echo 8/12/2020  Concentric left ventricular remodeling.  Normal central venous pressure (3 mmHg).  The estimated PA systolic pressure is 35 mmHg.  Normal left ventricular systolic function. The estimated ejection fraction is 55%.  Normal LV diastolic function.  No wall motion abnormalities.                         Elizabeth LeJeune, ACNP, ANP

## 2024-03-11 DIAGNOSIS — I71.40 ABDOMINAL AORTIC ANEURYSM (AAA) WITHOUT RUPTURE, UNSPECIFIED PART: Primary | ICD-10-CM

## 2024-03-13 DIAGNOSIS — I71.40 ABDOMINAL AORTIC ANEURYSM (AAA) WITHOUT RUPTURE, UNSPECIFIED PART: Primary | ICD-10-CM

## 2024-03-13 DIAGNOSIS — I70.0 ATHEROSCLEROSIS OF AORTA: ICD-10-CM

## 2024-03-15 ENCOUNTER — TELEPHONE (OUTPATIENT)
Dept: INTERNAL MEDICINE | Facility: CLINIC | Age: 89
End: 2024-03-15
Payer: MEDICARE

## 2024-03-15 NOTE — TELEPHONE ENCOUNTER
----- Message from Amari Ureña MD sent at 3/11/2024  6:52 AM CDT -----  The abdominal aneurysm may be increasing in size. My staff will call you to see vascular surgery to see if anything needs to be done.

## 2024-04-01 ENCOUNTER — INITIAL CONSULT (OUTPATIENT)
Dept: VASCULAR SURGERY | Facility: CLINIC | Age: 89
End: 2024-04-01
Payer: MEDICARE

## 2024-04-01 VITALS — DIASTOLIC BLOOD PRESSURE: 60 MMHG | SYSTOLIC BLOOD PRESSURE: 115 MMHG

## 2024-04-01 DIAGNOSIS — G62.9 PERIPHERAL POLYNEUROPATHY: ICD-10-CM

## 2024-04-01 DIAGNOSIS — I10 ESSENTIAL HYPERTENSION: Chronic | ICD-10-CM

## 2024-04-01 DIAGNOSIS — I71.40 ABDOMINAL AORTIC ANEURYSM (AAA) WITHOUT RUPTURE, UNSPECIFIED PART: Primary | ICD-10-CM

## 2024-04-01 PROCEDURE — 99204 OFFICE O/P NEW MOD 45 MIN: CPT | Mod: HCNC,S$GLB,, | Performed by: STUDENT IN AN ORGANIZED HEALTH CARE EDUCATION/TRAINING PROGRAM

## 2024-04-01 PROCEDURE — 99999 PR PBB SHADOW E&M-EST. PATIENT-LVL III: CPT | Mod: PBBFAC,HCNC,, | Performed by: STUDENT IN AN ORGANIZED HEALTH CARE EDUCATION/TRAINING PROGRAM

## 2024-04-01 NOTE — PROGRESS NOTES
"[facility]    OFFICE NOTE    DATE OF VISIT: 2024  PATIENT NAME: Chao Hawk  : 1932  MRN: [id number]  PRIMARY CARE PHYSICIAN: Amari Ureña MD  CARDIOLOGIST:   REFERRING PROVIDER: Amari Ureña MD    CHIEF COMPLAINT   Chief Complaint   Patient presents with    Consult     Consult for AAA. Patient denies any abdominal pain, and states that he's always had back pain.        HISTORY OF PRESENT ILLNESS:  Chao Hawk is a 89 y.o. male who presents to clinic today has known history of fusiform abdominal aortic aneurysm previously 3.8 it is now 4.4 cm on recent u/s. He is pleasantly confused. He denies any symptoms of abdominal or back pain. He does seem to understand that he has an aneurysm and what that means. He is a former smoker    ALLERGIES:  Review of patient's allergies indicates:   Allergen Reactions    Bee sting  [allergen ext-venom-honey bee] Swelling    Penicillins Rash     "Felt like I had pins and needles in my feet" , hospitalized overnight    Poison ivy extract Hives       PAST MEDICAL HISTORY:  Past Medical History:   Diagnosis Date    Anemia     Arthritis     back, hand    Back pain     Dr. Cristobal- [facility]    Bilateral leg edema 2018    Chronic bronchitis     COPD (chronic obstructive pulmonary disease)     Diverticulitis of large intestine with perforation without abscess or bleeding     Diverticulosis 06    colonoscopy    Hearing loss, bilateral     Hernia     x2    Hyperlipidemia     Hypertension     Lumbar radiculopathy 2017    Multiple renal cysts 2016    Nocturnal hypoxemia     Polyneuropathy     Tobacco dependence     Trouble in sleeping        PAST SURGICAL HISTORY:  Past Surgical History:   Procedure Laterality Date    ESOPHAGOGASTRODUODENOSCOPY N/A 10/26/2022    Procedure: EGD (ESOPHAGOGASTRODUODENOSCOPY);  Surgeon: Mallika Duran MD;  Location: [facility];  Service: Endoscopy;  Laterality: N/A;    FINGER SURGERY Left 1980s    index- Dr." Murtague; to remove nodule    HERNIA REPAIR      x2    INTRAOCULAR LENS INSERTION Bilateral 2016    LUMBAR SPINE SURGERY  2017    REVERSE TOTAL SHOULDER ARTHROPLASTY Right 10/5/2020    Procedure: ARTHROPLASTY, SHOULDER, TOTAL, REVERSE;  Surgeon: Reji Martin MD;  Location: [facility];  Service: Orthopedics;  Laterality: Right;    TONSILLECTOMY, ADENOIDECTOMY         SOCIAL HISTORY:   Social History     Tobacco Use    Smoking status: Former     Current packs/day: 0.00     Average packs/day: 1 pack/day for 42.0 years (42.0 ttl pk-yrs)     Types: Cigarettes     Start date: 1949     Quit date: 1991     Years since quittin.2    Smokeless tobacco: Former   Substance Use Topics    Alcohol use: Not Currently     Comment: rarely   No alcohol 72h  prior to sx    Drug use: No       FAMILY HISTORY:  Family History   Problem Relation Age of Onset    Emphysema Mother     Heart disease Mother     COPD Mother     Appendicitis Father     Heart disease Son     Atrial fibrillation Sister     Lung disease Sister 91    Colon cancer Neg Hx     Cancer Neg Hx     Stroke Neg Hx        REVIEW OF SYSTEMS:  ROS  10 pt ros otherwise negative    PHYSICAL EXAM:  Vitals:    24 0801   BP: 115/60      Physical Exam      Gen pleasantly confused, no acute distress  Abd soft nt nd  Ext no edema  Walks with cane  Vitals stable  No gross focal deficits    VASCULAR LAB STUDIES:  None today    ASSESSMENT AND PLAN:  Essential hypertension  Medical therapy    Abdominal aortic aneurysm without rupture  4.4cm not quite size threshold for repair although I do not feel he is a candidate for any major surgery even endovascular repair for this aneurysm. I will have him come see me in office in 1 year just to check in on him but I explained to him that we will simply be monitoring the aneurysm.      Chao was seen today for consult.    Diagnoses and all orders for this visit:    Abdominal aortic aneurysm (AAA) without  rupture, unspecified part  -     Ambulatory referral/consult to Vascular Surgery    Peripheral polyneuropathy    Essential hypertension        No follow-ups on file.        [facility]  [facility]  Vascular Surgery  [phone] (Clinic Number)

## 2024-04-01 NOTE — ASSESSMENT & PLAN NOTE
4.4cm not quite size threshold for repair although I do not feel he is a candidate for any major surgery even endovascular repair for this aneurysm. I will have him come see me in office in 1 year just to check in on him but I explained to him that we will simply be monitoring the aneurysm.

## 2024-04-03 ENCOUNTER — PATIENT MESSAGE (OUTPATIENT)
Dept: PULMONOLOGY | Facility: CLINIC | Age: 89
End: 2024-04-03
Payer: MEDICARE

## 2024-06-10 ENCOUNTER — TELEPHONE (OUTPATIENT)
Dept: OTOLARYNGOLOGY | Facility: CLINIC | Age: 89
End: 2024-06-10
Payer: MEDICARE

## 2024-06-10 NOTE — TELEPHONE ENCOUNTER
----- Message from Hannah Payne sent at 6/7/2024  5:49 PM CDT -----  Type:  Sooner Apoointment Request    Caller is requesting a sooner appointment.  Caller declined first available appointment listed below.  Caller will not accept being placed on the waitlist and is requesting a message be sent to doctor.  Name of Caller: Pt  When is the first available appointment?  Symptoms:  Would the patient rather a call back or a response via [facility]? Call  Best Call Back Number: [phone]  Additional Information:

## 2024-06-12 DIAGNOSIS — I10 ESSENTIAL HYPERTENSION: Chronic | ICD-10-CM

## 2024-06-12 DIAGNOSIS — I70.0 ATHEROSCLEROSIS OF AORTA: Primary | ICD-10-CM

## 2024-06-13 ENCOUNTER — OFFICE VISIT (OUTPATIENT)
Dept: CARDIOLOGY | Facility: CLINIC | Age: 89
End: 2024-06-13
Payer: MEDICARE

## 2024-06-13 ENCOUNTER — HOSPITAL ENCOUNTER (OUTPATIENT)
Dept: CARDIOLOGY | Facility: HOSPITAL | Age: 89
Discharge: HOME | End: 2024-06-13
Payer: MEDICARE

## 2024-06-13 VITALS
OXYGEN SATURATION: 92 % | WEIGHT: 175.69 LBS | HEART RATE: 93 BPM | HEIGHT: 66 IN | DIASTOLIC BLOOD PRESSURE: 58 MMHG | SYSTOLIC BLOOD PRESSURE: 130 MMHG | BODY MASS INDEX: 28.23 KG/M2

## 2024-06-13 DIAGNOSIS — I70.0 ATHEROSCLEROSIS OF AORTA: ICD-10-CM

## 2024-06-13 DIAGNOSIS — I27.21 PAH (PULMONARY ARTERY HYPERTENSION): ICD-10-CM

## 2024-06-13 DIAGNOSIS — E78.00 PURE HYPERCHOLESTEROLEMIA: Chronic | ICD-10-CM

## 2024-06-13 DIAGNOSIS — E66.3 OVERWEIGHT (BMI 25.0-29.9): ICD-10-CM

## 2024-06-13 DIAGNOSIS — I10 ESSENTIAL HYPERTENSION: Chronic | ICD-10-CM

## 2024-06-13 DIAGNOSIS — I73.9 PAD (PERIPHERAL ARTERY DISEASE): Primary | ICD-10-CM

## 2024-06-13 DIAGNOSIS — I71.40 ABDOMINAL AORTIC ANEURYSM (AAA) WITHOUT RUPTURE, UNSPECIFIED PART: ICD-10-CM

## 2024-06-13 LAB
OHS QRS DURATION: 94 MS
OHS QTC CALCULATION: 412 MS

## 2024-06-13 PROCEDURE — 99999 PR PBB SHADOW E&M-EST. PATIENT-LVL IV: CPT | Mod: PBBFAC,HCNC,,

## 2024-06-13 PROCEDURE — 1125F AMNT PAIN NOTED PAIN PRSNT: CPT | Mod: HCNC,CPTII,S$GLB,

## 2024-06-13 PROCEDURE — 93010 ELECTROCARDIOGRAM REPORT: CPT | Mod: HCNC,,, | Performed by: INTERNAL MEDICINE

## 2024-06-13 PROCEDURE — 1160F RVW MEDS BY RX/DR IN RCRD: CPT | Mod: HCNC,CPTII,S$GLB,

## 2024-06-13 PROCEDURE — 93005 ELECTROCARDIOGRAM TRACING: CPT | Mod: HCNC

## 2024-06-13 PROCEDURE — 1159F MED LIST DOCD IN RCRD: CPT | Mod: HCNC,CPTII,S$GLB,

## 2024-06-13 PROCEDURE — 99214 OFFICE O/P EST MOD 30 MIN: CPT | Mod: HCNC,S$GLB,,

## 2024-06-13 PROCEDURE — 1100F PTFALLS ASSESS-DOCD GE2>/YR: CPT | Mod: HCNC,CPTII,S$GLB,

## 2024-06-13 PROCEDURE — 3288F FALL RISK ASSESSMENT DOCD: CPT | Mod: HCNC,CPTII,S$GLB,

## 2024-06-13 NOTE — PROGRESS NOTES
Subjective:   Patient ID:  Chao Hawk is a 89 y.o. male who presents for evaluation of No chief complaint on file.      HPI 89y/o M with PMHx of anemia, COPD, Hlp, HTN, PAH, AAA followed by CVT last followed by Dr. Hoffmann in 22' here today c/o numbness in calves for months but now has keturah feet pain when he walks. Denies any CP, dizziness/falls or palpitations. Still drives, lives alone and cooks his own food. Bp stable    Echo 22' EF nml mod TR PA 47  Past Medical History:   Diagnosis Date    Anemia     Arthritis     back, hand    Back pain     Dr. Cristobal- [facility]    Bilateral leg edema 2/16/2018    Chronic bronchitis     COPD (chronic obstructive pulmonary disease)     Diverticulitis of large intestine with perforation without abscess or bleeding     Diverticulosis 5/16/06    colonoscopy    Hearing loss, bilateral     Hernia     x2    Hyperlipidemia     Hypertension     Lumbar radiculopathy 6/14/2017    Multiple renal cysts 7/5/2016    Nocturnal hypoxemia     Polyneuropathy     Tobacco dependence     Trouble in sleeping        Past Surgical History:   Procedure Laterality Date    ESOPHAGOGASTRODUODENOSCOPY N/A 10/26/2022    Procedure: EGD (ESOPHAGOGASTRODUODENOSCOPY);  Surgeon: Mallika Duran MD;  Location: [facility] ENDO;  Service: Endoscopy;  Laterality: N/A;    FINGER SURGERY Left 1980s    index- Dr. Encarnacion; to remove nodule    HERNIA REPAIR  1990s    x2    INTRAOCULAR LENS INSERTION Bilateral 2016    LUMBAR SPINE SURGERY  09/13/2017    REVERSE TOTAL SHOULDER ARTHROPLASTY Right 10/5/2020    Procedure: ARTHROPLASTY, SHOULDER, TOTAL, REVERSE;  Surgeon: Reji Martin MD;  Location: [facility] OR;  Service: Orthopedics;  Laterality: Right;    TONSILLECTOMY, ADENOIDECTOMY  1940       Social History     Tobacco Use    Smoking status: Former     Current packs/day: 0.00     Average packs/day: 1 pack/day for 42.0 years (42.0 ttl pk-yrs)     Types: Cigarettes     Start date: 1/1/1949     Quit date: 1/1/1991      Years since quittin.4    Smokeless tobacco: Former   Substance Use Topics    Alcohol use: Not Currently     Comment: rarely   No alcohol 72h  prior to sx    Drug use: No       Family History   Problem Relation Name Age of Onset    Emphysema Mother      Heart disease Mother      COPD Mother      Appendicitis Father      Heart disease Son      Atrial fibrillation Sister      Lung disease Sister  91    Colon cancer Neg Hx      Cancer Neg Hx      Stroke Neg Hx         Current Outpatient Medications on File Prior to Visit   Medication Sig Dispense Refill    acetaminophen (TYLENOL) 500 MG tablet Take 500 mg by mouth as needed for Pain.      atorvastatin (LIPITOR) 40 MG tablet Take 1 tablet (40 mg total) by mouth once daily. 90 tablet 1    betamethasone valerate 0.1% (VALISONE) 0.1 % Oint Apply topically 2 (two) times daily. 45 g 1    CANNABIDIOL, CBD, EXTRACT ORAL Take 3 drops by mouth every morning.      COQ10, UBIQUINOL, ORAL Take 1 tablet by mouth nightly.       cyanocobalamin (VITAMIN B-12) 1000 MCG tablet Take 100 mcg by mouth once daily.      diclofenac sodium (VOLTAREN) 1 % Gel Apply 2 g topically 3 (three) times daily. 1 each 3    fluticasone-umeclidin-vilanter (TRELEGY ELLIPTA) 200-62.5-25 mcg inhaler Inhale 1 puff into the lungs once daily. 60 each 11    gabapentin (NEURONTIN) 100 MG capsule TAKE ONE CAPSULE BY MOUTH TWICE DAILY 180 capsule 3    GLUCOSAM-MSM-CHONDROIT-VIT D3 ORAL Take 2 tablets by mouth once daily.      krill/om-3/dha/epa/phospho/ast (MEGARED OMEGA-3 KRILL OIL ORAL) Take 1 capsule by mouth nightly.       lisinopriL 10 MG tablet TAKE 1 TABLET EVERY DAY 90 tablet 3    metroNIDAZOLE (METROGEL) 0.75 % gel Apply topically 2 (two) times daily. 45 g 3    multivit-min/folic/vit K/lycop (ONE-A-DAY MEN'S MULTIVITAMIN ORAL) Take 1 tablet by mouth every evening.       prednisoLONE acetate (PRED FORTE) 1 % DrpS Place 1 drop into the right eye 2 (two) times daily.      tamsulosin (FLOMAX) 0.4 mg Cap TAKE  TWO CAPSULES BY MOUTH IN THE MORNING 180 capsule 3    traMADoL (ULTRAM) 50 mg tablet Take 1 tablet (50 mg total) by mouth daily as needed for Pain. 30 tablet 0    albuterol (PROVENTIL) 2.5 mg /3 mL (0.083 %) nebulizer solution Take 3 mLs (2.5 mg total) by nebulization every 4 (four) hours as needed for Wheezing. Rescue (Patient not taking: Reported on 6/13/2024) 150 mL 11    ARGININE, L-ARGININE, ORAL Take 500 mg by mouth once daily. (Patient not taking: Reported on 6/13/2024)      cholecalciferol, vitamin D3, (VITAMIN D3) 50 mcg (2,000 unit) Cap Take 1 capsule by mouth every evening.  (Patient not taking: Reported on 6/13/2024)      dutasteride (AVODART) 0.5 mg capsule Take 1 capsule (0.5 mg total) by mouth once daily. 90 capsule 3    furosemide (LASIX) 20 MG tablet Take 1 tablet (20 mg total) by mouth once daily. (Patient not taking: Reported on 6/13/2024) 90 tablet 3    potassium 99 mg Tab Take by mouth once daily.  (Patient not taking: Reported on 6/13/2024)      [DISCONTINUED] pantoprazole (PROTONIX) 40 MG tablet Take 1 tablet (40 mg total) by mouth 2 (two) times daily. 120 tablet 0     No current facility-administered medications on file prior to visit.      Wt Readings from Last 3 Encounters:   06/13/24 79.7 kg (175 lb 11.3 oz)   03/08/24 79.9 kg (176 lb 2.4 oz)   03/05/24 81.1 kg (178 lb 12.7 oz)     Temp Readings from Last 3 Encounters:   03/05/24 98 °F (36.7 °C) (Tympanic)   09/14/23 98.1 °F (36.7 °C)   09/05/23 98.2 °F (36.8 °C) (Tympanic)     BP Readings from Last 3 Encounters:   06/13/24 (!) 130/58   04/01/24 115/60   03/08/24 110/76     Pulse Readings from Last 3 Encounters:   06/13/24 93   03/08/24 89   03/05/24 80        Review of Systems   Constitutional: Positive for malaise/fatigue.   HENT: Negative.     Eyes: Negative.    Cardiovascular: Negative.    Respiratory:  Positive for shortness of breath.    Skin: Negative.    Musculoskeletal:  Positive for arthritis, back pain and stiffness.    Gastrointestinal: Negative.    Genitourinary: Negative.    Neurological:  Positive for numbness.   Psychiatric/Behavioral: Negative.         Objective:   Physical Exam  Vitals and nursing note reviewed.   Constitutional:       Appearance: Normal appearance.   HENT:      Head: Normocephalic and atraumatic.   Eyes:      General:         Right eye: No discharge.         Left eye: No discharge.      Pupils: Pupils are equal, round, and reactive to light.   Cardiovascular:      Rate and Rhythm: Normal rate and regular rhythm.      Heart sounds: S1 normal and S2 normal. Murmur heard.      No friction rub.   Pulmonary:      Effort: Pulmonary effort is normal. No respiratory distress.      Breath sounds: Normal breath sounds. No rales.   Abdominal:      Palpations: Abdomen is soft.      Tenderness: There is no abdominal tenderness.   Musculoskeletal:      Cervical back: Neck supple.      Right lower leg: No edema.      Left lower leg: No edema.   Skin:     General: Skin is warm and dry.   Neurological:      General: No focal deficit present.      Mental Status: He is alert and oriented to person, place, and time.      Motor: Weakness present.   Psychiatric:         Mood and Affect: Mood normal.         Behavior: Behavior normal.         Thought Content: Thought content normal.         Lab Results   Component Value Date    CHOL 119 (L) 02/29/2024    CHOL 119 (L) 08/28/2023    CHOL 113 (L) 02/23/2023     Lab Results   Component Value Date    HDL 49 02/29/2024    HDL 42 08/28/2023    HDL 44 02/23/2023     Lab Results   Component Value Date    LDLCALC 62.4 (L) 02/29/2024    LDLCALC 64.6 08/28/2023    LDLCALC 51.0 (L) 02/23/2023     Lab Results   Component Value Date    TRIG 38 02/29/2024    TRIG 62 08/28/2023    TRIG 90 02/23/2023     Lab Results   Component Value Date    CHOLHDL 41.2 02/29/2024    CHOLHDL 35.3 08/28/2023    CHOLHDL 38.9 02/23/2023       Chemistry        Component Value Date/Time     02/29/2024 0832     K 4.6 02/29/2024 0832     02/29/2024 0832    CO2 25 02/29/2024 0832    BUN 14 02/29/2024 0832    CREATININE 0.9 02/29/2024 0832    GLU 96 02/29/2024 0832        Component Value Date/Time    CALCIUM 9.1 02/29/2024 0832    ALKPHOS 86 02/29/2024 0832    AST 30 02/29/2024 0832    ALT 29 02/29/2024 0832    BILITOT 0.2 02/29/2024 0832    ESTGFRAFRICA >60.0 01/14/2022 0846    EGFRNONAA >60.0 01/14/2022 0846          Lab Results   Component Value Date    TSH 2.14 05/16/2011     Lab Results   Component Value Date    INR 1.0 10/26/2022    INR 1.0 10/25/2022    INR 1.0 04/13/2018     @RESUFAST(WBC,HGB,HCT,MCV,PLT)  @LABRCNTIP(BNP,BNPTRIAGEBLO)@  CrCl cannot be calculated (Patient's most recent lab result is older than the maximum 7 days allowed.).     Results for orders placed during the hospital encounter of 01/21/22    Echo    Interpretation Summary  · The left ventricle is normal in size with normal systolic function.  · The estimated ejection fraction is 55%.  · Grade I left ventricular diastolic dysfunction.  · Normal right ventricular size with normal right ventricular systolic function.  · Mild aortic regurgitation.  · Mild mitral regurgitation.  · Moderate tricuspid regurgitation.  · Normal central venous pressure (3 mmHg).  · The estimated PA systolic pressure is 47 mmHg.  · There is pulmonary hypertension.  · Trivial pericardial effusion.     No results found for this or any previous visit.     Assessment:      1. PAD (peripheral artery disease)    2. Essential hypertension    3. Pure hypercholesterolemia    4. Atherosclerosis of aorta    5. Abdominal aortic aneurysm (AAA) without rupture, unspecified part    6. PAH (pulmonary artery hypertension)    7. Overweight (BMI 25.0-29.9)        Plan:   PAD (peripheral artery disease)  -     Ankle Brachial Indices (IRLANDA); Future  -     CV Ultrasound doppler arterial legs bilat; Future    Essential hypertension    Pure hypercholesterolemia    Atherosclerosis of  aorta    Abdominal aortic aneurysm (AAA) without rupture, unspecified part    PAH (pulmonary artery hypertension)    Overweight (BMI 25.0-29.9)      IRLANDA and US    Cont current CV medications  RF modifications  Low na low fat diet  Daily exercise as tolerated    RTC 6 mos or sooner if needed    Courtney Guillot, FNP-C [facility], Cardiology

## 2024-06-21 ENCOUNTER — TELEPHONE (OUTPATIENT)
Dept: INTERNAL MEDICINE | Facility: CLINIC | Age: 89
End: 2024-06-21
Payer: MEDICARE

## 2024-06-28 ENCOUNTER — HOSPITAL ENCOUNTER (OUTPATIENT)
Dept: CARDIOLOGY | Facility: HOSPITAL | Age: 89
Discharge: HOME | End: 2024-06-28
Payer: MEDICARE

## 2024-06-28 VITALS
SYSTOLIC BLOOD PRESSURE: 116 MMHG | WEIGHT: 175 LBS | HEIGHT: 66 IN | WEIGHT: 175 LBS | BODY MASS INDEX: 28.12 KG/M2 | DIASTOLIC BLOOD PRESSURE: 55 MMHG | BODY MASS INDEX: 28.12 KG/M2 | DIASTOLIC BLOOD PRESSURE: 55 MMHG | SYSTOLIC BLOOD PRESSURE: 116 MMHG | HEIGHT: 66 IN

## 2024-06-28 DIAGNOSIS — I73.9 PAD (PERIPHERAL ARTERY DISEASE): ICD-10-CM

## 2024-06-28 LAB
LEFT ABI: 1.21
LEFT ANT TIBIAL SYS PSV: 131 CM/S
LEFT ARM BP: 107 MMHG
LEFT CFA PSV: 219 CM/S
LEFT DORSALIS PEDIS: 132 MMHG
LEFT PERONEAL SYS PSV: 104 CM/S
LEFT POPLITEAL PSV: 63 CM/S
LEFT POST TIBIAL SYS PSV: 52 CM/S
LEFT POSTERIOR TIBIAL: 140 MMHG
LEFT PROFUNDA SYS PSV: 128 CM/S
LEFT SUPER FEMORAL DIST SYS PSV: 95 CM/S
LEFT SUPER FEMORAL MID SYS PSV: 115 CM/S
LEFT SUPER FEMORAL OSTIAL SYS PSV: 115 CM/S
LEFT SUPER FEMORAL PROX SYS PSV: 94 CM/S
LEFT TBI: 1.15
LEFT TIB/PER TRUNK SYS PSV: 66 CM/S
LEFT TOE PRESSURE: 133 MMHG
OHS CV LEFT LOWER EXTREMITY ABI (NO CALC): 1.21
OHS CV RIGHT ABI LOWER EXTREMITY (NO CALC): 1.34
RIGHT ABI: 1.34
RIGHT ANT TIBIAL SYS PSV: 145 CM/S
RIGHT ARM BP: 116 MMHG
RIGHT CFA PSV: 197 CM/S
RIGHT DORSALIS PEDIS: 156 MMHG
RIGHT PERONEAL SYS PSV: 81 CM/S
RIGHT POPLITEAL PSV: 131 CM/S
RIGHT POST TIBIAL SYS PSV: 47 CM/S
RIGHT POSTERIOR TIBIAL: 133 MMHG
RIGHT PROFUNDA SYS PSV: 169 CM/S
RIGHT SUPER FEMORAL DIST SYS PSV: 97 CM/S
RIGHT SUPER FEMORAL MID SYS PSV: 125 CM/S
RIGHT SUPER FEMORAL OSTIAL SYS PSV: 108 CM/S
RIGHT SUPER FEMORAL PROX SYS PSV: 125 CM/S
RIGHT TBI: 0.91
RIGHT TIB/PER TRUNK SYS PSV: 79 CM/S
RIGHT TOE PRESSURE: 105 MMHG

## 2024-06-28 PROCEDURE — 93922 UPR/L XTREMITY ART 2 LEVELS: CPT | Mod: HCNC

## 2024-06-28 PROCEDURE — 93925 LOWER EXTREMITY STUDY: CPT | Mod: HCNC

## 2024-06-28 PROCEDURE — 93925 LOWER EXTREMITY STUDY: CPT | Mod: 26,HCNC,, | Performed by: INTERNAL MEDICINE

## 2024-06-28 PROCEDURE — 93922 UPR/L XTREMITY ART 2 LEVELS: CPT | Mod: 26,HCNC,, | Performed by: INTERNAL MEDICINE

## 2024-07-08 ENCOUNTER — OFFICE VISIT (OUTPATIENT)
Dept: OPHTHALMOLOGY | Facility: CLINIC | Age: 89
End: 2024-07-08
Payer: MEDICARE

## 2024-07-08 DIAGNOSIS — H43.811 POSTERIOR VITREOUS DETACHMENT OF RIGHT EYE: ICD-10-CM

## 2024-07-08 DIAGNOSIS — H31.001 RETINAL SCAR OF RIGHT EYE: Primary | ICD-10-CM

## 2024-07-08 DIAGNOSIS — Z96.1 PSEUDOPHAKIA OF BOTH EYES: ICD-10-CM

## 2024-07-08 DIAGNOSIS — H35.9 RETINAL LESION OF RIGHT EYE: ICD-10-CM

## 2024-07-08 DIAGNOSIS — H52.203 MYOPIA WITH ASTIGMATISM AND PRESBYOPIA, BILATERAL: ICD-10-CM

## 2024-07-08 DIAGNOSIS — H52.4 MYOPIA WITH ASTIGMATISM AND PRESBYOPIA, BILATERAL: ICD-10-CM

## 2024-07-08 DIAGNOSIS — H52.13 MYOPIA WITH ASTIGMATISM AND PRESBYOPIA, BILATERAL: ICD-10-CM

## 2024-07-08 DIAGNOSIS — H40.053 BILATERAL OCULAR HYPERTENSION: ICD-10-CM

## 2024-07-08 PROCEDURE — 1159F MED LIST DOCD IN RCRD: CPT | Mod: HCNC,CPTII,S$GLB, | Performed by: OPTOMETRIST

## 2024-07-08 PROCEDURE — 92015 DETERMINE REFRACTIVE STATE: CPT | Mod: HCNC,S$GLB,, | Performed by: OPTOMETRIST

## 2024-07-08 PROCEDURE — 99999 PR PBB SHADOW E&M-EST. PATIENT-LVL III: CPT | Mod: PBBFAC,HCNC,, | Performed by: OPTOMETRIST

## 2024-07-08 PROCEDURE — 92004 COMPRE OPH EXAM NEW PT 1/>: CPT | Mod: HCNC,S$GLB,, | Performed by: OPTOMETRIST

## 2024-07-08 NOTE — PROGRESS NOTES
HPI    1. PCIOL OU 2021    Vision changes since last eye exam?: Pt has been experiencing blurriness   with near and far vision. Pt is wearing PAL eyeglasses full time and is   not currently using any eyedrops.      Any eye pain today: No.    Other ocular symptoms: Pt has noticed a big black dot in OD vision ever   since 3/2020    Interested in contact lens fitting today? No.     Last edited by Niurka Meza on 7/8/2024  1:33 PM.            Assessment /Plan     For exam results, see Encounter Report.    1. Retinal lesion of right eye  -     Ambulatory referral/consult to Ophthalmology; Future; Expected date: 07/15/2024  Limits vision OD, previously seen Dr Chauhan, continue per [facility] at this time.     2. Bilateral ocular hypertension  IOP elevated, thicker than average Pach. gOCT normal. Continue close observation off drops at this time with gOCT for changes.     3. Posterior vitreous detachment of right eye  Retina flat, RD precautions reviewed. Observe.     4. Pseudophakia of both eyes  Doing well OU, observe.    5. Myopia with astigmatism and presbyopia, bilateral  Eyeglass Final Rx       Eyeglass Final Rx         Sphere Cylinder Axis Add    Right -1.75 +2.00 175 +2.50    Left -1.00 +1.25 003 +2.50      Type: Bifocal    Expiration Date: 7/8/2025              Eyeglass Final Rx #2         Sphere Cylinder Axis Add    Right +0.75 +2.00 175     Left +1.50 +1.25 003       Type: SVL - Reading    Expiration Date: 7/8/2025              Eyeglass Final Rx Comments    PD 64                 RTC with [facility] as scheduled for retinal eval.

## 2024-07-09 ENCOUNTER — TELEPHONE (OUTPATIENT)
Dept: CARDIOLOGY | Facility: CLINIC | Age: 89
End: 2024-07-09
Payer: MEDICARE

## 2024-07-09 NOTE — TELEPHONE ENCOUNTER
Contacted pt and informed him Ultrasound and IRLANDA look ok, continue statin and 81mg ASA if tolerable. Pt vu w/o q/c    ----- Message from Elsie Serrato NP sent at 7/9/2024  3:23 PM CDT -----  Ultrasound and IRLANDA look ok, continue statin and 81mg ASA if tolerable

## 2024-08-16 ENCOUNTER — OFFICE VISIT (OUTPATIENT)
Dept: OPHTHALMOLOGY | Facility: CLINIC | Age: 89
End: 2024-08-16
Payer: MEDICARE

## 2024-08-16 DIAGNOSIS — Z96.1 PSEUDOPHAKIA OF BOTH EYES: ICD-10-CM

## 2024-08-16 DIAGNOSIS — H40.053 BILATERAL OCULAR HYPERTENSION: ICD-10-CM

## 2024-08-16 DIAGNOSIS — H43.811 POSTERIOR VITREOUS DETACHMENT OF RIGHT EYE: ICD-10-CM

## 2024-08-16 DIAGNOSIS — H31.001 RETINAL SCAR OF RIGHT EYE: Primary | ICD-10-CM

## 2024-08-16 PROCEDURE — 99999 PR PBB SHADOW E&M-EST. PATIENT-LVL II: CPT | Mod: PBBFAC,HCNC,, | Performed by: OPHTHALMOLOGY

## 2024-08-16 NOTE — PROGRESS NOTES
===============================  Date today is 8/16/2024  Chao Hawk is a 89 y.o. male  Last visit [facility]: :Visit date not found   Last visit eye dept. 7/8/2024    Uncorrected distance visual acuity was HM in the right eye and 20/20- in the left eye. Comments: Floater impedes vision in OD.  Tonometry       Tonometry (Applanation, 9:20 AM)         Right Left    Pressure 21 24                  Not recorded       Not recorded       Not recorded       Chief Complaint   Patient presents with    Follow-up     Retinal Eval      HPI     Follow-up            Comments: Retinal Eval           Comments    States that his vision stable from past visit with Dr Granados.           Last edited by Kajal Cosby on 8/16/2024  9:19 AM.      Problem List Items Addressed This Visit    None  Visit Diagnoses       Retinal scar of right eye    -  Primary    Relevant Orders    Posterior Segment OCT Retina-Both eyes (Completed)    Bilateral ocular hypertension        Posterior vitreous detachment of right eye        Relevant Orders    Posterior Segment OCT Retina-Both eyes (Completed)    Pseudophakia of both eyes              Instructed to call 24/7 for any worsening of vision, visual distortion or pain.  Check OU independently daily.    Gave my office and personal cell phone number.  ________________  8/16/2024 today  Chao Hawk    OD inactive SRN  OCT Slight EZ irregularity  OD Low lying subretinal fibrosis, no inter retinal fluid, no benefit from Avgf  Osvaldo CUELLO  Discussed risk of OS bleeding   PCIOL OU  Increased c/d 0.6 OD- followed by Dr. Granados  Continue [facility] surveillance with Dr. Granados with OCT's  OS c/d 0.45  Tmax 26/27   OD post SRN with low lying fibrosis  OS minimal EZ irregularity    RTC with Dr. Granados as scheduled  Instructed to call 24/7 for any worsening of vision or symptoms. Check OU daily.   Gave my office and cell phone number.    =============================

## 2024-08-16 NOTE — TELEPHONE ENCOUNTER
No care due was identified.  [facility] Embedded Care Due Messages. Reference number: [id number].   8/16/2024 11:45:40 AM CDT

## 2024-08-17 RX ORDER — ATORVASTATIN CALCIUM 40 MG/1
40 TABLET, FILM COATED ORAL
Qty: 90 TABLET | Refills: 1 | Status: SHIPPED | OUTPATIENT
Start: 2024-08-17

## 2024-08-17 NOTE — TELEPHONE ENCOUNTER
Refill Decision Note   Chao Hawk  is requesting a refill authorization.  Brief Assessment and Rationale for Refill:  Approve     Medication Therapy Plan:         Comments:     Note composed:2:17 AM 08/17/2024

## 2024-08-27 ENCOUNTER — OFFICE VISIT (OUTPATIENT)
Dept: UROLOGY | Facility: CLINIC | Age: 89
End: 2024-08-27
Payer: MEDICARE

## 2024-08-27 VITALS
SYSTOLIC BLOOD PRESSURE: 166 MMHG | HEIGHT: 66 IN | HEART RATE: 80 BPM | WEIGHT: 174.19 LBS | BODY MASS INDEX: 27.99 KG/M2 | DIASTOLIC BLOOD PRESSURE: 73 MMHG

## 2024-08-27 DIAGNOSIS — N40.1 BENIGN PROSTATIC HYPERPLASIA WITH WEAK URINARY STREAM: Primary | ICD-10-CM

## 2024-08-27 DIAGNOSIS — R39.12 BENIGN PROSTATIC HYPERPLASIA WITH WEAK URINARY STREAM: Primary | ICD-10-CM

## 2024-08-27 PROCEDURE — 1101F PT FALLS ASSESS-DOCD LE1/YR: CPT | Mod: HCNC,CPTII,S$GLB, | Performed by: UROLOGY

## 2024-08-27 PROCEDURE — 1160F RVW MEDS BY RX/DR IN RCRD: CPT | Mod: HCNC,CPTII,S$GLB, | Performed by: UROLOGY

## 2024-08-27 PROCEDURE — 1159F MED LIST DOCD IN RCRD: CPT | Mod: HCNC,CPTII,S$GLB, | Performed by: UROLOGY

## 2024-08-27 PROCEDURE — 99214 OFFICE O/P EST MOD 30 MIN: CPT | Mod: HCNC,S$GLB,, | Performed by: UROLOGY

## 2024-08-27 PROCEDURE — 99999 PR PBB SHADOW E&M-EST. PATIENT-LVL V: CPT | Mod: PBBFAC,HCNC,, | Performed by: UROLOGY

## 2024-08-27 PROCEDURE — 1126F AMNT PAIN NOTED NONE PRSNT: CPT | Mod: HCNC,CPTII,S$GLB, | Performed by: UROLOGY

## 2024-08-27 PROCEDURE — 3288F FALL RISK ASSESSMENT DOCD: CPT | Mod: HCNC,CPTII,S$GLB, | Performed by: UROLOGY

## 2024-08-27 NOTE — PROGRESS NOTES
Chief Complaint: LUTS    HPI:   08/27/2024 - patient returns today for follow-up, notes that his son recently had a UroLift and he would like to be evaluated for this as well, notes that his most bothersome symptom is weak stream and incomplete emptying, no gross hematuria or dysuria, no UTIs  IPSS - 4/3/3/2/4/4/3 = 23  QOL - 5(unhappy)    08/16/2022 - returns for follow-up, no issues with the dutasteride, voiding well, no GH or dysuria    12/29/2021 - presents for follow-up, has been taking the finasteride but it gives him a rash, denies any gross hematuria, notes incomplete emptying as well as some urgency    06/22/2021 - patient presents for follow-up, thinks this stream has improved and is now okay, had some itchiness with the finasteride and thus he cuts the 1 pill in to 2 halves and takes these during the day, denies any gross hematuria, now down to 4 cups of coffee a day, still has nocturia x2    12/21/20: Daytime frequency and low void, nocturia x2.  Drinks 8 cups coffee a day.  Reviewed history in detail.   11/18/20: Cysto today shows BPH, voiding okay after.  Bladder compliance low, trabeculated.  11/2/20: 87 yo man has had two occasions of retention after right shoulder surgery.  Pittman placed again last night.  Been on flomax a long time.   No abd/pelvic pain and no exac/rel factors.  No hematuria.  No urolithiasis.  No urinary bother.  No  history.  Normal sexual function.    Allergies:  Bee sting  [allergen ext-venom-honey bee], Penicillins, and Poison ivy extract    Medications:  has a current medication list which includes the following prescription(s): acetaminophen, albuterol, arginine, atorvastatin, betamethasone valerate 0.1%, cannabidiol (cbd), cholecalciferol (vitamin d3), ubiquinol, cyanocobalamin, diclofenac sodium, trelegy ellipta, furosemide, gabapentin, glucosam/msm/chondroit/vit d3, krill/om-3/dha/epa/phospho/ast, lisinopril, metronidazole, multivit-min/folic/vit k/lycop, potassium,  prednisolone acetate, tamsulosin, tramadol, and dutasteride.    Review of Systems:  General: No fever, chills  Skin: No rashes  Chest:  Denies cough and sputum production  Heart: Denies chest pain  Resp: Denies dyspnea  Abdomen: Denies diarrhea, abdominal pain, hematemesis, or blood in stool.  Musculoskeletal: No joint stiffness or swelling. Denies back pain.  : see HPI  Neuro: no dizziness or weakness      PMH:   has a past medical history of Anemia, Arthritis, Back pain, Bilateral leg edema (2/16/2018), Chronic bronchitis, COPD (chronic obstructive pulmonary disease), Diverticulitis of large intestine with perforation without abscess or bleeding, Diverticulosis (5/16/06), Hearing loss, bilateral, Hernia, Hyperlipidemia, Hypertension, Lumbar radiculopathy (6/14/2017), Multiple renal cysts (7/5/2016), Nocturnal hypoxemia, Polyneuropathy, Tobacco dependence, and Trouble in sleeping.    PSH:   has a past surgical history that includes Finger surgery (Left, 1980s); Hernia repair (1990s); TONSILLECTOMY, ADENOIDECTOMY (1940); Lumbar spine surgery (09/13/2017); Intraocular lens insertion (Bilateral, 2016); Reverse total shoulder arthroplasty (Right, 10/5/2020); and Esophagogastroduodenoscopy (N/A, 10/26/2022).    FamHx: family history includes Appendicitis in his father; Atrial fibrillation in his sister; COPD in his mother; Emphysema in his mother; Heart disease in his mother and son; Lung disease (age of onset: 91) in his sister.    SocHx:  reports that he quit smoking about 33 years ago. His smoking use included cigarettes. He started smoking about 75 years ago. He has a 42 pack-year smoking history. He has quit using smokeless tobacco. He reports that he does not currently use alcohol. He reports that he does not use drugs.      Physical Exam:  Vitals:    08/27/24 1124   BP: (!) 166/73   Pulse: 80     General: awake, alert, cooperative  Head: NC/AT  Ears: external ears normal  Eyes: sclera normal  Lungs: normal  inspiration, NAD  Heart: well-perfused  Skin: The skin is warm and dry  Ext: No c/c/e.  Neuro: grossly intact, AOx3    Labs/Studies:   Lab Results   Component Value Date    WBC 9.15 02/29/2024    HGB 13.0 (L) 02/29/2024    HCT 42.6 02/29/2024     02/29/2024     02/29/2024    K 4.6 02/29/2024     02/29/2024    CREATININE 0.9 02/29/2024    BUN 14 02/29/2024    CO2 25 02/29/2024    TSH 2.14 05/16/2011    PSA 3.2 01/10/2020    INR 1.0 10/26/2022    GLUF 96 08/31/2012    HGBA1C 6.0 (H) 02/29/2024    CHOL 119 (L) 02/29/2024    TRIG 38 02/29/2024    HDL 49 02/29/2024    ALT 29 02/29/2024    AST 30 02/29/2024     PSA    1/20: 3.2    Impression/Plan:   88 yo M with:    LUTS - interested in urolift, obtain pelvic US and f/u for office cysto, continue flomax and dutasteride    Prostate Cancer Screening - no more PSAs or DREs      Tone Pineda MD

## 2024-08-29 ENCOUNTER — LAB VISIT (OUTPATIENT)
Dept: LAB | Facility: HOSPITAL | Age: 89
End: 2024-08-29
Attending: PEDIATRICS
Payer: MEDICARE

## 2024-08-29 DIAGNOSIS — D64.9 CHRONIC ANEMIA: ICD-10-CM

## 2024-08-29 DIAGNOSIS — E78.00 PURE HYPERCHOLESTEROLEMIA: Chronic | ICD-10-CM

## 2024-08-29 DIAGNOSIS — R73.03 PRE-DIABETES: ICD-10-CM

## 2024-08-29 LAB
ALBUMIN SERPL BCP-MCNC: 3.3 G/DL (ref 3.5–5.2)
ALP SERPL-CCNC: 78 U/L (ref 55–135)
ALT SERPL W/O P-5'-P-CCNC: 18 U/L (ref 10–44)
ANION GAP SERPL CALC-SCNC: 9 MMOL/L (ref 8–16)
AST SERPL-CCNC: 22 U/L (ref 10–40)
BASOPHILS # BLD AUTO: 0.03 K/UL (ref 0–0.2)
BASOPHILS NFR BLD: 0.4 % (ref 0–1.9)
BILIRUB SERPL-MCNC: 0.5 MG/DL (ref 0.1–1)
BUN SERPL-MCNC: 18 MG/DL (ref 10–30)
CALCIUM SERPL-MCNC: 9.3 MG/DL (ref 8.7–10.5)
CHLORIDE SERPL-SCNC: 112 MMOL/L (ref 95–110)
CHOLEST SERPL-MCNC: 114 MG/DL (ref 120–199)
CHOLEST/HDLC SERPL: 2.3 {RATIO} (ref 2–5)
CO2 SERPL-SCNC: 24 MMOL/L (ref 23–29)
CREAT SERPL-MCNC: 0.9 MG/DL (ref 0.5–1.4)
DIFFERENTIAL METHOD BLD: ABNORMAL
EOSINOPHIL # BLD AUTO: 0.1 K/UL (ref 0–0.5)
EOSINOPHIL NFR BLD: 1.4 % (ref 0–8)
ERYTHROCYTE [DISTWIDTH] IN BLOOD BY AUTOMATED COUNT: 17.1 % (ref 11.5–14.5)
EST. GFR  (NO RACE VARIABLE): >60 ML/MIN/1.73 M^2
ESTIMATED AVG GLUCOSE: 120 MG/DL (ref 68–131)
GLUCOSE SERPL-MCNC: 91 MG/DL (ref 70–110)
HBA1C MFR BLD: 5.8 % (ref 4–5.6)
HCT VFR BLD AUTO: 39.3 % (ref 40–54)
HDLC SERPL-MCNC: 50 MG/DL (ref 40–75)
HDLC SERPL: 43.9 % (ref 20–50)
HGB BLD-MCNC: 12.1 G/DL (ref 14–18)
IMM GRANULOCYTES # BLD AUTO: 0.02 K/UL (ref 0–0.04)
IMM GRANULOCYTES NFR BLD AUTO: 0.2 % (ref 0–0.5)
LDLC SERPL CALC-MCNC: 54.8 MG/DL (ref 63–159)
LYMPHOCYTES # BLD AUTO: 1 K/UL (ref 1–4.8)
LYMPHOCYTES NFR BLD: 11.6 % (ref 18–48)
MCH RBC QN AUTO: 28.3 PG (ref 27–31)
MCHC RBC AUTO-ENTMCNC: 30.8 G/DL (ref 32–36)
MCV RBC AUTO: 92 FL (ref 82–98)
MONOCYTES # BLD AUTO: 0.7 K/UL (ref 0.3–1)
MONOCYTES NFR BLD: 8.4 % (ref 4–15)
NEUTROPHILS # BLD AUTO: 6.6 K/UL (ref 1.8–7.7)
NEUTROPHILS NFR BLD: 78 % (ref 38–73)
NONHDLC SERPL-MCNC: 64 MG/DL
NRBC BLD-RTO: 0 /100 WBC
PLATELET # BLD AUTO: 224 K/UL (ref 150–450)
PMV BLD AUTO: 11.4 FL (ref 9.2–12.9)
POTASSIUM SERPL-SCNC: 4.2 MMOL/L (ref 3.5–5.1)
PROT SERPL-MCNC: 6.5 G/DL (ref 6–8.4)
RBC # BLD AUTO: 4.28 M/UL (ref 4.6–6.2)
SODIUM SERPL-SCNC: 145 MMOL/L (ref 136–145)
TRIGL SERPL-MCNC: 46 MG/DL (ref 30–150)
WBC # BLD AUTO: 8.42 K/UL (ref 3.9–12.7)

## 2024-08-29 PROCEDURE — 80053 COMPREHEN METABOLIC PANEL: CPT | Mod: HCNC | Performed by: PEDIATRICS

## 2024-08-29 PROCEDURE — 80061 LIPID PANEL: CPT | Mod: HCNC | Performed by: PEDIATRICS

## 2024-08-29 PROCEDURE — 85025 COMPLETE CBC W/AUTO DIFF WBC: CPT | Mod: HCNC | Performed by: PEDIATRICS

## 2024-08-29 PROCEDURE — 36415 COLL VENOUS BLD VENIPUNCTURE: CPT | Mod: HCNC | Performed by: PEDIATRICS

## 2024-08-29 PROCEDURE — 83036 HEMOGLOBIN GLYCOSYLATED A1C: CPT | Mod: HCNC | Performed by: PEDIATRICS

## 2024-08-30 ENCOUNTER — HOSPITAL ENCOUNTER (OUTPATIENT)
Dept: RADIOLOGY | Facility: HOSPITAL | Age: 89
Discharge: HOME | End: 2024-08-30
Attending: UROLOGY
Payer: MEDICARE

## 2024-08-30 ENCOUNTER — TELEPHONE (OUTPATIENT)
Dept: UROLOGY | Facility: CLINIC | Age: 89
End: 2024-08-30
Payer: MEDICARE

## 2024-08-30 DIAGNOSIS — R39.12 BENIGN PROSTATIC HYPERPLASIA WITH WEAK URINARY STREAM: ICD-10-CM

## 2024-08-30 DIAGNOSIS — N40.1 BENIGN PROSTATIC HYPERPLASIA WITH WEAK URINARY STREAM: ICD-10-CM

## 2024-08-30 PROCEDURE — 76857 US EXAM PELVIC LIMITED: CPT | Mod: 26,HCNC,, | Performed by: STUDENT IN AN ORGANIZED HEALTH CARE EDUCATION/TRAINING PROGRAM

## 2024-08-30 PROCEDURE — 76857 US EXAM PELVIC LIMITED: CPT | Mod: TC,HCNC

## 2024-09-03 ENCOUNTER — HOSPITAL ENCOUNTER (OUTPATIENT)
Dept: RADIOLOGY | Facility: HOSPITAL | Age: 89
Discharge: HOME | End: 2024-09-03
Attending: UROLOGY
Payer: MEDICARE

## 2024-09-03 ENCOUNTER — PROCEDURE VISIT (OUTPATIENT)
Dept: UROLOGY | Facility: CLINIC | Age: 89
End: 2024-09-03
Payer: MEDICARE

## 2024-09-03 VITALS — WEIGHT: 174.19 LBS | BODY MASS INDEX: 28.11 KG/M2

## 2024-09-03 DIAGNOSIS — R39.12 BENIGN PROSTATIC HYPERPLASIA WITH WEAK URINARY STREAM: Primary | ICD-10-CM

## 2024-09-03 DIAGNOSIS — N40.1 BENIGN PROSTATIC HYPERPLASIA WITH WEAK URINARY STREAM: Primary | ICD-10-CM

## 2024-09-03 DIAGNOSIS — N13.8 BPH WITH OBSTRUCTION/LOWER URINARY TRACT SYMPTOMS: ICD-10-CM

## 2024-09-03 DIAGNOSIS — N40.1 BPH WITH OBSTRUCTION/LOWER URINARY TRACT SYMPTOMS: ICD-10-CM

## 2024-09-03 DIAGNOSIS — Z01.818 PRE-OP EVALUATION: ICD-10-CM

## 2024-09-03 PROCEDURE — 71046 X-RAY EXAM CHEST 2 VIEWS: CPT | Mod: 26,HCNC,, | Performed by: RADIOLOGY

## 2024-09-03 PROCEDURE — 52000 CYSTOURETHROSCOPY: CPT | Mod: HCNC,S$GLB,, | Performed by: UROLOGY

## 2024-09-03 PROCEDURE — 99214 OFFICE O/P EST MOD 30 MIN: CPT | Mod: 25,HCNC,S$GLB, | Performed by: UROLOGY

## 2024-09-03 PROCEDURE — 71046 X-RAY EXAM CHEST 2 VIEWS: CPT | Mod: TC,HCNC

## 2024-09-03 RX ORDER — CEFAZOLIN SODIUM 2 G/50ML
2 SOLUTION INTRAVENOUS
OUTPATIENT
Start: 2024-09-03

## 2024-09-03 NOTE — PROGRESS NOTES
Chief Complaint: LUTS    HPI:   09/03/2024 - returns today for cystoscopy and review of his ultrasound, ultrasound shows a 79 g prostate, no new voiding issues, symptoms unchanged    08/27/2024 - patient returns today for follow-up, notes that his son recently had a UroLift and he would like to be evaluated for this as well, notes that his most bothersome symptom is weak stream and incomplete emptying, no gross hematuria or dysuria, no UTIs  IPSS - 4/3/3/2/4/4/3 = 23  QOL - 5(unhappy)    08/16/2022 - returns for follow-up, no issues with the dutasteride, voiding well, no GH or dysuria    12/29/2021 - presents for follow-up, has been taking the finasteride but it gives him a rash, denies any gross hematuria, notes incomplete emptying as well as some urgency    06/22/2021 - patient presents for follow-up, thinks this stream has improved and is now okay, had some itchiness with the finasteride and thus he cuts the 1 pill in to 2 halves and takes these during the day, denies any gross hematuria, now down to 4 cups of coffee a day, still has nocturia x2    12/21/20: Daytime frequency and low void, nocturia x2.  Drinks 8 cups coffee a day.  Reviewed history in detail.   11/18/20: Cysto today shows BPH, voiding okay after.  Bladder compliance low, trabeculated.  11/2/20: 87 yo man has had two occasions of retention after right shoulder surgery.  Pittman placed again last night.  Been on flomax a long time.   No abd/pelvic pain and no exac/rel factors.  No hematuria.  No urolithiasis.  No urinary bother.  No  history.  Normal sexual function.    Allergies:  Bee sting  [allergen ext-venom-honey bee], Penicillins, and Poison ivy extract    Medications:  has a current medication list which includes the following prescription(s): acetaminophen, albuterol, arginine, atorvastatin, betamethasone valerate 0.1%, cannabidiol (cbd), cholecalciferol (vitamin d3), ubiquinol, cyanocobalamin, diclofenac sodium, trelegy ellipta,  furosemide, gabapentin, glucosam/msm/chondroit/vit d3, krill/om-3/dha/epa/phospho/ast, lisinopril, metronidazole, multivit-min/folic/vit k/lycop, potassium, prednisolone acetate, tamsulosin, tramadol, and dutasteride.    Review of Systems:  General: No fever, chills  Skin: No rashes  Chest:  Denies cough and sputum production  Heart: Denies chest pain  Resp: Denies dyspnea  Abdomen: Denies diarrhea, abdominal pain, hematemesis, or blood in stool.  Musculoskeletal: No joint stiffness or swelling. Denies back pain.  : see HPI  Neuro: no dizziness or weakness      PMH:   has a past medical history of Anemia, Arthritis, Back pain, Bilateral leg edema (2/16/2018), Chronic bronchitis, COPD (chronic obstructive pulmonary disease), Diverticulitis of large intestine with perforation without abscess or bleeding, Diverticulosis (5/16/06), Hearing loss, bilateral, Hernia, Hyperlipidemia, Hypertension, Lumbar radiculopathy (6/14/2017), Multiple renal cysts (7/5/2016), Nocturnal hypoxemia, Polyneuropathy, Tobacco dependence, and Trouble in sleeping.    PSH:   has a past surgical history that includes Finger surgery (Left, 1980s); Hernia repair (1990s); TONSILLECTOMY, ADENOIDECTOMY (1940); Lumbar spine surgery (09/13/2017); Intraocular lens insertion (Bilateral, 2016); Reverse total shoulder arthroplasty (Right, 10/5/2020); and Esophagogastroduodenoscopy (N/A, 10/26/2022).    FamHx: family history includes Appendicitis in his father; Atrial fibrillation in his sister; COPD in his mother; Emphysema in his mother; Heart disease in his mother and son; Lung disease (age of onset: 91) in his sister.    SocHx:  reports that he quit smoking about 33 years ago. His smoking use included cigarettes. He started smoking about 75 years ago. He has a 42 pack-year smoking history. He has quit using smokeless tobacco. He reports that he does not currently use alcohol. He reports that he does not use drugs.      Physical Exam:  There were no  vitals filed for this visit.    General: awake, alert, cooperative  Head: NC/AT  Ears: external ears normal  Eyes: sclera normal  Lungs: normal inspiration, NAD  Heart: well-perfused  Skin: The skin is warm and dry  Ext: No c/c/e.  Neuro: grossly intact, AOx3    Labs/Studies:   Lab Results   Component Value Date    WBC 8.42 08/29/2024    HGB 12.1 (L) 08/29/2024    HCT 39.3 (L) 08/29/2024     08/29/2024     08/29/2024    K 4.2 08/29/2024     (H) 08/29/2024    CREATININE 0.9 08/29/2024    BUN 18 08/29/2024    CO2 24 08/29/2024    TSH 2.14 05/16/2011    PSA 3.2 01/10/2020    INR 1.0 10/26/2022    GLUF 96 08/31/2012    HGBA1C 5.8 (H) 08/29/2024    CHOL 114 (L) 08/29/2024    TRIG 46 08/29/2024    HDL 50 08/29/2024    ALT 18 08/29/2024    AST 22 08/29/2024     Procedure:  Diagnostic Cystoscopy    Indications: LUTS    UA: normal, see lab results for values    Procedure in Detail: After proper consents were obtained, the patient was prepped and draped in normal sterile fashion for diagnostic cystoscopy. 5 ml of lidocaine jelly was instilled in the urethra. The flexible cystoscope was then introduced into the urethra, and advanced into the bladder under direct vision. The urethral mucosa appeared normal, and no strictures were noted. The sphincter was normal, and the veru montanum was unremarkable. The prostatic mucosa and the lateral lobes of the prostate were unremarkable, with trilobar hypertrophy and complete visual obstruction. The bladder neck was normal. Inspection of the interior of the bladder was then carried out. The trigone was unremarkable, with no mucosal lesions. The ureteral orifices were normal in position and configuration. Systematic inspection of the mucosa of the bladder it was then carried out, rotating the cystoscope so that all areas of the left and right lateral walls, the dome of the bladder, and the posterior wall were all visualized. The cystoscope was then advanced further into  the bladder, and maximum deflection of the scope was performed so that the bladder neck could be inspected. No mucosal lesions were noted there. The cystoscope was then removed, and the procedure terminated. Patient tolerated the procedure well. No complications.     Findings:  Trilobar hypertrophy and obstruction    Impression/Plan:   89 yo M with:    LUTS - ultrasound shows 80 g prostate with trilobar hypertrophy, reviewed options, I think a combination TUR of his median lobe and UroLift would be a good option, we will leave him with catheter for a couple days and plan and outpatient procedure, will schedule 9/16    Prostate Cancer Screening - no more PSAs or DREs      Tone Pineda MD

## 2024-09-03 NOTE — H&P (VIEW-ONLY)
Chief Complaint: LUTS    HPI:   09/03/2024 - returns today for cystoscopy and review of his ultrasound, ultrasound shows a 79 g prostate, no new voiding issues, symptoms unchanged    08/27/2024 - patient returns today for follow-up, notes that his son recently had a UroLift and he would like to be evaluated for this as well, notes that his most bothersome symptom is weak stream and incomplete emptying, no gross hematuria or dysuria, no UTIs  IPSS - 4/3/3/2/4/4/3 = 23  QOL - 5(unhappy)    08/16/2022 - returns for follow-up, no issues with the dutasteride, voiding well, no GH or dysuria    12/29/2021 - presents for follow-up, has been taking the finasteride but it gives him a rash, denies any gross hematuria, notes incomplete emptying as well as some urgency    06/22/2021 - patient presents for follow-up, thinks this stream has improved and is now okay, had some itchiness with the finasteride and thus he cuts the 1 pill in to 2 halves and takes these during the day, denies any gross hematuria, now down to 4 cups of coffee a day, still has nocturia x2    12/21/20: Daytime frequency and low void, nocturia x2.  Drinks 8 cups coffee a day.  Reviewed history in detail.   11/18/20: Cysto today shows BPH, voiding okay after.  Bladder compliance low, trabeculated.  11/2/20: 89 yo man has had two occasions of retention after right shoulder surgery.  Pittman placed again last night.  Been on flomax a long time.   No abd/pelvic pain and no exac/rel factors.  No hematuria.  No urolithiasis.  No urinary bother.  No  history.  Normal sexual function.    Allergies:  Bee sting  [allergen ext-venom-honey bee], Penicillins, and Poison ivy extract    Medications:  has a current medication list which includes the following prescription(s): acetaminophen, albuterol, arginine, atorvastatin, betamethasone valerate 0.1%, cannabidiol (cbd), cholecalciferol (vitamin d3), ubiquinol, cyanocobalamin, diclofenac sodium, trelegy ellipta,  furosemide, gabapentin, glucosam/msm/chondroit/vit d3, krill/om-3/dha/epa/phospho/ast, lisinopril, metronidazole, multivit-min/folic/vit k/lycop, potassium, prednisolone acetate, tamsulosin, tramadol, and dutasteride.    Review of Systems:  General: No fever, chills  Skin: No rashes  Chest:  Denies cough and sputum production  Heart: Denies chest pain  Resp: Denies dyspnea  Abdomen: Denies diarrhea, abdominal pain, hematemesis, or blood in stool.  Musculoskeletal: No joint stiffness or swelling. Denies back pain.  : see HPI  Neuro: no dizziness or weakness      PMH:   has a past medical history of Anemia, Arthritis, Back pain, Bilateral leg edema (2/16/2018), Chronic bronchitis, COPD (chronic obstructive pulmonary disease), Diverticulitis of large intestine with perforation without abscess or bleeding, Diverticulosis (5/16/06), Hearing loss, bilateral, Hernia, Hyperlipidemia, Hypertension, Lumbar radiculopathy (6/14/2017), Multiple renal cysts (7/5/2016), Nocturnal hypoxemia, Polyneuropathy, Tobacco dependence, and Trouble in sleeping.    PSH:   has a past surgical history that includes Finger surgery (Left, 1980s); Hernia repair (1990s); TONSILLECTOMY, ADENOIDECTOMY (1940); Lumbar spine surgery (09/13/2017); Intraocular lens insertion (Bilateral, 2016); Reverse total shoulder arthroplasty (Right, 10/5/2020); and Esophagogastroduodenoscopy (N/A, 10/26/2022).    FamHx: family history includes Appendicitis in his father; Atrial fibrillation in his sister; COPD in his mother; Emphysema in his mother; Heart disease in his mother and son; Lung disease (age of onset: 91) in his sister.    SocHx:  reports that he quit smoking about 33 years ago. His smoking use included cigarettes. He started smoking about 75 years ago. He has a 42 pack-year smoking history. He has quit using smokeless tobacco. He reports that he does not currently use alcohol. He reports that he does not use drugs.      Physical Exam:  There were no  vitals filed for this visit.    General: awake, alert, cooperative  Head: NC/AT  Ears: external ears normal  Eyes: sclera normal  Lungs: normal inspiration, NAD  Heart: well-perfused  Skin: The skin is warm and dry  Ext: No c/c/e.  Neuro: grossly intact, AOx3    Labs/Studies:   Lab Results   Component Value Date    WBC 8.42 08/29/2024    HGB 12.1 (L) 08/29/2024    HCT 39.3 (L) 08/29/2024     08/29/2024     08/29/2024    K 4.2 08/29/2024     (H) 08/29/2024    CREATININE 0.9 08/29/2024    BUN 18 08/29/2024    CO2 24 08/29/2024    TSH 2.14 05/16/2011    PSA 3.2 01/10/2020    INR 1.0 10/26/2022    GLUF 96 08/31/2012    HGBA1C 5.8 (H) 08/29/2024    CHOL 114 (L) 08/29/2024    TRIG 46 08/29/2024    HDL 50 08/29/2024    ALT 18 08/29/2024    AST 22 08/29/2024     Procedure:  Diagnostic Cystoscopy    Indications: LUTS    UA: normal, see lab results for values    Procedure in Detail: After proper consents were obtained, the patient was prepped and draped in normal sterile fashion for diagnostic cystoscopy. 5 ml of lidocaine jelly was instilled in the urethra. The flexible cystoscope was then introduced into the urethra, and advanced into the bladder under direct vision. The urethral mucosa appeared normal, and no strictures were noted. The sphincter was normal, and the veru montanum was unremarkable. The prostatic mucosa and the lateral lobes of the prostate were unremarkable, with trilobar hypertrophy and complete visual obstruction. The bladder neck was normal. Inspection of the interior of the bladder was then carried out. The trigone was unremarkable, with no mucosal lesions. The ureteral orifices were normal in position and configuration. Systematic inspection of the mucosa of the bladder it was then carried out, rotating the cystoscope so that all areas of the left and right lateral walls, the dome of the bladder, and the posterior wall were all visualized. The cystoscope was then advanced further into  the bladder, and maximum deflection of the scope was performed so that the bladder neck could be inspected. No mucosal lesions were noted there. The cystoscope was then removed, and the procedure terminated. Patient tolerated the procedure well. No complications.     Findings:  Trilobar hypertrophy and obstruction    Impression/Plan:   89 yo M with:    LUTS - ultrasound shows 80 g prostate with trilobar hypertrophy, reviewed options, I think a combination TUR of his median lobe and UroLift would be a good option, we will leave him with catheter for a couple days and plan and outpatient procedure, will schedule 9/16    Prostate Cancer Screening - no more PSAs or DREs      Tone Pineda MD

## 2024-09-04 ENCOUNTER — E-CONSULT (OUTPATIENT)
Dept: CARDIOLOGY | Facility: CLINIC | Age: 89
End: 2024-09-04
Payer: MEDICARE

## 2024-09-04 ENCOUNTER — TELEPHONE (OUTPATIENT)
Dept: PULMONOLOGY | Facility: CLINIC | Age: 89
End: 2024-09-04
Payer: MEDICARE

## 2024-09-04 DIAGNOSIS — J44.9 CHRONIC OBSTRUCTIVE PULMONARY DISEASE, UNSPECIFIED COPD TYPE: Primary | ICD-10-CM

## 2024-09-04 DIAGNOSIS — J41.0 SIMPLE CHRONIC BRONCHITIS: Chronic | ICD-10-CM

## 2024-09-04 DIAGNOSIS — I70.0 ATHEROSCLEROSIS OF AORTA: Primary | ICD-10-CM

## 2024-09-04 DIAGNOSIS — E78.00 PURE HYPERCHOLESTEROLEMIA: Chronic | ICD-10-CM

## 2024-09-04 DIAGNOSIS — I10 ESSENTIAL HYPERTENSION: Chronic | ICD-10-CM

## 2024-09-04 DIAGNOSIS — Z01.818 PRE-OP EVALUATION: Primary | ICD-10-CM

## 2024-09-04 DIAGNOSIS — R07.9 CHEST PAIN SYNDROME: ICD-10-CM

## 2024-09-04 DIAGNOSIS — I27.21 PAH (PULMONARY ARTERY HYPERTENSION): ICD-10-CM

## 2024-09-04 NOTE — TELEPHONE ENCOUNTER
Called pt to schedule test and apt to review test, we received a message he needed to be cleared for a procedure, called pt to schedule, no answer, scheduled pt with next available, left pt voice message.

## 2024-09-04 NOTE — CONSULTS
[place] [facility]  Response for E-Consult     Patient Name: Chao Hawk  MRN: [id number]  Primary Care Provider: Amari Ureña MD   Requesting Provider: Tone Pineda,*  Consults    Recommendation: Pt cleared for procedure/surgery at moderate CV risk .    Contingency that warrants a repeat eConsult or referral:  89y/o M with PMHx of anemia, COPD, Hlp, HTN, PAH, AAA followed by CVT referred for Cv clearance for cystoscopy. Pt last seen in CV clinic 2-24 with stable CV status. Echo 2022 nml EF, NMT/stress 2019 normal.  Pt cleared for procedure/surgery at moderate CV risk .    Total time of Consultation: 10 minute    I did not speak to the requesting provider verbally about this.     *This eConsult is based on the clinical data available to me and is furnished without benefit of a physical examination. The eConsult will need to be interpreted in light of any clinical issues or changes in patient status not available to me at the time of filing this eConsults. Significant changes in patient condition or level of acuity should result in immediate formal consultation and reevaluation. Please alert me if you have further questions.    Thank you for this eConsult referral.     Fito Deutsch MD  [place] [facility]

## 2024-09-06 ENCOUNTER — TELEPHONE (OUTPATIENT)
Dept: UROLOGY | Facility: CLINIC | Age: 89
End: 2024-09-06
Payer: MEDICARE

## 2024-09-06 NOTE — TELEPHONE ENCOUNTER
----- Message from Elsie Serrato NP sent at 9/6/2024  1:25 PM CDT -----  Looks like Dr. Deutsch cleared him  ----- Message -----  From: Nichole Briceno CMA  Sent: 9/6/2024  12:30 PM CDT  To: Elsie Serrato NP    Please advise.  Please reply to provider in basket and not me as I am fixing to leave.  ----- Message -----  From: Gael Hoffmann MD  Sent: 9/6/2024  12:18 PM CDT  To: Nichole Briceno CMA    Last time I saw him was 2022.cannot clear .Elsie saw him recently  ----- Message -----  From: Nichole Briceno CMA  Sent: 9/3/2024   2:26 PM CDT  To: Gael Hoffmann MD; Peg Bullard MA    Good afternoon,     Can you please place an E Consult Attn Dr Hoffmann and he will be able to get that reviewed and let you know.     Nichole  ----- Message -----  From: Peg Bullard MA  Sent: 9/3/2024   2:17 PM CDT  To: Shun Severino    I'm Sorry this is the patient needing clearance for the urolift surgery. I forgot to add his name in the previous message.

## 2024-09-10 ENCOUNTER — CLINICAL SUPPORT (OUTPATIENT)
Dept: PULMONOLOGY | Facility: CLINIC | Age: 89
End: 2024-09-10
Payer: MEDICARE

## 2024-09-10 ENCOUNTER — OFFICE VISIT (OUTPATIENT)
Dept: PULMONOLOGY | Facility: CLINIC | Age: 89
End: 2024-09-10
Payer: MEDICARE

## 2024-09-10 ENCOUNTER — TELEPHONE (OUTPATIENT)
Dept: PULMONOLOGY | Facility: CLINIC | Age: 89
End: 2024-09-10
Payer: MEDICARE

## 2024-09-10 ENCOUNTER — OFFICE VISIT (OUTPATIENT)
Dept: INTERNAL MEDICINE | Facility: CLINIC | Age: 89
End: 2024-09-10
Payer: MEDICARE

## 2024-09-10 VITALS
SYSTOLIC BLOOD PRESSURE: 167 MMHG | WEIGHT: 171.63 LBS | BODY MASS INDEX: 27.58 KG/M2 | OXYGEN SATURATION: 93 % | RESPIRATION RATE: 20 BRPM | SYSTOLIC BLOOD PRESSURE: 122 MMHG | OXYGEN SATURATION: 99 % | DIASTOLIC BLOOD PRESSURE: 53 MMHG | HEART RATE: 85 BPM | TEMPERATURE: 98 F | DIASTOLIC BLOOD PRESSURE: 60 MMHG | HEIGHT: 66 IN | HEART RATE: 77 BPM

## 2024-09-10 DIAGNOSIS — J84.10 CALCIFIED GRANULOMA OF LUNG: Primary | ICD-10-CM

## 2024-09-10 DIAGNOSIS — G47.34 NOCTURNAL HYPOXEMIA: ICD-10-CM

## 2024-09-10 DIAGNOSIS — I10 ESSENTIAL HYPERTENSION: Chronic | ICD-10-CM

## 2024-09-10 DIAGNOSIS — M62.81 PROXIMAL MUSCLE WEAKNESS: ICD-10-CM

## 2024-09-10 DIAGNOSIS — Z01.818 PRE-OPERATIVE CLEARANCE: ICD-10-CM

## 2024-09-10 DIAGNOSIS — Z01.818 PRE-OPERATIVE CLEARANCE: Primary | ICD-10-CM

## 2024-09-10 DIAGNOSIS — J44.9 CHRONIC OBSTRUCTIVE PULMONARY DISEASE, UNSPECIFIED COPD TYPE: Chronic | ICD-10-CM

## 2024-09-10 DIAGNOSIS — G62.9 PERIPHERAL POLYNEUROPATHY: ICD-10-CM

## 2024-09-10 DIAGNOSIS — E78.00 PURE HYPERCHOLESTEROLEMIA: Chronic | ICD-10-CM

## 2024-09-10 DIAGNOSIS — N40.1 BENIGN PROSTATIC HYPERPLASIA WITH WEAK URINARY STREAM: Primary | Chronic | ICD-10-CM

## 2024-09-10 DIAGNOSIS — R39.12 BENIGN PROSTATIC HYPERPLASIA WITH WEAK URINARY STREAM: Primary | Chronic | ICD-10-CM

## 2024-09-10 DIAGNOSIS — Z01.818 PRE-OP EVALUATION: ICD-10-CM

## 2024-09-10 DIAGNOSIS — M47.816 LUMBAR ARTHROPATHY: ICD-10-CM

## 2024-09-10 DIAGNOSIS — D62 ACUTE BLOOD LOSS ANEMIA: ICD-10-CM

## 2024-09-10 LAB
ALLENS TEST: ABNORMAL
DELSYS: ABNORMAL
FIO2: 21
HCO3 UR-SCNC: 22.3 MMOL/L (ref 24–28)
MODE: ABNORMAL
PCO2 BLDA: 34.6 MMHG (ref 35–45)
PH SMN: 7.42 [PH] (ref 7.35–7.45)
PO2 BLDA: 76 MMHG (ref 80–100)
POC BE: -2 MMOL/L
POC SATURATED O2: 95 % (ref 95–100)
SAMPLE: ABNORMAL
SITE: ABNORMAL

## 2024-09-10 PROCEDURE — 99999 PR PBB SHADOW E&M-EST. PATIENT-LVL III: CPT | Mod: PBBFAC,HCNC,,

## 2024-09-10 PROCEDURE — 1159F MED LIST DOCD IN RCRD: CPT | Mod: HCNC,CPTII,S$GLB, | Performed by: PHYSICIAN ASSISTANT

## 2024-09-10 PROCEDURE — 99214 OFFICE O/P EST MOD 30 MIN: CPT | Mod: HCNC,S$GLB,, | Performed by: PHYSICIAN ASSISTANT

## 2024-09-10 PROCEDURE — 1160F RVW MEDS BY RX/DR IN RCRD: CPT | Mod: HCNC,CPTII,S$GLB, | Performed by: PHYSICIAN ASSISTANT

## 2024-09-10 PROCEDURE — 99999 PR PBB SHADOW E&M-EST. PATIENT-LVL IV: CPT | Mod: PBBFAC,HCNC,, | Performed by: PHYSICIAN ASSISTANT

## 2024-09-10 NOTE — ASSESSMENT & PLAN NOTE
Severe obstruction with bronchodilator reversal  Stable  Chest X Ray clear  Few daily COPD symptoms  No recent exacerbations  He feels well controlled on albuterol prn, rare use  Recommend flu vaccine this season

## 2024-09-10 NOTE — PROCEDURES
ABG completed. See ABG Below.    Component      Latest Ref Rng 9/10/2024   POC PH      7.35 - 7.45  7.418    POC PCO2      35 - 45 mmHg 34.6 (L)    POC PO2      80 - 100 mmHg 76 (L)    POC HCO3      24 - 28 mmol/L 22.3 (L)    POC BE      -2 to 2 mmol/L -2    POC SATURATED O2      95 - 100 % 95    Sample ARTERIAL    Site RR    Allens Test Pass    DelSys Room Air    Mode SPONT    FiO2 21       Legend:  (L) Low      Interpretation:  [] Arterial blood gases on room air demonstrate normal pCO2 and pO2  [] Arterial blood gases on room air are abnormal demonstrating hypercarbia (pCO2 >45 mmHg)  [] Arterial blood gases on room air are abnormal demonstrating hypocarbia (pCO2 < 35 mmHg)  [] Arterial blood gases on room air are abnormal demonstrating hypoxemia (pO2 < 80 mmHg)  [] Arterial blood gases on room air are abnormal demonstrating hyperoxemia (pO2 >120 mmHg)  [] Arterial blood gases on room air are abnormal demonstrating hypoxemia (pO2 < 80 mmHg) and hypercarbia (pCO2>45 mmHg)    The table below summarizes the main interpretations of the relationship between the arterial blood gases, pH, pCO2 and HCO-3    []    I

## 2024-09-10 NOTE — PROGRESS NOTES
Subjective:       Patient ID: Chao Hawk is a 89 y.o. male.    Chief Complaint: COPD      9/10/2024  Established patient history of COPD and nocturnal hypoxia here for pulmonary risk assessment visit  Scheduled for cystoscopy, TURP next week with Dr. Pineda  He feels COPD is well controlled  Not on controller inhaler, stopped Trelegy  Has albuterol for prn use but rarely needs it  No recent exacerbations  No respiratory infections in the last month  Qualified for oxygen with sleep 2019 but rarely wears it  No signs of exacerbation today    COPD Questionnaire  How often do you cough?: Never  How often do you have phlegm (mucus) in your chest?: Never  How often does your chest feel tight?: Never  When you walk up a hill or one flight of stairs, how often are you breathless?: All of the time  How often are you limited doing any activities at home?: Never  How often are you confident leaving the house despite your lung condition?: All of the time  How often do you sleep soundly?: A little of the time  How often do you have energy?: Some of the time  Total score: 10    Immunization History   Administered Date(s) Administered    COVID-19, MRNA, LN-S, PF (Pfizer) (Purple Cap) 02/04/2021, 02/25/2021, 01/04/2022    Influenza 09/19/2016    Influenza (FLUAD) - Quadrivalent - Adjuvanted - PF *Preferred* (65+) 01/21/2021, 10/18/2021, 11/17/2022    Influenza - High Dose - PF (65 years and older) 10/27/2010, 10/27/2011, 10/11/2012, 10/21/2013, 10/29/2014, 10/20/2015, 10/31/2017, 10/18/2018, 10/21/2019    Influenza - Quadrivalent - High Dose - PF (65 years and older) 09/04/2023    Influenza - Quadrivalent - PF *Preferred* (6 months and older) 09/19/2016    Influenza - Trivalent (ADULT) 11/08/2006, 10/25/2007, 10/06/2008    Influenza - Trivalent - PF (ADULT) 09/19/2016    Influenza Split 11/08/2006, 10/25/2007, 10/06/2008    Pneumococcal Conjugate - 13 Valent 01/04/2016    Pneumococcal Polysaccharide - 23 Valent 08/16/2000,  10/21/2019    Td (ADULT) 12/06/2000, 04/02/2013    Tdap 08/30/2013, 09/02/2022    Zoster 09/01/2009    Zoster Recombinant 09/04/2023, 11/06/2023      Tobacco Use: Medium Risk (9/10/2024)    Patient History     Smoking Tobacco Use: Former     Smokeless Tobacco Use: Former     Passive Exposure: Not on file      Past Medical History:   Diagnosis Date    Anemia     Arthritis     back, hand    Back pain     Dr. Cristobal- [facility]    Bilateral leg edema 2/16/2018    Chronic bronchitis     COPD (chronic obstructive pulmonary disease)     Diverticulitis of large intestine with perforation without abscess or bleeding     Diverticulosis 5/16/06    colonoscopy    Hearing loss, bilateral     Hernia     x2    Hyperlipidemia     Hypertension     Lumbar radiculopathy 6/14/2017    Multiple renal cysts 7/5/2016    Nocturnal hypoxemia     Polyneuropathy     Tobacco dependence     Trouble in sleeping       Current Outpatient Medications on File Prior to Visit   Medication Sig Dispense Refill    acetaminophen (TYLENOL) 500 MG tablet Take 500 mg by mouth as needed for Pain.      albuterol (PROVENTIL) 2.5 mg /3 mL (0.083 %) nebulizer solution Take 3 mLs (2.5 mg total) by nebulization every 4 (four) hours as needed for Wheezing. Rescue 150 mL 11    ARGININE, L-ARGININE, ORAL Take 500 mg by mouth once daily.      atorvastatin (LIPITOR) 40 MG tablet TAKE 1 TABLET EVERY DAY 90 tablet 1    betamethasone valerate 0.1% (VALISONE) 0.1 % Oint Apply topically 2 (two) times daily. 45 g 1    CANNABIDIOL, CBD, EXTRACT ORAL Take 3 drops by mouth every morning.      cholecalciferol, vitamin D3, (VITAMIN D3) 50 mcg (2,000 unit) Cap Take 1 capsule by mouth every evening.      COQ10, UBIQUINOL, ORAL Take 1 tablet by mouth nightly.       cyanocobalamin (VITAMIN B-12) 1000 MCG tablet Take 100 mcg by mouth once daily.      furosemide (LASIX) 20 MG tablet Take 1 tablet (20 mg total) by mouth once daily. 90 tablet 3    gabapentin (NEURONTIN) 100 MG capsule TAKE ONE  CAPSULE BY MOUTH TWICE DAILY 180 capsule 3    GLUCOSAM-MSM-CHONDROIT-VIT D3 ORAL Take 2 tablets by mouth once daily.      krill/om-3/dha/epa/phospho/ast (MEGARED OMEGA-3 KRILL OIL ORAL) Take 1 capsule by mouth nightly.       lisinopriL 10 MG tablet TAKE 1 TABLET EVERY DAY 90 tablet 3    metroNIDAZOLE (METROGEL) 0.75 % gel Apply topically 2 (two) times daily. 45 g 3    multivit-min/folic/vit K/lycop (ONE-A-DAY MEN'S MULTIVITAMIN ORAL) Take 1 tablet by mouth every evening.       potassium 99 mg Tab Take by mouth once daily.      [DISCONTINUED] diclofenac sodium (VOLTAREN) 1 % Gel Apply 2 g topically 3 (three) times daily. 1 each 3    [DISCONTINUED] dutasteride (AVODART) 0.5 mg capsule Take 1 capsule (0.5 mg total) by mouth once daily. 90 capsule 3    [DISCONTINUED] fluticasone-umeclidin-vilanter (TRELEGY ELLIPTA) 200-62.5-25 mcg inhaler Inhale 1 puff into the lungs once daily. 60 each 11    [DISCONTINUED] prednisoLONE acetate (PRED FORTE) 1 % DrpS Place 1 drop into the right eye 2 (two) times daily.      [DISCONTINUED] tamsulosin (FLOMAX) 0.4 mg Cap TAKE TWO CAPSULES BY MOUTH IN THE MORNING 180 capsule 3    [DISCONTINUED] traMADoL (ULTRAM) 50 mg tablet Take 1 tablet (50 mg total) by mouth daily as needed for Pain. 30 tablet 0     No current facility-administered medications on file prior to visit.        Review of Systems   Constitutional:  Negative for fever, weight loss, appetite change and weakness.   HENT:  Negative for postnasal drip, rhinorrhea, sinus pressure, trouble swallowing and congestion.    Respiratory:  Negative for cough, sputum production, choking, chest tightness, shortness of breath, wheezing and dyspnea on extertion.    Cardiovascular:  Negative for chest pain and leg swelling.   Musculoskeletal:  Positive for gait problem.   Gastrointestinal:  Negative for nausea and vomiting.   Neurological:  Negative for dizziness, weakness and headaches.   All other systems reviewed and are negative.     "  Objective:       Vitals:    09/10/24 1525   BP: 122/60   Pulse: 77   Resp: 20   SpO2: (!) 93%   Weight: 77.8 kg (171 lb 9.6 oz)   Height: 5' 6" (1.676 m)       Physical Exam   Constitutional: He is oriented to person, place, and time. He appears well-developed and well-nourished. No distress.   HENT:   Head: Normocephalic.   Mouth/Throat: Oropharynx is clear and moist.   Cardiovascular: Normal rate and regular rhythm.   Pulmonary/Chest: Effort normal. No respiratory distress. He has no wheezes. He has no rhonchi. He has no rales.   Musculoskeletal:         General: No edema.      Cervical back: Normal range of motion and neck supple.   Neurological: He is alert and oriented to person, place, and time.   Skin: Skin is warm and dry.   Psychiatric: He has a normal mood and affect.   Vitals reviewed.    Personal Diagnostic Review    X-Ray Chest PA And Lateral  Narrative: EXAM:   XR CHEST PA AND LATERAL    CLINICAL HISTORY: Observation for malignancy.    COMPARISON: 03/06/2023.    TECHNIQUE: PA and lateral views    FINDINGS:  The lungs are clear.   No pneumothorax.  The cardiac silhouette size and contour is within normal limits.  Calcified granuloma in the right lower lobe.  Impression: No acute findings.    Finalized on: 9/3/2024 2:30 PM By:  SARAVANAN Kapadia MD, MD  [facility]# [id number]      2024-09-03 14:32:27.151    [facility]    Recent Labs     09/10/24  1433   PH 7.418   PCO2 34.6*   PO2 76*   HCO3 22.3*   POCSATURATED 95   BE -2       PRE OPERATIVE PULMONARY RISK ASSESSMENT:      SURGEON: Dr. Pineda  PROCEDURE: TURP     ANESTHESIA: GA  DURATION: 1 hour      Patient is a moderate risk for perioperative pulmonary complications due to:        [x]            COPD                [x]            General Anesthsia  [x]            long-acting neuromuscular blockade.     Risks and possible pulmonary complications of surgery include risk of respiratory failure requiring mechanical ventilation, post operative pneumonia, post operative " atelectasis, deep venous thrombosis, pulmonary embolism and death.    Based on my assessment , it is okay to proceed with procedure PROVIDED RISK IS ACCEPTABLE TO BOTH THE PATIENT AND THE SURGEON PERFORMING THE SURGERY.     ARISCAT Score 24       0 to 25 points: Low risk: 1.6% pulmonary complication rate   26 to 44 points: Intermediate risk: 13.3% pulmonary complication rate   45 to 123 points: High risk: 42.1% pulmonary complication rate            RECOMMENDATIONS FOR RISK MITIGATION:    1. Preoperative pulmonary workup:  complete   2. Change in medication regimen before surgery: none, continue medication regimen including morning of surgery, with sip of water.  3. Prophylaxis for cardiac events with perioperative beta-blockers: should be considered, specific regimen per anesthesia.  4. Invasive hemodynamic monitoring perioperatively: should be considered.  5. Deep vein thrombosis prophylaxis postoperatively:regimen to be chosen by surgical team.  6. Surveillance for postoperative MI with ECG immediately postoperatively and on postoperative days 1 and 2 AND troponin levels 24 hours postoperatively and on day 4 or hospital discharge (whichever comes first): should be considered.  7. Begin patient education regarding lung-expansion maneuvers like deep breathing and incentive spirometry.   8. Limit duration of surgery to less than 3 hr if possible.  9. Use spinal or epidural anesthesia if possible.  10. Avoid use of pancuronium or long acting neuromuscular blockers.  11. Use deep-breathing exercises or incentive spirometry.  12. Assure perioperative thromboprophylaxis and adequate post operative analgesia.           Assessment/Plan:       Risks and possible pulmonary complications of surgery include risk of respiratory failure requiring mechanical ventilation, post operative pneumonia, post operative atelectasis, deep venous thrombosis, pulmonary embolism and death.    Based on my assessment , it is okay to proceed  with procedure PROVIDED RISK IS ACCEPTABLE TO BOTH THE PATIENT AND THE SURGEON PERFORMING THE SURGERY.     Problem List Items Addressed This Visit          Pulmonary    COPD (chronic obstructive pulmonary disease) (Chronic)     Severe obstruction with bronchodilator reversal  Stable  Chest X Ray clear  Few daily COPD symptoms  No recent exacerbations  He feels well controlled on albuterol prn, rare use           Calcified granuloma of lung - Primary     Stable on CXR            Other    Nocturnal hypoxemia     Poor compliance with oxygen at night  Discussed importance of wearing oxygen  Declines sleep study            Follow up 6 months for COPD.    Discussed diagnosis, its evaluation, treatment and usual course. All questions answered.    Patient verbalized understanding of plan and left in no acute distress    Thank you for the courtesy of participating in the care of this patient    Sveta Copeland PA-C  [facility]

## 2024-09-10 NOTE — ASSESSMENT & PLAN NOTE
- hold lisinopril the morning of surgery  - monitor BP perioperatively and treat with PRNs as needed

## 2024-09-10 NOTE — PROGRESS NOTES
Preoperative History and Physical                                                                   Chief Complaint: Preoperative evaluation     History of Present Illness:      Chao Hawk is a 89 y.o. male with PMH of DDD, lumbar arthropathy, peripheral neuropathy, COPD, calcified granuloma of lung, HTN, HLD, atherosclerosis of aorta, PAH, chest pain syndrome, BPH, anemia, and arthritis who presents to the office today for a preoperative consultation at the request of Dr. Pineda who plans on performing cystoscopy with urolift implant, TURP on September 16.     Functional Status:      The patient is not able to climb a flight of stairs. The patient is not able to ambulate without difficulty, he is feeble and ambulates with a cane. The patient's functional status is not affected by the surgical problem. The patient's functional status is affected by shortness of breath, chest pain, dyspnea on exertion and fatigue.    MET score greater than 4    Past Medical History:      Past Medical History:   Diagnosis Date    Anemia     Arthritis     back, hand    Back pain     Dr. Cristobal- [facility]    Bilateral leg edema 2/16/2018    Chronic bronchitis     COPD (chronic obstructive pulmonary disease)     Diverticulitis of large intestine with perforation without abscess or bleeding     Diverticulosis 5/16/06    colonoscopy    Hearing loss, bilateral     Hernia     x2    Hyperlipidemia     Hypertension     Lumbar radiculopathy 6/14/2017    Multiple renal cysts 7/5/2016    Nocturnal hypoxemia     Polyneuropathy     Tobacco dependence     Trouble in sleeping         Past Surgical History:      Past Surgical History:   Procedure Laterality Date    ESOPHAGOGASTRODUODENOSCOPY N/A 10/26/2022    Procedure: EGD (ESOPHAGOGASTRODUODENOSCOPY);  Surgeon: Mallika Duran MD;  Location: [facility];  Service: Endoscopy;  Laterality: N/A;    FINGER SURGERY Left 1980s    index- Dr. Encarnacion; to  remove nodule    HERNIA REPAIR      x2    INTRAOCULAR LENS INSERTION Bilateral 2016    LUMBAR SPINE SURGERY  2017    REVERSE TOTAL SHOULDER ARTHROPLASTY Right 10/5/2020    Procedure: ARTHROPLASTY, SHOULDER, TOTAL, REVERSE;  Surgeon: Reji Martin MD;  Location: [facility];  Service: Orthopedics;  Laterality: Right;    TONSILLECTOMY, ADENOIDECTOMY          Social History:      Social History     Socioeconomic History    Marital status:      Spouse name: Linsey    Number of children: 4    Years of education: 12 + 4   Occupational History    Occupation:  retired age 60     Comment: [employer]   Tobacco Use    Smoking status: Former     Current packs/day: 0.00     Average packs/day: 1 pack/day for 42.0 years (42.0 ttl pk-yrs)     Types: Cigarettes     Start date: 1949     Quit date: 1991     Years since quittin.7    Smokeless tobacco: Former   Substance and Sexual Activity    Alcohol use: Not Currently     Comment: rarely   No alcohol 72h  prior to sx    Drug use: No    Sexual activity: Not Currently   Social History Narrative    No smokers or pets in household.     Social Determinants of Health     Financial Resource Strain: Low Risk  (2023)    Overall Financial Resource Strain (CARDIA)     Difficulty of Paying Living Expenses: Not very hard   Transportation Needs: No Transportation Needs (2023)    PRAPARE - Transportation     Lack of Transportation (Medical): No     Lack of Transportation (Non-Medical): No   Physical Activity: Inactive (2023)    Exercise Vital Sign     Days of Exercise per Week: 0 days     Minutes of Exercise per Session: 0 min   Stress: No Stress Concern Present (2023)    [employer] - Occupational Stress Questionnaire     Feeling of Stress : Not at all   Housing Stability: Unknown (2023)    Housing Stability Vital Sign     Unable to Pay for Housing in the Last Year: No     Unstable  "Housing in the Last Year: No        Family History:      Family History   Problem Relation Name Age of Onset    Emphysema Mother      Heart disease Mother      COPD Mother      Appendicitis Father      Heart disease Son      Atrial fibrillation Sister      Lung disease Sister  91    Colon cancer Neg Hx      Cancer Neg Hx      Stroke Neg Hx         Allergies:      Review of patient's allergies indicates:   Allergen Reactions    Bee sting  [allergen ext-venom-honey bee] Swelling    Penicillins Rash     "Felt like I had pins and needles in my feet" , hospitalized overnight    Poison ivy extract Hives       Medications:      Current Outpatient Medications   Medication Sig    acetaminophen (TYLENOL) 500 MG tablet Take 500 mg by mouth as needed for Pain.    albuterol (PROVENTIL) 2.5 mg /3 mL (0.083 %) nebulizer solution Take 3 mLs (2.5 mg total) by nebulization every 4 (four) hours as needed for Wheezing. Rescue    ARGININE, L-ARGININE, ORAL Take 500 mg by mouth once daily.    atorvastatin (LIPITOR) 40 MG tablet TAKE 1 TABLET EVERY DAY    betamethasone valerate 0.1% (VALISONE) 0.1 % Oint Apply topically 2 (two) times daily.    CANNABIDIOL, CBD, EXTRACT ORAL Take 3 drops by mouth every morning.    cholecalciferol, vitamin D3, (VITAMIN D3) 50 mcg (2,000 unit) Cap Take 1 capsule by mouth every evening.    COQ10, UBIQUINOL, ORAL Take 1 tablet by mouth nightly.     cyanocobalamin (VITAMIN B-12) 1000 MCG tablet Take 100 mcg by mouth once daily.    furosemide (LASIX) 20 MG tablet Take 1 tablet (20 mg total) by mouth once daily.    gabapentin (NEURONTIN) 100 MG capsule TAKE ONE CAPSULE BY MOUTH TWICE DAILY    GLUCOSAM-MSM-CHONDROIT-VIT D3 ORAL Take 2 tablets by mouth once daily.    krill/om-3/dha/epa/phospho/ast (MEGARED OMEGA-3 KRILL OIL ORAL) Take 1 capsule by mouth nightly.     lisinopriL 10 MG tablet TAKE 1 TABLET EVERY DAY    metroNIDAZOLE (METROGEL) 0.75 % gel Apply topically 2 (two) times daily.    multivit-min/folic/vit " K/lycop (ONE-A-DAY MEN'S MULTIVITAMIN ORAL) Take 1 tablet by mouth every evening.     potassium 99 mg Tab Take by mouth once daily.     No current facility-administered medications for this visit.       Vitals:      Vitals:    09/10/24 1502   BP: (!) 167/53   Pulse: 85   Temp: 98.2 °F (36.8 °C)       Review of Systems:        Constitutional: Negative for fever, chills, weight loss, malaise/fatigue and diaphoresis.   HENT: +hearing loss, has bilateral hearing aids, ear pain, nosebleeds, congestion, sore throat, neck pain, tinnitus and ear discharge.    Eyes: Negative for blurred vision, double vision, photophobia, pain, discharge and redness.   Respiratory: Negative for cough, hemoptysis, sputum production, shortness of breath, wheezing and stridor.    Cardiovascular: Negative for chest pain, palpitations, orthopnea, claudication, leg swelling and PND.   Gastrointestinal: Negative for heartburn, nausea, vomiting, abdominal pain, diarrhea, constipation, blood in stool and melena.   Genitourinary: Negative for dysuria, urgency, frequency, hematuria and flank pain.   Musculoskeletal: Negative for myalgias, back pain, joint pain and falls.   Skin: Negative for itching and rash.   Neurological: Negative for dizziness, tingling, tremors, sensory change, speech change, focal weakness, seizures, loss of consciousness, weakness and headaches.   Endo/Heme/Allergies: Negative for environmental allergies and polydipsia. Does not bruise/bleed easily.   Psychiatric/Behavioral: Negative for depression, suicidal ideas, hallucinations, memory loss and substance abuse. The patient is not nervous/anxious and does not have insomnia.    All 14 systems reviewed and negative except as noted above.    Physical Exam:      Constitutional: Appears well-developed, well-nourished and in no acute distress.  Patient is oriented to person, place, and time.   Head: Normocephalic and atraumatic. Mucous membranes moist.  Neck: Neck supple no mass.    Cardiovascular: Normal rate and regular rhythm.  S1 S2 appreciated by ascultation.  Pulmonary/Chest: Effort normal and clear to auscultation bilaterally. No respiratory distress.   Abdomen: Soft. Non-tender and non-distended. Bowel sounds are normal.   Neurological: Patient is alert and oriented to person, place and time. Moves all extremities.  Skin: Warm and dry. No lesions.  Extremities: No clubbing, cyanosis or edema. Unsteady gait, ambulates with a cane    Laboratory data:      Reviewed and noted in plan where applicable. Please see chart for full laboratory data.    @DFQJTZKQA37(cpk,cpkmb,troponini,mb)@ @DOCIYOSHG74(poctglucose)@     Lab Results   Component Value Date    INR 1.0 10/26/2022    INR 1.0 10/25/2022    INR 1.0 04/13/2018       Lab Results   Component Value Date    WBC 8.42 08/29/2024    HGB 12.1 (L) 08/29/2024    HCT 39.3 (L) 08/29/2024    MCV 92 08/29/2024     08/29/2024       @INXHSCQTR19(GLU,NA,K,Cl,CO2,BUN,Creatinine,Calcium,MG)@        Sodium   Date Value Ref Range Status   08/29/2024 145 136 - 145 mmol/L Final     Chloride   Date Value Ref Range Status   08/29/2024 112 (H) 95 - 110 mmol/L Final     CO2   Date Value Ref Range Status   08/29/2024 24 23 - 29 mmol/L Final     Glucose   Date Value Ref Range Status   08/29/2024 91 70 - 110 mg/dL Final     BUN   Date Value Ref Range Status   08/29/2024 18 10 - 30 mg/dL Final     Creatinine   Date Value Ref Range Status   08/29/2024 0.9 0.5 - 1.4 mg/dL Final     Calcium   Date Value Ref Range Status   08/29/2024 9.3 8.7 - 10.5 mg/dL Final     Total Protein   Date Value Ref Range Status   08/29/2024 6.5 6.0 - 8.4 g/dL Final     Albumin   Date Value Ref Range Status   08/29/2024 3.3 (L) 3.5 - 5.2 g/dL Final     Total Bilirubin   Date Value Ref Range Status   08/29/2024 0.5 0.1 - 1.0 mg/dL Final     Comment:     For infants and newborns, interpretation of results should be based  on gestational age, weight and in agreement with  clinical  observations.    Premature Infant recommended reference ranges:  Up to 24 hours.............<8.0 mg/dL  Up to 48 hours............<12.0 mg/dL  3-5 days..................<15.0 mg/dL  6-29 days.................<15.0 mg/dL       Alkaline Phosphatase   Date Value Ref Range Status   08/29/2024 78 55 - 135 U/L Final     AST   Date Value Ref Range Status   08/29/2024 22 10 - 40 U/L Final     ALT   Date Value Ref Range Status   08/29/2024 18 10 - 44 U/L Final     Anion Gap   Date Value Ref Range Status   08/29/2024 9 8 - 16 mmol/L Final     eGFR   Date Value Ref Range Status   08/29/2024 >60.0 >60 mL/min/1.73 m^2 Final           Predictors of intubation difficulty:       Morbid obesity? no   Anatomically abnormal facies? no   Prominent incisors? no   Receding mandible? no   Short, thick neck? no   Neck range of motion: normal   Dentition: Edentulous, upper and lower denture plates  Based on the Modified Mallampati, patient is a mallampati score: II (hard and soft palate, upper portion of tonsils anduvula visible)    Cardiographics:      ECG: NSR with sinus arrhythmia, anterior infarct (seen on prior studies)  Echocardiogram: not indicated    Imaging:      Chest x-ray: normal and reviewed by myself    Assessment and Plan:      BPH (benign prostatic hyperplasia)  Known risk factors for perioperative complications: Congestive heart failure  Chronic pulmonary disease    Difficulty with intubation is not anticipated.    Cardiac Risk Estimation: Based on the Revised Cardiac Risk index, patient is a Class risk I with a 3.9 % risk of a major cardiac event in a low risk procedure.    1.) Preoperative workup as follows: chest x-ray, ECG, spirometry, ABGs, hemoglobin, hematocrit, electrolytes, creatinine, glucose.  2.) Change in medication regimen before surgery: discontinue ASA 6 days before surgery, discontinue NSAIDs 5 days before surgery, hold Metformin 24 hours prior to surgery. Hold all vitamins/supplements for 7 days  "prior to surgery, with the exception of Potassium and Iron supplementation. Hold semaglutide/tirzepatide for 7 days prior to surgery.   3.) Prophylaxis for cardiac events with perioperative beta-blockers: not indicated.  4.) Invasive hemodynamic monitoring perioperatively: at the discretion of anesthesiologist.  5.) Deep vein thrombosis prophylaxis postoperatively: regimen to be chosen by surgical team.  6.) Surveillance for postoperative MI with ECG immediately postoperatively and on postoperati ve days 1 and 2 AND troponin levels 24 hours postoperatively and on day 4 or hospital discharge (whichever comes first): at the discretion of anesthesiologist.  7.) Current medications which may produce withdrawal symptoms if withheld perioperatively: none  8.) Other measures: Postoperative hypertension management with IV hydralazine until able to take oral medications.  Consultation with [facility] for in-hospital postoperative management of multiple co-morbidities.    Lumbar arthropathy  - takes Tylenol as needed, denies taking NSAIDs     Peripheral neuropathy  - ok to take gabapentin if needed the morning of surgery    COPD (chronic obstructive pulmonary disease)  - has albuterol inhaler PRN, states he rarely uses it  - Trelegy on home med list, but he states he no longer takes it  - not on home supplemental oxygen   - patient was evaluated by pulmonary and cleared as follows: "Risks and possible pulmonary complications of surgery include risk of respiratory failure requiring mechanical ventilation, post operative pneumonia, post operative atelectasis, deep venous thrombosis, pulmonary embolism and death.    Based on my assessment , it is okay to proceed with procedure PROVIDED RISK IS ACCEPTABLE TO BOTH THE PATIENT AND THE SURGEON PERFORMING THE SURGERY."    Essential hypertension  - hold lisinopril the morning of surgery  - monitor BP perioperatively and treat with PRNs as needed    Pure hypercholesterolemia  - takes " atorvastatin HS, ok to take the night before surgery    Acute blood loss anemia  - H&H 12.1/39.3, stable  - monitor labs periop and transfuse as needed    Proximal muscle weakness  - ambulates with a cane        Electronically signed by Dolly Martin MSN, FNP-C on 9/10/2024 at 3:22 PM.

## 2024-09-10 NOTE — ASSESSMENT & PLAN NOTE
Poor compliance with oxygen at night  Discussed importance of wearing oxygen  Declines sleep study

## 2024-09-10 NOTE — PRE-PROCEDURE INSTRUCTIONS
To confirm, Surgery is scheduled on 9/16/24. We will call you after 2pm the day before Surgery with your arrival time.    *Please report to the [facility] (1st Floor) located off of [facility] (2nd Entrance/Building on the left, in front of the flag pole).  Address: [address] [place] [address] [place]. [address]      !!!INSTRUCTIONS IMPORTANT!!!  DO NOT Eat, Drink, or Smoke after 12 midnight unless instructed otherwise by your Surgeon. OK to brush teeth, no gum, candy or mints!    MORNING OF SURGERY, drink small sip of water with the following medications instructed by Surgery Pre-Admit Provider:  NONE    *Additional Medication Instructions: BRING ALBUTEROL INHALER TO PRE OP DAY OF SURGERY!    Diabetic/Prediabetic Patients: If you take diabetic or weight loss medication, Do NOT take morning of surgery unless instructed by Doctor. Metformin to be stopped 24 hrs prior to surgery. Ozempic/ Mounjaro/ Wegovy/ Trulicity/ Semaglutide or any weight loss injections to be stopped 7 days prior to surgery. DO NOT take long-acting insulin the evening before surgery. Blood sugars will be checked in pre-op by Nurse.    !!!STOP ALL Aspirins, NSAIDS, WEIGHT LOSS INJECTIONS/PILLS, Herbal supplements, & Vitamins 7 DAYS BEFORE SURGERY!!!    ____  Avoid Alcoholic beverages 3 days prior to surgery, as it can thin the blood.  ____  NO Acrylic/fake nails or nail polish worn day of surgery (specifically hand/arm & foot surgeries).  ____  NO powder, lotions, deodorants, oils or cream on body.  ____  Remove all jewelry, piercings, & foreign objects prior to arrival and leave at home.  ____  Remove Dentures, Hearing Aids & Contact Lens prior to surgery.  ____  Bring photo ID and insurance information to hospital (Leave Valuables at Home).  ____  If going home the same day, arrange for a ride home. You will not be able to drive for 24 hrs if Anesthesia was used.   ____  Females (ages 11-60): may need to give a urine sample the  morning of surgery; please see Pre op Nurse prior to using the restroom.  ____  Males: Stop ED medications (Viagra, Cialis) 24 hrs prior to surgery.  ____  Wear clean, loose fitting clothing to allow for dressings/ bandages.      Bathing Instructions:    -Shower with anti-bacterial Soap (Hibiclens or Dial) the night before surgery & the morning of surgery!   -Do not use Hibiclens soap on your face or genitals.   -Apply clean clothes after shower.  -Do not shave your face or body 2 days prior to surgery unless instructed otherwise by your Surgeon.  -Do not shave pubic hair 7 days prior to surgery (gyn pt's).    [facility] Visitor/Ride Policy:  Only 2 adults allowed in pre op/recovery area during your procedure. You MUST HAVE A RIDE HOME from a responsible adult that you know and trust. Medical Transport, Uber or Lyft can ONLY be used if patient has a responsible adult to accompany them during ride home.       *Signs and symptoms of Infection Before or After Surgery:               !!!If you experience any fever, chills, nausea/ vomiting, foul odor/ excessive drainage from surgical site, flu-like symptoms, new wounds or cuts, PLEASE CALL THE SURGEON OFFICE at [phone] or SEND MESSAGE THROUGH [employer] PORTAL!!!       *If you are running late day of surgery, please call the Surgery Dept @ [phone].    *Billing question, please call:  [phone] or [phone]       Thank you,  -[facility] Pre Admit Dept.  [phone] or [phone]  M-F 7:30 am-4:00 pm (Closed Major Holidays)    Additional Tests Scheduled Today:  CHEST XRAY (1ST Floor) Check in at the !  Clearances/ Appointments Requested before Surgery: PULMONARY CLEARANCE APPOINTMENT-9/10/24

## 2024-09-10 NOTE — ASSESSMENT & PLAN NOTE
"- has albuterol inhaler PRN, states he rarely uses it  - Trelegy on home med list, but he states he no longer takes it  - not on home supplemental oxygen   - patient was evaluated by pulmonary and cleared as follows: "Risks and possible pulmonary complications of surgery include risk of respiratory failure requiring mechanical ventilation, post operative pneumonia, post operative atelectasis, deep venous thrombosis, pulmonary embolism and death.    Based on my assessment , it is okay to proceed with procedure PROVIDED RISK IS ACCEPTABLE TO BOTH THE PATIENT AND THE SURGEON PERFORMING THE SURGERY."  "

## 2024-09-10 NOTE — DISCHARGE INSTRUCTIONS
Pre op instructions reviewed with patient face to face during Clinic Visit with Provider, verbalized understanding.    To confirm, Surgery is scheduled on 9/16/24. We will call you after 2pm the day before Surgery with your arrival time.    *Please report to the [facility] (1st Floor) located off of [facility] (2nd Entrance/Building on the left, in front of the flag pole).  Address: [address] [place] [address] [place]. [address]      !!!INSTRUCTIONS IMPORTANT!!!  DO NOT Eat, Drink, or Smoke after 12 midnight unless instructed otherwise by your Surgeon. OK to brush teeth, no gum, candy or mints!    MORNING OF SURGERY, drink small sip of water with the following medications instructed by Surgery Pre-Admit Provider:  NONE    *Additional Medication Instructions: BRING ALBUTEROL INHALER TO PRE OP DAY OF SURGERY!    Diabetic/Prediabetic Patients: If you take diabetic or weight loss medication, Do NOT take morning of surgery unless instructed by Doctor. Metformin to be stopped 24 hrs prior to surgery. Ozempic/ Mounjaro/ Wegovy/ Trulicity/ Semaglutide or any weight loss injections to be stopped 7 days prior to surgery. DO NOT take long-acting insulin the evening before surgery. Blood sugars will be checked in pre-op by Nurse.    !!!STOP ALL Aspirins, NSAIDS, WEIGHT LOSS INJECTIONS/PILLS, Herbal supplements, & Vitamins 7 DAYS BEFORE SURGERY!!!    ____  Avoid Alcoholic beverages 3 days prior to surgery, as it can thin the blood.  ____  NO Acrylic/fake nails or nail polish worn day of surgery (specifically hand/arm & foot surgeries).  ____  NO powder, lotions, deodorants, oils or cream on body.  ____  Remove all jewelry, piercings, & foreign objects prior to arrival and leave at home.  ____  Remove Dentures, Hearing Aids & Contact Lens prior to surgery.  ____  Bring photo ID and insurance information to hospital (Leave Valuables at Home).  ____  If going home the same day, arrange for a ride home. You will not be  able to drive for 24 hrs if Anesthesia was used.   ____  Females (ages 11-60): may need to give a urine sample the morning of surgery; please see Pre op Nurse prior to using the restroom.  ____  Males: Stop ED medications (Viagra, Cialis) 24 hrs prior to surgery.  ____  Wear clean, loose fitting clothing to allow for dressings/ bandages.      Bathing Instructions:    -Shower with anti-bacterial Soap (Hibiclens or Dial) the night before surgery & the morning of surgery!   -Do not use Hibiclens soap on your face or genitals.   -Apply clean clothes after shower.  -Do not shave your face or body 2 days prior to surgery unless instructed otherwise by your Surgeon.  -Do not shave pubic hair 7 days prior to surgery (gyn pt's).    [facility] Visitor/Ride Policy:  Only 2 adults allowed in pre op/recovery area during your procedure. You MUST HAVE A RIDE HOME from a responsible adult that you know and trust. Medical Transport, Uber or Lyft can ONLY be used if patient has a responsible adult to accompany them during ride home.       *Signs and symptoms of Infection Before or After Surgery:               !!!If you experience any fever, chills, nausea/ vomiting, foul odor/ excessive drainage from surgical site, flu-like symptoms, new wounds or cuts, PLEASE CALL THE SURGEON OFFICE at [phone] or SEND MESSAGE THROUGH [employer] PORTAL!!!       *If you are running late day of surgery, please call the Surgery Dept @ [phone].    *Billing question, please call:  [phone] or [phone]       Thank you,  -[facility] Pre Admit [facility].  [phone] or [phone]  M-F 7:30 am-4:00 pm (Closed Major Holidays)    Additional Tests Scheduled Today:  CHEST XRAY (1ST Floor) Check in at the !  Clearances/ Appointments Requested before Surgery: PULMONARY CLEARANCE APPOINTMENT-9/10/24

## 2024-09-10 NOTE — ASSESSMENT & PLAN NOTE
Known risk factors for perioperative complications: Congestive heart failure  Chronic pulmonary disease    Difficulty with intubation is not anticipated.    Cardiac Risk Estimation: Based on the Revised Cardiac Risk index, patient is a Class risk I with a 3.9 % risk of a major cardiac event in a low risk procedure.    1.) Preoperative workup as follows: chest x-ray, ECG, spirometry, ABGs, hemoglobin, hematocrit, electrolytes, creatinine, glucose.  2.) Change in medication regimen before surgery: discontinue ASA 6 days before surgery, discontinue NSAIDs 5 days before surgery, hold Metformin 24 hours prior to surgery. Hold all vitamins/supplements for 7 days prior to surgery, with the exception of Potassium and Iron supplementation. Hold semaglutide/tirzepatide for 7 days prior to surgery.   3.) Prophylaxis for cardiac events with perioperative beta-blockers: not indicated.  4.) Invasive hemodynamic monitoring perioperatively: at the discretion of anesthesiologist.  5.) Deep vein thrombosis prophylaxis postoperatively: regimen to be chosen by surgical team.  6.) Surveillance for postoperative MI with ECG immediately postoperatively and on postoperati ve days 1 and 2 AND troponin levels 24 hours postoperatively and on day 4 or hospital discharge (whichever comes first): at the discretion of anesthesiologist.  7.) Current medications which may produce withdrawal symptoms if withheld perioperatively: none  8.) Other measures: Postoperative hypertension management with IV hydralazine until able to take oral medications.  Consultation with [facility] for in-hospital postoperative management of multiple co-morbidities.

## 2024-09-11 NOTE — TELEPHONE ENCOUNTER
No care due was identified.  [facility] Embedded Care Due Messages. Reference number: [id number].   9/11/2024 2:33:45 AM CDT

## 2024-09-11 NOTE — TELEPHONE ENCOUNTER
Refill Routing Note   Medication(s) are not appropriate for processing by [facility] for the following reason(s):        No active prescription written by provider    [facility] action(s):  Defer               Appointments  past 12m or future 3m with PCP    Date Provider   Last Visit   3/5/2024 Amari Ureña MD   Next Visit   Visit date not found Amari Ureña MD   ED visits in past 90 days: 0        Note composed:7:36 AM 09/11/2024

## 2024-09-12 LAB
BRPFT: ABNORMAL
FEF 25 75 CHG: 31.6 %
FEF 25 75 LLN: 0.29
FEF 25 75 POST REF: 21.2 %
FEF 25 75 PRE REF: 16.1 %
FEF 25 75 REF: 2
FET100 CHG: -26.1 %
FEV1 CHG: 4 %
FEV1 FVC CHG: 14.9 %
FEV1 FVC LLN: 59
FEV1 FVC POST REF: 58.1 %
FEV1 FVC PRE REF: 50.5 %
FEV1 FVC REF: 69
FEV1 LLN: 1.47
FEV1 POST REF: 52.5 %
FEV1 PRE REF: 50.4 %
FEV1 REF: 2.23
FVC CHG: -9.5 %
FVC LLN: 2.13
FVC POST REF: 95.6 %
FVC PRE REF: 105.5 %
FVC REF: 3.06
PEF CHG: 10.9 %
PEF LLN: 2.61
PEF POST REF: 67.3 %
PEF PRE REF: 60.7 %
PEF REF: 4.67
POST FEF 25 75: 0.42 L/S (ref 0.29–3.71)
POST FET 100: 8.75 SEC
POST FEV1 FVC: 40.09 % (ref 59.38–78.74)
POST FEV1: 1.17 L (ref 1.47–2.93)
POST FVC: 2.92 L (ref 2.13–4)
POST PEF: 3.14 L/S (ref 2.61–6.73)
PRE FEF 25 75: 0.32 L/S (ref 0.29–3.71)
PRE FET 100: 11.84 SEC
PRE FEV1 FVC: 34.89 % (ref 59.38–78.74)
PRE FEV1: 1.13 L (ref 1.47–2.93)
PRE FVC: 3.23 L (ref 2.13–4)
PRE PEF: 2.83 L/S (ref 2.61–6.73)

## 2024-09-12 RX ORDER — TAMSULOSIN HYDROCHLORIDE 0.4 MG/1
2 CAPSULE ORAL EVERY MORNING
Qty: 180 CAPSULE | Refills: 1 | Status: SHIPPED | OUTPATIENT
Start: 2024-09-12

## 2024-09-13 ENCOUNTER — TELEPHONE (OUTPATIENT)
Dept: PREADMISSION TESTING | Facility: HOSPITAL | Age: 89
End: 2024-09-13
Payer: MEDICARE

## 2024-09-13 NOTE — TELEPHONE ENCOUNTER
Called and spoke with patient 's son, Juan Hawk about the following:     Please arrive to [facility] (' [facility]) at 7:45am on 9/16/2024 for your scheduled procedure.  Address: [address] [place] [address] [place] [address] ([address] on left, 1st Floor Lobby)  >>>NO eating or drinking after midnight unless instructed otherwise by your Surgeon<<<    Thank you,  -[facility].  Mon-Fri 7:30 am - 4 pm [phone]

## 2024-09-16 ENCOUNTER — ANESTHESIA (OUTPATIENT)
Dept: SURGERY | Facility: HOSPITAL | Age: 89
End: 2024-09-16
Payer: MEDICARE

## 2024-09-16 ENCOUNTER — ANESTHESIA EVENT (OUTPATIENT)
Dept: SURGERY | Facility: HOSPITAL | Age: 89
End: 2024-09-16
Payer: MEDICARE

## 2024-09-16 ENCOUNTER — HOSPITAL ENCOUNTER (OUTPATIENT)
Facility: HOSPITAL | Age: 89
Discharge: HOME | End: 2024-09-17
Attending: UROLOGY | Admitting: UROLOGY
Payer: MEDICARE

## 2024-09-16 DIAGNOSIS — N13.8 BPH WITH OBSTRUCTION/LOWER URINARY TRACT SYMPTOMS: ICD-10-CM

## 2024-09-16 DIAGNOSIS — N40.1 BPH WITH OBSTRUCTION/LOWER URINARY TRACT SYMPTOMS: ICD-10-CM

## 2024-09-16 DIAGNOSIS — R39.12 BENIGN PROSTATIC HYPERPLASIA WITH WEAK URINARY STREAM: Primary | ICD-10-CM

## 2024-09-16 DIAGNOSIS — N40.1 BENIGN PROSTATIC HYPERPLASIA WITH WEAK URINARY STREAM: Primary | ICD-10-CM

## 2024-09-16 LAB
ANION GAP SERPL CALC-SCNC: 9 MMOL/L (ref 8–16)
BUN SERPL-MCNC: 16 MG/DL (ref 10–30)
CALCIUM SERPL-MCNC: 9 MG/DL (ref 8.7–10.5)
CHLORIDE SERPL-SCNC: 109 MMOL/L (ref 95–110)
CO2 SERPL-SCNC: 25 MMOL/L (ref 23–29)
CREAT SERPL-MCNC: 0.8 MG/DL (ref 0.5–1.4)
ERYTHROCYTE [DISTWIDTH] IN BLOOD BY AUTOMATED COUNT: 15.9 % (ref 11.5–14.5)
EST. GFR  (NO RACE VARIABLE): >60 ML/MIN/1.73 M^2
GLUCOSE SERPL-MCNC: 102 MG/DL (ref 70–110)
HCT VFR BLD AUTO: 40.4 % (ref 40–54)
HGB BLD-MCNC: 12.7 G/DL (ref 14–18)
MCH RBC QN AUTO: 28.7 PG (ref 27–31)
MCHC RBC AUTO-ENTMCNC: 31.4 G/DL (ref 32–36)
MCV RBC AUTO: 91 FL (ref 82–98)
PLATELET # BLD AUTO: 245 K/UL (ref 150–450)
PMV BLD AUTO: 11.1 FL (ref 9.2–12.9)
POTASSIUM SERPL-SCNC: 4.1 MMOL/L (ref 3.5–5.1)
RBC # BLD AUTO: 4.42 M/UL (ref 4.6–6.2)
SODIUM SERPL-SCNC: 143 MMOL/L (ref 136–145)
WBC # BLD AUTO: 10.19 K/UL (ref 3.9–12.7)

## 2024-09-16 PROCEDURE — 37000009 HC ANESTHESIA EA ADD 15 MINS: Mod: HCNC | Performed by: UROLOGY

## 2024-09-16 PROCEDURE — 52442 CYSTO INS TRNSPRSTC IMPLT EA: CPT | Mod: ,,, | Performed by: UROLOGY

## 2024-09-16 PROCEDURE — 25000003 PHARM REV CODE 250: Mod: HCNC | Performed by: UROLOGY

## 2024-09-16 PROCEDURE — 36000707: Mod: HCNC | Performed by: UROLOGY

## 2024-09-16 PROCEDURE — 88305 TISSUE EXAM BY PATHOLOGIST: CPT | Mod: HCNC | Performed by: STUDENT IN AN ORGANIZED HEALTH CARE EDUCATION/TRAINING PROGRAM

## 2024-09-16 PROCEDURE — 85027 COMPLETE CBC AUTOMATED: CPT | Mod: HCNC | Performed by: UROLOGY

## 2024-09-16 PROCEDURE — 37000008 HC ANESTHESIA 1ST 15 MINUTES: Mod: HCNC | Performed by: UROLOGY

## 2024-09-16 PROCEDURE — 63600175 PHARM REV CODE 636 W HCPCS: Mod: HCNC | Performed by: UROLOGY

## 2024-09-16 PROCEDURE — 25000003 PHARM REV CODE 250: Mod: HCNC

## 2024-09-16 PROCEDURE — 71000033 HC RECOVERY, INTIAL HOUR: Mod: HCNC | Performed by: UROLOGY

## 2024-09-16 PROCEDURE — 36415 COLL VENOUS BLD VENIPUNCTURE: CPT | Mod: HCNC | Performed by: UROLOGY

## 2024-09-16 PROCEDURE — 71000039 HC RECOVERY, EACH ADD'L HOUR: Mod: HCNC | Performed by: UROLOGY

## 2024-09-16 PROCEDURE — 63600175 PHARM REV CODE 636 W HCPCS: Mod: HCNC

## 2024-09-16 PROCEDURE — 80048 BASIC METABOLIC PNL TOTAL CA: CPT | Mod: HCNC | Performed by: UROLOGY

## 2024-09-16 PROCEDURE — 27201423 OPTIME MED/SURG SUP & DEVICES STERILE SUPPLY: Mod: HCNC | Performed by: UROLOGY

## 2024-09-16 PROCEDURE — 52601 PROSTATECTOMY (TURP): CPT | Mod: ,,, | Performed by: UROLOGY

## 2024-09-16 PROCEDURE — 36000706: Mod: HCNC | Performed by: UROLOGY

## 2024-09-16 PROCEDURE — 52441 CYSTO INSJ TRNSPRSTC 1 IMPLT: CPT | Mod: 59,,, | Performed by: UROLOGY

## 2024-09-16 PROCEDURE — L8699 PROSTHETIC IMPLANT NOS: HCPCS | Mod: HCNC | Performed by: UROLOGY

## 2024-09-16 DEVICE — UROLIFT 2 IMPLANT CARTRIDGE
Type: IMPLANTABLE DEVICE | Site: PROSTATE | Status: FUNCTIONAL
Brand: UROLIFT

## 2024-09-16 RX ORDER — ONDANSETRON HYDROCHLORIDE 2 MG/ML
4 INJECTION, SOLUTION INTRAVENOUS ONCE AS NEEDED
Status: DISCONTINUED | OUTPATIENT
Start: 2024-09-16 | End: 2024-09-16 | Stop reason: HOSPADM

## 2024-09-16 RX ORDER — ATORVASTATIN CALCIUM 40 MG/1
40 TABLET, FILM COATED ORAL NIGHTLY
Status: DISCONTINUED | OUTPATIENT
Start: 2024-09-16 | End: 2024-09-17 | Stop reason: HOSPADM

## 2024-09-16 RX ORDER — ONDANSETRON HYDROCHLORIDE 2 MG/ML
4 INJECTION, SOLUTION INTRAVENOUS EVERY 6 HOURS PRN
Status: DISCONTINUED | OUTPATIENT
Start: 2024-09-16 | End: 2024-09-17 | Stop reason: HOSPADM

## 2024-09-16 RX ORDER — SODIUM CHLORIDE 9 MG/ML
INJECTION, SOLUTION INTRAVENOUS CONTINUOUS
Status: DISCONTINUED | OUTPATIENT
Start: 2024-09-16 | End: 2024-09-17 | Stop reason: HOSPADM

## 2024-09-16 RX ORDER — HYDROMORPHONE HYDROCHLORIDE 1 MG/ML
0.2 INJECTION, SOLUTION INTRAMUSCULAR; INTRAVENOUS; SUBCUTANEOUS EVERY 5 MIN PRN
Status: DISCONTINUED | OUTPATIENT
Start: 2024-09-16 | End: 2024-09-16 | Stop reason: HOSPADM

## 2024-09-16 RX ORDER — SUCCINYLCHOLINE CHLORIDE 20 MG/ML
INJECTION INTRAMUSCULAR; INTRAVENOUS
Status: DISCONTINUED | OUTPATIENT
Start: 2024-09-16 | End: 2024-09-16

## 2024-09-16 RX ORDER — ACETAMINOPHEN 10 MG/ML
1000 INJECTION, SOLUTION INTRAVENOUS ONCE
Status: COMPLETED | OUTPATIENT
Start: 2024-09-16 | End: 2024-09-16

## 2024-09-16 RX ORDER — FENTANYL CITRATE 50 UG/ML
INJECTION, SOLUTION INTRAMUSCULAR; INTRAVENOUS
Status: DISCONTINUED | OUTPATIENT
Start: 2024-09-16 | End: 2024-09-16

## 2024-09-16 RX ORDER — MEPERIDINE HYDROCHLORIDE 25 MG/ML
12.5 INJECTION INTRAMUSCULAR; INTRAVENOUS; SUBCUTANEOUS ONCE AS NEEDED
Status: DISCONTINUED | OUTPATIENT
Start: 2024-09-16 | End: 2024-09-16 | Stop reason: HOSPADM

## 2024-09-16 RX ORDER — OXYCODONE HYDROCHLORIDE 5 MG/1
5 TABLET ORAL EVERY 4 HOURS PRN
Status: DISCONTINUED | OUTPATIENT
Start: 2024-09-16 | End: 2024-09-17 | Stop reason: HOSPADM

## 2024-09-16 RX ORDER — ONDANSETRON HYDROCHLORIDE 2 MG/ML
4 INJECTION, SOLUTION INTRAVENOUS DAILY PRN
Status: DISCONTINUED | OUTPATIENT
Start: 2024-09-16 | End: 2024-09-16 | Stop reason: HOSPADM

## 2024-09-16 RX ORDER — LISINOPRIL 5 MG/1
5 TABLET ORAL DAILY
Status: DISCONTINUED | OUTPATIENT
Start: 2024-09-17 | End: 2024-09-17 | Stop reason: HOSPADM

## 2024-09-16 RX ORDER — ROCURONIUM BROMIDE 10 MG/ML
INJECTION, SOLUTION INTRAVENOUS
Status: DISCONTINUED | OUTPATIENT
Start: 2024-09-16 | End: 2024-09-16

## 2024-09-16 RX ORDER — FENTANYL CITRATE 50 UG/ML
25 INJECTION, SOLUTION INTRAMUSCULAR; INTRAVENOUS EVERY 5 MIN PRN
Status: DISCONTINUED | OUTPATIENT
Start: 2024-09-16 | End: 2024-09-16 | Stop reason: HOSPADM

## 2024-09-16 RX ORDER — LIDOCAINE HYDROCHLORIDE 10 MG/ML
INJECTION, SOLUTION EPIDURAL; INFILTRATION; INTRACAUDAL; PERINEURAL
Status: DISCONTINUED | OUTPATIENT
Start: 2024-09-16 | End: 2024-09-16

## 2024-09-16 RX ORDER — OXYCODONE AND ACETAMINOPHEN 5; 325 MG/1; MG/1
1 TABLET ORAL
Status: DISCONTINUED | OUTPATIENT
Start: 2024-09-16 | End: 2024-09-16 | Stop reason: HOSPADM

## 2024-09-16 RX ORDER — SODIUM CHLORIDE, SODIUM LACTATE, POTASSIUM CHLORIDE, CALCIUM CHLORIDE 600; 310; 30; 20 MG/100ML; MG/100ML; MG/100ML; MG/100ML
INJECTION, SOLUTION INTRAVENOUS CONTINUOUS PRN
Status: DISCONTINUED | OUTPATIENT
Start: 2024-09-16 | End: 2024-09-16

## 2024-09-16 RX ORDER — LIDOCAINE HYDROCHLORIDE 20 MG/ML
JELLY TOPICAL
Status: DISCONTINUED | OUTPATIENT
Start: 2024-09-16 | End: 2024-09-16 | Stop reason: HOSPADM

## 2024-09-16 RX ORDER — ONDANSETRON HYDROCHLORIDE 2 MG/ML
INJECTION, SOLUTION INTRAVENOUS
Status: DISCONTINUED | OUTPATIENT
Start: 2024-09-16 | End: 2024-09-16

## 2024-09-16 RX ORDER — PROPOFOL 10 MG/ML
VIAL (ML) INTRAVENOUS
Status: DISCONTINUED | OUTPATIENT
Start: 2024-09-16 | End: 2024-09-16

## 2024-09-16 RX ORDER — PHENAZOPYRIDINE HYDROCHLORIDE 100 MG/1
200 TABLET, FILM COATED ORAL 3 TIMES DAILY PRN
Status: DISCONTINUED | OUTPATIENT
Start: 2024-09-16 | End: 2024-09-17 | Stop reason: HOSPADM

## 2024-09-16 RX ORDER — ACETAMINOPHEN 500 MG
1000 TABLET ORAL EVERY 8 HOURS
Status: DISCONTINUED | OUTPATIENT
Start: 2024-09-17 | End: 2024-09-17 | Stop reason: HOSPADM

## 2024-09-16 RX ORDER — SODIUM CHLORIDE 0.9 % (FLUSH) 0.9 %
10 SYRINGE (ML) INJECTION
Status: DISCONTINUED | OUTPATIENT
Start: 2024-09-16 | End: 2024-09-17 | Stop reason: HOSPADM

## 2024-09-16 RX ADMIN — CEFAZOLIN 2 G: 2 INJECTION, POWDER, FOR SOLUTION INTRAMUSCULAR; INTRAVENOUS at 05:09

## 2024-09-16 RX ADMIN — CEFAZOLIN 2 G: 2 INJECTION, POWDER, FOR SOLUTION INTRAMUSCULAR; INTRAVENOUS at 10:09

## 2024-09-16 RX ADMIN — SUGAMMADEX 200 MG: 100 INJECTION, SOLUTION INTRAVENOUS at 11:09

## 2024-09-16 RX ADMIN — ATORVASTATIN CALCIUM 40 MG: 40 TABLET, FILM COATED ORAL at 09:09

## 2024-09-16 RX ADMIN — LIDOCAINE HYDROCHLORIDE 50 MG: 10 SOLUTION INTRAVENOUS at 10:09

## 2024-09-16 RX ADMIN — FENTANYL CITRATE 25 MCG: 50 INJECTION, SOLUTION INTRAMUSCULAR; INTRAVENOUS at 11:09

## 2024-09-16 RX ADMIN — OXYCODONE HYDROCHLORIDE 5 MG: 5 TABLET ORAL at 09:09

## 2024-09-16 RX ADMIN — FENTANYL CITRATE 25 MCG: 50 INJECTION, SOLUTION INTRAMUSCULAR; INTRAVENOUS at 10:09

## 2024-09-16 RX ADMIN — SODIUM CHLORIDE: 9 INJECTION, SOLUTION INTRAVENOUS at 05:09

## 2024-09-16 RX ADMIN — PROPOFOL 100 MG: 10 INJECTION, EMULSION INTRAVENOUS at 10:09

## 2024-09-16 RX ADMIN — ROCURONIUM BROMIDE 5 MG: 10 SOLUTION INTRAVENOUS at 10:09

## 2024-09-16 RX ADMIN — ROCURONIUM BROMIDE 25 MG: 10 SOLUTION INTRAVENOUS at 10:09

## 2024-09-16 RX ADMIN — ACETAMINOPHEN 1000 MG: 10 INJECTION INTRAVENOUS at 05:09

## 2024-09-16 RX ADMIN — SODIUM CHLORIDE, SODIUM LACTATE, POTASSIUM CHLORIDE, AND CALCIUM CHLORIDE: 600; 310; 30; 20 INJECTION, SOLUTION INTRAVENOUS at 10:09

## 2024-09-16 RX ADMIN — SUCCINYLCHOLINE CHLORIDE 120 MG: 20 INJECTION, SOLUTION INTRAMUSCULAR; INTRAVENOUS; PARENTERAL at 10:09

## 2024-09-16 RX ADMIN — ONDANSETRON 4 MG: 2 INJECTION INTRAMUSCULAR; INTRAVENOUS at 10:09

## 2024-09-16 NOTE — TRANSFER OF CARE
Anesthesia Transfer of Care Note    Patient: Chao Hawk    Procedure(s) Performed: Procedure(s) (LRB):  CYSTOSCOPY, WITH INSERTION OF UROLIFT IMPLANT, TURP (N/A)    Patient location: PACU    Anesthesia Type: general    Transport from OR: Transported from OR on room air with adequate spontaneous ventilation    Post pain: adequate analgesia    Post assessment: no apparent anesthetic complications    Post vital signs: stable    Level of consciousness: responds to stimulation    Nausea/Vomiting: no nausea/vomiting    Complications: none    Transfer of care protocol was followed      Last vitals: Visit Vitals  BP (!) 164/73 (BP Location: Right arm, Patient Position: Sitting)   Pulse 75   Temp 37 °C (98.6 °F) (Temporal)   Resp 18   SpO2 97%

## 2024-09-16 NOTE — ANESTHESIA PROCEDURE NOTES
Intubation    Date/Time: 9/16/2024 10:21 AM    Performed by: Olegario Martins CRNA  Authorized by: Thomas Lopez MD    Intubation:     Induction:  Intravenous    Intubated:  Postinduction    Mask Ventilation:  Easy mask    Attempts:  1    Attempted By:  CRNA    Method of Intubation:  Direct    Blade:  Viridiana 3    Laryngeal View Grade: Grade I - full view of cords      Difficult Airway Encountered?: No      Complications:  None    Airway Device:  Oral endotracheal tube    Airway Device Size:  7.5    Style/Cuff Inflation:  Cuffed (inflated to minimal occlusive pressure)    Tube secured:  22    Secured at:  The lips    Placement Verified By:  Capnometry    Complicating Factors:  None    Findings Post-Intubation:  BS equal bilateral

## 2024-09-16 NOTE — PLAN OF CARE
Pt received from PACU post procedure    Pittman in place w/ continuous bladder irrigation going    Fall precautions in place, remains injury free  Pt denies c/o pain    IVFs  Accurate I&Os  Abx given as prescribed  Bed locked at lowest position  Call light within reach    Chart check complete  Will cont with POC

## 2024-09-16 NOTE — ANESTHESIA PREPROCEDURE EVALUATION
09/16/2024  Chao Hawk is a 89 y.o., male.    Patient Active Problem List   Diagnosis    COPD (chronic obstructive pulmonary disease)    Low testosterone    Essential hypertension    Pure hypercholesterolemia    BPH (benign prostatic hyperplasia)    Atherosclerosis of aorta    Abdominal aortic aneurysm without rupture    DDD (degenerative disc disease), lumbar    Multiple renal cysts    Overweight (BMI 25.0-29.9)    Lumbar arthropathy    Calcified granuloma of lung    Bilateral leg edema    PAH (pulmonary artery hypertension)    History of diverticulitis    Nocturnal hypoxemia    Trochanteric bursitis of left hip    Impingement syndrome of right shoulder    Shoulder arthritis    Poor posture    Impaired functional mobility, balance, gait, and endurance    Decreased range of motion    Proximal muscle weakness    Rotator cuff arthropathy of right shoulder    Right glenohumeral arthritis -end stage    Peripheral neuropathy    S/P reverse total shoulder arthroplasty, right    Decreased right shoulder range of motion    Inguinal abscess    Chest pain syndrome    Chronic anemia    Acute blood loss anemia    Melena    Pre-diabetes     Past Surgical History:   Procedure Laterality Date    ESOPHAGOGASTRODUODENOSCOPY N/A 10/26/2022    Procedure: EGD (ESOPHAGOGASTRODUODENOSCOPY);  Surgeon: Mallika Duran MD;  Location: [facility];  Service: Endoscopy;  Laterality: N/A;    FINGER SURGERY Left 1980s    index- Dr. Encarnacion; to remove nodule    HERNIA REPAIR  1990s    x2    INTRAOCULAR LENS INSERTION Bilateral 2016    LUMBAR SPINE SURGERY  09/13/2017    REVERSE TOTAL SHOULDER ARTHROPLASTY Right 10/5/2020    Procedure: ARTHROPLASTY, SHOULDER, TOTAL, REVERSE;  Surgeon: Reji Martin MD;  Location: [facility];  Service: Orthopedics;  Laterality: Right;    TONSILLECTOMY, ADENOIDECTOMY  1940       Pre-op  Assessment    I have reviewed the Patient Summary Reports.    I have reviewed the NPO Status.   I have reviewed the Medications.     Review of Systems  Anesthesia Hx:  No problems with previous Anesthesia                Social:  Non-Smoker       Hematology/Oncology:  Hematology Normal                                     Cardiovascular:     Hypertension           hyperlipidemia                             Pulmonary:   COPD                     Renal/:  Renal/ Normal                 Hepatic/GI:  Hepatic/GI Normal       Diverticulosis          Musculoskeletal:  Arthritis               Neurological:           Lumbar radiculopathy      Peripheral Neuropathy                          Endocrine:  Endocrine Normal                Physical Exam  General: Well nourished, Cooperative, Alert and Oriented    Airway:  Mallampati: II / II  Mouth Opening: Normal  TM Distance: Normal  Tongue: Normal  Neck ROM: Flexion Decreased, Extension Decreased    Dental:  Intact        Anesthesia Plan  Type of Anesthesia, risks & benefits discussed:    Anesthesia Type: Gen ETT  Intra-op Monitoring Plan: Standard ASA Monitors  Post Op Pain Control Plan: multimodal analgesia  Induction:  IV  Airway Plan: Direct  Informed Consent: Informed consent signed with the Patient and all parties understand the risks and agree with anesthesia plan.  All questions answered.   ASA Score: 3  Day of Surgery Review of History & Physical: H&P Update referred to the surgeon/provider.I have interviewed and examined the patient. I have reviewed the patient's H&P dated: There are no significant changes. H&P completed by Anesthesiologist.    Ready For Surgery From Anesthesia Perspective.     .  Results for orders placed during the hospital encounter of 01/21/22    Echo    Interpretation Summary  · The left ventricle is normal in size with normal systolic function.  · The estimated ejection fraction is 55%.  · Grade I left ventricular diastolic dysfunction.  · Normal  right ventricular size with normal right ventricular systolic function.  · Mild aortic regurgitation.  · Mild mitral regurgitation.  · Moderate tricuspid regurgitation.  · Normal central venous pressure (3 mmHg).  · The estimated PA systolic pressure is 47 mmHg.  · There is pulmonary hypertension.  · Trivial pericardial effusion.      Chemistry        Component Value Date/Time     08/29/2024 0818    K 4.2 08/29/2024 0818     (H) 08/29/2024 0818    CO2 24 08/29/2024 0818    BUN 18 08/29/2024 0818    CREATININE 0.9 08/29/2024 0818    GLU 91 08/29/2024 0818        Component Value Date/Time    CALCIUM 9.3 08/29/2024 0818    ALKPHOS 78 08/29/2024 0818    AST 22 08/29/2024 0818    ALT 18 08/29/2024 0818    BILITOT 0.5 08/29/2024 0818    ESTGFRAFRICA >60.0 01/14/2022 0846    EGFRNONAA >60.0 01/14/2022 0846        Lab Results   Component Value Date    WBC 8.42 08/29/2024    HGB 12.1 (L) 08/29/2024    HCT 39.3 (L) 08/29/2024    MCV 92 08/29/2024     08/29/2024

## 2024-09-16 NOTE — OP NOTE
Operative Note    Surgery Date: 9/16/2024     Pre-op Diagnosis:  Benign prostatic hyperplasia with weak urinary stream [N40.1, R39.12]    Post-op Diagnosis: Post-Op Diagnosis Codes:     * Benign prostatic hyperplasia with weak urinary stream [N40.1, R39.12]    Surgeon: Tone Pineda MD    Procedures:  TURP of median lobe   Prostatic Urethral Lift (Urolift)    Anesthesia: General    Estimated Blood Loss: 50cc         Specimens Removed:   Specimen (24h ago, onward)       Start     Ordered    09/16/24 1048  Specimen to Pathology, Surgery Urology  Once        Comments: Pre-op Diagnosis: Benign prostatic hyperplasia with weak urinary stream [N40.1, R39.12]Procedure(s):CYSTOSCOPY, WITH INSERTION OF UROLIFT IMPLANT, TURP Number of specimens: 1Name of specimens: 1. Prostate chips-perm     References:    Click here for ordering Quick Tip   Question Answer Comment   Procedure Type: Urology    Specimen Class: Routine/Screening    Release to patient Immediate        09/16/24 1109                    Implants:   Implant Name Type Inv. Item Serial No.  Lot No. LRB No. Used Action   CARTRIDGE UROLIFT 2 IMPLANT - [id number]  CARTRIDGE UROLIFT 2 IMPLANT  NEELAM TECH  N/A 1 Implanted   CARTRIDGE UROLIFT 2 IMPLANT - [id number]  CARTRIDGE UROLIFT 2 IMPLANT  NEELAM TECH  N/A 1 Implanted   CARTRIDGE UROLIFT 2 IMPLANT - [id number]  CARTRIDGE UROLIFT 2 IMPLANT  NEELAM TECH  N/A 1 Implanted   CARTRIDGE UROLIFT 2 IMPLANT - [id number]  CARTRIDGE UROLIFT 2 IMPLANT  NEELAM TECH  N/A 1 Implanted   CARTRIDGE UROLIFT 2 IMPLANT - [id number]  CARTRIDGE UROLIFT 2 IMPLANT  NEELAM TECH  N/A 1 Implanted   CARTRIDGE UROLIFT 2 IMPLANT - [id number]  CARTRIDGE UROLIFT 2 IMPLANT  NEELAM TECH  N/A 1 Implanted   CARTRIDGE UROLIFT 2 IMPLANT - [id number]  CARTRIDGE UROLIFT 2 IMPLANT  NEELAM TECH  N/A 1 Implanted       Findings:  High bladder neck with small intravesical, resected, bilateral UOs preserved, seven total UroLift implants placed with a nice open  channel at the end of the case, hematuria present, CBI started    Drains: 22fr three way Pittman catheter on CBI    Complications: No    Indications for Procedure:  The patient is a 89 y.o. year old male with BPH with ZAMARRIPA and LUTS.  He presents today for surgical treatment with TURP and prostatic urethral lift (Urolift).  The full risks and benefits of the procedure have been explained and detail and he wishes to proceed.    Procedure Description:  The patient was brought to the operative suite and placed under General anesthesia. He was placed in lithotomy position and prepped and draped in usual sterile fashion.  Time out was performed prior to starting the procedure.    A 28 [place] continuous-flow inserted with the visual obturator bladder.  The visual obturator was exchanged for the working mechanism and bipolar loop.  The high bladder neck was incised in the median lobe was resected from the 5 o'clock position to the 7 o'clock position.  Hemostasis was obtained.  The bipolar resectoscope was then removed and the 20F cystoscope was inserted into the bladder. The cystoscopy bridge was replaced with a UroLift delivery device. The first treatment site was the patient's left side approximately 2cm distal to the margin of resection. The distal tip of the delivery device was then angled laterally approximately 20 degrees at this position to compress the lateral lobe. The trigger was pulled, thereby deploying a needle containing the implant through the prostate. The needle was then retracted, allowing one end of the implant to be delivered to the capsular surface of the prostate. The implant was then tensioned to assure capsular seating and removal of slack monofilament. The device was then angled back toward midline and slowly advanced proximally (typically 3 to 4 mm) until cystoscopic verification of the monofilament being centered in the delivery bay. The urethral end piece was then affixed to the monofilament thereby  tailoring the size of the implant. Excess filament was then severed. The delivery device was then re-advanced into the bladder. The delivery device was then replaced and the same procedure was then repeated on the right side.    The delivery device was then replaced with cystoscope and bridge and the implant location and opening effect was confirmed cystoscopically. 5 additional implants were delivered just proximal to the veru montanum,  on right and on left sides of the prostate, following the same technique.     A final cystoscopy was conducted first to inspect the location and state of each implant and second, to confirm the presence of a continuous anterior channel was present through the prostatic urethra with irrigation flow turned off.  The bipolar resectoscope was then reinserted and hemostasis was again achieved.  The resectoscope was removed and a 22 Greek three-way Pittman catheter was inserted, the bladder was irrigated with persistent hematuria.  It was felt that the patient would be better off staying overnight for CBI and this was initiated.    Disposition:  Patient will remain admitted Urology Service for CBI overnight.      Tone Pineda MD

## 2024-09-17 VITALS
TEMPERATURE: 98 F | BODY MASS INDEX: 28.91 KG/M2 | WEIGHT: 179.88 LBS | HEART RATE: 69 BPM | OXYGEN SATURATION: 90 % | DIASTOLIC BLOOD PRESSURE: 65 MMHG | RESPIRATION RATE: 20 BRPM | HEIGHT: 66 IN | SYSTOLIC BLOOD PRESSURE: 137 MMHG

## 2024-09-17 LAB
ANION GAP SERPL CALC-SCNC: 7 MMOL/L (ref 8–16)
BUN SERPL-MCNC: 17 MG/DL (ref 10–30)
CALCIUM SERPL-MCNC: 8.6 MG/DL (ref 8.7–10.5)
CHLORIDE SERPL-SCNC: 109 MMOL/L (ref 95–110)
CO2 SERPL-SCNC: 26 MMOL/L (ref 23–29)
CREAT SERPL-MCNC: 0.9 MG/DL (ref 0.5–1.4)
ERYTHROCYTE [DISTWIDTH] IN BLOOD BY AUTOMATED COUNT: 15.6 % (ref 11.5–14.5)
EST. GFR  (NO RACE VARIABLE): >60 ML/MIN/1.73 M^2
GLUCOSE SERPL-MCNC: 93 MG/DL (ref 70–110)
HCT VFR BLD AUTO: 37.8 % (ref 40–54)
HGB BLD-MCNC: 11.8 G/DL (ref 14–18)
MCH RBC QN AUTO: 28.7 PG (ref 27–31)
MCHC RBC AUTO-ENTMCNC: 31.2 G/DL (ref 32–36)
MCV RBC AUTO: 92 FL (ref 82–98)
PLATELET # BLD AUTO: 215 K/UL (ref 150–450)
PMV BLD AUTO: 11.4 FL (ref 9.2–12.9)
POTASSIUM SERPL-SCNC: 3.7 MMOL/L (ref 3.5–5.1)
RBC # BLD AUTO: 4.11 M/UL (ref 4.6–6.2)
SODIUM SERPL-SCNC: 142 MMOL/L (ref 136–145)
WBC # BLD AUTO: 9.93 K/UL (ref 3.9–12.7)

## 2024-09-17 PROCEDURE — 25000003 PHARM REV CODE 250: Mod: HCNC | Performed by: UROLOGY

## 2024-09-17 PROCEDURE — 80048 BASIC METABOLIC PNL TOTAL CA: CPT | Mod: HCNC | Performed by: UROLOGY

## 2024-09-17 PROCEDURE — 85027 COMPLETE CBC AUTOMATED: CPT | Mod: HCNC | Performed by: UROLOGY

## 2024-09-17 PROCEDURE — 36415 COLL VENOUS BLD VENIPUNCTURE: CPT | Mod: HCNC | Performed by: UROLOGY

## 2024-09-17 PROCEDURE — 63600175 PHARM REV CODE 636 W HCPCS: Mod: HCNC | Performed by: UROLOGY

## 2024-09-17 RX ORDER — OXYCODONE HYDROCHLORIDE 5 MG/1
5 TABLET ORAL EVERY 8 HOURS PRN
Qty: 15 TABLET | Refills: 0 | Status: SHIPPED | OUTPATIENT
Start: 2024-09-17

## 2024-09-17 RX ORDER — PHENAZOPYRIDINE HYDROCHLORIDE 200 MG/1
200 TABLET, FILM COATED ORAL 3 TIMES DAILY PRN
Qty: 30 TABLET | Refills: 0 | Status: SHIPPED | OUTPATIENT
Start: 2024-09-17 | End: 2024-09-27

## 2024-09-17 RX ORDER — NITROFURANTOIN 25; 75 MG/1; MG/1
100 CAPSULE ORAL 2 TIMES DAILY
Qty: 10 CAPSULE | Refills: 0 | Status: SHIPPED | OUTPATIENT
Start: 2024-09-17 | End: 2024-09-22

## 2024-09-17 RX ADMIN — ACETAMINOPHEN 1000 MG: 500 TABLET ORAL at 02:09

## 2024-09-17 RX ADMIN — CEFAZOLIN 2 G: 2 INJECTION, POWDER, FOR SOLUTION INTRAMUSCULAR; INTRAVENOUS at 09:09

## 2024-09-17 RX ADMIN — LISINOPRIL 5 MG: 5 TABLET ORAL at 09:09

## 2024-09-17 RX ADMIN — ACETAMINOPHEN 1000 MG: 500 TABLET ORAL at 09:09

## 2024-09-17 RX ADMIN — CEFAZOLIN 2 G: 2 INJECTION, POWDER, FOR SOLUTION INTRAMUSCULAR; INTRAVENOUS at 02:09

## 2024-09-17 NOTE — PLAN OF CARE
[facility] - Med Surg  Discharge Final Note    Primary Care Provider: Amari Ureña MD    Expected Discharge Date: 9/17/2024    Final Discharge Note (most recent)       Final Note - 09/17/24 0927          Final Note    Assessment Type Final Discharge Note     Anticipated Discharge Disposition Home or Self Care     Hospital Resources/Appts/Education Provided Appointments scheduled and added to AVS        Post-Acute Status    Discharge Delays None known at this time                   Patient has no d/c needs at this time. Sw to follow up, as needed, for d/c planning purposes.

## 2024-09-17 NOTE — ED NOTES
Patient placed on continuous cardiac monitor, automatic blood pressure cuff and continuous pulse oximeter.  
Pt lying on stretcher in NAD, on cell phone, SR up x 2 with bed in low/locked position, call bell w/in reach, no current needs at this time  
Report received from Shanique Chan at this time.  
US in progress at    
If you are a smoker, it is important for your health to stop smoking. Please be aware that second hand smoke is also harmful.

## 2024-09-17 NOTE — PLAN OF CARE
Problem: Adult Inpatient Plan of Care  Goal: Plan of Care Review  Outcome: Met  Goal: Patient-Specific Goal (Individualized)  Outcome: Met  Goal: Absence of Hospital-Acquired Illness or Injury  Outcome: Met  Goal: Optimal Comfort and Wellbeing  Outcome: Met  Goal: Readiness for Transition of Care  Outcome: Met     Problem: Infection  Goal: Absence of Infection Signs and Symptoms  Outcome: Met     Problem: Fall Injury Risk  Goal: Absence of Fall and Fall-Related Injury  Outcome: Met     Verbalizes understanding POC  Demonstrates pt education for leg bag  Pt has leg bag on, ready for discharge

## 2024-09-17 NOTE — PLAN OF CARE
Problem: Adult Inpatient Plan of Care  Goal: Plan of Care Review  Outcome: Progressing     Problem: Adult Inpatient Plan of Care  Goal: Patient-Specific Goal (Individualized)  Outcome: Progressing  Flowsheets (Taken 9/17/2024 0317)  Individualized Care Needs: blnakets  Anxieties, Fears or Concerns: pain control     Problem: Infection  Goal: Absence of Infection Signs and Symptoms  Outcome: Progressing     Problem: Fall Injury Risk  Goal: Absence of Fall and Fall-Related Injury  Outcome: Progressing         Discussed POC with pt, verbalized understanding.  Patient remains free from injury.  Safety precautions maintained.   Call light and personal belongings within reach, bed in lowest position with bed wheels locked.   No s/s of acute distress.  Purposeful rounding continued this shift.  Pain controlled per MD order.  IVF infusing.   Diet orders continued, pt diet: regular  Vital signs continued per orders this shift.   Pittman care continued this shift. CBI continued at slow pace with pink output.   Chart and orders review completed. Pt education about care completed.

## 2024-09-17 NOTE — ANESTHESIA POSTPROCEDURE EVALUATION
Anesthesia Post Evaluation    Patient: Chao Hawk    Procedure(s) Performed: Procedure(s) (LRB):  CYSTOSCOPY, WITH INSERTION OF UROLIFT IMPLANT, TURP (N/A)    Final Anesthesia Type: general      Patient location during evaluation: PACU  Patient participation: Yes- Able to Participate  Level of consciousness: awake and alert, oriented and awake  Post-procedure vital signs: reviewed and stable  Pain management: adequate  Airway patency: patent    PONV status at discharge: No PONV  Anesthetic complications: no      Cardiovascular status: blood pressure returned to baseline  Respiratory status: unassisted  Hydration status: euvolemic  Follow-up not needed.              Vitals Value Taken Time   /65 09/17/24 0503   Temp 36.8 °C (98.3 °F) 09/17/24 0503   Pulse 69 09/17/24 0503   Resp 20 09/17/24 0503   SpO2 90 % 09/17/24 0503         Event Time   Out of Recovery 14:03:28         Pain/Maritza Score: Pain Rating Prior to Med Admin: 1 (9/17/2024  2:12 AM)  Pain Rating Post Med Admin: 0 (9/16/2024  6:29 PM)  Maritza Score: 8 (9/16/2024  5:00 PM)

## 2024-09-18 ENCOUNTER — TELEPHONE (OUTPATIENT)
Dept: UROLOGY | Facility: CLINIC | Age: 89
End: 2024-09-18
Payer: MEDICARE

## 2024-09-18 LAB
FINAL PATHOLOGIC DIAGNOSIS: NORMAL
GROSS: NORMAL
Lab: NORMAL

## 2024-09-18 NOTE — TELEPHONE ENCOUNTER
Appt made pt notified on portal      ----- Message from Tone Pineda MD sent at 9/18/2024 11:23 AM CDT -----  F/u tomorrow at [facility] for voiding trial, thanksssss

## 2024-09-19 ENCOUNTER — NURSE TRIAGE (OUTPATIENT)
Dept: ADMINISTRATIVE | Facility: CLINIC | Age: 89
End: 2024-09-19
Payer: MEDICARE

## 2024-09-19 NOTE — TELEPHONE ENCOUNTER
Transferred from scheduling-has urology apt today for 930am. Pt is requesting nurse come to his home, says he cannot go in car with leaking catheter & cannot afford ambulance. Discussed triage process & questions to best assist, but unable to send nurse to home to assist with catheter. He is requesting callback from urology clinic.       Reason for Disposition   Catheter is broken and is not working (does not function normally)    Additional Information   Negative: Shock suspected (e.g., cold/pale/clammy skin, too weak to stand, low BP, rapid pulse)   Negative: Sounds like a life-threatening emergency to the triager   Negative: Fever > 100.4 F (38.0 C)   Negative: Drinking very little and dehydration suspected (e.g., no urine > 12 hours, very dry mouth, very lightheaded)   Negative: Patient sounds very sick or weak to the triager   Negative: Catheter was accidentally pulled-out    Protocols used: Urinary Catheter (e.g., Pittman) Symptoms and [id number]

## 2024-09-20 ENCOUNTER — TELEPHONE (OUTPATIENT)
Dept: INTERNAL MEDICINE | Facility: CLINIC | Age: 89
End: 2024-09-20
Payer: MEDICARE

## 2024-09-30 ENCOUNTER — HOSPITAL ENCOUNTER (OUTPATIENT)
Dept: RADIOLOGY | Facility: HOSPITAL | Age: 89
Discharge: HOME | End: 2024-09-30
Attending: NURSE PRACTITIONER
Payer: MEDICARE

## 2024-09-30 ENCOUNTER — OFFICE VISIT (OUTPATIENT)
Dept: PULMONOLOGY | Facility: CLINIC | Age: 89
End: 2024-09-30
Attending: NURSE PRACTITIONER
Payer: MEDICARE

## 2024-09-30 VITALS
HEIGHT: 66 IN | RESPIRATION RATE: 18 BRPM | WEIGHT: 170.19 LBS | HEART RATE: 103 BPM | SYSTOLIC BLOOD PRESSURE: 110 MMHG | BODY MASS INDEX: 27.35 KG/M2 | OXYGEN SATURATION: 92 % | DIASTOLIC BLOOD PRESSURE: 74 MMHG

## 2024-09-30 DIAGNOSIS — I27.21 PAH (PULMONARY ARTERY HYPERTENSION): ICD-10-CM

## 2024-09-30 DIAGNOSIS — J44.9 CHRONIC OBSTRUCTIVE PULMONARY DISEASE, UNSPECIFIED COPD TYPE: Chronic | ICD-10-CM

## 2024-09-30 DIAGNOSIS — J98.4 CALCIFIED GRANULOMA OF LUNG: Primary | ICD-10-CM

## 2024-09-30 PROCEDURE — 71046 X-RAY EXAM CHEST 2 VIEWS: CPT | Mod: TC,HCNC

## 2024-09-30 PROCEDURE — 1101F PT FALLS ASSESS-DOCD LE1/YR: CPT | Mod: HCNC,CPTII,S$GLB, | Performed by: NURSE PRACTITIONER

## 2024-09-30 PROCEDURE — 3288F FALL RISK ASSESSMENT DOCD: CPT | Mod: HCNC,CPTII,S$GLB, | Performed by: NURSE PRACTITIONER

## 2024-09-30 PROCEDURE — 1160F RVW MEDS BY RX/DR IN RCRD: CPT | Mod: HCNC,CPTII,S$GLB, | Performed by: NURSE PRACTITIONER

## 2024-09-30 PROCEDURE — 99214 OFFICE O/P EST MOD 30 MIN: CPT | Mod: HCNC,S$GLB,, | Performed by: NURSE PRACTITIONER

## 2024-09-30 PROCEDURE — 99999 PR PBB SHADOW E&M-EST. PATIENT-LVL IV: CPT | Mod: PBBFAC,HCNC,, | Performed by: NURSE PRACTITIONER

## 2024-09-30 PROCEDURE — 1159F MED LIST DOCD IN RCRD: CPT | Mod: HCNC,CPTII,S$GLB, | Performed by: NURSE PRACTITIONER

## 2024-09-30 PROCEDURE — 71046 X-RAY EXAM CHEST 2 VIEWS: CPT | Mod: 26,HCNC,, | Performed by: RADIOLOGY

## 2024-09-30 RX ORDER — FLUTICASONE FUROATE, UMECLIDINIUM BROMIDE AND VILANTEROL TRIFENATATE 200; 62.5; 25 UG/1; UG/1; UG/1
1 POWDER RESPIRATORY (INHALATION) DAILY
Qty: 60 EACH | Refills: 11 | COMMUNITY
Start: 2024-09-30 | End: 2024-09-30

## 2024-09-30 NOTE — PROGRESS NOTES
"Subjective:      Patient ID: Chao Hawk is a 89 y.o. male.    Chief Complaint: COPD    COPD      89 year old male with COPD. He becomes fatigued easy and frequent breaks. No wheezing.    No fever, chills, or hemoptysis. No pleuritic type chest pain. Ambulates with a cane.   Compliant with Trelegy.      Patient Active Problem List   Diagnosis    COPD (chronic obstructive pulmonary disease)    Low testosterone    Essential hypertension    Pure hypercholesterolemia    BPH (benign prostatic hyperplasia)    Atherosclerosis of aorta    Abdominal aortic aneurysm without rupture    DDD (degenerative disc disease), lumbar    Multiple renal cysts    Overweight (BMI 25.0-29.9)    Lumbar arthropathy    Calcified granuloma of lung    Bilateral leg edema    PAH (pulmonary artery hypertension)    History of diverticulitis    Nocturnal hypoxemia    Trochanteric bursitis of left hip    Impingement syndrome of right shoulder    Shoulder arthritis    Poor posture    Impaired functional mobility, balance, gait, and endurance    Decreased range of motion    Proximal muscle weakness    Rotator cuff arthropathy of right shoulder    Right glenohumeral arthritis -end stage    Peripheral neuropathy    S/P reverse total shoulder arthroplasty, right    Decreased right shoulder range of motion    Inguinal abscess    Chest pain syndrome    Chronic anemia    Acute blood loss anemia    Melena    Pre-diabetes    Benign prostatic hyperplasia with weak urinary stream     /74 (Patient Position: Sitting)   Pulse 103   Resp 18   Ht 5' 6" (1.676 m)   Wt 77.2 kg (170 lb 3.1 oz)   SpO2 (!) 92%   BMI 27.47 kg/m²   Body mass index is 27.47 kg/m².    Review of Systems   Constitutional: Negative.    HENT: Negative.     Respiratory:  Positive for dyspnea on extertion.    Cardiovascular: Negative.    Musculoskeletal:  Positive for gait problem.   Gastrointestinal: Negative.    Psychiatric/Behavioral: Negative.       Objective:      Physical " Exam  Constitutional:       Appearance: Normal appearance.   HENT:      Head: Normocephalic and atraumatic.      Nose: Nose normal.   Cardiovascular:      Rate and Rhythm: Normal rate and regular rhythm.   Pulmonary:      Effort: Pulmonary effort is normal.      Breath sounds: Normal breath sounds.   Abdominal:      Palpations: Abdomen is soft.      Tenderness: There is no abdominal tenderness.   Musculoskeletal:         General: Normal range of motion.      Cervical back: Normal range of motion and neck supple.   Skin:     General: Skin is warm and dry.   Neurological:      General: No focal deficit present.      Mental Status: He is alert and oriented to person, place, and time.   Psychiatric:         Mood and Affect: Mood normal.         Behavior: Behavior normal.       Personal Diagnostic Review      Results for orders placed or performed in visit on 09/10/24   Spirometry with/without bronchodilator    Collection Time: 09/12/24  8:17 AM   Result Value Ref Range    Interpretation       Spirometry shows moderate-severe obstruction. Spirometry remains unimproved following bronchodilator.   Â   Notes:  Â   The failure to demonstrate improvement in spirometry does not preclude a clinical response to a trial of bronchodilators. Consider DLCO determination. Patient age is outside the range of reference studies for predicted values.       Post FVC 2.92 2.13 - 4.00 L    Post FEV1 1.17 (L) 1.47 - 2.93 L    Post FEV1 FVC 40.09 (L) 59.38 - 78.74 %    Post FEF 25 75 0.42 0.29 - 3.71 L/s    Post PEF 3.14 2.61 - 6.73 L/s    Post  8.75 sec    Pre FVC 3.23 2.13 - 4.00 L    Pre FEV1 1.13 (L) 1.47 - 2.93 L    Pre FEV1 FVC 34.89 (L) 59.38 - 78.74 %    Pre FEF 25 75 0.32 0.29 - 3.71 L/s    Pre PEF 2.83 2.61 - 6.73 L/s    Pre  11.84 sec    FVC Ref 3.06     FVC LLN 2.13     FVC Pre Ref 105.5 %    FVC Post Ref 95.6 %    FVC Chg -9.5 %    FEV1 Ref 2.23     FEV1 LLN 1.47     FEV1 Pre Ref 50.4 %    FEV1 Post Ref 52.5 %     FEV1 Chg 4.0 %    FEV1 FVC Ref 69     FEV1 FVC LLN 59     FEV1 FVC Pre Ref 50.5 %    FEV1 FVC Post Ref 58.1 %    FEV1 FVC Chg 14.9 %    FEF 25 75 Ref 2.00     FEF 25 75 LLN 0.29     FEF 25 75 Pre Ref 16.1 %    FEF 25 75 Post Ref 21.2 %    FEF 25 75 Chg 31.6 %    PEF Ref 4.67     PEF LLN 2.61     PEF Pre Ref 60.7 %    PEF Post Ref 67.3 %    PEF Chg 10.9 %    MEF910 Chg -26.1 %       Results for orders placed during the hospital encounter of 09/30/24    X-Ray Chest PA And Lateral    Narrative  EXAMINATION:  XR CHEST PA AND LATERAL    CLINICAL HISTORY:  Chronic obstructive pulmonary disease, unspecified    TECHNIQUE:  PA and lateral views of the chest were performed.    COMPARISON:  Prior radiographs    FINDINGS:  Cardiac silhouette and mediastinal contours are normal.  Lungs are clear.  Osseous structures are intact.    Impression  No acute cardiopulmonary process.      Electronically signed by: Yanick Sewell MD  Date:    09/30/2024  Time:    10:25        Assessment:       1. Calcified granuloma of lung    2. Chronic obstructive pulmonary disease, unspecified COPD type    3. PAH (pulmonary artery hypertension)        Outpatient Encounter Medications as of 9/30/2024   Medication Sig Dispense Refill    acetaminophen (TYLENOL) 500 MG tablet Take 500 mg by mouth as needed for Pain.      albuterol (PROVENTIL) 2.5 mg /3 mL (0.083 %) nebulizer solution Take 3 mLs (2.5 mg total) by nebulization every 4 (four) hours as needed for Wheezing. Rescue 150 mL 11    ARGININE, L-ARGININE, ORAL Take 500 mg by mouth once daily.      atorvastatin (LIPITOR) 40 MG tablet TAKE 1 TABLET EVERY DAY 90 tablet 1    betamethasone valerate 0.1% (VALISONE) 0.1 % Oint Apply topically 2 (two) times daily. 45 g 1    CANNABIDIOL, CBD, EXTRACT ORAL Take 3 drops by mouth every morning.      cholecalciferol, vitamin D3, (VITAMIN D3) 50 mcg (2,000 unit) Cap Take 1 capsule by mouth every evening.      COQ10, UBIQUINOL, ORAL Take 1 tablet by mouth  nightly.       cyanocobalamin (VITAMIN B-12) 1000 MCG tablet Take 100 mcg by mouth once daily.      fluticasone-umeclidin-vilanter (TRELEGY ELLIPTA) 200-62.5-25 mcg inhaler Inhale 1 puff into the lungs once daily. 60 each 11    furosemide (LASIX) 20 MG tablet Take 1 tablet (20 mg total) by mouth once daily. 90 tablet 3    gabapentin (NEURONTIN) 100 MG capsule TAKE ONE CAPSULE BY MOUTH TWICE DAILY 180 capsule 3    GLUCOSAM-MSM-CHONDROIT-VIT D3 ORAL Take 2 tablets by mouth once daily.      krill/om-3/dha/epa/phospho/ast (MEGARED OMEGA-3 KRILL OIL ORAL) Take 1 capsule by mouth nightly.       lisinopriL 10 MG tablet TAKE 1 TABLET EVERY DAY 90 tablet 3    metroNIDAZOLE (METROGEL) 0.75 % gel Apply topically 2 (two) times daily. 45 g 3    multivit-min/folic/vit K/lycop (ONE-A-DAY MEN'S MULTIVITAMIN ORAL) Take 1 tablet by mouth every evening.       oxyCODONE (ROXICODONE) 5 MG immediate release tablet Take 1 tablet (5 mg total) by mouth every 8 (eight) hours as needed for Pain. 15 tablet 0    potassium 99 mg Tab Take by mouth once daily.      tamsulosin (FLOMAX) 0.4 mg Cap TAKE 2 CAPSULES BY MOUTH EVERY MORNING 180 capsule 1    [DISCONTINUED] fluticasone-umeclidin-vilanter (TRELEGY ELLIPTA) 200-62.5-25 mcg inhaler Inhale 1 puff into the lungs once daily. 60 each 11    [] nitrofurantoin, macrocrystal-monohydrate, (MACROBID) 100 MG capsule Take 1 capsule (100 mg total) by mouth 2 (two) times daily. for 5 days 10 capsule 0    [] phenazopyridine (PYRIDIUM) 200 MG tablet Take 1 tablet (200 mg total) by mouth 3 (three) times daily as needed (Dysuria). 30 tablet 0    [DISCONTINUED] diclofenac sodium (VOLTAREN) 1 % Gel Apply 2 g topically 3 (three) times daily. 1 each 3    [DISCONTINUED] dutasteride (AVODART) 0.5 mg capsule Take 1 capsule (0.5 mg total) by mouth once daily. 90 capsule 3    [DISCONTINUED] prednisoLONE acetate (PRED FORTE) 1 % DrpS Place 1 drop into the right eye 2 (two) times daily.      [DISCONTINUED]  tamsulosin (FLOMAX) 0.4 mg Cap TAKE TWO CAPSULES BY MOUTH IN THE MORNING 180 capsule 3    [DISCONTINUED] traMADoL (ULTRAM) 50 mg tablet Take 1 tablet (50 mg total) by mouth daily as needed for Pain. 30 tablet 0    [] ceFAZolin 2 g in D5W 50 mL IVPB (MB+)       [DISCONTINUED] 0.9%  NaCl infusion       [DISCONTINUED] acetaminophen tablet 1,000 mg       [DISCONTINUED] atorvastatin tablet 40 mg       [DISCONTINUED] lisinopriL tablet 5 mg       [DISCONTINUED] ondansetron injection 4 mg       [DISCONTINUED] oxyCODONE immediate release tablet 5 mg       [DISCONTINUED] phenazopyridine tablet 200 mg       [DISCONTINUED] sodium chloride 0.9% flush 10 mL        No facility-administered encounter medications on file as of 2024.     No orders of the defined types were placed in this encounter.    Plan:       1. Calcified granuloma of lung  Overview:  CT abdomen 17 in Care Everywhere: Calcified granuloma of right middle lobe lung    Assessment & Plan:  Stable      2. Chronic obstructive pulmonary disease, unspecified COPD type  Overview:  Trelegy, oxygen.       3. PAH (pulmonary artery hypertension)  Overview:  NC 2lm for sleep- returned  Echo 2020  Concentric left ventricular remodeling.  Normal central venous pressure (3 mmHg).  The estimated PA systolic pressure is 35 mmHg.  Normal left ventricular systolic function. The estimated ejection fraction is 55%.  Normal LV diastolic function.  No wall motion abnormalities.                          Elizabeth LeJeune, ACNP, ANP

## 2024-10-15 ENCOUNTER — OFFICE VISIT (OUTPATIENT)
Dept: INTERNAL MEDICINE | Facility: CLINIC | Age: 89
End: 2024-10-15
Payer: MEDICARE

## 2024-10-15 VITALS
HEIGHT: 66 IN | OXYGEN SATURATION: 92 % | DIASTOLIC BLOOD PRESSURE: 74 MMHG | HEART RATE: 70 BPM | WEIGHT: 172.38 LBS | BODY MASS INDEX: 27.7 KG/M2 | SYSTOLIC BLOOD PRESSURE: 130 MMHG | RESPIRATION RATE: 18 BRPM

## 2024-10-15 DIAGNOSIS — I27.21 PAH (PULMONARY ARTERY HYPERTENSION): ICD-10-CM

## 2024-10-15 DIAGNOSIS — E78.00 PURE HYPERCHOLESTEROLEMIA: Chronic | ICD-10-CM

## 2024-10-15 DIAGNOSIS — I10 ESSENTIAL HYPERTENSION: Primary | Chronic | ICD-10-CM

## 2024-10-15 DIAGNOSIS — Z23 NEED FOR VACCINATION: ICD-10-CM

## 2024-10-15 DIAGNOSIS — R73.03 PRE-DIABETES: ICD-10-CM

## 2024-10-15 DIAGNOSIS — D64.9 CHRONIC ANEMIA: ICD-10-CM

## 2024-10-15 DIAGNOSIS — R39.12 BENIGN PROSTATIC HYPERPLASIA WITH WEAK URINARY STREAM: Chronic | ICD-10-CM

## 2024-10-15 DIAGNOSIS — N40.1 BENIGN PROSTATIC HYPERPLASIA WITH WEAK URINARY STREAM: Chronic | ICD-10-CM

## 2024-10-15 DIAGNOSIS — G62.9 PERIPHERAL POLYNEUROPATHY: ICD-10-CM

## 2024-10-15 PROCEDURE — 99999 PR PBB SHADOW E&M-EST. PATIENT-LVL V: CPT | Mod: PBBFAC,HCNC,, | Performed by: PEDIATRICS

## 2024-10-15 NOTE — PROGRESS NOTES
Patient ID: Chao Hawk is a 89 y.o. male.    Chief Complaint: Follow-up    History of Present Illness    CHIEF COMPLAINT:  Patient presents today for follow up after TURP surgery.    RECENT TURP SURGERY:  He recently underwent a TURP (transurethral resection of the prostate) surgery, TUR of his median lobe and UroLift implant. He reports minimal post-operative bleeding, which has since resolved, and notes improved blood flow in the affected area. He experienced minimal post-operative pain and has a few oxycodone tablets remaining, which he is saving for potential future use. Currently, he denies the need for regular pain medication.    RESPIRATORY:  He reports experiencing constant but stable shortness of breath, which he attributes to his COPD and pulmonary hypertension. He denies using his prescribed inhalers, stating he does not feel the need to use them.    MUSCULOSKELETAL:  He reports ongoing back issues, acknowledging that these are likely his most significant current concern.    NEUROLOGICAL:  He experiences numbness in his feet, which appears to be stable. He denies any falls related to this condition.    ENDOCRINE:  He reports a recent A1C result of 5.8, indicating good glycemic control for his diabetes.    CARDIOVASCULAR:  He has an upcoming appointment with his cardiologist, Dr. Ferrer, scheduled for November 8th.    MEDICATIONS:  He denies currently taking any pain medication.    PMH, PSH, SH, FH reviewed with patient.      ROS:  General: -fever, -chills, -fatigue, -weight gain, -weight loss  Eyes: -vision changes, -redness, -discharge  ENT: -ear pain, -nasal congestion, -sore throat  Cardiovascular: -chest pain, -palpitations, -lower extremity edema  Respiratory: -cough, +shortness of breath  Gastrointestinal: -abdominal pain, -nausea, -vomiting, -diarrhea, -constipation, -blood in stool  Genitourinary: -dysuria, -hematuria, -frequency  Musculoskeletal: -joint pain, -muscle pain, +back pain  Skin:  -rash, -lesion  Neurological: -headache, -dizziness, +numbness, -tingling  Psychiatric: -anxiety, -depression, -sleep difficulty         Exam:  Physical Exam    Vitals: Blood pressure elevated.  General: No acute distress. Well-developed. Well-nourished.  Eyes: EOMI. Sclerae anicteric.  HENT: Normocephalic. Atraumatic. Nares patent. Moist oral mucosa.  Cardiovascular: Regular rate. Regular rhythm. No murmurs. No rubs. No gallops. Normal S1, S2.  Respiratory: Normal respiratory effort. Clear to auscultation bilaterally. No rales. No rhonchi. No wheezing.  Abdomen: Soft. Non-tender. Non-distended. Normoactive bowel sounds.  Musculoskeletal: DJD changes, slow gait with cane, kyphosis  Extremities: Trace edema in calves bilaterally.  Neurological: Alert & oriented x3. No slurred speech. Normal gait.  Psychiatric: Normal mood. Normal affect. Good insight. Good judgment.  Skin: Warm. Dry. No rash.         Assessment/Plan:  Essential hypertension    Peripheral polyneuropathy    PAH (pulmonary artery hypertension)    Pre-diabetes  -     Hemoglobin A1C; Future; Expected date: 10/15/2024    Pure hypercholesterolemia  -     Comprehensive Metabolic Panel; Future; Expected date: 10/15/2024  -     Lipid Panel; Future; Expected date: 10/15/2024    Chronic anemia  -     CBC Auto Differential; Future; Expected date: 10/15/2024    Benign prostatic hyperplasia with weak urinary stream    Need for vaccination  -     Influenza - Trivalent (Adjuvanted)         Assessment & Plan    Reviewed recent TURP procedure performed by Dr. Pineda, noting no significant problems and minimal post-operative anemia  Assessed kidney function post-surgery, found to be good  Evaluated blood pressure, noting it was elevated than patient's normal but still acceptable  Reviewed recent lab results: A1C at 5.8, cholesterol within normal limits  Assessed COPD and pulmonary hypertension status, noting patient reports steady symptoms without need for inhaler  use  Detected trace fluid in legs up to calves during physical exam    RESPIRATORY HEALTH:  - Advised the patient to obtain RSV vaccine at pharmacy.  - Recommend RSV, influenza, and COVID-19 vaccines.    COPD AND PULMONARY HYPERTENSION:  - The patient reports being mildly dyspneic with COPD and pulmonary hypertension, but condition remains stable.  - Noted that the patient is not using inhalers.    FLUID RETENTION:  - Performed physical exam, revealing minimal fluid retention.  - Observed trace edema in both legs up to the calves during physical exam.    DIABETES:  - Monitored the patient's A1C for diabetes, which was 5.8 last month.    HYPERTENSION:  - Monitored the patient's blood pressure, which is elevated but still within acceptable range.    PAIN MANAGEMENT:  - Discontinued oxycodone as the patient reports no longer requiring it for pain management post-TURP.  - The patient reports ongoing back pain.    NEUROPATHY:  - Inquired about numbness in feet, which appears to be stable.          Visit today included increased complexity associated with the care of the episodic problem  addressed and managing the longitudinal care of the patient due to the serious and/or complex managed problem(s) .      Follow up in about 6 months (around 4/15/2025).    This note was generated with the assistance of ambient listening technology. Verbal consent was obtained by the patient and accompanying visitor(s) for the recording of patient appointment to facilitate this note. I attest to having reviewed and edited the generated note for accuracy, though some syntax or spelling errors may persist. Please contact the author of this note for any clarification.

## 2024-10-17 ENCOUNTER — OFFICE VISIT (OUTPATIENT)
Dept: UROLOGY | Facility: CLINIC | Age: 89
End: 2024-10-17
Payer: MEDICARE

## 2024-10-17 VITALS
HEIGHT: 66 IN | BODY MASS INDEX: 27.64 KG/M2 | WEIGHT: 172 LBS | HEART RATE: 72 BPM | DIASTOLIC BLOOD PRESSURE: 59 MMHG | SYSTOLIC BLOOD PRESSURE: 135 MMHG | TEMPERATURE: 98 F

## 2024-10-17 DIAGNOSIS — N40.1 BENIGN PROSTATIC HYPERPLASIA WITH WEAK URINARY STREAM: ICD-10-CM

## 2024-10-17 DIAGNOSIS — N32.81 OAB (OVERACTIVE BLADDER): Primary | ICD-10-CM

## 2024-10-17 DIAGNOSIS — R39.12 BENIGN PROSTATIC HYPERPLASIA WITH WEAK URINARY STREAM: ICD-10-CM

## 2024-10-17 PROCEDURE — 99999 PR PBB SHADOW E&M-EST. PATIENT-LVL IV: CPT | Mod: PBBFAC,HCNC,, | Performed by: UROLOGY

## 2024-10-17 PROCEDURE — 99499 UNLISTED E&M SERVICE: CPT | Mod: HCNC,S$GLB,, | Performed by: UROLOGY

## 2024-10-17 RX ORDER — VIBEGRON 75 MG/1
75 TABLET, FILM COATED ORAL DAILY
Qty: 30 TABLET | Refills: 11 | Status: SHIPPED | OUTPATIENT
Start: 2024-10-17

## 2024-10-17 NOTE — PROGRESS NOTES
Chief Complaint: LUTS    HPI:   10/17/2024 - returns today after TUR of his median lobe and UroLift, had some bleeding intermittently for 2-3 weeks but this is cleared, notes that his stream is improved but now notes that he is having increased urgency and urge incontinence, the urge incontinence happens once every two weeks but the urgency is bothersome enough that he wants to try a medication  IPSS - 2/2/0/2/2/1/2 = 11  QOL - 3(mixed)    09/03/2024 - returns today for cystoscopy and review of his ultrasound, ultrasound shows a 79 g prostate, no new voiding issues, symptoms unchanged    08/27/2024 - patient returns today for follow-up, notes that his son recently had a UroLift and he would like to be evaluated for this as well, notes that his most bothersome symptom is weak stream and incomplete emptying, no gross hematuria or dysuria, no UTIs  IPSS - 4/3/3/2/4/4/3 = 23  QOL - 5(unhappy)    08/16/2022 - returns for follow-up, no issues with the dutasteride, voiding well, no GH or dysuria    12/29/2021 - presents for follow-up, has been taking the finasteride but it gives him a rash, denies any gross hematuria, notes incomplete emptying as well as some urgency    06/22/2021 - patient presents for follow-up, thinks this stream has improved and is now okay, had some itchiness with the finasteride and thus he cuts the 1 pill in to 2 halves and takes these during the day, denies any gross hematuria, now down to 4 cups of coffee a day, still has nocturia x2    12/21/20: Daytime frequency and low void, nocturia x2.  Drinks 8 cups coffee a day.  Reviewed history in detail.   11/18/20: Cysto today shows BPH, voiding okay after.  Bladder compliance low, trabeculated.  11/2/20: 89 yo man has had two occasions of retention after right shoulder surgery.  Pittman placed again last night.  Been on flomax a long time.   No abd/pelvic pain and no exac/rel factors.  No hematuria.  No urolithiasis.  No urinary bother.  No  history.   Normal sexual function.    Allergies:  Bee sting  [allergen ext-venom-honey bee], Penicillins, and Poison ivy extract    Medications:  has a current medication list which includes the following prescription(s): acetaminophen, albuterol, arginine, atorvastatin, betamethasone valerate 0.1%, cannabidiol (cbd), cholecalciferol (vitamin d3), ubiquinol, cyanocobalamin, furosemide, gabapentin, glucosam/msm/chondroit/vit d3, krill/om-3/dha/epa/phospho/ast, lisinopril, metronidazole, multivit-min/folic/vit k/lycop, oxycodone, potassium, and tamsulosin.    Review of Systems:  General: No fever, chills  Skin: No rashes  Chest:  Denies cough and sputum production  Heart: Denies chest pain  Resp: Denies dyspnea  Abdomen: Denies diarrhea, abdominal pain, hematemesis, or blood in stool.  Musculoskeletal: No joint stiffness or swelling. Denies back pain.  : see HPI  Neuro: no dizziness or weakness      PMH:   has a past medical history of Anemia, Arthritis, Back pain, Bilateral leg edema (2/16/2018), Chronic bronchitis, COPD (chronic obstructive pulmonary disease), Diverticulitis of large intestine with perforation without abscess or bleeding, Diverticulosis (5/16/06), Hearing loss, bilateral, Hernia, Hyperlipidemia, Hypertension, Lumbar radiculopathy (6/14/2017), Multiple renal cysts (7/5/2016), Nocturnal hypoxemia, Polyneuropathy, Tobacco dependence, and Trouble in sleeping.    PSH:   has a past surgical history that includes Finger surgery (Left, 1980s); Hernia repair (1990s); TONSILLECTOMY, ADENOIDECTOMY (1940); Lumbar spine surgery (09/13/2017); Intraocular lens insertion (Bilateral, 2016); Reverse total shoulder arthroplasty (Right, 10/5/2020); Esophagogastroduodenoscopy (N/A, 10/26/2022); Cystoscopy with insertion of minimally invasive implant to enlarge prostatic urethra (N/A, 9/16/2024); and Transurethral resection of prostate (N/A, 9/16/2024).    FamHx: family history includes Appendicitis in his father; Atrial  fibrillation in his sister; COPD in his mother; Emphysema in his mother; Heart disease in his mother and son; Lung disease (age of onset: 91) in his sister.    SocHx:  reports that he quit smoking about 33 years ago. His smoking use included cigarettes. He started smoking about 75 years ago. He has a 42 pack-year smoking history. He has quit using smokeless tobacco. He reports that he does not currently use alcohol. He reports that he does not use drugs.      Physical Exam:  Vitals:    10/17/24 0852   BP: (!) 135/59   Pulse: 72   Temp: 97.8 °F (36.6 °C)       General: awake, alert, cooperative  Head: NC/AT  Ears: external ears normal  Eyes: sclera normal  Lungs: normal inspiration, NAD  Heart: well-perfused  Skin: The skin is warm and dry  Ext: No c/c/e.  Neuro: grossly intact, AOx3    Labs/Studies:   Lab Results   Component Value Date    WBC 9.93 09/17/2024    HGB 11.8 (L) 09/17/2024    HCT 37.8 (L) 09/17/2024     09/17/2024     09/17/2024    K 3.7 09/17/2024     09/17/2024    CREATININE 0.9 09/17/2024    BUN 17 09/17/2024    CO2 26 09/17/2024    TSH 2.14 05/16/2011    PSA 3.2 01/10/2020    INR 1.0 10/26/2022    GLUF 96 08/31/2012    HGBA1C 5.8 (H) 08/29/2024    CHOL 114 (L) 08/29/2024    TRIG 46 08/29/2024    HDL 50 08/29/2024    ALT 18 08/29/2024    AST 22 08/29/2024     Procedure:  Diagnostic Cystoscopy    Indications: LUTS    UA: normal, see lab results for values    Procedure in Detail: After proper consents were obtained, the patient was prepped and draped in normal sterile fashion for diagnostic cystoscopy. 5 ml of lidocaine jelly was instilled in the urethra. The flexible cystoscope was then introduced into the urethra, and advanced into the bladder under direct vision. The urethral mucosa appeared normal, and no strictures were noted. The sphincter was normal, and the veru montanum was unremarkable. The prostatic mucosa and the lateral lobes of the prostate were unremarkable, with  trilobar hypertrophy and complete visual obstruction. The bladder neck was normal. Inspection of the interior of the bladder was then carried out. The trigone was unremarkable, with no mucosal lesions. The ureteral orifices were normal in position and configuration. Systematic inspection of the mucosa of the bladder it was then carried out, rotating the cystoscope so that all areas of the left and right lateral walls, the dome of the bladder, and the posterior wall were all visualized. The cystoscope was then advanced further into the bladder, and maximum deflection of the scope was performed so that the bladder neck could be inspected. No mucosal lesions were noted there. The cystoscope was then removed, and the procedure terminated. Patient tolerated the procedure well. No complications.     Findings:  Trilobar hypertrophy and obstruction    Impression/Plan:   89 yo M with:    LUTS - s/p TUR of median lobe and urolift, doing well from a flow perspective but urgency is bothersome, start gemtesa, SEs reviewed, f/u 3 months    Prostate Cancer Screening - no more PSAs or DREs      Tone Pineda MD

## 2024-10-26 DIAGNOSIS — J44.9 CHRONIC OBSTRUCTIVE PULMONARY DISEASE, UNSPECIFIED COPD TYPE: Chronic | ICD-10-CM

## 2024-10-28 DIAGNOSIS — J44.9 CHRONIC OBSTRUCTIVE PULMONARY DISEASE, UNSPECIFIED COPD TYPE: Chronic | ICD-10-CM

## 2024-10-28 RX ORDER — FLUTICASONE FUROATE, UMECLIDINIUM BROMIDE AND VILANTEROL TRIFENATATE 200; 62.5; 25 UG/1; UG/1; UG/1
1 POWDER RESPIRATORY (INHALATION) DAILY
Qty: 60 EACH | Refills: 11 | Status: SHIPPED | OUTPATIENT
Start: 2024-10-28

## 2024-10-28 RX ORDER — FLUTICASONE FUROATE, UMECLIDINIUM BROMIDE AND VILANTEROL TRIFENATATE 200; 62.5; 25 UG/1; UG/1; UG/1
1 POWDER RESPIRATORY (INHALATION) DAILY
Qty: 60 EACH | Refills: 0 | Status: SHIPPED | OUTPATIENT
Start: 2024-10-28 | End: 2024-10-28 | Stop reason: SDUPTHER

## 2024-11-08 ENCOUNTER — OFFICE VISIT (OUTPATIENT)
Dept: CARDIOLOGY | Facility: CLINIC | Age: 89
End: 2024-11-08
Payer: MEDICARE

## 2024-11-08 VITALS
HEART RATE: 80 BPM | DIASTOLIC BLOOD PRESSURE: 70 MMHG | SYSTOLIC BLOOD PRESSURE: 132 MMHG | HEIGHT: 66 IN | BODY MASS INDEX: 27.03 KG/M2 | WEIGHT: 168.19 LBS

## 2024-11-08 DIAGNOSIS — G47.34 NOCTURNAL HYPOXEMIA: ICD-10-CM

## 2024-11-08 DIAGNOSIS — I73.9 PVD (PERIPHERAL VASCULAR DISEASE): Primary | ICD-10-CM

## 2024-11-08 DIAGNOSIS — I71.40 ABDOMINAL AORTIC ANEURYSM (AAA) WITHOUT RUPTURE, UNSPECIFIED PART: ICD-10-CM

## 2024-11-08 DIAGNOSIS — Z74.09 IMPAIRED FUNCTIONAL MOBILITY, BALANCE, GAIT, AND ENDURANCE: ICD-10-CM

## 2024-11-08 DIAGNOSIS — J98.4 CALCIFIED GRANULOMA OF LUNG: ICD-10-CM

## 2024-11-08 DIAGNOSIS — E66.3 OVERWEIGHT (BMI 25.0-29.9): ICD-10-CM

## 2024-11-08 DIAGNOSIS — I70.0 ATHEROSCLEROSIS OF AORTA: ICD-10-CM

## 2024-11-08 DIAGNOSIS — R73.03 PRE-DIABETES: ICD-10-CM

## 2024-11-08 DIAGNOSIS — I27.21 PAH (PULMONARY ARTERY HYPERTENSION): ICD-10-CM

## 2024-11-08 DIAGNOSIS — E78.00 PURE HYPERCHOLESTEROLEMIA: Chronic | ICD-10-CM

## 2024-11-08 DIAGNOSIS — I10 ESSENTIAL HYPERTENSION: Chronic | ICD-10-CM

## 2024-11-08 DIAGNOSIS — J44.9 CHRONIC OBSTRUCTIVE PULMONARY DISEASE, UNSPECIFIED COPD TYPE: Chronic | ICD-10-CM

## 2024-11-08 PROCEDURE — 99999 PR PBB SHADOW E&M-EST. PATIENT-LVL IV: CPT | Mod: PBBFAC,HCNC,, | Performed by: INTERNAL MEDICINE

## 2024-11-08 NOTE — PROGRESS NOTES
Subjective:   Patient ID:  Chao Hawk is a 89 y.o. male who presents for follow up of No chief complaint on file.      HPI  6/13/2024 GABRIELA ARREDONDO NP    89y/o M with PMHx of anemia, COPD, Hlp, HTN, PAH, AAA followed by CVT last followed by Dr. Hoffmann in 22' here today c/o numbness in calves for months but now has keturah feet pain when he walks. Denies any CP, dizziness/falls or palpitations. Still drives, lives alone and cooks his own food. Bp stable     Echo 22' EF nml mod TR PA 47    11/8/2024   HERE FOR F/U HAS NO NEW COMPLAINTS OF CHEST PAIN HAS ARTHRITIC SYMPTOMS HAS SOME SUGGESTION OF CALF CLAUDICATION HE WALKS AROUND THE HOUSE HE USES A CANE. NO DISCOLORATION OR ULCERATION IN FEET.  Past Medical History:   Diagnosis Date    Anemia     Arthritis     back, hand    Back pain     Dr. Cristobal- [facility]    Bilateral leg edema 02/16/2018    Chronic bronchitis     COPD (chronic obstructive pulmonary disease)     Diverticulitis of large intestine with perforation without abscess or bleeding     Diverticulosis 05/16/2006    colonoscopy    Hearing loss, bilateral     Hernia     x2    Hyperlipidemia     Hypertension     Lumbar radiculopathy 06/14/2017    Multiple renal cysts 07/05/2016    Nocturnal hypoxemia     Polyneuropathy     PVD (peripheral vascular disease) 11/08/2024    PVD (peripheral vascular disease) 11/8/2024    Tobacco dependence     Trouble in sleeping        Past Surgical History:   Procedure Laterality Date    CYSTOSCOPY WITH INSERTION OF MINIMALLY INVASIVE IMPLANT TO ENLARGE PROSTATIC URETHRA N/A 9/16/2024    Procedure: CYSTOSCOPY, WITH INSERTION OF UROLIFT IMPLANT;  Surgeon: Tone Pineda MD;  Location: [facility];  Service: Urology;  Laterality: N/A;    ESOPHAGOGASTRODUODENOSCOPY N/A 10/26/2022    Procedure: EGD (ESOPHAGOGASTRODUODENOSCOPY);  Surgeon: Mallika Duran MD;  Location: [facility];  Service: Endoscopy;  Laterality: N/A;    FINGER SURGERY Left 1980s    index- Dr. Encarnacion; to  remove nodule    HERNIA REPAIR      x2    INTRAOCULAR LENS INSERTION Bilateral 2016    LUMBAR SPINE SURGERY  2017    REVERSE TOTAL SHOULDER ARTHROPLASTY Right 10/5/2020    Procedure: ARTHROPLASTY, SHOULDER, TOTAL, REVERSE;  Surgeon: Reji Martin MD;  Location: [facility];  Service: Orthopedics;  Laterality: Right;    TONSILLECTOMY, ADENOIDECTOMY      TRANSURETHRAL RESECTION OF PROSTATE N/A 2024    Procedure: TURP (TRANSURETHRAL RESECTION OF PROSTATE);  Surgeon: Tone Pineda MD;  Location: [facility];  Service: Urology;  Laterality: N/A;       Social History     Tobacco Use    Smoking status: Former     Current packs/day: 0.00     Average packs/day: 1 pack/day for 42.0 years (42.0 ttl pk-yrs)     Types: Cigarettes     Start date: 1949     Quit date: 1991     Years since quittin.8    Smokeless tobacco: Former   Substance Use Topics    Alcohol use: Not Currently     Comment: rarely   No alcohol 72h  prior to sx    Drug use: No       Family History   Problem Relation Name Age of Onset    Emphysema Mother      Heart disease Mother      COPD Mother      Appendicitis Father      Heart disease Son      Atrial fibrillation Sister      Lung disease Sister  91    Colon cancer Neg Hx      Cancer Neg Hx      Stroke Neg Hx         Current Outpatient Medications   Medication Sig    acetaminophen (TYLENOL) 500 MG tablet Take 500 mg by mouth as needed for Pain.    albuterol (PROVENTIL) 2.5 mg /3 mL (0.083 %) nebulizer solution Take 3 mLs (2.5 mg total) by nebulization every 4 (four) hours as needed for Wheezing. Rescue    ARGININE, L-ARGININE, ORAL Take 500 mg by mouth once daily.    atorvastatin (LIPITOR) 40 MG tablet TAKE 1 TABLET EVERY DAY    betamethasone valerate 0.1% (VALISONE) 0.1 % Oint Apply topically 2 (two) times daily.    CANNABIDIOL, CBD, EXTRACT ORAL Take 3 drops by mouth every morning.    cholecalciferol, vitamin D3, (VITAMIN D3) 50 mcg (2,000 unit) Cap Take 1 capsule  by mouth every evening.    COQ10, UBIQUINOL, ORAL Take 1 tablet by mouth nightly.     cyanocobalamin (VITAMIN B-12) 1000 MCG tablet Take 100 mcg by mouth once daily.    fluticasone-umeclidin-vilanter (TRELEGY ELLIPTA) 200-62.5-25 mcg inhaler Inhale 1 puff into the lungs once daily.    furosemide (LASIX) 20 MG tablet Take 1 tablet (20 mg total) by mouth once daily.    gabapentin (NEURONTIN) 100 MG capsule TAKE ONE CAPSULE BY MOUTH TWICE DAILY    GLUCOSAM-MSM-CHONDROIT-VIT D3 ORAL Take 2 tablets by mouth once daily.    krill/om-3/dha/epa/phospho/ast (MEGARED OMEGA-3 KRILL OIL ORAL) Take 1 capsule by mouth nightly.     lisinopriL 10 MG tablet TAKE 1 TABLET EVERY DAY    metroNIDAZOLE (METROGEL) 0.75 % gel Apply topically 2 (two) times daily.    multivit-min/folic/vit K/lycop (ONE-A-DAY MEN'S MULTIVITAMIN ORAL) Take 1 tablet by mouth every evening.     oxyCODONE (ROXICODONE) 5 MG immediate release tablet Take 1 tablet (5 mg total) by mouth every 8 (eight) hours as needed for Pain.    potassium 99 mg Tab Take by mouth once daily.    tamsulosin (FLOMAX) 0.4 mg Cap TAKE 2 CAPSULES BY MOUTH EVERY MORNING    vibegron (GEMTESA) 75 mg Tab Take 1 tablet (75 mg total) by mouth once daily.     No current facility-administered medications for this visit.     Current Outpatient Medications on File Prior to Visit   Medication Sig    acetaminophen (TYLENOL) 500 MG tablet Take 500 mg by mouth as needed for Pain.    albuterol (PROVENTIL) 2.5 mg /3 mL (0.083 %) nebulizer solution Take 3 mLs (2.5 mg total) by nebulization every 4 (four) hours as needed for Wheezing. Rescue    ARGININE, L-ARGININE, ORAL Take 500 mg by mouth once daily.    atorvastatin (LIPITOR) 40 MG tablet TAKE 1 TABLET EVERY DAY    betamethasone valerate 0.1% (VALISONE) 0.1 % Oint Apply topically 2 (two) times daily.    CANNABIDIOL, CBD, EXTRACT ORAL Take 3 drops by mouth every morning.    cholecalciferol, vitamin D3, (VITAMIN D3) 50 mcg (2,000 unit) Cap Take 1 capsule  "by mouth every evening.    COQ10, UBIQUINOL, ORAL Take 1 tablet by mouth nightly.     cyanocobalamin (VITAMIN B-12) 1000 MCG tablet Take 100 mcg by mouth once daily.    fluticasone-umeclidin-vilanter (TRELEGY ELLIPTA) 200-62.5-25 mcg inhaler Inhale 1 puff into the lungs once daily.    furosemide (LASIX) 20 MG tablet Take 1 tablet (20 mg total) by mouth once daily.    gabapentin (NEURONTIN) 100 MG capsule TAKE ONE CAPSULE BY MOUTH TWICE DAILY    GLUCOSAM-MSM-CHONDROIT-VIT D3 ORAL Take 2 tablets by mouth once daily.    krill/om-3/dha/epa/phospho/ast (MEGARED OMEGA-3 KRILL OIL ORAL) Take 1 capsule by mouth nightly.     lisinopriL 10 MG tablet TAKE 1 TABLET EVERY DAY    metroNIDAZOLE (METROGEL) 0.75 % gel Apply topically 2 (two) times daily.    multivit-min/folic/vit K/lycop (ONE-A-DAY MEN'S MULTIVITAMIN ORAL) Take 1 tablet by mouth every evening.     oxyCODONE (ROXICODONE) 5 MG immediate release tablet Take 1 tablet (5 mg total) by mouth every 8 (eight) hours as needed for Pain.    potassium 99 mg Tab Take by mouth once daily.    tamsulosin (FLOMAX) 0.4 mg Cap TAKE 2 CAPSULES BY MOUTH EVERY MORNING    vibegron (GEMTESA) 75 mg Tab Take 1 tablet (75 mg total) by mouth once daily.     No current facility-administered medications on file prior to visit.     Review of patient's allergies indicates:   Allergen Reactions    Bee sting  [allergen ext-venom-honey bee] Swelling    Penicillins Rash     "Felt like I had pins and needles in my feet" , hospitalized overnight    Poison ivy extract Hives      Review of Systems   Constitutional: Negative for malaise/fatigue.   Eyes:  Negative for blurred vision.   Cardiovascular:  Positive for claudication. Negative for chest pain, cyanosis, dyspnea on exertion, irregular heartbeat, leg swelling, near-syncope, orthopnea, palpitations and paroxysmal nocturnal dyspnea.   Respiratory:  Negative for cough, hemoptysis and shortness of breath.    Hematologic/Lymphatic: Negative for bleeding " problem. Does not bruise/bleed easily.   Skin:  Negative for dry skin and itching.   Musculoskeletal:  Negative for falls, muscle weakness and myalgias.   Gastrointestinal:  Negative for abdominal pain, diarrhea, heartburn, hematemesis, hematochezia and melena.   Genitourinary:  Negative for flank pain and hematuria.   Neurological:  Positive for numbness and paresthesias. Negative for dizziness, focal weakness, headaches, light-headedness, seizures and weakness.   Psychiatric/Behavioral:  Negative for altered mental status and memory loss. The patient is not nervous/anxious.    Allergic/Immunologic: Negative for hives.       Objective:   Physical Exam  Vitals and nursing note reviewed.   Constitutional:       General: He is not in acute distress.     Appearance: He is well-developed. He is not diaphoretic.   HENT:      Head: Normocephalic and atraumatic.   Eyes:      General:         Right eye: No discharge.         Left eye: No discharge.      Pupils: Pupils are equal, round, and reactive to light.   Neck:      Thyroid: No thyromegaly.      Vascular: No JVD.   Cardiovascular:      Rate and Rhythm: Normal rate and regular rhythm.      Pulses: Intact distal pulses.           Carotid pulses are 2+ on the right side and 2+ on the left side.       Radial pulses are 2+ on the right side and 2+ on the left side.        Femoral pulses are 2+ on the right side with bruit and 2+ on the left side with bruit.       Popliteal pulses are 2+ on the right side and 2+ on the left side.        Dorsalis pedis pulses are 1+ on the right side and 1+ on the left side.        Posterior tibial pulses are 1+ on the right side and 1+ on the left side.      Heart sounds: Murmur heard.      Harsh midsystolic murmur is present with a grade of 1/6 at the upper right sternal border radiating to the neck.      No friction rub. No gallop.   Pulmonary:      Effort: Pulmonary effort is normal. No respiratory distress.      Breath sounds: Normal  "breath sounds. No wheezing or rales.   Chest:      Chest wall: No tenderness.   Abdominal:      General: Bowel sounds are normal. There is no distension.      Palpations: Abdomen is soft.      Tenderness: There is no abdominal tenderness.   Musculoskeletal:         General: Normal range of motion.      Cervical back: Neck supple.      Right lower leg: No edema.      Left lower leg: No edema.   Skin:     General: Skin is warm and dry.      Findings: No erythema or rash.   Neurological:      General: No focal deficit present.      Mental Status: He is alert and oriented to person, place, and time.      Cranial Nerves: No cranial nerve deficit.   Psychiatric:         Mood and Affect: Mood normal.         Behavior: Behavior normal.       Vitals:    11/08/24 0759 11/08/24 0801   BP: 130/70 132/70   BP Location: Left arm Right arm   Patient Position: Sitting Sitting   Pulse: 80 80   Weight: 76.3 kg (168 lb 3.4 oz)    Height: 5' 6" (1.676 m)      Lab Results   Component Value Date    CHOL 114 (L) 08/29/2024    CHOL 119 (L) 02/29/2024    CHOL 119 (L) 08/28/2023      Body mass index is 27.15 kg/m².   Lab Results   Component Value Date    HGBA1C 5.8 (H) 08/29/2024      BMP  Lab Results   Component Value Date     09/17/2024    K 3.7 09/17/2024     09/17/2024    CO2 26 09/17/2024    BUN 17 09/17/2024    CREATININE 0.9 09/17/2024    CALCIUM 8.6 (L) 09/17/2024    ANIONGAP 7 (L) 09/17/2024    EGFRNORACEVR >60 09/17/2024      Lab Results   Component Value Date    HDL 50 08/29/2024    HDL 49 02/29/2024    HDL 42 08/28/2023     Lab Results   Component Value Date    LDLCALC 54.8 (L) 08/29/2024    LDLCALC 62.4 (L) 02/29/2024    LDLCALC 64.6 08/28/2023     Lab Results   Component Value Date    TRIG 46 08/29/2024    TRIG 38 02/29/2024    TRIG 62 08/28/2023     Lab Results   Component Value Date    CHOLHDL 43.9 08/29/2024    CHOLHDL 41.2 02/29/2024    CHOLHDL 35.3 08/28/2023       Chemistry        Component Value Date/Time    "  09/17/2024 0607    K 3.7 09/17/2024 0607     09/17/2024 0607    CO2 26 09/17/2024 0607    BUN 17 09/17/2024 0607    CREATININE 0.9 09/17/2024 0607    GLU 93 09/17/2024 0607        Component Value Date/Time    CALCIUM 8.6 (L) 09/17/2024 0607    ALKPHOS 78 08/29/2024 0818    AST 22 08/29/2024 0818    ALT 18 08/29/2024 0818    BILITOT 0.5 08/29/2024 0818    ESTGFRAFRICA >60.0 01/14/2022 0846    EGFRNONAA >60.0 01/14/2022 0846          Lab Results   Component Value Date    TSH 2.14 05/16/2011     Lab Results   Component Value Date    INR 1.0 10/26/2022    INR 1.0 10/25/2022    INR 1.0 04/13/2018     Lab Results   Component Value Date    WBC 9.93 09/17/2024    HGB 11.8 (L) 09/17/2024    HCT 37.8 (L) 09/17/2024    MCV 92 09/17/2024     09/17/2024     BMP  Sodium   Date Value Ref Range Status   09/17/2024 142 136 - 145 mmol/L Final     Potassium   Date Value Ref Range Status   09/17/2024 3.7 3.5 - 5.1 mmol/L Final     Chloride   Date Value Ref Range Status   09/17/2024 109 95 - 110 mmol/L Final     CO2   Date Value Ref Range Status   09/17/2024 26 23 - 29 mmol/L Final     BUN   Date Value Ref Range Status   09/17/2024 17 10 - 30 mg/dL Final     Creatinine   Date Value Ref Range Status   09/17/2024 0.9 0.5 - 1.4 mg/dL Final     Calcium   Date Value Ref Range Status   09/17/2024 8.6 (L) 8.7 - 10.5 mg/dL Final     Anion Gap   Date Value Ref Range Status   09/17/2024 7 (L) 8 - 16 mmol/L Final     eGFR if    Date Value Ref Range Status   01/14/2022 >60.0 >60 mL/min/1.73 m^2 Final     eGFR if non    Date Value Ref Range Status   01/14/2022 >60.0 >60 mL/min/1.73 m^2 Final     Comment:     Calculation used to obtain the estimated glomerular filtration  rate (eGFR) is the CKD-EPI equation.        CrCl cannot be calculated (Patient's most recent lab result is older than the maximum 7 days allowed.).    The right resting IRLANDA reduction is normal.    The left resting IRLANDA reduction is  normal.     Known abdominal aortic aneurysm   Possible early ulcer of second toe of right foot (photos uploaded to patient media)  Findings    IRLANDA The right resting IRLANDA reduction is normal.   The right ankle [DT] artery has biphasic flow.   The right ankle [DP] artery has biphasic flow.   The left resting IRLANDA reduction is normal.   The left ankle [PT] artery has biphasic flow.  The left ankle [DP] artery has biphasic flow.       US Measurements - IRLANDA    Right   Right arm  mmHg         Right posterior tibial 133 mmHg         Right dorsalis pedis 156 mmHg         Right IRLANDA 1.34         Right toe pressure 105 mmHg         Right TBI 0.91          Left   Left arm  mmHg         Left posterior tibial 140 mmHg         Left dorsalis pedis 132 mmHg         Left IRLANDA 1.21         Left toe pressure 133 mmHg         Left TBI 1.15           Interpretation Summary  Show Result Comparison     Left CFA 50 to 75 % lesion with bilateral tripahsic/biphasice waveform. No other significant lesion.    Rigth IRLANDA 1.3 non compressible vessel    Left IRLANDA 1.21 normal.    Narrative & Impression  EXAM:  US ABDOMINAL AORTA     CLINICAL HISTORY:   [I71.40]-Abdominal aortic aneurysm, without rupture, unspecified.     TECHNIQUE: Standard ultrasound of the abdominal aorta and common iliac arteries performed with grayscale and Doppler imaging.     FINDINGS: Severe aortic atherosclerosis.  Fusiform aneurysmal dilatation of the majority of the abdominal aorta measuring 3.1 cm in diameter proximally (previously 3.3 cm), 3.6 cm mid aorta (previously 3.2 cm) and 4.4 cm distal aorta (previously 3.8 cm).  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        Impression:   Abdominal aortic aneurysm, as above.     Finalized on: 3/8/2024 3:37 PM By:  Fausto Schmidt MD  [facility]# [id number]      2024-03-08 15:39:53.945    [facility]      Summary  Show Result ComparisonThe left ventricle is normal in size with normal systolic  function.  The estimated ejection fraction is 55%.  Grade I left ventricular diastolic dysfunction.  Normal right ventricular size with normal right ventricular systolic function.  Mild aortic regurgitation.  Mild mitral regurgitation.  Moderate tricuspid regurgitation.  Normal central venous pressure (3 mmHg).  The estimated PA systolic pressure is 47 mmHg.  There is pulmonary hypertension.  Trivial pericardial effusion.       1. PVD (peripheral vascular disease)    2. Chronic obstructive pulmonary disease, unspecified COPD type    3. Essential hypertension    4. Pure hypercholesterolemia    5. Atherosclerosis of aorta    6. Abdominal aortic aneurysm (AAA) without rupture, unspecified part    7. Overweight (BMI 25.0-29.9)    8. Calcified granuloma of lung    9. PAH (pulmonary artery hypertension)    10. Nocturnal hypoxemia    11. Impaired functional mobility, balance, gait, and endurance    12. Pre-diabetes    HTN CONTROLLED LOW SALT DIET EMPHASIZED CONTINUE SAME   PAH FOLLOW RECS PER PULMONARY WILL REPEAT ECHO   COPD PER PULMONARY FUNCTIONAL CAPACITY STABLE   PVD ASYMPTOMATIC ALTHOUGH HAS CALF SYMPTOMS I THINK THIS IS PROBABLY AN ELEMENT OF DECONDITIONING RATHER THAN OCCLUSIVE DISEASE CONTINUE ASA STATINS   AAA STABLE CONTINUE MONITORING   HLP ON [facility] CONTINUE SAME    PREDIABETES ON [facility] CONTINUE THE SAME.     Plan:   Continue current therapy  Cardiac low salt diet.  Risk factor modification and excercise program.  F/u in 6 months  ECHO

## 2024-12-05 ENCOUNTER — HOSPITAL ENCOUNTER (OUTPATIENT)
Dept: CARDIOLOGY | Facility: HOSPITAL | Age: 89
Discharge: HOME | End: 2024-12-05
Attending: INTERNAL MEDICINE
Payer: MEDICARE

## 2024-12-05 VITALS
HEIGHT: 66 IN | WEIGHT: 168 LBS | BODY MASS INDEX: 27 KG/M2 | SYSTOLIC BLOOD PRESSURE: 132 MMHG | HEART RATE: 79 BPM | DIASTOLIC BLOOD PRESSURE: 70 MMHG

## 2024-12-05 DIAGNOSIS — I10 ESSENTIAL HYPERTENSION: Chronic | ICD-10-CM

## 2024-12-05 DIAGNOSIS — E78.00 PURE HYPERCHOLESTEROLEMIA: Chronic | ICD-10-CM

## 2024-12-05 DIAGNOSIS — I27.21 PAH (PULMONARY ARTERY HYPERTENSION): ICD-10-CM

## 2024-12-05 LAB
AORTIC ROOT ANNULUS: 3.81 CM
ASCENDING AORTA: 2.74 CM
AV INDEX (PROSTH): 0.55
AV MEAN GRADIENT: 11.5 MMHG
AV PEAK GRADIENT: 19.4 MMHG
AV REGURGITATION PRESSURE HALF TIME: 525.2 MS
AV VALVE AREA BY VELOCITY RATIO: 2.1 CM²
AV VALVE AREA: 2.3 CM²
AV VELOCITY RATIO: 0.5
BSA FOR ECHO PROCEDURE: 1.88 M2
CV ECHO LV RWT: 0.44 CM
DOP CALC AO PEAK VEL: 2.2 M/S
DOP CALC AO VTI: 48.4 CM
DOP CALC LVOT AREA: 4.2 CM2
DOP CALC LVOT DIAMETER: 2.3 CM
DOP CALC LVOT PEAK VEL: 1.1 M/S
DOP CALC LVOT STROKE VOLUME: 110.5 CM3
DOP CALC RVOT PEAK VEL: 1.03 M/S
DOP CALC RVOT VTI: 20.5 CM
DOP CALCLVOT PEAK VEL VTI: 26.6 CM
E WAVE DECELERATION TIME: 202.96 MSEC
E/A RATIO: 0.63
E/E' RATIO: 9.86 M/S
ECHO LV POSTERIOR WALL: 1 CM (ref 0.6–1.1)
FRACTIONAL SHORTENING: 33.3 % (ref 28–44)
INTERVENTRICULAR SEPTUM: 1.2 CM (ref 0.6–1.1)
IVC DIAMETER: 2.2 CM
IVRT: 58.99 MSEC
LA MAJOR: 4.85 CM
LA MINOR: 5.07 CM
LA WIDTH: 3.5 CM
LEFT ATRIUM AREA SYSTOLIC (APICAL 2 CHAMBER): 18.54 CM2
LEFT ATRIUM AREA SYSTOLIC (APICAL 4 CHAMBER): 18.42 CM2
LEFT ATRIUM SIZE: 3.29 CM
LEFT ATRIUM VOLUME INDEX MOD: 28.5 ML/M2
LEFT ATRIUM VOLUME INDEX: 26.1 ML/M2
LEFT ATRIUM VOLUME MOD: 53.04 ML
LEFT ATRIUM VOLUME: 48.52 CM3
LEFT INTERNAL DIMENSION IN SYSTOLE: 3 CM (ref 2.1–4)
LEFT VENTRICLE DIASTOLIC VOLUME INDEX: 48.52 ML/M2
LEFT VENTRICLE DIASTOLIC VOLUME: 90.25 ML
LEFT VENTRICLE END SYSTOLIC VOLUME APICAL 2 CHAMBER: 52.91 ML
LEFT VENTRICLE END SYSTOLIC VOLUME APICAL 4 CHAMBER: 53.33 ML
LEFT VENTRICLE MASS INDEX: 94.1 G/M2
LEFT VENTRICLE SYSTOLIC VOLUME INDEX: 18.6 ML/M2
LEFT VENTRICLE SYSTOLIC VOLUME: 34.68 ML
LEFT VENTRICULAR INTERNAL DIMENSION IN DIASTOLE: 4.5 CM (ref 3.5–6)
LEFT VENTRICULAR MASS: 175 G
LV LATERAL E/E' RATIO: 8.63 M/S
LV SEPTAL E/E' RATIO: 11.5 M/S
LVED V (TEICH): 90.25 ML
LVES V (TEICH): 34.68 ML
LVOT MG: 2.72 MMHG
LVOT MV: 0.77 CM/S
MV PEAK A VEL: 1.09 M/S
MV PEAK E VEL: 0.69 M/S
MV STENOSIS PRESSURE HALF TIME: 58.86 MS
MV VALVE AREA P 1/2 METHOD: 3.74 CM2
OHS CV RV/LV RATIO: 0.87 CM
PISA AR MAX VEL: 3.8 M/S
PISA TR MAX VEL: 3.21 M/S
PULM VEIN S/D RATIO: 1.46
PV MEAN GRADIENT: 2 MMHG
PV PEAK D VEL: 0.39 M/S
PV PEAK GRADIENT: 4 MMHG
PV PEAK S VEL: 0.57 M/S
PV PEAK VELOCITY: 0.97 M/S
RA MAJOR: 4.19 CM
RA PRESSURE ESTIMATED: 8 MMHG
RA WIDTH: 2.84 CM
RIGHT VENTRICLE DIASTOLIC BASEL DIMENSION: 3.9 CM
RIGHT VENTRICULAR END-DIASTOLIC DIMENSION: 3.47 CM
RV TB RVSP: 11 MMHG
SINUS: 3.59 CM
STJ: 3.54 CM
TDI LATERAL: 0.08 M/S
TDI SEPTAL: 0.06 M/S
TDI: 0.07 M/S
TR MAX PG: 41 MMHG
TRICUSPID ANNULAR PLANE SYSTOLIC EXCURSION: 2.14 CM
TV REST PULMONARY ARTERY PRESSURE: 49 MMHG
Z-SCORE OF LEFT VENTRICULAR DIMENSION IN END DIASTOLE: -1.4
Z-SCORE OF LEFT VENTRICULAR DIMENSION IN END SYSTOLE: -0.49

## 2024-12-05 PROCEDURE — 93306 TTE W/DOPPLER COMPLETE: CPT | Mod: 26,HCNC,, | Performed by: INTERNAL MEDICINE

## 2024-12-05 PROCEDURE — 93306 TTE W/DOPPLER COMPLETE: CPT | Mod: HCNC

## 2024-12-06 ENCOUNTER — TELEPHONE (OUTPATIENT)
Dept: CARDIOLOGY | Facility: CLINIC | Age: 89
End: 2024-12-06
Payer: MEDICARE

## 2024-12-06 NOTE — TELEPHONE ENCOUNTER
[facility] [phone] and [facility]         ----- Message from Gael Hoffmann MD sent at 12/5/2024  5:33 PM CST -----  Heart function normal. Mild valve cjhanges age related no change in treatmment

## 2025-01-08 DIAGNOSIS — I71.40 ABDOMINAL AORTIC ANEURYSM (AAA) WITHOUT RUPTURE, UNSPECIFIED PART: Primary | ICD-10-CM

## 2025-01-15 ENCOUNTER — OFFICE VISIT (OUTPATIENT)
Dept: HOME HEALTH SERVICES | Facility: CLINIC | Age: OVER 89
End: 2025-01-15
Payer: MEDICARE

## 2025-01-15 VITALS
DIASTOLIC BLOOD PRESSURE: 66 MMHG | TEMPERATURE: 99 F | HEART RATE: 93 BPM | HEIGHT: 66 IN | OXYGEN SATURATION: 94 % | SYSTOLIC BLOOD PRESSURE: 132 MMHG | WEIGHT: 168 LBS | BODY MASS INDEX: 27 KG/M2

## 2025-01-15 DIAGNOSIS — M47.816 LUMBAR ARTHROPATHY: ICD-10-CM

## 2025-01-15 DIAGNOSIS — I70.0 ATHEROSCLEROSIS OF AORTA: ICD-10-CM

## 2025-01-15 DIAGNOSIS — J98.4 CALCIFIED GRANULOMA OF LUNG: ICD-10-CM

## 2025-01-15 DIAGNOSIS — Z87.891 PERSONAL HISTORY OF NICOTINE DEPENDENCE: ICD-10-CM

## 2025-01-15 DIAGNOSIS — R26.9 ABNORMALITY OF GAIT AND MOBILITY: ICD-10-CM

## 2025-01-15 DIAGNOSIS — I27.21 PAH (PULMONARY ARTERY HYPERTENSION): ICD-10-CM

## 2025-01-15 DIAGNOSIS — N40.1 BENIGN PROSTATIC HYPERPLASIA WITH WEAK URINARY STREAM: Chronic | ICD-10-CM

## 2025-01-15 DIAGNOSIS — I71.40 ABDOMINAL AORTIC ANEURYSM (AAA) WITHOUT RUPTURE, UNSPECIFIED PART: ICD-10-CM

## 2025-01-15 DIAGNOSIS — I10 ESSENTIAL (PRIMARY) HYPERTENSION: ICD-10-CM

## 2025-01-15 DIAGNOSIS — D64.9 CHRONIC ANEMIA: ICD-10-CM

## 2025-01-15 DIAGNOSIS — G62.9 PERIPHERAL POLYNEUROPATHY: ICD-10-CM

## 2025-01-15 DIAGNOSIS — J44.9 CHRONIC OBSTRUCTIVE PULMONARY DISEASE, UNSPECIFIED COPD TYPE: ICD-10-CM

## 2025-01-15 DIAGNOSIS — H35.3212 EXUDATIVE AGE-RELATED MACULAR DEGENERATION, RIGHT EYE, WITH INACTIVE CHOROIDAL NEOVASCULARIZATION: ICD-10-CM

## 2025-01-15 DIAGNOSIS — R41.3 MEMORY DEFICIT: ICD-10-CM

## 2025-01-15 DIAGNOSIS — R60.0 BILATERAL LEG EDEMA: ICD-10-CM

## 2025-01-15 DIAGNOSIS — R39.12 BENIGN PROSTATIC HYPERPLASIA WITH WEAK URINARY STREAM: Chronic | ICD-10-CM

## 2025-01-15 DIAGNOSIS — R73.03 PRE-DIABETES: ICD-10-CM

## 2025-01-15 DIAGNOSIS — R07.9 CHEST PAIN SYNDROME: ICD-10-CM

## 2025-01-15 DIAGNOSIS — E78.00 PURE HYPERCHOLESTEROLEMIA: Chronic | ICD-10-CM

## 2025-01-15 DIAGNOSIS — M19.019 SHOULDER ARTHRITIS: ICD-10-CM

## 2025-01-15 DIAGNOSIS — M51.369 DEGENERATION OF INTERVERTEBRAL DISC OF LUMBAR REGION, UNSPECIFIED WHETHER PAIN PRESENT: ICD-10-CM

## 2025-01-15 DIAGNOSIS — I73.9 PVD (PERIPHERAL VASCULAR DISEASE): ICD-10-CM

## 2025-01-15 DIAGNOSIS — Z74.09 IMPAIRED FUNCTIONAL MOBILITY, BALANCE, GAIT, AND ENDURANCE: ICD-10-CM

## 2025-01-15 DIAGNOSIS — Z00.00 ENCOUNTER FOR PREVENTIVE HEALTH EXAMINATION: Primary | ICD-10-CM

## 2025-01-15 PROBLEM — K92.1 MELENA: Status: RESOLVED | Noted: 2022-10-26 | Resolved: 2025-01-15

## 2025-01-15 PROBLEM — L02.214 INGUINAL ABSCESS: Status: RESOLVED | Noted: 2021-10-26 | Resolved: 2025-01-15

## 2025-01-15 PROBLEM — Z60.2 PROBLEMS RELATED TO LIVING ALONE: Status: ACTIVE | Noted: 2020-10-07

## 2025-01-15 PROBLEM — D62 ACUTE BLOOD LOSS ANEMIA: Status: RESOLVED | Noted: 2022-10-26 | Resolved: 2025-01-15

## 2025-01-15 PROBLEM — Z96.611 PRESENCE OF RIGHT ARTIFICIAL SHOULDER JOINT: Status: ACTIVE | Noted: 2020-10-05

## 2025-01-15 PROCEDURE — 1159F MED LIST DOCD IN RCRD: CPT | Mod: CPTII,S$GLB,, | Performed by: NURSE PRACTITIONER

## 2025-01-15 PROCEDURE — G0439 PPPS, SUBSEQ VISIT: HCPCS | Mod: S$GLB,,, | Performed by: NURSE PRACTITIONER

## 2025-01-15 PROCEDURE — 1101F PT FALLS ASSESS-DOCD LE1/YR: CPT | Mod: CPTII,S$GLB,, | Performed by: NURSE PRACTITIONER

## 2025-01-15 PROCEDURE — 1126F AMNT PAIN NOTED NONE PRSNT: CPT | Mod: CPTII,S$GLB,, | Performed by: NURSE PRACTITIONER

## 2025-01-15 PROCEDURE — 1160F RVW MEDS BY RX/DR IN RCRD: CPT | Mod: CPTII,S$GLB,, | Performed by: NURSE PRACTITIONER

## 2025-01-15 PROCEDURE — 3288F FALL RISK ASSESSMENT DOCD: CPT | Mod: CPTII,S$GLB,, | Performed by: NURSE PRACTITIONER

## 2025-01-15 PROCEDURE — 1170F FXNL STATUS ASSESSED: CPT | Mod: CPTII,S$GLB,, | Performed by: NURSE PRACTITIONER

## 2025-01-15 PROCEDURE — 1158F ADVNC CARE PLAN TLK DOCD: CPT | Mod: CPTII,S$GLB,, | Performed by: NURSE PRACTITIONER

## 2025-01-15 NOTE — PROGRESS NOTES
"Chao Hawk presented for a follow-up Medicare AWV today. The following components were reviewed and updated:    Medical history  Family History  Social history  Allergies and Current Medications  Health Risk Assessment  Health Maintenance  Care Team    **See Completed Assessments for Annual Wellness visit with in the encounter summary    The following assessments were completed:  Depression Screening  Cognitive function Screening    Timed Get Up Test  Whisper Test      Opioid documentation:      Patient does not have a current opioid prescription.          Vitals:    01/15/25 1152   BP: 132/66   Pulse: 93   Temp: 98.6 °F (37 °C)   TempSrc: Temporal   SpO2: (!) 94%   Weight: 76.2 kg (168 lb)   Height: 5' 6" (1.676 m)     Body mass index is 27.12 kg/m².       Physical Exam  Constitutional:       Comments: Elderly frail   HENT:      Ears:      Comments: [place]     Mouth/Throat:      Mouth: Mucous membranes are moist.   Cardiovascular:      Rate and Rhythm: Normal rate and regular rhythm.      Pulses: Normal pulses.      Heart sounds: Normal heart sounds.   Pulmonary:      Effort: Pulmonary effort is normal.      Breath sounds: Normal breath sounds.   Musculoskeletal:      Right lower leg: Edema present.      Left lower leg: Edema present.   Skin:     General: Skin is warm and dry.      Findings: Bruising (right elbow) present.   Neurological:      General: No focal deficit present.      Mental Status: He is alert and oriented to person, place, and time.      Motor: Weakness (generalized weakness) present.      Gait: Gait abnormal.   Psychiatric:         Mood and Affect: Mood normal.         Behavior: Behavior normal.           Diagnoses and health risks identified today and associated recommendations/orders:  1. Encounter for preventive health examination  Medicare awv complete. Health maintenance:  up to date.     2. Chronic obstructive pulmonary disease, unspecified COPD type  Has sob on exertion. No wheezing. Patient " states he does not have oxygen and has not been using Trelegy. He has memory issues. His son Juan is supposed to start helping pt with his medications. I told Juan holt needs to be picked up from [facility] on pharmacy. Recommend follow up with pulmonology. Message sent to Elizabeth Lejeune, NP.     3. PAH (pulmonary artery hypertension)  Echo 8/12/2020  Concentric left ventricular remodeling.  Normal central venous pressure (3 mmHg).  The estimated PA systolic pressure is 35 mmHg.  Normal left ventricular systolic function. The estimated ejection fraction is 55%.  Normal LV diastolic function.  No wall motion abnormalities.  Recommend pt take lasix as prescribed. F/u with pulmonology.     4. Exudative age-related macular degeneration, right eye, with inactive choroidal neovascularization  Pt states he went to [facility] last week. He said they said it was okay for him to drive. Pt states he just has issues seeing text. Continue follow up with ophthalmology.     6. Calcified granuloma of lung  CT abdomen 11-14-17 in [facility]: Calcified granuloma of right middle lobe lung. Stable symptoms. F/u with pulmonology.     7. Abdominal aortic aneurysm (AAA) without rupture, unspecified part  7/ 2021...The distal abdominal aorta again demonstrates aneurysmal dilatation with maximum diameter of 3.8 cm, slightly increased from 3.6 cm previously though this could be due to technical differences.     8. PVD (peripheral vascular disease)  Symptoms stable.  Pulses bilateral feet palpable 2+.  Follow up with PCP.    9. Essential (primary) hypertension  Blood Pressure stable.  Recommend low-sodium diet and taking lisinopril as directed.  Follow up with PCP.    10. Atherosclerosis of aorta  Chronic.  Blood pressure stable.  Recommend taking Lipitor as directed.  Follow up with PCP.    11. Pure hypercholesterolemia  Lab Results   Component Value Date    CHOL 114 (L) 08/29/2024    CHOL 119 (L) 02/29/2024    CHOL 119 (L)  08/28/2023     Lab Results   Component Value Date    HDL 50 08/29/2024    HDL 49 02/29/2024    HDL 42 08/28/2023     Lab Results   Component Value Date    LDLCALC 54.8 (L) 08/29/2024    LDLCALC 62.4 (L) 02/29/2024    LDLCALC 64.6 08/28/2023     Lab Results   Component Value Date    TRIG 46 08/29/2024    TRIG 38 02/29/2024    TRIG 62 08/28/2023       Lab Results   Component Value Date    CHOLHDL 43.9 08/29/2024    CHOLHDL 41.2 02/29/2024    CHOLHDL 35.3 08/28/2023    Lipids stable.  Recommend taking Lipitor as directed.  Follow up with PCP.    12. Chest pain syndrome  Patient denies any chest pain, palpitations, shortness of breath at rest.  Tylenol as needed.  Follow up with Cardiology.    13. Benign prostatic hyperplasia with weak urinary stream  Symptoms stable.  Continue Gemtesa.  Unsure if patient is taking Flomax.  Follow up with Urology.    14. Degeneration of intervertebral disc of lumbar region, unspecified whether pain present  Symptoms stable.  Pain controlled.  Recommend taking Tylenol as needed and gabapentin as prescribed.  Follow up with PCP.  Patient is not on any opioids per his son ismael's report.     15. Lumbar arthropathy  Symptoms stable.  Pain controlled.  Recommend taking Tylenol as needed and gabapentin as prescribed.  Follow up with PCP.    16. Peripheral polyneuropathy  Symptoms stable.  Pain controlled.  Recommend taking Tylenol as needed and gabapentin as prescribed.  Follow up with PCP.    17. Chronic anemia  H&H stable.  Recommend taking vitamin B12.  Follow up with PCP    18. Pre-diabetes  Lab Results   Component Value Date    HGBA1C 5.8 (H) 08/29/2024    Hemoglobin A1c stable.  Recommend diabetic diet and exercise.  Follow up with PCP    19. Shoulder arthritis  Symptoms stable.  Pain controlled.  Recommend taking Tylenol as needed.  Follow up with PCP.    20. Bilateral leg edema  Chronic edema present.  Patient states not worsening.  Recommend patient follow a low-sodium diet,  compression stockings, take Lasix as prescribed, elevate legs.  Follow up with PCP.    21. Memory deficit  pt lives alone 89 year old. Son visits him once a week. very [place]. cooks and drives-safety issue? Cognitive screening score 2/5. pt manages his medications but missing days and cannot locate his medication bottles during assessment today. spoke with son juan who agreed to help with medications but still seemed somewhat confused after thorough explanation. Recommend home health to help manage medications. Pt has some medications sent to [facility] on pharmacy and some medications sent to [facility]. [facility]. Unable to verify which medications pt actually takes since he cannot locate his medication bottles.   I told Juan to call him everyday to check on pt and to ask him if he took his medications. Also to manage his medications and set up a pill box for him once a week. Explained to Juan to try to get access to pt's mychart to be able to get his medication list. Pt could not find an AVS so I wrote down most of his medications.   Pt does not need cna to help him bath according to pt and his son. Message sent to pcp. Case management consulted.   - Ambulatory referral/consult to Outpatient Case Management    22. Impaired functional mobility, balance, gait, and endurance  Chronic. Fall precautions recommended and discussed. Follow up with PCP.      23. Personal history of nicotine dependence  Patient quit smoking in 1991.  No issues with resuming smoking.  COPD present see 2.        Provided Chao with a 5-10 year written screening schedule and personal prevention plan. Recommendations were developed using the USPSTF age appropriate recommendations. Education, counseling, and referrals were provided as needed.  After Visit Summary printed and given to patient which includes a list of additional screenings\tests needed.    Follow up in about 1 year (around 1/15/2026) for annual wellness  visit.      Liliana Smith, FNP      I offered to discuss advanced care planning, including how to pick a person who would make decisions for you if you were unable to make them for yourself, called a health care power of , and what kind of decisions you might make such as use of life sustaining treatments such as ventilators and tube feeding when faced with a life limiting illness recorded on a living will that they will need to know. (How you want to be cared for as you near the end of your natural life)     X  Patient has advanced directives written and agrees to provide copies to the institution.

## 2025-01-15 NOTE — PATIENT INSTRUCTIONS
Counseling and Referral of Other Preventative  (Italic type indicates deductible and co-insurance are waived)    Patient Name: Chao Hawk  Today's Date: 1/15/2025    Health Maintenance       Date Due Completion Date    Hemoglobin A1c (Prediabetes) 08/29/2025 8/29/2024    Lipid Panel 08/29/2025 8/29/2024    TETANUS VACCINE 09/02/2032 9/2/2022        Orders Placed This Encounter   Procedures    Ambulatory referral/consult to Outpatient Case Management       The following information is provided to all patients.  This information is to help you find resources for any of the problems found today that may be affecting your health:                  Living healthy guide: [email or web address].[email or web address]      Understanding Diabetes: www.diabetes.org      Eating healthy: www.cdc.gov/healthyweight      CDC home safety checklist: www.cdc.gov/steadi/patient.html      Agency on Aging: www.goea.[place].[facility]      Alcoholics anonymous (AA): www.aa.org      Physical Activity: www.jojo.nih.gov/pv8hokg      Tobacco use: www.quitwithusla.org

## 2025-01-15 NOTE — Clinical Note
Medicare awv complete. Health maintenance:  up to date.   pt lives alone 89 year old. pt manages his medications but missing days and cannot locate his medication bottles during assessment today. spoke with son juan who agreed to help with medications but still seemed somewhat confused after thorough explanation. Recommend home health to help manage medications. [facility] and [facility]. Unable to verify which medications pt actually takes since he cannot locate his medication bottles.  I told Juan to call him everyday to check on pt and to ask him if he took his medications. Also to manage his medications and set up a pill box for him once a week. Explained to Juan to try to get access to pt's mychart to be able to get his medication list. Pt could not find an AVS so I wrote down most of his medications.  Pt does not need cna to help him bath according to pt and his son. Message sent to pcp. Case management consult

## 2025-01-16 ENCOUNTER — PATIENT MESSAGE (OUTPATIENT)
Dept: INTERNAL MEDICINE | Facility: CLINIC | Age: OVER 89
End: 2025-01-16
Payer: MEDICARE

## 2025-01-17 ENCOUNTER — TELEPHONE (OUTPATIENT)
Dept: INTERNAL MEDICINE | Facility: CLINIC | Age: OVER 89
End: 2025-01-17
Payer: MEDICARE

## 2025-02-01 ENCOUNTER — HOSPITAL ENCOUNTER (EMERGENCY)
Facility: HOSPITAL | Age: OVER 89
Discharge: HOME | End: 2025-02-01
Attending: EMERGENCY MEDICINE
Payer: MEDICARE

## 2025-02-01 VITALS
BODY MASS INDEX: 26.53 KG/M2 | DIASTOLIC BLOOD PRESSURE: 64 MMHG | TEMPERATURE: 98 F | HEART RATE: 91 BPM | OXYGEN SATURATION: 95 % | SYSTOLIC BLOOD PRESSURE: 121 MMHG | RESPIRATION RATE: 19 BRPM | WEIGHT: 164.38 LBS

## 2025-02-01 DIAGNOSIS — M79.89 SWELLING OF LOWER LEG: ICD-10-CM

## 2025-02-01 DIAGNOSIS — L03.115 CELLULITIS OF RIGHT LEG: ICD-10-CM

## 2025-02-01 DIAGNOSIS — M79.89 LEG SWELLING: ICD-10-CM

## 2025-02-01 DIAGNOSIS — R06.02 SHORTNESS OF BREATH: ICD-10-CM

## 2025-02-01 DIAGNOSIS — J44.9 CHRONIC OBSTRUCTIVE PULMONARY DISEASE, UNSPECIFIED COPD TYPE: Primary | ICD-10-CM

## 2025-02-01 DIAGNOSIS — Z13.6 SCREENING FOR CARDIOVASCULAR CONDITION: ICD-10-CM

## 2025-02-01 LAB
ALBUMIN SERPL BCP-MCNC: 3.5 G/DL (ref 3.5–5.2)
ALP SERPL-CCNC: 88 U/L (ref 40–150)
ALT SERPL W/O P-5'-P-CCNC: 12 U/L (ref 10–44)
ANION GAP SERPL CALC-SCNC: 10 MMOL/L (ref 8–16)
APTT PPP: 26.3 SEC (ref 21–32)
AST SERPL-CCNC: 15 U/L (ref 10–40)
BACTERIA #/AREA URNS HPF: ABNORMAL /HPF
BASOPHILS # BLD AUTO: 0.02 K/UL (ref 0–0.2)
BASOPHILS NFR BLD: 0.3 % (ref 0–1.9)
BILIRUB SERPL-MCNC: 0.3 MG/DL (ref 0.1–1)
BILIRUB UR QL STRIP: NEGATIVE
BNP SERPL-MCNC: 57 PG/ML (ref 0–99)
BUN SERPL-MCNC: 17 MG/DL (ref 10–30)
CALCIUM SERPL-MCNC: 9.1 MG/DL (ref 8.7–10.5)
CHLORIDE SERPL-SCNC: 110 MMOL/L (ref 95–110)
CLARITY UR: CLEAR
CO2 SERPL-SCNC: 24 MMOL/L (ref 23–29)
COLOR UR: YELLOW
CREAT SERPL-MCNC: 0.9 MG/DL (ref 0.5–1.4)
D DIMER PPP IA.FEU-MCNC: 4.39 MG/L FEU
DIFFERENTIAL METHOD BLD: ABNORMAL
EOSINOPHIL # BLD AUTO: 0.1 K/UL (ref 0–0.5)
EOSINOPHIL NFR BLD: 1.3 % (ref 0–8)
ERYTHROCYTE [DISTWIDTH] IN BLOOD BY AUTOMATED COUNT: 14.9 % (ref 11.5–14.5)
EST. GFR  (NO RACE VARIABLE): >60 ML/MIN/1.73 M^2
GLUCOSE SERPL-MCNC: 134 MG/DL (ref 70–110)
GLUCOSE UR QL STRIP: NEGATIVE
HCT VFR BLD AUTO: 40.5 % (ref 40–54)
HCV AB SERPL QL IA: NEGATIVE
HEP C VIRUS HOLD SPECIMEN: NORMAL
HGB BLD-MCNC: 13 G/DL (ref 14–18)
HGB UR QL STRIP: NEGATIVE
HIV 1+2 AB+HIV1 P24 AG SERPL QL IA: NEGATIVE
HYALINE CASTS #/AREA URNS LPF: 0 /LPF
IMM GRANULOCYTES # BLD AUTO: 0.04 K/UL (ref 0–0.04)
IMM GRANULOCYTES NFR BLD AUTO: 0.6 % (ref 0–0.5)
INR PPP: 0.9 (ref 0.8–1.2)
KETONES UR QL STRIP: NEGATIVE
LACTATE SERPL-SCNC: 1.2 MMOL/L (ref 0.5–2.2)
LEUKOCYTE ESTERASE UR QL STRIP: NEGATIVE
LYMPHOCYTES # BLD AUTO: 1 K/UL (ref 1–4.8)
LYMPHOCYTES NFR BLD: 13.7 % (ref 18–48)
MCH RBC QN AUTO: 29.3 PG (ref 27–31)
MCHC RBC AUTO-ENTMCNC: 32.1 G/DL (ref 32–36)
MCV RBC AUTO: 91 FL (ref 82–98)
MICROSCOPIC COMMENT: ABNORMAL
MONOCYTES # BLD AUTO: 0.6 K/UL (ref 0.3–1)
MONOCYTES NFR BLD: 7.9 % (ref 4–15)
NEUTROPHILS # BLD AUTO: 5.3 K/UL (ref 1.8–7.7)
NEUTROPHILS NFR BLD: 76.2 % (ref 38–73)
NITRITE UR QL STRIP: NEGATIVE
NRBC BLD-RTO: 0 /100 WBC
OHS QRS DURATION: 92 MS
OHS QTC CALCULATION: 435 MS
PH UR STRIP: 7 [PH] (ref 5–8)
PLATELET # BLD AUTO: 182 K/UL (ref 150–450)
PMV BLD AUTO: 10.8 FL (ref 9.2–12.9)
POTASSIUM SERPL-SCNC: 3.9 MMOL/L (ref 3.5–5.1)
PROT SERPL-MCNC: 6.7 G/DL (ref 6–8.4)
PROT UR QL STRIP: ABNORMAL
PROTHROMBIN TIME: 10.9 SEC (ref 9–12.5)
RBC # BLD AUTO: 4.43 M/UL (ref 4.6–6.2)
RBC #/AREA URNS HPF: 5 /HPF (ref 0–4)
SODIUM SERPL-SCNC: 144 MMOL/L (ref 136–145)
SP GR UR STRIP: >1.03 (ref 1–1.03)
TROPONIN I SERPL DL<=0.01 NG/ML-MCNC: 0.03 NG/ML (ref 0–0.03)
TROPONIN I SERPL DL<=0.01 NG/ML-MCNC: 0.03 NG/ML (ref 0–0.03)
URN SPEC COLLECT METH UR: ABNORMAL
UROBILINOGEN UR STRIP-ACNC: NEGATIVE EU/DL
WBC # BLD AUTO: 6.93 K/UL (ref 3.9–12.7)
WBC #/AREA URNS HPF: 4 /HPF (ref 0–5)

## 2025-02-01 PROCEDURE — 96365 THER/PROPH/DIAG IV INF INIT: CPT | Mod: HCNC

## 2025-02-01 PROCEDURE — 84484 ASSAY OF TROPONIN QUANT: CPT | Mod: 91,HCNC | Performed by: EMERGENCY MEDICINE

## 2025-02-01 PROCEDURE — 93010 ELECTROCARDIOGRAM REPORT: CPT | Mod: HCNC,,, | Performed by: INTERNAL MEDICINE

## 2025-02-01 PROCEDURE — 81000 URINALYSIS NONAUTO W/SCOPE: CPT | Mod: HCNC | Performed by: EMERGENCY MEDICINE

## 2025-02-01 PROCEDURE — 87040 BLOOD CULTURE FOR BACTERIA: CPT | Mod: HCNC | Performed by: EMERGENCY MEDICINE

## 2025-02-01 PROCEDURE — 99285 EMERGENCY DEPT VISIT HI MDM: CPT | Mod: 25,HCNC

## 2025-02-01 PROCEDURE — 85730 THROMBOPLASTIN TIME PARTIAL: CPT | Mod: HCNC | Performed by: EMERGENCY MEDICINE

## 2025-02-01 PROCEDURE — 25000003 PHARM REV CODE 250: Mod: HCNC | Performed by: EMERGENCY MEDICINE

## 2025-02-01 PROCEDURE — 86803 HEPATITIS C AB TEST: CPT | Mod: HCNC | Performed by: EMERGENCY MEDICINE

## 2025-02-01 PROCEDURE — 83880 ASSAY OF NATRIURETIC PEPTIDE: CPT | Mod: HCNC | Performed by: EMERGENCY MEDICINE

## 2025-02-01 PROCEDURE — 93005 ELECTROCARDIOGRAM TRACING: CPT | Mod: HCNC

## 2025-02-01 PROCEDURE — 87150 DNA/RNA AMPLIFIED PROBE: CPT | Mod: 59,HCNC | Performed by: EMERGENCY MEDICINE

## 2025-02-01 PROCEDURE — 83605 ASSAY OF LACTIC ACID: CPT | Mod: HCNC | Performed by: EMERGENCY MEDICINE

## 2025-02-01 PROCEDURE — 25500020 PHARM REV CODE 255: Mod: HCNC | Performed by: EMERGENCY MEDICINE

## 2025-02-01 PROCEDURE — 63600175 PHARM REV CODE 636 W HCPCS: Mod: HCNC | Performed by: EMERGENCY MEDICINE

## 2025-02-01 PROCEDURE — 85379 FIBRIN DEGRADATION QUANT: CPT | Mod: HCNC | Performed by: EMERGENCY MEDICINE

## 2025-02-01 PROCEDURE — 87389 HIV-1 AG W/HIV-1&-2 AB AG IA: CPT | Mod: HCNC | Performed by: EMERGENCY MEDICINE

## 2025-02-01 PROCEDURE — 85025 COMPLETE CBC W/AUTO DIFF WBC: CPT | Mod: HCNC | Performed by: EMERGENCY MEDICINE

## 2025-02-01 PROCEDURE — 80053 COMPREHEN METABOLIC PANEL: CPT | Mod: HCNC | Performed by: EMERGENCY MEDICINE

## 2025-02-01 PROCEDURE — 85610 PROTHROMBIN TIME: CPT | Mod: HCNC | Performed by: EMERGENCY MEDICINE

## 2025-02-01 RX ORDER — CLINDAMYCIN PHOSPHATE 900 MG/50ML
900 INJECTION, SOLUTION INTRAVENOUS
Status: COMPLETED | OUTPATIENT
Start: 2025-02-01 | End: 2025-02-01

## 2025-02-01 RX ORDER — CLINDAMYCIN HYDROCHLORIDE 300 MG/1
300 CAPSULE ORAL EVERY 8 HOURS
Qty: 30 CAPSULE | Refills: 0 | Status: SHIPPED | OUTPATIENT
Start: 2025-02-01 | End: 2025-02-11

## 2025-02-01 RX ORDER — ALBUTEROL SULFATE 90 UG/1
2 INHALANT RESPIRATORY (INHALATION) EVERY 6 HOURS PRN
Qty: 18 G | Refills: 0 | Status: SHIPPED | OUTPATIENT
Start: 2025-02-01

## 2025-02-01 RX ORDER — MUPIROCIN 20 MG/G
OINTMENT TOPICAL
Status: COMPLETED | OUTPATIENT
Start: 2025-02-01 | End: 2025-02-01

## 2025-02-01 RX ORDER — MUPIROCIN 20 MG/G
OINTMENT TOPICAL 2 TIMES DAILY
Qty: 22 G | Refills: 0 | Status: SHIPPED | OUTPATIENT
Start: 2025-02-01

## 2025-02-01 RX ADMIN — MUPIROCIN: 20 OINTMENT TOPICAL at 03:02

## 2025-02-01 RX ADMIN — IOHEXOL 100 ML: 350 INJECTION, SOLUTION INTRAVENOUS at 02:02

## 2025-02-01 RX ADMIN — TACROLIMUS 900 MG: 0.5 CAPSULE ORAL at 03:02

## 2025-02-01 NOTE — ED PROVIDER NOTES
"Emergency Medicine Provider Note - 2/1/2025       SCRIBE NOTE: I, Katharine Weinberg, am scribing for, and in the presence of, Hannah Antoine DO.     History     Chief Complaint   Patient presents with    Leg Pain     Right lower leg pain and swelling, exertional shortness of breath (COPD). Denies trauma.       Allergies:  Review of patient's allergies indicates:   Allergen Reactions    Bee sting  [allergen ext-venom-honey bee] Swelling    Penicillins Rash     "Felt like I had pins and needles in my feet" , hospitalized overnight    Poison ivy extract Hives        History of Present Illness   HPI    2/1/2025, 1:26 PM  The history is provided by the patient, old chart and son at bedside    Chao Hawk is a 89 y.o. male presenting to the ED for right leg swelling with a wound present on right leg that onset about one week ago.  PMhx:  Pulmonary artery hypertension, Abdominal aortic aneursym (3.8 cm), Chronic COPD (Not on oxygen, noncompliant with Trelegy), Essential hypertension, and hypercholesterolemia.  Pt reports having pain in the area of swelling. He denies any injury to the area. He denies fever, chills. chest pain, chest pressure, worsening SOB, orthopnea, nausea, vomiting, diarrhea, dysuria  Pt has a history of chronic SOB.  He "states that he just puts up with it." It is not currently worse than baseline.     Per son, patient has had a frequent, unproductive cough.       Arrival mode: Private Vehicle     PCP: Amari Ureña MD     Past Medical History:  Past Medical History:   Diagnosis Date    Anemia     Arthritis     back, hand    Back pain     Dr. Cristobal- [facility]    Bilateral leg edema 02/16/2018    Chronic bronchitis     COPD (chronic obstructive pulmonary disease)     Diverticulitis of large intestine with perforation without abscess or bleeding     Diverticulosis 05/16/2006    colonoscopy    Hearing loss, bilateral     Hernia     x2    Hyperlipidemia     Hypertension     Lumbar radiculopathy " 06/14/2017    Multiple renal cysts 07/05/2016    Nocturnal hypoxemia     Polyneuropathy     PVD (peripheral vascular disease) 11/08/2024    PVD (peripheral vascular disease) 11/8/2024    Tobacco dependence     Trouble in sleeping        Past Surgical History:  Past Surgical History:   Procedure Laterality Date    CYSTOSCOPY WITH INSERTION OF MINIMALLY INVASIVE IMPLANT TO ENLARGE PROSTATIC URETHRA N/A 9/16/2024    Procedure: CYSTOSCOPY, WITH INSERTION OF UROLIFT IMPLANT;  Surgeon: Tone Pineda MD;  Location: [facility] OR;  Service: Urology;  Laterality: N/A;    ESOPHAGOGASTRODUODENOSCOPY N/A 10/26/2022    Procedure: EGD (ESOPHAGOGASTRODUODENOSCOPY);  Surgeon: Mallika Duran MD;  Location: [facility];  Service: Endoscopy;  Laterality: N/A;    FINGER SURGERY Left 1980s    index- Dr. Encarnacion; to remove nodule    HERNIA REPAIR  1990s    x2    INTRAOCULAR LENS INSERTION Bilateral 2016    LUMBAR SPINE SURGERY  09/13/2017    REVERSE TOTAL SHOULDER ARTHROPLASTY Right 10/5/2020    Procedure: ARTHROPLASTY, SHOULDER, TOTAL, REVERSE;  Surgeon: Reji Martin MD;  Location: [facility] OR;  Service: Orthopedics;  Laterality: Right;    TONSILLECTOMY, ADENOIDECTOMY  1940    TRANSURETHRAL RESECTION OF PROSTATE N/A 9/16/2024    Procedure: TURP (TRANSURETHRAL RESECTION OF PROSTATE);  Surgeon: Tone Pineda MD;  Location: [facility];  Service: Urology;  Laterality: N/A;         Family History:  Family History   Problem Relation Name Age of Onset    Emphysema Mother      Heart disease Mother      COPD Mother      Appendicitis Father      Heart disease Son      Atrial fibrillation Sister      Lung disease Sister  91    Colon cancer Neg Hx      Cancer Neg Hx      Stroke Neg Hx         Social History:  Social History     Tobacco Use    Smoking status: Former     Current packs/day: 0.00     Average packs/day: 1 pack/day for 42.0 years (42.0 ttl pk-yrs)     Types: Cigarettes     Start date: 1/1/1949     Quit date:  1991     Years since quittin.1    Smokeless tobacco: Former   Substance and Sexual Activity    Alcohol use: Yes     Comment: 1 beer every 4 months    Drug use: No    Sexual activity: Not Currently        Review of Systems   Review of Systems   Constitutional:  Negative for fever.   HENT:  Negative for sore throat.    Respiratory:  Positive for cough and shortness of breath.    Cardiovascular:  Positive for leg swelling (right). Negative for chest pain.   Gastrointestinal:  Negative for abdominal pain and nausea.   Genitourinary:  Negative for dysuria.   Musculoskeletal:  Negative for back pain.   Skin:  Positive for wound (right leg). Negative for rash.   Neurological:  Negative for weakness.   Hematological:  Does not bruise/bleed easily.   All other systems reviewed and are negative.       Physical Exam     Initial Vitals [25 1241]   BP Pulse Resp Temp SpO2   (!) 192/89 (!) 128 (!) 28 98.3 °F (36.8 °C) (!) 90 %      MAP       --          Physical Exam    Nursing Notes and Vital Signs Reviewed.  Constitutional: Patient is in no acute distress. Well-developed and well-nourished. Appears stated age.  Head: Atraumatic. Normocephalic.  Eyes: PERRL. EOM intact. Conjunctivae are not pale. No scleral icterus.  Cardiovascular: No murmurs, rubs, or gallops. Distal pulses are 2+ and symmetric. Cap refill 3 seconds.   Pulmonary/Chest: Decreased breath sounds.   (+) Wheezing.  No rales.   Abdominal: Soft and non-distended.  There is no tenderness.  No rebound, guarding, or rigidity. Good bowel sounds.  Musculoskeletal: Moves all extremities. No obvious deformities. No calf tenderness.   Right legs: (See photo) Warmth and erythema to lower tib/fib.  Not involving the foot. Skin tear present.   Toes pink, no cyanosis.  Capillary refill less than 3 seconds. Pigmentary changes to the lower extremity.   Skin: Warm and dry.  Neurological:  Alert, awake, and appropriate.  Normal speech.  No acute focal neurological  deficits are appreciated. Patient can wiggle toes.   Psychiatric: Normal affect. Good eye contact. Appropriate in content.             ED Course   ED Procedures:  Procedures    ED Vital Signs:  Vitals:    02/01/25 1241 02/01/25 1244 02/01/25 1443 02/01/25 1500   BP: (!) 192/89  (!) 176/81 (!) 172/75   Pulse: (!) 128 (!) 128 85 88   Resp: (!) 28  12 (!) 23   Temp: 98.3 °F (36.8 °C)      TempSrc: Oral      SpO2: (!) 90%  96% 95%   Weight: 74.6 kg (164 lb 6.4 oz)       02/01/25 1532 02/01/25 1600 02/01/25 1630 02/01/25 1700   BP: (!) 180/86 (!) 158/72 (!) 154/66 (!) 160/70   Pulse: 105 90 94 83   Resp: 20 16 20 15   Temp:       TempSrc:       SpO2: 95% 96% 95% 96%   Weight:        02/01/25 1753   BP: 121/64   Pulse: 91   Resp: 19   Temp: 97.6 °F (36.4 °C)   TempSrc: Oral   SpO2: 95%   Weight:        Abnormal Lab Results:  Labs Reviewed   CBC W/ AUTO DIFFERENTIAL - Abnormal       Result Value    WBC 6.93      RBC 4.43 (*)     Hemoglobin 13.0 (*)     Hematocrit 40.5      MCV 91      MCH 29.3      MCHC 32.1      RDW 14.9 (*)     Platelets 182      MPV 10.8      Immature Granulocytes 0.6 (*)     Gran # (ANC) 5.3      Immature Grans (Abs) 0.04      Lymph # 1.0      Mono # 0.6      Eos # 0.1      Baso # 0.02      nRBC 0      Gran % 76.2 (*)     Lymph % 13.7 (*)     Mono % 7.9      Eosinophil % 1.3      Basophil % 0.3      Differential Method Automated     COMPREHENSIVE METABOLIC PANEL - Abnormal    Sodium 144      Potassium 3.9      Chloride 110      CO2 24      Glucose 134 (*)     BUN 17      Creatinine 0.9      Calcium 9.1      Total Protein 6.7      Albumin 3.5      Total Bilirubin 0.3      Alkaline Phosphatase 88      AST 15      ALT 12      eGFR >60      Anion Gap 10     URINALYSIS, REFLEX TO URINE CULTURE - Abnormal    Specimen UA Urine, Clean Catch      Color, UA Yellow      Appearance, UA Clear      pH, UA 7.0      Specific Gravity, UA >1.030 (*)     Protein, UA 1+ (*)     Glucose, UA Negative      Ketones, UA  Negative      Bilirubin (UA) Negative      Occult Blood UA Negative      Nitrite, UA Negative      Urobilinogen, UA Negative      Leukocytes, UA Negative      Narrative:     Specimen Source->Urine   D DIMER, QUANTITATIVE - Abnormal    D-Dimer 4.39 (*)    TROPONIN I - Abnormal    Troponin I 0.032 (*)    URINALYSIS MICROSCOPIC - Abnormal    RBC, UA 5 (*)     WBC, UA 4      Bacteria None      Hyaline Casts, UA 0      Microscopic Comment SEE COMMENT      Narrative:     Specimen Source->Urine   TROPONIN I - Abnormal    Troponin I 0.032 (*)    CULTURE, BLOOD   CULTURE, BLOOD   HEPATITIS C ANTIBODY    Hepatitis C Ab Negative      Narrative:     Release to patient->Immediate   HEP C VIRUS HOLD SPECIMEN    HEP C Virus Hold Specimen Hold for HCV sendout      Narrative:     Release to patient->Immediate   HIV 1 / 2 ANTIBODY    HIV 1/2 Ag/Ab Negative      Narrative:     Release to patient->Immediate   LACTIC ACID, PLASMA    Lactate (Lactic Acid) 1.2     PROTIME-INR    Prothrombin Time 10.9      INR 0.9     APTT    aPTT 26.3     B-TYPE NATRIURETIC PEPTIDE    BNP 57          All Lab Results:  Results for orders placed or performed during the hospital encounter of 02/01/25   EKG 12-lead    Collection Time: 02/01/25 12:40 PM   Result Value Ref Range    QRS Duration 92 ms    OHS QTC Calculation 435 ms   CBC auto differential    Collection Time: 02/01/25  1:39 PM   Result Value Ref Range    WBC 6.93 3.90 - 12.70 K/uL    RBC 4.43 (L) 4.60 - 6.20 M/uL    Hemoglobin 13.0 (L) 14.0 - 18.0 g/dL    Hematocrit 40.5 40.0 - 54.0 %    MCV 91 82 - 98 fL    MCH 29.3 27.0 - 31.0 pg    MCHC 32.1 32.0 - 36.0 g/dL    RDW 14.9 (H) 11.5 - 14.5 %    Platelets 182 150 - 450 K/uL    MPV 10.8 9.2 - 12.9 fL    Immature Granulocytes 0.6 (H) 0.0 - 0.5 %    Gran # (ANC) 5.3 1.8 - 7.7 K/uL    Immature Grans (Abs) 0.04 0.00 - 0.04 K/uL    Lymph # 1.0 1.0 - 4.8 K/uL    Mono # 0.6 0.3 - 1.0 K/uL    Eos # 0.1 0.0 - 0.5 K/uL    Baso # 0.02 0.00 - 0.20 K/uL    nRBC 0  0 /100 WBC    Gran % 76.2 (H) 38.0 - 73.0 %    Lymph % 13.7 (L) 18.0 - 48.0 %    Mono % 7.9 4.0 - 15.0 %    Eosinophil % 1.3 0.0 - 8.0 %    Basophil % 0.3 0.0 - 1.9 %    Differential Method Automated    Comprehensive metabolic panel    Collection Time: 02/01/25  1:39 PM   Result Value Ref Range    Sodium 144 136 - 145 mmol/L    Potassium 3.9 3.5 - 5.1 mmol/L    Chloride 110 95 - 110 mmol/L    CO2 24 23 - 29 mmol/L    Glucose 134 (H) 70 - 110 mg/dL    BUN 17 10 - 30 mg/dL    Creatinine 0.9 0.5 - 1.4 mg/dL    Calcium 9.1 8.7 - 10.5 mg/dL    Total Protein 6.7 6.0 - 8.4 g/dL    Albumin 3.5 3.5 - 5.2 g/dL    Total Bilirubin 0.3 0.1 - 1.0 mg/dL    Alkaline Phosphatase 88 40 - 150 U/L    AST 15 10 - 40 U/L    ALT 12 10 - 44 U/L    eGFR >60 >60 mL/min/1.73 m^2    Anion Gap 10 8 - 16 mmol/L   Lactic acid, plasma #1    Collection Time: 02/01/25  1:39 PM   Result Value Ref Range    Lactate (Lactic Acid) 1.2 0.5 - 2.2 mmol/L   D-Dimer, Quantitative    Collection Time: 02/01/25  1:39 PM   Result Value Ref Range    D-Dimer 4.39 (H) <0.50 mg/L FEU   Protime-INR    Collection Time: 02/01/25  1:39 PM   Result Value Ref Range    Prothrombin Time 10.9 9.0 - 12.5 sec    INR 0.9 0.8 - 1.2   APTT    Collection Time: 02/01/25  1:39 PM   Result Value Ref Range    aPTT 26.3 21.0 - 32.0 sec   Brain natriuretic peptide    Collection Time: 02/01/25  1:39 PM   Result Value Ref Range    BNP 57 0 - 99 pg/mL   Troponin I    Collection Time: 02/01/25  1:39 PM   Result Value Ref Range    Troponin I 0.032 (H) 0.000 - 0.026 ng/mL   Hepatitis C Antibody    Collection Time: 02/01/25  2:04 PM   Result Value Ref Range    Hepatitis C Ab Negative Negative   HCV Virus Hold Specimen    Collection Time: 02/01/25  2:04 PM   Result Value Ref Range    HEP C Virus Hold Specimen Hold for HCV sendout    HIV 1/2 Ag/Ab (4th Gen)    Collection Time: 02/01/25  2:04 PM   Result Value Ref Range    HIV 1/2 Ag/Ab Negative Negative   Urinalysis, Reflex to Urine Culture  Urine, Clean Catch    Collection Time: 02/01/25  3:43 PM    Specimen: Urine   Result Value Ref Range    Specimen UA Urine, Clean Catch     Color, UA Yellow Yellow, Straw, Jodie    Appearance, UA Clear Clear    pH, UA 7.0 5.0 - 8.0    Specific Gravity, UA >1.030 (A) 1.005 - 1.030    Protein, UA 1+ (A) Negative    Glucose, UA Negative Negative    Ketones, UA Negative Negative    Bilirubin (UA) Negative Negative    Occult Blood UA Negative Negative    Nitrite, UA Negative Negative    Urobilinogen, UA Negative <2.0 EU/dL    Leukocytes, UA Negative Negative   Urinalysis Microscopic    Collection Time: 02/01/25  3:43 PM   Result Value Ref Range    RBC, UA 5 (H) 0 - 4 /hpf    WBC, UA 4 0 - 5 /hpf    Bacteria None None-Occ /hpf    Hyaline Casts, UA 0 0-1/lpf /lpf    Microscopic Comment SEE COMMENT    Troponin I    Collection Time: 02/01/25  4:30 PM   Result Value Ref Range    Troponin I 0.032 (H) 0.000 - 0.026 ng/mL           The EKG was ordered, reviewed, and independently interpreted by the ED provider:  Rate: 110 BPM  Rhythm: sinus tachycardia  Interpretation: Left axis deviation. Septal infarct (cited on or before 21-Aug-2017). No STEMI.      ECG Results              EKG 12-lead (Final result)        Collection Time Result Time QRS Duration [facility]HS QTC Calculation    02/01/25 12:40:07 02/01/25 17:14:04 92 435                     Wet Read by Hannah Antoine DO (02/01/25 17:14:17, [facility] - Emergency Dept., Emergency Medicine)    Sinus tachycardia.  Rate of 110.  Left axis deviation.  No ST segment elevation.  No STEMI.  Normal QT                      Final result by Interface, Lab In [facility] (02/01/25 16:56:08)                   Narrative:    Test Reason : R06.02,    Vent. Rate : 110 BPM     Atrial Rate : 110 BPM     P-R Int : 148 ms          QRS Dur :  92 ms      QT Int : 322 ms       P-R-T Axes :  81 -52  84 degrees    QTcB Int : 435 ms    Sinus tachycardia  Left axis deviation  Cannot exclude Septal infarct (cited on  or before 21-Aug-2017)  When compared with ECG of 13-Jun-2024 09:28,  Questionable change in initial forces of Anterior-septal leads  Confirmed by Fito Deutsch ([id number]) on 2/1/2025 4:55:56 PM    Referred By: AAAREFERRAL SELF           Confirmed By: Fito Deutsch                                    Imaging Results:  Imaging Results              CTA Chest Non-Coronary (PE Studies) (Final result)  Result time 02/01/25 15:10:37      Final result by Johanna Ryan MD (02/01/25 15:10:37)                   Impression:      1.    No evidence of central pulmonary thromboembolism.  2.    Small amount of debris seen within the trachea may suggest aspiration.  3.    Emphysematous changes.  4.    Moderate to heavy coronary artery calcifications.            Finalized on: 2/1/2025 3:10 PM By:  Johanna Ryan MD  [facility]# [id number]      2025-02-01 15:12:38.835     [facility]               Narrative:    PROCEDURE: CTA CHEST NON CORONARY (PE STUDIES)    INDICATION: Pulmonary embolism suspected    TECHNIQUE: CT images were obtained from the thoracic inlet to the upper abdomen following the administration of intravenous contrast. The examination was performed as a CTA study for evaluation of pulmonary embolus.  Sagittal, coronal, and MIP images were also performed.    This CT utilized automated exposure control and/or adjustment of the mA according to patient size and/or iterative reconstruction technique(s).    COMPARISON:No prior studies available for direct comparison.    FINDINGS:  No filling defects are seen within the pulmonary vascular bed due to indicate pulmonary embolus. The pulmonary artery is normal in caliber.    The central airways are patent. There is no pneumothorax.  Advanced emphysematous changes of the lungs with scarring at the bases bilaterally, at the right middle lobe and lingula.  There is a 1.8 cm calcified granuloma at the anterior right middle lobe.  The lungs are without airspace consolidation, nodularity, or mass.  There is  no pleural effusion.  Small amount of debris within the trachea may suggest aspiration.    The heart is normal in size.  No evidence of right heart strain.  There is no pericardial effusion.  Moderate to heavy coronary artery calcifications.  Moderate scattered calcifications of the aorta and its major side branches.    There is no lymphadenopathy.  The thyroid [has a normal CT appearance.]    Images of the upper abdomen appear normal.    No aggressive appearing lytic/blastic lesions.                                           US Lower Extremity Veins Right (Final result)  Result time 02/01/25 14:32:49      Final result by David Russell MD (02/01/25 14:32:49)                   Impression:      No evidence of deep venous thrombosis in the right lower extremity.      Electronically signed by: David Russell MD  Date:    02/01/2025  Time:    14:32               Narrative:    EXAMINATION:  US LOWER EXTREMITY VEINS RIGHT    CLINICAL HISTORY:  Other specified soft tissue disorders    TECHNIQUE:  Duplex and color flow Doppler evaluation and graded compression of the right lower extremity veins was performed.    COMPARISON:  None    FINDINGS:  Right thigh veins: The common femoral, femoral, popliteal, upper greater saphenous, and deep femoral veins are patent and free of thrombus. The veins are normally compressible and have normal phasic flow and augmentation response.    Right calf veins: The visualized calf veins are patent.    Contralateral CFV: The contralateral (left) common femoral vein is patent and free of thrombus.    Miscellaneous: None                                       X-Ray Tibia Fibula 2 View Right (Final result)  Result time 02/01/25 13:53:41      Final result by Tonya LopezMikel), MD (02/01/25 13:53:41)                   Impression:     Unremarkable study.    Finalized on: 2/1/2025 1:53 PM By:  Jose Lopez MD  [facility]# [id number]      2025-02-01 13:55:44.557     [facility]               Narrative:    EXAM:  XR TIBIA FIBULA 2 VIEW RIGHT    CLINICAL HISTORY: Leg pain    FINDINGS:  2 views.  No fractures or dislocations.                                         X-Ray Chest AP Portable (Final result)  Result time 02/01/25 13:50:49      Final result by Tonya LopezMikel), MD (02/01/25 13:50:49)                   Impression:     No infiltrates.    Finalized on: 2/1/2025 1:50 PM By:  Jose Lopez MD  [facility]# [id number]      2025-02-01 13:52:54.423     [facility]               Narrative:    EXAM: XR CHEST AP PORTABLE    CLINICAL HISTORY: Sepsis    FINDINGS:  Comparison chest 09/30/2024.    Atherosclerosis of thoracic aorta.    Normal size heart.    Right lower lobe granuloma.    No infiltrates.                                              The Emergency Provider reviewed the vital signs and test results, which are outlined above.     ED Discussion   ED Medication(s):  Medications   clindamycin in D5W 900 mg/50 mL IVPB 900 mg (0 mg Intravenous Stopped 2/1/25 1532)   mupirocin 2 % ointment ( Topical (Top) Given 2/1/25 1500)   iohexoL (OMNIPAQUE 350) injection 100 mL (100 mLs Intravenous Given 2/1/25 1436)       ED Course as of 02/01/25 1804   Sat Feb 01, 2025   1438 D-Dimer(!): 4.39 [LB]   1621 Again confirm patient has not had any chest pain, chest pressure, or shortness of breath.  Look at old labs show elevated troponin of 0.00  6 years ago. [LB]      ED Course User Index  [LB] Hannah Antoine,        6:04 PM: Dr. Antoine reassessed the pt.  The pt is resting comfortably and is NAD.  Pt states their sx have improved. Discussed test results, shared treatment plan, specific conditions for return, and the need for f/u.  Answered their questions at this time.  Pt understands and agrees to the plan.  The pt has remained hemodynamically stable through ED course and is stable for discharge.    I discussed with patient and/or family/caretaker that evaluation in the ED does not suggest any emergent or life threatening medical  conditions requiring immediate intervention beyond what was provided in the ED, and I believe patient is safe for discharge.  Regardless, an unremarkable evaluation in the ED does not preclude the development or presence of a serious of life threatening condition. As such, patient was instructed to return immediately for any worsening or change in current symptoms.     MIPS Measures     Smoker? No     Hypertension: History of Hypertension: The patient has elevated blood pressure (higher than 120/80) while being treated in the ED but has a history of hypertension.       Medical Decision Making                 Medical Decision Making  Differential Diagnosis:  Cellulitis, DVT, peripheral vascular disease, pneumonia, exacerbation COPD, congestive heart failure, Fracture.     ED course:  Patient placed on monitor.  EKG: No STEMI.  Initial presenting room-air sat 90%.  Respiratory rate 28.  Pulse 128.  Patient given DuoNeb x1. (Unable to place order in EPIC under last entry)  Serial troponin 0.032, repeat 3 hour 0.032.  Troponins flat.  Note patient's previous troponin was on February 8, 2018 = 0.030.  No acute rise in cardiac enzymes.  Patient clinically is not complaining of any chest pain or chest pressure.  WBC 6.93.  Mild anemia with a hemoglobin /hematocrit of 13 0.0/40.5.   Baseline between 11.8-13/37.8-42.6. 17.  Creatinine 0.8.  Lactic 1.2.  D-dimer 4.39.  BNP 57.CTA  chest:  No PE>   Small amount of debris within the trachea ? Aspiration, Emphysematous changes, moderate to heavy CAD.  This was discussed with patient and son.  US lower extremity right:  No DVT. Tib/fib:   No fracture.    Likely dx:  cellulitis.  Wound cleaned.  Treated with bacitracin.  RE-evaluated lungs:   Improved air movement.     Room air 95%.   RR  19, Heart rate 91.   /64. Patient appears stable for discharge.    Return precautions given.  Rx: albuterol, Clindamycin, and bactroban ointment.     Amount and/or Complexity of Data  Reviewed  Independent Historian: caregiver     Details: Son at bedside.  External Data Reviewed: labs and notes.     Details: See HPI.   See ED course.  Labs: ordered. Decision-making details documented in ED Course.  Radiology: ordered. Decision-making details documented in ED Course.  ECG/medicine tests: ordered and independent interpretation performed. Decision-making details documented in ED Course.    Risk  Prescription drug management.  Risk Details: Discharge Medication List as of 2/1/2025  5:14 PM    START taking these medications    albuterol (VENTOLIN HFA) 90 mcg/actuation inhaler  Inhale 2 puffs into the lungs every 6 (six) hours as needed for Wheezing. Rescue, Starting Sat 2/1/2025, Normal    clindamycin (CLEOCIN) 300 MG capsule  Take 1 capsule (300 mg total) by mouth every 8 (eight) hours. for 10 days, Starting Sat 2/1/2025, Until Tue 2/11/2025, Normal    mupirocin (BACTROBAN) 2 % ointment  Apply topically 2 (two) times daily. To skin abrasion on right ankle, Starting Sat 2/1/2025, Normal                Coding    Prescription Management: I performed a review of the patient's current Rx medication list as input by nursing staff.    Patient's Medications   New Prescriptions    ALBUTEROL (VENTOLIN HFA) 90 MCG/ACTUATION INHALER    Inhale 2 puffs into the lungs every 6 (six) hours as needed for Wheezing. Rescue    CLINDAMYCIN (CLEOCIN) 300 MG CAPSULE    Take 1 capsule (300 mg total) by mouth every 8 (eight) hours. for 10 days    MUPIROCIN (BACTROBAN) 2 % OINTMENT    Apply topically 2 (two) times daily. To skin abrasion on right ankle   Previous Medications    ACETAMINOPHEN (TYLENOL) 500 MG TABLET    Take 500 mg by mouth as needed for Pain.    ALBUTEROL (PROVENTIL) 2.5 MG /3 ML (0.083 %) NEBULIZER SOLUTION    Take 3 mLs (2.5 mg total) by nebulization every 4 (four) hours as needed for Wheezing. Rescue    ARGININE, L-ARGININE, ORAL    Take 500 mg by mouth once daily.    ATORVASTATIN (LIPITOR) 40 MG TABLET     "TAKE 1 TABLET EVERY DAY    BETAMETHASONE VALERATE 0.1% (VALISONE) 0.1 % OINT    Apply topically 2 (two) times daily.    CANNABIDIOL, CBD, EXTRACT ORAL    Take 3 drops by mouth every morning.    CHOLECALCIFEROL, VITAMIN D3, (VITAMIN D3) 50 MCG (2,000 UNIT) CAP    Take 1 capsule by mouth every evening.    COQ10, UBIQUINOL, ORAL    Take 1 tablet by mouth nightly.     CYANOCOBALAMIN (VITAMIN B-12) 1000 MCG TABLET    Take 100 mcg by mouth once daily.    FLUTICASONE-UMECLIDIN-VILANTER (TRELEGY ELLIPTA) 200-62.5-25 MCG INHALER    Inhale 1 puff into the lungs once daily.    FUROSEMIDE (LASIX) 20 MG TABLET    Take 1 tablet (20 mg total) by mouth once daily.    GABAPENTIN (NEURONTIN) 100 MG CAPSULE    TAKE ONE CAPSULE BY MOUTH TWICE DAILY    GLUCOSAM-MSM-CHONDROIT-VIT D3 ORAL    Take 2 tablets by mouth once daily.    KRILL/OM-3/DHA/EPA/PHOSPHO/AST (MEGARED OMEGA-3 KRILL OIL ORAL)    Take 1 capsule by mouth nightly.     LISINOPRIL 10 MG TABLET    TAKE 1 TABLET EVERY DAY    METRONIDAZOLE (METROGEL) 0.75 % GEL    Apply topically 2 (two) times daily.    MULTIVIT-MIN/FOLIC/VIT K/LYCOP (ONE-A-DAY MEN'S MULTIVITAMIN ORAL)    Take 1 tablet by mouth every evening.     POTASSIUM 99 MG TAB    Take by mouth once daily.    TAMSULOSIN (FLOMAX) 0.4 MG CAP    TAKE 2 CAPSULES BY MOUTH EVERY MORNING    VIBEGRON (GEMTESA) 75 MG TAB    Take 1 tablet (75 mg total) by mouth once daily.   Modified Medications    No medications on file   Discontinued Medications    No medications on file        Portions of this note may have been created with voice recognition software. Occasional "wrong-word" or "sound-a-like" substitutions may have occurred due to the inherent limitations of voice recognition software. Please, read the note carefully and recognize, using context, where substitutions have occurred.          Clinical Impression       ICD-10-CM ICD-9-CM   1. Chronic obstructive pulmonary disease, unspecified COPD type  J44.9 496   2. Shortness of " breath  R06.02 786.05   3. Screening for cardiovascular condition  Z13.6 V81.2   4. Swelling of lower leg  M79.89 729.81   5. Leg swelling  M79.89 729.81   6. Cellulitis of right leg  L03.115 682.6         ED Disposition     Disposition: Discharge to home  Patient condition: Stable    ED Follow-up   Follow-up Information       Amari Ureña MD In 2 days.    Specialties: Internal Medicine, Pediatrics  Why: Return to emergency department for fever, chills, nausea, vomiting, worsening swelling, chest pain, chest pressure, difficulty breathing  Contact information:  [address]  [place] [address]  [phone]                               Scribe Attestation:   Scribe #1: I performed the above scribed service and the documentation accurately describes the services I performed. I attest to the accuracy of the note.     Attending:   Physician Attestation Statement for Scribe #1: I, Hannah Antoine DO, personally performed the services described in this documentation, as scribed by Katharine Weinberg, in my presence, and it is both accurate and complete.                 Hannah Antoine DO  02/02/25 8311

## 2025-02-03 ENCOUNTER — OUTPATIENT CASE MANAGEMENT (OUTPATIENT)
Dept: ADMINISTRATIVE | Facility: OTHER | Age: OVER 89
End: 2025-02-03
Payer: MEDICARE

## 2025-02-03 LAB
MRSA ID BY PCR: NEGATIVE
STAPH AUREUS ID BY PCR: NEGATIVE

## 2025-02-03 NOTE — LETTER
Chao Hawk  [facility] [facility] [address]      Dear Chao Hawk,     I work with [facility] Outpatient Care Management Department. We received a referral to call you to discuss your medical history. These services are free of charge and are offered to [facility] patients who have recently been discharged from any of our facilities or who have medical conditions that may require the skill of a nurse to assist with management.     I am a Registered Nurse who specializes in connecting patients with available medical and financial resources as well as addressing any educational needs that may be indicated.    I attempted to reach you by telephone, but I was unsuccessful. Please call our department so that we can go over some questions with you, regarding your health.    The Outpatient Care Management Department can be reached at [phone], from 8:00AM to 4:30 PM, on Monday thru Friday.     Additionally, [facility] also has a program where a nurse is available 24/7 to answer questions or provide medical advice, their number is [phone].      Thanks,        Shanell Reyna RN  Outpatient Care Management  Phone #: [phone]

## 2025-02-03 NOTE — PROGRESS NOTES
2/3/2025  1st attempt to complete Initial Assessment  for Outpatient Care Management, left message.  Will send via portal -  unable to assess letter.

## 2025-02-04 ENCOUNTER — OUTPATIENT CASE MANAGEMENT (OUTPATIENT)
Dept: ADMINISTRATIVE | Facility: OTHER | Age: OVER 89
End: 2025-02-04
Payer: MEDICARE

## 2025-02-04 NOTE — PROGRESS NOTES
Outpatient Care Management  Patient Does Not Consent    Patient: Chao Hawk  MRN:  [id number]  Date of Service:  2/4/2025  Completed by:  Shanell Reyna RN    Chief Complaint   Patient presents with    OPCM Enrollment Call    Case Closure     Declined. States he has no needs and is doing good.       Patient Summary           Consent Received:  Decline  Decline Reason:  Not interested

## 2025-02-05 LAB
BACTERIA BLD CULT: ABNORMAL

## 2025-02-05 RX ORDER — TAMSULOSIN HYDROCHLORIDE 0.4 MG/1
2 CAPSULE ORAL EVERY MORNING
Qty: 180 CAPSULE | Refills: 3 | Status: SHIPPED | OUTPATIENT
Start: 2025-02-05

## 2025-02-05 NOTE — TELEPHONE ENCOUNTER
No care due was identified.  [facility] Embedded Care Due Messages. Reference number: [id number].   2/05/2025 1:40:19 AM CST

## 2025-02-06 ENCOUNTER — OFFICE VISIT (OUTPATIENT)
Dept: UROLOGY | Facility: CLINIC | Age: OVER 89
End: 2025-02-06
Payer: MEDICARE

## 2025-02-06 VITALS
SYSTOLIC BLOOD PRESSURE: 151 MMHG | HEIGHT: 66 IN | BODY MASS INDEX: 26.43 KG/M2 | HEART RATE: 105 BPM | DIASTOLIC BLOOD PRESSURE: 75 MMHG | WEIGHT: 164.44 LBS

## 2025-02-06 DIAGNOSIS — N40.1 BENIGN PROSTATIC HYPERPLASIA WITH WEAK URINARY STREAM: Primary | ICD-10-CM

## 2025-02-06 DIAGNOSIS — R39.12 BENIGN PROSTATIC HYPERPLASIA WITH WEAK URINARY STREAM: Primary | ICD-10-CM

## 2025-02-06 DIAGNOSIS — N32.81 OAB (OVERACTIVE BLADDER): ICD-10-CM

## 2025-02-06 LAB — BACTERIA BLD CULT: NORMAL

## 2025-02-06 PROCEDURE — 99999 PR PBB SHADOW E&M-EST. PATIENT-LVL IV: CPT | Mod: PBBFAC,HCNC,, | Performed by: UROLOGY

## 2025-02-06 PROCEDURE — 1160F RVW MEDS BY RX/DR IN RCRD: CPT | Mod: HCNC,CPTII,S$GLB, | Performed by: UROLOGY

## 2025-02-06 PROCEDURE — 1126F AMNT PAIN NOTED NONE PRSNT: CPT | Mod: HCNC,CPTII,S$GLB, | Performed by: UROLOGY

## 2025-02-06 PROCEDURE — 1101F PT FALLS ASSESS-DOCD LE1/YR: CPT | Mod: HCNC,CPTII,S$GLB, | Performed by: UROLOGY

## 2025-02-06 PROCEDURE — 99214 OFFICE O/P EST MOD 30 MIN: CPT | Mod: HCNC,S$GLB,, | Performed by: UROLOGY

## 2025-02-06 PROCEDURE — 3288F FALL RISK ASSESSMENT DOCD: CPT | Mod: HCNC,CPTII,S$GLB, | Performed by: UROLOGY

## 2025-02-06 PROCEDURE — 1159F MED LIST DOCD IN RCRD: CPT | Mod: HCNC,CPTII,S$GLB, | Performed by: UROLOGY

## 2025-02-06 NOTE — PROGRESS NOTES
Chief Complaint: LUTS    HPI:   02/06/2025 - returns today for follow-up, has been doing really well since his procedure, did not start the Gemtesa and still having some overactive bladder but not bothersome enough that he wants a medication, no gross hematuria or dysuria, overall pleased    10/17/2024 - returns today after TUR of his median lobe and UroLift, had some bleeding intermittently for 2-3 weeks but this is cleared, notes that his stream is improved but now notes that he is having increased urgency and urge incontinence, the urge incontinence happens once every two weeks but the urgency is bothersome enough that he wants to try a medication  IPSS - 2/2/0/2/2/1/2 = 11  QOL - 3(mixed)    09/03/2024 - returns today for cystoscopy and review of his ultrasound, ultrasound shows a 79 g prostate, no new voiding issues, symptoms unchanged    08/27/2024 - patient returns today for follow-up, notes that his son recently had a UroLift and he would like to be evaluated for this as well, notes that his most bothersome symptom is weak stream and incomplete emptying, no gross hematuria or dysuria, no UTIs  IPSS - 4/3/3/2/4/4/3 = 23  QOL - 5(unhappy)    08/16/2022 - returns for follow-up, no issues with the dutasteride, voiding well, no GH or dysuria    12/29/2021 - presents for follow-up, has been taking the finasteride but it gives him a rash, denies any gross hematuria, notes incomplete emptying as well as some urgency    06/22/2021 - patient presents for follow-up, thinks this stream has improved and is now okay, had some itchiness with the finasteride and thus he cuts the 1 pill in to 2 halves and takes these during the day, denies any gross hematuria, now down to 4 cups of coffee a day, still has nocturia x2    12/21/20: Daytime frequency and low void, nocturia x2.  Drinks 8 cups coffee a day.  Reviewed history in detail.   11/18/20: Cysto today shows BPH, voiding okay after.  Bladder compliance low,  trabeculated.  11/2/20: 89 yo man has had two occasions of retention after right shoulder surgery.  Pittman placed again last night.  Been on flomax a long time.   No abd/pelvic pain and no exac/rel factors.  No hematuria.  No urolithiasis.  No urinary bother.  No  history.  Normal sexual function.    Allergies:  Bee sting  [allergen ext-venom-honey bee], Penicillins, and Poison ivy extract    Medications:  has a current medication list which includes the following prescription(s): acetaminophen, albuterol, albuterol, arginine, atorvastatin, betamethasone valerate 0.1%, cannabidiol (cbd), cholecalciferol (vitamin d3), clindamycin, ubiquinol, cyanocobalamin, trelegy ellipta, furosemide, gabapentin, glucosam/msm/chondroit/vit d3, krill/om-3/dha/epa/phospho/ast, lisinopril, metronidazole, multivit-min/folic/vit k/lycop, mupirocin, potassium, tamsulosin, and gemtesa.    Review of Systems:  General: No fever, chills  Skin: No rashes  Chest:  Denies cough and sputum production  Heart: Denies chest pain  Resp: Denies dyspnea  Abdomen: Denies diarrhea, abdominal pain, hematemesis, or blood in stool.  Musculoskeletal: No joint stiffness or swelling. Denies back pain.  : see HPI  Neuro: no dizziness or weakness      PMH:   has a past medical history of Anemia, Arthritis, Back pain, Bilateral leg edema (02/16/2018), Chronic bronchitis, COPD (chronic obstructive pulmonary disease), Diverticulitis of large intestine with perforation without abscess or bleeding, Diverticulosis (05/16/2006), Hearing loss, bilateral, Hernia, Hyperlipidemia, Hypertension, Lumbar radiculopathy (06/14/2017), Multiple renal cysts (07/05/2016), Nocturnal hypoxemia, Polyneuropathy, PVD (peripheral vascular disease) (11/08/2024), PVD (peripheral vascular disease) (11/8/2024), Tobacco dependence, and Trouble in sleeping.    PSH:   has a past surgical history that includes Finger surgery (Left, 1980s); Hernia repair (1990s); TONSILLECTOMY, ADENOIDECTOMY  (1940); Lumbar spine surgery (09/13/2017); Intraocular lens insertion (Bilateral, 2016); Reverse total shoulder arthroplasty (Right, 10/5/2020); Esophagogastroduodenoscopy (N/A, 10/26/2022); Cystoscopy with insertion of minimally invasive implant to enlarge prostatic urethra (N/A, 9/16/2024); and Transurethral resection of prostate (N/A, 9/16/2024).    FamHx: family history includes Appendicitis in his father; Atrial fibrillation in his sister; COPD in his mother; Emphysema in his mother; Heart disease in his mother and son; Lung disease (age of onset: 91) in his sister.    SocHx:  reports that he quit smoking about 34 years ago. His smoking use included cigarettes. He started smoking about 76 years ago. He has a 42 pack-year smoking history. He has quit using smokeless tobacco. He reports current alcohol use. He reports that he does not use drugs.      Physical Exam:  Vitals:    02/06/25 0820   BP: (!) 151/75   Pulse: 105       General: awake, alert, cooperative  Head: NC/AT  Ears: external ears normal  Eyes: sclera normal  Lungs: normal inspiration, NAD  Heart: well-perfused  Skin: The skin is warm and dry  Ext: No c/c/e.  Neuro: grossly intact, AOx3    Labs/Studies:   Lab Results   Component Value Date    WBC 6.93 02/01/2025    HGB 13.0 (L) 02/01/2025    HCT 40.5 02/01/2025     02/01/2025     02/01/2025    K 3.9 02/01/2025     02/01/2025    CREATININE 0.9 02/01/2025    BUN 17 02/01/2025    CO2 24 02/01/2025    TSH 2.14 05/16/2011    PSA 3.2 01/10/2020    INR 0.9 02/01/2025    GLUF 96 08/31/2012    HGBA1C 5.8 (H) 08/29/2024    CHOL 114 (L) 08/29/2024    TRIG 46 08/29/2024    HDL 50 08/29/2024    ALT 12 02/01/2025    AST 15 02/01/2025     Procedure:  Diagnostic Cystoscopy    Indications: LUTS    UA: normal, see lab results for values    Procedure in Detail: After proper consents were obtained, the patient was prepped and draped in normal sterile fashion for diagnostic cystoscopy. 5 ml of  lidocaine jelly was instilled in the urethra. The flexible cystoscope was then introduced into the urethra, and advanced into the bladder under direct vision. The urethral mucosa appeared normal, and no strictures were noted. The sphincter was normal, and the veru montanum was unremarkable. The prostatic mucosa and the lateral lobes of the prostate were unremarkable, with trilobar hypertrophy and complete visual obstruction. The bladder neck was normal. Inspection of the interior of the bladder was then carried out. The trigone was unremarkable, with no mucosal lesions. The ureteral orifices were normal in position and configuration. Systematic inspection of the mucosa of the bladder it was then carried out, rotating the cystoscope so that all areas of the left and right lateral walls, the dome of the bladder, and the posterior wall were all visualized. The cystoscope was then advanced further into the bladder, and maximum deflection of the scope was performed so that the bladder neck could be inspected. No mucosal lesions were noted there. The cystoscope was then removed, and the procedure terminated. Patient tolerated the procedure well. No complications.     Findings:  Trilobar hypertrophy and obstruction    Impression/Plan:   89 yo M with:    LUTS - s/p TUR of median lobe and urolift, doing well from a flow perspective but urgency is bothersome, start gemtesa, SEs reviewed, f/u 6 months    Prostate Cancer Screening - no more PSAs or DREs    OAB - not bothersome enough that he wants a medication, continue to monitor    Tone Pineda MD

## 2025-02-19 ENCOUNTER — TELEPHONE (OUTPATIENT)
Dept: INTERNAL MEDICINE | Facility: CLINIC | Age: OVER 89
End: 2025-02-19
Payer: MEDICARE

## 2025-02-19 NOTE — TELEPHONE ENCOUNTER
----- Message from Wei sent at 2/19/2025  9:51 AM CST -----  Contact: Saritha(Home Health Nurse  ..Type:  Patient Requesting CallWho Called:Saritha(Home Health Nurse)Does the patient know what this is regarding?:nurse states pt had a fall Sunday night 02/16/25, also would like speech therapy evaluation  for coughing /a physical therapy evaluation Would the patient rather a call back or a response via [facility]? [facility] Back Number:[phone] Information:

## 2025-02-19 NOTE — TELEPHONE ENCOUNTER
HH nurse Saritha said patient fell Saturday. She evaluated him for injuries and no apparent injuries were found. The daughter is requesting PT eval for the falls and ST eval for the coughing.

## 2025-02-20 NOTE — TELEPHONE ENCOUNTER
Spoke with Gavin at . Gave verbal order for PT and ST. Attempted to call daughter to let her know.

## 2025-03-05 ENCOUNTER — TELEPHONE (OUTPATIENT)
Dept: INTERNAL MEDICINE | Facility: CLINIC | Age: OVER 89
End: 2025-03-05
Payer: MEDICARE

## 2025-03-05 NOTE — TELEPHONE ENCOUNTER
----- Message from Dalia sent at 3/5/2025  3:16 PM CST -----  Contact: Zhane/Daughter  .Type:  Patient Returning CallWho Called:Zhane/PATIENT DaughterWho Left Message for Patient:Amarilis Alfredyanet the patient know what this is regarding?:yes Patient/Daughter returning call back with concerns to physical therapy for dad that the home health nurse recommends.missed call on 02/20/2025 Zhane also express to have dad ears check due not hearing at all in left ear also with coughing that last for hours after eatingWould the patient rather a call back or a response via [facility]? [facility] Call Back Number:[phone] Information:

## 2025-03-06 DIAGNOSIS — M47.816 LUMBAR ARTHROPATHY: ICD-10-CM

## 2025-03-06 RX ORDER — GABAPENTIN 100 MG/1
100 CAPSULE ORAL 2 TIMES DAILY
Qty: 180 CAPSULE | Refills: 3 | Status: SHIPPED | OUTPATIENT
Start: 2025-03-06

## 2025-03-06 NOTE — TELEPHONE ENCOUNTER
Contacted Saritha with . She states he needs a refill on gabapentin. She also says the patient said the gemtesa was to expensive and he will not be paying for it again.

## 2025-03-06 NOTE — TELEPHONE ENCOUNTER
----- Message from Rebecca sent at 3/6/2025  2:42 PM CST -----  Contact: Saritha / [facility]  Type:  Needs Medical AdviceWho Called: SarithaPharmacy name and phone #: Would the patient rather a call back or a response via [facility]? Call [facility] Call Back Number: [phone] Information: He don't have gabapentin (NEURONTIN) 100 MG capsule in the home and need to know if he need to be on the medication or not. Also, the vibegron (GEMTESA) 75 mg Tab is to expensive.

## 2025-03-06 NOTE — TELEPHONE ENCOUNTER
No care due was identified.  [facility] Embedded Care Due Messages. Reference number: [id number].   3/06/2025 3:22:30 PM CST   Scheduling Information  To schedule your CT/MRI scan, please contact [facility] at [phone] OR [facility] at [phone]    To schedule your Surgery, please contact our Specialty Schedulers at [phone]      ENT Clinic Locations Clinic Hours Telephone Number     [facility]  [address] [place] [address]   Monday:           1:00pm -- 5:00pm    Friday:              8:00am - 12:00pm   To schedule/reschedule an appointment with   Dr. Hermosillo,   please contact our   Specialty Scheduling Department at:     [phone]       [facility]  [address] [place] [address] Tuesday:          8:00am -- 2:00pm         Urgent Care Locations Clinic Hours Telephone Numbers     [facility]  [address] [place] [address]     Monday-Friday:     11:00am - 9:00pm    Saturday-Sunday:  9:00am - 5:00pm   [phone]     [facility]  [address] [place] [address]     Monday-Friday:      5:00pm - 9:00pm     Saturday-Sunday:  9:00am - 5:00pm   [phone]

## 2025-04-07 ENCOUNTER — DOCUMENT SCAN (OUTPATIENT)
Dept: HOME HEALTH SERVICES | Facility: HOSPITAL | Age: OVER 89
End: 2025-04-07
Payer: MEDICARE

## 2025-04-09 ENCOUNTER — DOCUMENT SCAN (OUTPATIENT)
Dept: HOME HEALTH SERVICES | Facility: HOSPITAL | Age: OVER 89
End: 2025-04-09
Payer: MEDICARE

## 2025-04-10 ENCOUNTER — DOCUMENT SCAN (OUTPATIENT)
Dept: HOME HEALTH SERVICES | Facility: HOSPITAL | Age: OVER 89
End: 2025-04-10
Payer: MEDICARE

## 2025-04-15 ENCOUNTER — DOCUMENTATION ONLY (OUTPATIENT)
Dept: INTERNAL MEDICINE | Facility: CLINIC | Age: OVER 89
End: 2025-04-15
Payer: MEDICARE

## 2025-04-23 ENCOUNTER — DOCUMENTATION ONLY (OUTPATIENT)
Dept: INTERNAL MEDICINE | Facility: CLINIC | Age: OVER 89
End: 2025-04-23
Payer: MEDICARE

## 2025-04-23 ENCOUNTER — OFFICE VISIT (OUTPATIENT)
Dept: INTERNAL MEDICINE | Facility: CLINIC | Age: OVER 89
End: 2025-04-23
Payer: MEDICARE

## 2025-04-23 VITALS
OXYGEN SATURATION: 99 % | SYSTOLIC BLOOD PRESSURE: 138 MMHG | RESPIRATION RATE: 16 BRPM | HEART RATE: 92 BPM | HEIGHT: 66 IN | TEMPERATURE: 98 F | WEIGHT: 159.38 LBS | DIASTOLIC BLOOD PRESSURE: 70 MMHG | BODY MASS INDEX: 25.61 KG/M2

## 2025-04-23 DIAGNOSIS — I10 ESSENTIAL (PRIMARY) HYPERTENSION: ICD-10-CM

## 2025-04-23 DIAGNOSIS — J44.9 CHRONIC OBSTRUCTIVE PULMONARY DISEASE, UNSPECIFIED COPD TYPE: ICD-10-CM

## 2025-04-23 DIAGNOSIS — I70.0 ATHEROSCLEROSIS OF AORTA: ICD-10-CM

## 2025-04-23 DIAGNOSIS — Z00.00 WELL ADULT EXAM: Primary | ICD-10-CM

## 2025-04-23 DIAGNOSIS — Z74.09 IMPAIRED FUNCTIONAL MOBILITY, BALANCE, GAIT, AND ENDURANCE: ICD-10-CM

## 2025-04-23 DIAGNOSIS — G62.9 PERIPHERAL POLYNEUROPATHY: ICD-10-CM

## 2025-04-23 DIAGNOSIS — I71.40 ABDOMINAL AORTIC ANEURYSM (AAA) WITHOUT RUPTURE, UNSPECIFIED PART: ICD-10-CM

## 2025-04-23 DIAGNOSIS — D64.9 CHRONIC ANEMIA: ICD-10-CM

## 2025-04-23 DIAGNOSIS — E78.00 PURE HYPERCHOLESTEROLEMIA: Chronic | ICD-10-CM

## 2025-04-23 DIAGNOSIS — N40.1 BENIGN PROSTATIC HYPERPLASIA WITH WEAK URINARY STREAM: Chronic | ICD-10-CM

## 2025-04-23 DIAGNOSIS — J98.4 CALCIFIED GRANULOMA OF LUNG: ICD-10-CM

## 2025-04-23 DIAGNOSIS — R39.12 BENIGN PROSTATIC HYPERPLASIA WITH WEAK URINARY STREAM: Chronic | ICD-10-CM

## 2025-04-23 DIAGNOSIS — R73.03 PRE-DIABETES: ICD-10-CM

## 2025-04-23 DIAGNOSIS — I73.9 PVD (PERIPHERAL VASCULAR DISEASE): ICD-10-CM

## 2025-04-23 PROBLEM — Z87.891 PERSONAL HISTORY OF NICOTINE DEPENDENCE: Status: RESOLVED | Noted: 2020-10-07 | Resolved: 2025-04-23

## 2025-04-23 PROCEDURE — 99397 PER PM REEVAL EST PAT 65+ YR: CPT | Mod: HCNC,S$GLB,, | Performed by: PEDIATRICS

## 2025-04-23 PROCEDURE — 1126F AMNT PAIN NOTED NONE PRSNT: CPT | Mod: HCNC,CPTII,S$GLB, | Performed by: PEDIATRICS

## 2025-04-23 PROCEDURE — 1159F MED LIST DOCD IN RCRD: CPT | Mod: HCNC,CPTII,S$GLB, | Performed by: PEDIATRICS

## 2025-04-23 PROCEDURE — 1160F RVW MEDS BY RX/DR IN RCRD: CPT | Mod: HCNC,CPTII,S$GLB, | Performed by: PEDIATRICS

## 2025-04-23 PROCEDURE — 99999 PR PBB SHADOW E&M-EST. PATIENT-LVL V: CPT | Mod: PBBFAC,HCNC,, | Performed by: PEDIATRICS

## 2025-04-23 PROCEDURE — 3288F FALL RISK ASSESSMENT DOCD: CPT | Mod: HCNC,CPTII,S$GLB, | Performed by: PEDIATRICS

## 2025-04-23 PROCEDURE — 1101F PT FALLS ASSESS-DOCD LE1/YR: CPT | Mod: HCNC,CPTII,S$GLB, | Performed by: PEDIATRICS

## 2025-04-23 RX ORDER — FLUTICASONE FUROATE, UMECLIDINIUM BROMIDE AND VILANTEROL TRIFENATATE 200; 62.5; 25 UG/1; UG/1; UG/1
1 POWDER RESPIRATORY (INHALATION) DAILY
Qty: 60 EACH | Refills: 11 | Status: SHIPPED | OUTPATIENT
Start: 2025-04-23

## 2025-04-23 RX ORDER — NEBULIZER AND COMPRESSOR
EACH MISCELLANEOUS
Qty: 1 EACH | Refills: 0 | Status: SHIPPED | OUTPATIENT
Start: 2025-04-23

## 2025-04-23 NOTE — PROGRESS NOTES
Subjective:       Patient ID: Chao Hawk is a 89 y.o. male.    Chief Complaint: Follow-up    Chao Hawk is a 89 year old male here for his annual follow up.     PMHx, PSHx, SocHx, and FHx reviewed and discussed with patient.     1) HTN: B/P normal, no HTNive symptoms  2) LIPIDS: following D&E, tolerating and compliant with med(s).    3) BPH: on meds and Sx acceptable seeing Urology.  4) COPD/Pulmonary artery HTN: sees pulmonary, worsening, chronically SOB but stable, using inhalers but has not noticed much help  5) Low T: no longer On depo testosterone.  6) Back/Neuropathy: s/p surgery, uses canes only for balance and support for uneven surface, has residual feet numbness. Worse in AM until he gets around. Uses only CBD oil. Not any other OTC or tramadol. Fall risk is now minimal, uses cane, no falls.  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   8) AAA: medical management. CT 2018: distal abdominal aortic aneurysm measuring 3.6 cm AP dimension 3.3 cm transverse dimension.  There is also fusiform aneurysmal dilatation of the mid infrarenal abdominal aorta measuring approximately 3.2 cm in diameter. Last  2021  9) Anemia/Hx of GI bleed/gastritis/GERD: on protonix  10) Prediabetes: no hyperglycemic sxs     LABS REVIEWED AND DISCUSSED WITH PATIENT      Follow-up  Associated symptoms include arthralgias. Pertinent negatives include no abdominal pain, chest pain, congestion, coughing, fever, headaches, joint swelling, rash or vomiting.     Review of Systems   Constitutional:  Positive for unexpected weight change (SOCIALLY BY SELF PREPARES MEALS, ONLY EATS TWICE A DAY). Negative for fever.   HENT:  Negative for congestion and rhinorrhea.    Eyes:  Negative for discharge and redness.   Respiratory:  Positive for shortness of breath (STABLE). Negative for cough and wheezing.    Cardiovascular:  Negative for chest pain, palpitations and leg swelling.   Gastrointestinal:  Negative for abdominal pain,  constipation, diarrhea and vomiting.   Endocrine: Negative for cold intolerance, heat intolerance, polydipsia, polyphagia and polyuria.   Genitourinary:  Positive for difficulty urinating (PER UROLOGY). Negative for decreased urine volume.   Musculoskeletal:  Positive for arthralgias, back pain and gait problem (USES CANE, NO FALLS). Negative for joint swelling.   Skin:  Negative for rash and wound.   Neurological:  Negative for syncope and headaches.   Psychiatric/Behavioral:  Negative for behavioral problems, dysphoric mood and sleep disturbance. The patient is not nervous/anxious.        Objective:      Physical Exam  Vitals and nursing note reviewed.   Constitutional:       General: He is not in acute distress.     Appearance: He is well-developed.   Neck:      Thyroid: No thyromegaly.      Vascular: No JVD.   Cardiovascular:      Rate and Rhythm: Normal rate and regular rhythm.      Heart sounds: Normal heart sounds. No murmur heard.  Pulmonary:      Effort: Pulmonary effort is normal. No respiratory distress.      Breath sounds: Normal breath sounds. No wheezing or rales.   Abdominal:      General: There is no distension.      Palpations: Abdomen is soft. There is no mass.      Tenderness: There is no abdominal tenderness. There is no guarding.   Musculoskeletal:      Right lower leg: No edema.      Left lower leg: No edema.   Lymphadenopathy:      Cervical: No cervical adenopathy.   Skin:     Capillary Refill: Capillary refill takes less than 2 seconds.      Findings: No rash.   Neurological:      General: No focal deficit present.      Mental Status: He is alert and oriented to person, place, and time.      Cranial Nerves: No cranial nerve deficit.      Coordination: Coordination normal.   Psychiatric:         Mood and Affect: Mood normal.         Behavior: Behavior normal.         Thought Content: Thought content normal.         Judgment: Judgment normal.         Assessment:       1. Well adult exam    2.  Abdominal aortic aneurysm (AAA) without rupture, unspecified part    3. Atherosclerosis of aorta    4. Benign prostatic hyperplasia with weak urinary stream    5. Chronic anemia    6. Chronic obstructive pulmonary disease, unspecified COPD type    7. Essential (primary) hypertension    8. Impaired functional mobility, balance, gait, and endurance    9. Peripheral polyneuropathy    10. Pre-diabetes    11. Pure hypercholesterolemia    12. PVD (peripheral vascular disease)    13. Calcified granuloma of lung        Plan:       Well adult exam    Abdominal aortic aneurysm (AAA) without rupture, unspecified part    Atherosclerosis of aorta    Benign prostatic hyperplasia with weak urinary stream    Chronic anemia    Chronic obstructive pulmonary disease, unspecified COPD type  -     fluticasone-umeclidin-vilanter (TRELEGY ELLIPTA) 200-62.5-25 mcg inhaler; Inhale 1 puff into the lungs once daily.  Dispense: 60 each; Refill: 11  -     nebulizer and compressor Gabriella; USE WITH ALBUTEROL Q 4 HOURS PRN  Dispense: 1 each; Refill: 0  -     nebulizer accessories Kit; USE Q4HRS  Dispense: 1 kit; Refill: 11    Essential (primary) hypertension    Impaired functional mobility, balance, gait, and endurance    Peripheral polyneuropathy    Pre-diabetes    Pure hypercholesterolemia    PVD (peripheral vascular disease)    Calcified granuloma of lung    Overall he is stable except for COPD, restart trelegy daily and albuterol prn. HMI d/w pt. F/u 6 months with labs.

## 2025-04-29 ENCOUNTER — EXTERNAL HOME HEALTH (OUTPATIENT)
Dept: HOME HEALTH SERVICES | Facility: HOSPITAL | Age: OVER 89
End: 2025-04-29
Payer: MEDICARE

## 2025-05-09 ENCOUNTER — DOCUMENT SCAN (OUTPATIENT)
Dept: HOME HEALTH SERVICES | Facility: HOSPITAL | Age: OVER 89
End: 2025-05-09
Payer: MEDICARE

## 2025-06-18 ENCOUNTER — HOSPITAL ENCOUNTER (OUTPATIENT)
Dept: RADIOLOGY | Facility: HOSPITAL | Age: OVER 89
Discharge: HOME | End: 2025-06-18
Payer: MEDICARE

## 2025-06-18 ENCOUNTER — OFFICE VISIT (OUTPATIENT)
Dept: INTERNAL MEDICINE | Facility: CLINIC | Age: OVER 89
End: 2025-06-18
Payer: MEDICARE

## 2025-06-18 ENCOUNTER — NURSE TRIAGE (OUTPATIENT)
Dept: ADMINISTRATIVE | Facility: CLINIC | Age: OVER 89
End: 2025-06-18
Payer: MEDICARE

## 2025-06-18 VITALS
DIASTOLIC BLOOD PRESSURE: 78 MMHG | WEIGHT: 160.94 LBS | HEART RATE: 83 BPM | SYSTOLIC BLOOD PRESSURE: 164 MMHG | RESPIRATION RATE: 20 BRPM | BODY MASS INDEX: 25.86 KG/M2 | HEIGHT: 66 IN | OXYGEN SATURATION: 94 % | TEMPERATURE: 97 F

## 2025-06-18 DIAGNOSIS — M25.552 LEFT HIP PAIN: ICD-10-CM

## 2025-06-18 DIAGNOSIS — M25.552 LEFT HIP PAIN: Primary | ICD-10-CM

## 2025-06-18 PROCEDURE — 73502 X-RAY EXAM HIP UNI 2-3 VIEWS: CPT | Mod: TC,HCNC,LT

## 2025-06-18 PROCEDURE — 73502 X-RAY EXAM HIP UNI 2-3 VIEWS: CPT | Mod: 26,HCNC,LT, | Performed by: RADIOLOGY

## 2025-06-18 PROCEDURE — 99999 PR PBB SHADOW E&M-EST. PATIENT-LVL V: CPT | Mod: PBBFAC,HCNC,,

## 2025-06-18 RX ORDER — DICLOFENAC SODIUM 10 MG/G
2 GEL TOPICAL 3 TIMES DAILY
Qty: 20 G | Refills: 0 | Status: SHIPPED | OUTPATIENT
Start: 2025-06-18

## 2025-06-18 NOTE — PROGRESS NOTES
Chao Hawk  06/18/2025  [id number]    Amari Ureña MD  Patient Care Team:  Amari Ureña MD as PCP - General (Internal Medicine)  [facility]  Gianni Payne MD (Ophthalmology)  Gael Hoffmann MD as Consulting Physician (Cardiology)  Obed Sotelo MD as Consulting Physician (Pulmonary Disease)  Reji Martin MD as Consulting Physician (Orthopedic Surgery)  Fausto Curran IV, MD as Consulting Physician (Urology)  Tone Pineda MD as Consulting Physician (Urology)    Visit Type:an urgent visit for a new problem    Chief Complaint:  Chief Complaint   Patient presents with    Hip Pain       History of Present Illness:    History of Present Illness    CHIEF COMPLAINT:  Patient presents today for hip pain.    MUSCULOSKELETAL:  He reports constant right hip pain persisting for more than a week. He denies any specific injury or trauma. Pain is localized to the hip region. He has been taking Tylenol with minimal relief of symptoms.    HYPERTENSION:  He takes Lisinopril typically at night but did not take medication today. He owns a home blood pressure cuff but rarely checks blood pressure, only monitoring when he thinks about it, which occurs infrequently.      ROS:  General: -fever, -chills, -fatigue, -weight gain, -weight loss  Eyes: -vision changes, -redness, -discharge  ENT: -ear pain, -nasal congestion, -sore throat  Cardiovascular: -chest pain, -palpitations, -lower extremity edema  Respiratory: -cough, -shortness of breath  Gastrointestinal: -abdominal pain, -nausea, -vomiting, -diarrhea, -constipation, -blood in stool  Genitourinary: -dysuria, -hematuria, -frequency  Musculoskeletal: +joint pain, -muscle pain, +pain with movement  Skin: -rash, -lesion  Neurological: -headache, -dizziness, -numbness, -tingling  Psychiatric: -anxiety, -depression, -sleep difficulty            The following were reviewed at this visit: active problem list,  medication list, allergies, family history, social history, and health maintenance.  History:  Past Medical History:   Diagnosis Date    Anemia     Arthritis     back, hand    Back pain     Dr. Cristobal- [facility]    Bilateral leg edema 02/16/2018    Chronic bronchitis     COPD (chronic obstructive pulmonary disease)     Diverticulitis of large intestine with perforation without abscess or bleeding     Diverticulosis 05/16/2006    colonoscopy    Hearing loss, bilateral     Hernia     x2    Hyperlipidemia     Hypertension     Lumbar radiculopathy 06/14/2017    Multiple renal cysts 07/05/2016    Nocturnal hypoxemia     Polyneuropathy     PVD (peripheral vascular disease) 11/08/2024    PVD (peripheral vascular disease) 11/8/2024    Tobacco dependence     Trouble in sleeping      Past Surgical History:   Procedure Laterality Date    CYSTOSCOPY WITH INSERTION OF MINIMALLY INVASIVE IMPLANT TO ENLARGE PROSTATIC URETHRA N/A 9/16/2024    Procedure: CYSTOSCOPY, WITH INSERTION OF UROLIFT IMPLANT;  Surgeon: Tone Pineda MD;  Location: [facility];  Service: Urology;  Laterality: N/A;    ESOPHAGOGASTRODUODENOSCOPY N/A 10/26/2022    Procedure: EGD (ESOPHAGOGASTRODUODENOSCOPY);  Surgeon: Mallika Duran MD;  Location: [facility];  Service: Endoscopy;  Laterality: N/A;    FINGER SURGERY Left 1980s    index- Dr. Encarnacion; to remove nodule    HERNIA REPAIR  1990s    x2    INTRAOCULAR LENS INSERTION Bilateral 2016    LUMBAR SPINE SURGERY  09/13/2017    REVERSE TOTAL SHOULDER ARTHROPLASTY Right 10/5/2020    Procedure: ARTHROPLASTY, SHOULDER, TOTAL, REVERSE;  Surgeon: Reji Martin MD;  Location: [facility] OR;  Service: Orthopedics;  Laterality: Right;    TONSILLECTOMY, ADENOIDECTOMY  1940    TRANSURETHRAL RESECTION OF PROSTATE N/A 9/16/2024    Procedure: TURP (TRANSURETHRAL RESECTION OF PROSTATE);  Surgeon: Tone Pineda MD;  Location: [facility] OR;  Service: Urology;  Laterality: N/A;     Problem  List[1]    Medications:  Medications Ordered Prior to Encounter[2]    Medications have been reviewed and reconciled with patient at this visit.    Exam:  Wt Readings from Last 3 Encounters:   06/18/25 73 kg (160 lb 15 oz)   04/23/25 72.3 kg (159 lb 6.3 oz)   02/06/25 74.6 kg (164 lb 7.4 oz)     Temp Readings from Last 3 Encounters:   06/18/25 96.7 °F (35.9 °C) (Tympanic)   04/23/25 97.7 °F (36.5 °C) (Tympanic)   02/01/25 97.6 °F (36.4 °C) (Oral)     BP Readings from Last 3 Encounters:   06/18/25 (!) 164/78   04/23/25 138/70   02/06/25 (!) 151/75     Pulse Readings from Last 3 Encounters:   06/18/25 83   04/23/25 92   02/06/25 105     Body mass index is 25.98 kg/m².      Physical Exam  HENT:      Head: Normocephalic.   Cardiovascular:      Rate and Rhythm: Normal rate and regular rhythm.      Pulses: Normal pulses.      Heart sounds: Normal heart sounds.   Pulmonary:      Effort: Pulmonary effort is normal.      Breath sounds: Normal breath sounds.   Musculoskeletal:      Left hip: Tenderness present. No deformity, lacerations or crepitus.        Legs:    Neurological:      General: No focal deficit present.      Mental Status: He is alert and oriented to person, place, and time.      Gait: Gait abnormal (Use cane).   Psychiatric:         Mood and Affect: Mood normal.         Behavior: Behavior normal.         Thought Content: Thought content normal.         Judgment: Judgment normal.         Laboratory Reviewed ({Yes)    Lab Results   Component Value Date    WBC 6.93 02/01/2025    HGB 13.0 (L) 02/01/2025    HCT 40.5 02/01/2025     02/01/2025    CHOL 114 (L) 08/29/2024    TRIG 46 08/29/2024    HDL 50 08/29/2024    ALT 12 02/01/2025    AST 15 02/01/2025     02/01/2025    K 3.9 02/01/2025     02/01/2025    CREATININE 0.9 02/01/2025    BUN 17 02/01/2025    CO2 24 02/01/2025    TSH 2.14 05/16/2011    PSA 3.2 01/10/2020    INR 0.9 02/01/2025    GLUF 96 08/31/2012    HGBA1C 5.8 (H) 08/29/2024     Chao  was seen today for hip pain.    Diagnoses and all orders for this visit:    Left hip pain  -     X-Ray Hip 2 or 3 views Left with Pelvis when performed; Future  -     diclofenac sodium (VOLTAREN ARTHRITIS PAIN) 1 % Gel; Apply 2 g topically 3 (three) times daily.    Assessment & Plan    IMPRESSION:  - Assessed hip pain, constant for over a week without apparent injury or trauma.  - Performed exam of the affected area.  - Considered elevated BP (164/78) in context of age, opting not to make changes to current management.    Left HIP PAIN:  - Patient reports constant left hip pain for more than a week without swelling, rash, or lesion.  - Physical exam confirms no visible abnormalities.  - Prescribed diclofenac gel to be applied to the painful area for pain management.  - Ordered hip x-ray to evaluate for any underlying issues.    ESSENTIAL HYPERTENSION:  - Patient's blood pressure is elevated at 164/78 and 170/74.  - Educated the patient on target BP range for their age (140s/90s).  - Continued Lisinopril for BP management, to be taken at night.  - Advised the patient to check BP at home 1-2 hours after taking medication and to maintain regular medication adherence.    MEDICATION NON-ADHERENCE:  - Noted that the patient did not take Lisinopril today, which may contribute to the elevated blood pressure readings.  - Reinforced the importance of consistent medication adherence for effective blood pressure control.        Care Plan/Goals: Reviewed     Follow up: No follow-ups on file.    After visit summary was printed and given to patient upon discharge today.  Patient goals and care plan are included in After Visit Summary.      Visit today included increased complexity associated with the care of the episodic problem Hypertension, which was addressed while instituting co-management of the longitudinal care of the patient due to the serious and/or complex managed problem(s) .    I have evaluated and discussed management  associated with medical care services that serve as the continuing focal point for all needed health care services and/or with medical care services that are part of ongoing care related to my patient's single, serious condition or a complex condition(s).    I am providing ongoing care and I am the primary care provider for this patient, and they are being managed, monitored, and/or observed for their chronic conditions over time.     I have addressed their ongoing health maintenance requirements and needs for all health care services and reviewed co-management plans provided by specialty providers when available.     This note was generated with the assistance of ambient listening technology. Verbal consent was obtained by the patient and accompanying visitor(s) for the recording of patient appointment to facilitate this note. I attest to having reviewed and edited the generated note for accuracy, though some syntax or spelling errors may persist. Please contact the author of this note for any clarification.            [1]   Patient Active Problem List  Diagnosis    Chronic obstructive pulmonary disease, unspecified    Low testosterone    Essential (primary) hypertension    Pure hypercholesterolemia    BPH (benign prostatic hyperplasia)    Atherosclerosis of aorta    Abdominal aortic aneurysm without rupture    DDD (degenerative disc disease), lumbar    Multiple renal cysts    Overweight (BMI 25.0-29.9)    Lumbar arthropathy    Calcified granuloma of lung    Bilateral leg edema    PAH (pulmonary artery hypertension)    History of diverticulitis    Nocturnal hypoxemia    Trochanteric bursitis of left hip    Impingement syndrome of right shoulder    Shoulder arthritis    Poor posture    Impaired functional mobility, balance, gait, and endurance    Decreased range of motion    Proximal muscle weakness    Rotator cuff arthropathy of right shoulder    Right glenohumeral arthritis -end stage    Peripheral neuropathy     S/P reverse total shoulder arthroplasty, right    Decreased right shoulder range of motion    Chest pain syndrome    Chronic anemia    Pre-diabetes    PVD (peripheral vascular disease)    Presence of right artificial shoulder joint    Problems related to living alone    Exudative age-related macular degeneration, right eye, with inactive choroidal neovascularization    Memory deficit   [2]   Current Outpatient Medications on File Prior to Visit   Medication Sig Dispense Refill    acetaminophen (TYLENOL) 500 MG tablet Take 500 mg by mouth as needed for Pain.      albuterol (PROVENTIL) 2.5 mg /3 mL (0.083 %) nebulizer solution Take 3 mLs (2.5 mg total) by nebulization every 4 (four) hours as needed for Wheezing. Rescue 150 mL 11    albuterol (VENTOLIN HFA) 90 mcg/actuation inhaler Inhale 2 puffs into the lungs every 6 (six) hours as needed for Wheezing. Rescue 18 g 0    ARGININE, L-ARGININE, ORAL Take 500 mg by mouth once daily.      atorvastatin (LIPITOR) 40 MG tablet TAKE 1 TABLET EVERY DAY 90 tablet 1    betamethasone valerate 0.1% (VALISONE) 0.1 % Oint Apply topically 2 (two) times daily. 45 g 1    CANNABIDIOL, CBD, EXTRACT ORAL Take 3 drops by mouth every morning.      cholecalciferol, vitamin D3, (VITAMIN D3) 50 mcg (2,000 unit) Cap Take 1 capsule by mouth every evening.      COQ10, UBIQUINOL, ORAL Take 1 tablet by mouth nightly.       cyanocobalamin (VITAMIN B-12) 1000 MCG tablet Take 100 mcg by mouth once daily.      fluticasone-umeclidin-vilanter (TRELEGY ELLIPTA) 200-62.5-25 mcg inhaler Inhale 1 puff into the lungs once daily. 60 each 11    furosemide (LASIX) 20 MG tablet Take 1 tablet (20 mg total) by mouth once daily. 90 tablet 3    gabapentin (NEURONTIN) 100 MG capsule Take 1 capsule (100 mg total) by mouth 2 (two) times daily. 180 capsule 3    GLUCOSAM-MSM-CHONDROIT-VIT D3 ORAL Take 2 tablets by mouth once daily.      krill/om-3/dha/epa/phospho/ast (MEGARED OMEGA-3 KRILL OIL ORAL) Take 1 capsule by mouth  nightly.       lisinopriL 10 MG tablet TAKE 1 TABLET EVERY DAY 90 tablet 3    metroNIDAZOLE (METROGEL) 0.75 % gel Apply topically 2 (two) times daily. 45 g 3    multivit-min/folic/vit K/lycop (ONE-A-DAY MEN'S MULTIVITAMIN ORAL) Take 1 tablet by mouth every evening.       mupirocin (BACTROBAN) 2 % ointment Apply topically 2 (two) times daily. To skin abrasion on right ankle 22 g 0    nebulizer accessories Kit USE Q4HRS 1 kit 11    nebulizer and compressor Gabriella USE WITH ALBUTEROL Q 4 HOURS PRN 1 each 0    potassium 99 mg Tab Take by mouth once daily.      tamsulosin (FLOMAX) 0.4 mg Cap TAKE 2 CAPSULES BY MOUTH EVERY MORNING 180 capsule 3    vibegron (GEMTESA) 75 mg Tab Take 1 tablet (75 mg total) by mouth once daily. 30 tablet 11     No current facility-administered medications on file prior to visit.

## 2025-06-18 NOTE — TELEPHONE ENCOUNTER
Chao c/o non-radiating sharp severe left hip pain x 2 days. Pain increases with sitting, very uncomfortable with sitting. Able to stand and walk. Denies recent injury/fall. Advised pt per triage protocol to go to office now. V/u. Appt scheduled for 4:00 pm today with NP JACI Case at [place] location.    Reason for Disposition   SEVERE pain (e.g., excruciating, unable to do any normal activities)    Additional Information   Negative: Looks like a broken bone or dislocated joint (e.g., crooked or deformed)   Negative: Sounds like a life-threatening emergency to the triager   Negative: Followed a hip injury   Negative: SEVERE pain (e.g., excruciating, unable to do any normal activities) and fever   Negative: Can't stand (bear weight) or walk   Negative: Fever and red area (or area very tender to touch)   Negative: Patient sounds very sick or weak to the triager    Protocols used: Hip Pain-A-[place]

## 2025-06-19 ENCOUNTER — RESULTS FOLLOW-UP (OUTPATIENT)
Dept: INTERNAL MEDICINE | Facility: CLINIC | Age: OVER 89
End: 2025-06-19

## 2025-06-24 RX ORDER — ATORVASTATIN CALCIUM 40 MG/1
40 TABLET, FILM COATED ORAL
Qty: 90 TABLET | Refills: 0 | Status: SHIPPED | OUTPATIENT
Start: 2025-06-24

## 2025-06-24 NOTE — TELEPHONE ENCOUNTER
Care Due:                  Date            Visit Type   Department     Provider  --------------------------------------------------------------------------------                                EP -                              PRIMARY      [facility] INTER[facility]  Last Visit: 04-      CARE ([facility])   [facility]       Amari Ureña                              EP -                              PRIMARY      [facility] INTER[facility]  Next Visit: 06-      CARE ([facility])   [facility]                                                            Last  Test          Frequency    Reason                     Performed    Due Date  --------------------------------------------------------------------------------    Lipid Panel.  12 months..  atorvastatin.............  08- 08-    [facility] Embedded Care Due Messages. Reference number: [id number].   6/24/2025 10:31:29 AM CDT

## 2025-06-24 NOTE — TELEPHONE ENCOUNTER
Refill Decision Note   Chao Hawk  is requesting a refill authorization.  Brief Assessment and Rationale for Refill:  Approve     Medication Therapy Plan:  FLOS; JANETS      Comments:     Note composed:11:02 AM 06/24/2025

## 2025-06-25 ENCOUNTER — OFFICE VISIT (OUTPATIENT)
Dept: INTERNAL MEDICINE | Facility: CLINIC | Age: OVER 89
End: 2025-06-25
Payer: MEDICARE

## 2025-06-25 VITALS
BODY MASS INDEX: 25.86 KG/M2 | TEMPERATURE: 99 F | SYSTOLIC BLOOD PRESSURE: 136 MMHG | HEART RATE: 100 BPM | OXYGEN SATURATION: 95 % | DIASTOLIC BLOOD PRESSURE: 66 MMHG | HEIGHT: 66 IN | WEIGHT: 160.94 LBS

## 2025-06-25 DIAGNOSIS — M16.12 PRIMARY OSTEOARTHRITIS OF LEFT HIP: Primary | ICD-10-CM

## 2025-06-25 DIAGNOSIS — J44.9 CHRONIC OBSTRUCTIVE PULMONARY DISEASE, UNSPECIFIED COPD TYPE: ICD-10-CM

## 2025-06-25 PROCEDURE — 3288F FALL RISK ASSESSMENT DOCD: CPT | Mod: CPTII,HCNC,S$GLB, | Performed by: PEDIATRICS

## 2025-06-25 PROCEDURE — 1160F RVW MEDS BY RX/DR IN RCRD: CPT | Mod: CPTII,HCNC,S$GLB, | Performed by: PEDIATRICS

## 2025-06-25 PROCEDURE — G2211 COMPLEX E/M VISIT ADD ON: HCPCS | Mod: HCNC,S$GLB,, | Performed by: PEDIATRICS

## 2025-06-25 PROCEDURE — 99999 PR PBB SHADOW E&M-EST. PATIENT-LVL V: CPT | Mod: PBBFAC,HCNC,, | Performed by: PEDIATRICS

## 2025-06-25 PROCEDURE — 1101F PT FALLS ASSESS-DOCD LE1/YR: CPT | Mod: CPTII,HCNC,S$GLB, | Performed by: PEDIATRICS

## 2025-06-25 PROCEDURE — 99214 OFFICE O/P EST MOD 30 MIN: CPT | Mod: HCNC,S$GLB,, | Performed by: PEDIATRICS

## 2025-06-25 PROCEDURE — 1159F MED LIST DOCD IN RCRD: CPT | Mod: CPTII,HCNC,S$GLB, | Performed by: PEDIATRICS

## 2025-06-25 PROCEDURE — 1125F AMNT PAIN NOTED PAIN PRSNT: CPT | Mod: CPTII,HCNC,S$GLB, | Performed by: PEDIATRICS

## 2025-06-25 RX ORDER — ALBUTEROL SULFATE 90 UG/1
2 INHALANT RESPIRATORY (INHALATION) EVERY 6 HOURS PRN
Qty: 18 G | Refills: 0 | Status: SHIPPED | OUTPATIENT
Start: 2025-06-25

## 2025-06-25 RX ORDER — FLUTICASONE FUROATE, UMECLIDINIUM BROMIDE AND VILANTEROL TRIFENATATE 200; 62.5; 25 UG/1; UG/1; UG/1
1 POWDER RESPIRATORY (INHALATION) DAILY
Qty: 60 EACH | Refills: 11 | Status: SHIPPED | OUTPATIENT
Start: 2025-06-25

## 2025-06-25 RX ORDER — ALBUTEROL SULFATE 0.83 MG/ML
2.5 SOLUTION RESPIRATORY (INHALATION) EVERY 4 HOURS PRN
Qty: 150 ML | Refills: 11 | Status: SHIPPED | OUTPATIENT
Start: 2025-06-25

## 2025-06-25 NOTE — PROGRESS NOTES
Patient ID: Chao Hawk is a 89 y.o. male.    Chief Complaint: Hip Pain (Left)    History of Present Illness    CHIEF COMPLAINT:  Patient presents today with left hip pain.    HIP PAIN:  He reports left hip pain present for at least one month. Pain is primarily experienced when sitting, with severity ranging from 4-5/10, occasionally escalating to 6/10. He denies pain with movement or walking. A nurse practitioner previously prescribed a topical gel, which provides temporary relief for a few hours before pain returns. He currently takes Tylenol 500 mg in the morning for pain management. He denies additional associated symptoms or significant impact on daily activities.    PMH, PSH, SH, FH reviewed with patient.      ROS:  General: -fever, -chills, -fatigue, -weight gain, -weight loss  Eyes: -vision changes, -redness, -discharge  ENT: -ear pain, -nasal congestion, -sore throat  Cardiovascular: -chest pain, -palpitations, -lower extremity edema  Respiratory: -cough, -shortness of breath, +wheezing  Gastrointestinal: -abdominal pain, -nausea, -vomiting, -diarrhea, -constipation, -blood in stool  Genitourinary: -dysuria, -hematuria, -frequency  Musculoskeletal: +joint pain, -muscle pain, +pain with movement  Skin: -rash, -lesion  Neurological: -headache, -dizziness, -numbness, -tingling  Psychiatric: -anxiety, -depression, -sleep difficulty         Exam:  Physical Exam    General: No acute distress. Well-developed. Well-nourished.  Eyes: EOMI. Sclerae anicteric.  HENT: Normocephalic. Atraumatic. Nares patent. Moist oral mucosa.  Cardiovascular: Regular rate. Regular rhythm. No murmurs. No rubs. No gallops. Normal S1, S2.  Respiratory: Normal respiratory effort. Clear to auscultation bilaterally. No rales. No rhonchi. Slight wheeze noted.  Abdomen: Soft. Non-tender. Non-distended. Normoactive bowel sounds.  Extremities: No lower extremity edema.  Neurological: Alert & oriented x3. No slurred speech. Normal  gait.  Psychiatric: Normal mood. Normal affect. Good insight. Good judgment.  Skin: Warm. Dry. No rash.  MSK: Spine - Hip: No ulcers or broken skin on left hip. Shuffling gait with cane. Tender at left inferior pubic ramus.        Assessment/Plan:  Primary osteoarthritis of left hip  -     Ambulatory referral/consult to Orthopedics; Future; Expected date: 07/02/2025    Chronic obstructive pulmonary disease, unspecified COPD type  -     albuterol (PROVENTIL) 2.5 mg /3 mL (0.083 %) nebulizer solution; Take 3 mLs (2.5 mg total) by nebulization every 4 (four) hours as needed for Wheezing. Rescue  Dispense: 150 mL; Refill: 11  -     fluticasone-umeclidin-vilanter (TRELEGY ELLIPTA) 200-62.5-25 mcg inhaler; Inhale 1 puff into the lungs once daily.  Dispense: 60 each; Refill: 11    Other orders  -     albuterol (VENTOLIN HFA) 90 mcg/actuation inhaler; Inhale 2 puffs into the lungs every 6 (six) hours as needed for Wheezing. Rescue  Dispense: 18 g; Refill: 0         Assessment & Plan    IMPRESSION:  - Left hip pain is 4-5/10, sometimes 6/10, primarily when sitting.  - Slight wheeze on lung auscultation.    PAIN IN LEFT HIP:  - Patient reports left hip pain rated 4-6/10, worsening with sitting and temporarily relieved by topical gel application.  - Physical exam shows no ulcers or broken skin.  - Patient has not seen an orthopedist but was previously advised by a nurse practitioner to use topical gel.  - Prescribed Tylenol for pain management, to be taken once in the morning.    WHEEZING:  - Noted slight wheeze during physical exam. He has known COPD followed by pulmonary and self stopped treatment. He agrees to resume above.         Visit today included increased complexity associated with the care of the episodic problem  addressed and managing the longitudinal care of the patient due to the serious and/or complex managed problem(s) .      No follow-ups on file.    This note was generated with the assistance of happin!  listening technology. Verbal consent was obtained by the patient and accompanying visitor(s) for the recording of patient appointment to facilitate this note. I attest to having reviewed and edited the generated note for accuracy, though some syntax or spelling errors may persist. Please contact the author of this note for any clarification.

## 2025-07-21 ENCOUNTER — OFFICE VISIT (OUTPATIENT)
Dept: INTERNAL MEDICINE | Facility: CLINIC | Age: OVER 89
End: 2025-07-21
Payer: MEDICARE

## 2025-07-21 ENCOUNTER — TELEPHONE (OUTPATIENT)
Dept: INTERNAL MEDICINE | Facility: CLINIC | Age: OVER 89
End: 2025-07-21
Payer: MEDICARE

## 2025-07-21 VITALS
WEIGHT: 168.19 LBS | HEIGHT: 66 IN | OXYGEN SATURATION: 89 % | BODY MASS INDEX: 27.03 KG/M2 | HEART RATE: 97 BPM | DIASTOLIC BLOOD PRESSURE: 78 MMHG | TEMPERATURE: 98 F | SYSTOLIC BLOOD PRESSURE: 142 MMHG

## 2025-07-21 DIAGNOSIS — H91.90 HEARING LOSS, UNSPECIFIED HEARING LOSS TYPE, UNSPECIFIED LATERALITY: Primary | ICD-10-CM

## 2025-07-21 DIAGNOSIS — I10 ESSENTIAL (PRIMARY) HYPERTENSION: ICD-10-CM

## 2025-07-21 PROCEDURE — 1101F PT FALLS ASSESS-DOCD LE1/YR: CPT | Mod: CPTII,HCNC,S$GLB, | Performed by: FAMILY MEDICINE

## 2025-07-21 PROCEDURE — 3288F FALL RISK ASSESSMENT DOCD: CPT | Mod: CPTII,HCNC,S$GLB, | Performed by: FAMILY MEDICINE

## 2025-07-21 PROCEDURE — 1126F AMNT PAIN NOTED NONE PRSNT: CPT | Mod: CPTII,HCNC,S$GLB, | Performed by: FAMILY MEDICINE

## 2025-07-21 PROCEDURE — 1159F MED LIST DOCD IN RCRD: CPT | Mod: CPTII,HCNC,S$GLB, | Performed by: FAMILY MEDICINE

## 2025-07-21 PROCEDURE — 99212 OFFICE O/P EST SF 10 MIN: CPT | Mod: HCNC,S$GLB,, | Performed by: FAMILY MEDICINE

## 2025-07-21 PROCEDURE — 99999 PR PBB SHADOW E&M-EST. PATIENT-LVL V: CPT | Mod: PBBFAC,HCNC,, | Performed by: FAMILY MEDICINE

## 2025-07-21 NOTE — TELEPHONE ENCOUNTER
Copied from CRM #[id number]. Topic: Appointments - Same Day Access  >> Jul 21, 2025 11:04 AM Juliana wrote:  Type:  Same Day Appointment Request    Caller is requesting a same day appointment.  Caller declined first available appointment listed below.    Name of Caller:Pt Son Juan Hawk  When is the first available appointment?7/23//2025   Symptoms: Hearing aid small piece stuff in ear  Best Call Back Number: [phone] Juan Hawk  Additional Information: Pt son states part of hearing aid is stuck in his ear and needs removal. Pt is open to be seen by anyone for removal of item.

## 2025-07-21 NOTE — PROGRESS NOTES
Chief Complaint: Physical Exam    History of Present Illness    CHIEF COMPLAINT:  Mr. Hawk presents today for evaluation of a hearing aid piece believed to be stuck in ear.    EAR CONCERNS:  He reports a piece from his hearing aid became dislodged and remained in his ear after removing the device last night. He denies any ear pain or pressure.    CARDIOVASCULAR:  He reports experiencing shortness of breath. His blood pressure was normal approximately one month ago, and he is willing to have blood pressure rechecked today. He denies any other symptoms of hypertension.          Objective:   Physical Exam    Vitals: Reviewed. Nursing note reviewed. BLOOD PRESSURE: 142/78.  Constitutional: Alert.  HENT: Normocephalic. Atraumatic. External ears normal. Nose normal. PERRL. Conjunctivae normal. SMALL AMOUNT OF CERUMEN IN r EAR. Both ac clear both tms nl  Skin: Warm. Dry.  Neurological: Oriented x3.  Psychiatric: Behavior normal. Thought content normal. Judgment normal.       Assessment:       1. Hearing loss, unspecified hearing loss type, unspecified laterality    2. Essential (primary) hypertension        Plan:   Assessment & Plan    Examined left ear for reported stuck hearing aid piece.  Did not visualize any foreign object in either ear canal.  Noted slightly elevated BP, rechecked and found improvement (142/78).  Concluded hearing aid piece likely not in ear, possibly lost elsewhere.    PLAN SUMMARY:  Examined left ear, small amount of cerumen present  Discussed hearing aid replacement options  Advised patient to search for missing hearing aid piece at home      FOREIGN BODY IN EAR:  Examined both ears and found no foreign body present despite patient's report of a hearing aid piece stuck in the ear.  Advised patient to look for the missing piece at home and contact audiologist for replacement if not found.  Discussed potential replacement options.

## 2025-09-05 ENCOUNTER — CLINICAL SUPPORT (OUTPATIENT)
Dept: PULMONOLOGY | Facility: CLINIC | Age: OVER 89
End: 2025-09-05
Payer: MEDICARE

## 2025-09-05 VITALS — BODY MASS INDEX: 27.25 KG/M2 | HEIGHT: 66 IN | WEIGHT: 169.56 LBS

## 2025-09-05 DIAGNOSIS — J44.9 CHRONIC OBSTRUCTIVE PULMONARY DISEASE, UNSPECIFIED COPD TYPE: ICD-10-CM

## 2025-09-05 PROCEDURE — 99999 PR PBB SHADOW E&M-EST. PATIENT-LVL I: CPT | Mod: PBBFAC,HCNC,,

## (undated) DEVICE — SLING SHOULDER ULTRASLING 3 XL

## (undated) DEVICE — SOL NACL IRR 3000ML

## (undated) DEVICE — TAPE SURGICAL MICROFOAM 4IN

## (undated) DEVICE — TRAY SKIN SCRUB WET 4 COMPART

## (undated) DEVICE — KIT TRIMANO CHIN

## (undated) DEVICE — DRAPE PLASTIC U 60X72

## (undated) DEVICE — COVER LIGHT HANDLE 80/CA

## (undated) DEVICE — SUPPORT ULNA NERVE PROTECTOR

## (undated) DEVICE — CAUTERY TIP 2 3/4

## (undated) DEVICE — APPLICATOR CHLORAPREP ORN 26ML

## (undated) DEVICE — SEE MEDLINE ITEM 157216

## (undated) DEVICE — DRAPE INCISE IOBAN 2 23X17IN

## (undated) DEVICE — GOWN POLY REINF BRTH SLV XL

## (undated) DEVICE — Device

## (undated) DEVICE — ELECTRODE REM PLYHSV RETURN 9

## (undated) DEVICE — SUT VICRYL PLUS 0 CT1 36IN

## (undated) DEVICE — GLOVE BIOGEL PI ORTHO PRO 7.5

## (undated) DEVICE — SUT BLU BR 2 TAPERD NDL 1/2

## (undated) DEVICE — KIT TRIMANO

## (undated) DEVICE — SUT X425H ETHIBOND 1-0

## (undated) DEVICE — SPONGE COTTON TRAY 4X4IN

## (undated) DEVICE — ELECTRODE LOOP CUTTING BIPOLAR

## (undated) DEVICE — PAD ABD 8X10 STERILE

## (undated) DEVICE — DRAPE STERI INSTRUMENT 1018

## (undated) DEVICE — BOWL STERILE LARGE 32OZ

## (undated) DEVICE — MANIFOLD 4 PORT

## (undated) DEVICE — SLING ARM ULTRA III PAD LG

## (undated) DEVICE — CONTAINER SPECIMEN OR STER 4OZ

## (undated) DEVICE — SUT PROLENE 4-0 PS2 18 BLUE

## (undated) DEVICE — UNDERGLOVES BIOGEL PI SIZE 8

## (undated) DEVICE — UROVIEW 2600/2800

## (undated) DEVICE — SEE MEDLINE ITEM 157131

## (undated) DEVICE — HOOD FLYTE PEELWY STERISHIELD

## (undated) DEVICE — SOL 9P NACL IRR PIC IL

## (undated) DEVICE — CANISTER SUCTION JUMBO 12L

## (undated) DEVICE — SEE MEDLINE ITEM 157125

## (undated) DEVICE — SEE MEDLINE ITEM 157117

## (undated) DEVICE — SUT PROLENE 3-0 PS-1 BL 18

## (undated) DEVICE — GOWN POLY REINF X-LONG XL

## (undated) DEVICE — KIT IRR SUCTION HND PIECE

## (undated) DEVICE — DRAPE STERI U-SHAPED 47X51IN

## (undated) DEVICE — SPONGE LAP 18X18 PREWASHED

## (undated) DEVICE — TOWEL OR DISP STRL BLUE 4/PK

## (undated) DEVICE — DRAPE T CYSTOSCOPY STERILE

## (undated) DEVICE — SYR ONLY LUER LOCK 20CC

## (undated) DEVICE — GLOVE SIGNATURE ESSNTL LTX 7.5

## (undated) DEVICE — SEE MEDLINE ITEM 152622

## (undated) DEVICE — SOL IRRI STRL WATER 1000ML

## (undated) DEVICE — GAUZE SPONGE 4X4 12PLY

## (undated) DEVICE — SUT TI-CRON BLU 2 30 HOS-10

## (undated) DEVICE — BLADE SAG 18.0X1.27X100

## (undated) DEVICE — SUT VICRYL 2-0 CT-2 VCP269H

## (undated) DEVICE — SEE MEDLINE ITEM 157027